# Patient Record
Sex: FEMALE | Race: WHITE | NOT HISPANIC OR LATINO
[De-identification: names, ages, dates, MRNs, and addresses within clinical notes are randomized per-mention and may not be internally consistent; named-entity substitution may affect disease eponyms.]

---

## 2017-01-17 ENCOUNTER — APPOINTMENT (OUTPATIENT)
Dept: SURGERY | Facility: CLINIC | Age: 47
End: 2017-01-17

## 2017-01-17 VITALS — HEART RATE: 70 BPM | RESPIRATION RATE: 15 BRPM | OXYGEN SATURATION: 100 % | TEMPERATURE: 98.2 F

## 2017-01-17 DIAGNOSIS — K63.2 FISTULA OF INTESTINE: ICD-10-CM

## 2018-06-12 ENCOUNTER — APPOINTMENT (OUTPATIENT)
Dept: SURGERY | Facility: CLINIC | Age: 48
End: 2018-06-12
Payer: MEDICAID

## 2018-06-12 VITALS
HEART RATE: 62 BPM | DIASTOLIC BLOOD PRESSURE: 70 MMHG | RESPIRATION RATE: 16 BRPM | SYSTOLIC BLOOD PRESSURE: 104 MMHG | TEMPERATURE: 98.7 F | OXYGEN SATURATION: 100 %

## 2018-06-12 DIAGNOSIS — K43.2 INCISIONAL HERNIA W/OUT OBSTRUCTION OR GANGRENE: ICD-10-CM

## 2018-06-12 PROCEDURE — 99213 OFFICE O/P EST LOW 20 MIN: CPT

## 2018-06-12 RX ORDER — PNV NO.95/FERROUS FUM/FOLIC AC 28MG-0.8MG
TABLET ORAL
Refills: 0 | Status: ACTIVE | COMMUNITY

## 2018-06-12 RX ORDER — CHOLECALCIFEROL (VITAMIN D3) 50 MCG
2000 CAPSULE ORAL
Refills: 0 | Status: ACTIVE | COMMUNITY

## 2018-07-31 ENCOUNTER — APPOINTMENT (OUTPATIENT)
Dept: OPHTHALMOLOGY | Facility: CLINIC | Age: 48
End: 2018-07-31
Payer: MEDICAID

## 2018-07-31 PROCEDURE — 92014 COMPRE OPH EXAM EST PT 1/>: CPT

## 2018-07-31 PROCEDURE — 92020 GONIOSCOPY: CPT

## 2018-07-31 PROCEDURE — 92132 CPTRZD OPH DX IMG ANT SGM: CPT

## 2019-07-23 ENCOUNTER — TRANSCRIPTION ENCOUNTER (OUTPATIENT)
Age: 49
End: 2019-07-23

## 2019-08-07 ENCOUNTER — LABORATORY RESULT (OUTPATIENT)
Age: 49
End: 2019-08-07

## 2019-08-07 ENCOUNTER — NON-APPOINTMENT (OUTPATIENT)
Age: 49
End: 2019-08-07

## 2019-08-07 ENCOUNTER — APPOINTMENT (OUTPATIENT)
Dept: CARDIOLOGY | Facility: CLINIC | Age: 49
End: 2019-08-07
Payer: MEDICAID

## 2019-08-07 VITALS
HEIGHT: 67 IN | OXYGEN SATURATION: 97 % | DIASTOLIC BLOOD PRESSURE: 60 MMHG | HEART RATE: 73 BPM | BODY MASS INDEX: 41.44 KG/M2 | TEMPERATURE: 97.9 F | WEIGHT: 264 LBS | SYSTOLIC BLOOD PRESSURE: 110 MMHG

## 2019-08-07 DIAGNOSIS — R41.3 OTHER AMNESIA: ICD-10-CM

## 2019-08-07 PROCEDURE — 93306 TTE W/DOPPLER COMPLETE: CPT

## 2019-08-07 PROCEDURE — 93000 ELECTROCARDIOGRAM COMPLETE: CPT

## 2019-08-07 PROCEDURE — 99396 PREV VISIT EST AGE 40-64: CPT

## 2019-08-07 NOTE — HISTORY OF PRESENT ILLNESS
[de-identified] : This is a 48 year old lady with a PSH of Partial Gastrectomy, Small bowel Resection, Ventral hernia Repair, and Skin Debridement of the Abdomen presents today to establish care and for her annual wellness exam. Patient states that she has an Abdominal Hernia since 2016, and is awaiting to have surgery with Dr. Bowen once she loses weight. Patient states that she believes that she has had many complications from the multiple surgeries and attributes her symptoms of fatigue and difficulty concentrating to the surgeries. She does feel nauseous after eating, however, she does attribute to surgery.  Patient denies dyspnea, palpitations, chest pain, vomiting, dizziness and lightheadedness.\par  [FreeTextEntry1] : yearly physical

## 2019-08-07 NOTE — REVIEW OF SYSTEMS
[Fatigue] : fatigue [Nausea] : nausea [Memory Loss] : memory loss [Negative] : Psychiatric [Fever] : no fever [Chills] : no chills [Hot Flashes] : no hot flashes [Night Sweats] : no night sweats [Recent Change In Weight] : ~T no recent weight change [Abdominal Pain] : no abdominal pain [Constipation] : no constipation [Diarrhea] : diarrhea [Vomiting] : no vomiting [Heartburn] : no heartburn [Melena] : no melena [Headache] : no headache [Dizziness] : no dizziness [Fainting] : no fainting [Confusion] : no confusion [Unsteady Walking] : no ataxia [de-identified] : Difficulty concentrating

## 2019-08-07 NOTE — PHYSICAL EXAM
[No Acute Distress] : no acute distress [Well Nourished] : well nourished [Well Developed] : well developed [Normal Sclera/Conjunctiva] : normal sclera/conjunctiva [Well-Appearing] : well-appearing [PERRL] : pupils equal round and reactive to light [EOMI] : extraocular movements intact [Normal Outer Ear/Nose] : the outer ears and nose were normal in appearance [Normal Oropharynx] : the oropharynx was normal [No JVD] : no jugular venous distention [Supple] : supple [No Lymphadenopathy] : no lymphadenopathy [No Respiratory Distress] : no respiratory distress  [Thyroid Normal, No Nodules] : the thyroid was normal and there were no nodules present [No Accessory Muscle Use] : no accessory muscle use [Normal Rate] : normal rate  [Clear to Auscultation] : lungs were clear to auscultation bilaterally [Normal S1, S2] : normal S1 and S2 [Regular Rhythm] : with a regular rhythm [No Carotid Bruits] : no carotid bruits [No Murmur] : no murmur heard [No Abdominal Bruit] : a ~M bruit was not heard ~T in the abdomen [No Varicosities] : no varicosities [Pedal Pulses Present] : the pedal pulses are present [No Edema] : there was no peripheral edema [No Palpable Aorta] : no palpable aorta [No Extremity Clubbing/Cyanosis] : no extremity clubbing/cyanosis [Soft] : abdomen soft [Non Tender] : non-tender [No Masses] : no abdominal mass palpated [No HSM] : no HSM [Normal Bowel Sounds] : normal bowel sounds [Normal Posterior Cervical Nodes] : no posterior cervical lymphadenopathy [Normal Anterior Cervical Nodes] : no anterior cervical lymphadenopathy [No Spinal Tenderness] : no spinal tenderness [No CVA Tenderness] : no CVA  tenderness [Grossly Normal Strength/Tone] : grossly normal strength/tone [No Joint Swelling] : no joint swelling [No Rash] : no rash [Coordination Grossly Intact] : coordination grossly intact [No Focal Deficits] : no focal deficits [Normal Gait] : normal gait [Deep Tendon Reflexes (DTR)] : deep tendon reflexes were 2+ and symmetric [Normal Affect] : the affect was normal [Normal Insight/Judgement] : insight and judgment were intact [de-identified] : Abdominal Hernia  [de-identified] : nevi noted

## 2019-08-11 LAB
25(OH)D3 SERPL-MCNC: 31 NG/ML
ALBUMIN SERPL ELPH-MCNC: 4.3 G/DL
ALP BLD-CCNC: 56 U/L
ALT SERPL-CCNC: 16 U/L
ANION GAP SERPL CALC-SCNC: 11 MMOL/L
APPEARANCE: CLEAR
AST SERPL-CCNC: 10 U/L
BACTERIA: ABNORMAL
BASOPHILS # BLD AUTO: 0.12 K/UL
BASOPHILS NFR BLD AUTO: 1.1 %
BILIRUB SERPL-MCNC: 0.6 MG/DL
BILIRUBIN URINE: NEGATIVE
BLOOD URINE: NEGATIVE
BUN SERPL-MCNC: 14 MG/DL
CALCIUM SERPL-MCNC: 9.6 MG/DL
CHLORIDE SERPL-SCNC: 102 MMOL/L
CHOLEST SERPL-MCNC: 182 MG/DL
CHOLEST/HDLC SERPL: 2.8 RATIO
CO2 SERPL-SCNC: 24 MMOL/L
COLOR: YELLOW
CREAT SERPL-MCNC: 0.67 MG/DL
EOSINOPHIL # BLD AUTO: 0.2 K/UL
EOSINOPHIL NFR BLD AUTO: 1.9 %
ESTIMATED AVERAGE GLUCOSE: 111 MG/DL
FERRITIN SERPL-MCNC: 10 NG/ML
FOLATE SERPL-MCNC: >20 NG/ML
GLUCOSE QUALITATIVE U: NEGATIVE
GLUCOSE SERPL-MCNC: 87 MG/DL
HBA1C MFR BLD HPLC: 5.5 %
HCT VFR BLD CALC: 41 %
HDLC SERPL-MCNC: 65 MG/DL
HGB BLD-MCNC: 12.9 G/DL
HYALINE CASTS: 2 /LPF
IMM GRANULOCYTES NFR BLD AUTO: 0.4 %
IRON SERPL-MCNC: 76 UG/DL
KETONES URINE: NEGATIVE
LDLC SERPL CALC-MCNC: 101 MG/DL
LEUKOCYTE ESTERASE URINE: NEGATIVE
LYMPHOCYTES # BLD AUTO: 2.69 K/UL
LYMPHOCYTES NFR BLD AUTO: 25.4 %
MAGNESIUM SERPL-MCNC: 1.9 MG/DL
MAN DIFF?: NORMAL
MCHC RBC-ENTMCNC: 26.9 PG
MCHC RBC-ENTMCNC: 31.5 GM/DL
MCV RBC AUTO: 85.4 FL
MICROSCOPIC-UA: NORMAL
MONOCYTES # BLD AUTO: 0.88 K/UL
MONOCYTES NFR BLD AUTO: 8.3 %
NEUTROPHILS # BLD AUTO: 6.65 K/UL
NEUTROPHILS NFR BLD AUTO: 62.9 %
NITRITE URINE: NEGATIVE
PH URINE: 6
PHOSPHATE SERPL-MCNC: 3.8 MG/DL
PLATELET # BLD AUTO: 363 K/UL
POTASSIUM SERPL-SCNC: 4.5 MMOL/L
PROT SERPL-MCNC: 6.8 G/DL
PROTEIN URINE: NEGATIVE
RBC # BLD: 4.8 M/UL
RBC # FLD: 13.7 %
RED BLOOD CELLS URINE: 2 /HPF
SODIUM SERPL-SCNC: 137 MMOL/L
SPECIFIC GRAVITY URINE: 1.02
SQUAMOUS EPITHELIAL CELLS: 9 /HPF
T3RU NFR SERPL: 1.1 TBI
T4 FREE SERPL-MCNC: 1.1 NG/DL
T4 SERPL-MCNC: 6.1 UG/DL
TRANSFERRIN SERPL-MCNC: 279 MG/DL
TRIGL SERPL-MCNC: 79 MG/DL
TSH SERPL-ACNC: 1.23 UIU/ML
URATE SERPL-MCNC: 4.7 MG/DL
UROBILINOGEN URINE: NORMAL
VIT B12 SERPL-MCNC: 988 PG/ML
WBC # FLD AUTO: 10.58 K/UL
WHITE BLOOD CELLS URINE: 2 /HPF

## 2020-08-26 ENCOUNTER — APPOINTMENT (OUTPATIENT)
Dept: CARDIOLOGY | Facility: CLINIC | Age: 50
End: 2020-08-26
Payer: MEDICAID

## 2020-08-26 ENCOUNTER — NON-APPOINTMENT (OUTPATIENT)
Age: 50
End: 2020-08-26

## 2020-08-26 VITALS
DIASTOLIC BLOOD PRESSURE: 80 MMHG | WEIGHT: 260 LBS | HEIGHT: 67 IN | HEART RATE: 54 BPM | SYSTOLIC BLOOD PRESSURE: 118 MMHG | TEMPERATURE: 98.4 F | BODY MASS INDEX: 40.81 KG/M2 | OXYGEN SATURATION: 96 %

## 2020-08-26 DIAGNOSIS — Z12.39 ENCOUNTER FOR OTHER SCREENING FOR MALIGNANT NEOPLASM OF BREAST: ICD-10-CM

## 2020-08-26 DIAGNOSIS — L81.2 FRECKLES: ICD-10-CM

## 2020-08-26 DIAGNOSIS — Z71.89 OTHER SPECIFIED COUNSELING: ICD-10-CM

## 2020-08-26 DIAGNOSIS — Z12.11 ENCOUNTER FOR SCREENING FOR MALIGNANT NEOPLASM OF COLON: ICD-10-CM

## 2020-08-26 DIAGNOSIS — R53.83 OTHER FATIGUE: ICD-10-CM

## 2020-08-26 PROCEDURE — 93000 ELECTROCARDIOGRAM COMPLETE: CPT

## 2020-08-26 PROCEDURE — 99396 PREV VISIT EST AGE 40-64: CPT

## 2020-08-26 NOTE — PHYSICAL EXAM
[No Acute Distress] : no acute distress [Well Nourished] : well nourished [Well Developed] : well developed [Normal Sclera/Conjunctiva] : normal sclera/conjunctiva [Well-Appearing] : well-appearing [PERRL] : pupils equal round and reactive to light [EOMI] : extraocular movements intact [Normal Oropharynx] : the oropharynx was normal [Normal Outer Ear/Nose] : the outer ears and nose were normal in appearance [No JVD] : no jugular venous distention [No Lymphadenopathy] : no lymphadenopathy [Supple] : supple [Thyroid Normal, No Nodules] : the thyroid was normal and there were no nodules present [No Respiratory Distress] : no respiratory distress  [No Accessory Muscle Use] : no accessory muscle use [Clear to Auscultation] : lungs were clear to auscultation bilaterally [Normal Rate] : normal rate  [Regular Rhythm] : with a regular rhythm [Normal S1, S2] : normal S1 and S2 [No Murmur] : no murmur heard [No Carotid Bruits] : no carotid bruits [No Abdominal Bruit] : a ~M bruit was not heard ~T in the abdomen [No Varicosities] : no varicosities [Pedal Pulses Present] : the pedal pulses are present [No Edema] : there was no peripheral edema [No Palpable Aorta] : no palpable aorta [No Extremity Clubbing/Cyanosis] : no extremity clubbing/cyanosis [Soft] : abdomen soft [Non Tender] : non-tender [No Masses] : no abdominal mass palpated [No HSM] : no HSM [Normal Bowel Sounds] : normal bowel sounds [Normal Posterior Cervical Nodes] : no posterior cervical lymphadenopathy [Normal Anterior Cervical Nodes] : no anterior cervical lymphadenopathy [No CVA Tenderness] : no CVA  tenderness [No Spinal Tenderness] : no spinal tenderness [No Joint Swelling] : no joint swelling [Grossly Normal Strength/Tone] : grossly normal strength/tone [No Rash] : no rash [Coordination Grossly Intact] : coordination grossly intact [Normal Gait] : normal gait [No Focal Deficits] : no focal deficits [Deep Tendon Reflexes (DTR)] : deep tendon reflexes were 2+ and symmetric [Normal Affect] : the affect was normal [Normal Insight/Judgement] : insight and judgment were intact [de-identified] : Abdominal Hernia  [de-identified] : nevi noted

## 2020-08-26 NOTE — HISTORY OF PRESENT ILLNESS
[de-identified] : This is a 49 year old lady with a PSH of Partial Gastrectomy, Small bowel Resection, Ventral hernia Repair, and Skin Debridement of the Abdomen presents today for her annual wellness exam. Patient has been planning to have surgery for her abdominal hernia but has not been able to lose weight.  She also continues to experience fatigue and states she needs to rest if she over exerts herself. She denies any dyspnea and states she experiences more muscle fatigue, has no stamina, to complete tasks. She has no other issues or concerns for today. Patient denies chest pain, shortness of breath, palpitations, dizziness, vision changes, n/v, abdominal pain, changes in bowel/bladder habits,  or appetite.

## 2020-09-03 ENCOUNTER — NON-APPOINTMENT (OUTPATIENT)
Age: 50
End: 2020-09-03

## 2020-09-03 ENCOUNTER — APPOINTMENT (OUTPATIENT)
Dept: OPHTHALMOLOGY | Facility: CLINIC | Age: 50
End: 2020-09-03
Payer: MEDICAID

## 2020-09-03 PROCEDURE — 92020 GONIOSCOPY: CPT

## 2020-09-03 PROCEDURE — 92014 COMPRE OPH EXAM EST PT 1/>: CPT

## 2020-09-03 PROCEDURE — 92132 CPTRZD OPH DX IMG ANT SGM: CPT

## 2020-09-25 ENCOUNTER — APPOINTMENT (OUTPATIENT)
Dept: GASTROENTEROLOGY | Facility: CLINIC | Age: 50
End: 2020-09-25
Payer: MEDICAID

## 2020-09-25 VITALS
WEIGHT: 262 LBS | HEIGHT: 67 IN | BODY MASS INDEX: 41.12 KG/M2 | DIASTOLIC BLOOD PRESSURE: 71 MMHG | SYSTOLIC BLOOD PRESSURE: 111 MMHG | TEMPERATURE: 97.1 F | HEART RATE: 62 BPM

## 2020-09-25 DIAGNOSIS — K43.9 VENTRAL HERNIA W/OUT OBSTRUCTION OR GANGRENE: ICD-10-CM

## 2020-09-25 DIAGNOSIS — Z86.39 PERSONAL HISTORY OF OTHER ENDOCRINE, NUTRITIONAL AND METABOLIC DISEASE: ICD-10-CM

## 2020-09-25 DIAGNOSIS — Z86.2 PERSONAL HISTORY OF DISEASES OF THE BLOOD AND BLOOD-FORMING ORGANS AND CERTAIN DISORDERS INVOLVING THE IMMUNE MECHANISM: ICD-10-CM

## 2020-09-25 DIAGNOSIS — Z56.0 UNEMPLOYMENT, UNSPECIFIED: ICD-10-CM

## 2020-09-25 PROCEDURE — 99203 OFFICE O/P NEW LOW 30 MIN: CPT

## 2020-09-25 SDOH — ECONOMIC STABILITY - INCOME SECURITY: UNEMPLOYMENT, UNSPECIFIED: Z56.0

## 2020-09-25 NOTE — ASSESSMENT
[FreeTextEntry1] : 1.  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  Low ferritin may be from postoperative anatomy, smoldering inflammation, with menses superimposed.  Colorectal source possible, as prep may have been suboptimal at only colonoscopy approximately 2017.\par 3.  Morbid obesity.\par 4.  Abdominal wall hernia, contemplating surgery.\par 5.  Hypercholesterolemia.\par \par Plan:\par 1.  Medical record release for Dr. Pham.\par 2.  Other medical records reviewed at length.\par 3.  Schedule repeat upper endoscopy--she is aware that she would not be a candidate for office anesthesia unless BMI<40--she will try to lose about 10 pounds before proceeding with the endoscopy, otherwise the procedure would be scheduled at the hospital with Dr. Triplett (as I do no hospital work).  Hold iron for several days prior. Procedure, rationale, material risks, and anesthesia plan were reviewed and brochure given.\par 4.  Although she may need another colonoscopy, particularly if indeed bowel prep was suboptimal, she would like to hold off for now; she also refused rectal exam today (she stated that it was recently done by GYN).

## 2020-09-25 NOTE — HISTORY OF PRESENT ILLNESS
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  She has been contemplating surgery for abdominal wall hernia, but would like to lose more weight before proceeding.  She did not require chemotherapy or radiation for the stomach cancer.  Other than occasional nausea and chronic fatigue, she denies GI or constitutional symptoms at this time.  Last labs revealed ferritin 12 with normal H/H.  She has been followed by Dr. Pham ([facility]), with last upper endoscopy approximately 3-4 years ago; and same-day colonoscopy was reportedly negative, although prep was suboptimal--she states that she does not want repeat colonoscopy at this time, but realizes that she is long overdue for repeating the upper endoscopy.

## 2020-09-25 NOTE — CONSULT LETTER
[Dear  ___] : Dear  [unfilled], [Consult Letter:] : I had the pleasure of evaluating your patient, [unfilled]. [Please see my note below.] : Please see my note below. [Consult Closing:] : Thank you very much for allowing me to participate in the care of this patient.  If you have any questions, please do not hesitate to contact me. [Sincerely,] : Sincerely, [FreeTextEntry3] : Sohan Rangel M.D.\par

## 2020-09-25 NOTE — PHYSICAL EXAM
[General Appearance - Alert] : alert [General Appearance - In No Acute Distress] : in no acute distress [General Appearance - Well Nourished] : well nourished [General Appearance - Well Developed] : well developed [Sclera] : the sclera and conjunctiva were normal [Neck Appearance] : the appearance of the neck was normal [Neck Cervical Mass (___cm)] : no neck mass was observed [Jugular Venous Distention Increased] : there was no jugular-venous distention [Thyroid Diffuse Enlargement] : the thyroid was not enlarged [Thyroid Nodule] : there were no palpable thyroid nodules [Auscultation Breath Sounds / Voice Sounds] : lungs were clear to auscultation bilaterally [Heart Rate And Rhythm] : heart rate was normal and rhythm regular [Heart Sounds] : normal S1 and S2 [Heart Sounds Gallop] : no gallops [Murmurs] : no murmurs [Heart Sounds Pericardial Friction Rub] : no pericardial rub [Full Pulse] : the pedal pulses are present [Edema] : there was no peripheral edema [Bowel Sounds] : normal bowel sounds [Abdomen Soft] : soft [Abdomen Tenderness] : non-tender [Abdomen Mass (___ Cm)] : no abdominal mass palpated [Patient Refused] : rectal exam was refused by the patient [Cervical Lymph Nodes Enlarged Posterior Bilaterally] : posterior cervical [Cervical Lymph Nodes Enlarged Anterior Bilaterally] : anterior cervical [Supraclavicular Lymph Nodes Enlarged Bilaterally] : supraclavicular [Inguinal Lymph Nodes Enlarged Bilaterally] : inguinal [Abnormal Walk] : normal gait [Nail Clubbing] : no clubbing  or cyanosis of the fingernails [Musculoskeletal - Swelling] : no joint swelling seen [Skin Color & Pigmentation] : normal skin color and pigmentation [Skin Turgor] : normal skin turgor [] : no rash [Oriented To Time, Place, And Person] : oriented to person, place, and time [Impaired Insight] : insight and judgment were intact [Affect] : the affect was normal [FreeTextEntry1] : surgical scars; large reducible abdominal wall hernia

## 2020-09-25 NOTE — REASON FOR VISIT
[Consultation] : a consultation visit [FreeTextEntry1] : PHx stomach CA (2011).  Pt had part of stomach and small intestine resected.  Pt c/o fatigue.  Pt needs repeat EGD.

## 2020-09-25 NOTE — REVIEW OF SYSTEMS
[Feeling Tired] : feeling tired [Muscle Weakness] : muscle weakness [Feelings Of Weakness] : feelings of weakness [Negative] : Heme/Lymph [As Noted in HPI] : as noted in HPI

## 2020-10-08 ENCOUNTER — APPOINTMENT (OUTPATIENT)
Dept: OTOLARYNGOLOGY | Facility: CLINIC | Age: 50
End: 2020-10-08
Payer: MEDICAID

## 2020-10-08 VITALS
BODY MASS INDEX: 41.12 KG/M2 | DIASTOLIC BLOOD PRESSURE: 67 MMHG | WEIGHT: 262 LBS | SYSTOLIC BLOOD PRESSURE: 111 MMHG | HEIGHT: 67 IN | TEMPERATURE: 98 F | HEART RATE: 75 BPM

## 2020-10-08 DIAGNOSIS — H80.92 UNSPECIFIED OTOSCLEROSIS, LEFT EAR: ICD-10-CM

## 2020-10-08 DIAGNOSIS — H90.11 CONDUCTIVE HEARING LOSS, UNILATERAL, RIGHT EAR, WITH UNRESTRICTED HEARING ON THE CONTRALATERAL SIDE: ICD-10-CM

## 2020-10-08 PROCEDURE — 99204 OFFICE O/P NEW MOD 45 MIN: CPT | Mod: 25

## 2020-10-08 PROCEDURE — 92567 TYMPANOMETRY: CPT

## 2020-10-08 PROCEDURE — 92557 COMPREHENSIVE HEARING TEST: CPT

## 2020-10-08 NOTE — DATA REVIEWED
[de-identified] : left kid to moid CHL\par progression from previous audio in 2013 and 2007\par Hearing Test performed to evaluate the extent of hearing loss and  to explain pt's symptoms\par

## 2020-10-08 NOTE — ASSESSMENT
[FreeTextEntry1] : Left hearing loss c/w otosclerosis\par Rec Hearing Aid vs Stapedectopmy\par CHL hearing loss-cleared for hearing aid\par \par f/u annual and prn

## 2020-10-08 NOTE — HISTORY OF PRESENT ILLNESS
[No] : patient does not have a  history of radiation therapy [de-identified] : 50 yo female\par known h/o left hearing loss 2/2 otosclerosis for >10 yrs\par senses that hearing loss has progressed over the years\par Occ left tinnitus w/o vertigo\par no other modifying factors\par no nasal or throat complaints\par  [Tinnitus] : tinnitus [Anxiety] : no anxiety [Dizziness] : no dizziness [Headache] : no headache [Hearing Loss] : hearing loss [Vertigo] : no vertigo [Presbycusis] : no presbycusis [Congenital Ear Malformation] : no congenital ear malformation [Meniere Disease] : no Meniere disease [Otosclerosis] : otosclerosis [Perilymphatic Fistula] : no perilymphatic fistula [Hypertension] : no hypertension [Early Onset Hearing Loss] : no early onset hearing loss [Stroke] : no stroke [Facial Pain] : no facial pain [Facial Pressure] : no facial pressure [Nasal Congestion] : no nasal congestion [Ear Fullness] : no ear fullness [Allergic Rhinitis] : no allergic rhinitis [Septal Deviation] : no septal deviation [Environmental Allergies] : no environmental allergies [Seasonal Allergies] : no seasonal allergies [Environmental Allergens] : no environmental allergens [Adenoidectomy] : no adenoidectomy [Allergies] : no allergies [Asthma] : no asthma [Neck Mass] : no neck mass [Neck Pain] : no neck pain [Chills] : no chills [Cold Intolerance] : no cold intolerance [Cough] : no cough [Fatigue] : no fatigue [Heat Intolerance] : no heat intolerance [Hyperthyroidism] : no hyperthyroidism [Sialadenitis] : no sialadenitis [Hodgkin Disease] : no hodgkin disease [Tobacco Use] : no tobacco use [Alcohol Use] : no alcohol use

## 2020-11-06 ENCOUNTER — APPOINTMENT (OUTPATIENT)
Dept: DISASTER EMERGENCY | Facility: CLINIC | Age: 50
End: 2020-11-06

## 2020-11-08 LAB — SARS-COV-2 N GENE NPH QL NAA+PROBE: NOT DETECTED

## 2020-11-09 ENCOUNTER — APPOINTMENT (OUTPATIENT)
Dept: GASTROENTEROLOGY | Facility: CLINIC | Age: 50
End: 2020-11-09

## 2020-12-23 PROBLEM — Z12.11 ENCOUNTER FOR SCREENING COLONOSCOPY: Status: RESOLVED | Noted: 2019-08-07 | Resolved: 2020-12-23

## 2021-01-01 ENCOUNTER — TRANSCRIPTION ENCOUNTER (OUTPATIENT)
Age: 51
End: 2021-01-01

## 2021-01-01 ENCOUNTER — OUTPATIENT (OUTPATIENT)
Dept: OUTPATIENT SERVICES | Facility: HOSPITAL | Age: 51
LOS: 1 days | Discharge: HOME | End: 2021-01-01

## 2021-01-01 ENCOUNTER — APPOINTMENT (OUTPATIENT)
Dept: SURGICAL ONCOLOGY | Facility: CLINIC | Age: 51
End: 2021-01-01
Payer: MEDICAID

## 2021-01-01 ENCOUNTER — APPOINTMENT (OUTPATIENT)
Dept: CARDIOLOGY | Facility: CLINIC | Age: 51
End: 2021-01-01
Payer: MEDICAID

## 2021-01-01 ENCOUNTER — NON-APPOINTMENT (OUTPATIENT)
Age: 51
End: 2021-01-01

## 2021-01-01 ENCOUNTER — APPOINTMENT (OUTPATIENT)
Dept: CARDIOLOGY | Facility: CLINIC | Age: 51
End: 2021-01-01

## 2021-01-01 ENCOUNTER — APPOINTMENT (OUTPATIENT)
Dept: GASTROENTEROLOGY | Facility: CLINIC | Age: 51
End: 2021-01-01
Payer: MEDICAID

## 2021-01-01 ENCOUNTER — APPOINTMENT (OUTPATIENT)
Dept: GASTROENTEROLOGY | Facility: HOSPITAL | Age: 51
End: 2021-01-01

## 2021-01-01 ENCOUNTER — APPOINTMENT (OUTPATIENT)
Dept: PULMONOLOGY | Facility: CLINIC | Age: 51
End: 2021-01-01
Payer: MEDICAID

## 2021-01-01 ENCOUNTER — LABORATORY RESULT (OUTPATIENT)
Age: 51
End: 2021-01-01

## 2021-01-01 ENCOUNTER — APPOINTMENT (OUTPATIENT)
Dept: HEMATOLOGY ONCOLOGY | Facility: CLINIC | Age: 51
End: 2021-01-01
Payer: MEDICAID

## 2021-01-01 ENCOUNTER — APPOINTMENT (OUTPATIENT)
Dept: INTERNAL MEDICINE | Facility: CLINIC | Age: 51
End: 2021-01-01
Payer: MEDICAID

## 2021-01-01 ENCOUNTER — APPOINTMENT (OUTPATIENT)
Dept: OBGYN | Facility: CLINIC | Age: 51
End: 2021-01-01
Payer: MEDICAID

## 2021-01-01 ENCOUNTER — APPOINTMENT (OUTPATIENT)
Dept: SLEEP CENTER | Facility: CLINIC | Age: 51
End: 2021-01-01
Payer: MEDICAID

## 2021-01-01 ENCOUNTER — INPATIENT (INPATIENT)
Facility: HOSPITAL | Age: 51
LOS: 8 days | Discharge: HOME | DRG: 374 | End: 2021-12-30
Attending: STUDENT IN AN ORGANIZED HEALTH CARE EDUCATION/TRAINING PROGRAM | Admitting: STUDENT IN AN ORGANIZED HEALTH CARE EDUCATION/TRAINING PROGRAM
Payer: MEDICAID

## 2021-01-01 ENCOUNTER — APPOINTMENT (OUTPATIENT)
Dept: DISASTER EMERGENCY | Facility: CLINIC | Age: 51
End: 2021-01-01

## 2021-01-01 ENCOUNTER — OUTPATIENT (OUTPATIENT)
Dept: OUTPATIENT SERVICES | Facility: HOSPITAL | Age: 51
LOS: 1 days | End: 2021-01-01
Payer: MEDICAID

## 2021-01-01 ENCOUNTER — RESULT REVIEW (OUTPATIENT)
Age: 51
End: 2021-01-01

## 2021-01-01 ENCOUNTER — APPOINTMENT (OUTPATIENT)
Dept: PULMONOLOGY | Facility: CLINIC | Age: 51
End: 2021-01-01

## 2021-01-01 ENCOUNTER — OUTPATIENT (OUTPATIENT)
Dept: OUTPATIENT SERVICES | Facility: HOSPITAL | Age: 51
LOS: 1 days | Discharge: HOME | End: 2021-01-01
Payer: MEDICAID

## 2021-01-01 ENCOUNTER — APPOINTMENT (OUTPATIENT)
Dept: CT IMAGING | Facility: CLINIC | Age: 51
End: 2021-01-01
Payer: MEDICAID

## 2021-01-01 VITALS
HEART RATE: 65 BPM | DIASTOLIC BLOOD PRESSURE: 63 MMHG | TEMPERATURE: 98 F | SYSTOLIC BLOOD PRESSURE: 102 MMHG | RESPIRATION RATE: 17 BRPM | OXYGEN SATURATION: 97 % | WEIGHT: 244.93 LBS | HEIGHT: 66 IN

## 2021-01-01 VITALS
HEART RATE: 83 BPM | TEMPERATURE: 98 F | RESPIRATION RATE: 18 BRPM | HEIGHT: 66.93 IN | SYSTOLIC BLOOD PRESSURE: 130 MMHG | OXYGEN SATURATION: 98 % | DIASTOLIC BLOOD PRESSURE: 80 MMHG | WEIGHT: 248.02 LBS | BODY MASS INDEX: 38.93 KG/M2

## 2021-01-01 VITALS
BODY MASS INDEX: 38.14 KG/M2 | HEART RATE: 58 BPM | SYSTOLIC BLOOD PRESSURE: 98 MMHG | WEIGHT: 243 LBS | TEMPERATURE: 98.7 F | DIASTOLIC BLOOD PRESSURE: 70 MMHG | HEIGHT: 67 IN | OXYGEN SATURATION: 99 %

## 2021-01-01 VITALS
SYSTOLIC BLOOD PRESSURE: 129 MMHG | DIASTOLIC BLOOD PRESSURE: 83 MMHG | WEIGHT: 245 LBS | BODY MASS INDEX: 38.91 KG/M2 | OXYGEN SATURATION: 99 % | HEART RATE: 79 BPM | RESPIRATION RATE: 18 BRPM | HEIGHT: 66.5 IN

## 2021-01-01 VITALS
WEIGHT: 210.1 LBS | HEART RATE: 104 BPM | RESPIRATION RATE: 18 BRPM | HEIGHT: 66 IN | TEMPERATURE: 98 F | OXYGEN SATURATION: 98 % | SYSTOLIC BLOOD PRESSURE: 131 MMHG | DIASTOLIC BLOOD PRESSURE: 81 MMHG

## 2021-01-01 VITALS
WEIGHT: 253 LBS | BODY MASS INDEX: 39.71 KG/M2 | TEMPERATURE: 97.9 F | DIASTOLIC BLOOD PRESSURE: 76 MMHG | HEART RATE: 74 BPM | HEIGHT: 67 IN | SYSTOLIC BLOOD PRESSURE: 130 MMHG | OXYGEN SATURATION: 98 %

## 2021-01-01 VITALS
RESPIRATION RATE: 18 BRPM | TEMPERATURE: 98 F | SYSTOLIC BLOOD PRESSURE: 124 MMHG | OXYGEN SATURATION: 98 % | HEART RATE: 92 BPM | DIASTOLIC BLOOD PRESSURE: 79 MMHG

## 2021-01-01 VITALS
HEART RATE: 65 BPM | TEMPERATURE: 97.6 F | SYSTOLIC BLOOD PRESSURE: 110 MMHG | WEIGHT: 255 LBS | BODY MASS INDEX: 40.02 KG/M2 | BODY MASS INDEX: 40.02 KG/M2 | DIASTOLIC BLOOD PRESSURE: 76 MMHG | HEIGHT: 67 IN | TEMPERATURE: 97.8 F | SYSTOLIC BLOOD PRESSURE: 107 MMHG | HEART RATE: 66 BPM | WEIGHT: 255 LBS | HEIGHT: 67 IN | DIASTOLIC BLOOD PRESSURE: 71 MMHG

## 2021-01-01 VITALS
SYSTOLIC BLOOD PRESSURE: 118 MMHG | HEART RATE: 62 BPM | HEIGHT: 67 IN | DIASTOLIC BLOOD PRESSURE: 68 MMHG | WEIGHT: 239 LBS | BODY MASS INDEX: 37.51 KG/M2

## 2021-01-01 VITALS
HEIGHT: 65.98 IN | RESPIRATION RATE: 17 BRPM | DIASTOLIC BLOOD PRESSURE: 68 MMHG | HEART RATE: 73 BPM | TEMPERATURE: 97.5 F | WEIGHT: 239.86 LBS | DIASTOLIC BLOOD PRESSURE: 72 MMHG | BODY MASS INDEX: 38.55 KG/M2 | OXYGEN SATURATION: 98 % | SYSTOLIC BLOOD PRESSURE: 110 MMHG | SYSTOLIC BLOOD PRESSURE: 108 MMHG

## 2021-01-01 VITALS
TEMPERATURE: 97.6 F | DIASTOLIC BLOOD PRESSURE: 70 MMHG | HEART RATE: 78 BPM | SYSTOLIC BLOOD PRESSURE: 130 MMHG | RESPIRATION RATE: 18 BRPM | OXYGEN SATURATION: 98 % | BODY MASS INDEX: 39.05 KG/M2 | WEIGHT: 243 LBS | HEIGHT: 66 IN

## 2021-01-01 VITALS
HEART RATE: 75 BPM | TEMPERATURE: 98 F | SYSTOLIC BLOOD PRESSURE: 103 MMHG | OXYGEN SATURATION: 97 % | DIASTOLIC BLOOD PRESSURE: 58 MMHG | RESPIRATION RATE: 17 BRPM

## 2021-01-01 DIAGNOSIS — R93.89 ABNORMAL FINDINGS ON DIAGNOSTIC IMAGING OF OTHER SPECIFIED BODY STRUCTURES: ICD-10-CM

## 2021-01-01 DIAGNOSIS — C16.1 MALIGNANT NEOPLASM OF FUNDUS OF STOMACH: ICD-10-CM

## 2021-01-01 DIAGNOSIS — R82.71 BACTERIURIA: ICD-10-CM

## 2021-01-01 DIAGNOSIS — E78.5 HYPERLIPIDEMIA, UNSPECIFIED: ICD-10-CM

## 2021-01-01 DIAGNOSIS — R60.0 LOCALIZED EDEMA: ICD-10-CM

## 2021-01-01 DIAGNOSIS — N17.9 ACUTE KIDNEY FAILURE, UNSPECIFIED: ICD-10-CM

## 2021-01-01 DIAGNOSIS — Z85.028 PERSONAL HISTORY OF OTHER MALIGNANT NEOPLASM OF STOMACH: ICD-10-CM

## 2021-01-01 DIAGNOSIS — N83.201 UNSPECIFIED OVARIAN CYST, RIGHT SIDE: ICD-10-CM

## 2021-01-01 DIAGNOSIS — R11.0 NAUSEA: ICD-10-CM

## 2021-01-01 DIAGNOSIS — N30.00 ACUTE CYSTITIS WITHOUT HEMATURIA: ICD-10-CM

## 2021-01-01 DIAGNOSIS — K28.9 GASTROJEJUNAL ULCER, UNSPECIFIED AS ACUTE OR CHRONIC, W/OUT HEMORRHAGE OR PERFORATION: ICD-10-CM

## 2021-01-01 DIAGNOSIS — Z90.3 ACQUIRED ABSENCE OF STOMACH [PART OF]: Chronic | ICD-10-CM

## 2021-01-01 DIAGNOSIS — E66.9 OBESITY, UNSPECIFIED: ICD-10-CM

## 2021-01-01 DIAGNOSIS — R63.8 OTHER SYMPTOMS AND SIGNS CONCERNING FOOD AND FLUID INTAKE: ICD-10-CM

## 2021-01-01 DIAGNOSIS — R53.81 OTHER MALAISE: ICD-10-CM

## 2021-01-01 DIAGNOSIS — R13.10 DYSPHAGIA, UNSPECIFIED: ICD-10-CM

## 2021-01-01 DIAGNOSIS — J90 PLEURAL EFFUSION, NOT ELSEWHERE CLASSIFIED: ICD-10-CM

## 2021-01-01 DIAGNOSIS — Z98.89 OTHER SPECIFIED POSTPROCEDURAL STATES: Chronic | ICD-10-CM

## 2021-01-01 DIAGNOSIS — E87.2 ACIDOSIS: ICD-10-CM

## 2021-01-01 DIAGNOSIS — R79.0 ABNORMAL LVL OF BLOOD MINERAL: ICD-10-CM

## 2021-01-01 DIAGNOSIS — K21.9 GASTRO-ESOPHAGEAL REFLUX DISEASE W/OUT ESOPHAGITIS: ICD-10-CM

## 2021-01-01 DIAGNOSIS — K59.00 CONSTIPATION, UNSPECIFIED: ICD-10-CM

## 2021-01-01 DIAGNOSIS — C16.9 MALIGNANT NEOPLASM OF STOMACH, UNSPECIFIED: ICD-10-CM

## 2021-01-01 DIAGNOSIS — Z00.8 ENCOUNTER FOR OTHER GENERAL EXAMINATION: ICD-10-CM

## 2021-01-01 DIAGNOSIS — Z01.818 ENCOUNTER FOR OTHER PREPROCEDURAL EXAMINATION: ICD-10-CM

## 2021-01-01 DIAGNOSIS — R94.2 ABNORMAL RESULTS OF PULMONARY FUNCTION STUDIES: ICD-10-CM

## 2021-01-01 DIAGNOSIS — E55.9 VITAMIN D DEFICIENCY, UNSPECIFIED: ICD-10-CM

## 2021-01-01 DIAGNOSIS — R10.9 UNSPECIFIED ABDOMINAL PAIN: ICD-10-CM

## 2021-01-01 DIAGNOSIS — Z00.00 ENCOUNTER FOR GENERAL ADULT MEDICAL EXAMINATION W/OUT ABNORMAL FINDINGS: ICD-10-CM

## 2021-01-01 DIAGNOSIS — R06.83 SNORING: ICD-10-CM

## 2021-01-01 DIAGNOSIS — R94.31 ABNORMAL ELECTROCARDIOGRAM [ECG] [EKG]: ICD-10-CM

## 2021-01-01 DIAGNOSIS — R52 PAIN, UNSPECIFIED: ICD-10-CM

## 2021-01-01 DIAGNOSIS — R10.13 EPIGASTRIC PAIN: ICD-10-CM

## 2021-01-01 DIAGNOSIS — Z01.419 ENCOUNTER FOR GYNECOLOGICAL EXAMINATION (GENERAL) (ROUTINE) W/OUT ABNORMAL FINDINGS: ICD-10-CM

## 2021-01-01 DIAGNOSIS — R18.0 MALIGNANT ASCITES: ICD-10-CM

## 2021-01-01 DIAGNOSIS — R11.2 NAUSEA WITH VOMITING, UNSPECIFIED: ICD-10-CM

## 2021-01-01 DIAGNOSIS — Z63.5 DISRUPTION OF FAMILY BY SEPARATION AND DIVORCE: ICD-10-CM

## 2021-01-01 DIAGNOSIS — Z51.5 ENCOUNTER FOR PALLIATIVE CARE: ICD-10-CM

## 2021-01-01 DIAGNOSIS — R18.8 OTHER ASCITES: ICD-10-CM

## 2021-01-01 DIAGNOSIS — Z29.9 ENCOUNTER FOR PROPHYLACTIC MEASURES, UNSPECIFIED: ICD-10-CM

## 2021-01-01 LAB
25(OH)D3 SERPL-MCNC: 72.7 NG/ML
ACANTHOCYTES BLD QL SMEAR: SLIGHT — SIGNIFICANT CHANGE UP
ALBUMIN FLD-MCNC: 2.6 G/DL — SIGNIFICANT CHANGE UP
ALBUMIN FLD-MCNC: 2.8 G/DL — SIGNIFICANT CHANGE UP
ALBUMIN SERPL ELPH-MCNC: 2.6 G/DL — LOW (ref 3.3–5)
ALBUMIN SERPL ELPH-MCNC: 2.6 G/DL — LOW (ref 3.3–5)
ALBUMIN SERPL ELPH-MCNC: 2.8 G/DL — LOW (ref 3.3–5)
ALBUMIN SERPL ELPH-MCNC: 2.9 G/DL — LOW (ref 3.3–5)
ALBUMIN SERPL ELPH-MCNC: 2.9 G/DL — LOW (ref 3.3–5)
ALBUMIN SERPL ELPH-MCNC: 3.4 G/DL — SIGNIFICANT CHANGE UP (ref 3.3–5)
ALBUMIN SERPL ELPH-MCNC: 3.5 G/DL — SIGNIFICANT CHANGE UP (ref 3.3–5)
ALBUMIN SERPL ELPH-MCNC: 3.7 G/DL — SIGNIFICANT CHANGE UP (ref 3.3–5)
ALBUMIN SERPL ELPH-MCNC: 4 G/DL
ALBUMIN SERPL ELPH-MCNC: 4 G/DL — SIGNIFICANT CHANGE UP (ref 3.3–5)
ALBUMIN SERPL ELPH-MCNC: 4.4 G/DL
ALP BLD-CCNC: 57 U/L
ALP BLD-CCNC: 70 U/L
ALP SERPL-CCNC: 1394 U/L — HIGH (ref 40–120)
ALP SERPL-CCNC: 1432 U/L — HIGH (ref 40–120)
ALP SERPL-CCNC: 1581 U/L — HIGH (ref 40–120)
ALP SERPL-CCNC: 1763 U/L — HIGH (ref 40–120)
ALP SERPL-CCNC: 1806 U/L — HIGH (ref 40–120)
ALP SERPL-CCNC: 2125 U/L — HIGH (ref 40–120)
ALP SERPL-CCNC: 2178 U/L — HIGH (ref 40–120)
ALP SERPL-CCNC: 2291 U/L — HIGH (ref 40–120)
ALP SERPL-CCNC: 2383 U/L — HIGH (ref 40–120)
ALT FLD-CCNC: 12 U/L — SIGNIFICANT CHANGE UP (ref 10–45)
ALT FLD-CCNC: 15 U/L — SIGNIFICANT CHANGE UP (ref 10–45)
ALT FLD-CCNC: 19 U/L — SIGNIFICANT CHANGE UP (ref 10–45)
ALT FLD-CCNC: 26 U/L — SIGNIFICANT CHANGE UP (ref 10–45)
ALT FLD-CCNC: 30 U/L — SIGNIFICANT CHANGE UP (ref 10–45)
ALT FLD-CCNC: 41 U/L — SIGNIFICANT CHANGE UP (ref 10–45)
ALT FLD-CCNC: 53 U/L — HIGH (ref 10–45)
ALT SERPL-CCNC: 14 U/L
ALT SERPL-CCNC: 17 U/L
AMYLASE/CREAT SERPL: 52 U/L
ANION GAP SERPL CALC-SCNC: 12 MMOL/L
ANION GAP SERPL CALC-SCNC: 13 MMOL/L — SIGNIFICANT CHANGE UP (ref 5–17)
ANION GAP SERPL CALC-SCNC: 14 MMOL/L
ANION GAP SERPL CALC-SCNC: 14 MMOL/L — SIGNIFICANT CHANGE UP (ref 5–17)
ANION GAP SERPL CALC-SCNC: 15 MMOL/L — SIGNIFICANT CHANGE UP (ref 5–17)
ANION GAP SERPL CALC-SCNC: 16 MMOL/L — SIGNIFICANT CHANGE UP (ref 5–17)
ANION GAP SERPL CALC-SCNC: 18 MMOL/L — HIGH (ref 5–17)
ANION GAP SERPL CALC-SCNC: 19 MMOL/L — HIGH (ref 5–17)
ANION GAP SERPL CALC-SCNC: 19 MMOL/L — HIGH (ref 5–17)
ANISOCYTOSIS BLD QL: SLIGHT — SIGNIFICANT CHANGE UP
ANISOCYTOSIS BLD QL: SLIGHT — SIGNIFICANT CHANGE UP
APPEARANCE UR: ABNORMAL
APPEARANCE UR: ABNORMAL
APPEARANCE: ABNORMAL
APTT BLD: 29.7 SEC — SIGNIFICANT CHANGE UP (ref 27.5–35.5)
APTT BLD: 30.2 SEC — SIGNIFICANT CHANGE UP (ref 27.5–35.5)
APTT BLD: 30.5 SEC — SIGNIFICANT CHANGE UP (ref 27.5–35.5)
AST SERPL-CCNC: 12 U/L — SIGNIFICANT CHANGE UP (ref 10–40)
AST SERPL-CCNC: 13 U/L
AST SERPL-CCNC: 13 U/L — SIGNIFICANT CHANGE UP (ref 10–40)
AST SERPL-CCNC: 15 U/L — SIGNIFICANT CHANGE UP (ref 10–40)
AST SERPL-CCNC: 15 U/L — SIGNIFICANT CHANGE UP (ref 10–40)
AST SERPL-CCNC: 16 U/L — SIGNIFICANT CHANGE UP (ref 10–40)
AST SERPL-CCNC: 17 U/L — SIGNIFICANT CHANGE UP (ref 10–40)
AST SERPL-CCNC: 21 U/L
AST SERPL-CCNC: 35 U/L — SIGNIFICANT CHANGE UP (ref 10–40)
AST SERPL-CCNC: 36 U/L — SIGNIFICANT CHANGE UP (ref 10–40)
AST SERPL-CCNC: 83 U/L — HIGH (ref 10–40)
AUER BODIES BLD QL SMEAR: PRESENT — SIGNIFICANT CHANGE UP
B PERT IGG+IGM PNL SER: ABNORMAL
B PERT IGG+IGM PNL SER: CLEAR — SIGNIFICANT CHANGE UP
BACTERIA # UR AUTO: ABNORMAL
BACTERIA # UR AUTO: NEGATIVE — SIGNIFICANT CHANGE UP
BACTERIA: NEGATIVE
BASE EXCESS BLDV CALC-SCNC: -2 MMOL/L — SIGNIFICANT CHANGE UP (ref -2–2)
BASOPHILS # BLD AUTO: 0.05 K/UL
BASOPHILS # BLD AUTO: 0.06 K/UL — SIGNIFICANT CHANGE UP (ref 0–0.2)
BASOPHILS # BLD AUTO: 0.07 K/UL
BASOPHILS # BLD AUTO: 0.07 K/UL — SIGNIFICANT CHANGE UP (ref 0–0.2)
BASOPHILS # BLD AUTO: 0.1 K/UL — SIGNIFICANT CHANGE UP (ref 0–0.2)
BASOPHILS # BLD AUTO: 0.13 K/UL — SIGNIFICANT CHANGE UP (ref 0–0.2)
BASOPHILS NFR BLD AUTO: 0.6 % — SIGNIFICANT CHANGE UP (ref 0–2)
BASOPHILS NFR BLD AUTO: 0.7 %
BASOPHILS NFR BLD AUTO: 0.7 %
BASOPHILS NFR BLD AUTO: 0.8 % — SIGNIFICANT CHANGE UP (ref 0–2)
BASOPHILS NFR BLD AUTO: 0.9 % — SIGNIFICANT CHANGE UP (ref 0–2)
BASOPHILS NFR BLD AUTO: 0.9 % — SIGNIFICANT CHANGE UP (ref 0–2)
BILIRUB DIRECT SERPL-MCNC: 0.2 MG/DL
BILIRUB SERPL-MCNC: 0.3 MG/DL — SIGNIFICANT CHANGE UP (ref 0.2–1.2)
BILIRUB SERPL-MCNC: 0.4 MG/DL — SIGNIFICANT CHANGE UP (ref 0.2–1.2)
BILIRUB SERPL-MCNC: 0.5 MG/DL — SIGNIFICANT CHANGE UP (ref 0.2–1.2)
BILIRUB SERPL-MCNC: 0.6 MG/DL — SIGNIFICANT CHANGE UP (ref 0.2–1.2)
BILIRUB SERPL-MCNC: 0.6 MG/DL — SIGNIFICANT CHANGE UP (ref 0.2–1.2)
BILIRUB SERPL-MCNC: 0.7 MG/DL
BILIRUB SERPL-MCNC: 0.8 MG/DL
BILIRUB UR-MCNC: ABNORMAL
BILIRUB UR-MCNC: NEGATIVE — SIGNIFICANT CHANGE UP
BILIRUBIN URINE: NEGATIVE
BLD GP AB SCN SERPL QL: NEGATIVE — SIGNIFICANT CHANGE UP
BLD GP AB SCN SERPL QL: NEGATIVE — SIGNIFICANT CHANGE UP
BLOOD URINE: NEGATIVE
BUN SERPL-MCNC: 10 MG/DL — SIGNIFICANT CHANGE UP (ref 7–23)
BUN SERPL-MCNC: 13 MG/DL
BUN SERPL-MCNC: 13 MG/DL — SIGNIFICANT CHANGE UP (ref 7–23)
BUN SERPL-MCNC: 14 MG/DL — SIGNIFICANT CHANGE UP (ref 7–23)
BUN SERPL-MCNC: 16 MG/DL
BUN SERPL-MCNC: 18 MG/DL — SIGNIFICANT CHANGE UP (ref 7–23)
BUN SERPL-MCNC: 21 MG/DL — SIGNIFICANT CHANGE UP (ref 7–23)
BUN SERPL-MCNC: 27 MG/DL — HIGH (ref 7–23)
BUN SERPL-MCNC: 29 MG/DL — HIGH (ref 7–23)
BUN SERPL-MCNC: 7 MG/DL — SIGNIFICANT CHANGE UP (ref 7–23)
BUN SERPL-MCNC: 8 MG/DL — SIGNIFICANT CHANGE UP (ref 7–23)
BUN SERPL-MCNC: 9 MG/DL — SIGNIFICANT CHANGE UP (ref 7–23)
BURR CELLS BLD QL SMEAR: PRESENT — SIGNIFICANT CHANGE UP
BURR CELLS BLD QL SMEAR: SLIGHT — SIGNIFICANT CHANGE UP
CA-I SERPL-SCNC: 1.18 MMOL/L — SIGNIFICANT CHANGE UP (ref 1.15–1.33)
CALCIUM OXALATE CRYSTALS: ABNORMAL
CALCIUM SERPL-MCNC: 8 MG/DL — LOW (ref 8.4–10.5)
CALCIUM SERPL-MCNC: 8.1 MG/DL — LOW (ref 8.4–10.5)
CALCIUM SERPL-MCNC: 8.1 MG/DL — LOW (ref 8.4–10.5)
CALCIUM SERPL-MCNC: 8.2 MG/DL — LOW (ref 8.4–10.5)
CALCIUM SERPL-MCNC: 8.8 MG/DL — SIGNIFICANT CHANGE UP (ref 8.4–10.5)
CALCIUM SERPL-MCNC: 8.8 MG/DL — SIGNIFICANT CHANGE UP (ref 8.4–10.5)
CALCIUM SERPL-MCNC: 8.9 MG/DL — SIGNIFICANT CHANGE UP (ref 8.4–10.5)
CALCIUM SERPL-MCNC: 9.1 MG/DL — SIGNIFICANT CHANGE UP (ref 8.4–10.5)
CALCIUM SERPL-MCNC: 9.2 MG/DL — SIGNIFICANT CHANGE UP (ref 8.4–10.5)
CALCIUM SERPL-MCNC: 9.2 MG/DL — SIGNIFICANT CHANGE UP (ref 8.4–10.5)
CALCIUM SERPL-MCNC: 9.3 MG/DL — SIGNIFICANT CHANGE UP (ref 8.4–10.5)
CALCIUM SERPL-MCNC: 9.5 MG/DL
CALCIUM SERPL-MCNC: 9.7 MG/DL
CALCIUM SERPL-MCNC: 9.7 MG/DL — SIGNIFICANT CHANGE UP (ref 8.4–10.5)
CHLORIDE BLDV-SCNC: 94 MMOL/L — LOW (ref 96–108)
CHLORIDE SERPL-SCNC: 100 MMOL/L — SIGNIFICANT CHANGE UP (ref 96–108)
CHLORIDE SERPL-SCNC: 100 MMOL/L — SIGNIFICANT CHANGE UP (ref 96–108)
CHLORIDE SERPL-SCNC: 101 MMOL/L — SIGNIFICANT CHANGE UP (ref 96–108)
CHLORIDE SERPL-SCNC: 101 MMOL/L — SIGNIFICANT CHANGE UP (ref 96–108)
CHLORIDE SERPL-SCNC: 102 MMOL/L
CHLORIDE SERPL-SCNC: 104 MMOL/L
CHLORIDE SERPL-SCNC: 92 MMOL/L — LOW (ref 96–108)
CHLORIDE SERPL-SCNC: 94 MMOL/L — LOW (ref 96–108)
CHLORIDE SERPL-SCNC: 94 MMOL/L — LOW (ref 96–108)
CHLORIDE SERPL-SCNC: 96 MMOL/L — SIGNIFICANT CHANGE UP (ref 96–108)
CHLORIDE SERPL-SCNC: 98 MMOL/L — SIGNIFICANT CHANGE UP (ref 96–108)
CHLORIDE SERPL-SCNC: 98 MMOL/L — SIGNIFICANT CHANGE UP (ref 96–108)
CHLORIDE SERPL-SCNC: 99 MMOL/L — SIGNIFICANT CHANGE UP (ref 96–108)
CHLORIDE SERPL-SCNC: 99 MMOL/L — SIGNIFICANT CHANGE UP (ref 96–108)
CHOLEST SERPL-MCNC: 194 MG/DL
CO2 BLDV-SCNC: 24 MMOL/L — SIGNIFICANT CHANGE UP (ref 22–26)
CO2 SERPL-SCNC: 17 MMOL/L — LOW (ref 22–31)
CO2 SERPL-SCNC: 18 MMOL/L — LOW (ref 22–31)
CO2 SERPL-SCNC: 18 MMOL/L — LOW (ref 22–31)
CO2 SERPL-SCNC: 19 MMOL/L — LOW (ref 22–31)
CO2 SERPL-SCNC: 19 MMOL/L — LOW (ref 22–31)
CO2 SERPL-SCNC: 20 MMOL/L — LOW (ref 22–31)
CO2 SERPL-SCNC: 21 MMOL/L — LOW (ref 22–31)
CO2 SERPL-SCNC: 22 MMOL/L
CO2 SERPL-SCNC: 22 MMOL/L — SIGNIFICANT CHANGE UP (ref 22–31)
CO2 SERPL-SCNC: 24 MMOL/L
COD CRY URNS QL: ABNORMAL
COLOR FLD: YELLOW — SIGNIFICANT CHANGE UP
COLOR FLD: YELLOW — SIGNIFICANT CHANGE UP
COLOR SPEC: YELLOW — SIGNIFICANT CHANGE UP
COLOR SPEC: YELLOW — SIGNIFICANT CHANGE UP
COLOR: YELLOW
COMMENT - URINE: SIGNIFICANT CHANGE UP
CREAT ?TM UR-MCNC: 216 MG/DL — SIGNIFICANT CHANGE UP
CREAT SERPL-MCNC: 0.65 MG/DL
CREAT SERPL-MCNC: 0.7 MG/DL — SIGNIFICANT CHANGE UP (ref 0.5–1.3)
CREAT SERPL-MCNC: 0.72 MG/DL — SIGNIFICANT CHANGE UP (ref 0.5–1.3)
CREAT SERPL-MCNC: 0.73 MG/DL
CREAT SERPL-MCNC: 0.75 MG/DL — SIGNIFICANT CHANGE UP (ref 0.5–1.3)
CREAT SERPL-MCNC: 0.81 MG/DL — SIGNIFICANT CHANGE UP (ref 0.5–1.3)
CREAT SERPL-MCNC: 0.82 MG/DL — SIGNIFICANT CHANGE UP (ref 0.5–1.3)
CREAT SERPL-MCNC: 0.82 MG/DL — SIGNIFICANT CHANGE UP (ref 0.5–1.3)
CREAT SERPL-MCNC: 0.96 MG/DL — SIGNIFICANT CHANGE UP (ref 0.5–1.3)
CREAT SERPL-MCNC: 1.4 MG/DL — HIGH (ref 0.5–1.3)
CREAT SERPL-MCNC: 2.1 MG/DL — HIGH (ref 0.5–1.3)
CREAT SERPL-MCNC: 3.31 MG/DL — HIGH (ref 0.5–1.3)
CREAT SERPL-MCNC: 3.97 MG/DL — HIGH (ref 0.5–1.3)
CREAT SERPL-MCNC: 4.67 MG/DL — HIGH (ref 0.5–1.3)
CRP SERPL-MCNC: <3 MG/L
CULTURE RESULTS: NO GROWTH — SIGNIFICANT CHANGE UP
CULTURE RESULTS: SIGNIFICANT CHANGE UP
CYTOLOGY CVX/VAG DOC THIN PREP: NORMAL
DACRYOCYTES BLD QL SMEAR: SLIGHT — SIGNIFICANT CHANGE UP
DIFF PNL FLD: ABNORMAL
DIFF PNL FLD: ABNORMAL
ELLIPTOCYTES BLD QL SMEAR: SLIGHT — SIGNIFICANT CHANGE UP
EOSINOPHIL # BLD AUTO: 0 K/UL — SIGNIFICANT CHANGE UP (ref 0–0.5)
EOSINOPHIL # BLD AUTO: 0 K/UL — SIGNIFICANT CHANGE UP (ref 0–0.5)
EOSINOPHIL # BLD AUTO: 0.09 K/UL — SIGNIFICANT CHANGE UP (ref 0–0.5)
EOSINOPHIL # BLD AUTO: 0.13 K/UL
EOSINOPHIL # BLD AUTO: 0.17 K/UL — SIGNIFICANT CHANGE UP (ref 0–0.5)
EOSINOPHIL # BLD AUTO: 0.18 K/UL
EOSINOPHIL NFR BLD AUTO: 0 % — SIGNIFICANT CHANGE UP (ref 0–6)
EOSINOPHIL NFR BLD AUTO: 0 % — SIGNIFICANT CHANGE UP (ref 0–6)
EOSINOPHIL NFR BLD AUTO: 0.8 % — SIGNIFICANT CHANGE UP (ref 0–6)
EOSINOPHIL NFR BLD AUTO: 1.7 %
EOSINOPHIL NFR BLD AUTO: 1.8 %
EOSINOPHIL NFR BLD AUTO: 2.2 % — SIGNIFICANT CHANGE UP (ref 0–6)
EPI CELLS # UR: 30 /HPF — HIGH
EPI CELLS # UR: 54 /HPF — HIGH
ERYTHROCYTE [SEDIMENTATION RATE] IN BLOOD BY WESTERGREN METHOD: 34 MM/HR
ESTIMATED AVERAGE GLUCOSE: 103 MG/DL
ESTIMATED AVERAGE GLUCOSE: 108 MG/DL
FERRITIN SERPL-MCNC: 12 NG/ML
FLUID INTAKE SUBSTANCE CLASS: SIGNIFICANT CHANGE UP
FLUID INTAKE SUBSTANCE CLASS: SIGNIFICANT CHANGE UP
FLUID SEGMENTED GRANULOCYTES: 1 % — SIGNIFICANT CHANGE UP
FLUID SEGMENTED GRANULOCYTES: 1 % — SIGNIFICANT CHANGE UP
GAS PNL BLDV: 130 MMOL/L — LOW (ref 136–145)
GAS PNL BLDV: SIGNIFICANT CHANGE UP
GAS PNL BLDV: SIGNIFICANT CHANGE UP
GGT SERPL-CCNC: 33 U/L — SIGNIFICANT CHANGE UP (ref 8–40)
GGT SERPL-CCNC: 7 U/L
GIANT PLATELETS BLD QL SMEAR: PRESENT — SIGNIFICANT CHANGE UP
GLUCOSE BLDV-MCNC: 107 MG/DL — HIGH (ref 70–99)
GLUCOSE FLD-MCNC: 86 MG/DL — SIGNIFICANT CHANGE UP
GLUCOSE FLD-MCNC: 90 MG/DL — SIGNIFICANT CHANGE UP
GLUCOSE QUALITATIVE U: NEGATIVE
GLUCOSE SERPL-MCNC: 100 MG/DL — HIGH (ref 70–99)
GLUCOSE SERPL-MCNC: 103 MG/DL — HIGH (ref 70–99)
GLUCOSE SERPL-MCNC: 105 MG/DL — HIGH (ref 70–99)
GLUCOSE SERPL-MCNC: 106 MG/DL — HIGH (ref 70–99)
GLUCOSE SERPL-MCNC: 111 MG/DL — HIGH (ref 70–99)
GLUCOSE SERPL-MCNC: 117 MG/DL — HIGH (ref 70–99)
GLUCOSE SERPL-MCNC: 83 MG/DL
GLUCOSE SERPL-MCNC: 89 MG/DL — SIGNIFICANT CHANGE UP (ref 70–99)
GLUCOSE SERPL-MCNC: 90 MG/DL — SIGNIFICANT CHANGE UP (ref 70–99)
GLUCOSE SERPL-MCNC: 90 MG/DL — SIGNIFICANT CHANGE UP (ref 70–99)
GLUCOSE SERPL-MCNC: 91 MG/DL — SIGNIFICANT CHANGE UP (ref 70–99)
GLUCOSE SERPL-MCNC: 92 MG/DL
GLUCOSE SERPL-MCNC: 92 MG/DL — SIGNIFICANT CHANGE UP (ref 70–99)
GLUCOSE SERPL-MCNC: 95 MG/DL — SIGNIFICANT CHANGE UP (ref 70–99)
GLUCOSE UR QL: NEGATIVE — SIGNIFICANT CHANGE UP
GLUCOSE UR QL: NEGATIVE — SIGNIFICANT CHANGE UP
GRAM STN FLD: SIGNIFICANT CHANGE UP
GRAM STN FLD: SIGNIFICANT CHANGE UP
HBA1C MFR BLD HPLC: 5.2 %
HBA1C MFR BLD HPLC: 5.4 %
HCO3 BLDV-SCNC: 23 MMOL/L — SIGNIFICANT CHANGE UP (ref 22–29)
HCT VFR BLD CALC: 28.1 % — LOW (ref 34.5–45)
HCT VFR BLD CALC: 28.2 % — LOW (ref 34.5–45)
HCT VFR BLD CALC: 31.5 % — LOW (ref 34.5–45)
HCT VFR BLD CALC: 31.7 % — LOW (ref 34.5–45)
HCT VFR BLD CALC: 32.1 % — LOW (ref 34.5–45)
HCT VFR BLD CALC: 32.5 % — LOW (ref 34.5–45)
HCT VFR BLD CALC: 34.1 % — LOW (ref 34.5–45)
HCT VFR BLD CALC: 34.9 % — SIGNIFICANT CHANGE UP (ref 34.5–45)
HCT VFR BLD CALC: 35.9 % — SIGNIFICANT CHANGE UP (ref 34.5–45)
HCT VFR BLD CALC: 36.5 % — SIGNIFICANT CHANGE UP (ref 34.5–45)
HCT VFR BLD CALC: 36.9 % — SIGNIFICANT CHANGE UP (ref 34.5–45)
HCT VFR BLD CALC: 40.9 %
HCT VFR BLD CALC: 43.2 %
HCT VFR BLDA CALC: 36 % — SIGNIFICANT CHANGE UP (ref 34.5–46.5)
HDLC SERPL-MCNC: 43 MG/DL
HGB BLD CALC-MCNC: 12.1 G/DL — SIGNIFICANT CHANGE UP (ref 11.7–16.1)
HGB BLD-MCNC: 10 G/DL — LOW (ref 11.5–15.5)
HGB BLD-MCNC: 10.4 G/DL — LOW (ref 11.5–15.5)
HGB BLD-MCNC: 10.8 G/DL — LOW (ref 11.5–15.5)
HGB BLD-MCNC: 11.1 G/DL — LOW (ref 11.5–15.5)
HGB BLD-MCNC: 11.5 G/DL — SIGNIFICANT CHANGE UP (ref 11.5–15.5)
HGB BLD-MCNC: 11.5 G/DL — SIGNIFICANT CHANGE UP (ref 11.5–15.5)
HGB BLD-MCNC: 12.9 G/DL
HGB BLD-MCNC: 13.3 G/DL
HGB BLD-MCNC: 8.8 G/DL — LOW (ref 11.5–15.5)
HGB BLD-MCNC: 8.9 G/DL — LOW (ref 11.5–15.5)
HGB BLD-MCNC: 9.8 G/DL — LOW (ref 11.5–15.5)
HPV HIGH+LOW RISK DNA PNL CVX: NOT DETECTED
HYALINE CASTS # UR AUTO: 148 /LPF — HIGH (ref 0–2)
HYALINE CASTS # UR AUTO: 15 /LPF — HIGH (ref 0–2)
HYALINE CASTS: 0 /LPF
HYPOCHROMIA BLD QL: SLIGHT — SIGNIFICANT CHANGE UP
IMM GRANULOCYTES NFR BLD AUTO: 0.3 %
IMM GRANULOCYTES NFR BLD AUTO: 0.3 %
IMM GRANULOCYTES NFR BLD AUTO: 2.6 % — HIGH (ref 0–1.5)
IMM GRANULOCYTES NFR BLD AUTO: 3.5 % — HIGH (ref 0–1.5)
INR BLD: 1.26 RATIO — HIGH (ref 0.88–1.16)
INR BLD: 1.28 RATIO — HIGH (ref 0.88–1.16)
INR BLD: 1.29 RATIO — HIGH (ref 0.88–1.16)
INR BLD: 1.36 RATIO — HIGH (ref 0.88–1.16)
IRON SATN MFR SERPL: 20 %
IRON SERPL-MCNC: 70 UG/DL
KETONES UR-MCNC: ABNORMAL
KETONES UR-MCNC: NEGATIVE — SIGNIFICANT CHANGE UP
KETONES URINE: NEGATIVE
LACTATE BLDV-MCNC: 1.3 MMOL/L — SIGNIFICANT CHANGE UP (ref 0.7–2)
LACTATE SERPL-SCNC: 0.9 MMOL/L — SIGNIFICANT CHANGE UP (ref 0.7–2)
LDH SERPL L TO P-CCNC: 169 U/L — SIGNIFICANT CHANGE UP
LDH SERPL L TO P-CCNC: 232 U/L — SIGNIFICANT CHANGE UP
LDH SERPL L TO P-CCNC: 252 U/L — HIGH (ref 50–242)
LDLC SERPL DIRECT ASSAY-MCNC: 132 MG/DL
LEUKOCYTE ESTERASE UR-ACNC: ABNORMAL
LEUKOCYTE ESTERASE UR-ACNC: NEGATIVE — SIGNIFICANT CHANGE UP
LEUKOCYTE ESTERASE URINE: NEGATIVE
LIDOCAIN IGE QN: 44 U/L — SIGNIFICANT CHANGE UP (ref 7–60)
LPL SERPL-CCNC: 23 U/L
LYMPHOCYTES # BLD AUTO: 0.38 K/UL — LOW (ref 1–3.3)
LYMPHOCYTES # BLD AUTO: 1.43 K/UL — SIGNIFICANT CHANGE UP (ref 1–3.3)
LYMPHOCYTES # BLD AUTO: 1.65 K/UL — SIGNIFICANT CHANGE UP (ref 1–3.3)
LYMPHOCYTES # BLD AUTO: 12.6 % — LOW (ref 13–44)
LYMPHOCYTES # BLD AUTO: 2.32 K/UL
LYMPHOCYTES # BLD AUTO: 2.56 K/UL — SIGNIFICANT CHANGE UP (ref 1–3.3)
LYMPHOCYTES # BLD AUTO: 2.6 % — LOW (ref 13–44)
LYMPHOCYTES # BLD AUTO: 2.71 K/UL
LYMPHOCYTES # BLD AUTO: 21.4 % — SIGNIFICANT CHANGE UP (ref 13–44)
LYMPHOCYTES # BLD AUTO: 23.5 % — SIGNIFICANT CHANGE UP (ref 13–44)
LYMPHOCYTES # FLD: 40 % — SIGNIFICANT CHANGE UP
LYMPHOCYTES # FLD: 48 % — SIGNIFICANT CHANGE UP
LYMPHOCYTES NFR BLD AUTO: 27.5 %
LYMPHOCYTES NFR BLD AUTO: 30.4 %
MAGNESIUM SERPL-MCNC: 1.5 MG/DL — LOW (ref 1.6–2.6)
MAGNESIUM SERPL-MCNC: 1.6 MG/DL — SIGNIFICANT CHANGE UP (ref 1.6–2.6)
MAGNESIUM SERPL-MCNC: 1.7 MG/DL — SIGNIFICANT CHANGE UP (ref 1.6–2.6)
MAGNESIUM SERPL-MCNC: 1.7 MG/DL — SIGNIFICANT CHANGE UP (ref 1.6–2.6)
MAGNESIUM SERPL-MCNC: 1.8 MG/DL — SIGNIFICANT CHANGE UP (ref 1.6–2.6)
MAGNESIUM SERPL-MCNC: 1.8 MG/DL — SIGNIFICANT CHANGE UP (ref 1.6–2.6)
MAGNESIUM SERPL-MCNC: 1.9 MG/DL
MAGNESIUM SERPL-MCNC: 1.9 MG/DL — SIGNIFICANT CHANGE UP (ref 1.6–2.6)
MAGNESIUM SERPL-MCNC: 2 MG/DL — SIGNIFICANT CHANGE UP (ref 1.6–2.6)
MAN DIFF?: NORMAL
MAN DIFF?: NORMAL
MANUAL SMEAR VERIFICATION: SIGNIFICANT CHANGE UP
MANUAL SMEAR VERIFICATION: SIGNIFICANT CHANGE UP
MCHC RBC-ENTMCNC: 22.4 PG — LOW (ref 27–34)
MCHC RBC-ENTMCNC: 22.5 PG — LOW (ref 27–34)
MCHC RBC-ENTMCNC: 22.6 PG — LOW (ref 27–34)
MCHC RBC-ENTMCNC: 22.8 PG — LOW (ref 27–34)
MCHC RBC-ENTMCNC: 22.9 PG — LOW (ref 27–34)
MCHC RBC-ENTMCNC: 23 PG — LOW (ref 27–34)
MCHC RBC-ENTMCNC: 23.1 PG — LOW (ref 27–34)
MCHC RBC-ENTMCNC: 23.2 PG — LOW (ref 27–34)
MCHC RBC-ENTMCNC: 23.2 PG — LOW (ref 27–34)
MCHC RBC-ENTMCNC: 27 PG
MCHC RBC-ENTMCNC: 27 PG
MCHC RBC-ENTMCNC: 30.5 GM/DL — LOW (ref 32–36)
MCHC RBC-ENTMCNC: 30.8 GM/DL
MCHC RBC-ENTMCNC: 30.8 GM/DL — LOW (ref 32–36)
MCHC RBC-ENTMCNC: 30.9 GM/DL — LOW (ref 32–36)
MCHC RBC-ENTMCNC: 31.2 GM/DL — LOW (ref 32–36)
MCHC RBC-ENTMCNC: 31.2 GM/DL — LOW (ref 32–36)
MCHC RBC-ENTMCNC: 31.3 GM/DL — LOW (ref 32–36)
MCHC RBC-ENTMCNC: 31.5 GM/DL
MCHC RBC-ENTMCNC: 31.5 GM/DL — LOW (ref 32–36)
MCHC RBC-ENTMCNC: 31.6 GM/DL — LOW (ref 32–36)
MCHC RBC-ENTMCNC: 31.7 GM/DL — LOW (ref 32–36)
MCV RBC AUTO: 71.9 FL — LOW (ref 80–100)
MCV RBC AUTO: 72.1 FL — LOW (ref 80–100)
MCV RBC AUTO: 72.3 FL — LOW (ref 80–100)
MCV RBC AUTO: 72.7 FL — LOW (ref 80–100)
MCV RBC AUTO: 73 FL — LOW (ref 80–100)
MCV RBC AUTO: 73.1 FL — LOW (ref 80–100)
MCV RBC AUTO: 73.2 FL — LOW (ref 80–100)
MCV RBC AUTO: 74 FL — LOW (ref 80–100)
MCV RBC AUTO: 74.4 FL — LOW (ref 80–100)
MCV RBC AUTO: 74.9 FL — LOW (ref 80–100)
MCV RBC AUTO: 75.9 FL — LOW (ref 80–100)
MCV RBC AUTO: 85.7 FL
MCV RBC AUTO: 87.8 FL
MESOTHL CELL # FLD: 11 % — SIGNIFICANT CHANGE UP
MESOTHL CELL # FLD: 30 % — SIGNIFICANT CHANGE UP
MICROCYTES BLD QL: SLIGHT — SIGNIFICANT CHANGE UP
MICROCYTES BLD QL: SLIGHT — SIGNIFICANT CHANGE UP
MICROSCOPIC-UA: NORMAL
MONOCYTES # BLD AUTO: 0.75 K/UL — SIGNIFICANT CHANGE UP (ref 0–0.9)
MONOCYTES # BLD AUTO: 0.88 K/UL
MONOCYTES # BLD AUTO: 0.88 K/UL
MONOCYTES # BLD AUTO: 0.97 K/UL — HIGH (ref 0–0.9)
MONOCYTES # BLD AUTO: 1.24 K/UL — HIGH (ref 0–0.9)
MONOCYTES # BLD AUTO: 1.52 K/UL — HIGH (ref 0–0.9)
MONOCYTES NFR BLD AUTO: 10.5 % — SIGNIFICANT CHANGE UP (ref 2–14)
MONOCYTES NFR BLD AUTO: 10.9 % — SIGNIFICANT CHANGE UP (ref 2–14)
MONOCYTES NFR BLD AUTO: 11.5 %
MONOCYTES NFR BLD AUTO: 12.6 % — SIGNIFICANT CHANGE UP (ref 2–14)
MONOCYTES NFR BLD AUTO: 6.9 % — SIGNIFICANT CHANGE UP (ref 2–14)
MONOCYTES NFR BLD AUTO: 8.9 %
MONOS+MACROS # FLD: 25 % — SIGNIFICANT CHANGE UP
MONOS+MACROS # FLD: 9 % — SIGNIFICANT CHANGE UP
NEUTROPHILS # BLD AUTO: 12.44 K/UL — HIGH (ref 1.8–7.4)
NEUTROPHILS # BLD AUTO: 4.24 K/UL
NEUTROPHILS # BLD AUTO: 4.59 K/UL — SIGNIFICANT CHANGE UP (ref 1.8–7.4)
NEUTROPHILS # BLD AUTO: 6 K/UL
NEUTROPHILS # BLD AUTO: 7.5 K/UL — HIGH (ref 1.8–7.4)
NEUTROPHILS # BLD AUTO: 8.26 K/UL — HIGH (ref 1.8–7.4)
NEUTROPHILS NFR BLD AUTO: 55.4 %
NEUTROPHILS NFR BLD AUTO: 59.5 % — SIGNIFICANT CHANGE UP (ref 43–77)
NEUTROPHILS NFR BLD AUTO: 60.8 %
NEUTROPHILS NFR BLD AUTO: 68.7 % — SIGNIFICANT CHANGE UP (ref 43–77)
NEUTROPHILS NFR BLD AUTO: 72.5 % — SIGNIFICANT CHANGE UP (ref 43–77)
NEUTROPHILS NFR BLD AUTO: 85.1 % — HIGH (ref 43–77)
NEUTS BAND # BLD: 0.9 % — SIGNIFICANT CHANGE UP (ref 0–8)
NITRITE UR-MCNC: NEGATIVE — SIGNIFICANT CHANGE UP
NITRITE UR-MCNC: NEGATIVE — SIGNIFICANT CHANGE UP
NITRITE URINE: NEGATIVE
NRBC # BLD: 0 /100 WBCS — SIGNIFICANT CHANGE UP (ref 0–0)
OTHER CELLS FLD MANUAL: 15 % — SIGNIFICANT CHANGE UP
OTHER CELLS FLD MANUAL: 20 % — SIGNIFICANT CHANGE UP
PCO2 BLDV: 40 MMHG — SIGNIFICANT CHANGE UP (ref 39–42)
PH BLDV: 7.37 — SIGNIFICANT CHANGE UP (ref 7.32–7.43)
PH FLD: 7.63 — SIGNIFICANT CHANGE UP
PH UR: 5.5 — SIGNIFICANT CHANGE UP (ref 5–8)
PH UR: 7.5 — SIGNIFICANT CHANGE UP (ref 5–8)
PH URINE: 6
PHOSPHATE SERPL-MCNC: 2.3 MG/DL — LOW (ref 2.5–4.5)
PHOSPHATE SERPL-MCNC: 2.7 MG/DL — SIGNIFICANT CHANGE UP (ref 2.5–4.5)
PHOSPHATE SERPL-MCNC: 2.8 MG/DL — SIGNIFICANT CHANGE UP (ref 2.5–4.5)
PHOSPHATE SERPL-MCNC: 3 MG/DL — SIGNIFICANT CHANGE UP (ref 2.5–4.5)
PHOSPHATE SERPL-MCNC: 3.3 MG/DL — SIGNIFICANT CHANGE UP (ref 2.5–4.5)
PHOSPHATE SERPL-MCNC: 3.3 MG/DL — SIGNIFICANT CHANGE UP (ref 2.5–4.5)
PHOSPHATE SERPL-MCNC: 3.6 MG/DL — SIGNIFICANT CHANGE UP (ref 2.5–4.5)
PHOSPHATE SERPL-MCNC: 4 MG/DL
PLAT MORPH BLD: NORMAL — SIGNIFICANT CHANGE UP
PLAT MORPH BLD: NORMAL — SIGNIFICANT CHANGE UP
PLATELET # BLD AUTO: 250 K/UL — SIGNIFICANT CHANGE UP (ref 150–400)
PLATELET # BLD AUTO: 250 K/UL — SIGNIFICANT CHANGE UP (ref 150–400)
PLATELET # BLD AUTO: 262 K/UL — SIGNIFICANT CHANGE UP (ref 150–400)
PLATELET # BLD AUTO: 274 K/UL — SIGNIFICANT CHANGE UP (ref 150–400)
PLATELET # BLD AUTO: 275 K/UL — SIGNIFICANT CHANGE UP (ref 150–400)
PLATELET # BLD AUTO: 281 K/UL — SIGNIFICANT CHANGE UP (ref 150–400)
PLATELET # BLD AUTO: 301 K/UL — SIGNIFICANT CHANGE UP (ref 150–400)
PLATELET # BLD AUTO: 314 K/UL — SIGNIFICANT CHANGE UP (ref 150–400)
PLATELET # BLD AUTO: 330 K/UL
PLATELET # BLD AUTO: 333 K/UL — SIGNIFICANT CHANGE UP (ref 150–400)
PLATELET # BLD AUTO: 352 K/UL — SIGNIFICANT CHANGE UP (ref 150–400)
PLATELET # BLD AUTO: 365 K/UL
PLATELET # BLD AUTO: 389 K/UL — SIGNIFICANT CHANGE UP (ref 150–400)
PO2 BLDV: 26 MMHG — SIGNIFICANT CHANGE UP (ref 25–45)
POIKILOCYTOSIS BLD QL AUTO: SIGNIFICANT CHANGE UP
POIKILOCYTOSIS BLD QL AUTO: SLIGHT — SIGNIFICANT CHANGE UP
POLYCHROMASIA BLD QL SMEAR: SLIGHT — SIGNIFICANT CHANGE UP
POTASSIUM BLDV-SCNC: 2.9 MMOL/L — CRITICAL LOW (ref 3.5–5.1)
POTASSIUM SERPL-MCNC: 3 MMOL/L — LOW (ref 3.5–5.3)
POTASSIUM SERPL-MCNC: 3.1 MMOL/L — LOW (ref 3.5–5.3)
POTASSIUM SERPL-MCNC: 3.2 MMOL/L — LOW (ref 3.5–5.3)
POTASSIUM SERPL-MCNC: 3.3 MMOL/L — LOW (ref 3.5–5.3)
POTASSIUM SERPL-MCNC: 3.4 MMOL/L — LOW (ref 3.5–5.3)
POTASSIUM SERPL-MCNC: 3.4 MMOL/L — LOW (ref 3.5–5.3)
POTASSIUM SERPL-MCNC: 3.5 MMOL/L — SIGNIFICANT CHANGE UP (ref 3.5–5.3)
POTASSIUM SERPL-MCNC: 3.6 MMOL/L — SIGNIFICANT CHANGE UP (ref 3.5–5.3)
POTASSIUM SERPL-MCNC: 3.6 MMOL/L — SIGNIFICANT CHANGE UP (ref 3.5–5.3)
POTASSIUM SERPL-MCNC: 3.8 MMOL/L — SIGNIFICANT CHANGE UP (ref 3.5–5.3)
POTASSIUM SERPL-MCNC: 3.8 MMOL/L — SIGNIFICANT CHANGE UP (ref 3.5–5.3)
POTASSIUM SERPL-MCNC: 4.4 MMOL/L — SIGNIFICANT CHANGE UP (ref 3.5–5.3)
POTASSIUM SERPL-SCNC: 3 MMOL/L — LOW (ref 3.5–5.3)
POTASSIUM SERPL-SCNC: 3.1 MMOL/L — LOW (ref 3.5–5.3)
POTASSIUM SERPL-SCNC: 3.2 MMOL/L — LOW (ref 3.5–5.3)
POTASSIUM SERPL-SCNC: 3.3 MMOL/L — LOW (ref 3.5–5.3)
POTASSIUM SERPL-SCNC: 3.4 MMOL/L — LOW (ref 3.5–5.3)
POTASSIUM SERPL-SCNC: 3.4 MMOL/L — LOW (ref 3.5–5.3)
POTASSIUM SERPL-SCNC: 3.5 MMOL/L — SIGNIFICANT CHANGE UP (ref 3.5–5.3)
POTASSIUM SERPL-SCNC: 3.6 MMOL/L — SIGNIFICANT CHANGE UP (ref 3.5–5.3)
POTASSIUM SERPL-SCNC: 3.6 MMOL/L — SIGNIFICANT CHANGE UP (ref 3.5–5.3)
POTASSIUM SERPL-SCNC: 3.8 MMOL/L — SIGNIFICANT CHANGE UP (ref 3.5–5.3)
POTASSIUM SERPL-SCNC: 3.8 MMOL/L — SIGNIFICANT CHANGE UP (ref 3.5–5.3)
POTASSIUM SERPL-SCNC: 4.2 MMOL/L
POTASSIUM SERPL-SCNC: 4.4 MMOL/L
POTASSIUM SERPL-SCNC: 4.4 MMOL/L — SIGNIFICANT CHANGE UP (ref 3.5–5.3)
PROCALCITONIN SERPL-MCNC: 0.22 NG/ML — HIGH (ref 0.02–0.1)
PROT FLD-MCNC: 3.9 G/DL — SIGNIFICANT CHANGE UP
PROT FLD-MCNC: 4.3 G/DL — SIGNIFICANT CHANGE UP
PROT SERPL-MCNC: 5.3 G/DL — LOW (ref 6–8.3)
PROT SERPL-MCNC: 5.3 G/DL — LOW (ref 6–8.3)
PROT SERPL-MCNC: 5.7 G/DL — LOW (ref 6–8.3)
PROT SERPL-MCNC: 5.7 G/DL — LOW (ref 6–8.3)
PROT SERPL-MCNC: 5.9 G/DL — LOW (ref 6–8.3)
PROT SERPL-MCNC: 6.1 G/DL — SIGNIFICANT CHANGE UP (ref 6–8.3)
PROT SERPL-MCNC: 6.4 G/DL — SIGNIFICANT CHANGE UP (ref 6–8.3)
PROT SERPL-MCNC: 6.6 G/DL — SIGNIFICANT CHANGE UP (ref 6–8.3)
PROT SERPL-MCNC: 7 G/DL
PROT SERPL-MCNC: 7 G/DL — SIGNIFICANT CHANGE UP (ref 6–8.3)
PROT SERPL-MCNC: 7.1 G/DL
PROT UR-MCNC: 100 — SIGNIFICANT CHANGE UP
PROT UR-MCNC: ABNORMAL
PROTEIN URINE: ABNORMAL
PROTHROM AB SERPL-ACNC: 15 SEC — HIGH (ref 10.6–13.6)
PROTHROM AB SERPL-ACNC: 15.2 SEC — HIGH (ref 10.6–13.6)
PROTHROM AB SERPL-ACNC: 15.3 SEC — HIGH (ref 10.6–13.6)
PROTHROM AB SERPL-ACNC: 16.1 SEC — HIGH (ref 10.6–13.6)
RBC # BLD: 3.85 M/UL — SIGNIFICANT CHANGE UP (ref 3.8–5.2)
RBC # BLD: 3.91 M/UL — SIGNIFICANT CHANGE UP (ref 3.8–5.2)
RBC # BLD: 4.23 M/UL — SIGNIFICANT CHANGE UP (ref 3.8–5.2)
RBC # BLD: 4.33 M/UL — SIGNIFICANT CHANGE UP (ref 3.8–5.2)
RBC # BLD: 4.37 M/UL — SIGNIFICANT CHANGE UP (ref 3.8–5.2)
RBC # BLD: 4.44 M/UL — SIGNIFICANT CHANGE UP (ref 3.8–5.2)
RBC # BLD: 4.49 M/UL — SIGNIFICANT CHANGE UP (ref 3.8–5.2)
RBC # BLD: 4.77 M/UL
RBC # BLD: 4.77 M/UL — SIGNIFICANT CHANGE UP (ref 3.8–5.2)
RBC # BLD: 4.85 M/UL — SIGNIFICANT CHANGE UP (ref 3.8–5.2)
RBC # BLD: 4.92 M/UL
RBC # BLD: 4.99 M/UL — SIGNIFICANT CHANGE UP (ref 3.8–5.2)
RBC # BLD: 5.13 M/UL — SIGNIFICANT CHANGE UP (ref 3.8–5.2)
RBC # FLD: 14.7 %
RBC # FLD: 15.4 %
RBC # FLD: 19.9 % — HIGH (ref 10.3–14.5)
RBC # FLD: 20.3 % — HIGH (ref 10.3–14.5)
RBC # FLD: 20.5 % — HIGH (ref 10.3–14.5)
RBC # FLD: 20.6 % — HIGH (ref 10.3–14.5)
RBC # FLD: 20.8 % — HIGH (ref 10.3–14.5)
RBC # FLD: 20.8 % — HIGH (ref 10.3–14.5)
RBC # FLD: 21 % — HIGH (ref 10.3–14.5)
RBC # FLD: 21.2 % — HIGH (ref 10.3–14.5)
RBC # FLD: 21.3 % — HIGH (ref 10.3–14.5)
RBC BLD AUTO: ABNORMAL
RBC BLD AUTO: ABNORMAL
RBC CASTS # UR COMP ASSIST: 5 /HPF — HIGH (ref 0–4)
RBC CASTS # UR COMP ASSIST: 6 /HPF — HIGH (ref 0–4)
RCV VOL RI: 112 /UL — HIGH (ref 0–0)
RCV VOL RI: 310 /UL — HIGH (ref 0–0)
RED BLOOD CELLS URINE: 3 /HPF
RH IG SCN BLD-IMP: POSITIVE — SIGNIFICANT CHANGE UP
RH IG SCN BLD-IMP: POSITIVE — SIGNIFICANT CHANGE UP
SAO2 % BLDV: 39.8 % — LOW (ref 67–88)
SARS-COV-2 N GENE NPH QL NAA+PROBE: NOT DETECTED
SARS-COV-2 N GENE NPH QL NAA+PROBE: NOT DETECTED
SARS-COV-2 RNA SPEC QL NAA+PROBE: SIGNIFICANT CHANGE UP
SARS-COV-2 RNA SPEC QL NAA+PROBE: SIGNIFICANT CHANGE UP
SCHISTOCYTES BLD QL AUTO: SLIGHT — SIGNIFICANT CHANGE UP
SODIUM SERPL-SCNC: 131 MMOL/L — LOW (ref 135–145)
SODIUM SERPL-SCNC: 131 MMOL/L — LOW (ref 135–145)
SODIUM SERPL-SCNC: 132 MMOL/L — LOW (ref 135–145)
SODIUM SERPL-SCNC: 133 MMOL/L — LOW (ref 135–145)
SODIUM SERPL-SCNC: 134 MMOL/L — LOW (ref 135–145)
SODIUM SERPL-SCNC: 134 MMOL/L — LOW (ref 135–145)
SODIUM SERPL-SCNC: 138 MMOL/L
SODIUM SERPL-SCNC: 140 MMOL/L
SODIUM UR-SCNC: 35 MMOL/L — SIGNIFICANT CHANGE UP
SP GR SPEC: 1.01 — SIGNIFICANT CHANGE UP (ref 1.01–1.02)
SP GR SPEC: >1.05 (ref 1.01–1.02)
SPECIFIC GRAVITY URINE: 1.04
SPECIMEN SOURCE: SIGNIFICANT CHANGE UP
SQUAMOUS EPITHELIAL CELLS: 22 /HPF
SURGICAL PATHOLOGY STUDY: SIGNIFICANT CHANGE UP
T3 SERPL-MCNC: 95 NG/DL
T3RU NFR SERPL: 1.1 TBI
T4 FREE SERPL-MCNC: 1.1 NG/DL
T4 SERPL-MCNC: 6.1 UG/DL
TIBC SERPL-MCNC: 344 UG/DL
TOTAL NUCLEATED CELL COUNT, BODY FLUID: 207 /UL — SIGNIFICANT CHANGE UP
TOTAL NUCLEATED CELL COUNT, BODY FLUID: 610 /UL — SIGNIFICANT CHANGE UP
TRIGL SERPL-MCNC: 93 MG/DL
TSH SERPL-ACNC: 0.69 UIU/ML
TUBE TYPE: SIGNIFICANT CHANGE UP
TUBE TYPE: SIGNIFICANT CHANGE UP
UIBC SERPL-MCNC: 274 UG/DL
URINE COMMENTS: NORMAL
UROBILINOGEN FLD QL: ABNORMAL
UROBILINOGEN FLD QL: NEGATIVE — SIGNIFICANT CHANGE UP
UROBILINOGEN URINE: ABNORMAL
VIT B12 SERPL-MCNC: 818 PG/ML
WBC # BLD: 10.27 K/UL — SIGNIFICANT CHANGE UP (ref 3.8–10.5)
WBC # BLD: 10.91 K/UL — HIGH (ref 3.8–10.5)
WBC # BLD: 11.33 K/UL — HIGH (ref 3.8–10.5)
WBC # BLD: 11.39 K/UL — HIGH (ref 3.8–10.5)
WBC # BLD: 12.24 K/UL — HIGH (ref 3.8–10.5)
WBC # BLD: 14.14 K/UL — HIGH (ref 3.8–10.5)
WBC # BLD: 14.47 K/UL — HIGH (ref 3.8–10.5)
WBC # BLD: 7.71 K/UL — SIGNIFICANT CHANGE UP (ref 3.8–10.5)
WBC # BLD: 8.48 K/UL — SIGNIFICANT CHANGE UP (ref 3.8–10.5)
WBC # BLD: 8.57 K/UL — SIGNIFICANT CHANGE UP (ref 3.8–10.5)
WBC # BLD: 9.5 K/UL — SIGNIFICANT CHANGE UP (ref 3.8–10.5)
WBC # FLD AUTO: 10.27 K/UL — SIGNIFICANT CHANGE UP (ref 3.8–10.5)
WBC # FLD AUTO: 10.91 K/UL — HIGH (ref 3.8–10.5)
WBC # FLD AUTO: 11.33 K/UL — HIGH (ref 3.8–10.5)
WBC # FLD AUTO: 11.39 K/UL — HIGH (ref 3.8–10.5)
WBC # FLD AUTO: 12.24 K/UL — HIGH (ref 3.8–10.5)
WBC # FLD AUTO: 14.14 K/UL — HIGH (ref 3.8–10.5)
WBC # FLD AUTO: 14.47 K/UL — HIGH (ref 3.8–10.5)
WBC # FLD AUTO: 7.64 K/UL
WBC # FLD AUTO: 7.71 K/UL — SIGNIFICANT CHANGE UP (ref 3.8–10.5)
WBC # FLD AUTO: 8.48 K/UL — SIGNIFICANT CHANGE UP (ref 3.8–10.5)
WBC # FLD AUTO: 8.57 K/UL — SIGNIFICANT CHANGE UP (ref 3.8–10.5)
WBC # FLD AUTO: 9.5 K/UL — SIGNIFICANT CHANGE UP (ref 3.8–10.5)
WBC # FLD AUTO: 9.87 K/UL
WBC UR QL: 22 /HPF — HIGH (ref 0–5)
WBC UR QL: 27 /HPF — HIGH (ref 0–5)
WHITE BLOOD CELLS URINE: 4 /HPF

## 2021-01-01 PROCEDURE — 99223 1ST HOSP IP/OBS HIGH 75: CPT | Mod: GC

## 2021-01-01 PROCEDURE — 99285 EMERGENCY DEPT VISIT HI MDM: CPT | Mod: 25

## 2021-01-01 PROCEDURE — 88108 CYTOPATH CONCENTRATE TECH: CPT | Mod: 26

## 2021-01-01 PROCEDURE — 71275 CT ANGIOGRAPHY CHEST: CPT | Mod: 26,MA

## 2021-01-01 PROCEDURE — 93970 EXTREMITY STUDY: CPT | Mod: 26

## 2021-01-01 PROCEDURE — 99072 ADDL SUPL MATRL&STAF TM PHE: CPT

## 2021-01-01 PROCEDURE — 71045 X-RAY EXAM CHEST 1 VIEW: CPT | Mod: 26

## 2021-01-01 PROCEDURE — 84702 CHORIONIC GONADOTROPIN TEST: CPT

## 2021-01-01 PROCEDURE — 94729 DIFFUSING CAPACITY: CPT

## 2021-01-01 PROCEDURE — 99232 SBSQ HOSP IP/OBS MODERATE 35: CPT | Mod: GC

## 2021-01-01 PROCEDURE — 74177 CT ABD & PELVIS W/CONTRAST: CPT | Mod: 26,MA

## 2021-01-01 PROCEDURE — 99233 SBSQ HOSP IP/OBS HIGH 50: CPT

## 2021-01-01 PROCEDURE — 87075 CULTR BACTERIA EXCEPT BLOOD: CPT

## 2021-01-01 PROCEDURE — 99232 SBSQ HOSP IP/OBS MODERATE 35: CPT

## 2021-01-01 PROCEDURE — 99223 1ST HOSP IP/OBS HIGH 75: CPT

## 2021-01-01 PROCEDURE — 71046 X-RAY EXAM CHEST 2 VIEWS: CPT

## 2021-01-01 PROCEDURE — 99213 OFFICE O/P EST LOW 20 MIN: CPT

## 2021-01-01 PROCEDURE — 76770 US EXAM ABDO BACK WALL COMP: CPT

## 2021-01-01 PROCEDURE — 99232 SBSQ HOSP IP/OBS MODERATE 35: CPT | Mod: GC,25

## 2021-01-01 PROCEDURE — 81001 URINALYSIS AUTO W/SCOPE: CPT

## 2021-01-01 PROCEDURE — 93970 EXTREMITY STUDY: CPT

## 2021-01-01 PROCEDURE — ZZZZZ: CPT

## 2021-01-01 PROCEDURE — 83880 ASSAY OF NATRIURETIC PEPTIDE: CPT

## 2021-01-01 PROCEDURE — 82945 GLUCOSE OTHER FLUID: CPT

## 2021-01-01 PROCEDURE — 88112 CYTOPATH CELL ENHANCE TECH: CPT | Mod: 26

## 2021-01-01 PROCEDURE — 74177 CT ABD & PELVIS W/CONTRAST: CPT | Mod: MA

## 2021-01-01 PROCEDURE — 49083 ABD PARACENTESIS W/IMAGING: CPT

## 2021-01-01 PROCEDURE — 74018 RADEX ABDOMEN 1 VIEW: CPT

## 2021-01-01 PROCEDURE — 93306 TTE W/DOPPLER COMPLETE: CPT

## 2021-01-01 PROCEDURE — 88305 TISSUE EXAM BY PATHOLOGIST: CPT

## 2021-01-01 PROCEDURE — 87102 FUNGUS ISOLATION CULTURE: CPT

## 2021-01-01 PROCEDURE — 88112 CYTOPATH CELL ENHANCE TECH: CPT

## 2021-01-01 PROCEDURE — 83690 ASSAY OF LIPASE: CPT

## 2021-01-01 PROCEDURE — 99233 SBSQ HOSP IP/OBS HIGH 50: CPT | Mod: GC

## 2021-01-01 PROCEDURE — 86850 RBC ANTIBODY SCREEN: CPT

## 2021-01-01 PROCEDURE — 99203 OFFICE O/P NEW LOW 30 MIN: CPT

## 2021-01-01 PROCEDURE — 86900 BLOOD TYPING SEROLOGIC ABO: CPT

## 2021-01-01 PROCEDURE — 32557 INSERT CATH PLEURA W/ IMAGE: CPT | Mod: GC,59

## 2021-01-01 PROCEDURE — 85027 COMPLETE CBC AUTOMATED: CPT

## 2021-01-01 PROCEDURE — 89051 BODY FLUID CELL COUNT: CPT

## 2021-01-01 PROCEDURE — 99396 PREV VISIT EST AGE 40-64: CPT

## 2021-01-01 PROCEDURE — 84295 ASSAY OF SERUM SODIUM: CPT

## 2021-01-01 PROCEDURE — 99214 OFFICE O/P EST MOD 30 MIN: CPT

## 2021-01-01 PROCEDURE — 93010 ELECTROCARDIOGRAM REPORT: CPT

## 2021-01-01 PROCEDURE — 84100 ASSAY OF PHOSPHORUS: CPT

## 2021-01-01 PROCEDURE — 85014 HEMATOCRIT: CPT

## 2021-01-01 PROCEDURE — 83605 ASSAY OF LACTIC ACID: CPT

## 2021-01-01 PROCEDURE — 88341 IMHCHEM/IMCYTCHM EA ADD ANTB: CPT

## 2021-01-01 PROCEDURE — 85610 PROTHROMBIN TIME: CPT

## 2021-01-01 PROCEDURE — 84157 ASSAY OF PROTEIN OTHER: CPT

## 2021-01-01 PROCEDURE — 88305 TISSUE EXAM BY PATHOLOGIST: CPT | Mod: 26

## 2021-01-01 PROCEDURE — 82803 BLOOD GASES ANY COMBINATION: CPT

## 2021-01-01 PROCEDURE — 99204 OFFICE O/P NEW MOD 45 MIN: CPT | Mod: 25

## 2021-01-01 PROCEDURE — 87086 URINE CULTURE/COLONY COUNT: CPT

## 2021-01-01 PROCEDURE — 87040 BLOOD CULTURE FOR BACTERIA: CPT

## 2021-01-01 PROCEDURE — 84132 ASSAY OF SERUM POTASSIUM: CPT

## 2021-01-01 PROCEDURE — 43239 EGD BIOPSY SINGLE/MULTIPLE: CPT

## 2021-01-01 PROCEDURE — 76604 US EXAM CHEST: CPT | Mod: 26,GC

## 2021-01-01 PROCEDURE — 36415 COLL VENOUS BLD VENIPUNCTURE: CPT

## 2021-01-01 PROCEDURE — 87205 SMEAR GRAM STAIN: CPT

## 2021-01-01 PROCEDURE — 32555 ASPIRATE PLEURA W/ IMAGING: CPT | Mod: GC,59

## 2021-01-01 PROCEDURE — 99222 1ST HOSP IP/OBS MODERATE 55: CPT

## 2021-01-01 PROCEDURE — 80048 BASIC METABOLIC PNL TOTAL CA: CPT

## 2021-01-01 PROCEDURE — 71045 X-RAY EXAM CHEST 1 VIEW: CPT

## 2021-01-01 PROCEDURE — 83986 ASSAY PH BODY FLUID NOS: CPT

## 2021-01-01 PROCEDURE — 85025 COMPLETE CBC W/AUTO DIFF WBC: CPT

## 2021-01-01 PROCEDURE — 88341 IMHCHEM/IMCYTCHM EA ADD ANTB: CPT | Mod: 26,59

## 2021-01-01 PROCEDURE — 96374 THER/PROPH/DIAG INJ IV PUSH: CPT

## 2021-01-01 PROCEDURE — 82042 OTHER SOURCE ALBUMIN QUAN EA: CPT

## 2021-01-01 PROCEDURE — 82435 ASSAY OF BLOOD CHLORIDE: CPT

## 2021-01-01 PROCEDURE — 99214 OFFICE O/P EST MOD 30 MIN: CPT | Mod: 25

## 2021-01-01 PROCEDURE — 86901 BLOOD TYPING SEROLOGIC RH(D): CPT

## 2021-01-01 PROCEDURE — 80053 COMPREHEN METABOLIC PANEL: CPT

## 2021-01-01 PROCEDURE — 84145 PROCALCITONIN (PCT): CPT

## 2021-01-01 PROCEDURE — U0003: CPT

## 2021-01-01 PROCEDURE — 74240 X-RAY XM UPR GI TRC 1CNTRST: CPT | Mod: 26

## 2021-01-01 PROCEDURE — 94010 BREATHING CAPACITY TEST: CPT

## 2021-01-01 PROCEDURE — 85730 THROMBOPLASTIN TIME PARTIAL: CPT

## 2021-01-01 PROCEDURE — 74177 CT ABD & PELVIS W/CONTRAST: CPT | Mod: 26

## 2021-01-01 PROCEDURE — 82947 ASSAY GLUCOSE BLOOD QUANT: CPT

## 2021-01-01 PROCEDURE — 99222 1ST HOSP IP/OBS MODERATE 55: CPT | Mod: GC

## 2021-01-01 PROCEDURE — 84300 ASSAY OF URINE SODIUM: CPT

## 2021-01-01 PROCEDURE — 87070 CULTURE OTHR SPECIMN AEROBIC: CPT

## 2021-01-01 PROCEDURE — 32551 INSERTION OF CHEST TUBE: CPT | Mod: GC,59

## 2021-01-01 PROCEDURE — 93000 ELECTROCARDIOGRAM COMPLETE: CPT

## 2021-01-01 PROCEDURE — 49082 ABD PARACENTESIS: CPT

## 2021-01-01 PROCEDURE — 82977 ASSAY OF GGT: CPT

## 2021-01-01 PROCEDURE — 88342 IMHCHEM/IMCYTCHM 1ST ANTB: CPT

## 2021-01-01 PROCEDURE — P9045: CPT

## 2021-01-01 PROCEDURE — 74177 CT ABD & PELVIS W/CONTRAST: CPT

## 2021-01-01 PROCEDURE — 99285 EMERGENCY DEPT VISIT HI MDM: CPT

## 2021-01-01 PROCEDURE — 93005 ELECTROCARDIOGRAM TRACING: CPT

## 2021-01-01 PROCEDURE — 71275 CT ANGIOGRAPHY CHEST: CPT | Mod: MA

## 2021-01-01 PROCEDURE — 74240 X-RAY XM UPR GI TRC 1CNTRST: CPT

## 2021-01-01 PROCEDURE — P9047: CPT

## 2021-01-01 PROCEDURE — 82330 ASSAY OF CALCIUM: CPT

## 2021-01-01 PROCEDURE — 94727 GAS DIL/WSHOT DETER LNG VOL: CPT

## 2021-01-01 PROCEDURE — 99205 OFFICE O/P NEW HI 60 MIN: CPT

## 2021-01-01 PROCEDURE — 85018 HEMOGLOBIN: CPT

## 2021-01-01 PROCEDURE — 71250 CT THORAX DX C-: CPT

## 2021-01-01 PROCEDURE — 88342 IMHCHEM/IMCYTCHM 1ST ANTB: CPT | Mod: 26,59

## 2021-01-01 PROCEDURE — 82570 ASSAY OF URINE CREATININE: CPT

## 2021-01-01 PROCEDURE — U0005: CPT

## 2021-01-01 PROCEDURE — 74018 RADEX ABDOMEN 1 VIEW: CPT | Mod: 26

## 2021-01-01 PROCEDURE — 84484 ASSAY OF TROPONIN QUANT: CPT

## 2021-01-01 PROCEDURE — 71250 CT THORAX DX C-: CPT | Mod: 26

## 2021-01-01 PROCEDURE — 83615 LACTATE (LD) (LDH) ENZYME: CPT

## 2021-01-01 PROCEDURE — 93015 CV STRESS TEST SUPVJ I&R: CPT

## 2021-01-01 PROCEDURE — 76770 US EXAM ABDO BACK WALL COMP: CPT | Mod: 26

## 2021-01-01 PROCEDURE — 83735 ASSAY OF MAGNESIUM: CPT

## 2021-01-01 PROCEDURE — 43259 EGD US EXAM DUODENUM/JEJUNUM: CPT | Mod: GC

## 2021-01-01 PROCEDURE — C1729: CPT

## 2021-01-01 RX ORDER — SODIUM CHLORIDE 9 MG/ML
1000 INJECTION INTRAMUSCULAR; INTRAVENOUS; SUBCUTANEOUS
Refills: 0 | Status: DISCONTINUED | OUTPATIENT
Start: 2021-01-01 | End: 2021-01-01

## 2021-01-01 RX ORDER — HYDROMORPHONE HYDROCHLORIDE 2 MG/ML
2 INJECTION INTRAMUSCULAR; INTRAVENOUS; SUBCUTANEOUS
Qty: 32 | Refills: 0
Start: 2021-01-01 | End: 2022-01-01

## 2021-01-01 RX ORDER — ALBUMIN HUMAN 25 %
50 VIAL (ML) INTRAVENOUS ONCE
Refills: 0 | Status: COMPLETED | OUTPATIENT
Start: 2021-01-01 | End: 2021-01-01

## 2021-01-01 RX ORDER — FAMOTIDINE 10 MG/ML
20 INJECTION INTRAVENOUS ONCE
Refills: 0 | Status: COMPLETED | OUTPATIENT
Start: 2021-01-01 | End: 2021-01-01

## 2021-01-01 RX ORDER — MAGNESIUM SULFATE 500 MG/ML
2 VIAL (ML) INJECTION ONCE
Refills: 0 | Status: COMPLETED | OUTPATIENT
Start: 2021-01-01 | End: 2021-01-01

## 2021-01-01 RX ORDER — POTASSIUM CHLORIDE 20 MEQ
10 PACKET (EA) ORAL ONCE
Refills: 0 | Status: DISCONTINUED | OUTPATIENT
Start: 2021-01-01 | End: 2021-01-01

## 2021-01-01 RX ORDER — SIMETHICONE 80 MG/1
80 TABLET, CHEWABLE ORAL
Refills: 0 | Status: DISCONTINUED | OUTPATIENT
Start: 2021-01-01 | End: 2021-01-01

## 2021-01-01 RX ORDER — ALBUMIN HUMAN 25 %
250 VIAL (ML) INTRAVENOUS ONCE
Refills: 0 | Status: COMPLETED | OUTPATIENT
Start: 2021-01-01 | End: 2021-01-01

## 2021-01-01 RX ORDER — SODIUM CHLORIDE 9 MG/ML
1000 INJECTION, SOLUTION INTRAVENOUS
Refills: 0 | Status: DISCONTINUED | OUTPATIENT
Start: 2021-01-01 | End: 2021-01-01

## 2021-01-01 RX ORDER — POTASSIUM CHLORIDE 20 MEQ
10 PACKET (EA) ORAL
Refills: 0 | Status: DISCONTINUED | OUTPATIENT
Start: 2021-01-01 | End: 2021-01-01

## 2021-01-01 RX ORDER — ACETAMINOPHEN 500 MG
975 TABLET ORAL ONCE
Refills: 0 | Status: COMPLETED | OUTPATIENT
Start: 2021-01-01 | End: 2021-01-01

## 2021-01-01 RX ORDER — POTASSIUM CHLORIDE 20 MEQ
10 PACKET (EA) ORAL
Refills: 0 | Status: COMPLETED | OUTPATIENT
Start: 2021-01-01 | End: 2021-01-01

## 2021-01-01 RX ORDER — LACTOBACILLUS ACIDOPHILUS 100MM CELL
1 CAPSULE ORAL DAILY
Refills: 0 | Status: DISCONTINUED | OUTPATIENT
Start: 2021-01-01 | End: 2021-01-01

## 2021-01-01 RX ORDER — ONDANSETRON 8 MG/1
4 TABLET, FILM COATED ORAL ONCE
Refills: 0 | Status: COMPLETED | OUTPATIENT
Start: 2021-01-01 | End: 2021-01-01

## 2021-01-01 RX ORDER — MORPHINE SULFATE 50 MG/1
3 CAPSULE, EXTENDED RELEASE ORAL EVERY 4 HOURS
Refills: 0 | Status: DISCONTINUED | OUTPATIENT
Start: 2021-01-01 | End: 2021-01-01

## 2021-01-01 RX ORDER — HYDROMORPHONE HYDROCHLORIDE 2 MG/ML
0.3 INJECTION INTRAMUSCULAR; INTRAVENOUS; SUBCUTANEOUS
Refills: 0 | Status: DISCONTINUED | OUTPATIENT
Start: 2021-01-01 | End: 2021-01-01

## 2021-01-01 RX ORDER — POTASSIUM CHLORIDE 20 MEQ
20 PACKET (EA) ORAL ONCE
Refills: 0 | Status: DISCONTINUED | OUTPATIENT
Start: 2021-01-01 | End: 2021-01-01

## 2021-01-01 RX ORDER — MAGNESIUM SULFATE 500 MG/ML
1 VIAL (ML) INJECTION ONCE
Refills: 0 | Status: COMPLETED | OUTPATIENT
Start: 2021-01-01 | End: 2021-01-01

## 2021-01-01 RX ORDER — MORPHINE SULFATE 50 MG/1
2 CAPSULE, EXTENDED RELEASE ORAL EVERY 4 HOURS
Refills: 0 | Status: DISCONTINUED | OUTPATIENT
Start: 2021-01-01 | End: 2021-01-01

## 2021-01-01 RX ORDER — DEXTROSE MONOHYDRATE, SODIUM CHLORIDE, AND POTASSIUM CHLORIDE 50; .745; 4.5 G/1000ML; G/1000ML; G/1000ML
1000 INJECTION, SOLUTION INTRAVENOUS
Refills: 0 | Status: DISCONTINUED | OUTPATIENT
Start: 2021-01-01 | End: 2021-01-01

## 2021-01-01 RX ORDER — HYDROMORPHONE HYDROCHLORIDE 2 MG/ML
0.5 INJECTION INTRAMUSCULAR; INTRAVENOUS; SUBCUTANEOUS EVERY 4 HOURS
Refills: 0 | Status: DISCONTINUED | OUTPATIENT
Start: 2021-01-01 | End: 2021-01-01

## 2021-01-01 RX ORDER — ONDANSETRON 8 MG/1
4 TABLET, FILM COATED ORAL EVERY 8 HOURS
Refills: 0 | Status: DISCONTINUED | OUTPATIENT
Start: 2021-01-01 | End: 2021-01-01

## 2021-01-01 RX ORDER — HYDROMORPHONE HYDROCHLORIDE 2 MG/ML
0.5 INJECTION INTRAMUSCULAR; INTRAVENOUS; SUBCUTANEOUS
Refills: 0 | Status: DISCONTINUED | OUTPATIENT
Start: 2021-01-01 | End: 2021-01-01

## 2021-01-01 RX ORDER — SIMETHICONE 80 MG/1
1 TABLET, CHEWABLE ORAL
Qty: 40 | Refills: 0
Start: 2021-01-01 | End: 2022-01-01

## 2021-01-01 RX ORDER — SODIUM CHLORIDE 9 MG/ML
500 INJECTION, SOLUTION INTRAVENOUS ONCE
Refills: 0 | Status: COMPLETED | OUTPATIENT
Start: 2021-01-01 | End: 2021-01-01

## 2021-01-01 RX ORDER — SENNA PLUS 8.6 MG/1
10 TABLET ORAL
Qty: 200 | Refills: 0
Start: 2021-01-01 | End: 2022-01-01

## 2021-01-01 RX ORDER — CEFTRIAXONE 500 MG/1
1000 INJECTION, POWDER, FOR SOLUTION INTRAMUSCULAR; INTRAVENOUS EVERY 24 HOURS
Refills: 0 | Status: DISCONTINUED | OUTPATIENT
Start: 2021-01-01 | End: 2021-01-01

## 2021-01-01 RX ORDER — POTASSIUM PHOSPHATE, MONOBASIC POTASSIUM PHOSPHATE, DIBASIC 236; 224 MG/ML; MG/ML
15 INJECTION, SOLUTION INTRAVENOUS ONCE
Refills: 0 | Status: COMPLETED | OUTPATIENT
Start: 2021-01-01 | End: 2021-01-01

## 2021-01-01 RX ORDER — SODIUM CHLORIDE 9 MG/ML
500 INJECTION, SOLUTION INTRAVENOUS
Refills: 0 | Status: DISCONTINUED | OUTPATIENT
Start: 2021-01-01 | End: 2021-01-01

## 2021-01-01 RX ORDER — OMEPRAZOLE 40 MG/1
40 CAPSULE, DELAYED RELEASE ORAL
Qty: 30 | Refills: 1 | Status: ACTIVE | COMMUNITY
Start: 2021-01-01 | End: 1900-01-01

## 2021-01-01 RX ORDER — HEPARIN SODIUM 5000 [USP'U]/ML
5000 INJECTION INTRAVENOUS; SUBCUTANEOUS EVERY 12 HOURS
Refills: 0 | Status: DISCONTINUED | OUTPATIENT
Start: 2021-01-01 | End: 2021-01-01

## 2021-01-01 RX ORDER — SODIUM CHLORIDE 9 MG/ML
1000 INJECTION, SOLUTION INTRAVENOUS
Refills: 0 | Status: COMPLETED | OUTPATIENT
Start: 2021-01-01 | End: 2021-01-01

## 2021-01-01 RX ORDER — MORPHINE SULFATE 50 MG/1
4 CAPSULE, EXTENDED RELEASE ORAL EVERY 4 HOURS
Refills: 0 | Status: DISCONTINUED | OUTPATIENT
Start: 2021-01-01 | End: 2021-01-01

## 2021-01-01 RX ORDER — HEPARIN SODIUM 5000 [USP'U]/ML
5000 INJECTION INTRAVENOUS; SUBCUTANEOUS ONCE
Refills: 0 | Status: COMPLETED | OUTPATIENT
Start: 2021-01-01 | End: 2021-01-01

## 2021-01-01 RX ORDER — POTASSIUM CHLORIDE 20 MEQ
40 PACKET (EA) ORAL ONCE
Refills: 0 | Status: COMPLETED | OUTPATIENT
Start: 2021-01-01 | End: 2021-01-01

## 2021-01-01 RX ORDER — POTASSIUM CHLORIDE 20 MEQ
40 PACKET (EA) ORAL ONCE
Refills: 0 | Status: DISCONTINUED | OUTPATIENT
Start: 2021-01-01 | End: 2021-01-01

## 2021-01-01 RX ORDER — CEFTRIAXONE 500 MG/1
1000 INJECTION, POWDER, FOR SOLUTION INTRAMUSCULAR; INTRAVENOUS EVERY 24 HOURS
Refills: 0 | Status: COMPLETED | OUTPATIENT
Start: 2021-01-01 | End: 2021-01-01

## 2021-01-01 RX ORDER — PANTOPRAZOLE SODIUM 20 MG/1
40 TABLET, DELAYED RELEASE ORAL
Refills: 0 | Status: DISCONTINUED | OUTPATIENT
Start: 2021-01-01 | End: 2021-01-01

## 2021-01-01 RX ORDER — PANTOPRAZOLE SODIUM 20 MG/1
40 TABLET, DELAYED RELEASE ORAL EVERY 24 HOURS
Refills: 0 | Status: DISCONTINUED | OUTPATIENT
Start: 2021-01-01 | End: 2021-01-01

## 2021-01-01 RX ORDER — SIMETHICONE 80 MG/1
80 TABLET, CHEWABLE ORAL THREE TIMES A DAY
Refills: 0 | Status: DISCONTINUED | OUTPATIENT
Start: 2021-01-01 | End: 2021-01-01

## 2021-01-01 RX ORDER — ENOXAPARIN SODIUM 100 MG/ML
40 INJECTION SUBCUTANEOUS DAILY
Refills: 0 | Status: DISCONTINUED | OUTPATIENT
Start: 2021-01-01 | End: 2021-01-01

## 2021-01-01 RX ORDER — ONDANSETRON 8 MG/1
1 TABLET, FILM COATED ORAL
Qty: 30 | Refills: 0
Start: 2021-01-01 | End: 2022-01-01

## 2021-01-01 RX ORDER — SIMETHICONE 80 MG/1
80 TABLET, CHEWABLE ORAL ONCE
Refills: 0 | Status: COMPLETED | OUTPATIENT
Start: 2021-01-01 | End: 2021-01-01

## 2021-01-01 RX ADMIN — CEFTRIAXONE 100 MILLIGRAM(S): 500 INJECTION, POWDER, FOR SOLUTION INTRAMUSCULAR; INTRAVENOUS at 14:56

## 2021-01-01 RX ADMIN — SODIUM CHLORIDE 75 MILLILITER(S): 9 INJECTION, SOLUTION INTRAVENOUS at 12:39

## 2021-01-01 RX ADMIN — ENOXAPARIN SODIUM 40 MILLIGRAM(S): 100 INJECTION SUBCUTANEOUS at 11:25

## 2021-01-01 RX ADMIN — ENOXAPARIN SODIUM 40 MILLIGRAM(S): 100 INJECTION SUBCUTANEOUS at 11:16

## 2021-01-01 RX ADMIN — MORPHINE SULFATE 2 MILLIGRAM(S): 50 CAPSULE, EXTENDED RELEASE ORAL at 22:37

## 2021-01-01 RX ADMIN — DEXTROSE MONOHYDRATE, SODIUM CHLORIDE, AND POTASSIUM CHLORIDE 50 MILLILITER(S): 50; .745; 4.5 INJECTION, SOLUTION INTRAVENOUS at 06:31

## 2021-01-01 RX ADMIN — SODIUM CHLORIDE 100 MILLILITER(S): 9 INJECTION, SOLUTION INTRAVENOUS at 23:47

## 2021-01-01 RX ADMIN — FAMOTIDINE 20 MILLIGRAM(S): 10 INJECTION INTRAVENOUS at 10:08

## 2021-01-01 RX ADMIN — Medication 25 GRAM(S): at 17:33

## 2021-01-01 RX ADMIN — SODIUM CHLORIDE 80 MILLILITER(S): 9 INJECTION INTRAMUSCULAR; INTRAVENOUS; SUBCUTANEOUS at 23:07

## 2021-01-01 RX ADMIN — MORPHINE SULFATE 2 MILLIGRAM(S): 50 CAPSULE, EXTENDED RELEASE ORAL at 15:41

## 2021-01-01 RX ADMIN — HYDROMORPHONE HYDROCHLORIDE 0.5 MILLIGRAM(S): 2 INJECTION INTRAMUSCULAR; INTRAVENOUS; SUBCUTANEOUS at 09:57

## 2021-01-01 RX ADMIN — SIMETHICONE 80 MILLIGRAM(S): 80 TABLET, CHEWABLE ORAL at 19:44

## 2021-01-01 RX ADMIN — SIMETHICONE 80 MILLIGRAM(S): 80 TABLET, CHEWABLE ORAL at 04:14

## 2021-01-01 RX ADMIN — MORPHINE SULFATE 2 MILLIGRAM(S): 50 CAPSULE, EXTENDED RELEASE ORAL at 19:30

## 2021-01-01 RX ADMIN — MORPHINE SULFATE 3 MILLIGRAM(S): 50 CAPSULE, EXTENDED RELEASE ORAL at 21:30

## 2021-01-01 RX ADMIN — Medication 1 TABLET(S): at 11:26

## 2021-01-01 RX ADMIN — DEXTROSE MONOHYDRATE, SODIUM CHLORIDE, AND POTASSIUM CHLORIDE 50 MILLILITER(S): 50; .745; 4.5 INJECTION, SOLUTION INTRAVENOUS at 11:20

## 2021-01-01 RX ADMIN — DEXTROSE MONOHYDRATE, SODIUM CHLORIDE, AND POTASSIUM CHLORIDE 75 MILLILITER(S): 50; .745; 4.5 INJECTION, SOLUTION INTRAVENOUS at 11:53

## 2021-01-01 RX ADMIN — Medication 100 MILLIEQUIVALENT(S): at 19:30

## 2021-01-01 RX ADMIN — Medication 100 MILLIEQUIVALENT(S): at 14:26

## 2021-01-01 RX ADMIN — SIMETHICONE 80 MILLIGRAM(S): 80 TABLET, CHEWABLE ORAL at 15:48

## 2021-01-01 RX ADMIN — MORPHINE SULFATE 4 MILLIGRAM(S): 50 CAPSULE, EXTENDED RELEASE ORAL at 08:56

## 2021-01-01 RX ADMIN — SODIUM CHLORIDE 80 MILLILITER(S): 9 INJECTION INTRAMUSCULAR; INTRAVENOUS; SUBCUTANEOUS at 09:12

## 2021-01-01 RX ADMIN — Medication 100 MILLIEQUIVALENT(S): at 10:18

## 2021-01-01 RX ADMIN — PANTOPRAZOLE SODIUM 40 MILLIGRAM(S): 20 TABLET, DELAYED RELEASE ORAL at 16:54

## 2021-01-01 RX ADMIN — MORPHINE SULFATE 3 MILLIGRAM(S): 50 CAPSULE, EXTENDED RELEASE ORAL at 16:31

## 2021-01-01 RX ADMIN — ONDANSETRON 4 MILLIGRAM(S): 8 TABLET, FILM COATED ORAL at 00:02

## 2021-01-01 RX ADMIN — MORPHINE SULFATE 2 MILLIGRAM(S): 50 CAPSULE, EXTENDED RELEASE ORAL at 20:45

## 2021-01-01 RX ADMIN — SODIUM CHLORIDE 500 MILLILITER(S): 9 INJECTION, SOLUTION INTRAVENOUS at 22:03

## 2021-01-01 RX ADMIN — HEPARIN SODIUM 5000 UNIT(S): 5000 INJECTION INTRAVENOUS; SUBCUTANEOUS at 09:56

## 2021-01-01 RX ADMIN — Medication 100 GRAM(S): at 16:41

## 2021-01-01 RX ADMIN — HYDROMORPHONE HYDROCHLORIDE 0.5 MILLIGRAM(S): 2 INJECTION INTRAMUSCULAR; INTRAVENOUS; SUBCUTANEOUS at 18:50

## 2021-01-01 RX ADMIN — Medication 100 MILLIEQUIVALENT(S): at 22:02

## 2021-01-01 RX ADMIN — Medication 50 MILLILITER(S): at 17:30

## 2021-01-01 RX ADMIN — Medication 100 MILLIEQUIVALENT(S): at 09:11

## 2021-01-01 RX ADMIN — HYDROMORPHONE HYDROCHLORIDE 0.5 MILLIGRAM(S): 2 INJECTION INTRAMUSCULAR; INTRAVENOUS; SUBCUTANEOUS at 18:37

## 2021-01-01 RX ADMIN — HEPARIN SODIUM 5000 UNIT(S): 5000 INJECTION INTRAVENOUS; SUBCUTANEOUS at 16:41

## 2021-01-01 RX ADMIN — SODIUM CHLORIDE 80 MILLILITER(S): 9 INJECTION INTRAMUSCULAR; INTRAVENOUS; SUBCUTANEOUS at 11:43

## 2021-01-01 RX ADMIN — DEXTROSE MONOHYDRATE, SODIUM CHLORIDE, AND POTASSIUM CHLORIDE 50 MILLILITER(S): 50; .745; 4.5 INJECTION, SOLUTION INTRAVENOUS at 18:18

## 2021-01-01 RX ADMIN — HEPARIN SODIUM 5000 UNIT(S): 5000 INJECTION INTRAVENOUS; SUBCUTANEOUS at 23:44

## 2021-01-01 RX ADMIN — ENOXAPARIN SODIUM 40 MILLIGRAM(S): 100 INJECTION SUBCUTANEOUS at 17:42

## 2021-01-01 RX ADMIN — FAMOTIDINE 20 MILLIGRAM(S): 10 INJECTION INTRAVENOUS at 05:05

## 2021-01-01 RX ADMIN — SIMETHICONE 80 MILLIGRAM(S): 80 TABLET, CHEWABLE ORAL at 17:31

## 2021-01-01 RX ADMIN — MORPHINE SULFATE 3 MILLIGRAM(S): 50 CAPSULE, EXTENDED RELEASE ORAL at 17:29

## 2021-01-01 RX ADMIN — HYDROMORPHONE HYDROCHLORIDE 0.5 MILLIGRAM(S): 2 INJECTION INTRAMUSCULAR; INTRAVENOUS; SUBCUTANEOUS at 18:27

## 2021-01-01 RX ADMIN — SODIUM CHLORIDE 100 MILLILITER(S): 9 INJECTION, SOLUTION INTRAVENOUS at 15:11

## 2021-01-01 RX ADMIN — SIMETHICONE 80 MILLIGRAM(S): 80 TABLET, CHEWABLE ORAL at 04:51

## 2021-01-01 RX ADMIN — SIMETHICONE 80 MILLIGRAM(S): 80 TABLET, CHEWABLE ORAL at 11:33

## 2021-01-01 RX ADMIN — DEXTROSE MONOHYDRATE, SODIUM CHLORIDE, AND POTASSIUM CHLORIDE 50 MILLILITER(S): 50; .745; 4.5 INJECTION, SOLUTION INTRAVENOUS at 13:02

## 2021-01-01 RX ADMIN — DEXTROSE MONOHYDRATE, SODIUM CHLORIDE, AND POTASSIUM CHLORIDE 50 MILLILITER(S): 50; .745; 4.5 INJECTION, SOLUTION INTRAVENOUS at 20:13

## 2021-01-01 RX ADMIN — SODIUM CHLORIDE 125 MILLILITER(S): 9 INJECTION INTRAMUSCULAR; INTRAVENOUS; SUBCUTANEOUS at 12:03

## 2021-01-01 RX ADMIN — Medication 100 MILLIEQUIVALENT(S): at 12:17

## 2021-01-01 RX ADMIN — MORPHINE SULFATE 4 MILLIGRAM(S): 50 CAPSULE, EXTENDED RELEASE ORAL at 13:49

## 2021-01-01 RX ADMIN — Medication 100 MILLIEQUIVALENT(S): at 17:39

## 2021-01-01 RX ADMIN — Medication 125 MILLILITER(S): at 18:15

## 2021-01-01 RX ADMIN — HYDROMORPHONE HYDROCHLORIDE 0.5 MILLIGRAM(S): 2 INJECTION INTRAMUSCULAR; INTRAVENOUS; SUBCUTANEOUS at 11:03

## 2021-01-01 RX ADMIN — Medication 100 MILLIEQUIVALENT(S): at 18:31

## 2021-01-01 RX ADMIN — MORPHINE SULFATE 4 MILLIGRAM(S): 50 CAPSULE, EXTENDED RELEASE ORAL at 09:26

## 2021-01-01 RX ADMIN — MORPHINE SULFATE 2 MILLIGRAM(S): 50 CAPSULE, EXTENDED RELEASE ORAL at 22:50

## 2021-01-01 RX ADMIN — Medication 1 TABLET(S): at 11:25

## 2021-01-01 RX ADMIN — MORPHINE SULFATE 2 MILLIGRAM(S): 50 CAPSULE, EXTENDED RELEASE ORAL at 15:11

## 2021-01-01 RX ADMIN — Medication 100 MILLIEQUIVALENT(S): at 02:28

## 2021-01-01 RX ADMIN — SIMETHICONE 80 MILLIGRAM(S): 80 TABLET, CHEWABLE ORAL at 02:45

## 2021-01-01 RX ADMIN — POTASSIUM PHOSPHATE, MONOBASIC POTASSIUM PHOSPHATE, DIBASIC 62.5 MILLIMOLE(S): 236; 224 INJECTION, SOLUTION INTRAVENOUS at 19:39

## 2021-01-01 RX ADMIN — CEFTRIAXONE 100 MILLIGRAM(S): 500 INJECTION, POWDER, FOR SOLUTION INTRAMUSCULAR; INTRAVENOUS at 13:46

## 2021-01-01 RX ADMIN — SIMETHICONE 80 MILLIGRAM(S): 80 TABLET, CHEWABLE ORAL at 05:57

## 2021-01-01 RX ADMIN — SIMETHICONE 80 MILLIGRAM(S): 80 TABLET, CHEWABLE ORAL at 20:49

## 2021-01-01 RX ADMIN — MORPHINE SULFATE 3 MILLIGRAM(S): 50 CAPSULE, EXTENDED RELEASE ORAL at 20:13

## 2021-01-01 RX ADMIN — SODIUM CHLORIDE 125 MILLILITER(S): 9 INJECTION INTRAMUSCULAR; INTRAVENOUS; SUBCUTANEOUS at 15:41

## 2021-01-01 RX ADMIN — Medication 100 MILLIEQUIVALENT(S): at 01:25

## 2021-01-01 RX ADMIN — Medication 100 MILLIEQUIVALENT(S): at 15:20

## 2021-01-01 RX ADMIN — CEFTRIAXONE 100 MILLIGRAM(S): 500 INJECTION, POWDER, FOR SOLUTION INTRAMUSCULAR; INTRAVENOUS at 13:24

## 2021-01-01 RX ADMIN — FAMOTIDINE 20 MILLIGRAM(S): 10 INJECTION INTRAVENOUS at 22:03

## 2021-01-01 RX ADMIN — SIMETHICONE 80 MILLIGRAM(S): 80 TABLET, CHEWABLE ORAL at 00:52

## 2021-01-01 RX ADMIN — SODIUM CHLORIDE 100 MILLILITER(S): 9 INJECTION, SOLUTION INTRAVENOUS at 14:56

## 2021-01-01 RX ADMIN — PANTOPRAZOLE SODIUM 40 MILLIGRAM(S): 20 TABLET, DELAYED RELEASE ORAL at 06:31

## 2021-01-01 RX ADMIN — SODIUM CHLORIDE 75 MILLILITER(S): 9 INJECTION, SOLUTION INTRAVENOUS at 02:50

## 2021-01-01 RX ADMIN — ENOXAPARIN SODIUM 40 MILLIGRAM(S): 100 INJECTION SUBCUTANEOUS at 11:44

## 2021-01-01 RX ADMIN — SIMETHICONE 80 MILLIGRAM(S): 80 TABLET, CHEWABLE ORAL at 12:53

## 2021-01-01 RX ADMIN — SIMETHICONE 80 MILLIGRAM(S): 80 TABLET, CHEWABLE ORAL at 13:08

## 2021-01-01 SDOH — SOCIAL STABILITY - SOCIAL INSECURITY: DISRUPTION OF FAMILY BY SEPARATION AND DIVORCE: Z63.5

## 2021-02-15 LAB
25(OH)D3 SERPL-MCNC: 37.2 NG/ML
ALBUMIN SERPL ELPH-MCNC: 4.5 G/DL
ALP BLD-CCNC: 48 U/L
ALT SERPL-CCNC: 12 U/L
ANION GAP SERPL CALC-SCNC: 13 MMOL/L
APPEARANCE: CLEAR
AST SERPL-CCNC: 14 U/L
BACTERIA: ABNORMAL
BASOPHILS # BLD AUTO: 0.09 K/UL
BASOPHILS NFR BLD AUTO: 0.9 %
BILIRUB SERPL-MCNC: 0.7 MG/DL
BILIRUBIN URINE: NEGATIVE
BLOOD URINE: NEGATIVE
BUN SERPL-MCNC: 16 MG/DL
CALCIUM SERPL-MCNC: 9.9 MG/DL
CHLORIDE SERPL-SCNC: 102 MMOL/L
CHOLEST SERPL-MCNC: 207 MG/DL
CHOLEST/HDLC SERPL: 3.2 RATIO
CO2 SERPL-SCNC: 24 MMOL/L
COLOR: YELLOW
CREAT SERPL-MCNC: 0.71 MG/DL
EOSINOPHIL # BLD AUTO: 0.16 K/UL
EOSINOPHIL NFR BLD AUTO: 1.6 %
ESTIMATED AVERAGE GLUCOSE: 100 MG/DL
FERRITIN SERPL-MCNC: 12 NG/ML
FOLATE SERPL-MCNC: 12.3 NG/ML
GLUCOSE QUALITATIVE U: NEGATIVE
GLUCOSE SERPL-MCNC: 90 MG/DL
HBA1C MFR BLD HPLC: 5.1 %
HCT VFR BLD CALC: 44.2 %
HDLC SERPL-MCNC: 65 MG/DL
HGB BLD-MCNC: 13.9 G/DL
HYALINE CASTS: 1 /LPF
IMM GRANULOCYTES NFR BLD AUTO: 0.3 %
IRON SATN MFR SERPL: 22 %
IRON SERPL-MCNC: 76 UG/DL
KETONES URINE: NEGATIVE
LDLC SERPL CALC-MCNC: 124 MG/DL
LEUKOCYTE ESTERASE URINE: NEGATIVE
LYMPHOCYTES # BLD AUTO: 2.8 K/UL
LYMPHOCYTES NFR BLD AUTO: 27.6 %
MAN DIFF?: NORMAL
MCHC RBC-ENTMCNC: 27.1 PG
MCHC RBC-ENTMCNC: 31.4 GM/DL
MCV RBC AUTO: 86.3 FL
MICROSCOPIC-UA: NORMAL
MONOCYTES # BLD AUTO: 0.88 K/UL
MONOCYTES NFR BLD AUTO: 8.7 %
NEUTROPHILS # BLD AUTO: 6.2 K/UL
NEUTROPHILS NFR BLD AUTO: 60.9 %
NITRITE URINE: NEGATIVE
PH URINE: 7
PLATELET # BLD AUTO: 311 K/UL
POTASSIUM SERPL-SCNC: 4.8 MMOL/L
PROT SERPL-MCNC: 6.9 G/DL
PROTEIN URINE: NEGATIVE
RBC # BLD: 5.12 M/UL
RBC # FLD: 15.5 %
RED BLOOD CELLS URINE: 1 /HPF
SODIUM SERPL-SCNC: 139 MMOL/L
SPECIFIC GRAVITY URINE: 1.02
SQUAMOUS EPITHELIAL CELLS: 13 /HPF
T4 FREE SERPL-MCNC: 1.1 NG/DL
TIBC SERPL-MCNC: 352 UG/DL
TRIGL SERPL-MCNC: 91 MG/DL
TSH SERPL-ACNC: 0.71 UIU/ML
UIBC SERPL-MCNC: 276 UG/DL
UROBILINOGEN URINE: NORMAL
VIT B12 SERPL-MCNC: 645 PG/ML
WBC # FLD AUTO: 10.16 K/UL
WHITE BLOOD CELLS URINE: 2 /HPF

## 2021-05-24 PROBLEM — R10.9 ABDOMINAL PAIN: Status: ACTIVE | Noted: 2021-01-01

## 2021-05-24 NOTE — HISTORY OF PRESENT ILLNESS
[FreeTextEntry8] : Patient presented for the first time for transition of care, she's looking for a new PCP.  Last PE was 8/2020.  She had to change MD due to a change of insurance.\par \par Pt has diffuse abdominal pain for 1-2 weeks.   She also has dyspepsia.  Symptoms are worse after eating.  The symptoms can last minutes or hours.  Symptoms slightly relieved if she lies on her L side.  She had h/o stomach cancer, and has distal gastrectomy, about 10 years ago, and later had SBO requiring FOREIGN twice.  She also has a hernia, but that has not been repaired  yet.  She has not tried any meds.  She has an apt to see GI and needs a referral.  She has no change of bowel habits, no vomiting, but has the usual nausea.  Pt admitted to 3-4 cups of coffee daily, and also like to eat dark chocolate.\par \par She would also like to see cardiologist, who is her previous PCP, to f/u on HLD and to discuss her care.\par \par

## 2021-05-24 NOTE — PHYSICAL EXAM
[No Acute Distress] : no acute distress [Well Nourished] : well nourished [Well Developed] : well developed [Supple] : supple [No Respiratory Distress] : no respiratory distress  [Clear to Auscultation] : lungs were clear to auscultation bilaterally [Normal Rate] : normal rate  [Regular Rhythm] : with a regular rhythm [Normal S1, S2] : normal S1 and S2 [No Edema] : there was no peripheral edema [Soft] : abdomen soft [Non Tender] : non-tender [Normal Bowel Sounds] : normal bowel sounds [No CVA Tenderness] : no CVA  tenderness [No Spinal Tenderness] : no spinal tenderness [No Joint Swelling] : no joint swelling [Grossly Normal Strength/Tone] : grossly normal strength/tone [Speech Grossly Normal] : speech grossly normal [Normal Affect] : the affect was normal [Alert and Oriented x3] : oriented to person, place, and time [Normal Mood] : the mood was normal [de-identified] : Obese female in stated age,  [de-identified] : Well healed surgical scars, with reducible incisional hernia,

## 2021-05-25 PROBLEM — R11.0 NAUSEA: Status: ACTIVE | Noted: 2021-01-01

## 2021-05-25 PROBLEM — Z01.818 PREOP TESTING: Status: ACTIVE | Noted: 2020-11-03

## 2021-05-25 NOTE — REVIEW OF SYSTEMS
[Negative] : Heme/Lymph [As Noted in HPI] : as noted in HPI [Abdominal Pain] : abdominal pain [Heartburn] : heartburn

## 2021-05-25 NOTE — PHYSICAL EXAM
[General Appearance - Alert] : alert [General Appearance - In No Acute Distress] : in no acute distress [General Appearance - Well Nourished] : well nourished [General Appearance - Well Developed] : well developed [Sclera] : the sclera and conjunctiva were normal [Neck Appearance] : the appearance of the neck was normal [Neck Cervical Mass (___cm)] : no neck mass was observed [Jugular Venous Distention Increased] : there was no jugular-venous distention [Thyroid Diffuse Enlargement] : the thyroid was not enlarged [Thyroid Nodule] : there were no palpable thyroid nodules [Auscultation Breath Sounds / Voice Sounds] : lungs were clear to auscultation bilaterally [Heart Rate And Rhythm] : heart rate was normal and rhythm regular [Heart Sounds] : normal S1 and S2 [Heart Sounds Gallop] : no gallops [Murmurs] : no murmurs [Heart Sounds Pericardial Friction Rub] : no pericardial rub [Full Pulse] : the pedal pulses are present [Edema] : there was no peripheral edema [Bowel Sounds] : normal bowel sounds [Abdomen Soft] : soft [Abdomen Tenderness] : non-tender [Abdomen Mass (___ Cm)] : no abdominal mass palpated [FreeTextEntry1] : surgical scars; large reducible abdominal wall hernia [Patient Refused] : rectal exam was refused by the patient [Skin Color & Pigmentation] : normal skin color and pigmentation [Skin Turgor] : normal skin turgor [] : no rash [Oriented To Time, Place, And Person] : oriented to person, place, and time [Impaired Insight] : insight and judgment were intact [Affect] : the affect was normal

## 2021-05-25 NOTE — REASON FOR VISIT
[Follow-Up: _____] : a [unfilled] follow-up visit [FreeTextEntry1] : Pre Endoscopy exam, acid reflux, indigestion

## 2021-05-25 NOTE — ASSESSMENT
[FreeTextEntry1] : 1.  Recent upper abdominal discomfort, indigestion, regurgitation, and nausea; status post gastrojejunostomy for signet ring cancer of the stomach 2011, with subsequent small bowel resection, with last upper endoscopy approximately 2017 reportedly negative--rule out marginal ulcer, metachronous neoplasia.  Possible gastroparesis (such as post-gastrectomy), prone to flares from time to time.\par 2.  Previously noted low ferritin may be from postoperative anatomy, smoldering inflammation, etc.  Colonoscopy 2017 reportedly revealed suboptimal prep.\par 3.  Obesity.\par 4.  Hypercholesterolemia.\par 5.  Abdominal wall hernia.\par \par Suggest:\par 1.  Extensive bloodwork drawn by me today.\par 2.  Schedule repeat upper endoscopy; she is aware that colonoscopy should be repeated with better prep at some point, but would like to hold off for now.  Procedure, rationale, and anesthesia plan were again reviewed and brochure given.\par 3.  Consider CT scan abdomen/pelvis after upper endoscopy.\par 4.  Further discussion regarding colonoscopy after above results available.

## 2021-05-25 NOTE — HISTORY OF PRESENT ILLNESS
[FreeTextEntry1] : For the past 1-2 weeks, Lucinda has noted a sour stomach, with regurgitation and nausea, gradually becoming worse.  She has been taking iron pills sporadically, not regularly.  She also notes some vague discomfort in the LUQ, changes with movement, unclear cause.  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  Last upper endoscopy was approximately 4 years ago by Dr. Pham, reportedly negative, with same day colonoscopy negative but prep was reportedly suboptimal.  She did not have COVID-19 infection, and she is refusing vaccine.

## 2021-05-25 NOTE — CONSULT LETTER
[Dear  ___] : Dear  [unfilled], [Courtesy Letter:] : I had the pleasure of seeing your patient, [unfilled], in my office today. [Please see my note below.] : Please see my note below. [Consult Closing:] : Thank you very much for allowing me to participate in the care of this patient.  If you have any questions, please do not hesitate to contact me. [Sincerely,] : Sincerely, [FreeTextEntry3] : Sohan Rangel M.D.\par

## 2021-05-28 PROBLEM — K28.9 ANASTOMOTIC ULCER: Status: ACTIVE | Noted: 2021-01-01

## 2021-06-05 NOTE — HISTORY OF PRESENT ILLNESS
[_________] : Performed [unfilled] [de-identified] : Lucinda presents to the office today for follow up after undergoing an EGD one week prior.\par \par The patient had seen Dr. Rangel one week prior for abdominal pain, reflux, and nausea which had been getting worse for the previous 1-2 weeks.  On her EGD on May 28, a small hiatus hernia and an ulceration at the anastomosis was seen.  Biopsies of the anastomotic region revealed adenocarcinoma with signet ring cell features.  Biopsies from the stomach body revealed atypical cells.\par \par The patient has a history of signet ring adenocarcinoma of the stomach which was diagnosed in 2011 when she was pregnant.  She underwent a partial gastrectomy in [facility] with subsequent small bowel resection.  Her postoperative course was complicated by perforation and enterocutaneous fistulas.  She never underwent chemotherapy.  She was previously followed by Dr. Pham ([facility]) and had an EGD and colonoscopy in 2017. [de-identified] : adenocarcinoma

## 2021-06-05 NOTE — ASSESSMENT
[FreeTextEntry1] : 1.  Recurrent gastric adenocarcinoma with signet ring features on EGD in May 2021 (at anastomosis).  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  Low ferritin.  \par 3.  Status post Colonoscopy 2017 reportedly revealed suboptimal prep.\par 4.  Obesity.\par 5.  Hypercholesterolemia.\par 6.  Abdominal wall hernia.\par \par Suggest:\par - Biopsy results reviewed with the patient.  Copy was given to patient.\par - Check CT A/P.\par - Patient was advised to follow up with medical and surgical oncology to discuss treatment options.

## 2021-06-24 PROBLEM — R79.0 LOW FERRITIN: Status: ACTIVE | Noted: 2020-09-25

## 2021-06-24 PROBLEM — E78.5 HYPERLIPIDEMIA: Status: ACTIVE | Noted: 2021-01-01

## 2021-06-24 PROBLEM — R94.31 ABNORMAL EKG: Status: ACTIVE | Noted: 2019-08-07

## 2021-06-24 PROBLEM — R10.13 EPIGASTRIC PAIN: Status: ACTIVE | Noted: 2021-01-01

## 2021-06-24 NOTE — HISTORY OF PRESENT ILLNESS
[FreeTextEntry1] : pt presents for f/u pt s/p resection for signet cell carcinoma 10 years ago .pt with recurrent cancer .pt s/p surgery for prior pt pending oncology and surgery eval .pt with ocassional dyspepsia and rare dyspnea .pt s/p recent ct abdomen /pelvis noted with abnormality base of lung pt denies cough /fever pt denies any chest  pain dizziness ,lightheadedness ,nausea vomiting diaphoresis\par pt with ocassional abdominal discomfort

## 2021-06-24 NOTE — PHYSICAL EXAM
[Well Developed] : well developed [Well Nourished] : well nourished [No Acute Distress] : no acute distress [Normal Conjunctiva] : normal conjunctiva [Normal Venous Pressure] : normal venous pressure [No Carotid Bruit] : no carotid bruit [No Murmur] : no murmur [Normal S1, S2] : normal S1, S2 [No Rub] : no rub [No Gallop] : no gallop [Clear Lung Fields] : clear lung fields [Good Air Entry] : good air entry [No Respiratory Distress] : no respiratory distress  [Non Tender] : non-tender [Soft] : abdomen soft [No Masses/organomegaly] : no masses/organomegaly [Normal Bowel Sounds] : normal bowel sounds [Normal Gait] : normal gait [No Edema] : no edema [No Cyanosis] : no cyanosis [No Clubbing] : no clubbing [No Varicosities] : no varicosities [No Rash] : no rash [No Skin Lesions] : no skin lesions [Moves all extremities] : moves all extremities [No Focal Deficits] : no focal deficits [Normal Speech] : normal speech [Alert and Oriented] : alert and oriented [Normal memory] : normal memory [de-identified] : abdominal scar noted /abdominal hernia noted

## 2021-06-27 NOTE — PHYSICAL EXAM
[Fully active, able to carry on all pre-disease performance without restriction] : Status 0 - Fully active, able to carry on all pre-disease performance without restriction [Normal] : affect appropriate [de-identified] : anicteric  [de-identified] : no jvd  [de-identified] : normal respiratory effort, no audible wheeze [de-identified] : reg rate

## 2021-06-27 NOTE — HISTORY OF PRESENT ILLNESS
[Disease: _____________________] : Disease: [unfilled] [de-identified] : Lucinda in 2021 at age 50 dx with gastric adenocarcinoma\par in 2011 when pregnant had gastric adeno that was resected and found to be T1a with no perioperative chemo, had been on surveillance with no recurrence \par recently had abdominal pain and went for EGD with biopsy + gastric adeno at prior anastomosis site \par Surg Onc eval next week

## 2021-06-28 NOTE — PHYSICAL EXAM
[Normal] : supple, no neck mass and thyroid not enlarged [Normal Neck Lymph Nodes] : normal neck lymph nodes  [Normal Supraclavicular Lymph Nodes] : normal supraclavicular lymph nodes [Normal Groin Lymph Nodes] : normal groin lymph nodes [Normal Axillary Lymph Nodes] : normal axillary lymph nodes [Normal] : oriented to person, place and time, with appropriate affect [de-identified] : Midline incisional hernia with skin overlying loops of bowel.  No peritoneal signs.

## 2021-06-28 NOTE — CONSULT LETTER
[Dear  ___] : Dear  [unfilled], [Consult Letter:] : I had the pleasure of evaluating your patient, [unfilled]. [Please see my note below.] : Please see my note below. [Consult Closing:] : Thank you very much for allowing me to participate in the care of this patient.  If you have any questions, please do not hesitate to contact me. [Sincerely,] : Sincerely, [FreeTextEntry2] : Sohan Rangel MD  [FreeTextEntry3] : Jonatan Patel MD\par Surgical Oncology\par [facility]\par Office: [phone]\par Cell: [phone]\par

## 2021-06-28 NOTE — HISTORY OF PRESENT ILLNESS
[de-identified] : Lucinda is a pleasant 50 year old female who presents today for an initial consultation regarding recurrent signet cell gastric cancer.\par \par She was initially diagnosed with gastric cancer in 2011 while she was 4 months pregnant.  She underwent a distal gastrectomy with Billroth II reconstruction at [facility] in [place] with Dr. Philipp More.  Final pathology demonstrated a T1aN0 signet cell carcinoma of the stomach.  She did not require adjuvant chemotherapy.\par \par In June 2015, Lucinda was admitted to [facility] with a bowel obstruction.  She initially tried nonoperative management and eventually required an operative intervention with Dr. Alarcon.  She underwent an adhesiolysis and closure of the abdominal wall with Strattice mesh.  Her postoperative course was complicated by the development of ischemic bowel managed with an open abdomen and serial trips to the operating room with ultimately abdominal wall closure using a biologic mesh. \par \par Most recently, Lucinda has been developing abdominal pain, discomfort, increased burping, and flatulence. She report an 8-10 lbs. weight loss over the last month.  Endoscopy 5/28/21 with Dr. Neeraj Triplett.  Gabriel's esophagitis and a small hiatal hernia was noted.  Biopsy of the anastomosis demonstrates poorly differentiated adenocarcinoma with signet ring cell features.\par \par CT abdomen and pelvis 06/15/21 notes recurrent neoplastic disease of the stomach at the gastrojejunal anastomosis with concern for transmural disease and possibly direct extension to the left lobe of liver.

## 2021-06-28 NOTE — ASSESSMENT
[FreeTextEntry1] : We discussed the multimodal management of gastric cancer.  Lucinda will undergo a CT scan of the chest tomorrow.  I have also recommended that she follow-up with GI for an endoscopic ultrasound.  I suspect she will be a candidate for neoadjuvant chemotherapy and subsequent surgical extirpation.

## 2021-07-14 NOTE — ASU PREOP CHECKLIST - BMI (KG/M2)
Dear Laura Jackson    After reviewing your responses, I've been able to diagnose you witha sinus infection caused by a virus.? Also, we will test you for covid.    No antibiotics are indicated at this time.    It is also important to stay well hydrated, get lots of rest and take over-the-counter decongestants,?tylenol?or ibuprofen if you?are able to?take those medications per your primary care provider to help relieve discomfort.?     It is important that you take?all of?your prescribed medication even if your symptoms are improving after a few doses.? Taking?all of?your medicine helps prevent the symptoms from returning.?     If your symptoms worsen, you develop severe headache, vomiting, high fever (>102), or are not improving in 7 days, please contact your primary care provider for an appointment or visit any of our convenient Walk-in Care or Urgent Care Centers to be seen which can be found on our website?here.?     Thanks again for choosing?us?as your health care partner,?   ?  Duglas Stearns MD?   
39.5

## 2021-07-16 PROBLEM — Z85.028 HISTORY OF MALIGNANT NEOPLASM OF STOMACH: Status: RESOLVED | Noted: 2021-01-01 | Resolved: 2021-01-01

## 2021-07-16 PROBLEM — E66.9 OBESITY (BMI 30-39.9): Status: ACTIVE | Noted: 2021-01-01

## 2021-07-16 PROBLEM — Z63.5 SEPARATED FROM SPOUSE: Status: ACTIVE | Noted: 2021-01-01

## 2021-07-16 PROBLEM — R06.83 SNORING: Status: ACTIVE | Noted: 2021-01-01

## 2021-07-16 PROBLEM — K21.9 GERD (GASTROESOPHAGEAL REFLUX DISEASE): Status: ACTIVE | Noted: 2021-01-01

## 2021-07-16 PROBLEM — E55.9 VITAMIN D DEFICIENCY: Status: ACTIVE | Noted: 2019-08-07

## 2021-07-16 PROBLEM — R93.89 ABNORMAL CHEST CT: Status: ACTIVE | Noted: 2021-01-01

## 2021-07-16 PROBLEM — R94.2 ABNORMAL PFTS (PULMONARY FUNCTION TESTS): Status: ACTIVE | Noted: 2021-01-01

## 2021-07-16 NOTE — ASSESSMENT
[FreeTextEntry1] : Ms. ALLRED  is a 50 year old female with a history of hyperlipidemia, OW, low vitamin D, otosclerosis, gastric cancer signet cell carcinoma 2011 s/p complicated surgery perforation left pleural effusion, chest tube placement, resection of small bowels 2015, now recent recurrent adeno carcinoma; gastric cancer who now comes to the office for an initial pulmonary evaluation for abnormal CT of the chest prior to starting chemotherapy. \par \par Her shortness of breath is multifactorial due to:\par -poor mechanics of breathing \par -out of shape \par - over weight \par - pulmonary disease\par     - abnormal CT \par -cardiac disease (doubtful)\par \par problem 1: abnormal CT - c/w mild silent aspiration (likely related to GERD and likely sleep apnea) NOT c/w underlying known gastric cancer \par - get f/p HR CT in 3 months;  9/2021 \par CAT scans are the only radiological modality to identify abnormalities w/in the lungs with regards to nodules/masses/lymph nodes. Risks, benefits were reviewed in detail. The guidelines for abnormalities include follow up CT scans at various intervals which could range from 6 weeks to 1 year intervals. If there is a change for the worse then consideration for a biopsy will be considered if you are a candidate. Second opinion evaluation with a thoracic surgeon or an interventional radiologist could be offered. \par \par problem 2: no underlying pulmonary disease \par - no medication indicated at the current time \par \par Problem 3: abnormal PFTs \par - diffusion is mildly  reduced; repeat in 3 months \par \par Problem 4: GERD / hx of gastric carcinoma\par  -add Omeprazole 40 mg QAM, pre-meal \par  -IF have breakthrough, then recommended Pepcid 40 mg QHS \par -Rule of 2s: avoid eating too much, eating too late, eating too spicy, eating two hours before bed.\par -Things to avoid including overeating, spicy foods, tight clothing, eating within three hours of bed, this list is not all inclusive. \par -For treatment of reflux, possible options discussed including diet control, H2 blockers, PPIs, as well as coating motility agents discussed as treatment options. Timing of meals and proximity of last meal to sleep were discussed. If symptoms persist, a formal gastrointestinal evaluation is needed.\par \par \par Problem 5: Snoring / ?OSAS\par - get home sleep study ([employer])\par Sleep apnea is associated with adverse clinical consequences which can affect most organ systems.  Cardiovascular disease risk includes arrhythmias, atrial fibrillation, hypertension, coronary artery disease, and stroke. Metabolic disorders include diabetes type 2, non-alcoholic fatty liver disease. Mood disorder especially depression; and cognitive decline especially in the elderly. Associations with  chronic reflux/Gabriel’s esophagus some but not all inclusive. \par -Reasons  include arousal consistent with hypopnea; respiratory events most prominent in REM sleep or supine position; therefore sleep staging and body position are important for accurate diagnosis and estimation of AHI.\par \par Problem 6: low vitamin D\par - on rx \par Low vitamin D levels have been associated with asthma exacerbations and increased allergic symptoms. The goal based on recent information is maintaining levels between 50-70 and low normal is 30. Recommended 50,000 units every two weeks to once a month depending on the level. \par \par \par problem 7: cardiac disease\par -recommended to continue to follow up with Cardiologist if needed \par \par problem 8: poor breathing mechanics\par -Proper breathing techniques were reviewed with an emphasis of exhalation. Patient instructed to breath in for 1 second and out for four seconds. Patient was encouraged to not talk while walking. \par \par problem 9:  overweight\par -Weight loss, exercise, and diet control were discussed and are highly encouraged. Treatment options are given such as, aqua therapy, and contacting a nutritionist. Recommended to use the elliptical, stationary bike, less use of treadmill. \par \par \par problem 10: health maintenance \par -recommended yearly flu shot after October 15\par -recommended strep pneumonia vaccines: Prevnar-13 vaccine, followed by Pneumo vaccine 23 one year following after 65 years old. \par -recommended early intervention for Upper Respiratory Infections (URIs)\par -recommended regular osteoporosis evaluations\par -recommended early dermatological evaluations\par -recommended after the age of 50 to the age of 70, colonoscopy every 5 years\par \par F/U in 6-8 weeks.\par She is encouraged to call with any changes, concerns, or questions\par \par

## 2021-07-16 NOTE — HISTORY OF PRESENT ILLNESS
[TextBox_4] : Ms. ALLRED is a 50 year old female with hx of hyperlipidemia, OW, low vitamin D,  otosclerosis, gastric cancer signet cell carcinoma 2011 s/p complicated surgery perforation left pleural effusion, chest tube placement, resection of small bowels 2015, presenting to the office today for initial pulmonary evaluation. Her chief complaint is\par - no real SOB \par - she has been having heart burn depending on what she eats \par - no diarrhea / constipation\par - no itchy eyes / ears \par - no coughing / wheezing \par - weight has been stable \par - no ankle / leg swelling \par - she has been having fatigue \par - she notes she snores but shes not sure if its a big issue\par - when she wakes up in the morning she feels okay \par - occasionally has lump in the throat to clear \par - she reports NOT  being able to fall asleep as a passenger in a car for over an hour\par - she reports being able to fall asleep while watching a boring TV show \par - her memory / concentration is fine \par - she has been taking omeprazole \par - she feels like her breathing has been shallow \par -she denies any visual issues, headaches, nausea, vomiting, fever, chills, sweats, chest pain, chest pressure, diarrhea, constipation, dysphagia, dizziness, leg swelling, leg pain, itchy eyes, itchy ears, heartburn, reflux, sour taste in the mouth, myalgias or arthralgias.

## 2021-07-16 NOTE — ADDENDUM
[FreeTextEntry1] : Documented by Ai Cortez acting as a scribe for Dr. Shahriar Conklin on 07/16/2021 \par \par All medical record entries made by the Scribe were at my, Dr. Shahriar Conklin's, direction and personally dictated by me on 07/16/2021 . I have reviewed the chart and agree that the record accurately reflects my personal performance of the history, physical exam, assessment and plan. I have also personally directed, reviewed, and agree with the discharge instructions.

## 2021-07-16 NOTE — PROCEDURE
[FreeTextEntry1] :  FENO ; pt was unable to perform \par \par  CXR reveals  normal sized heart; evidence of left lower lung scaring versus breast tissue \par \par FULL PFTs reveals normal flows; FEV1 was   2.94L which is   103% of predicted; normal lung volumes; normal diffusion of  21.8, which is  76% of predicted; low normal flow volume loop \par \par \par CT (JUL.9.2021) IMPRESSION: no finding suspicious for metastasis in the chest. Right lower lobe aspiration, some areas new and decreased elsewhere. \par

## 2021-08-03 NOTE — HISTORY OF PRESENT ILLNESS
[Disease: _____________________] : Disease: [unfilled] [T: ___] : T[unfilled] [N: ___] : N[unfilled] [M: ___] : M[unfilled] [de-identified] : Lucinda in 2021 at age 50 dx with gastric adenocarcinoma\par in 2011 when pregnant had gastric adeno that was resected and found to be T1a with no perioperative chemo, had been on surveillance with no recurrence \par recently had abdominal pain and went for EGD with biopsy + gastric adeno at prior anastomosis site \par Surg Onc eval with Dr. Patel \par EUS T2 N1-2 July 2021 with Dr. Pavon  [de-identified] : Lucinda comes in follow up , still looking to be evaluate at [facility] but hasn't made appt yet\piper Hasn't completed PET despite beign called with authorization \piper remains undecided if she wants to proceed with treatment but wants more info

## 2021-08-03 NOTE — PHYSICAL EXAM
[Fully active, able to carry on all pre-disease performance without restriction] : Status 0 - Fully active, able to carry on all pre-disease performance without restriction [Normal] : affect appropriate [de-identified] : anicteric  [de-identified] : no jvd  [de-identified] : normal respiratory effort, no audible wheeze [de-identified] : reg rate

## 2021-10-04 PROBLEM — Z01.419 ENCOUNTER FOR ANNUAL ROUTINE GYNECOLOGICAL EXAMINATION: Status: ACTIVE | Noted: 2021-01-01

## 2021-10-04 PROBLEM — C16.9: Status: ACTIVE | Noted: 2021-01-01

## 2021-10-05 NOTE — DISCUSSION/SUMMARY
[FreeTextEntry1] : Annual preventitive care gyn exam\par Unremarkable gyn exam\par pap/hpv\par Recurrence of gastric cancer 2021\par pt to be scheduled for full gastrectomy\par SBE\par mammo\par Colonoscopy\par

## 2021-10-29 PROBLEM — R93.89 ABNORMAL PELVIC ULTRASOUND: Status: ACTIVE | Noted: 2021-01-01

## 2021-11-04 PROBLEM — N83.201 CYST OF RIGHT OVARY: Status: ACTIVE | Noted: 2021-01-01

## 2021-12-21 NOTE — ED PROVIDER NOTE - CARE PLAN
1 Principal Discharge DX:	Nausea and vomiting   Principal Discharge DX:	Nausea and vomiting  Secondary Diagnosis:	Pleural effusion, right  Secondary Diagnosis:	Shortness of breath  Secondary Diagnosis:	Metastasis from gastric cancer

## 2021-12-21 NOTE — ED PROVIDER NOTE - NSICDXPASTSURGICALHX_GEN_ALL_CORE_FT
PAST SURGICAL HISTORY:  History of gastrectomy distal gastrectomy w/ B2 reconstruction (gastrojejunostomy)    History of resection of small bowel exlap, SBR, meckel's diverticulectomy (7/4/2015); exlap, SBR for necrotic bowel, left in discontinuity, Abthera (7/9/2015); exlap, SB anastomosis, Abthera (7/11/15); exlap, washout, abdominal closure w/ Strattice (7/13/15)

## 2021-12-21 NOTE — ED PROVIDER NOTE - CLINICAL SUMMARY MEDICAL DECISION MAKING FREE TEXT BOX
51F hx of gastric CA sp resection, prior SBOs, no longer on tx, here with NV, inability to tolerate PO, abdominal discomfort and SOB, concern for SBO, Progression of dz, possible PE given hx of malignancy. Labs, imaging, IVF, sx mgmt, TBA.

## 2021-12-21 NOTE — ED PROVIDER NOTE - OBJECTIVE STATEMENT
50 y/o female PMHx gastric cancer 2011 s/p distal gastrectomy (no chemo) c/b SBO 2015 requiring adhesiolysis further c/b ischemic bowel managed with an open abdomen and serial surgeries now with poorly differentiated adenocarcinoma on EGD biopsy 5/28/21 with concern for transmural disease and possibly direct extension to the left lobe of liver not currently on chemo    GI Dr. Uziel Pickard [phone] 50 y/o female PMHx gastric cancer 2011 s/p distal gastrectomy (no chemo) c/b SBO 2015 requiring adhesiolysis further c/b ischemic bowel managed with an open abdomen and serial surgeries now with poorly differentiated adenocarcinoma on EGD biopsy 5/28/21 with concern for transmural disease and possibly direct extension to the left lobe of liver refusing gastrectomy/chemo now presenting to the ED unable to tolerate PO x3 weeks. Patient has been on a liquid since onset of symptoms and feels that the vitamins/tea treating her cancer caused her symptoms. Patient also reported JORDAN walking a few feet within the last week. Patient last BM was 3 days ago and has minimal flatus. Patient reported belching. Patient denied CP, palpitations, abdominal pain, diarrhea, fever chills, cough, back pain, rash,    GI Dr. Uziel Pickard [phone]

## 2021-12-21 NOTE — ED PROVIDER NOTE - RAPID ASSESSMENT
50 y/o female h/o gastric cancer, prior SBO, bowel resection x 2, gastrectomy, presents with inability to tolerate solid PO intake, abdominal discomfort, N/V, and change in BM. Pt is belching a lot on triage.     Scribe Statement: IYoana Tasfia, attest that this documentation has been prepared under the direction and in the presence of Giselle Orozco (). 50 y/o female h/o gastric cancer, prior SBO, bowel resection x 2, gastrectomy, presents with inability to tolerate solid PO intake, abdominal discomfort, N/V, and change in BM. Pt is belching a lot on triage. Uncomfortable appearing.     Scribe Statement: Yoana LAWSON Tasfia, attest that this documentation has been prepared under the direction and in the presence of Giselle Orozco (). 50 y/o female h/o gastric cancer, prior SBO, bowel resection x 2, gastrectomy, presents with inability to tolerate solid PO intake, abdominal discomfort, N/V, and change in BM. Pt is belching a lot on triage. Uncomfortable appearing.     Scribe Statement: I, Randa Lees, attest that this documentation has been prepared under the direction and in the presence of Giselle Orozco ().    I, Dr. Orozco,  personally performed the telemedicine rapid assessment described in the documentation, reviewed the rapid assessment documentation recorded by the scribe in my presence and it accurately and completely records my words and action.

## 2021-12-21 NOTE — ED ADULT NURSE NOTE - OBJECTIVE STATEMENT
2155 pt 51yf aox4 c/o dec po intake for 3wks, [place] [address], her pmds are aware, in no acute distress able to verbalize concerns, pending eval/dispo

## 2021-12-21 NOTE — ED PROVIDER NOTE - NSICDXPASTMEDICALHX_GEN_ALL_CORE_FT
PAST MEDICAL HISTORY:  Gastric cancer 5/2011 no adjunt trement    Obesity (BMI 30-39.9)     Other abdominal hernia     Perforated bowel     Small bowel obstruction Exp lap complicated with post op Ishemic bowel /perforation

## 2021-12-22 NOTE — H&P ADULT - PROBLEM SELECTOR PLAN 5
-Patient with bilateral lower extremity edema-in the setting of malignancy need to rule out DVT  -Bilateral lower extremity dopplers ordered, follow up results

## 2021-12-22 NOTE — H&P ADULT - PROBLEM SELECTOR PLAN 3
-Patient presenting with many bacteria in the urine and 27 WBC however, denies symptoms  -Follow up urine culture  -Hold off on antibiotics for now, patient not having symptoms and states she would not want unless culture confirms infection

## 2021-12-22 NOTE — CONSULT NOTE ADULT - ASSESSMENT
Check HER 2 status, deficient mismatch repair, MSI-H, PD-1 expression status.   She may be a canddiate for further therapy...  This patient is a 49yo lady with PMH with gastric cancer T1a diagnosed 2011 s/p partial gastrectomy, with localized recurrence identified in 5/2021, who had not pursued therapy, now presenting with inability to tolerate PO, found to have metastatic disease.     - agree with consulting GI and surgical teams to determine if patient's dilated afferent loop on CT and inability to tolerate PO may be related to possible obstruction   - agree with IR evaluation for paracentesis for ascites and pulmonary evaluation for thoracentesis of large pleural effusion  - ongoing discussion regarding further outpatient care        Thank you for this interesting consult. We will follow with you. Please do not hesitate to page with questions.     Karlee Hudson MD PGY4   [phone]  Hematology-Oncology Fellow

## 2021-12-22 NOTE — H&P ADULT - NSHPLABSRESULTS_GEN_ALL_CORE
.  LABS:                         10.0   10.27 )-----------( 301      ( 22 Dec 2021 06:18 )             32.1     12-22    132<L>  |  94<L>  |  7   ----------------------------<  89  3.0<L>   |  20<L>  |  0.70    Ca    9.1      22 Dec 2021 06:18  Phos  2.8     12-22  Mg     1.9     12-22    TPro  7.0  /  Alb  4.0  /  TBili  0.6  /  DBili  x   /  AST  16  /  ALT  15  /  AlkPhos  2383<H>  12-21      Urinalysis Basic - ( 22 Dec 2021 11:36 )    Color: Yellow / Appearance: Slightly Turbid / SG: >1.050 / pH: x  Gluc: x / Ketone: Large  / Bili: Small / Urobili: 3 mg/dL   Blood: x / Protein: 100 / Nitrite: Negative   Leuk Esterase: Negative / RBC: 6 /hpf / WBC 27 /HPF   Sq Epi: x / Non Sq Epi: 30 /hpf / Bacteria: Many

## 2021-12-22 NOTE — H&P ADULT - PROBLEM SELECTOR PLAN 4
-Patient presenting with HCO3 of 20 and anion gap of 19 with ketones in the urine-in the setting of poor PO intake for the last few weeks, likely starvation ketosis  -Continue with IV D5/LR and recheck metabolic panel

## 2021-12-22 NOTE — CONSULT NOTE ADULT - SUBJECTIVE AND OBJECTIVE BOX
REASON FOR CONSULTATION:    HPI:  This patient is a 47yo lady with PMH with recent recurrent gastric cancer presents with complaint of progressively worsening dysphagia.   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 CT A/P found recurrent neoplastic disease of stomach at gastrojejunal anastomosis, stranding of the perienteric fascia suggesting transmural disease. Recurrent tumor abutting the left lobe of the liver. She saw Dr. Ibanez in 6/25 and recommended PET/CT, but was not performed. CT chest found no suspicious findings for mets.  She had EUS T2N1 with Dr. Pavon. Pt was seen by Dr. Patel (surgical oncology), who was unable to get diagnostic laparoscopy given surgical risk from prior studies. The patient was informed about neoadjuvant FLOT, and advised to have PET and port placement,  but she was not ready to start treatment and was [place] [facility] [place]    CT A/P imaging here new large R pleural effusion, moderate L hydronephrosis without cause for obstruction, marked distension of H apparent loop likely 2/2 recurrent disease at gastrojejunostomy anastomosis, ascites with multiple loculated peritoneal fluid collections, infiltrative chances of peritoneal fat likely 2/2 peritoneal carcinomatosis, sclerotic and lytic bone lesion suggestive of advanced metastatic disease.   IR was consulted for paracentesis of ascites. Surgical team not offering intervention at this time, but suggested drain of pleural effusion.  CT chest found no main, R, L or lobar pulmonary embolism noted.  Diffuse osseous metastatic disease noted. New large pleural effusion.     REVIEW OF SYSTEMS  CONSTITUTIONAL: No fever, no chills, no fatigue  EYES: No eye pain, no vision changes  ENMT:  No difficulty hearing, no throat pain  RESPIRATORY: No cough, no wheezing, no sputum production; No shortness of breath  CARDIOVASCULAR: No chest pain, no palpitations, no JORDAN, no leg swelling  GASTROINTESTINAL: No abdominal pain, no nausea, no vomiting, no hematemesis, no diarrhea, no constipation, no melena, no hematochezia.  GENITOURINARY: No dysuria, no hematuria  NEUROLOGICAL: No headaches, no loss of strength, no numbness  SKIN: No itching, no rashes, no lesions   MUSCULOSKELETAL: No joint pain, no joint swelling; No muscle pain  HEME/LYMPH: No easy bruising, bleeding        Allergies    Cipro (Rash)    Intolerances        MEDICATIONS  (STANDING):  dextrose 5% + lactated ringers. 1000 milliLiter(s) (100 mL/Hr) IV Continuous <Continuous>  lactobacillus acidophilus 1 Tablet(s) Oral daily  multivitamin 1 Tablet(s) Oral daily    MEDICATIONS  (PRN):  morphine  - Injectable 2 milliGRAM(s) IV Push every 4 hours PRN Moderate Pain (4 - 6)  morphine  - Injectable 4 milliGRAM(s) IV Push every 4 hours PRN Severe Pain (7 - 10)  ondansetron Injectable 4 milliGRAM(s) IV Push every 8 hours PRN Nausea and/or Vomiting      T(C): 36.8 (12-22-21 @ 12:38), Max: 36.9 (12-21-21 @ 20:30)  HR: 98 (12-22-21 @ 12:38) (92 - 112)  BP: 132/68 (12-22-21 @ 12:38) (113/67 - 136/59)  RR: 20 (12-22-21 @ 12:38) (16 - 20)  SpO2: 97% (12-22-21 @ 12:38) (95% - 100%)  Wt(kg): --    PHYSICAL EXAM:  GENERAL: NAD, well-groomed, well-developed  HEAD:  Atraumatic, Normocephalic  EYES: EOMI, PERRLA, conjunctiva and sclera clear  ENMT: No oropharyngeal exudates, erythema or lesions,  Moist mucous membranes, good dentition  NECK: Supple, no cervical lymphadenopathy, no JVD  NERVOUS SYSTEM:  Alert & Oriented X3, CN II-XII intact, 5/5 BUE and BLE motor strength, full sensation to light touch   CHEST/LUNG: Clear to auscultation bilaterally; No rales, no  rhonchi, no wheezing  HEART: Regular rate and rhythm; No murmurs, rubs, or gallops  ABDOMEN: Soft, Nontender, Nondistended  EXTREMITIES:  2+ radial Pulses, No cyanosis or edema  SKIN: warm, dry      LABS:                        11.5   11.33 )-----------( 333      ( 22 Dec 2021 15:15 )             36.5     12-22    132<L>  |  94<L>  |  7   ----------------------------<  89  3.0<L>   |  20<L>  |  0.70    Ca    9.1      22 Dec 2021 06:18  Phos  2.8     12-22  Mg     1.9     12-22    TPro  7.0  /  Alb  4.0  /  TBili  0.6  /  DBili  x   /  AST  16  /  ALT  15  /  AlkPhos  2383<H>  12-21      Urinalysis Basic - ( 22 Dec 2021 11:36 )    Color: Yellow / Appearance: Slightly Turbid / SG: >1.050 / pH: x  Gluc: x / Ketone: Large  / Bili: Small / Urobili: 3 mg/dL   Blood: x / Protein: 100 / Nitrite: Negative   Leuk Esterase: Negative / RBC: 6 /hpf / WBC 27 /HPF   Sq Epi: x / Non Sq Epi: 30 /hpf / Bacteria: Many            RADIOLOGY & ADDITIONAL STUDIES:    PATHOLOGY:     NOTE NOT FINALIZED    REASON FOR CONSULTATION:    HPI:  This patient is a 49yo lady with PMH with recent recurrent gastric cancer presents with complaint of progressively worsening dysphagia.   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 CT A/P found recurrent neoplastic disease of stomach at gastrojejunal anastomosis, stranding of the perienteric fascia suggesting transmural disease. Recurrent tumor abutting the left lobe of the liver. She saw Dr. Ibanez in 6/25 and recommended PET/CT, but was not performed. CT chest found no suspicious findings for mets.  She had EUS T2N1 with Dr. Pavon. Pt was seen by Dr. Patel (surgical oncology), who was unable to get diagnostic laparoscopy given surgical risk from prior studies. The patient was informed about neoadjuvant FLOT, and advised to have PET and port placement,  but she was not ready to start treatment and [place] [facility] [place]    [place]P imaging here new large R pleural effusion, moderate L hydronephrosis without cause for obstruction, marked distension of H apparent loop likely 2/2 recurrent disease at gastrojejunostomy anastomosis, ascites with multiple loculated peritoneal fluid collections, infiltrative chances of peritoneal fat likely 2/2 peritoneal carcinomatosis, sclerotic and lytic bone lesion suggestive of advanced metastatic disease.   IR was consulted for paracentesis of ascites. Surgical team not offering intervention at this time, but suggested drain of pleural effusion.  CT chest found no main, R, L or lobar pulmonary embolism noted.  Diffuse osseous metastatic disease noted. New large pleural effusion.     REVIEW OF SYSTEMS  CONSTITUTIONAL: No fever, no chills, no fatigue  EYES: No eye pain, no vision changes  ENMT:  No difficulty hearing, no throat pain  RESPIRATORY: No cough, no wheezing, no sputum production; No shortness of breath  CARDIOVASCULAR: No chest pain, no palpitations, no JORDAN, no leg swelling  GASTROINTESTINAL: No abdominal pain, no nausea, no vomiting, no hematemesis, no diarrhea, no constipation, no melena, no hematochezia.  GENITOURINARY: No dysuria, no hematuria  NEUROLOGICAL: No headaches, no loss of strength, no numbness  SKIN: No itching, no rashes, no lesions   MUSCULOSKELETAL: No joint pain, no joint swelling; No muscle pain  HEME/LYMPH: No easy bruising, bleeding        Allergies    Cipro (Rash)    Intolerances        MEDICATIONS  (STANDING):  dextrose 5% + lactated ringers. 1000 milliLiter(s) (100 mL/Hr) IV Continuous <Continuous>  lactobacillus acidophilus 1 Tablet(s) Oral daily  multivitamin 1 Tablet(s) Oral daily    MEDICATIONS  (PRN):  morphine  - Injectable 2 milliGRAM(s) IV Push every 4 hours PRN Moderate Pain (4 - 6)  morphine  - Injectable 4 milliGRAM(s) IV Push every 4 hours PRN Severe Pain (7 - 10)  ondansetron Injectable 4 milliGRAM(s) IV Push every 8 hours PRN Nausea and/or Vomiting      T(C): 36.8 (12-22-21 @ 12:38), Max: 36.9 (12-21-21 @ 20:30)  HR: 98 (12-22-21 @ 12:38) (92 - 112)  BP: 132/68 (12-22-21 @ 12:38) (113/67 - 136/59)  RR: 20 (12-22-21 @ 12:38) (16 - 20)  SpO2: 97% (12-22-21 @ 12:38) (95% - 100%)  Wt(kg): --    PHYSICAL EXAM:  GENERAL: NAD, well-groomed, well-developed  HEAD:  Atraumatic, Normocephalic  EYES: EOMI, PERRLA, conjunctiva and sclera clear  ENMT: No oropharyngeal exudates, erythema or lesions,  Moist mucous membranes, good dentition  NECK: Supple, no cervical lymphadenopathy, no JVD  NERVOUS SYSTEM:  Alert & Oriented X3, CN II-XII intact, 5/5 BUE and BLE motor strength, full sensation to light touch   CHEST/LUNG: Clear to auscultation bilaterally; No rales, no  rhonchi, no wheezing  HEART: Regular rate and rhythm; No murmurs, rubs, or gallops  ABDOMEN: Soft, Nontender, Nondistended  EXTREMITIES:  2+ radial Pulses, No cyanosis or edema  SKIN: warm, dry      LABS:                        11.5   11.33 )-----------( 333      ( 22 Dec 2021 15:15 )             36.5     12-22    132<L>  |  94<L>  |  7   ----------------------------<  89  3.0<L>   |  20<L>  |  0.70    Ca    9.1      22 Dec 2021 06:18  Phos  2.8     12-22  Mg     1.9     12-22    TPro  7.0  /  Alb  4.0  /  TBili  0.6  /  DBili  x   /  AST  16  /  ALT  15  /  AlkPhos  2383<H>  12-21      Urinalysis Basic - ( 22 Dec 2021 11:36 )    Color: Yellow / Appearance: Slightly Turbid / SG: >1.050 / pH: x  Gluc: x / Ketone: Large  / Bili: Small / Urobili: 3 mg/dL   Blood: x / Protein: 100 / Nitrite: Negative   Leuk Esterase: Negative / RBC: 6 /hpf / WBC 27 /HPF   Sq Epi: x / Non Sq Epi: 30 /hpf / Bacteria: Many            RADIOLOGY & ADDITIONAL STUDIES:    PATHOLOGY:     REASON FOR CONSULTATION:    HPI:  This patient is a 49yo lady with PMH with recent recurrent gastric cancer presents with complaint of progressively worsening inability to tolerate oral intake.   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 In June 2015, she had bowel obstruction, requiring surgical intervention.    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 CT A/P found recurrent neoplastic disease of stomach at gastrojejunal anastomosis, stranding of the perienteric fascia suggesting transmural disease. Recurrent tumor abutting the left lobe of the liver. She saw Dr. Ibanez in 6/25 and recommended PET/CT, but was not performed. CT chest found no suspicious findings for mets.  She had EUS T2N1 with Dr. Pavon. Pt was seen by Dr. Patel (surgical oncology), who was unable to get diagnostic laparoscopy given surgical risk from prior studies. The patient was informed about neoadjuvant FLOT, and advised to have PET and port placement,  but she was not ready to start treatment and was considering care at [facility] [place]. The patient started naturopathic remedies with juicing.   Over the last month, she has had emesis after taking solid foods, and now can only tolerate clear liquids. She has had 30-40lbs weight loss in the last 3 months.     CT A/P imaging here new large R pleural effusion, moderate L hydronephrosis without cause for obstruction, marked distension of H apparent loop likely 2/2 recurrent disease at gastrojejunostomy anastomosis, ascites with multiple loculated peritoneal fluid collections, infiltrative chances of peritoneal fat likely 2/2 peritoneal carcinomatosis, sclerotic and lytic bone lesion suggestive of advanced metastatic disease.   IR was consulted for paracentesis of ascites. Surgical team not offering intervention at this time, but suggested drain of pleural effusion.  CT chest found no main, R, L or lobar pulmonary embolism noted.  Diffuse osseous metastatic disease noted. New large pleural effusion.     REVIEW OF SYSTEMS  CONSTITUTIONAL: No fever, no chills, no fatigue  EYES: No eye pain, no vision changes  ENMT:  No difficulty hearing, no throat pain  RESPIRATORY: No cough, no wheezing, no sputum production; No shortness of breath  CARDIOVASCULAR: No chest pain, no palpitations, no JORDAN, no leg swelling  GASTROINTESTINAL: + abdominal pain described as "raw", +postprandial vomiting  GENITOURINARY: No dysuria, no hematuria  NEUROLOGICAL: No headaches, no loss of strength, no numbness  SKIN: No itching, no rashes, no lesions   MUSCULOSKELETAL: No joint pain, no joint swelling; No muscle pain  HEME/LYMPH: No easy bruising, bleeding    Allergies    Cipro (Rash)    Intolerances        MEDICATIONS  (STANDING):  dextrose 5% + lactated ringers. 1000 milliLiter(s) (100 mL/Hr) IV Continuous <Continuous>  lactobacillus acidophilus 1 Tablet(s) Oral daily  multivitamin 1 Tablet(s) Oral daily    MEDICATIONS  (PRN):  morphine  - Injectable 2 milliGRAM(s) IV Push every 4 hours PRN Moderate Pain (4 - 6)  morphine  - Injectable 4 milliGRAM(s) IV Push every 4 hours PRN Severe Pain (7 - 10)  ondansetron Injectable 4 milliGRAM(s) IV Push every 8 hours PRN Nausea and/or Vomiting      T(C): 36.8 (12-22-21 @ 12:38), Max: 36.9 (12-21-21 @ 20:30)  HR: 98 (12-22-21 @ 12:38) (92 - 112)  BP: 132/68 (12-22-21 @ 12:38) (113/67 - 136/59)  RR: 20 (12-22-21 @ 12:38) (16 - 20)  SpO2: 97% (12-22-21 @ 12:38) (95% - 100%)  Wt(kg): --    PHYSICAL EXAM:  GENERAL: NAD, well-groomed, well-developed  HEAD:  Atraumatic, Normocephalic  EYES: EOMI, PERRLA, conjunctiva and sclera clear  ENMT: No oropharyngeal exudates, erythema or lesions,  Moist mucous membranes  NECK: Supple, no cervical lymphadenopathy  NERVOUS SYSTEM:  Alert & Oriented X3, moving extremities voluntarily   CHEST/LUNG: Clear to auscultation bilaterally; No rales, no  rhonchi, no wheezing  HEART: Regular rate and rhythm; No murmurs, rubs, or gallops  ABDOMEN: Soft, mild abdominal tenderness to palpation, nondistended  EXTREMITIES:  2+ radial Pulses, No cyanosis or edema  SKIN: warm, dry    LABS:                        11.5   11.33 )-----------( 333      ( 22 Dec 2021 15:15 )             36.5     12-22    132<L>  |  94<L>  |  7   ----------------------------<  89  3.0<L>   |  20<L>  |  0.70    Ca    9.1      22 Dec 2021 06:18  Phos  2.8     12-22  Mg     1.9     12-22    TPro  7.0  /  Alb  4.0  /  TBili  0.6  /  DBili  x   /  AST  16  /  ALT  15  /  AlkPhos  2383<H>  12-21      Urinalysis Basic - ( 22 Dec 2021 11:36 )    Color: Yellow / Appearance: Slightly Turbid / SG: >1.050 / pH: x  Gluc: x / Ketone: Large  / Bili: Small / Urobili: 3 mg/dL   Blood: x / Protein: 100 / Nitrite: Negative   Leuk Esterase: Negative / RBC: 6 /hpf / WBC 27 /HPF   Sq Epi: x / Non Sq Epi: 30 /hpf / Bacteria: Many    RADIOLOGY & ADDITIONAL STUDIES:    < from: CT Abdomen and Pelvis w/ IV Cont (12.21.21 @ 20:14) >  FINDINGS:  Pleural spaces: New large right pleural effusion with adjacent   atelectasis..    Liver: Unremarkable.  Gallbladder and bile ducts: Unremarkable. No calcified stones.  Pancreas: Unremarkable.  Spleen: Normal. .  Adrenal glands: Normal.  Kidneys and ureters: Normal right kidney. Moderate left hydronephrosis and  proximal ureteral dilatation. No cause for obstruction identified.  Stomach and bowel: Status post gastrojejunostomy. Apparent marked   distention H apparent loop likely secondary to recurrent disease at the   gastrojejunostomy anastomosis. Appendix: No inflammation.    Intraperitoneal space: Ascites and multiple loculated peritoneal fluid  collections. Infiltrative changes of the peritoneal fat likely secondary   to peritoneal carcinomatosis.  Vasculature: Unremarkable.  Retroperitoneum: Left periaortic lymph node new since 06/15/2021, 1.8 x   1.1 cm (5:38). Retroperitoneal fluid infiltrating into the ric hepatis  Urinary bladder: Unremarkable.  Reproductive: Unremarkable uterus and adenexa.  Bones/joints: Predominantly sclerotic but some lytic lesions throughout  skeleton suggest advanced metastatic disease..  Soft tissues: Large anterior abdominal and pelvic wall hernia containing   bowel,  mesentery and ascites. Hernia neck measures 16 cm..    IMPRESSION:  1. Apparent marked dilatation afferent loop likely secondary to recurrent   disease at the gastrojejunostomy  2. New large right pleural effusion with adjacent atelectasis..  3. New large ascites with multiple loculations and mesenteric fat   infiltration likely secondary to peritoneal carcinomatosis  4. Predominantly sclerotic but some lytic lesions throughout skeleton   suggest  advanced metastatic disease..  5. Moderate left hydronephrosis andproximal ureteral dilatation  6. Large abdominal and pelvic wall hernia containing bowel, mesentery and  ascites. No significant change.    < end of copied text >    < from: CT Angio Chest PE Protocol w/ IV Cont (12.21.21 @ 22:31) >  FINDINGS:  CHEST:  LUNGS AND LARGE AIRWAYS: Patent central airways. 4 mm nodule in the   periphery left lung base on series 8 image 82 appears stable.  PLEURA: New large right pleural effusion with near complete collapse of   the right lower lobe and partial compressive right middle and upper lobe   atelectasis.  VESSELS: No main, right, left or lobar pulmonary embolism. Limited   evaluation of the segmental subsegmental branches.  HEART: Heart size is normal. No pericardial effusion.  MEDIASTINUM AND NASREEN: No lymphadenopathy.  CHEST WALL AND LOWER NECK: Tiny subcentimeter hypodense nodules within   the left thyroid lobe, unchanged.  UPPER ABDOMEN: Again seen are partially visualized cystic collections in   the upper abdomen which could be from loculated ascites. Please refer to   CT abdomen and pelvis that was concurrently performed was widely dictated   in a separate report.  BONES: Diffuse osseous metastatic disease involving the bilateral ribs,   sternum cervical and thoracic spine, which are new from 6/29/2021. No   evidence of pathological fracture.    IMPRESSION:    No main, right, left or lobar pulmonary embolism. Limited evaluation of   the segmental subsegmental branches.    New large right pleural effusion with near complete right lower and   partial right upper and middle compressive atelectasis.    Stable 4 mm mm left lower lobe pulmonary nodule.    Diffuse osseous metastatic disease.      < end of copied text >

## 2021-12-22 NOTE — PATIENT PROFILE ADULT - FALL HARM RISK - UNIVERSAL INTERVENTIONS
Bed in lowest position, wheels locked, appropriate side rails in place/Call bell, personal items and telephone in reach/Instruct patient to call for assistance before getting out of bed or chair/Non-slip footwear when patient is out of bed/[place] to call system/Physically safe environment - no spills, clutter or unnecessary equipment/Purposeful Proactive Rounding/Room/bathroom lighting operational, light cord in reach

## 2021-12-22 NOTE — H&P ADULT - PROBLEM SELECTOR PLAN 1
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 however, patient opted not for treatment at that time now presenting with dysphagia to solids that has progressed to liquids  -CT findings as above with progression of disease  -Surgical oncology consulted, no surgical intervention at this time  -Oncology consulted, will follow up recs  -Patient with large right pleural effusion, pulmonary consulted for thoracentesis  -IR consulted for possible paracentesis for ascites  -Patient states that currently she would like to be full code-palliative consulted, follow up recs  -Zofran PRN  -Clear liquid diet, advance as tolerated  -Morphine for pain  -IVF as patient not tolerating much PO

## 2021-12-22 NOTE — H&P ADULT - HISTORY OF PRESENT ILLNESS
51 year old female with PMH gastric CA s/p resection, SBO, multiple bowel resections, and recent recurrence of her cancer who presents for inability to tolerate PO. Six months ago, the patient had abdominal pain and EGD with biopsy which showed poorly differentiated signet ring cell carcinoma. She saw Dr. Skinny Ibanez from onoclogy as an outpatient who recommended PORT and PET along with treatment however, the patient opted not to proceed with treatment. 51 year old female with PMH gastric CA s/p resection, SBO, multiple bowel resections, and recent recurrence of her cancer who presents for inability to tolerate PO. Six months ago, the patient had abdominal pain and EGD with biopsy which showed poorly differentiated signet ring cell carcinoma. She saw Dr. Skinny Ibanez from onoclogy as an outpatient who recommended PORT and PET along with treatment however, the patient opted not to proceed with treatment. For the past 3 weeks, the patient has been unable to tolerate solids and switched to a liquid diet. For the past 2-3 days, her dysphagia has worsened and now she is having difficulty tolerating liquids. When she attempts to take PO, she has many episodes of vomiting, belching and spitting up which prompted her to go to the ED. Upon arrival, vital signs were /81, , RR 18, temperature 98.3 degrees Farenheit and saturating 98% on room air. Labs were significant for WBC 10.91, Na 131, K 3.1, Cl 92, HCO3 20, anion gap 19 and alk phos 2383. UA was positive for blood and 27 WBC. CT C/A/P showed apparent marked dilatation afferent loop likely secondary to recurrent disease at the gastrojejunostomy, new large right pleural effusion with adjacent atelectasis, new large ascites with multiple loculations and mesenteric fat infiltration likely secondary to peritoneal carcinomatosis, predominantly sclerotic but some lytic lesions throughout skeleton suggest advanced metastatic disease, moderate left hydronephrosis and proximal ureteral dilatation and large abdominal and pelvic wall hernia containing bowel, mesentery and ascites. She received 500cc LR, 20mg IV pepcid x2, 975mg PO acetaminophen, 4mg PO zofran and 30meq IV K and was admitted for further managmenet. 51 year old female with PMH gastric CA s/p resection, SBO, multiple bowel resections, and recent recurrence of her cancer who presents for inability to tolerate PO. The patient was initially diagnosed in 2011 with gastric cancer which she had resected. Six months ago, the patient had abdominal pain and EGD with biopsy which showed poorly differentiated signet ring cell carcinoma. She saw Dr. Skinny Ibanez from oncology as an outpatient who recommended chemoport and PET for staging purposes along with treatment however, the patient opted not to proceed with treatment. For the past 3 weeks, the patient has been unable to tolerate solids and switched to a liquid diet. For the past 2-3 days, her dysphagia has worsened and now she is having difficulty tolerating liquids. When she attempts to take PO, she has many episodes of vomiting, belching and spitting up which prompted her to go to the ED. Upon arrival, vital signs were /81, , RR 18, temperature 98.3 degrees Farenheit and saturating 98% on room air. Labs were significant for WBC 10.91, Na 131, K 3.1, Cl 92, HCO3 20, anion gap 19 and alk phos 2383. UA was positive for blood and 27 WBC. CT C/A/P showed apparent marked dilatation afferent loop likely secondary to recurrent disease at the gastrojejunostomy, new large right pleural effusion with adjacent atelectasis, new large ascites with multiple loculations and mesenteric fat infiltration likely secondary to peritoneal carcinomatosis, predominantly sclerotic but some lytic lesions throughout skeleton suggest advanced metastatic disease, moderate left hydronephrosis and proximal ureteral dilatation and large abdominal and pelvic wall hernia containing bowel, mesentery and ascites. She received 500cc LR, 20mg IV pepcid x2, 975mg PO acetaminophen, 4mg PO zofran and 30meq IV K and was admitted for further management 51 year old female with PMH gastric CA s/p resection, SBO, multiple bowel resections, and recent recurrence of her cancer who presents for inability to tolerate PO. The patient was initially diagnosed in 2011 with gastric cancer which she had resected. Six months ago, the patient had abdominal pain and EGD with biopsy which showed poorly differentiated signet ring cell carcinoma. She saw Dr. Skinny Ibanez from oncology as an outpatient who recommended chemoport and PET for staging purposes along with treatment however, the patient opted not to proceed with treatment. For the past 3 weeks, the patient has been unable to tolerate solids and switched to a liquid diet. For the past 2-3 days, her dysphagia has worsened and now she is having difficulty tolerating liquids. When she attempts to take PO, she has many episodes of vomiting, belching and spitting up which prompted her to go to the ED. Upon arrival, vital signs were /81, , RR 18, temperature 98.3 degrees Farenheit and saturating 98% on room air. Labs were significant for WBC 10.91, Na 131, K 3.1, Cl 92, HCO3 20, anion gap 19 and alk phos 2383. UA was positive for blood and 27 WBC. CT C/A/P showed apparent marked dilatation afferent loop likely secondary to recurrent disease at the gastrojejunostomy, new large right pleural effusion with adjacent atelectasis, new large ascites with multiple loculations and mesenteric fat infiltration likely secondary to peritoneal carcinomatosis, predominantly sclerotic but some lytic lesions throughout skeleton suggest advanced metastatic disease, moderate left hydronephrosis and proximal ureteral dilatation and large abdominal and pelvic wall hernia containing bowel, mesentery and ascites. She received 500cc LR, 20mg IV pepcid x2, 975mg PO acetaminophen, 4mg PO zofran and 30meq IV K and was admitted for further management.

## 2021-12-22 NOTE — CONSULT NOTE ADULT - ASSESSMENT
The patient is a 51 year old female with PMH of stage 4 gastric CA (s/p resection, SBO, multiple bowel resections, and recent recurrence of her cancer) who presents for inability to tolerate PO found to have progression of her gastric cancer. Pulmonary consulted after patient found to have large right pleural effusion on CT chest.     Problem: Large right pleural effusion  Assessment: Differential includes malignant effusion vs translocation of ascites fluid through defect in the diaghpram. Unlikely to represent parapneumonic effusion given lack of fevers, productive cough. Patient has SOB on exertion which is typical of malignant effusions. She does SOB at rest or orthopnea however she is unable to lie flat due to her GI complaints. She has no cough. She denies pleurisy sx therefore unlikely to be an inflammatory or infectious effusion as these patients typically complain of pleuritic pain / positional pain / sharp pain. Discussed therapeutic and diagnostic thoracentesis with patient. She is very nervous about the procedure however agreed to allow us to perform the procedure tomorrow AM.     Plan:  Await coag panel  Please send CBC, CMP tomorrow AM labs  Can give dose of HSQ now and at midnight. Please hold after midnight.   Pulm will set up for thoracentesis tomorrow AM

## 2021-12-22 NOTE — CHART NOTE - NSCHARTNOTEFT_GEN_A_CORE
: Dr Tobin Underwood    INDICATION: Pleural Effusion    PROCEDURE:  [ ] LIMITED ECHO  [x ] LIMITED CHEST  [ ] LIMITED RETROPERITONEAL  [ ] LIMITED ABDOMINAL  [ ] LIMITED DVT  [ ] NEEDLE GUIDANCE VASCULAR  [ ] NEEDLE GUIDANCE THORACENTESIS  [ ] NEEDLE GUIDANCE PARACENTESIS  [ ] NEEDLE GUIDANCE PERICARDIOCENTESIS  [ ] OTHER    FINDINGS:  Large right pleural effusion, simple fluid    INTERPRETATION:    Large right pleural effusion, simple fluid. Concern for malignant effusion      Images uploaded to [employer] : Dr Tobin Underwood    INDICATION: Pleural Effusion    PROCEDURE:  [ ] LIMITED ECHO  [x ] LIMITED CHEST  [ ] LIMITED RETROPERITONEAL  [ ] LIMITED ABDOMINAL  [ ] LIMITED DVT  [ ] NEEDLE GUIDANCE VASCULAR  [ ] NEEDLE GUIDANCE THORACENTESIS  [ ] NEEDLE GUIDANCE PARACENTESIS  [ ] NEEDLE GUIDANCE PERICARDIOCENTESIS  [ ] OTHER    FINDINGS:  Large right pleural effusion, simple fluid    INTERPRETATION:    Large right pleural effusion, simple fluid. Concern for malignant effusion.      Images uploaded to [email or web address]

## 2021-12-22 NOTE — CHART NOTE - NSCHARTNOTEFT_GEN_A_CORE
51 F with gastric cancer, likely with peritoneal disease and ascites, IR consulted for diagnostic/therapeutic paracentesis.    plan for  paracentesis THURS 12/23. can put order for IR procedure under Dr. Macario    please recheck CBC BMP Coags 4 AM,  ensure covid swab within 72 hours of procedure    Raheem Abreu MD  Interventional Radiology PGY4    -EMERGENT: [facility] IR Pager: [phone].  [facility] IR Pager: [phone] [id number]  -Nonemergent consults:  [facility] order "Consult- Interventional Radiology"  -Available on [employer] TEAMS for questions 51 F with gastric cancer, likely with peritoneal disease and ascites, IR consulted for diagnostic/therapeutic paracentesis.    plan for  paracentesis THURS 12/23. can put order for IR procedure under Dr. Macario.    please recheck CBC BMP Coags 4 AM,  ensure covid swab within 72 hours of procedure    Raheem Abreu MD  Interventional Radiology PGY4    -EMERGENT: [facility] IR Pager: [phone].  [facility] IR Pager: [phone] [id number]  -Nonemergent consults:  [facility] order "Consult- Interventional Radiology"  -Available on [employer] TEAMS for questions

## 2021-12-22 NOTE — H&P ADULT - PROBLEM SELECTOR PLAN 2
-Patient has shortness of breath with exertion as an outpatient and CT chest with new large right pleural effusion with near complete right lower and partial right upper and middle compressive atelectasis  -Pulmonary consulted, for thoracentesis either later today or tomorrow

## 2021-12-22 NOTE — PROVIDER CONTACT NOTE (MEDICATION) - ASSESSMENT
pt is a/0x4. vitals stable. patient unable to consume po. small amount of emesis after attempting to drink

## 2021-12-22 NOTE — CONSULT NOTE ADULT - SUBJECTIVE AND OBJECTIVE BOX
[facility] Surgical Oncology Consultant Evaluation    HPI:  Ms. Flores is a 51 Year-Old Lady with history of T1a gastric adenocarcinoma status post resection '11 Outside Hospitalization Facility, persistent Small bowel obstruction requiring Exploratory Laparotomy and multiple takebacks for SBR, hernia repair, most recently by Dr. Bowen in '16. She presented earlier in June this year with persistent abdominal pain, discomfort, and burping, she had previously undergone and Esophagogastroduodenoscopy and biopsy of the GJ anastomosis which revealed poorly differentiated adenocarcinoma with signet ring cell features. Imaging at that time revealed recurrent neoplastic disease of the stomach with concern for transmural disease extending to the liver. She was recommended to undergo a PET CT scan and initiate chemotherapy as soon as possibly by Oncology, Dr. Sánchez.     Now presents with 3 weeks of worsening tolerance to diet, initially trialed liquid diet however still unable to tolerate. When asked as to why she had not pursued treatment for her known recurrent gastric CA, she stated her belief that "chemotherapy kills people more than helps", and at the time pursued natural remedies for her cancer. She has not yet sought a second opinion. Still passing gas, last bowel movement was 3 days ago.     PAST MEDICAL & SURGICAL HISTORY:  Gastric cancer  5/2011 no adjunt trement    Small bowel obstruction  Exp lap complicated with post op Ishemic bowel /perforation    Perforated bowel    Other abdominal hernia    Obesity (BMI 30-39.9)    History of gastrectomy  distal gastrectomy w/ B2 reconstruction (gastrojejunostomy)    History of resection of small bowel  exlap, SBR, meckel&#x27;s diverticulectomy (7/4/2015); exlap, SBR for necrotic bowel, left in discontinuity, Abthera (7/9/2015); exlap, SB anastomosis, Abthera (7/11/15); exlap, washout, abdominal closure w/ Strattice (7/13/15)        MEDICATIONS  (STANDING):  potassium chloride  10 mEq/100 mL IVPB 10 milliEquivalent(s) IV Intermittent every 1 hour      ___________________________________________  REVIEW OF SYSTEMS:  As stated in History of Present Illness, otherwise non-contributory.   ___________________________________________  PHYSICAL EXAM:  Vital Signs Last 24 Hrs  T(C): 36.9 (21 Dec 2021 20:30), Max: 36.9 (21 Dec 2021 20:30)  T(F): 98.5 (21 Dec 2021 20:30), Max: 98.5 (21 Dec 2021 20:30)  HR: 95 (21 Dec 2021 20:30) (95 - 104)  BP: 127/87 (21 Dec 2021 20:30) (127/87 - 131/81)  BP(mean): --  RR: 20 (21 Dec 2021 20:30) (18 - 20)  SpO2: 97% (21 Dec 2021 20:30) (97% - 98%)CAPILLARY BLOOD GLUCOSE        I&O's Detail      General: Alert and Oriented x3, No acute distress.  Respiratory: Breathing non-labored.  Abdomen: Soft, nontender, nondistended. No rebound, no guarding, No palpable organomegaly or masses.  Extremities: Moves all four.   ____________________________________________  LABS:  CBC Full  -  ( 21 Dec 2021 17:57 )  WBC Count : 10.91 K/uL  RBC Count : 5.13 M/uL  Hemoglobin : 11.5 g/dL  Hematocrit : 36.9 %  Platelet Count - Automated : 389 K/uL  Mean Cell Volume : 71.9 fl  Mean Cell Hemoglobin : 22.4 pg  Mean Cell Hemoglobin Concentration : 31.2 gm/dL  Auto Neutrophil # : 7.50 K/uL  Auto Lymphocyte # : 2.56 K/uL  Auto Monocyte # : 0.75 K/uL  Auto Eosinophil # : 0.00 K/uL  Auto Basophil # : 0.10 K/uL  Auto Neutrophil % : 68.7 %  Auto Lymphocyte % : 23.5 %  Auto Monocyte % : 6.9 %  Auto Eosinophil % : 0.0 %  Auto Basophil % : 0.9 %    12-21    131<L>  |  92<L>  |  9   ----------------------------<  111<H>  3.1<L>   |  20<L>  |  0.72    Ca    9.7      21 Dec 2021 17:57  Phos  3.0     12-21  Mg     1.8     12-21    TPro  7.0  /  Alb  4.0  /  TBili  0.6  /  DBili  x   /  AST  16  /  ALT  15  /  AlkPhos  2383<H>  12-21    LIVER FUNCTIONS - ( 21 Dec 2021 17:57 )  Alb: 4.0 g/dL / Pro: 7.0 g/dL / ALK PHOS: 2383 U/L / ALT: 15 U/L / AST: 16 U/L / GGT: x                   ____________________________________________  RADIOLOGY:  < from: CT Angio Chest PE Protocol w/ IV Cont (12.21.21 @ 22:31) >    IMPRESSION:    No main, right, left or lobar pulmonary embolism. Limited evaluation of   the segmental subsegmental branches.    New large right pleural effusion with near complete right lower and   partial right upper and middle compressive atelectasis.    Stable 4 mm mm left lower lobe pulmonary nodule.    Diffuse osseous metastatic disease.    < from: CT Abdomen and Pelvis w/ IV Cont (12.21.21 @ 20:14) >  IMPRESSION:  1. Interval development of significant systemic disease.  2. New large right pleural effusion with adjacent atelectasis..  3. Ascites and multiple loculated peritoneal fluid collections..  4. Predominantly sclerotic but some lytic lesions throughout skeleton   suggest  advanced metastatic disease..  5. Moderate left hydronephrosis and proximal ureteral dilatation  6. Large abdominal and pelvic wall hernia containing bowel, mesentery and  ascites. No significant change.    < end of copied text >

## 2021-12-22 NOTE — CONSULT NOTE ADULT - ASSESSMENT
Ms. Flores is a 51 Year-Old Lady with history of T1a gastric adenocarcinoma status post resection '11, SBR and incisional hernia repairs, known recurrent disease at GJ anastomosis managed naturally, presenting with persistent inability to tolerate diet, found to have diffuse metastatic disease.     - No surgical intervention at this time.   - Can consider diagnostic/therapeutic drainage of R pleural effusion.   - Unclear nature of intra-abdominal fluid collections, symptoms possibly related to intestinal compression.   - Recommend Palliative/Oncology consultation.   - Full plan to be discussed with Dr. Patel.     Red Team Surgery Pager #[phone]

## 2021-12-22 NOTE — CONSULT NOTE ADULT - SUBJECTIVE AND OBJECTIVE BOX
CHIEF COMPLAINT:    HPI per H and P: HPI:  51 year old female with PMH gastric CA s/p resection, SBO, multiple bowel resections, and recent recurrence of her cancer who presents for inability to tolerate PO. The patient was initially diagnosed in 2011 with gastric cancer which she had resected. Six months ago, the patient had abdominal pain and EGD with biopsy which showed poorly differentiated signet ring cell carcinoma. She saw Dr. Skinny Ibanez from oncology as an outpatient who recommended chemoport and PET for staging purposes along with treatment however, the patient opted not to proceed with treatment. For the past 3 weeks, the patient has been unable to tolerate solids and switched to a liquid diet. For the past 2-3 days, her dysphagia has worsened and now she is having difficulty tolerating liquids. When she attempts to take PO, she has many episodes of vomiting, belching and spitting up which prompted her to go to the ED. Upon arrival, vital signs were /81, , RR 18, temperature 98.3 degrees Farenheit and saturating 98% on room air. Labs were significant for WBC 10.91, Na 131, K 3.1, Cl 92, HCO3 20, anion gap 19 and alk phos 2383. UA was positive for blood and 27 WBC. CT C/A/P showed apparent marked dilatation afferent loop likely secondary to recurrent disease at the gastrojejunostomy, new large right pleural effusion with adjacent atelectasis, new large ascites with multiple loculations and mesenteric fat infiltration likely secondary to peritoneal carcinomatosis, predominantly sclerotic but some lytic lesions throughout skeleton suggest advanced metastatic disease, moderate left hydronephrosis and proximal ureteral dilatation and large abdominal and pelvic wall hernia containing bowel, mesentery and ascites. She received 500cc LR, 20mg IV pepcid x2, 975mg PO acetaminophen, 4mg PO zofran and 30meq IV K and was admitted for further management. (22 Dec 2021 13:39)      PAST MEDICAL & SURGICAL HISTORY:  Gastric cancer  5/2011 no adjunt trement    Small bowel obstruction  Exp lap complicated with post op Ishemic bowel /perforation    Perforated bowel    Other abdominal hernia    Obesity (BMI 30-39.9)    History of gastrectomy  distal gastrectomy w/ B2 reconstruction (gastrojejunostomy)    History of resection of small bowel  exlap, SBR, meckel&#x27;s diverticulectomy (7/4/2015); exlap, SBR for necrotic bowel, left in discontinuity, Abthera (7/9/2015); exlap, SB anastomosis, Abthera (7/11/15); exlap, washout, abdominal closure w/ Strattice (7/13/15)        FAMILY HISTORY:  FH: diabetes mellitus (Mother)        SOCIAL HISTORY:  Smoking: [x ] Never Smoked [ ] Former Smoker (__ packs x ___ years) [ ] Current Smoker  (__ packs x ___ years)  Substance Use: [x ] Never Used [ ] Used ____  EtOH Use: No ETOH use        Allergies    Cipro (Rash)    Intolerances        HOME MEDICATIONS:     ROS  Constitutional: denies fevers, chills, night sweats, weight loss  HEENT: denies visual changes, cough  Cardiovascular: denies palpitations, chest pain, edema  Respiratory: denies SOB, wheezing  Gastrointestinal: denies N/V/D, abdominal pain, hematochezia, melena  : denies dysuria, urinary urgency, increased frequency  MSK: denies muscle weakness, joint pain  Skin: denies new rashes or masses  Heme: denies bleeding, bruising  Neuro: denies headache, weakness    PHYSICAL EXAM:   GEN: Age appropriate, resting comfortably in bed, no acute distress, non toxic appearing, speaking in complete sentences.   HEENT: Conjunctiva and sclera normal  PULM: Lungs CTAB, no wheezes, rales, rhonchi  CV: RRR, S1S2, no MRG  MSK: no stiffness or joint effusions  Abdominal: Soft, nontender to palpation, non-distended, +BS  Extremities: No edema or cyanosis  NEURO: AAOx3  Psych: normal affect, normal behavior  Skin: No rashes, lesions      OBJECTIVE:  ICU Vital Signs Last 24 Hrs  T(C): 36.8 (22 Dec 2021 12:38), Max: 36.9 (21 Dec 2021 20:30)  T(F): 98.2 (22 Dec 2021 12:38), Max: 98.5 (21 Dec 2021 20:30)  HR: 98 (22 Dec 2021 12:38) (92 - 112)  BP: 132/68 (22 Dec 2021 12:38) (113/67 - 136/59)  BP(mean): --  ABP: --  ABP(mean): --  RR: 20 (22 Dec 2021 12:38) (16 - 20)  SpO2: 97% (22 Dec 2021 12:38) (95% - 100%)        CAPILLARY BLOOD GLUCOSE            HOSPITAL MEDICATIONS:            morphine  - Injectable 2 milliGRAM(s) IV Push every 4 hours PRN  morphine  - Injectable 4 milliGRAM(s) IV Push every 4 hours PRN  ondansetron Injectable 4 milliGRAM(s) IV Push every 8 hours PRN          dextrose 5% + lactated ringers. 1000 milliLiter(s) IV Continuous <Continuous>  multivitamin 1 Tablet(s) Oral daily        lactobacillus acidophilus 1 Tablet(s) Oral daily      LABS:                        11.5   11.33 )-----------( 333      ( 22 Dec 2021 15:15 )             36.5     Hgb Trend: 11.5<--, 10.0<--, 11.5<--  12-22    132<L>  |  94<L>  |  7   ----------------------------<  89  3.0<L>   |  20<L>  |  0.70    Ca    9.1      22 Dec 2021 06:18  Phos  2.8     12-22  Mg     1.9     12-22    TPro  7.0  /  Alb  4.0  /  TBili  0.6  /  DBili  x   /  AST  16  /  ALT  15  /  AlkPhos  2383<H>  12-21    Creatinine Trend: 0.70<--, 0.72<--    Urinalysis Basic - ( 22 Dec 2021 11:36 )    Color: Yellow / Appearance: Slightly Turbid / SG: >1.050 / pH: x  Gluc: x / Ketone: Large  / Bili: Small / Urobili: 3 mg/dL   Blood: x / Protein: 100 / Nitrite: Negative   Leuk Esterase: Negative / RBC: 6 /hpf / WBC 27 /HPF   Sq Epi: x / Non Sq Epi: 30 /hpf / Bacteria: Many        Venous Blood Gas:  12-21 @ 17:57  7.37/40/26/23/39.8  VBG Lactate: 1.3

## 2021-12-22 NOTE — H&P ADULT - ASSESSMENT
51 year old female with PMH gastric CA s/p resection, SBO, multiple bowel resections, and recent recurrence of her cancer who presents for inability to tolerate PO. 51 year old female with PMH gastric CA s/p resection, SBO, multiple bowel resections, and recent recurrence of her cancer who presents for inability to tolerate PO found to have progression of her gastric cancer.

## 2021-12-22 NOTE — H&P ADULT - NSHPREVIEWOFSYSTEMS_GEN_ALL_CORE
REVIEW OF SYSTEMS:    CONSTITUTIONAL: No weakness, fevers or chills  EYES/ENT: No visual changes;  No vertigo or throat pain   NECK: No pain or stiffness  RESPIRATORY: No cough, wheezing, hemoptysis; No shortness of breath  CARDIOVASCULAR: No chest pain or palpitations  GASTROINTESTINAL: No abdominal or epigastric pain. No nausea, vomiting, or hematemesis; No diarrhea or constipation. No melena or hematochezia.  GENITOURINARY: No dysuria, frequency or hematuria  NEUROLOGICAL: No numbness or weakness  SKIN: No itching, burning, rashes, or lesions  MSK: No joint pain, no back pain  HEME: No easy bleeding, no easy bruising  All other review of systems is negative unless indicated above. REVIEW OF SYSTEMS:    CONSTITUTIONAL: No weakness, fevers or chills  EYES/ENT: No visual changes;  No vertigo  NECK: No pain or stiffness  RESPIRATORY: No cough, wheezing, hemoptysis; Does endorse dyspnea on exertion  CARDIOVASCULAR: No chest pain or palpitations  GASTROINTESTINAL: No abdominal or epigastric pain. Endorses nausea, vomiting and dysphagia  GENITOURINARY: No dysuria, frequency or hematuria  NEUROLOGICAL: No numbness or weakness  SKIN: No itching, burning, rashes, or lesions  MSK: No joint pain, no back pain  HEME: No easy bleeding, no easy bruising  All other review of systems is negative unless indicated above. REVIEW OF SYSTEMS:    CONSTITUTIONAL: No weakness, fevers or chills  EYES/ENT: No visual changes;  No vertigo  NECK: No pain or stiffness  RESPIRATORY: No cough, wheezing, hemoptysis; Does endorse dyspnea on exertion  CARDIOVASCULAR: No chest pain or palpitations  GASTROINTESTINAL: No abdominal or epigastric pain. Endorses nausea, vomiting, belching and dysphagia  GENITOURINARY: No dysuria, frequency or hematuria  NEUROLOGICAL: No numbness or weakness  SKIN: No itching, burning, rashes, or lesions  MSK: No joint pain, no back pain  HEME: No easy bleeding, no easy bruising  All other review of systems is negative unless indicated above.

## 2021-12-22 NOTE — H&P ADULT - NSHPADDITIONALINFOADULT_GEN_ALL_CORE
Discussed with patient, [facility] ACP and various consultants.    Eliud Convissar, DO  Pager [phone]  If no answer [phone]

## 2021-12-23 NOTE — PROGRESS NOTE ADULT - ASSESSMENT
51 year old female with PMH gastric CA s/p resection, SBO, multiple bowel resections, and recent recurrence of her cancer who presents for inability to tolerate PO found to have progression of her gastric cancer.

## 2021-12-23 NOTE — PROGRESS NOTE ADULT - SUBJECTIVE AND OBJECTIVE BOX
GENERAL SURGERY PROGRESS NOTE   ___________________________________________________________________    ALLIE ALLRED | [id number] | 51y Female | [facility] 456 D1 | LOS 1d    Attending: John Millan    ___________________________________________________________________    CC: Patient is a 51y old  Female who presents with a chief complaint of Dysphagia (23 Dec 2021 12:30)      SUBJECTIVE:   Patient seen today during morning rounds at bedside and found to be without acute distress. Denies chest pain, fever, severe pain, or SOB.     Overnight: Unremarkable    Allegies: Cipro (Rash)   NKDA    OBJECTIVE:  Vitals:    T(C): 36.7 (21 @ 14:00), Max: 37.4 (21 @ 08:51)  HR: 104 (21 @ 14:00) (88 - 111)  BP: 121/79 (21 @ 14:00) (121/79 - 136/77)  RR: 17 (21 @ 14:00) (16 - 17)  SpO2: 97% (21 @ 14:00) (97% - 98%)      OUT:  Total OUT: 0 mL            Medications:  cefTRIAXone   IVPB 1000 IV Intermittent every 24 hours    lactobacillus acidophilus 1 Tablet(s) Oral daily        Laboratory:  WBC: 8.57 H&H: 10.8/34.9 Plt: 314  WBC: 11.33 H&H: 11.5/36.5 Plt: 333    Chemistry:                               Phos: 3.3 M.0  132  |  96  |  9   ----------------------------<  105  3.5   |  21  |  0.75                                       Phos: 3.0 M.8  133  |  94  |  8   ----------------------------<  103  3.3   |  20  |  0.82            12-23   TPro 6.1 / Alb 3.4 / TBili 0.5 / DBili x  / AST/AST 17/12 / AlkPhos 2125<H>  12-   TPro 6.6 / Alb 3.7 / TBili 0.5 / DBili x  / AST/AST 15/15 / AlkPhos 2291<H>  PTT 29.7 PT/INR 15.0/1.26          Reviewed laboratory and imaging    Physical Exam:   Constitutional: resting in bed with no acute distress  Respiratory: unlabored breathing, clear respiration  Gastrointestinal: Abdomen soft, distended, minimally tender, ventral hernia soft, nontender and reducible  Extremities:  No edema, no calf tenderness  Skin: no cyanosis or rash observed

## 2021-12-23 NOTE — CONSULT NOTE ADULT - ASSESSMENT
50 yo female with stage 4 gastric cancer, inability to sujit po, ascites s/p paracentesis and pleural effusion found with moderate left hydroureteronephrosis to proximal ureter likely from extrinsic compression; + u/a on admission without ucx results      no urgent urological intervention required at this time due to the pt being without flank pain, having normal kidney function, and she is without signs of obstructed acute pyelonephritis.   favor starting abx while we await ucx - informed pt and she agrees at this time.   restart IVF to help dilute urine

## 2021-12-23 NOTE — PRE PROCEDURE NOTE - PRE PROCEDURE EVALUATION
Interventional Radiology    HPI: The patient is a 51 year old female with PMH of stage 4 gastric CA (s/p resection, SBO, multiple bowel resections, and recent recurrence of her cancer) who presents for inability to tolerate PO found to have progression of her gastric cancer. Presents to IR tx & dx paracentesis.    Allergies: Cipro (Rash)    Medications (Abx/Cardiac/Anticoagulation/Blood Products)    heparin   Injectable: 5000 Unit(s) SubCutaneous (12-22 @ 16:41)  heparin   Injectable: 5000 Unit(s) SubCutaneous (12-22 @ 23:44)    Data:    T(C): 37.4  HR: 111  BP: 127/76  RR: 16  SpO2: 98%    Exam  General: No acute distress  Chest: Non labored breathing  Abdomen: Non-distended  Extremities: No swelling, warm    -WBC 8.57 / HgB 10.8 / Hct 34.9 / Plt 314  -Na 132 / Cl 96 / BUN 9 / Glucose 105  -K 3.5 / CO2 21 / Cr 0.75  -ALT 12 / Alk Phos 2125 / T.Bili 0.5  -INR1.26    Imaging: reviewed    Plan: 51y Female presents for dx & tx paracentesis  -Risks/Benefits/alternatives explained with the patient and/or healthcare proxy and witnessed informed consent obtained.

## 2021-12-23 NOTE — CONSULT NOTE ADULT - PROBLEM SELECTOR RECOMMENDATION 5
Current functional PPSv2: 70  Nursing care required: Minimal assistance with ADLs    Family Health Care Decision Act (FHCDA) Surrogate Decision Maker Hierarchy in the absence of a health care agent  [facility] Article 81 Guardian-->Spouse or domestic partner--> Adult child-->Parent-->Sibling--> Close friend

## 2021-12-23 NOTE — CONSULT NOTE ADULT - PROBLEM SELECTOR RECOMMENDATION 4
Problem: pleural effusion  Degree: Large R pleural effusion, moderate dyspnea  Likely due to malignancy  Rx regimen/treatment strategy: Will be drained today  Consultative team involvement: Pulm

## 2021-12-23 NOTE — CONSULT NOTE ADULT - SUBJECTIVE AND OBJECTIVE BOX
HPI:  51 year old female with PMH gastric CA s/p resection, SBO, multiple bowel resections, and recent recurrence of her cancer who presents for inability to tolerate PO. The patient was initially diagnosed in 2011 with gastric cancer which she had resected. Six months ago, the patient had abdominal pain and EGD with biopsy which showed poorly differentiated signet ring cell carcinoma. She saw Dr. Skinny Ibanez from oncology as an outpatient who recommended chemoport and PET for staging purposes along with treatment however, the patient opted not to proceed with treatment. For the past 3 weeks, the patient has been unable to tolerate solids and switched to a liquid diet. For the past 2-3 days, her dysphagia has worsened and now she is having difficulty tolerating liquids. When she attempts to take PO, she has many episodes of vomiting, belching and spitting up which prompted her to go to the ED. Upon arrival, vital signs were /81, , RR 18, temperature 98.3 degrees Farenheit and saturating 98% on room air. Labs were significant for WBC 10.91, Na 131, K 3.1, Cl 92, HCO3 20, anion gap 19 and alk phos 2383. UA was positive for blood and 27 WBC. CT C/A/P showed apparent marked dilatation afferent loop likely secondary to recurrent disease at the gastrojejunostomy, new large right pleural effusion with adjacent atelectasis, new large ascites with multiple loculations and mesenteric fat infiltration likely secondary to peritoneal carcinomatosis, predominantly sclerotic but some lytic lesions throughout skeleton suggest advanced metastatic disease, moderate left hydronephrosis and proximal ureteral dilatation and large abdominal and pelvic wall hernia containing bowel, mesentery and ascites. She received 500cc LR, 20mg IV pepcid x2, 975mg PO acetaminophen, 4mg PO zofran and 30meq IV K and was admitted for further management. (22 Dec 2021 13:39)        Pt feels some relief after paracentesis. She still has some abdominal discomfort and receives moderate relief from the IVP morphine 3 mg. She feels as if the 4 mg is too strong. The patient also reports using the morphine to relax but does nto want to use it to excess out of fear of dependence          PERTINENT PM/SXH:   Bariatric surg stat-unsp    Gastric cancer    Small bowel obstruction    Perforated bowel    Other abdominal hernia    Obesity (BMI 30-39.9)      History of gastrectomy    History of resection of small bowel      FAMILY HISTORY:  FH: diabetes mellitus (Mother)      ITEMS NOT CHECKED ARE NOT PRESENT    SOCIAL HISTORY:   Significant other/partner[ ]  Children[x ]  Advent/Spirituality:  Substance hx:  [ ]   Tobacco hx:  [ ]   Alcohol hx: [ ]   Home Opioid hx:  [ ] I-Stop Reference No:  Living Situation: [ x]Home  [ ]Long term care  [ ]Rehab [ ]Other    ADVANCE DIRECTIVES:    DNR  MOLST  [ ]  Living Will  [ ]   DECISION MAKER(s):  [ ] Health Care Proxy(s)  [x ] Surrogate(s)  [ ] Guardian           Name(s): Phone Number(s):    BASELINE (I)ADL(s) (prior to admission):  [place]: [ ]Total  [ ] Moderate [ ]Dependent    Allergies    Cipro (Rash)    Intolerances    MEDICATIONS  (STANDING):  cefTRIAXone   IVPB 1000 milliGRAM(s) IV Intermittent every 24 hours  dextrose 5% + lactated ringers. 1000 milliLiter(s) (100 mL/Hr) IV Continuous <Continuous>  lactobacillus acidophilus 1 Tablet(s) Oral daily  multivitamin 1 Tablet(s) Oral daily    MEDICATIONS  (PRN):  morphine  - Injectable 2 milliGRAM(s) IV Push every 4 hours PRN Moderate Pain (4 - 6)  morphine  - Injectable 4 milliGRAM(s) IV Push every 4 hours PRN Severe Pain (7 - 10)  ondansetron Injectable 4 milliGRAM(s) IV Push every 8 hours PRN Nausea and/or Vomiting    PRESENT SYMPTOMS: [ ]Unable to obtain due to poor mentation   Source if other than patient:  [ ]Family   [ ]Team     Pain: [ x]yes [ ]no  QOL impact -  significant  Location - abdomen                    Aggravating factors - movement  Quality - sharp  Radiation - none  Timing- n/a  Severity (0-10 scale):  Minimal acceptable level (0-10 scale):     PAIN AD Score:     http://geriatrictoolkit.[email or web address]/cog/painad.pdf (press ctrl +  left click to view)    Dyspnea:                           [ ]Mild [x ]Moderate [ ]Severe  Anxiety:                             [x ]Mild [ ]Moderate [ ]Severe  Fatigue:                             [ ]Mild [ ]Moderate [ ]Severe  Nausea:                             [ x]Mild [ ]Moderate [ ]Severe  Loss of appetite:              [ x]Mild [ ]Moderate [ ]Severe  Constipation:                    [ ]Mild [ ]Moderate [ ]Severe    Other Symptoms:  [x ]All other review of systems negative     Palliative Performance Status Version 2:      40   %    http://[facility].org/files/news/palliative_performance_scale_ppsv2.pdf  PHYSICAL EXAM:  Vital Signs Last 24 Hrs  T(C): 36.7 (23 Dec 2021 14:00), Max: 37.4 (23 Dec 2021 08:51)  T(F): 98 (23 Dec 2021 14:00), Max: 99.3 (23 Dec 2021 08:51)  HR: 104 (23 Dec 2021 14:00) (88 - 111)  BP: 121/79 (23 Dec 2021 14:00) (121/79 - 136/77)  BP(mean): --  RR: 17 (23 Dec 2021 14:00) (16 - 17)  SpO2: 97% (23 Dec 2021 14:00) (97% - 98%) I&O's Summary    23 Dec 2021 07:01  -  23 Dec 2021 14:27  --------------------------------------------------------  IN: 240 mL / OUT: 0 mL / NET: 240 mL      GENERAL:  [x ]Alert  [x ]Oriented x  3  [ ]Lethargic  [ ]Cachexia  [ ]Unarousable  [ ]Verbal  [ ]Non-Verbal  Behavioral:   [ x] Anxiety  [ ] Delirium [ ] Agitation [  ] Other Calm  HEENT:  [ ]Normal   [ x]Dry mouth   [ ]ET Tube/Trach  [ ]Oral lesions  PULMONARY: exam deferred, about to receive thoracentesis  [  ]Clear [ ]Tachypnea  [ ]Audible excessive secretions   [ ]Rhonchi        [ ]Right [ ]Left [ ]Bilateral  [ ]Crackles        [ ]Right [ ]Left [ ]Bilateral  [ ]Wheezing     [ ]Right [ ]Left [ ]Bilateral  [ ]Diminished breath sounds [ ]right [ ]left [ ]bilateral  CARDIOVASCULAR:   exam deferred, about to receive thoracentesis  [  ]Regular [ ]Irregular [ ]Tachy  [ ]Linus [ ]Murmur [ ]Other  GASTROINTESTINAL: heavily scarred from Sx  [  ]Soft  [ ]Distended   [ ]+BS  [x ]Non tender [ ]Tender  [ ]PEG [ ]OGT/ NGT  Last BM:   GENITOURINARY:  [ x]Normal [ ] Incontinent   [ ]Oliguria/Anuria   [ ]Ansari  MUSCULOSKELETAL:   [x ]Normal   [ ]Weakness  [ ]Bed/Wheelchair bound [ ]Edema  NEUROLOGIC:   [ x]No focal deficits  [ ]Cognitive impairment  [ ]Dysphagia [ ]Dysarthria [ ]Paresis [ ]Other   SKIN:   [x ]Normal    [ ]Rash  [ ]Pressure ulcer(s)       Present on admission [ ]y [ ]n    CRITICAL CARE:  [ ] Shock Present  [ ]Septic [ ]Cardiogenic [ ]Neurologic [ ]Hypovolemic  [ ]  Vasopressors [ ]  Inotropes   [ ]Respiratory failure present [ ]Mechanical ventilation [ ]Non-invasive ventilatory support [ ]High flow  [ ]Acute  [ ]Chronic [ ]Hypoxic  [ ]Hypercarbic [ ]Other  [ ]Other organ failure     LABS:                        10.8   8.57  )-----------( 314      ( 23 Dec 2021 07:04 )             34.9   12-23    132<L>  |  96  |  9   ----------------------------<  105<H>  3.5   |  21<L>  |  0.75    Ca    9.2      23 Dec 2021 07:06  Phos  3.3     12-23  Mg     2.0     12-23    TPro  6.1  /  Alb  3.4  /  TBili  0.5  /  DBili  x   /  AST  17  /  ALT  12  /  AlkPhos  2125<H>  12-23  PT/INR - ( 23 Dec 2021 07:05 )   PT: 15.0 sec;   INR: 1.26 ratio         PTT - ( 23 Dec 2021 07:05 )  PTT:29.7 sec    Urinalysis Basic - ( 22 Dec 2021 11:36 )    Color: Yellow / Appearance: Slightly Turbid / SG: >1.050 / pH: x  Gluc: x / Ketone: Large  / Bili: Small / Urobili: 3 mg/dL   Blood: x / Protein: 100 / Nitrite: Negative   Leuk Esterase: Negative / RBC: 6 /hpf / WBC 27 /HPF   Sq Epi: x / Non Sq Epi: 30 /hpf / Bacteria: Many      RADIOLOGY & ADDITIONAL STUDIES:    PROTEIN CALORIE MALNUTRITION PRESENT: [ ]mild [ ]moderate [ ]severe [ ]underweight [ ]morbid obesity  https://www.andeal.org/vault/2440/web/files/ONC/Table_Clinical%20Characteristics%20to%20Document%20Malnutrition-White%20JV%20et%20al%202012.pdf    Height (cm): 167.6 (12-21-21 @ 17:21), 167.6 (07-14-21 @ 14:07)  Weight (kg): 95.3 (12-21-21 @ 17:21), 111.1 (07-14-21 @ 14:07)  BMI (kg/m2): 33.9 (12-21-21 @ 17:21), 39.6 (07-14-21 @ 14:07)    [ ]PPSV2 < or = to 30% [ ]significant weight loss  [ ]poor nutritional intake  [ ]anasarca      [ ]Artificial Nutrition      REFERRALS:   [ ]Chaplaincy  [ ]Hospice  [ ]Child Life  [ ]Social Work  [ ]Case management [ ]Holistic Therapy     Goals of Care Document:

## 2021-12-23 NOTE — CONSULT NOTE ADULT - PROBLEM SELECTOR RECOMMENDATION 6
Actions:  [x] Rapport building     [x] Symptom assessment    [] Eliciting preferences of goals   [] Prognostic understanding    [] Emotional Support  [] Coping skill development  []  Other  Interdisciplinary Referrals: None at this time  Communication: d/w nurse  Documentation Review: [x] Primary Team [x] Consultants [] Interdisciplinary team

## 2021-12-23 NOTE — PRE-OP CHECKLIST - HEART RATE (BEATS/MIN)
I left a message for Jarett Montoya to notify her that I spoke to [facility] ANDRY at [place] [address] and set up her referral as requested by Dr Silvia Sosa  Per Gisela Mayo is now registered and will need to call for her appointment  I instructed Jarett Montoya in my message that per [facility], the first available appointment will be 2/25/20 at [facility]  Ultrasound reports sent to [facility] orthopedics as requested  I left the phone number to call and schedule the appointment, as well as the number for the nurse line for any questions 
[phone]

## 2021-12-23 NOTE — PROGRESS NOTE ADULT - PROBLEM SELECTOR PLAN 3
-Patient presenting with many bacteria in the urine and 27 WBC however, denies symptoms  -Follow up urine culture  -appreciate  input  start rocephin for cystitis

## 2021-12-23 NOTE — PROGRESS NOTE ADULT - SUBJECTIVE AND OBJECTIVE BOX
Patient is a 51y old  Female who presents with a chief complaint of Dysphagia (23 Dec 2021 12:30)      INTERVAL History of Present Illness/OVERNIGHT EVENTS: upper GI symptoms  dilated afferent loop GJ  d/w Dr. Patel at bedside - recommend GI consult  Dr. Alexis to evaluate in AM    MEDICATIONS  (STANDING):  cefTRIAXone   IVPB 1000 milliGRAM(s) IV Intermittent every 24 hours  lactobacillus acidophilus 1 Tablet(s) Oral daily  multivitamin 1 Tablet(s) Oral daily  sodium chloride 0.9% with potassium chloride 20 mEq/L 1000 milliLiter(s) (50 mL/Hr) IV Continuous <Continuous>    MEDICATIONS  (PRN):  morphine  - Injectable 2 milliGRAM(s) IV Push every 4 hours PRN Moderate Pain (4 - 6)  morphine  - Injectable 3 milliGRAM(s) IV Push every 4 hours PRN Severe Pain (7 - 10)  ondansetron Injectable 4 milliGRAM(s) IV Push every 8 hours PRN Nausea and/or Vomiting      Allergies    Cipro (Rash)    Intolerances        REVIEW OF SYSTEMS:  Negative unless otherwise specified above.    Vital Signs Last 24 Hrs  T(C): 36.7 (23 Dec 2021 14:00), Max: 37.4 (23 Dec 2021 08:51)  T(F): 98 (23 Dec 2021 14:00), Max: 99.3 (23 Dec 2021 08:51)  HR: 104 (23 Dec 2021 14:00) (88 - 111)  BP: 121/79 (23 Dec 2021 14:00) (121/79 - 136/77)  BP(mean): --  RR: 17 (23 Dec 2021 14:00) (16 - 17)  SpO2: 97% (23 Dec 2021 14:00) (97% - 98%)        PHYSICAL EXAM:  GENERAL: No apparent distress, appears stated age  HEAD:  Atraumatic, Normocephalic  EYES: Conjunctiva and sclera clear, no discharge  ENMT: Moist mucous membranes, no nasal discharge  NECK: Supple, no JVD  CHEST/LUNG: Clear to auscultation bilaterally, no wheeze or rales  HEART: Regular rhythm, no rubs or gallops  ABDOMEN: Soft, Nontender, mild distension  EXTREMITIES:  No clubbing, cyanosis or edema  SKIN: No rash, no new discoloration  NERVOUS SYSTEM:  Alert & Oriented; Bilateral Lower extremity mobile, sensation to light touch intact      LABS:                        10.8   8.57  )-----------( 314      ( 23 Dec 2021 07:04 )             34.9     23 Dec 2021 07:06    132    |  96     |  9      ----------------------------<  105    3.5     |  21     |  0.75     Ca    9.2        23 Dec 2021 07:06  Phos  3.3       23 Dec 2021 07:06  Mg     2.0       23 Dec 2021 07:06    TPro  6.1    /  Alb  3.4    /  TBili  0.5    /  DBili  x      /  AST  17     /  ALT  12     /  AlkPhos  2125   23 Dec 2021 07:06    PT/INR - ( 23 Dec 2021 07:05 )   PT: 15.0 sec;   INR: 1.26 ratio         PTT - ( 23 Dec 2021 07:05 )  PTT:29.7 sec  Urinalysis Basic - ( 22 Dec 2021 11:36 )    Color: Yellow / Appearance: Slightly Turbid / SG: >1.050 / pH: x  Gluc: x / Ketone: Large  / Bili: Small / Urobili: 3 mg/dL   Blood: x / Protein: 100 / Nitrite: Negative   Leuk Esterase: Negative / RBC: 6 /hpf / WBC 27 /HPF   Sq Epi: x / Non Sq Epi: 30 /hpf / Bacteria: Many      CAPILLARY BLOOD GLUCOSE          RADIOLOGY & ADDITIONAL TESTS:      Images reviewed personally    Consultant Notes Reviewed and Care Discussed with relevant Consultants.

## 2021-12-23 NOTE — PROGRESS NOTE ADULT - PROBLEM SELECTOR PLAN 2
-Patient has shortness of breath with exertion as an outpatient and CT chest with new large right pleural effusion with near complete right lower and partial right upper and middle compressive atelectasis  -Pulmonary consulted, for thoracentesis - done 12/23 with 2 liters removed

## 2021-12-23 NOTE — PROGRESS NOTE ADULT - ASSESSMENT
51 F PMH of  T1a gastric adenocarcinoma status post resection '11, SBR and incisional hernia repairs, known recurrent disease at GJ anastomosis managed naturally, presenting with persistent inability to tolerate diet, found to have diffuse metastatic disease.     Recommendations:   - No surgical intervention at this time.   - Can consider diagnostic/therapeutic drainage of R pleural effusion and ascitic fluid  - Unclear nature of intra-abdominal fluid collections, symptoms possibly related to intestinal compression.   - Appreciate palliative evaluation; will follow up recommendations    Billy Guerra PGY2  Red Team Surgery [phone]

## 2021-12-23 NOTE — PROGRESS NOTE ADULT - ASSESSMENT
The patient is a 51 year old female with PMH of stage 4 gastric CA (s/p resection, SBO, multiple bowel resections, and recent recurrence of her cancer) who presents for inability to tolerate PO found to have progression of her gastric cancer. Pulmonary consulted after patient found to have large right pleural effusion on CT chest.     Problem: Large right pleural effusion  Assessment: Differential includes malignant effusion vs translocation of ascites fluid through defect in the diaghpram. Unlikely to represent parapneumonic effusion given lack of fevers, productive cough. Patient has SOB on exertion which is typical of malignant effusions. She does SOB at rest or orthopnea however she is unable to lie flat due to her GI complaints. She has no cough. She denies pleurisy sx therefore unlikely to be an inflammatory or infectious effusion as these patients typically complain of pleuritic pain / positional pain / sharp pain. Discussed therapeutic and diagnostic thoracentesis with patient. She is amenable.     Plan:  Pulm will set up for thoracentesis today

## 2021-12-23 NOTE — CONSULT NOTE ADULT - PROBLEM SELECTOR RECOMMENDATION 9
Predominant symptom: pain  Likely due to ascites and obstruction  Degree of control: moderate  Relative to previous day: comparable  Duration of PRN: one hour maximum  Current treatment regimen: IV morphine 2 mg and 4 mg for moderate and severe pain, respectively  Recommendations: Discontinue 4 mg, she feels this is too potent, Begin 3 mg IV morphine q4H PRN  Reassess morphine use in 24 hours to determine if ATC dosing is needed  Risk mitigation/Bowel regimen: narcan prn. Pt is using morphine to relax at times.  Adverse events noted: none

## 2021-12-23 NOTE — CONSULT NOTE ADULT - PROBLEM SELECTOR RECOMMENDATION 2
Problem: ascites  Status: s/p paracentesis  Likely due to malignancy  Relevant factors: normal albumin  Rx regimen/treatment strategy: s/p drain

## 2021-12-23 NOTE — PROGRESS NOTE ADULT - SUBJECTIVE AND OBJECTIVE BOX
Patient is a 51y old  Female who presents with a chief complaint of Dysphagia (22 Dec 2021 15:53)      SUBJECTIVE / OVERNIGHT EVENTS:    Patient seen and examined at bedside. No acute events overnight.    T(F): 97.4 (12-23 @ 05:05), Max: 98.4 (12-22 @ 21:40)  HR: 88 (12-23 @ 05:05) (88 - 112)  BP: 125/80 (12-23 @ 05:05) (113/67 - 134/84)  RR: 17 (12-23 @ 05:05) (17 - 20)  SpO2: 98% (12-23 @ 05:05) (97% - 98%)    I&O's Summary      MEDICATIONS  (STANDING):  dextrose 5% + lactated ringers. 1000 milliLiter(s) (100 mL/Hr) IV Continuous <Continuous>  dextrose 5% + lactated ringers. 1000 milliLiter(s) (100 mL/Hr) IV Continuous <Continuous>  lactobacillus acidophilus 1 Tablet(s) Oral daily  multivitamin 1 Tablet(s) Oral daily    MEDICATIONS  (PRN):  morphine  - Injectable 2 milliGRAM(s) IV Push every 4 hours PRN Moderate Pain (4 - 6)  morphine  - Injectable 4 milliGRAM(s) IV Push every 4 hours PRN Severe Pain (7 - 10)  ondansetron Injectable 4 milliGRAM(s) IV Push every 8 hours PRN Nausea and/or Vomiting      PHYSICAL EXAM:   GEN: Age appropriate, resting comfortably in bed, no acute distress, non toxic appearing, speaking in complete sentences.   HEENT: Conjunctiva and sclera normal  PULM: Lungs decreased breath sounds right lung fields  CV: RRR, S1S2, no MRG  MSK: no stiffness or joint effusions  Abdominal: Soft, nontender to palpation, non-distended, +BS  Extremities: No edema or cyanosis  NEURO: AAOx3  Psych: normal affect, normal behavior  Skin: No rashes, lesions    LABS:  Labs personally reviewed.                        10.8   8.57  )-----------( 314      ( 23 Dec 2021 07:04 )             34.9     Hgb Trend: 10.8<--, 11.5<--, 10.0<--, 11.5<--  12-23    132<L>  |  96  |  9   ----------------------------<  105<H>  3.5   |  21<L>  |  0.75    Ca    9.2      23 Dec 2021 07:06  Phos  3.3     12-23  Mg     2.0     12-23    TPro  6.1  /  Alb  3.4  /  TBili  0.5  /  DBili  x   /  AST  17  /  ALT  12  /  AlkPhos  x   12-23    Creatinine Trend: 0.75<--, 0.82<--, 0.70<--, 0.72<--  PT/INR - ( 23 Dec 2021 07:05 )   PT: 15.0 sec;   INR: 1.26 ratio         PTT - ( 23 Dec 2021 07:05 )  PTT:29.7 sec  Urinalysis Basic - ( 22 Dec 2021 11:36 )    Color: Yellow / Appearance: Slightly Turbid / SG: >1.050 / pH: x  Gluc: x / Ketone: Large  / Bili: Small / Urobili: 3 mg/dL   Blood: x / Protein: 100 / Nitrite: Negative   Leuk Esterase: Negative / RBC: 6 /hpf / WBC 27 /HPF   Sq Epi: x / Non Sq Epi: 30 /hpf / Bacteria: Many          Berny [facility]  Pulmonary and Critical Care Fellow    PGY-5 Pager: [phone]  [phone]  [facility] Pulmonary Spectra [phone]   Night Float:

## 2021-12-23 NOTE — CONSULT NOTE ADULT - SUBJECTIVE AND OBJECTIVE BOX
50 yo Female  with PMH of gastric CA s/p resection, SBO, multiple bowel resections, and recent recurrence of her cancer who presents for inability to tolerate PO. On imaging was found to have moderate hydronephrosis to proximal ureter with clear etiology. Pt seen and examined. Had never known of this before and has no past  hx. Notes that with dec po intake she has had dec uop and its color is concentrated cloudy and malodorous She favored holding off abx in the ER while cx is growing. She is without fever/ flank pain/ hematuria/ sensation of retention.   s/p paracentesis of 750cc today   Notes that her abd feels distended but slightly improved. Very scant flatus, + much belching.    PAST MEDICAL & SURGICAL HISTORY:  Gastric cancer  5/2011 no adjuvent treatment    Small bowel obstruction  Exp lap complicated with post op Ishemic bowel /perforation    Perforated bowel    Other abdominal hernia    Obesity (BMI 30-39.9)    History of gastrectomy  distal gastrectomy w/ B2 reconstruction (gastrojejunostomy)    History of resection of small bowel  exlap, SBR, meckel&#x27;s diverticulectomy (7/4/2015); exlap, SBR for necrotic bowel, left in discontinuity, Abthera (7/9/2015); exlap, SB anastomosis, Abthera (7/11/15); exlap, washout, abdominal closure w/ Strattice (7/13/15)        MEDICATIONS  (STANDING):  dextrose 5% + lactated ringers. 1000 milliLiter(s) (100 mL/Hr) IV Continuous <Continuous>  dextrose 5% + lactated ringers. 1000 milliLiter(s) (100 mL/Hr) IV Continuous <Continuous>  lactobacillus acidophilus 1 Tablet(s) Oral daily  multivitamin 1 Tablet(s) Oral daily    MEDICATIONS  (PRN):  morphine  - Injectable 2 milliGRAM(s) IV Push every 4 hours PRN Moderate Pain (4 - 6)  morphine  - Injectable 4 milliGRAM(s) IV Push every 4 hours PRN Severe Pain (7 - 10)  ondansetron Injectable 4 milliGRAM(s) IV Push every 8 hours PRN Nausea and/or Vomiting      FAMILY HISTORY:  FH: diabetes mellitus (Mother)        Allergies    Cipro (Rash)    Intolerances        SOCIAL HISTORY: no tob     REVIEW OF SYSTEMS: Otherwise negative as stated in HPI    Physical Exam  Vital signs  T(C): 37 (12-23-21 @ 09:47), Max: 37.4 (12-23-21 @ 08:51)  HR: 99 (12-23-21 @ 09:47)  BP: 136/77 (12-23-21 @ 09:47)  SpO2: 97% (12-23-21 @ 09:47)  Wt(kg): --    Output    UOP N/A    Gen:  AWAKE ALERT NAD AXOX3    Pulm:  NO RESP DISTRESS  	  CV:  S1S2    GI:  MIN DISTENSION, + VENTRAL HERNIA    BACK: NO CVAT BL     :  NONPALP BLADDER, NO SUPRAPUBIC TENDERNESS                             	      LABS:                            10.8   8.57  )-----------( 314      ( 23 Dec 2021 07:04 )             34.9       12-23    132<L>  |  96  |  9   ----------------------------<  105<H>  3.5   |  21<L>  |  0.75    Ca    9.2      23 Dec 2021 07:06  Phos  3.3     12-23  Mg     2.0     12-23    TPro  6.1  /  Alb  3.4  /  TBili  0.5  /  DBili  x   /  AST  17  /  ALT  12  /  AlkPhos  2125<H>  12-23    PT/INR - ( 23 Dec 2021 07:05 )   PT: 15.0 sec;   INR: 1.26 ratio         PTT - ( 23 Dec 2021 07:05 )  PTT:29.7 sec  Urinalysis Basic - ( 22 Dec 2021 11:36 )    Color: Yellow / Appearance: Slightly Turbid / SG: >1.050 / pH: x  Gluc: x / Ketone: Large  / Bili: Small / Urobili: 3 mg/dL   Blood: x / Protein: 100 / Nitrite: Negative   Leuk Esterase: Negative / RBC: 6 /hpf / WBC 27 /HPF   Sq Epi: x / Non Sq Epi: 30 /hpf / Bacteria: Many        Urine Cx: SENT AND REC     RADIOLOGY:  < from: CT Abdomen and Pelvis w/ IV Cont (12.21.21 @ 20:14) >    COMPARISON:  CT ABDOMEN AND PELVIS WITH ORAL CONTRAST WITH IV CONTRAST 6/15/2021 5:00   PM    FINDINGS:  Pleural spaces: New large right pleural effusion with adjacent   atelectasis..    Liver: Unremarkable.  Gallbladder and bile ducts: Unremarkable. No calcified stones.  Pancreas: Unremarkable.  Spleen: Normal. .  Adrenal glands: Normal.  Kidneys and ureters: Normal right kidney. Moderate left hydronephrosis and  proximal ureteral dilatation. No cause for obstruction identified.  Stomach and bowel: Status post gastrojejunostomy. Apparent marked   distention H apparent loop likely secondary to recurrent disease at the   gastrojejunostomy anastomosis. Appendix: No inflammation.    Intraperitoneal space: Ascites and multiple loculated peritoneal fluid  collections. Infiltrative changes of the peritoneal fat likely secondary   to peritoneal carcinomatosis.  Vasculature: Unremarkable.  Retroperitoneum: Left periaortic lymph node new since 06/15/2021, 1.8 x   1.1 cm (5:38). Retroperitoneal fluid infiltrating into the ric hepatis  Urinary bladder: Unremarkable.  Reproductive: Unremarkable uterus and adenexa.  Bones/joints: Predominantly sclerotic but some lytic lesions throughout  skeleton suggest advanced metastatic disease..  Soft tissues: Large anterior abdominal and pelvic wall hernia containing   bowel,  mesentery and ascites. Hernia neck measures 16 cm..    IMPRESSION:  1. Apparent marked dilatation afferent loop likely secondary to recurrent   disease at the gastrojejunostomy  2. New large right pleural effusion with adjacent atelectasis..  3. New large ascites with multiple loculations and mesenteric fat   infiltration likely secondary to peritoneal carcinomatosis  4. Predominantly sclerotic but some lytic lesions throughout skeleton   suggest  advanced metastatic disease..  5. Moderate left hydronephrosis andproximal ureteral dilatation  6. Large abdominal and pelvic wall hernia containing bowel, mesentery and  ascites. No significant change.    < end of copied text >

## 2021-12-24 NOTE — PROGRESS NOTE ADULT - SUBJECTIVE AND OBJECTIVE BOX
Patient is a 51y old  Female who presents with a chief complaint of Dysphagia (23 Dec 2021 17:55)      INTERVAL History of Present Illness/OVERNIGHT EVENTS: sister at bedside.  await GI eval re: EGD/dilation.  consult placed    MEDICATIONS  (STANDING):  cefTRIAXone   IVPB 1000 milliGRAM(s) IV Intermittent every 24 hours  enoxaparin Injectable 40 milliGRAM(s) SubCutaneous daily  lactobacillus acidophilus 1 Tablet(s) Oral daily  multivitamin 1 Tablet(s) Oral daily  pantoprazole  Injectable 40 milliGRAM(s) IV Push two times a day  sodium chloride 0.9% with potassium chloride 20 mEq/L 1000 milliLiter(s) (50 mL/Hr) IV Continuous <Continuous>    MEDICATIONS  (PRN):  morphine  - Injectable 2 milliGRAM(s) IV Push every 4 hours PRN Moderate Pain (4 - 6)  morphine  - Injectable 3 milliGRAM(s) IV Push every 4 hours PRN Severe Pain (7 - 10)  ondansetron Injectable 4 milliGRAM(s) IV Push every 8 hours PRN Nausea and/or Vomiting      Allergies    Cipro (Rash)    Intolerances        REVIEW OF SYSTEMS:  Negative unless otherwise specified above.    Vital Signs Last 24 Hrs  T(C): 36.8 (24 Dec 2021 14:10), Max: 36.8 (23 Dec 2021 21:27)  T(F): 98.3 (24 Dec 2021 14:10), Max: 98.3 (24 Dec 2021 14:10)  HR: 92 (24 Dec 2021 14:10) (92 - 104)  BP: 123/82 (24 Dec 2021 14:10) (120/78 - 123/82)  BP(mean): --  RR: 18 (24 Dec 2021 14:10) (18 - 18)  SpO2: 97% (24 Dec 2021 14:10) (97% - 97%)        PHYSICAL EXAM:  GENERAL: No apparent distress, appears stated age  HEAD:  Atraumatic, Normocephalic  EYES: Conjunctiva and sclera clear, no discharge  ENMT: Moist mucous membranes, no nasal discharge  NECK: Supple, no JVD  CHEST/LUNG: improved aeration right lung  HEART: Regular rhythm, no rubs or gallops  ABDOMEN: Soft, Nontender, epigastric fullness  EXTREMITIES:  No clubbing, cyanosis or edema  SKIN: No rash, no new discoloration  NERVOUS SYSTEM:  Alert & Oriented; Bilateral Lower extremity mobile, sensation to light touch intact      LABS:                        11.1   9.50  )-----------( 352      ( 24 Dec 2021 12:17 )             35.9     24 Dec 2021 12:17    134    |  98     |  9      ----------------------------<  106    3.4     |  22     |  0.81     Ca    9.2        24 Dec 2021 12:17    TPro  6.4    /  Alb  3.5    /  TBili  0.5    /  DBili  x      /  AST  35     /  ALT  30     /  AlkPhos  2178   24 Dec 2021 12:17    PT/INR - ( 23 Dec 2021 07:05 )   PT: 15.0 sec;   INR: 1.26 ratio         PTT - ( 23 Dec 2021 07:05 )  PTT:29.7 sec    CAPILLARY BLOOD GLUCOSE          RADIOLOGY & ADDITIONAL TESTS:      Images reviewed personally    Consultant Notes Reviewed and Care Discussed with relevant Consultants.

## 2021-12-24 NOTE — PROGRESS NOTE ADULT - PROBLEM SELECTOR PLAN 3
-Patient has shortness of breath with exertion as an outpatient and CT chest with new large right pleural effusion with near complete right lower and partial right upper and middle compressive atelectasis  -Pulmonary consulted, for thoracentesis - done 12/23 with 2 liters removed  -exudate

## 2021-12-25 NOTE — PROGRESS NOTE ADULT - SUBJECTIVE AND OBJECTIVE BOX
Patient is a 51y old  Female who presents with a chief complaint of Dysphagia (24 Dec 2021 17:47)      INTERVAL History of Present Illness/OVERNIGHT EVENTS: reports decreased urination despite IVF.  poor oral intake chronically  await advanced GI input    MEDICATIONS  (STANDING):  enoxaparin Injectable 40 milliGRAM(s) SubCutaneous daily  lactobacillus acidophilus 1 Tablet(s) Oral daily  multivitamin 1 Tablet(s) Oral daily  pantoprazole  Injectable 40 milliGRAM(s) IV Push two times a day  sodium chloride 0.9%. 1000 milliLiter(s) (125 mL/Hr) IV Continuous <Continuous>    MEDICATIONS  (PRN):  morphine  - Injectable 2 milliGRAM(s) IV Push every 4 hours PRN Moderate Pain (4 - 6)  morphine  - Injectable 3 milliGRAM(s) IV Push every 4 hours PRN Severe Pain (7 - 10)  ondansetron Injectable 4 milliGRAM(s) IV Push every 8 hours PRN Nausea and/or Vomiting  simethicone 80 milliGRAM(s) Chew three times a day PRN Dyspepsia      Allergies    Cipro (Rash)    Intolerances        REVIEW OF SYSTEMS:  Negative unless otherwise specified above.    Vital Signs Last 24 Hrs  T(C): 36.8 (25 Dec 2021 13:58), Max: 36.8 (25 Dec 2021 13:58)  T(F): 98.3 (25 Dec 2021 13:58), Max: 98.3 (25 Dec 2021 13:58)  HR: --  BP: 133/84 (25 Dec 2021 13:58) (133/84 - 133/84)  BP(mean): --  RR: 18 (25 Dec 2021 13:58) (18 - 18)  SpO2: --        PHYSICAL EXAM:  GENERAL: No apparent distress, appears stated age  HEAD:  Atraumatic, Normocephalic  EYES: Conjunctiva and sclera clear, no discharge  ENMT: Moist mucous membranes, no nasal discharge  NECK: Supple, no JVD  CHEST/LUNG: decreased BS on right  HEART: Regular rhythm, no rubs or gallops  ABDOMEN: Soft, Nontender, epigastric fullness, no rigidity or rebound ttp, hypoactive BS  EXTREMITIES:  No clubbing, cyanosis or edema  SKIN: No rash, no new discoloration  NERVOUS SYSTEM:  Alert & Oriented; Bilateral Lower extremity mobile, sensation to light touch intact      LABS:                        10.0   12.24 )-----------( 250      ( 25 Dec 2021 11:33 )             31.5     25 Dec 2021 11:33    132    |  98     |  14     ----------------------------<  100    3.8     |  19     |  2.10     Ca    8.0        25 Dec 2021 11:33  Phos  3.3       25 Dec 2021 11:33  Mg     1.7       25 Dec 2021 11:33    TPro  5.3    /  Alb  2.8    /  TBili  0.6    /  DBili  x      /  AST  83     /  ALT  53     /  AlkPhos  1806   25 Dec 2021 11:33        CAPILLARY BLOOD GLUCOSE          RADIOLOGY & ADDITIONAL TESTS:      Images reviewed personally    Consultant Notes Reviewed and Care Discussed with relevant Consultants.

## 2021-12-25 NOTE — PROGRESS NOTE ADULT - SUBJECTIVE AND OBJECTIVE BOX
Interval events:   Increase in Cr 2    SUBJECTIVE:  Denies flank pain. Reports tea colored urine. Says she is only urinating once a day      OBJECTIVE:  Vital Signs Last 24 Hrs  T(C): 36.8 (25 Dec 2021 13:58), Max: 36.8 (25 Dec 2021 13:58)  T(F): 98.3 (25 Dec 2021 13:58), Max: 98.3 (25 Dec 2021 13:58)  HR: --  BP: 133/84 (25 Dec 2021 13:58) (133/84 - 133/84)  BP(mean): --  RR: 18 (25 Dec 2021 13:58) (18 - 18)  SpO2: --    Physical Examination:  GEN: NAD, resting quietly  ABD: soft    LABS:                        10.0   12.24 )-----------( 250      ( 25 Dec 2021 11:33 )             31.5       12-25    132<L>  |  98  |  14  ----------------------------<  100<H>  3.8   |  19<L>  |  2.10<H>    Ca    8.0<L>      25 Dec 2021 11:33  Phos  3.3     12-25  Mg     1.7     12-25    TPro  5.3<L>  /  Alb  2.8<L>  /  TBili  0.6  /  DBili  x   /  AST  83<H>  /  ALT  53<H>  /  AlkPhos  1806<H>  12-25

## 2021-12-25 NOTE — CONSULT NOTE ADULT - ASSESSMENT
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 Work up since then included EUS with Dr. Llanos that showed involvement of the mucosa, submucosa, and muscularis propria (T2) with gastro-hepatic nodes. Patient now presenting with 30 lb wt loss, poor PO intake and N/V  after PO intake for the past 3 months with work up revealing in CT  marked dilatation afferent loop likely secondary to recurrent disease at the gastrojejunostomy and ? partial obstruction.    #Recurrent gastric cancer with bone metastasis and peritoneal carcinoamtosis  #Concern for partial obstruction vs gastroparesis from invading malignancy    #New ascites s/p paracentesis of 700 cc  #Large pleural effusion s/p thoracentesis of 2 L    Recommendations:  -Keep on full liuid for now  -Discussed with patient role of obtaining upper GI series to delineate anatomy and assess concern for blockage but patient thinks she can not tolerate it.  -PPI IV BID  -Supportive care with antiemetics per primary team and palliative team  -Obtain COVID swab tomorrow and make NPO on Sunday after midnight in anticipation of possible EGD. Plan to discuss with [facility] attending ( On Monday) role of EGD +/- stent (if anatomically possible/feasible).     Ridge Levine MD  Gastroenterology/Hepatology Fellow  Contact on-call GI fellow via answering service ([phone]) from 5pm-8am AND on weekends/holidays

## 2021-12-25 NOTE — CONSULT NOTE ADULT - SUBJECTIVE AND OBJECTIVE BOX
HPI:  51 year old female with PMH gastric CA s/p resection, SBO, multiple bowel resections, and recent recurrence of her cancer who presents for inability to tolerate PO. The patient was initially diagnosed in 2011 with gastric cancer which she had resected. Six months ago, the patient had abdominal pain and EGD with biopsy which showed poorly differentiated signet ring cell carcinoma. She saw Dr. Skinny Ibanez from oncology as an outpatient who recommended chemoport and PET for staging purposes along with treatment however, the patient opted not to proceed with treatment. For the past 3 weeks, the patient has been unable to tolerate solids and switched to a liquid diet. For the past 2-3 days, her dysphagia has worsened and now she is having difficulty tolerating liquids. When she attempts to take PO, she has many episodes of vomiting, belching and spitting up which prompted her to go to the ED. Upon arrival, vital signs were /81, , RR 18, temperature 98.3 degrees Farenheit and saturating 98% on room air. Labs were significant for WBC 10.91, Na 131, K 3.1, Cl 92, HCO3 20, anion gap 19 and alk phos 2383. UA was positive for blood and 27 WBC. CT C/A/P showed apparent marked dilatation afferent loop likely secondary to recurrent disease at the gastrojejunostomy, new large right pleural effusion with adjacent atelectasis, new large ascites with multiple loculations and mesenteric fat infiltration likely secondary to peritoneal carcinomatosis, predominantly sclerotic but some lytic lesions throughout skeleton suggest advanced metastatic disease, moderate left hydronephrosis and proximal ureteral dilatation and large abdominal and pelvic wall hernia containing bowel, mesentery and ascites. She received 500cc LR, 20mg IV pepcid x2, 975mg PO acetaminophen, 4mg PO zofran and 30meq IV K and was admitted for further management.      Allergies:  Cipro (Rash)        Hospital Medications:  dextrose 5% + sodium chloride 0.9% with potassium chloride 20 mEq/L 1000 milliLiter(s) IV Continuous <Continuous>  enoxaparin Injectable 40 milliGRAM(s) SubCutaneous daily  lactobacillus acidophilus 1 Tablet(s) Oral daily  morphine  - Injectable 2 milliGRAM(s) IV Push every 4 hours PRN  morphine  - Injectable 3 milliGRAM(s) IV Push every 4 hours PRN  multivitamin 1 Tablet(s) Oral daily  ondansetron Injectable 4 milliGRAM(s) IV Push every 8 hours PRN  pantoprazole  Injectable 40 milliGRAM(s) IV Push two times a day      PMHX/PSHX:  Bariatric surg stat-unsp    Gastric cancer    Small bowel obstruction    Perforated bowel    Other abdominal hernia    Obesity (BMI 30-39.9)    History of gastrectomy    History of resection of small bowel        Family history:  No pertinent family history in first degree relatives    FH: diabetes mellitus (Mother)        Social History: no smoking    ROS:   General:  No fevers, chills or night sweats  ENT:  No sore throat or dysphagia  CV:  No pain or palpitations  Resp:  No dyspnea, cough or  wheezing  GI:  as above  Skin:  No rash or edema  Neuro: no weakness   Hematologic: no bleeding  Musculoskeletal: no muscle pain or join pain  Psych: no agitation     : no dysuria      PHYSICAL EXAM:   GENERAL:  NAD, Appears stated age  HEENT:  NC/AT,  conjunctivae clear and pink, sclera -anicteric  CHEST:  CTA B/L, Normal effort  HEART:  RRR S1/S2,  ABDOMEN:  Soft, mild discomfort  EXTREMITIES:  No cyanosis or Edema  SKIN:  Warm & Dry. No rash or erythema  NEURO:  Alert, oriented, no focal deficit    Vital Signs:  Vital Signs Last 24 Hrs  T(C): 36.8 (25 Dec 2021 13:58), Max: 36.8 (25 Dec 2021 13:58)  T(F): 98.3 (25 Dec 2021 13:58), Max: 98.3 (25 Dec 2021 13:58)  HR: --  BP: 133/84 (25 Dec 2021 13:58) (133/84 - 133/84)  BP(mean): --  RR: 18 (25 Dec 2021 13:58) (18 - 18)  SpO2: --  Daily     Daily     LABS:                        10.0   12.24 )-----------( 250      ( 25 Dec 2021 11:33 )             31.5     Mean Cell Volume: 72.7 fl (12-25-21 @ 11:33)    12-25    132<L>  |  98  |  14  ----------------------------<  100<H>  3.8   |  19<L>  |  2.10<H>    Ca    8.0<L>      25 Dec 2021 11:33  Phos  3.3     12-25  Mg     1.7     12-25    TPro  5.3<L>  /  Alb  2.8<L>  /  TBili  0.6  /  DBili  x   /  AST  83<H>  /  ALT  53<H>  /  AlkPhos  1806<H>  12-25    LIVER FUNCTIONS - ( 25 Dec 2021 11:33 )  Alb: 2.8 g/dL / Pro: 5.3 g/dL / ALK PHOS: 1806 U/L / ALT: 53 U/L / AST: 83 U/L / GGT: x                                       10.0   12.24 )-----------( 250      ( 25 Dec 2021 11:33 )             31.5                         11.1   9.50  )-----------( 352      ( 24 Dec 2021 12:17 )             35.9                         10.8   8.57  )-----------( 314      ( 23 Dec 2021 07:04 )             34.9                         11.5   11.33 )-----------( 333      ( 22 Dec 2021 15:15 )             36.5     Imaging:  < from: CT Abdomen and Pelvis w/ IV Cont (12.21.21 @ 20:14) >  Liver: Unremarkable.  Gallbladder and bile ducts: Unremarkable. No calcified stones.  Pancreas: Unremarkable.  Spleen: Normal. .  Adrenal glands: Normal.  Kidneys and ureters: Normal right kidney. Moderate left hydronephrosis and  proximal ureteral dilatation. No cause for obstruction identified.  Stomach and bowel: Status post gastrojejunostomy. Apparent marked   distention H apparent loop likely secondary to recurrent disease at the   gastrojejunostomy anastomosis. Appendix: No inflammation.    Intraperitoneal space: Ascites and multiple loculated peritoneal fluid  collections. Infiltrative changes of the peritoneal fat likely secondary   to peritoneal carcinomatosis.  Vasculature: Unremarkable.  Retroperitoneum: Left periaortic lymph node new since 06/15/2021, 1.8 x   1.1 cm (5:38). Retroperitoneal fluid infiltrating into the ric hepatis  Urinary bladder: Unremarkable.  Reproductive: Unremarkable uterus and adenexa.  Bones/joints: Predominantly sclerotic but some lytic lesions throughout  skeleton suggest advanced metastatic disease..  Soft tissues: Large anterior abdominal and pelvic wall hernia containing   bowel,  mesentery and ascites. Hernia neck measures 16 cm..    IMPRESSION:  1. Apparent marked dilatation afferent loop likely secondary to recurrent   disease at the gastrojejunostomy  2. New large right pleural effusion with adjacent atelectasis..  3. New large ascites with multiple loculations and mesenteric fat   infiltration likely secondary to peritoneal carcinomatosis  4. Predominantly sclerotic but some lytic lesions throughout skeleton   suggest  advanced metastatic disease..  5. Moderate left hydronephrosis andproximal ureteral dilatation  6. Large abdominal and pelvic wall hernia containing bowel, mesentery and  ascites. No significant change.    < end of copied text >

## 2021-12-25 NOTE — PROGRESS NOTE ADULT - ASSESSMENT
52 yo female with stage 4 gastric cancer, inability to sujit po, ascites s/p paracentesis and pleural effusion found with moderate left hydroureteronephrosis to proximal ureter likely from extrinsic compression; + u/a on admission without ucx results    - Patient AF VSS, small increase in WBC today   - Cr acutely elevated to 2 from 0.8  - Acute increase in Cr likely unrelated to left hydronephrosis seen on CT from 12/21  - Can obtain renal bladder US to rule out obstruction   - Nephrology evaluation for alternative cause of MARIELLA  - urine clx negative, patient has completed course of ctx   - no urgent urological intervention

## 2021-12-26 NOTE — CONSULT NOTE ADULT - ATTENDING COMMENTS
I have examined pt and agree with above exam and plan above.    50 yo female with  widely metastatic gastric cancer s/p multiple bowel resection c/o increasing SOB. See has difficulty lying flat due to abd mets from gastric cancer. No fever , chills  or hemoptysis. Pt c/o SOB at rest and on exertion. Pt with large R pleural effusion on CT chest. POCUS today confirmed large R pleural effusion.      A/P Large R pleural effusion suspicious for malignant pleural effusion. Will perform therapeutic thoracentesis once INR  comes back and is normal.  If pt has recurrent malignant pleural effusion, she would be candidate for Pleurx catheter.
Pt w gastric remnant cancer.  Last seen in office several months ago, but pt was unclear of her treatment choices.  Per pt she never initiated treatment.  Currently w progression of dz    Would f/u w Med Onc eval    Consider paracentesis.    Consider mSBO protocol for persisting symptoms    Consider [facility] discussions/Pall care eval    Do not feel that surgery currently will be of benefit
Pt. seen, examined and d/w fellow. Agree with above note. PT. 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 Work up since then included EUS  Patient now presenting with 30 lb wt loss, poor PO intake and N/V  after PO intake for the past 3 months with work up revealing in CT  marked dilatation afferent loop likely secondary to recurrent disease at the gastrojejunostomy and ? partial obstruction. Now has new ascites and pleural effusion. We will d/w advanced endoscopy team for possible EGD with stent early next week.    Shin Bajwa MD  [facility]
Patient seen and examined    Case discussed with Dr Kirkpatrick    I agree with his history exam and plans as noted above    52 yo woman with metastatic gastric cancer admitted with SBO and leukocytosis and hydronephrosis seen on CT scan  distended abdomen with palpable ascites and superficial skin metastases on exam  feels uncomfortable but not in acute distress    Source GI translocation vs  urinary tract  Send blood cultures x2 sets  send UA and culture  Urology evaluation  IF decompensates would broaden to Meropenem 500mg IV q 12hr    Zacarias Ford MD  [phone]  After 5pm/weekends [phone]
Patient has h/o early stage gastric cancer s/p resection 2011 while 4 months pregnant with pathologic T1aN0 disease.  Followed with observation/surveillance.  She was hospitalized with bowel obstruction in 2015 requiring surgery.  Diagnosed with local recurrence of disease earlier this year at anastomic site.  Disease appeared localized and it was recommended for patient to have PET/CT and plan for neoadjuvant chemo followed by possibly surgical resection.  Patient did not follow-up and pursued natural therapies including juicing.  She now  presents with widespread metastatic disease including bone metastases, peritoneal carcinomatosis, ascites and likely obstruction from local recurrence of disease.  Patient unable to tolerate solids and some liquids.  Need Surgical Oncology and possibly GI input about possible intervention to alleviate ? mechanical obstruction from local recurrence:  role for stent?  Role for palliative surgery?  Patient will likely need thoracentesis and possibly paracentesis. May need  consult for hydronephrosis.  Mainstay of treatment will be palliative systemic chemotherapy however patient needs way of meeting nutritional needs.      Madi Hein MD

## 2021-12-26 NOTE — CONSULT NOTE ADULT - ASSESSMENT
51 year old female with PMH gastric CA s/p resection, SBO, multiple bowel resections, and recent recurrence of her cancer who presents for inability to tolerate PO. The patient was initially diagnosed in 2011 with gastric cancer which she had resected. Six months ago, the patient had abdominal pain and EGD with biopsy which showed poorly differentiated signet ring cell carcinoma. She saw Dr. Skinny Ibanez from oncology as an outpatient who recommended chemoport and PET for staging purposes along with treatment however, the patient opted not to proceed with treatment. For the past 3 weeks, the patient has been unable to tolerate solids and switched to a liquid diet. For the past 2-3 days, her dysphagia has worsened and now she is having difficulty tolerating liquids. When she attempts to take PO, she has many episodes of vomiting, belching and spitting up which prompted her to go to the ED.  UA was positive for blood and 27 WBC. CT C/A/P  with iv contrast showed apparent marked dilatation afferent loop likely secondary to recurrent disease at the gastrojejunostomy, new large right pleural effusion with adjacent atelectasis, new large ascites with multiple loculations and mesenteric fat infiltration likely secondary to peritoneal carcinomatosis, predominantly sclerotic but some lytic lesions throughout skeleton suggest advanced metastatic disease, moderate left hydronephrosis and proximal ureteral dilatation and large abdominal and pelvic wall hernia containing bowel, mesentery and ascites. She received 500cc LR, 20mg IV pepcid x2, 975mg PO acetaminophen, 4mg PO zofran and 30meq IV K and was admitted for further management.  she had paracentesis as well as thoracentesis. states has decrease po intake for a month  now noticed with MARIELLA          1- MARIELLA   2- metastatic ca  3- mild acidosis   4- hydronephrosis     mariella in setting of contrast nephropathy suspected.   hydro is also of concern   to receive ivf and albumin 5% 250 cc as d/w pt team   strict I/O  if no improvement in uo may need P NT as well   trend lytes and bicarb

## 2021-12-26 NOTE — PROGRESS NOTE ADULT - ASSESSMENT
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 Work up since then included EUS with Dr. Llanos that showed involvement of the mucosa, submucosa, and muscularis propria (T2) with gastro-hepatic nodes. Patient now presenting with 30 lb wt loss, poor PO intake and N/V  after PO intake for the past 3 months with work up revealing in CT  marked dilatation afferent loop likely secondary to recurrent disease at the gastrojejunostomy and ? partial obstruction.    #Recurrent gastric cancer with bone metastasis and peritoneal carcinoamtosis  #Concern for partial obstruction vs gastroparesis from invading malignancy    #New ascites s/p paracentesis of 700 cc  #Large pleural effusion s/p thoracentesis of 2 L    Recommendations:  -Keep on full liuid for now  -Discussed with patient role of obtaining upper GI series to delineate anatomy and assess concern for blockage but patient thinks she can not tolerate it.  -PPI IV BID  -Supportive care with antiemetics per primary team and palliative team  -Patient now with MARIELLA stage 3, hyponatremia, and leukocytosis. Will hold off EGD evaluation tomorrow given further medical optimization warranted.     Ridge Levine MD  Gastroenterology/Hepatology Fellow  Contact on-call GI fellow via answering service ([phone]) from 5pm-8am AND on weekends/holidays

## 2021-12-26 NOTE — PROGRESS NOTE ADULT - SUBJECTIVE AND OBJECTIVE BOX
Interval Events:   Patient with same ongoing [facility]    Hospital Medications:  dextrose 5% + sodium chloride 0.9%. 1000 milliLiter(s) IV Continuous <Continuous>  enoxaparin Injectable 40 milliGRAM(s) SubCutaneous daily  lactobacillus acidophilus 1 Tablet(s) Oral daily  morphine  - Injectable 2 milliGRAM(s) IV Push every 4 hours PRN  morphine  - Injectable 3 milliGRAM(s) IV Push every 4 hours PRN  multivitamin 1 Tablet(s) Oral daily  ondansetron Injectable 4 milliGRAM(s) IV Push every 8 hours PRN  pantoprazole  Injectable 40 milliGRAM(s) IV Push two times a day  simethicone 80 milliGRAM(s) Chew three times a day PRN  sodium chloride 0.9%. 1000 milliLiter(s) IV Continuous <Continuous>      ROS: All system reviewed and negative except as mentioned above.    PHYSICAL EXAM:   Vital Signs:  Vital Signs Last 24 Hrs  T(C): 36.4 (26 Dec 2021 10:01), Max: 36.4 (26 Dec 2021 10:01)  T(F): 97.5 (26 Dec 2021 10:01), Max: 97.5 (26 Dec 2021 10:01)  HR: 92 (26 Dec 2021 10:01) (92 - 92)  BP: 125/81 (26 Dec 2021 10:01) (125/81 - 125/81)  BP(mean): --  RR: 18 (26 Dec 2021 10:01) (18 - 18)  SpO2: 98% (26 Dec 2021 10:01) (98% - 98%)  Daily     Daily     GENERAL:  NAD, Appears stated age  HEENT:  NC/AT,  conjunctivae clear and pink, sclera -anicteric  CHEST:  Normal Effort, Breath sounds clear  HEART:  RRR, S1 + S2, no murmurs  ABDOMEN:  Soft, non-tender, non-distended, normoactive bowel sounds,  no masses  EXTREMITIES:  no cyanosis or edema  SKIN:  Warm & Dry. No rash or erythema  NEURO:  Alert, oriented, no focal deficit    LABS:                        10.4   14.14 )-----------( 262      ( 26 Dec 2021 09:32 )             34.1     Mean Cell Volume: 75.9 fl (12-26-21 @ 09:32)    12-26    132<L>  |  100  |  21  ----------------------------<  92  4.4   |  17<L>  |  3.31<H>    Ca    8.1<L>      26 Dec 2021 09:32  Phos  3.6     12-26  Mg     1.8     12-26    TPro  5.7<L>  /  Alb  2.9<L>  /  TBili  0.4  /  DBili  x   /  AST  36  /  ALT  41  /  AlkPhos  1763<H>  12-26    LIVER FUNCTIONS - ( 26 Dec 2021 09:32 )  Alb: 2.9 g/dL / Pro: 5.7 g/dL / ALK PHOS: 1763 U/L / ALT: 41 U/L / AST: 36 U/L / GGT: x                                       10.4   14.14 )-----------( 262      ( 26 Dec 2021 09:32 )             34.1                         10.0   12.24 )-----------( 250      ( 25 Dec 2021 11:33 )             31.5                         11.1   9.50  )-----------( 352      ( 24 Dec 2021 12:17 )             35.9       Imaging: Images reviewed no new images appreciated.

## 2021-12-26 NOTE — PROGRESS NOTE ADULT - PROBLEM SELECTOR PLAN 5
-Patient has shortness of breath with exertion as an outpatient and CT chest with new large right pleural effusion with near complete right lower and partial right upper and middle compressive atelectasis  -Pulmonary consulted, for thoracentesis - done 12/23 with 2 liters removed  -exudate  -f/up pulm recs

## 2021-12-26 NOTE — CONSULT NOTE ADULT - SUBJECTIVE AND OBJECTIVE BOX
Patient is a 51y old  Female who presents with a chief complaint of Dysphagia (26 Dec 2021 15:15)    HPI:  51 year old female with PMH gastric CA s/p resection, SBO, multiple bowel resections, and recent recurrence of her cancer who presents for inability to tolerate PO. The patient was initially diagnosed in 2011 with gastric cancer which she had resected. Six months ago, the patient had abdominal pain and EGD with biopsy which showed poorly differentiated signet ring cell carcinoma. She saw Dr. Skinny Ibanez from oncology as an outpatient who recommended chemoport and PET for staging purposes along with treatment however, the patient opted not to proceed with treatment. For the past 3 weeks, the patient has been unable to tolerate solids and switched to a liquid diet. For the past 2-3 days, her dysphagia has worsened and now she is having difficulty tolerating liquids. When she attempts to take PO, she has many episodes of vomiting, belching and spitting up which prompted her to go to the ED. Upon arrival, vital signs were /81, , RR 18, temperature 98.3 degrees Farenheit and saturating 98% on room air. Labs were significant for WBC 10.91, Na 131, K 3.1, Cl 92, HCO3 20, anion gap 19 and alk phos 2383. UA was positive for blood and 27 WBC. CT C/A/P showed apparent marked dilatation afferent loop likely secondary to recurrent disease at the gastrojejunostomy, new large right pleural effusion with adjacent atelectasis, new large ascites with multiple loculations and mesenteric fat infiltration likely secondary to peritoneal carcinomatosis, predominantly sclerotic but some lytic lesions throughout skeleton suggest advanced metastatic disease, moderate left hydronephrosis and proximal ureteral dilatation and large abdominal and pelvic wall hernia containing bowel, mesentery and ascites. She received 500cc LR, 20mg IV pepcid x2, 975mg PO acetaminophen, 4mg PO zofran and 30meq IV K and was admitted for further management. (22 Dec 2021 13:39)     ID consulted for leukocytosis.    REVIEW OF SYSTEMS  [  ] ROS unobtainable because:    [ X ] All other systems negative except as noted below    Constitutional:  [ ] fever [ ] chills  [ ] weight loss  [ ]night sweat  [ ]poor appetite/PO intake [ ]fatigue   Skin:  [ ] rash [ ] phlebitis	  Eyes: [ ] icterus [ ] pain  [ ] discharge	  ENMT: [ ] sore throat  [ ] thrush [ ] ulcers [ ] exudates [ ]anosmia  Respiratory: [ ] dyspnea [ ] hemoptysis [ ] cough [ ] sputum	  Cardiovascular:  [ ] chest pain [ ] palpitations [ ] edema	  Gastrointestinal:  [ ] nausea [ ] vomiting [ ] diarrhea [ ] constipation [ ] pain	  Genitourinary:  [ ] dysuria [ ] frequency [ ] hematuria [ ] discharge [ ] flank pain  [ ] incontinence  Musculoskeletal:  [ ] myalgias [ ] arthralgias [ ] arthritis  [ ] back pain  Neurological:  [ ] headache [ ] weakness [ ] seizures  [ ] confusion/altered mental status    prior hospital charts reviewed [V]  primary team notes reviewed [V]  other consultant notes reviewed [V]    PAST MEDICAL & SURGICAL HISTORY:  Gastric cancer  5/2011 no adjunt trement    Small bowel obstruction  Exp lap complicated with post op Ishemic bowel /perforation    Perforated bowel    Other abdominal hernia    Obesity (BMI 30-39.9)    History of gastrectomy  distal gastrectomy w/ B2 reconstruction (gastrojejunostomy)    History of resection of small bowel  exlap, SBR, meckel&#x27;s diverticulectomy (7/4/2015); exlap, SBR for necrotic bowel, left in discontinuity, Abthera (7/9/2015); exlap, SB anastomosis, Abthera (7/11/15); exlap, washout, abdominal closure w/ Strattice (7/13/15)        SOCIAL HISTORY:  - Denied smoking/vaping/alcohol/recreational drug use    FAMILY HISTORY:  FH: diabetes mellitus (Mother)        Allergies  Cipro (Rash)    ANTIMICROBIALS:      ANTIMICROBIALS (past 90 days):  MEDICATIONS  (STANDING):  cefTRIAXone   IVPB   100 mL/Hr IV Intermittent (12-25-21 @ 13:46)   100 mL/Hr IV Intermittent (12-24-21 @ 13:24)   100 mL/Hr IV Intermittent (12-23-21 @ 14:56)        OTHER MEDS:   MEDICATIONS  (STANDING):  heparin   Injectable 5000 every 12 hours  HYDROmorphone  Injectable 0.5 every 4 hours PRN  ondansetron Injectable 4 every 8 hours PRN  pantoprazole  Injectable 40 every 24 hours  simethicone 80 three times a day PRN      VITALS:  Vital Signs Last 24 Hrs  T(F): 97.5 (12-26-21 @ 10:01), Max: 99.3 (12-23-21 @ 08:51)    Vital Signs Last 24 Hrs  HR: 92 (12-26-21 @ 10:01) (92 - 92)  BP: 125/81 (12-26-21 @ 10:01) (125/81 - 125/81)  RR: 18 (12-26-21 @ 10:01)  SpO2: 98% (12-26-21 @ 10:01) (98% - 98%)  Wt(kg): --    EXAM:  Physical Exam:  Constitutional:  well preserved, comfortable  Head/Eyes: no icterus, PERRL, EOMI  ENT:  supple; no thrush  LUNGS:  CTA  CVS:  normal S1, S2, no murmur  Abd:  soft, non-tender; non-distended  Ext:  no edema  Vascular:  IV site no erythema tenderness or discharge  MSK:  joints without swelling  Neuro: AAO X 3, non- focal    Labs:                        10.4   14.14 )-----------( 262      ( 26 Dec 2021 09:32 )             34.1     12-26    132<L>  |  100  |  21  ----------------------------<  92  4.4   |  17<L>  |  3.31<H>    Ca    8.1<L>      26 Dec 2021 09:32  Phos  3.6     12-26  Mg     1.8     12-26    TPro  5.7<L>  /  Alb  2.9<L>  /  TBili  0.4  /  DBili  x   /  AST  36  /  ALT  41  /  AlkPhos  1763<H>  12-26      WBC Trend:  WBC Count: 14.14 (12-26-21 @ 09:32)  WBC Count: 12.24 (12-25-21 @ 11:33)  WBC Count: 9.50 (12-24-21 @ 12:17)  WBC Count: 8.57 (12-23-21 @ 07:04)      Auto Neutrophil #: 7.50 K/uL (12-21-21 @ 17:57)      Creatine Trend:  Creatinine, Serum: 3.31 (12-26)  Creatinine, Serum: 2.10 (12-25)  Creatinine, Serum: 0.81 (12-24)  Creatinine, Serum: 0.75 (12-23)      Liver Biochemical Testing Trend:  Alanine Aminotransferase (ALT/SGPT): 41 (12-26)  Alanine Aminotransferase (ALT/SGPT): 53 *H* (12-25)  Alanine Aminotransferase (ALT/SGPT): 30 (12-24)  Alanine Aminotransferase (ALT/SGPT): 12 (12-23)  Alanine Aminotransferase (ALT/SGPT): 15 (12-22)  Aspartate Aminotransferase (AST/SGOT): 36 (12-26-21 @ 09:32)  Aspartate Aminotransferase (AST/SGOT): 83 (12-25-21 @ 11:33)  Aspartate Aminotransferase (AST/SGOT): 35 (12-24-21 @ 12:17)  Aspartate Aminotransferase (AST/SGOT): 17 (12-23-21 @ 07:06)  Aspartate Aminotransferase (AST/SGOT): 15 (12-22-21 @ 15:15)  Bilirubin Total, Serum: 0.4 (12-26)  Bilirubin Total, Serum: 0.6 (12-25)  Bilirubin Total, Serum: 0.5 (12-24)  Bilirubin Total, Serum: 0.5 (12-23)  Bilirubin Total, Serum: 0.5 (12-22)      MICROBIOLOGY:        Culture - Body Fluid with Gram Stain (collected 23 Dec 2021 23:25)  Source: .Body Fluid Pleural Fluid  Preliminary Report:    No growth    Culture - Body Fluid with Gram Stain (collected 23 Dec 2021 15:00)  Source: .Body Fluid Peritoneal Fluid  Preliminary Report:    No growth    Culture - Urine (collected 23 Dec 2021 00:44)  Source: Clean Catch Clean Catch (Midstream)  Final Report:    Normal Urogenital reginald present      COVID-19 PCR: NotDetec (12-26-21 @ 10:25)  COVID-19 PCR: NotDetec (12-21-21 @ 17:56)    Lactate Dehydrogenase, Serum: 252 (12-25)    Serum Pro-Brain Natriuretic Peptide: 63 (12-21)    Troponin T, High Sensitivity Result: 11 (12-21)    RADIOLOGY:  imaging below personally reviewed    < from: Xray Abdomen 1 View PORTABLE -Urgent (Xray Abdomen 1 View PORTABLE -Urgent .) (12.25.21 @ 11:50) >  IMPRESSION:  Nonspecific, nonobstructive bowel gas pattern.  < end of copied text >      < from: CT Abdomen and Pelvis w/ IV Cont (12.21.21 @ 20:14) >  IMPRESSION:  1. Apparent marked dilatation afferent loop likely secondary to recurrent   disease at the gastrojejunostomy  2. New large right pleural effusion with adjacent atelectasis..  3. New large ascites with multiple loculations and mesenteric fat   infiltration likely secondary to peritoneal carcinomatosis  4. Predominantly sclerotic but some lytic lesions throughout skeleton   suggest  advanced metastatic disease..  5. Moderate left hydronephrosis andproximal ureteral dilatation  6. Large abdominal and pelvic wall hernia containing bowel, mesentery and  ascites. No significant change.    < end of copied text >     Patient is a 51y old  Female who presents with a chief complaint of Dysphagia (26 Dec 2021 15:15)    HPI:  51 year old female with PMH gastric CA s/p resection, SBO, multiple bowel resections, and recent recurrence of her cancer who presents for inability to tolerate PO. The patient was initially diagnosed in 2011 with gastric cancer which she had resected. Six months ago, the patient had abdominal pain and EGD with biopsy which showed poorly differentiated signet ring cell carcinoma. She saw Dr. Skinny Ibanez from oncology as an outpatient who recommended chemoport and PET for staging purposes along with treatment however, the patient opted not to proceed with treatment. For the past 3 weeks, the patient has been unable to tolerate solids and switched to a liquid diet. For the past 2-3 days, her dysphagia has worsened and now she is having difficulty tolerating liquids. When she attempts to take PO, she has many episodes of vomiting, belching and spitting up which prompted her to go to the ED. Upon arrival, vital signs were /81, , RR 18, temperature 98.3 degrees Farenheit and saturating 98% on room air. Labs were significant for WBC 10.91, Na 131, K 3.1, Cl 92, HCO3 20, anion gap 19 and alk phos 2383. UA was positive for blood and 27 WBC. CT C/A/P showed apparent marked dilatation afferent loop likely secondary to recurrent disease at the gastrojejunostomy, new large right pleural effusion with adjacent atelectasis, new large ascites with multiple loculations and mesenteric fat infiltration likely secondary to peritoneal carcinomatosis, predominantly sclerotic but some lytic lesions throughout skeleton suggest advanced metastatic disease, moderate left hydronephrosis and proximal ureteral dilatation and large abdominal and pelvic wall hernia containing bowel, mesentery and ascites. She received 500cc LR, 20mg IV pepcid x2, 975mg PO acetaminophen, 4mg PO zofran and 30meq IV K and was admitted for further management. (22 Dec 2021 13:39)     ID consulted for leukocytosis.  She is making few urine. No dysuria. No fever or chills.    REVIEW OF SYSTEMS  [  ] ROS unobtainable because:    [ X ] All other systems negative except as noted below    Constitutional:  [ ] fever [ ] chills  [ ] weight loss  [ ]night sweat  [ ]poor appetite/PO intake [ ]fatigue   Skin:  [ ] rash [ ] phlebitis	  Eyes: [ ] icterus [ ] pain  [ ] discharge	  ENMT: [ ] sore throat  [ ] thrush [ ] ulcers [ ] exudates [ ]anosmia  Respiratory: [ ] dyspnea [ ] hemoptysis [ ] cough [ ] sputum	  Cardiovascular:  [ ] chest pain [ ] palpitations [ ] edema	  Gastrointestinal:  [X ] nausea [ ] vomiting [ ] diarrhea [ ] constipation [ X] pain	  Genitourinary:  [ ] dysuria [ ] frequency [ ] hematuria [ ] discharge [ ] flank pain  [ ] incontinence  Musculoskeletal:  [ ] myalgias [ ] arthralgias [ ] arthritis  [ ] back pain  Neurological:  [ ] headache [ ] weakness [ ] seizures  [ ] confusion/altered mental status    prior hospital charts reviewed [V]  primary team notes reviewed [V]  other consultant notes reviewed [V]    PAST MEDICAL & SURGICAL HISTORY:  Gastric cancer  5/2011 no adjunt trement    Small bowel obstruction  Exp lap complicated with post op Ishemic bowel /perforation  Perforated bowel  Other abdominal hernia  Obesity (BMI 30-39.9)  History of gastrectomydistal gastrectomy w/ B2 reconstruction (gastrojejunostomy)    History of resection of small bowel  exlap, SBR, meckel&#x27;s diverticulectomy (7/4/2015); exlap, SBR for necrotic bowel, left in discontinuity, Abthera (7/9/2015); exlap, SB anastomosis, Abthera (7/11/15); exlap, washout, abdominal closure w/ Strattice (7/13/15)      SOCIAL HISTORY:  - Denied smoking/vaping/alcohol/recreational drug use  - Lives with 2 daughter  - Not vaccinated against COVID    FAMILY HISTORY:  FH: diabetes mellitus (Mother)    Allergies  Cipro (Rash)    ANTIMICROBIALS: Off    ANTIMICROBIALS (past 90 days):  MEDICATIONS  (STANDING):  cefTRIAXone   IVPB   100 mL/Hr IV Intermittent (12-25-21 @ 13:46)   100 mL/Hr IV Intermittent (12-24-21 @ 13:24)   100 mL/Hr IV Intermittent (12-23-21 @ 14:56)        OTHER MEDS:   MEDICATIONS  (STANDING):  heparin   Injectable 5000 every 12 hours  HYDROmorphone  Injectable 0.5 every 4 hours PRN  ondansetron Injectable 4 every 8 hours PRN  pantoprazole  Injectable 40 every 24 hours  simethicone 80 three times a day PRN      VITALS:  Vital Signs Last 24 Hrs  T(F): 97.5 (12-26-21 @ 10:01), Max: 99.3 (12-23-21 @ 08:51)    Vital Signs Last 24 Hrs  HR: 92 (12-26-21 @ 10:01) (92 - 92)  BP: 125/81 (12-26-21 @ 10:01) (125/81 - 125/81)  RR: 18 (12-26-21 @ 10:01)  SpO2: 98% (12-26-21 @ 10:01) (98% - 98%)  Wt(kg): --    EXAM:  Physical Exam:  Constitutional:  stable, feeling nausea  Head/Eyes: no icterus, PERRL, EOMI  ENT:  supple; no thrush  LUNGS:  decreased breathing sounds, more on RLL  CVS:  normal S1, S2, no murmur  Abd:  distended abd from vernia, mild tenderness, BS+  Ext:  no edema  Vascular:  IV site no erythema tenderness or discharge  MSK:  joints without swelling  Neuro: AAO X 3, non- focal    Labs:                        10.4   14.14 )-----------( 262      ( 26 Dec 2021 09:32 )             34.1     12-26    132<L>  |  100  |  21  ----------------------------<  92  4.4   |  17<L>  |  3.31<H>    Ca    8.1<L>      26 Dec 2021 09:32  Phos  3.6     12-26  Mg     1.8     12-26    TPro  5.7<L>  /  Alb  2.9<L>  /  TBili  0.4  /  DBili  x   /  AST  36  /  ALT  41  /  AlkPhos  1763<H>  12-26      WBC Trend:  WBC Count: 14.14 (12-26-21 @ 09:32)  WBC Count: 12.24 (12-25-21 @ 11:33)  WBC Count: 9.50 (12-24-21 @ 12:17)  WBC Count: 8.57 (12-23-21 @ 07:04)      Auto Neutrophil #: 7.50 K/uL (12-21-21 @ 17:57)      Creatine Trend:  Creatinine, Serum: 3.31 (12-26)  Creatinine, Serum: 2.10 (12-25)  Creatinine, Serum: 0.81 (12-24)  Creatinine, Serum: 0.75 (12-23)      Liver Biochemical Testing Trend:  Alanine Aminotransferase (ALT/SGPT): 41 (12-26)  Alanine Aminotransferase (ALT/SGPT): 53 *H* (12-25)  Alanine Aminotransferase (ALT/SGPT): 30 (12-24)  Alanine Aminotransferase (ALT/SGPT): 12 (12-23)  Alanine Aminotransferase (ALT/SGPT): 15 (12-22)  Aspartate Aminotransferase (AST/SGOT): 36 (12-26-21 @ 09:32)  Aspartate Aminotransferase (AST/SGOT): 83 (12-25-21 @ 11:33)  Aspartate Aminotransferase (AST/SGOT): 35 (12-24-21 @ 12:17)  Aspartate Aminotransferase (AST/SGOT): 17 (12-23-21 @ 07:06)  Aspartate Aminotransferase (AST/SGOT): 15 (12-22-21 @ 15:15)  Bilirubin Total, Serum: 0.4 (12-26)  Bilirubin Total, Serum: 0.6 (12-25)  Bilirubin Total, Serum: 0.5 (12-24)  Bilirubin Total, Serum: 0.5 (12-23)  Bilirubin Total, Serum: 0.5 (12-22)      MICROBIOLOGY:        Culture - Body Fluid with Gram Stain (collected 23 Dec 2021 23:25)  Source: .Body Fluid Pleural Fluid  Preliminary Report:    No growth    Culture - Body Fluid with Gram Stain (collected 23 Dec 2021 15:00)  Source: .Body Fluid Peritoneal Fluid  Preliminary Report:    No growth    Culture - Urine (collected 23 Dec 2021 00:44)  Source: Clean Catch Clean Catch (Midstream)  Final Report:    Normal Urogenital reginald present      COVID-19 PCR: NotDetec (12-26-21 @ 10:25)  COVID-19 PCR: NotDetec (12-21-21 @ 17:56)    Lactate Dehydrogenase, Serum: 252 (12-25)    Serum Pro-Brain Natriuretic Peptide: 63 (12-21)    Troponin T, High Sensitivity Result: 11 (12-21)    RADIOLOGY:  imaging below personally reviewed    < from: Xray Abdomen 1 View PORTABLE -Urgent (Xray Abdomen 1 View PORTABLE -Urgent .) (12.25.21 @ 11:50) >  IMPRESSION:  Nonspecific, nonobstructive bowel gas pattern.  < end of copied text >      < from: CT Abdomen and Pelvis w/ IV Cont (12.21.21 @ 20:14) >  IMPRESSION:  1. Apparent marked dilatation afferent loop likely secondary to recurrent   disease at the gastrojejunostomy  2. New large right pleural effusion with adjacent atelectasis..  3. New large ascites with multiple loculations and mesenteric fat   infiltration likely secondary to peritoneal carcinomatosis  4. Predominantly sclerotic but some lytic lesions throughout skeleton   suggest  advanced metastatic disease..  5. Moderate left hydronephrosis andproximal ureteral dilatation  6. Large abdominal and pelvic wall hernia containing bowel, mesentery and  ascites. No significant change.    < end of copied text >

## 2021-12-26 NOTE — PROGRESS NOTE ADULT - PROBLEM SELECTOR PLAN 3
Rocephin - finished 3 days  appreciate  input - no intervention on hydro.  WBC mildly elevated but no fever or overt infection.  Check procalcitonin.

## 2021-12-26 NOTE — CONSULT NOTE ADULT - ASSESSMENT
51 year old female with PMH gastric CA s/p resection, SBO, multiple bowel resections, and recent recurrence of her cancer who presents for inability to tolerate PO.   ALP 1700-2000s  s/p CTX 12/23-25  No fever  Now WBC 8.57>14.14  Now MARIELLA, Cr acutely>3 from 0.7  COVID (12/26) PCR neg  PD fluid culture no growth (12/23)  UCx: normal reginald (12/23)  CT A/P (12/21) metastasis, left hydronephrosis, ascites    #Leukocytosis  - Could be reactive from cancer or urologic obstruction given acute MARIELLA  -  and Renal to evaluate for MARIELLA cause  - No abx for now    Dimas Kirkpatrick MD, PGY5  ID fellow  Pager: [phone]  [facility] pager ID: [phone] (would prefer to text page for any new consult or question, please include name/location and best call back number)  After 5pm/weekends call [phone]   51 year old female with PMH gastric CA s/p resection, SBO, multiple bowel resections, and recent recurrence of her cancer who presents for inability to tolerate PO.   ALP 1700-2000s  s/p CTX 12/23-25  No fever  Now WBC 8.57>14.14  Now MARIELLA, Cr acutely>3 from 0.7  COVID (12/26) PCR neg  PD fluid culture no growth (12/23)  UCx: normal reginald (12/23)  CT A/P (12/21) metastasis, left hydronephrosis, ascites    #Leukocytosis  - Could be reactive from cancer or urologic obstruction given acute MARIELLA  -  and Renal to evaluate for MARIELLA cause  - No abx for now, can repeat UA/UCx  - Follow up renal US result, per my view there is hydro? management (Ansari, etc) per primary team  - Please encourage pt to get COVID vaccine whenever feasible    Dimas Kirkpatrick MD, PGY5  ID fellow  Pager: [phone]  [facility] pager ID: [phone] (would prefer to text page for any new consult or question, please include name/location and best call back number)  After 5pm/weekends call [phone]    d/w Dr. Ford  Recs conveyed to primary team

## 2021-12-26 NOTE — CONSULT NOTE ADULT - SUBJECTIVE AND OBJECTIVE BOX
[facility]  [phone]  NEPHROLOGY      INITIAL CONSULT NOTE  --------------------------------------------------------------------------------  HPI:      51 year old female with PMH gastric CA s/p resection, SBO, multiple bowel resections, and recent recurrence of her cancer who presents for inability to tolerate PO. The patient was initially diagnosed in 2011 with gastric cancer which she had resected. Six months ago, the patient had abdominal pain and EGD with biopsy which showed poorly differentiated signet ring cell carcinoma. She saw Dr. Skinny Ibanez from oncology as an outpatient who recommended chemoport and PET for staging purposes along with treatment however, the patient opted not to proceed with treatment. For the past 3 weeks, the patient has been unable to tolerate solids and switched to a liquid diet. For the past 2-3 days, her dysphagia has worsened and now she is having difficulty tolerating liquids. When she attempts to take PO, she has many episodes of vomiting, belching and spitting up which prompted her to go to the ED. Upon arrival, vital signs were /81, , RR 18, temperature 98.3 degrees Farenheit and saturating 98% on room air. Labs were significant for WBC 10.91, Na 131, K 3.1, Cl 92, HCO3 20, anion gap 19 and alk phos 2383. UA was positive for blood and 27 WBC. CT C/A/P  with iv contrast showed apparent marked dilatation afferent loop likely secondary to recurrent disease at the gastrojejunostomy, new large right pleural effusion with adjacent atelectasis, new large ascites with multiple loculations and mesenteric fat infiltration likely secondary to peritoneal carcinomatosis, predominantly sclerotic but some lytic lesions throughout skeleton suggest advanced metastatic disease, moderate left hydronephrosis and proximal ureteral dilatation and large abdominal and pelvic wall hernia containing bowel, mesentery and ascites. She received 500cc LR, 20mg IV pepcid x2, 975mg PO acetaminophen, 4mg PO zofran and 30meq IV K and was admitted for further management.  she had paracentesis as well as thoracentesis. states has decrease po intake for a month  now noticed with MARIELLA renal consult called.     PAST HISTORY  --------------------------------------------------------------------------------  PAST MEDICAL & SURGICAL HISTORY:  Gastric cancer  5/2011 no adjunt trement    Small bowel obstruction  Exp lap complicated with post op Ishemic bowel /perforation    Perforated bowel    Other abdominal hernia    Obesity (BMI 30-39.9)    History of gastrectomy  distal gastrectomy w/ B2 reconstruction (gastrojejunostomy)    History of resection of small bowel  exlap, SBR, meckel&#x27;s diverticulectomy (7/4/2015); exlap, SBR for necrotic bowel, left in discontinuity, Abthera (7/9/2015); exlap, SB anastomosis, Abthera (7/11/15); exlap, washout, abdominal closure w/ Strattice (7/13/15)      FAMILY HISTORY:  FH: diabetes mellitus (Mother)      PAST SOCIAL HISTORY:    ALLERGIES & MEDICATIONS  --------------------------------------------------------------------------------  Allergies    Cipro (Rash)    Intolerances      Standing Inpatient Medications  dextrose 5% + sodium chloride 0.9%. 1000 milliLiter(s) IV Continuous <Continuous>  heparin   Injectable 5000 Unit(s) SubCutaneous every 12 hours  lactobacillus acidophilus 1 Tablet(s) Oral daily  multivitamin 1 Tablet(s) Oral daily  pantoprazole  Injectable 40 milliGRAM(s) IV Push every 24 hours  sodium chloride 0.9%. 1000 milliLiter(s) IV Continuous <Continuous>    PRN Inpatient Medications  HYDROmorphone  Injectable 0.5 milliGRAM(s) IV Push every 4 hours PRN  ondansetron Injectable 4 milliGRAM(s) IV Push every 8 hours PRN  simethicone 80 milliGRAM(s) Chew three times a day PRN      REVIEW OF SYSTEMS  --------------------------------------------------------------------------------  Gen: No  fevers/chills   Skin: No rashes  Head/Eyes/Ears/Mouth: No headache; Normal hearing;  No sinus pain/discomfort, sore throat  Respiratory: No dyspnea, cough, wheezing, hemoptysis  CV: No chest pain, orthopnea  GI: No abdominal pain, diarrhea, nausea, vomiting, melena  : No dysuria,  + decrease urination or hesitancy urinating  hematuria, nocturia  MSK: No joint pain/swelling; no back pain  Neuro: No dizziness/lightheadedness  also with no edema       VITALS/PHYSICAL EXAM  --------------------------------------------------------------------------------  T(C): 36.4 (12-26-21 @ 10:01), Max: 36.4 (12-26-21 @ 10:01)  HR: 92 (12-26-21 @ 10:01) (92 - 92)  BP: 125/81 (12-26-21 @ 10:01) (125/81 - 125/81)  RR: 18 (12-26-21 @ 10:01) (18 - 18)  SpO2: 98% (12-26-21 @ 10:01) (98% - 98%)  Wt(kg): --        12-25-21 @ 07:01  -  12-26-21 @ 07:00  --------------------------------------------------------  IN: 240 mL / OUT: 0 mL / NET: 240 mL    12-26-21 @ 07:01  -  12-26-21 @ 19:07  --------------------------------------------------------  IN: 240 mL / OUT: 0 mL / NET: 240 mL      Physical Exam:  	Gen: Non toxic comfortable appearing   	no jvd   	Pulm: decrease bs  no rales or ronchi or wheezing  	CV: RRR, S1S2; no rub  	Back: No CVA tenderness  	Abd: +BS, soft, nontender/nondistended  	: No suprapubic tenderness  	UE: Warm, no cyanosis  no clubbing,  no edema  	LE: Warm, no cyanosis  no clubbing, non pitting edema  	Neuro: alert and oriented. speech coherent   	Skin: Warm, no decrease skin turgor   	    LABS/STUDIES  --------------------------------------------------------------------------------              10.4   14.14 >-----------<  262      [12-26-21 @ 09:32]              34.1     132  |  100  |  21  ----------------------------<  92      [12-26-21 @ 09:32]  4.4   |  17  |  3.31        Ca     8.1     [12-26-21 @ 09:32]      Mg     1.8     [12-26-21 @ 09:32]      Phos  3.6     [12-26-21 @ 09:32]    TPro  5.7  /  Alb  2.9  /  TBili  0.4  /  DBili  x   /  AST  36  /  ALT  41  /  AlkPhos  1763  [12-26-21 @ 09:32]              [12-25-21 @ 11:33]    Creatinine Trend:  SCr 3.31 [12-26 @ 09:32]  SCr 2.10 [12-25 @ 11:33]  SCr 0.81 [12-24 @ 12:17]  SCr 0.75 [12-23 @ 07:06]  SCr 0.82 [12-22 @ 15:15]    Urinalysis - [12-22-21 @ 11:36]      Color Yellow / Appearance Slightly Turbid / SG >1.050 / pH 7.5      Gluc Negative / Ketone Large  / Bili Small / Urobili 3 mg/dL       Blood Large / Protein 100 / Leuk Est Negative / Nitrite Negative      RBC 6 / WBC 27 / Hyaline 148 / Gran  / Sq Epi  / Non Sq Epi 30 / Bacteria Many    Urine Creatinine 216      [12-26-21 @ 00:11]  Urine Sodium 35      [12-26-21 @ 00:11]        < from: CT Abdomen and Pelvis w/ IV Cont (12.21.21 @ 20:14) >    ACC: [id number] EXAM:  CT ABDOMEN AND PELVIS IC                          PROCEDURE DATE:  12/21/2021          INTERPRETATION:  PROCEDURE INFORMATION:  Exam: CT Abdomen And Pelvis With Contrast  Exam date and time: 12/21/2021 7:59 PM  Age: 51 years old  Clinical indication: Nausea and vomiting. History of gastric cancer and   prior small bowel obstruction with 2 prior bowel resections and   gastrectomy. Biopsy of gastrojejunostomy anastomosis in June 2021   consistent with poorly differentiated adenocarcinoma with possible   extension to the liver on imaging.    TECHNIQUE:  Imaging protocol: Computed tomography of the abdomen and pelvis with   contrast.    COMPARISON:  CT ABDOMEN AND PELVIS WITH ORAL CONTRAST WITH IV CONTRAST 6/15/2021 5:00   PM    FINDINGS:  Pleural spaces: New large right pleural effusion with adjacent   atelectasis..    Liver: Unremarkable.  Gallbladder and bile ducts: Unremarkable. No calcified stones.  Pancreas: Unremarkable.  Spleen: Normal. .  Adrenal glands: Normal.  Kidneys and ureters: Normal right kidney. Moderate left hydronephrosis and  proximal ureteral dilatation. No cause for obstruction identified.  Stomach and bowel: Status post gastrojejunostomy. Apparent marked   distention H apparent loop likely secondary to recurrent disease at the   gastrojejunostomy anastomosis. Appendix: No inflammation.    Intraperitoneal space: Ascites and multiple loculated peritoneal fluid  collections. Infiltrative changes of the peritoneal fat likely secondary   to peritoneal carcinomatosis.  Vasculature: Unremarkable.  Retroperitoneum: Left periaortic lymph node new since 06/15/2021, 1.8 x   1.1 cm (5:38). Retroperitoneal fluid infiltrating into the ric hepatis  Urinary bladder: Unremarkable.  Reproductive: Unremarkable uterus and adenexa.  Bones/joints: Predominantly sclerotic but some lytic lesions throughout  skeleton suggest advanced metastatic disease..  Soft tissues: Large anterior abdominal and pelvic wall hernia containing   bowel,  mesentery and ascites. Hernia neck measures 16 cm..    IMPRESSION:  1. Apparent marked dilatation afferent loop likely secondary to recurrent   disease at the gastrojejunostomy  2. New large right pleural effusion with adjacent atelectasis..  3. New large ascites with multiple loculations and mesenteric fat   infiltration likely secondary to peritoneal carcinomatosis  4. Predominantly sclerotic but some lytic lesions throughout skeleton   suggest  advanced metastatic disease..  5. Moderate left hydronephrosis andproximal ureteral dilatation  6. Large abdominal and pelvic wall hernia containing bowel, mesentery and  ascites. No significant change.    < end of copied text >    < from: CT Angio Chest PE Protocol w/ IV Cont (12.21.21 @ 22:31) >  ACC: [id number] EXAM:  CT ANGIO CHEST PULM [facility]                          PROCEDURE DATE:  12/21/2021          INTERPRETATION:  CLINICAL INFORMATION: History of gastric cancer with   dyspnea on exertion and uncomfortable appearing. History of gastrectomy   and small bowel obstruction. Elevated Wells score of greater than four   placement in the PE likely category.    COMPARISON: CT chest 6/29/2021    CONTRAST/COMPLICATIONS:  IV Contrast: Omnipaque 350  66 cc administered   34 cc discarded  Oral Contrast: NONE  Complications: None reported at time of study completion    PROCEDURE:  CT Angiography of the Chest was performed. Portal venous phase imaging of   the Abdomen and Pelvis was performed.  Sagittal and coronal reformats were performedas well as 3D (MIP)   reconstructions.    FINDINGS:  CHEST:  LUNGS AND LARGE AIRWAYS: Patent central airways. 4 mm nodule in the   periphery left lung base on series 8 image 82 appears stable.  PLEURA: New large right pleural effusion with near complete collapse of   the right lower lobe and partial compressive right middle and upper lobe   atelectasis.  VESSELS: No main, right, left or lobar pulmonary embolism. Limited   evaluation of the segmental subsegmental branches.  HEART: Heart size is normal. No pericardial effusion.  MEDIASTINUM AND NASREEN: No lymphadenopathy.  CHEST WALL AND LOWER NECK: Tiny subcentimeter hypodense nodules within   the left thyroid lobe, unchanged.  UPPER ABDOMEN: Again seen are partially visualized cystic collections in   the upper abdomen which could be from loculated ascites. Please refer to   CT abdomen and pelvis that was concurrently performed was widely dictated   in a separate report.  BONES: Diffuse osseous metastatic disease involving the bilateral ribs,   sternum cervical and thoracic spine, which are new from 6/29/2021. No   evidence of pathological fracture.    IMPRESSION:    No main, right, left or lobar pulmonary embolism. Limited evaluation of   the segmental subsegmental branches.    New large right pleural effusion with near complete right lower and   partial right upper and middle compressive atelectasis.    Stable 4 mm mm left lower lobe pulmonary nodule.    Diffuse osseous metastatic disease.    < end of copied text >

## 2021-12-26 NOTE — PROGRESS NOTE ADULT - SUBJECTIVE AND OBJECTIVE BOX
Patient is a 51y old  Female who presents with a chief complaint of Dysphagia (26 Dec 2021 14:59)      INTERVAL History of Present Illness/OVERNIGHT EVENTS: oliguria  d/w Dr. Knight from renal - will give another dose of albumin, in addition to IVF.  Patient reports getting iv contrast*2 in ER.  Contrast nephropathy suspected.  No intervention for underlying left hydro per urology.  Not a candidate for surgery given diffuse metastatic disease - d/w surgery team 12/25.  No urgent GI intervention - d/w GI fellow 12/25.  Patient has been on IVF since admission to floor, and no nephrotoxic agents have been administered.    MEDICATIONS  (STANDING):  albumin human  5% IVPB 250 milliLiter(s) IV Intermittent once  dextrose 5% + sodium chloride 0.9%. 1000 milliLiter(s) (75 mL/Hr) IV Continuous <Continuous>  heparin   Injectable 5000 Unit(s) SubCutaneous every 12 hours  lactobacillus acidophilus 1 Tablet(s) Oral daily  multivitamin 1 Tablet(s) Oral daily  pantoprazole  Injectable 40 milliGRAM(s) IV Push every 24 hours  sodium chloride 0.9%. 1000 milliLiter(s) (125 mL/Hr) IV Continuous <Continuous>    MEDICATIONS  (PRN):  HYDROmorphone  Injectable 0.5 milliGRAM(s) IV Push every 4 hours PRN Severe Pain (7 - 10)  ondansetron Injectable 4 milliGRAM(s) IV Push every 8 hours PRN Nausea and/or Vomiting  simethicone 80 milliGRAM(s) Chew three times a day PRN Dyspepsia      Allergies    Cipro (Rash)    Intolerances        REVIEW OF SYSTEMS:  Negative unless otherwise specified above.    Vital Signs Last 24 Hrs  T(C): 36.4 (26 Dec 2021 10:01), Max: 36.4 (26 Dec 2021 10:01)  T(F): 97.5 (26 Dec 2021 10:01), Max: 97.5 (26 Dec 2021 10:01)  HR: 92 (26 Dec 2021 10:01) (92 - 92)  BP: 125/81 (26 Dec 2021 10:01) (125/81 - 125/81)  BP(mean): --  RR: 18 (26 Dec 2021 10:01) (18 - 18)  SpO2: 98% (26 Dec 2021 10:01) (98% - 98%)        PHYSICAL EXAM:  GENERAL: No apparent distress, appears stated age  HEAD:  Atraumatic, Normocephalic  EYES: Conjunctiva and sclera clear, no discharge  ENMT: Moist mucous membranes, no nasal discharge  NECK: Supple, no JVD  CHEST/LUNG: Decreased BS on right  HEART: Regular rhythm, no rubs or gallops  ABDOMEN: Soft, No focal ttp, no rigidity or rebound ttp  EXTREMITIES:  No clubbing, cyanosis or edema  SKIN: No rash, no new discoloration  NERVOUS SYSTEM:  Alert & Oriented; Bilateral Lower extremity mobile, sensation to light touch intact      LABS:                        10.4   14.14 )-----------( 262      ( 26 Dec 2021 09:32 )             34.1     26 Dec 2021 09:32    132    |  100    |  21     ----------------------------<  92     4.4     |  17     |  3.31     Ca    8.1        26 Dec 2021 09:32  Phos  3.6       26 Dec 2021 09:32  Mg     1.8       26 Dec 2021 09:32    TPro  5.7    /  Alb  2.9    /  TBili  0.4    /  DBili  x      /  AST  36     /  ALT  41     /  AlkPhos  1763   26 Dec 2021 09:32        CAPILLARY BLOOD GLUCOSE          RADIOLOGY & ADDITIONAL TESTS:      Images reviewed personally    Consultant Notes Reviewed and Care Discussed with relevant Consultants.

## 2021-12-27 NOTE — PROGRESS NOTE ADULT - PROBLEM SELECTOR PLAN 5
-Patient has shortness of breath with exertion as an outpatient and CT chest with new large right pleural effusion with near complete right lower and partial right upper and middle compressive atelectasis  -presumably malignant.   -Pulmonary consulted, for thoracentesis - done 12/23 with 2 liters removed

## 2021-12-27 NOTE — CONSULT NOTE ADULT - SUBJECTIVE AND OBJECTIVE BOX
Interventional Radiology    Evaluate for Procedure: Left PCN-U    HPI: 51y Female with PMH with gastric cancer T1a diagnosed 2011 s/p partial gastrectomy, with localized recurrence identified in 5/2021 at anastomotic site, who had not pursued therapy, now presenting with inability to tolerate PO, found to have metastatic disease to bone, with peritoneal carcinomatosis, pleural effusion and ascites, s/p thoracentesis and paracentesis now with worsening MARIELLA and new left hydro to proximal ureter in setting of mets cancer, delayed nephrogram, degree of obstruction is highly likely. IR consulted for left PCN-U placement.     Allergies: Cipro (Rash)    Medications (Abx/Cardiac/Anticoagulation/Blood Products)  enoxaparin Injectable: 40 milliGRAM(s) SubCutaneous (12-26 @ 11:44)  heparin   Injectable: 5000 Unit(s) SubCutaneous (12-27 @ 09:56)    Data:  T(C): 36.6  HR: 78  BP: 118/78  RR: 18  SpO2: 98%    -WBC 14.47 / HgB 10.0 / Hct 32.5 / Plt 274  -Na 131 / Cl 101 / BUN 27 / Glucose 117  -K 3.8 / CO2 17 / Cr 4.67  -ALT 26 / Alk Phos 1581 / T.Bili 0.4  -INR 1.26 / PTT 29.7    Radiology: reviewed    Assessment/Plan: 51y Female with PMH with gastric cancer T1a diagnosed 2011 s/p partial gastrectomy, with localized recurrence identified in 5/2021 at anastomotic site, who had not pursued therapy, now presenting with inability to tolerate PO, found to have metastatic disease to bone, with peritoneal carcinomatosis, pleural effusion and ascites, s/p thoracentesis and paracentesis now with worsening MARIELLA and new left hydro to proximal ureter in setting of mets cancer, delayed nephrogram, degree of obstruction is highly likely. IR consulted for left PCN-U placement.     - case reviewed and approved for tomorrow 12/28  - please place IR procedure order under Dr. Macario  - NPO tonight 12/27 @ 11pm  - Hold PM & AM AC  - send STAT labs in AM  - d/w primary team     Please contact IR with any questions or concerns.

## 2021-12-27 NOTE — PROGRESS NOTE ADULT - ASSESSMENT
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 Work up since then included EUS with Dr. Llanos that showed involvement of the mucosa, submucosa, and muscularis propria (T2) with gastro-hepatic nodes. Patient now presenting with 30 lb wt loss, poor PO intake and N/V  after PO intake for the past 3 months with work up revealing in CT  marked dilatation afferent loop likely secondary to recurrent disease at the gastrojejunostomy and ? partial obstruction.    #Recurrent gastric cancer with bone metastasis and peritoneal carcinoamtosis  #Concern for partial obstruction vs gastroparesis from invading malignancy    #New ascites s/p paracentesis of 700 cc  #Large pleural effusion s/p thoracentesis of 2 L    Recommendations:  -Keep on full liquid for now  -Discussed with patient role of obtaining upper GI series to delineate anatomy and assess concern for blockage but patient thinks she can not tolerate it.  -PPI IV BID  -Supportive care with antiemetics per primary team and palliative team  -Patient now with MARIELLA stage 3, hyponatremia. Will need to optimize prior EGD    Ridge Levine MD  Gastroenterology/Hepatology Fellow  Contact on-call GI fellow via answering service ([phone]) from 5pm-8am AND on weekends/holidays

## 2021-12-27 NOTE — PROGRESS NOTE ADULT - ASSESSMENT
This patient is a 49yo lady with PMH with gastric cancer T1a diagnosed 2011 s/p partial gastrectomy, with localized recurrence identified in 5/2021 at anastamotic site, who had not pursued therapy, now presenting with inability to tolerate PO, found to have metastatic disease to bone, with peritoneal carcinomatosis, pleural effusion and ascites, s/p thoracentesis and paracentesis.    Gastric cancer with metastases  - EGD on hold due to MARIELLA,   - Patient is awaiting UGIS to assess for obstruction, however patient reports due to nausea and emesis she will be unable to tolerate the study.  - dyspnea improved after thoracentesis, and abdominal paracentesis removed 750cc.   - patient will need follow up for palliative systemic chemotherapy    MARIELLA  - SCr is persistently rising, in setting of poor PO intake, contrast use, and hydronephrosis. SCr has not improved despite IV albumin and IVF.   She has left hydronephrosis to proximal ureter, with delayed nephrogram.   - patient has had low urine output, and CT identified   - patient planned for percutaneous nephroureteral tube with urology    Thank you for this interesting consult. We will follow with you. Please do not hesitate to page with questions.     Karlee Hudson MD PGY4   [phone]  Hematology-Oncology Fellow   This patient is a 49yo lady with PMH with gastric cancer T1a diagnosed 2011 s/p partial gastrectomy, with localized recurrence identified in 5/2021 at anastamotic site, who had not pursued therapy, now presenting with inability to tolerate PO, found to have metastatic disease to bone, with peritoneal carcinomatosis, pleural effusion and ascites, s/p thoracentesis and paracentesis.    Gastric cancer with metastases  - EGD on hold due to MARIELLA  - Patient is awaiting UGIS to assess for obstruction, however patient reports due to nausea and emesis she will be unable to tolerate the study.  - dyspnea improved after thoracentesis, and abdominal paracentesis removed 750cc.   - patient will need follow up for palliative systemic chemotherapy    MARIELLA  - SCr is persistently rising, in setting of poor PO intake, contrast use, and hydronephrosis. SCr has not improved despite IV albumin and IVF.   She has left hydronephrosis to proximal ureter, with delayed nephrogram.   - patient has had low urine output, and CT identified   - patient planned for percutaneous nephroureteral tube with IR    We will follow with you. Please do not hesitate to page with questions.     Karlee Hudson MD PGY4   [phone]  Hematology-Oncology Fellow

## 2021-12-27 NOTE — PROGRESS NOTE ADULT - PROBLEM SELECTOR PLAN 2
GI is f/u and the idea is for possible EGD if MARIELLA improves and IR is planning for eft PCN-U placement tomorrow.   Will hope that true endoscopy a palliative procedure may be performed to improve the issue with the "marked dilatation afferent loop likely secondary to recurrent disease at the gastrojejunostomy" which is probably causing her current symptoms.   Will ass Simethicone q 6 ATC.   Will add Zofran 4mg IV q 8ATC and will continue 4mg IV q 8 PRN.

## 2021-12-27 NOTE — PROGRESS NOTE ADULT - SUBJECTIVE AND OBJECTIVE BOX
INTERNAL MEDICINE PROGRESS NOTE    Dr. Chao Snyder, DO  Hospitalist  Division of Hospital Medicine  [facility]  Available via Microsoft Teams      SUBJECTIVE:  Nausea with po intake.     REVIEW OF SYSTEMS   12 point review of systems negative except for above.     PAST MEDICAL & SURGICAL HISTORY:  Gastric cancer  2011 no adjunt trement    Small bowel obstruction  Exp lap complicated with post op Ishemic bowel /perforation    Perforated bowel    Other abdominal hernia    Obesity (BMI 30-39.9)    History of gastrectomy  distal gastrectomy w/ B2 reconstruction (gastrojejunostomy)    History of resection of small bowel  exlap, SBR, meckel&#x27;s diverticulectomy (2015); exlap, SBR for necrotic bowel, left in discontinuity, Abthera (2015); exlap, SB anastomosis, Abthera (7/11/15); exlap, washout, abdominal closure w/ Strattice (7/13/15)        MEDICATIONS  (STANDING):  multivitamin 1 Tablet(s) Oral daily  pantoprazole  Injectable 40 milliGRAM(s) IV Push every 24 hours    MEDICATIONS  (PRN):  ondansetron Injectable 4 milliGRAM(s) IV Push every 8 hours PRN Nausea and/or Vomiting  simethicone 80 milliGRAM(s) Chew three times a day PRN Dyspepsia      Allergies    Cipro (Rash)    Intolerances        T(C): 36.6 (21 @ 13:25), Max: 36.6 (21 @ 13:25)  T(F): 97.9 (21 @ 13:25), Max: 97.9 (21 @ 13:25)  HR: 78 (21 @ 13:25) (78 - 78)  BP: 118/78 (21 @ 13:25) (118/78 - 118/78)  ABP: --  ABP(mean): --  RR: 18 (21 @ 13:25) (18 - 18)  SpO2: 98% (21 @ 13:25) (98% - 98%)      CONSTITUTIONAL: No acute distress.   HEENT:  Conjunctiva clear B/L.  Moist oral mucosa.   Cardiovascular: RRR with no murmurs. No JVD noted. Trace lower extremity edema B/L. Extremities are warm and well perfused. Radial pulses 2+ B/L. Dorsalis pedis pulses 2+ B/L.    Respiratory: dec bs (R>L). No wrr. No accessory muscle use.   Gastrointestinal:  Soft, nontender. Non-distended. Non-rigid. No CVA tenderness B/L.  MSK:  No joint swelling. No joint erythema B/L. No midline spinal tenderness.  Neurologic:  Alert and awake. Moving all extremities. Following commands. Making eye contact.    Skin:  No rashes noted. No skin erythema noted.   Psych:  Normal affect. Normal Mood.     LABS                        10.0   14.47 )-----------( 274      ( 27 Dec 2021 10:22 )             32.5     12-    131<L>  |  101  |  27<H>  ----------------------------<  117<H>  3.8   |  17<L>  |  4.67<H>    Ca    8.1<L>      27 Dec 2021 10:22  Phos  3.6     12  Mg     1.7         TPro  5.9<L>  /  Alb  2.9<L>  /  TBili  0.4  /  DBili  x   /  AST  15  /  ALT  26  /  AlkPhos  1581<H>  12      Urinalysis Basic - ( 27 Dec 2021 03:21 )    Color: Yellow / Appearance: Slightly Turbid / S.013 / pH: x  Gluc: x / Ketone: Negative  / Bili: Negative / Urobili: Negative   Blood: x / Protein: Trace / Nitrite: Negative   Leuk Esterase: Moderate / RBC: 5 /hpf / WBC 22 /HPF   Sq Epi: x / Non Sq Epi: 54 /hpf / Bacteria: Negative        ALL RECENT STUDIES REVIEWED INCLUDING REPORTS AVAILABLE     CARE DISCUSSED WITH ALL CONSULTANTS

## 2021-12-27 NOTE — PROGRESS NOTE ADULT - SUBJECTIVE AND OBJECTIVE BOX
GENERAL SURGERY PROGRESS NOTE    SUBJECTIVE  Patient seen and examined  tolerating water, otherwise c/o GERD "burning sensation" with reflux of non-water liquids  denies flatus, last BM over 1 week ago      OBJECTIVE    PHYSICAL EXAM  General: Appears well, NAD  CHEST: breathing comfortably  CV: appears well perfused  Abdomen: soft, nontender, nondistended, no rebound or guarding  Extremities: Grossly symmetric    T(C): 36.4 (21 @ 10:01), Max: 36.4 (21 @ 10:01)  HR: 92 (21 @ 10:01) (92 - 92)  BP: 125/81 (21 @ 10:01) (125/81 - 125/81)  RR: 18 (21 @ 10:01) (18 - 18)  SpO2: 98% (21 @ 10:01) (98% - 98%)    21 @ 07:01  -  21 @ 07:00  --------------------------------------------------------  IN: 240 mL / OUT: 0 mL / NET: 240 mL        MEDICATIONS  dextrose 5% + sodium chloride 0.9%. 1000 milliLiter(s) IV Continuous <Continuous>  heparin   Injectable 5000 Unit(s) SubCutaneous every 12 hours  HYDROmorphone  Injectable 0.5 milliGRAM(s) IV Push every 4 hours PRN  lactobacillus acidophilus 1 Tablet(s) Oral daily  multivitamin 1 Tablet(s) Oral daily  ondansetron Injectable 4 milliGRAM(s) IV Push every 8 hours PRN  pantoprazole  Injectable 40 milliGRAM(s) IV Push every 24 hours  simethicone 80 milliGRAM(s) Chew three times a day PRN  sodium chloride 0.9%. 1000 milliLiter(s) IV Continuous <Continuous>      LABS                        10.4   14.14 )-----------( 262      ( 26 Dec 2021 09:32 )             34.1         132<L>  |  100  |  21  ----------------------------<  92  4.4   |  17<L>  |  3.31<H>    Ca    8.1<L>      26 Dec 2021 09:32  Phos  3.6       Mg     1.8         TPro  5.7<L>  /  Alb  2.9<L>  /  TBili  0.4  /  DBili  x   /  AST  36  /  ALT  41  /  AlkPhos  1763<H>        Urinalysis Basic - ( 27 Dec 2021 03:21 )    Color: Yellow / Appearance: Slightly Turbid / S.013 / pH: x  Gluc: x / Ketone: Negative  / Bili: Negative / Urobili: Negative   Blood: x / Protein: Trace / Nitrite: Negative   Leuk Esterase: Moderate / RBC: 5 /hpf / WBC 22 /HPF   Sq Epi: x / Non Sq Epi: 54 /hpf / Bacteria: Negative        RADIOLOGY & ADDITIONAL STUDIES

## 2021-12-27 NOTE — PROGRESS NOTE ADULT - SUBJECTIVE AND OBJECTIVE BOX
Interval Events:   ongoing vomiting, no improvement in her [facility]    Hospital Medications:  dextrose 5% + sodium chloride 0.9%. 1000 milliLiter(s) IV Continuous <Continuous>  heparin   Injectable 5000 Unit(s) SubCutaneous every 12 hours  HYDROmorphone  Injectable 0.5 milliGRAM(s) IV Push every 4 hours PRN  lactobacillus acidophilus 1 Tablet(s) Oral daily  multivitamin 1 Tablet(s) Oral daily  ondansetron Injectable 4 milliGRAM(s) IV Push every 8 hours PRN  pantoprazole  Injectable 40 milliGRAM(s) IV Push every 24 hours  simethicone 80 milliGRAM(s) Chew three times a day PRN  sodium chloride 0.9%. 1000 milliLiter(s) IV Continuous <Continuous>      ROS: All system reviewed and negative except as mentioned above.    PHYSICAL EXAM:   Vital Signs:  Vital Signs Last 24 Hrs  T(C): 36.4 (26 Dec 2021 10:01), Max: 36.4 (26 Dec 2021 10:01)  T(F): 97.5 (26 Dec 2021 10:01), Max: 97.5 (26 Dec 2021 10:01)  HR: 92 (26 Dec 2021 10:01) (92 - 92)  BP: 125/81 (26 Dec 2021 10:01) (125/81 - 125/81)  BP(mean): --  RR: 18 (26 Dec 2021 10:01) (18 - 18)  SpO2: 98% (26 Dec 2021 10:01) (98% - 98%)  Daily     Daily     GENERAL:  NAD, Appears stated age  HEENT:  NC/AT,  conjunctivae clear and pink, sclera -anicteric  CHEST:  Normal Effort, Breath sounds clear  HEART:  RRR, S1 + S2, no murmurs  ABDOMEN:  Soft, mild discomfort to palpation  EXTREMITIES:  no cyanosis or edema  SKIN:  Warm & Dry. No rash or erythema  NEURO:  Alert, oriented, no focal deficit    LABS:                        10.4   14.14 )-----------( 262      ( 26 Dec 2021 09:32 )             34.1     Mean Cell Volume: 75.9 fl (- @ 09:32)        132<L>  |  100  |  21  ----------------------------<  92  4.4   |  17<L>  |  3.31<H>    Ca    8.1<L>      26 Dec 2021 09:32  Phos  3.6       Mg     1.8         TPro  5.7<L>  /  Alb  2.9<L>  /  TBili  0.4  /  DBili  x   /  AST  36  /  ALT  41  /  AlkPhos  1763<H>      LIVER FUNCTIONS - ( 26 Dec 2021 09:32 )  Alb: 2.9 g/dL / Pro: 5.7 g/dL / ALK PHOS: 1763 U/L / ALT: 41 U/L / AST: 36 U/L / GGT: x             Urinalysis Basic - ( 27 Dec 2021 03:21 )    Color: Yellow / Appearance: Slightly Turbid / S.013 / pH: x  Gluc: x / Ketone: Negative  / Bili: Negative / Urobili: Negative   Blood: x / Protein: Trace / Nitrite: Negative   Leuk Esterase: Moderate / RBC: 5 /hpf / WBC 22 /HPF   Sq Epi: x / Non Sq Epi: 54 /hpf / Bacteria: Negative                              10.4   14.14 )-----------( 262      ( 26 Dec 2021 09:32 )             34.1                         10.0   12.24 )-----------( 250      ( 25 Dec 2021 11:33 )             31.5                         11.1   9.50  )-----------( 352      ( 24 Dec 2021 12:17 )             35.9       Imaging: Images reviewed no new images appreciated.

## 2021-12-27 NOTE — CONSULT NOTE ADULT - PROVIDER SPECIALTY LIST ADULT
Heme/Onc
Intervent Radiology
Pulmonology
Gastroenterology
Nephrology
Infectious Disease
Surgery
Urology
Palliative Care

## 2021-12-27 NOTE — CHART NOTE - NSCHARTNOTEFT_GEN_A_CORE
Reviewed recent labs, MARIELLA worsening.   Patient has new left hydro to proximal ureter in setting of mets cancer, delayed nephrogram, degree of obstruction is highly likely.  The contralateral kidney if healthy should be providing adequate filtration, so worsening MARIELLA does raise concern for other potential causes.  However, in the setting of obstructive uropathy, patient would benefit from left nephrostomy tube, optimally a nephroureteral tube which could be capped if need be.   Internal tube has higher likelihood of failure from external compression and would not have option of diverting urine through back    Recommendations:  -left Nephroureteral tube,  discussion with patient by primary team to determine level of intervention in setting of palliation.    -trend SCr  -care per primary/ nephrology     Discussed with Dr. Dangelo Drake MD    Urology     [facility] Urology Pager #[phone]  [facility] Urology Pager #[phone]  Reachable on Teams M-F 6am-6pm    Please call with any further questions.

## 2021-12-27 NOTE — PROGRESS NOTE ADULT - SUBJECTIVE AND OBJECTIVE BOX
HPI:   51 year old female with PMH gastric CA s/p resection, SBO, multiple bowel resections, and recent recurrence of her cancer who presents for inability to tolerate PO found to have progression of her gastric cancer (bone metastasis and peritoneal carcinomatosis). Now with dysphagia.  GI as suggesting GI series; however, the patient did not think she was able to tolerate it. Currently she is trying to be optimized for possible EGD. She is s/p Paracentesis and Thoracentesis. Palliative medicine is f/u for symptoms and GOC.    SUBJECTIVE AND OBJECTIVE:  INTERVAL HPI/OVERNIGHT EVENTS:    DNR on chart:   Allergies    Cipro (Rash)    Intolerances    MEDICATIONS  (STANDING):  heparin   Injectable 5000 Unit(s) SubCutaneous every 12 hours  multivitamin 1 Tablet(s) Oral daily  pantoprazole  Injectable 40 milliGRAM(s) IV Push every 24 hours    MEDICATIONS  (PRN):  ondansetron Injectable 4 milliGRAM(s) IV Push every 8 hours PRN Nausea and/or Vomiting  simethicone 80 milliGRAM(s) Chew three times a day PRN Dyspepsia      ITEMS UNCHECKED ARE NOT PRESENT    PRESENT SYMPTOMS: [ ]Unable to obtain due to poor mentation   Source if other than patient:  [ ]Family   [ ]Team     Pain:  [ ]yes [ ]no  QOL impact -   Location -                    Aggravating factors -  Quality -  Radiation -  Timing-  Severity (0-10 scale):  Minimal acceptable level (0-10 scale):     Dyspnea:                           [ ]Mild [ ]Moderate [ ]Severe  Anxiety:                             [ ]Mild [ ]Moderate [ ]Severe  Fatigue:                             [ ]Mild [ ]Moderate [ ]Severe  Nausea:                             [ ]Mild [ ]Moderate [ ]Severe  Loss of appetite:              [ ]Mild [ ]Moderate [ ]Severe  Constipation:                    [ ]Mild [ ]Moderate [ ]Severe    CPOT:    https://www.[facility].org/getattachment/[email or web address]/Critical-Care-Pain-Observation-Tool-(CPOT)    PAIN AD Score:	  http://geriatrictoolkit.[place].[facility]/cog/painad.pdf (Ctrl + left click to view)    Other Symptoms:  [ ]All other review of systems negative     Palliative Performance Status Version 2:         %      http://npcrc.org/files/news/palliative_performance_scale_ppsv2.pdf  PHYSICAL EXAM:  Vital Signs Last 24 Hrs  T(C): 36.6 (27 Dec 2021 13:25), Max: 36.6 (27 Dec 2021 13:25)  T(F): 97.9 (27 Dec 2021 13:25), Max: 97.9 (27 Dec 2021 13:25)  HR: 78 (27 Dec 2021 13:25) (78 - 78)  BP: 118/78 (27 Dec 2021 13:25) (118/78 - 118/78)  BP(mean): --  RR: 18 (27 Dec 2021 13:25) (18 - 18)  SpO2: 98% (27 Dec 2021 13:25) (98% - 98%) I&O's Summary    26 Dec 2021 07:01  -  27 Dec 2021 07:00  --------------------------------------------------------  IN: 240 mL / OUT: 0 mL / NET: 240 mL       GENERAL:  [ ]Alert  [ ]Oriented x   [ ]Lethargic  [ ]Cachexia  [ ]Unarousable  [ ]Verbal  [ ]Non-Verbal  Behavioral:   [ ]Anxiety  [ ]Delirium [ ]Agitation [ ]Other  HEENT:  [ ]Normal   [ ]Dry mouth   [ ]ET Tube/Trach  [ ]Oral lesions  PULMONARY:   [ ]Clear [ ]Tachypnea  [ ]Audible excessive secretions   [ ]Rhonchi        [ ]Right [ ]Left [ ]Bilateral  [ ]Crackles        [ ]Right [ ]Left [ ]Bilateral  [ ]Wheezing     [ ]Right [ ]Left [ ]Bilateral  [ ]Diminished BS [ ] Right [ ]Left [ ]Bilateral  CARDIOVASCULAR:    [ ]Regular [ ]Irregular [ ]Tachy  [ ]Linus [ ]Murmur [ ]Other  GASTROINTESTINAL:  [ ]Soft  [ ]Distended   [ ]+BS  [ ]Non tender [ ]Tender  [ ]PEG [ ]OGT/ NGT   Last BM:    GENITOURINARY:  [ ]Normal [ ]Incontinent   [ ]Oliguria/Anuria   [ ]Ansari  MUSCULOSKELETAL:   [ ]Normal   [ ]Weakness  [ ]Bed/Wheelchair bound [ ]Edema  NEUROLOGIC:   [ ]No focal deficits  [ ] Cognitive impairment  [ ] Dysphagia [ ]Dysarthria [ ] Paresis [ ]Other   SKIN:   [ ]Normal  [ ]Rash   [ ]Pressure ulcer(s) [ ]y [ ]n present on admission    CRITICAL CARE:  [ ]Shock Present  [ ]Septic [ ]Cardiogenic [ ]Neurologic [ ]Hypovolemic  [ ]Vasopressors [ ]Inotropes  [ ]Respiratory failure present [ ]Mechanical Ventilation [ ]Non-invasive ventilatory support [ ]High-Flow   [ ]Acute  [ ]Chronic [ ]Hypoxic  [ ]Hypercarbic [ ]Other  [ ]Other organ failure     LABS:                        10.0   14.47 )-----------( 274      ( 27 Dec 2021 10:22 )             32.5   12    131<L>  |  101  |  27<H>  ----------------------------<  117<H>  3.8   |  17<L>  |  4.67<H>    Ca    8.1<L>      27 Dec 2021 10:22  Phos  3.6     12  Mg     1.7         TPro  5.9<L>  /  Alb  2.9<L>  /  TBili  0.4  /  DBili  x   /  AST  15  /  ALT  26  /  AlkPhos  1581<H>  12      Urinalysis Basic - ( 27 Dec 2021 03:21 )    Color: Yellow / Appearance: Slightly Turbid / S.013 / pH: x  Gluc: x / Ketone: Negative  / Bili: Negative / Urobili: Negative   Blood: x / Protein: Trace / Nitrite: Negative   Leuk Esterase: Moderate / RBC: 5 /hpf / WBC 22 /HPF   Sq Epi: x / Non Sq Epi: 54 /hpf / Bacteria: Negative      RADIOLOGY & ADDITIONAL STUDIES:    c< from: CT Abdomen and Pelvis w/ IV Cont (21 @ 20:14) >    ACC: [id number] EXAM:  CT ABDOMEN AND PELVIS IC                          PROCEDURE DATE:  2021 IMPRESSION:  1. Apparent marked dilatation afferent loop likely secondary to recurrent   disease at the gastrojejunostomy  2. New large right pleural effusion with adjacent atelectasis..  3. New large ascites with multiple loculations and mesenteric fat   infiltration likely secondary to peritoneal carcinomatosis  4. Predominantly sclerotic but some lytic lesions throughout skeleton   suggest  advanced metastatic disease..  5. Moderate left hydronephrosis andproximal ureteral dilatation  6. Large abdominal and pelvic wall hernia containing bowel, mesentery and  ascites. No significant change.    Original report by [facility].  Final report discussed with STEFFANIE Cardoza on 2021 at 9:10 AM  with   read back.    --- End of Report ---            YAMILA OSWALD MD; Attending Radiologist  This document has been electronically signed. Dec 22 2021  9:13AM    < end of copied text >    < from: CT Angio Chest PE Protocol w/ IV Cont (21 @ 22:31) >    IMPRESSION:    No main, right, left or lobar pulmonary embolism. Limited evaluation of   the segmental subsegmental branches.    New large right pleural effusion with near complete right lower and   partial right upper and middle compressive atelectasis.    Stable 4 mm mm left lower lobe pulmonary nodule.    Diffuse osseous metastatic disease.    --- End of Report ---          ZEV CHOI MD; Resident Radiology  This document has been electronically signed.  GORDY LANDERS MD; Attending Radiologist  This document has been electronically signed. Dec 22 2021 12:15AMACC: [id number] EXAM:  CT ANGIO CHEST PULM ART [facility]                          PROCEDURE DATE:  2021      < end of copied text >    Protein Calorie Malnutrition Present: [ ]mild [ ]moderate [ ]severe [ ]underweight [ ]morbid obesity  https://www.andeal.org/vault/2440/web/files/ONC/Table_Clinical%20Characteristics%20to%20Document%20Malnutrition-White%20JV%20et%20al%2020.pdf    Height (cm): 167.6 (21 @ 17:21), 167.6 (21 @ 14:07)  Weight (kg): 95.3 (21 @ 17:21), 111.1 (21 @ 14:07)  BMI (kg/m2): 33.9 (21 @ 17:21), 39.6 (21 @ 14:07)    [ ]PPSV2 < or = 30%  [ ]significant weight loss [ ]poor nutritional intake [ ]anasarca    [ ]Artificial Nutrition    REFERRALS:   [ ]Chaplaincy  [ ]Hospice  [ ]Child Life  [ ]Social Work  [ ]Case management [ ]Holistic Therapy     Goals of Care Document:     HPI:   51 year old female with PMH gastric CA s/p resection, SBO, multiple bowel resections, and recent recurrence of her cancer who presents for inability to tolerate PO found to have progression of her gastric cancer (bone metastasis and peritoneal carcinomatosis). Now with dysphagia.  GI as suggesting GI series; however, due to nausea, the patient did not think she was able to tolerate it. She is trying to be optimized for possible EGD.  Currently she has MARIELLA ? 2/2 to contrast induced nephropathy + [place]. Urology is recommending left Nephroureteral tube. She is s/p Paracentesis and Thoracentesis. Palliative medicine is f/u for symptoms and GOC.    SUBJECTIVE AND OBJECTIVE:  INTERVAL HPI/OVERNIGHT EVENTS:    DNR on chart:   Allergies    Cipro (Rash)    Intolerances    MEDICATIONS  (STANDING):  heparin   Injectable 5000 Unit(s) SubCutaneous every 12 hours  multivitamin 1 Tablet(s) Oral daily  pantoprazole  Injectable 40 milliGRAM(s) IV Push every 24 hours    MEDICATIONS  (PRN):  ondansetron Injectable 4 milliGRAM(s) IV Push every 8 hours PRN Nausea and/or Vomiting  simethicone 80 milliGRAM(s) Chew three times a day PRN Dyspepsia      ITEMS UNCHECKED ARE NOT PRESENT    PRESENT SYMPTOMS: [ ]Unable to obtain due to poor mentation   Source if other than patient:  [ ]Family   [ ]Team     Pain:  [ ]yes [ ]no  QOL impact -   Location -                    Aggravating factors -  Quality -  Radiation -  Timing-  Severity (0-10 scale):  Minimal acceptable level (0-10 scale):     Dyspnea:                           [ ]Mild [ ]Moderate [ ]Severe  Anxiety:                             [ ]Mild [ ]Moderate [ ]Severe  Fatigue:                             [ ]Mild [ ]Moderate [ ]Severe  Nausea:                             [ ]Mild [ ]Moderate [ ]Severe  Loss of appetite:              [ ]Mild [ ]Moderate [ ]Severe  Constipation:                    [ ]Mild [ ]Moderate [ ]Severe    CPOT:    https://www.[facility].org/getattachment/[email or web address]/Critical-Care-Pain-Observation-Tool-(CPOT)    PAIN AD Score:	  http://geriatrictoolkit.[facility]/cog/painad.pdf (Ctrl + left click to view)    Other Symptoms:  [ ]All other review of systems negative     Palliative Performance Status Version 2:         %      http://npcrc.org/files/news/palliative_performance_scale_ppsv2.pdf  PHYSICAL EXAM:  Vital Signs Last 24 Hrs  T(C): 36.6 (27 Dec 2021 13:25), Max: 36.6 (27 Dec 2021 13:25)  T(F): 97.9 (27 Dec 2021 13:25), Max: 97.9 (27 Dec 2021 13:25)  HR: 78 (27 Dec 2021 13:25) (78 - 78)  BP: 118/78 (27 Dec 2021 13:25) (118/78 - 118/78)  BP(mean): --  RR: 18 (27 Dec 2021 13:25) (18 - 18)  SpO2: 98% (27 Dec 2021 13:25) (98% - 98%) I&O's Summary    26 Dec 2021 07:01  -  27 Dec 2021 07:00  --------------------------------------------------------  IN: 240 mL / OUT: 0 mL / NET: 240 mL       GENERAL:  [ ]Alert  [ ]Oriented x   [ ]Lethargic  [ ]Cachexia  [ ]Unarousable  [ ]Verbal  [ ]Non-Verbal  Behavioral:   [ ]Anxiety  [ ]Delirium [ ]Agitation [ ]Other  HEENT:  [ ]Normal   [ ]Dry mouth   [ ]ET Tube/Trach  [ ]Oral lesions  PULMONARY:   [ ]Clear [ ]Tachypnea  [ ]Audible excessive secretions   [ ]Rhonchi        [ ]Right [ ]Left [ ]Bilateral  [ ]Crackles        [ ]Right [ ]Left [ ]Bilateral  [ ]Wheezing     [ ]Right [ ]Left [ ]Bilateral  [ ]Diminished BS [ ] Right [ ]Left [ ]Bilateral  CARDIOVASCULAR:    [ ]Regular [ ]Irregular [ ]Tachy  [ ]Linus [ ]Murmur [ ]Other  GASTROINTESTINAL:  [ ]Soft  [ ]Distended   [ ]+BS  [ ]Non tender [ ]Tender  [ ]PEG [ ]OGT/ NGT   Last BM:    GENITOURINARY:  [ ]Normal [ ]Incontinent   [ ]Oliguria/Anuria   [ ]Ansari  MUSCULOSKELETAL:   [ ]Normal   [ ]Weakness  [ ]Bed/Wheelchair bound [ ]Edema  NEUROLOGIC:   [ ]No focal deficits  [ ] Cognitive impairment  [ ] Dysphagia [ ]Dysarthria [ ] Paresis [ ]Other   SKIN:   [ ]Normal  [ ]Rash   [ ]Pressure ulcer(s) [ ]y [ ]n present on admission    CRITICAL CARE:  [ ]Shock Present  [ ]Septic [ ]Cardiogenic [ ]Neurologic [ ]Hypovolemic  [ ]Vasopressors [ ]Inotropes  [ ]Respiratory failure present [ ]Mechanical Ventilation [ ]Non-invasive ventilatory support [ ]High-Flow   [ ]Acute  [ ]Chronic [ ]Hypoxic  [ ]Hypercarbic [ ]Other  [ ]Other organ failure     LABS:                        10.0   14.47 )-----------( 274      ( 27 Dec 2021 10:22 )             32.5   12    131<L>  |  101  |  27<H>  ----------------------------<  117<H>  3.8   |  17<L>  |  4.67<H>    Ca    8.1<L>      27 Dec 2021 10:22  Phos  3.6     12  Mg     1.7         TPro  5.9<L>  /  Alb  2.9<L>  /  TBili  0.4  /  DBili  x   /  AST  15  /  ALT  26  /  AlkPhos  1581<H>        Urinalysis Basic - ( 27 Dec 2021 03:21 )    Color: Yellow / Appearance: Slightly Turbid / S.013 / pH: x  Gluc: x / Ketone: Negative  / Bili: Negative / Urobili: Negative   Blood: x / Protein: Trace / Nitrite: Negative   Leuk Esterase: Moderate / RBC: 5 /hpf / WBC 22 /HPF   Sq Epi: x / Non Sq Epi: 54 /hpf / Bacteria: Negative      RADIOLOGY & ADDITIONAL STUDIES:    c< from: CT Abdomen and Pelvis w/ IV Cont (21 @ 20:14) >    ACC: [id number] EXAM:  CT ABDOMEN AND PELVIS IC                          PROCEDURE DATE:  2021 IMPRESSION:  1. Apparent marked dilatation afferent loop likely secondary to recurrent   disease at the gastrojejunostomy  2. New large right pleural effusion with adjacent atelectasis..  3. New large ascites with multiple loculations and mesenteric fat   infiltration likely secondary to peritoneal carcinomatosis  4. Predominantly sclerotic but some lytic lesions throughout skeleton   suggest  advanced metastatic disease..  5. Moderate left hydronephrosis andproximal ureteral dilatation  6. Large abdominal and pelvic wall hernia containing bowel, mesentery and  ascites. No significant change.    Original report by [facility].  Final report discussed with STEFFANIE Cardoza on 2021 at 9:10 AM  with   read back.    --- End of Report ---            YAMILA OSWALD MD; Attending Radiologist  This document has been electronically signed. Dec 22 2021  9:13AM    < end of copied text >    < from: CT Angio Chest PE Protocol w/ IV Cont (21 @ 22:31) >    IMPRESSION:    No main, right, left or lobar pulmonary embolism. Limited evaluation of   the segmental subsegmental branches.    New large right pleural effusion with near complete right lower and   partial right upper and middle compressive atelectasis.    Stable 4 mm mm left lower lobe pulmonary nodule.    Diffuse osseous metastatic disease.    --- End of Report ---          ZEV CHOI MD; Resident Radiology  This document has been electronically signed.  GORDY LANDERS MD; Attending Radiologist  This document has been electronically signed. Dec 22 2021 12:15AMACC: [id number] EXAM:  CT ANGIO CHEST PULM ART [facility]                          PROCEDURE DATE:  2021      < end of copied text >    Protein Calorie Malnutrition Present: [ ]mild [ ]moderate [ ]severe [ ]underweight [ ]morbid obesity  https://www.andeal.org/vault/2440/web/files/ONC/Table_Clinical%20Characteristics%20to%20Document%20Malnutrition-White%20JV%20et%20al%2020.pdf    Height (cm): 167.6 (21 @ 17:21), 167.6 (21 @ 14:07)  Weight (kg): 95.3 (21 @ 17:21), 111.1 (21 @ 14:07)  BMI (kg/m2): 33.9 (21 @ 17:21), 39.6 (21 @ 14:07)    [ ]PPSV2 < or = 30%  [ ]significant weight loss [ ]poor nutritional intake [ ]anasarca    [ ]Artificial Nutrition    REFERRALS:   [ ]Chaplaincy  [ ]Hospice  [ ]Child Life  [ ]Social Work  [ ]Case management [ ]Holistic Therapy     Goals of Care Document:     HPI:   51 year old female with PMH gastric CA s/p resection, SBO, multiple bowel resections, and recent recurrence of her cancer who presents for inability to tolerate PO found to have progression of her gastric cancer (bone metastasis and peritoneal carcinomatosis). Now with dysphagia.  GI as suggesting GI series; however, due to nausea, the patient did not think she was able to tolerate it. She is trying to be optimized for possible EGD.  Currently she has MARIELLA ? 2/2 to contrast induced nephropathy + [place]. Urology is recommending left Nephroureteral tube. She is s/p Paracentesis and Thoracentesis. Palliative medicine is f/u for symptoms and GOC.    SUBJECTIVE AND OBJECTIVE:  INTERVAL HPI/OVERNIGHT EVENTS:  She was c/o not being able to pass gas or to have a BM. She was also c/o nausea and abdominal discomfort.     DNR on chart:   Allergies    Cipro (Rash)    Intolerances    MEDICATIONS  (STANDING):  heparin   Injectable 5000 Unit(s) SubCutaneous every 12 hours  multivitamin 1 Tablet(s) Oral daily  pantoprazole  Injectable 40 milliGRAM(s) IV Push every 24 hours    MEDICATIONS  (PRN):  ondansetron Injectable 4 milliGRAM(s) IV Push every 8 hours PRN Nausea and/or Vomiting  simethicone 80 milliGRAM(s) Chew three times a day PRN Dyspepsia      ITEMS UNCHECKED ARE NOT PRESENT    PRESENT SYMPTOMS: [ ]Unable to obtain due to poor mentation   Source if other than patient:  [ ]Family   [ ]Team     Pain:  [x ]yes [ ]no  QOL impact - Not being able to rest or eat   Location -                  epigastric and upper abdominal region.   Aggravating factors - eating   Quality - discomfort   Radiation - none   Timing- intermittent   Severity (0-10 scale): up to 8/10 but going away with Dilaudid PRN   Minimal acceptable level (0-10 scale): 3    Dyspnea:                           [ ]Mild [ ]Moderate [ ]Severe  Anxiety:                             [ ]Mild [ ]Moderate [ ]Severe  Fatigue:                             [ ]Mild [ ]Moderate [ ]Severe  Nausea:                             [ ]Mild [ ]Moderate [ ]Severe  Loss of appetite:              [ ]Mild [ ]Moderate [ ]Severe  Constipation:                    [ ]Mild [ ]Moderate [ ]Severe    CPOT:    https://www.[facility].org/getattachment/[email or web address]/Critical-Care-Pain-Observation-Tool-(CPOT)    PAIN AD Score:	  http://geriatrictoolkit.[place].[facility]/cog/painad.pdf (Ctrl + left click to view)    Other Symptoms:  [ x]All other review of systems negative but as per subjective and objective     Palliative Performance Status Version 2:    40     %      http://[facility].org/files/news/palliative_performance_scale_ppsv2.pdf  PHYSICAL EXAM:  Vital Signs Last 24 Hrs  T(C): 36.6 (27 Dec 2021 13:25), Max: 36.6 (27 Dec 2021 13:25)  T(F): 97.9 (27 Dec 2021 13:25), Max: 97.9 (27 Dec 2021 13:25)  HR: 78 (27 Dec 2021 13:25) (78 - 78)  BP: 118/78 (27 Dec 2021 13:25) (118/78 - 118/78)  BP(mean): --  RR: 18 (27 Dec 2021 13:25) (18 - 18)  SpO2: 98% (27 Dec 2021 13:25) (98% - 98%) I&O's Summary    26 Dec 2021 07:01  -  27 Dec 2021 07:00  --------------------------------------------------------  IN: 240 mL / OUT: 0 mL / NET: 240 mL       GENERAL:  [x ]Alert  [ x]Oriented x 3  [ ]Lethargic  [ ]Cachexia  [ ]Unarousable  [ ]Verbal  [ ]Non-Verbal [x] Obese   Behavioral:   [ ]Anxiety  [ ]Delirium [ ]Agitation [ ]Other  HEENT:  [x ]Normal   [ ]Dry mouth   [ ]ET Tube/Trach  [ ]Oral lesions  PULMONARY:   [x ]Clear [ ]Tachypnea  [ ]Audible excessive secretions   [ ]Rhonchi        [ ]Right [ ]Left [ ]Bilateral  [ ]Crackles        [ ]Right [ ]Left [ ]Bilateral  [ ]Wheezing     [ ]Right [ ]Left [ ]Bilateral  [ ]Diminished BS [ ] Right [ ]Left [ ]Bilateral  CARDIOVASCULAR:    [x ]Regular [ ]Irregular [ ]Tachy  [ ]Linus [ ]Murmur [ ]Other  GASTROINTESTINAL:  [x ]Soft  [ ]Distended   [x ]+BS  [ ]Non tender [x ]Tender over epigastric region  [ ]PEG [ ]OGT/ NGT   Last BM:    GENITOURINARY:  [ ]Normal [ ]Incontinent   [ ]Oliguria/Anuria   [ ]Ansari [x] Decreased urinary output   MUSCULOSKELETAL:   [ ]Normal   [x ]Weakness  [ ]Bed/Wheelchair bound [ x]Edema  NEUROLOGIC:   [x ]No focal deficits  [ ] Cognitive impairment  [ ] Dysphagia [ ]Dysarthria [ ] Paresis [ ]Other   SKIN:   [x ]Normal  [ ]Rash   [ ]Pressure ulcer(s) [ ]y [ ]n present on admission    CRITICAL CARE:  [ ]Shock Present  [ ]Septic [ ]Cardiogenic [ ]Neurologic [ ]Hypovolemic  [ ]Vasopressors [ ]Inotropes  [ ]Respiratory failure present [ ]Mechanical Ventilation [ ]Non-invasive ventilatory support [ ]High-Flow   [ ]Acute  [ ]Chronic [ ]Hypoxic  [ ]Hypercarbic [ ]Other  [ ]Other organ failure     LABS:                        10.0   14.47 )-----------( 274      ( 27 Dec 2021 10:22 )             32.5   12-    131<L>  |  101  |  27<H>  ----------------------------<  117<H>  3.8   |  17<L>  |  4.67<H>    Ca    8.1<L>      27 Dec 2021 10:22  Phos  3.6       Mg     1.7         TPro  5.9<L>  /  Alb  2.9<L>  /  TBili  0.4  /  DBili  x   /  AST  15  /  ALT  26  /  AlkPhos  1581<H>        Urinalysis Basic - ( 27 Dec 2021 03:21 )    Color: Yellow / Appearance: Slightly Turbid / S.013 / pH: x  Gluc: x / Ketone: Negative  / Bili: Negative / Urobili: Negative   Blood: x / Protein: Trace / Nitrite: Negative   Leuk Esterase: Moderate / RBC: 5 /hpf / WBC 22 /HPF   Sq Epi: x / Non Sq Epi: 54 /hpf / Bacteria: Negative      RADIOLOGY & ADDITIONAL STUDIES:    c< from: CT Abdomen and Pelvis w/ IV Cont (21 @ 20:14) >    ACC: [id number] EXAM:  CT ABDOMEN AND PELVIS IC                          PROCEDURE DATE:  2021 IMPRESSION:  1. Apparent marked dilatation afferent loop likely secondary to recurrent   disease at the gastrojejunostomy  2. New large right pleural effusion with adjacent atelectasis..  3. New large ascites with multiple loculations and mesenteric fat   infiltration likely secondary to peritoneal carcinomatosis  4. Predominantly sclerotic but some lytic lesions throughout skeleton   suggest  advanced metastatic disease..  5. Moderate left hydronephrosis andproximal ureteral dilatation  6. Large abdominal and pelvic wall hernia containing bowel, mesentery and  ascites. No significant change.    Original report by [facility].  Final report discussed with STEFFANIE Cardoza on 2021 at 9:10 AM  with   read back.    --- End of Report ---            YAMILA OSWALD MD; Attending Radiologist  This document has been electronically signed. Dec 22 2021  9:13AM    < end of copied text >    < from: CT Angio Chest PE Protocol w/ IV Cont (21 @ 22:31) >    IMPRESSION:    No main, right, left or lobar pulmonary embolism. Limited evaluation of   the segmental subsegmental branches.    New large right pleural effusion with near complete right lower and   partial right upper and middle compressive atelectasis.    Stable 4 mm mm left lower lobe pulmonary nodule.    Diffuse osseous metastatic disease.    --- End of Report ---          ZEV CHOI MD; Resident Radiology  This document has been electronically signed.  GORDY LANDERS MD; Attending Radiologist  This document has been electronically signed. Dec 22 2021 12:15AMACC: [id number] EXAM:  CT ANGIO CHEST PULM ART [facility]                          PROCEDURE DATE:  2021      < end of copied text >    Protein Calorie Malnutrition Present: [ ]mild [ ]moderate [ ]severe [ ]underweight [ ]morbid obesity  https://www.andeal.org/vault/2440/web/files/ONC/Table_Clinical%20Characteristics%20to%20Document%20Malnutrition-White%20JV%20et%20al%2020.pdf    Height (cm): 167.6 (21 @ 17:21), 167.6 (21 @ 14:07)  Weight (kg): 95.3 (21 @ 17:21), 111.1 (21 @ 14:07)  BMI (kg/m2): 33.9 (21 @ 17:21), 39.6 (21 @ 14:07)    [ ]PPSV2 < or = 30%  [ ]significant weight loss [ ]poor nutritional intake [ ]anasarca    [ ]Artificial Nutrition    REFERRALS:   [ ]Chaplaincy  [ ]Hospice  [ ]Child Life  [ ]Social Work  [ ]Case management [ ]Holistic Therapy     Goals of Care Document:

## 2021-12-27 NOTE — PROGRESS NOTE ADULT - PROBLEM SELECTOR PLAN 5
Will continue to f/u for symptoms.         Orlin Cardona MD   Geriatrics and Palliative Care (GAP) Consult Service    of Geriatric and Palliative Medicine  [facility]      Please page the following number for clinical matters between the hours of 9 am and 5 pm from Monday through Friday : [phone]    After 5pm and on weekends, please see the contact information below:    In the event of newly developing, evolving, or worsening symptoms, please contact the Palliative Medicine team via pager (if the patient is at [facility] #[phone] or if the patient is at [facility] #[phone]) The Geriatric and Palliative Medicine service has coverage 24 hours a day/ 7 days a week to provide medical recommendations regarding symptom management needs via telephone

## 2021-12-27 NOTE — PROGRESS NOTE ADULT - SUBJECTIVE AND OBJECTIVE BOX
INFECTIOUS DISEASES FOLLOW UP-- Gisell Ford  [phone]    This is a follow up note for this  51yFemale with  Nausea and vomiting        ROS:  CONSTITUTIONAL:  No fever, good appetite  CARDIOVASCULAR:  No chest pain or palpitations  RESPIRATORY:  No dyspnea  GASTROINTESTINAL:  No nausea, vomiting, diarrhea, or abdominal pain  GENITOURINARY:  No dysuria  NEUROLOGIC:  No headache,     Allergies    Cipro (Rash)    Intolerances        ANTIBIOTICS/RELEVANT:  antimicrobials    immunologic:    OTHER:  heparin   Injectable 5000 Unit(s) SubCutaneous every 12 hours  multivitamin 1 Tablet(s) Oral daily  ondansetron Injectable 4 milliGRAM(s) IV Push every 8 hours PRN  pantoprazole  Injectable 40 milliGRAM(s) IV Push every 24 hours  simethicone 80 milliGRAM(s) Chew three times a day PRN      Objective:  Vital Signs Last 24 Hrs  T(C): 36.6 (27 Dec 2021 13:25), Max: 36.6 (27 Dec 2021 13:25)  T(F): 97.9 (27 Dec 2021 13:25), Max: 97.9 (27 Dec 2021 13:25)  HR: 78 (27 Dec 2021 13:25) (78 - 78)  BP: 118/78 (27 Dec 2021 13:25) (118/78 - 118/78)  BP(mean): --  RR: 18 (27 Dec 2021 13:25) (18 - 18)  SpO2: 98% (27 Dec 2021 13:25) (98% - 98%)    PHYSICAL EXAM:  Constitutional:no acute distress  Eyes:MERRY, EOMI  Ear/Nose/Throat: no oral lesions, 	  Respiratory: clear BL  Cardiovascular: S1S2  Gastrointestinal:soft, (+) BS, no tenderness  Extremities:no e/e/c  No Lymphadenopathy  IV sites not inflammed.    LABS:                        10.0   14.47 )-----------( 274      ( 27 Dec 2021 10:22 )             32.5     12-27    131<L>  |  101  |  27<H>  ----------------------------<  117<H>  3.8   |  17<L>  |  4.67<H>    Ca    8.1<L>      27 Dec 2021 10:22  Phos  3.6     12-26  Mg     1.7     12-27    TPro  5.9<L>  /  Alb  2.9<L>  /  TBili  0.4  /  DBili  x   /  AST  15  /  ALT  26  /  AlkPhos  1581<H>        Urinalysis Basic - ( 27 Dec 2021 03:21 )    Color: Yellow / Appearance: Slightly Turbid / S.013 / pH: x  Gluc: x / Ketone: Negative  / Bili: Negative / Urobili: Negative   Blood: x / Protein: Trace / Nitrite: Negative   Leuk Esterase: Moderate / RBC: 5 /hpf / WBC 22 /HPF   Sq Epi: x / Non Sq Epi: 54 /hpf / Bacteria: Negative        MICROBIOLOGY:            RECENT CULTURES:   @ 23:25  .Body Fluid Pleural Fluid  --  --  --    No growth  --   @ 15:00  .Body Fluid Peritoneal Fluid  --  --  --    No growth  --   @ 00:44  Clean Catch Clean Catch (Midstream)  --  --  --    Normal Urogenital reginald present  --      RADIOLOGY & ADDITIONAL STUDIES:    < from: [facility] Duplex Lower Ext Vein Scan, [place] (21 @ 09:13) >  IMPRESSION:  No evidence of deep venous thrombosis in either lower extremity.    < end of copied text >   INFECTIOUS DISEASES FOLLOW UP-- Gisell Liliana  [phone]    This is a follow up note for this  51yFemale with  Nausea and vomiting, bowel obstruction secondary to metastatic gastric carcinoma  ureteral obstruction left> right for percutaneous nephrostomy placement        ROS:  CONSTITUTIONAL:  No fever, unable to take oral food and minimal liguids    Allergies    Cipro (Rash)    Intolerances        ANTIBIOTICS/RELEVANT:  antimicrobials    immunologic:    OTHER:  heparin   Injectable 5000 Unit(s) SubCutaneous every 12 hours  multivitamin 1 Tablet(s) Oral daily  ondansetron Injectable 4 milliGRAM(s) IV Push every 8 hours PRN  pantoprazole  Injectable 40 milliGRAM(s) IV Push every 24 hours  simethicone 80 milliGRAM(s) Chew three times a day PRN      Objective:  Vital Signs Last 24 Hrs  T(C): 36.6 (27 Dec 2021 13:25), Max: 36.6 (27 Dec 2021 13:25)  T(F): 97.9 (27 Dec 2021 13:25), Max: 97.9 (27 Dec 2021 13:25)  HR: 78 (27 Dec 2021 13:25) (78 - 78)  BP: 118/78 (27 Dec 2021 13:25) (118/78 - 118/78)  BP(mean): --  RR: 18 (27 Dec 2021 13:25) (18 - 18)  SpO2: 98% (27 Dec 2021 13:25) (98% - 98%)    PHYSICAL EXAM:  Constitutional:no acute distress, afebrile  Eyes:MERRY, EOMI  Ear/Nose/Throat: no oral lesions, 	  Respiratory: decreased BS right base  Cardiovascular: S1S2  Gastrointestinal: distended, scars noted, palpable metastatic disease  Extremities:no e/e/c  No Lymphadenopathy  IV sites not inflammed.    LABS:                        10.0   14.47 )-----------( 274      ( 27 Dec 2021 10:22 )             32.5     12-27    131<L>  |  101  |  27<H>  ----------------------------<  117<H>  3.8   |  17<L>  |  4.67<H>    Ca    8.1<L>      27 Dec 2021 10:22  Phos  3.6     12-26  Mg     1.7     12-27    TPro  5.9<L>  /  Alb  2.9<L>  /  TBili  0.4  /  DBili  x   /  AST  15  /  ALT  26  /  AlkPhos  1581<H>        Urinalysis Basic - ( 27 Dec 2021 03:21 )    Color: Yellow / Appearance: Slightly Turbid / S.013 / pH: x  Gluc: x / Ketone: Negative  / Bili: Negative / Urobili: Negative   Blood: x / Protein: Trace / Nitrite: Negative   Leuk Esterase: Moderate / RBC: 5 /hpf / WBC 22 /HPF   Sq Epi: x / Non Sq Epi: 54 /hpf / Bacteria: Negative        MICROBIOLOGY:            RECENT CULTURES:   @ 23:25  .Body Fluid Pleural Fluid  --  --  --    No growth  --   @ 15:00  .Body Fluid Peritoneal Fluid  --  --  --    No growth  --   @ 00:44  Clean Catch Clean Catch (Midstream)  --  --  --    Normal Urogenital reginald present  --      RADIOLOGY & ADDITIONAL STUDIES:    < from: [facility] Duplex Lower Ext Vein Scan, [place] (21 @ 09:13) >  IMPRESSION:  No evidence of deep venous thrombosis in either lower extremity.    < end of copied text >

## 2021-12-27 NOTE — CONSULT NOTE ADULT - CONSULT REQUESTED DATE/TIME
22-Dec-2021 01:48
22-Dec-2021 15:56
26-Dec-2021 15:42
25-Dec-2021 14:19
22-Dec-2021 15:41
23-Dec-2021 12:32
26-Dec-2021 19:06
27-Dec-2021 15:29
23-Dec-2021

## 2021-12-27 NOTE — PROGRESS NOTE ADULT - ASSESSMENT
51 year old female with PMH gastric CA s/p resection, SBO, multiple bowel resections, and recent recurrence of her cancer who presents for inability to tolerate PO.   ALP 1700-2000s  s/p CTX 12/23-25  No fever  Now WBC 8.57>14.14  Now MARIELLA, Cr acutely>3 from 0.7  COVID (12/26) PCR neg  PD fluid culture no growth (12/23)  UCx: normal reginald (12/23)  CT A/P (12/21) metastasis, left hydronephrosis, ascites    #Leukocytosis  - Could be reactive from cancer or urologic obstruction given acute MARIELLA  -  and Renal to evaluate for MARIELLA cause  - No abx for now, can repeat UA/UCx  - Follow up renal US result, per my view there is hydro? management (Ansari, etc) per primary team  - Please encourage pt to get COVID vaccine whenever feasible         51 year old female with PMH gastric CA s/p resection, SBO, multiple bowel resections, and recent recurrence of her cancer who presents for inability to tolerate PO.   ALP 1700-2000s  s/p CTX 12/23-25  No fever  Now WBC 8.57>14.14  Now MARIELLA, Cr acutely>3 from 0.7  COVID (12/26) PCR neg  PD fluid culture no growth (12/23)  UCx: normal reginald (12/23)  CT A/P (12/21) metastasis, left hydronephrosis, ascites    #Leukocytosis  - Could be reactive from cancer or urologic obstruction given acute MARIELLA  - s/p brief course of antibiotics with Ceftriaxone for + UA  - plan for percutaneous nephrostomy tube placement- would send urine for cultures at time of placement  - bowel obstruction- further work up after nephrostomy placement  - low threshold to restart antibiotics if she deteriorates with Cefepime dose dependent on renal function at that time point    - Please encourage pt to get COVID vaccine whenever feasible    - Currently still refusing chemotherapy, even palliative chemotherapy, she reports interest in in 'holistic' approach    Palliative care involved for GOC discussion    Zacarias Ford MD  [phone]  After 5pm/weekends [phone]

## 2021-12-27 NOTE — PROGRESS NOTE ADULT - SUBJECTIVE AND OBJECTIVE BOX
[facility]   [phone]  RENAL FOLLOW UP NOTE  --------------------------------------------------------------------------------  Chief Complaint:    24 hour events/subjective:      seen c/o nausea and no flatus has abd discomfort     PAST HISTORY  --------------------------------------------------------------------------------  No significant changes to PMH, PSH, FHx, SHx, unless otherwise noted    ALLERGIES & MEDICATIONS  --------------------------------------------------------------------------------  Allergies    Cipro (Rash)    Intolerances      Standing Inpatient Medications  heparin   Injectable 5000 Unit(s) SubCutaneous every 12 hours  multivitamin 1 Tablet(s) Oral daily  pantoprazole  Injectable 40 milliGRAM(s) IV Push every 24 hours    PRN Inpatient Medications  ondansetron Injectable 4 milliGRAM(s) IV Push every 8 hours PRN  simethicone 80 milliGRAM(s) Chew three times a day PRN      REVIEW OF SYSTEMS  --------------------------------------------------------------------------------    Gen: denies  fevers/chills,  CVS: denies chest pain/palpitations  Resp: denies SOB/Cough  GI: Denies N/V/Abd pain +  : Denies dysuria + decrease urination         VITALS/PHYSICAL EXAM  --------------------------------------------------------------------------------  T(C): --  HR: --  BP: --  RR: --  SpO2: --  Wt(kg): --    125/81 P 92    12-26-21 @ 07:01  -  12-27-21 @ 07:00  --------------------------------------------------------  IN: 240 mL / OUT: 0 mL / NET: 240 mL      Physical Exam:  	    Gen: overall ill appearing   	no jvd   	Pulm: decrease bs  no rales or ronchi or wheezing  	CV: RRR, S1S2; no rub  	Back: No CVA tenderness  	Abd:  hypoactive BS, soft, distended   	: No suprapubic tenderness  	UE: Warm, no cyanosis  no clubbing,  no edema  	LE: Warm, no cyanosis  no clubbing, non pitting edema  	Neuro: alert and oriented. speech coherent   	      LABS/STUDIES  --------------------------------------------------------------------------------              10.0   14.47 >-----------<  274      [12-27-21 @ 10:22]              32.5     131  |  101  |  27  ----------------------------<  117      [12-27-21 @ 10:22]  3.8   |  17  |  4.67        Ca     8.1     [12-27-21 @ 10:22]      Mg     1.7     [12-27-21 @ 10:22]      Phos  3.6     [12-26-21 @ 09:32]    TPro  5.9  /  Alb  2.9  /  TBili  0.4  /  DBili  x   /  AST  15  /  ALT  26  /  AlkPhos  1581  [12-27-21 @ 10:22]          Creatinine Trend:  SCr 4.67 [12-27 @ 10:22]  SCr 3.31 [12-26 @ 09:32]  SCr 2.10 [12-25 @ 11:33]  SCr 0.81 [12-24 @ 12:17]  SCr 0.75 [12-23 @ 07:06]      < from: CT Abdomen and Pelvis w/ IV Cont (12.21.21 @ 20:14) >    ACC: [id number] EXAM:  CT ABDOMEN AND PELVIS IC                          PROCEDURE DATE:  12/21/2021          INTERPRETATION:  PROCEDURE INFORMATION:  Exam: CT Abdomen And Pelvis With Contrast  Exam date and time: 12/21/2021 7:59 PM  Age: 51 years old  Clinical indication: Nausea and vomiting. History of gastric cancer and   prior small bowel obstruction with 2 prior bowel resections and   gastrectomy. Biopsy of gastrojejunostomy anastomosis in June 2021   consistent with poorly differentiated adenocarcinoma with possible   extension to the liver on imaging.    TECHNIQUE:  Imaging protocol: Computed tomography of the abdomen and pelvis with   contrast.    COMPARISON:  CT ABDOMEN AND PELVIS WITH ORAL CONTRAST WITH IV CONTRAST 6/15/2021 5:00   PM    FINDINGS:  Pleural spaces: New large right pleural effusion with adjacent   atelectasis..    Liver: Unremarkable.  Gallbladder and bile ducts: Unremarkable. No calcified stones.  Pancreas: Unremarkable.  Spleen: Normal. .  Adrenal glands: Normal.  Kidneys and ureters: Normal right kidney. Moderate left hydronephrosis and  proximal ureteral dilatation. No cause for obstruction identified.  Stomach and bowel: Status post gastrojejunostomy. Apparent marked   distention H apparent loop likely secondary to recurrent disease at the   gastrojejunostomy anastomosis. Appendix: No inflammation.    Intraperitoneal space: Ascites and multiple loculated peritoneal fluid  collections. Infiltrative changes of the peritoneal fat likely secondary   to peritoneal carcinomatosis.  Vasculature: Unremarkable.  Retroperitoneum: Left periaortic lymph node new since 06/15/2021, 1.8 x   1.1 cm (5:38). Retroperitoneal fluid infiltrating into the ric hepatis  Urinary bladder: Unremarkable.  Reproductive: Unremarkable uterus and adenexa.  Bones/joints: Predominantly sclerotic but some lytic lesions throughout  skeleton suggest advanced metastatic disease..  Soft tissues: Large anterior abdominal and pelvic wall hernia containing   bowel,  mesentery and ascites. Hernia neck measures 16 cm..    IMPRESSION:  1. Apparent marked dilatation afferent loop likely secondary to recurrent   disease at the gastrojejunostomy  2. New large right pleural effusion with adjacent atelectasis..  3. New large ascites with multiple loculations and mesenteric fat   infiltration likely secondary to peritoneal carcinomatosis  4. Predominantly sclerotic but some lytic lesions throughout skeleton   suggest  advanced metastatic disease..  5. Moderate left hydronephrosis andproximal ureteral dilatation  6. Large abdominal and pelvic wall hernia containing bowel, mesentery and  ascites. No significant change.    < end of copied text >    < from: CT Angio Chest PE Protocol w/ IV Cont (12.21.21 @ 22:31) >  ACC: [id number] EXAM:  CT ANGIO CHEST PULM [facility]                          PROCEDURE DATE:  12/21/2021          INTERPRETATION:  CLINICAL INFORMATION: History of gastric cancer with   dyspnea on exertion and uncomfortable appearing. History of gastrectomy   and small bowel obstruction. Elevated Wells score of greater than four   placement in the PE likely category.    COMPARISON: CT chest 6/29/2021    CONTRAST/COMPLICATIONS:  IV Contrast: Omnipaque 350  66 cc administered   34 cc discarded  Oral Contrast: NONE  Complications: None reported at time of study completion    PROCEDURE:  CT Angiography of the Chest was performed. Portal venous phase imaging of   the Abdomen and Pelvis was performed.  Sagittal and coronal reformats were performedas well as 3D (MIP)   reconstructions.    FINDINGS:  CHEST:  LUNGS AND LARGE AIRWAYS: Patent central airways. 4 mm nodule in the   periphery left lung base on series 8 image 82 appears stable.  PLEURA: New large right pleural effusion with near complete collapse of   the right lower lobe and partial compressive right middle and upper lobe   atelectasis.  VESSELS: No main, right, left or lobar pulmonary embolism. Limited   evaluation of the segmental subsegmental branches.  HEART: Heart size is normal. No pericardial effusion.  MEDIASTINUM AND NASREEN: No lymphadenopathy.  CHEST WALL AND LOWER NECK: Tiny subcentimeter hypodense nodules within   the left thyroid lobe, unchanged.  UPPER ABDOMEN: Again seen are partially visualized cystic collections in   the upper abdomen which could be from loculated ascites. Please refer to   CT abdomen and pelvis that was concurrently performed was widely dictated   in a separate report.  BONES: Diffuse osseous metastatic disease involving the bilateral ribs,   sternum cervical and thoracic spine, which are new from 6/29/2021. No   evidence of pathological fracture.    IMPRESSION:    No main, right, left or lobar pulmonary embolism. Limited evaluation of   the segmental subsegmental branches.    New large right pleural effusion with near complete right lower and   partial right upper and middle compressive atelectasis.    Stable 4 mm mm left lower lobe pulmonary nodule.    Diffuse osseous metastatic disease.    < end of copied text >        Urinalysis - [12-27-21 @ 03:21]      Color Yellow / Appearance Slightly Turbid / SG 1.013 / pH 5.5      Gluc Negative / Ketone Negative  / Bili Negative / Urobili Negative       Blood Small / Protein Trace / Leuk Est Moderate / Nitrite Negative      RBC 5 / WBC 22 / Hyaline 15 / Gran  / Sq Epi  / Non Sq Epi 54 / Bacteria Negative    Urine Creatinine 216      [12-26-21 @ 00:11]  Urine Sodium 35      [12-26-21 @ 00:11]

## 2021-12-27 NOTE — PROGRESS NOTE ADULT - ASSESSMENT
51 year old female with PMH gastric CA s/p resection, SBO, multiple bowel resections, and recent recurrence of her cancer who presents for inability to tolerate PO found to have progression of her gastric cancer (bone metastasis and peritoneal carcinomatosis). Now with dysphagia.  GI as suggesting GI series; however, due to nausea, the patient did not think she was able to tolerate it. She is trying to be optimized for possible EGD.  Currently she has MARIELLA ? 2/2 to contrast induced nephropathy + [place]. Urology is recommending left Nephroureteral tube. She is s/p Paracentesis and Thoracentesis. Palliative medicine is f/u for symptoms and GOC.

## 2021-12-27 NOTE — CONSULT NOTE ADULT - CONSULT REASON
left sided hydronephrosis
rising creatinine
Left PCN-U
Metastatic gastric CA
N/V
Leukocytosis
metastatic disease
pleural effusion
symptom management

## 2021-12-27 NOTE — PROGRESS NOTE ADULT - PROBLEM SELECTOR PLAN 3
2/2 to recurrent disease over gastrojejunostomy with marked dilated afferent loop as well as ascites.   EGD is pending once MARIELLA improves (see above).   Continue Dilaudid 0.3-0.5 mg IV q 3 PRN moderate to severe pain.  Monitor for worsening ascites and consider paracentesis PRN.

## 2021-12-27 NOTE — PROGRESS NOTE ADULT - ASSESSMENT
51 F PMH of  T1a gastric adenocarcinoma status post resection '11, SBR and incisional hernia repairs, known recurrent disease at GJ anastomosis managed naturally, presenting with persistent inability to tolerate diet, found to have diffuse metastatic disease.     Recommendations:   - No surgical intervention at this time.   - Unclear nature of intra-abdominal fluid collections, symptoms possibly related to intestinal compression.   - diet per medicine/GI  - Appreciate palliative evaluation for GOC    Please call back [facility] with any additional questions or concerns    [facility]  [phone]  51 F PMH of  T1a gastric adenocarcinoma status post resection '11, SBR and incisional hernia repairs, known recurrent disease at GJ anastomosis managed naturally, presenting with persistent inability to tolerate diet, found to have diffuse metastatic disease.     Recommendations:   - No surgical intervention at this time.   - Unclear nature of intra-abdominal fluid collections, symptoms possibly related to intestinal compression.   - diet per medicine/GI  - palliative immunotherapy may benefit patient, appreciate oncology recommendations  - Appreciate palliative evaluation for GOC    Please call back [facility] with any additional questions or concerns    [facility]  [phone]

## 2021-12-27 NOTE — PROGRESS NOTE ADULT - SUBJECTIVE AND OBJECTIVE BOX
Hematology Oncology Follow-up    INTERVAL HPI/OVERNIGHT EVENTS:  The patient reports persistent nausea, bloating, belching and abdominal discomfort. She is able to tolerate some liquids but still cannot tolerate solid flora.d She reports her last bowel movement was on Saturday.     REVIEW OF SYSTEMS  CONSTITUTIONAL: No fever, no chills  EYES: No eye pain, no vision changes  ENMT:  No difficulty hearing, no throat pain  RESPIRATORY: dyspnea improved after thoracentesis, no cough  CARDIOVASCULAR: No chest pain, no palpitations,   GASTROINTESTINAL: + abdominal pain, + nausea, + vomiting  GENITOURINARY: No dysuria, no hematuria  NEUROLOGICAL: No headaches, no loss of strength, no numbness  SKIN: No itching, no rashes, no lesions   MUSCULOSKELETAL: No joint pain, no joint swelling; No muscle pain  HEME/LYMPH: No easy bruising, bleeding    VITAL SIGNS:  T(F): 97.9 (21 @ 13:25)  HR: 78 (21 @ 13:25)  BP: 118/78 (21 @ 13:25)  RR: 18 (21 @ 13:25)  SpO2: 98% (21 @ 13:25)  Wt(kg): --    21 @ 07:01  -  21 @ 07:00  --------------------------------------------------------  IN: 240 mL / OUT: 0 mL / NET: 240 mL        T(C): 36.6 (21 @ 13:25), Max: 36.6 (21 @ 13:25)  HR: 78 (21 @ 13:25) (78 - 78)  BP: 118/78 (21 @ 13:25) (118/78 - 118/)  RR: 18 (21 @ 13:25) (18 - 18)  SpO2: 98% (21 @ 13:25) (98% - 98%)  Wt(kg): --    PHYSICAL EXAM:  GENERAL: appears mildly uncomfortable, well-groomed  HEAD:  Atraumatic, Normocephalic  EYES: EOMI, PERRLA, conjunctiva and sclera clear  ENMT: No oropharyngeal exudates, dry mucous membranes  NECK: Supple, no cervical lymphadenopathy  NERVOUS SYSTEM:  Alert & Oriented X3, CN II-XII intact, 5/5 BUE and BLE motor strength, full sensation to light touch   CHEST/LUNG: Clear to auscultation bilaterally; No rales  HEART: Regular rate and rhythm; No murmurs  ABDOMEN: Soft, distended, mild tenderness to palpation, no guarding  EXTREMITIES:  2+ radial Pulses, No cyanosis, traceedema  SKIN: warm, dry      MEDICATIONS  (STANDING):  heparin   Injectable 5000 Unit(s) SubCutaneous every 12 hours  multivitamin 1 Tablet(s) Oral daily  pantoprazole  Injectable 40 milliGRAM(s) IV Push every 24 hours    MEDICATIONS  (PRN):  ondansetron Injectable 4 milliGRAM(s) IV Push every 8 hours PRN Nausea and/or Vomiting  simethicone 80 milliGRAM(s) Chew three times a day PRN Dyspepsia      Cipro (Rash)      LABS:                        10.0   14.47 )-----------( 274      ( 27 Dec 2021 10:22 )             32.5     12    131<L>  |  101  |  27<H>  ----------------------------<  117<H>  3.8   |  17<L>  |  4.67<H>    Ca    8.1<L>      27 Dec 2021 10:22  Phos  3.6       Mg     1.7         TPro  5.9<L>  /  Alb  2.9<L>  /  TBili  0.4  /  DBili  x   /  AST  15  /  ALT  26  /  AlkPhos  1581<H>         Urinalysis Basic - ( 27 Dec 2021 03:21 )    Color: Yellow / Appearance: Slightly Turbid / S.013 / pH: x  Gluc: x / Ketone: Negative  / Bili: Negative / Urobili: Negative   Blood: x / Protein: Trace / Nitrite: Negative   Leuk Esterase: Moderate / RBC: 5 /hpf / WBC 22 /HPF   Sq Epi: x / Non Sq Epi: 54 /hpf / Bacteria: Negative    Bilirubin: Negative (21 @ 03:21)      RADIOLOGY & ADDITIONAL TESTS:  < from: [facility] Duplex Lower Ext Vein Scan, Bilat (27. @ 09:13) >  FINDINGS:    RIGHT:  Normal compressibility of the RIGHT common femoral, femoral and popliteal   veins.  Doppler examination shows normal spontaneous and phasic flow.  No RIGHT calf vein thrombosis is detected.    LEFT:  Normal compressibility of the LEFT common femoral, femoral and popliteal   veins.  Doppler examination shows normal spontaneous and phasic flow.  No LEFT calf vein thrombosis is detected.    IMPRESSION:  No evidence of deep venous thrombosis in either lower extremity.    < end of copied text >   Hematology Oncology Follow-up    INTERVAL HPI/OVERNIGHT EVENTS:  The patient reports persistent nausea, bloating, belching and abdominal discomfort. She is able to tolerate some liquids but still cannot tolerate solid flora.d She reports her last bowel movement was on Saturday.     REVIEW OF SYSTEMS  CONSTITUTIONAL: No fever, no chills  EYES: No eye pain, no vision changes  ENMT:  No difficulty hearing, no throat pain  RESPIRATORY: dyspnea improved after thoracentesis, no cough  CARDIOVASCULAR: No chest pain, no palpitations,   GASTROINTESTINAL: + abdominal pain, + nausea, + vomiting  GENITOURINARY: No dysuria, no hematuria  NEUROLOGICAL: No headaches, no loss of strength, no numbness  SKIN: No itching, no rashes, no lesions   MUSCULOSKELETAL: No joint pain, no joint swelling; No muscle pain  HEME/LYMPH: No easy bruising, bleeding    VITAL SIGNS:  T(F): 97.9 (21 @ 13:25)  HR: 78 (21 @ 13:25)  BP: 118/78 (21 @ 13:25)  RR: 18 (21 @ 13:25)  SpO2: 98% (21 @ 13:25)  Wt(kg): --    21 @ 07:01  -  21 @ 07:00  --------------------------------------------------------  IN: 240 mL / OUT: 0 mL / NET: 240 mL        T(C): 36.6 (21 @ 13:25), Max: 36.6 (21 @ 13:25)  HR: 78 (21 @ 13:25) (78 - 78)  BP: 118/78 (21 @ 13:25) (118/78 - 118/)  RR: 18 (21 @ 13:25) (18 - 18)  SpO2: 98% (21 @ 13:25) (98% - 98%)  Wt(kg): --    PHYSICAL EXAM:  GENERAL: appears mildly uncomfortable, well-groomed  HEAD:  Atraumatic, Normocephalic  EYES: EOMI, PERRLA, conjunctiva and sclera clear  ENMT: No oropharyngeal exudates, dry mucous membranes  NECK: Supple, no cervical lymphadenopathy  NERVOUS SYSTEM:  Alert & Oriented X3, CN II-XII intact, 5/5 BUE and BLE motor strength, full sensation to light touch   CHEST/LUNG: Clear to auscultation bilaterally; No rales  HEART: Regular rate and rhythm; No murmurs  ABDOMEN: Soft, distended, mild tenderness to palpation, no guarding  EXTREMITIES:  2+ radial Pulses, No cyanosis, trace edema  SKIN: warm, dry      MEDICATIONS  (STANDING):  heparin   Injectable 5000 Unit(s) SubCutaneous every 12 hours  multivitamin 1 Tablet(s) Oral daily  pantoprazole  Injectable 40 milliGRAM(s) IV Push every 24 hours    MEDICATIONS  (PRN):  ondansetron Injectable 4 milliGRAM(s) IV Push every 8 hours PRN Nausea and/or Vomiting  simethicone 80 milliGRAM(s) Chew three times a day PRN Dyspepsia      Cipro (Rash)      LABS:                        10.0   14.47 )-----------( 274      ( 27 Dec 2021 10:22 )             32.5     12    131<L>  |  101  |  27<H>  ----------------------------<  117<H>  3.8   |  17<L>  |  4.67<H>    Ca    8.1<L>      27 Dec 2021 10:22  Phos  3.6       Mg     1.7         TPro  5.9<L>  /  Alb  2.9<L>  /  TBili  0.4  /  DBili  x   /  AST  15  /  ALT  26  /  AlkPhos  1581<H>         Urinalysis Basic - ( 27 Dec 2021 03:21 )    Color: Yellow / Appearance: Slightly Turbid / S.013 / pH: x  Gluc: x / Ketone: Negative  / Bili: Negative / Urobili: Negative   Blood: x / Protein: Trace / Nitrite: Negative   Leuk Esterase: Moderate / RBC: 5 /hpf / WBC 22 /HPF   Sq Epi: x / Non Sq Epi: 54 /hpf / Bacteria: Negative    Bilirubin: Negative (21 @ 03:21)      RADIOLOGY & ADDITIONAL TESTS:  < from: [facility] Duplex Lower Ext Vein Scan, Bilat (27. @ 09:13) >  FINDINGS:    RIGHT:  Normal compressibility of the RIGHT common femoral, femoral and popliteal   veins.  Doppler examination shows normal spontaneous and phasic flow.  No RIGHT calf vein thrombosis is detected.    LEFT:  Normal compressibility of the LEFT common femoral, femoral and popliteal   veins.  Doppler examination shows normal spontaneous and phasic flow.  No LEFT calf vein thrombosis is detected.    IMPRESSION:  No evidence of deep venous thrombosis in either lower extremity.    < end of copied text >

## 2021-12-27 NOTE — PROGRESS NOTE ADULT - ASSESSMENT
51 year old female with PMH gastric CA s/p resection, SBO, multiple bowel resections, and recent recurrence of her cancer who presents for inability to tolerate PO. The patient was initially diagnosed in 2011 with gastric cancer which she had resected. Six months ago, the patient had abdominal pain and EGD with biopsy which showed poorly differentiated signet ring cell carcinoma. She saw Dr. Skinny Ibanez from oncology as an outpatient who recommended chemoport and PET for staging purposes along with treatment however, the patient opted not to proceed with treatment. For the past 3 weeks, the patient has been unable to tolerate solids and switched to a liquid diet. For the past 2-3 days, her dysphagia has worsened and now she is having difficulty tolerating liquids. When she attempts to take PO, she has many episodes of vomiting, belching and spitting up which prompted her to go to the ED.  UA was positive for blood and 27 WBC. CT C/A/P  with iv contrast showed apparent marked dilatation afferent loop likely secondary to recurrent disease at the gastrojejunostomy, new large right pleural effusion with adjacent atelectasis, new large ascites with multiple loculations and mesenteric fat infiltration likely secondary to peritoneal carcinomatosis, predominantly sclerotic but some lytic lesions throughout skeleton suggest advanced metastatic disease, moderate left hydronephrosis and proximal ureteral dilatation and large abdominal and pelvic wall hernia containing bowel, mesentery and ascites. She received 500cc LR, 20mg IV pepcid x2, 975mg PO acetaminophen, 4mg PO zofran and 30meq IV K and was admitted for further management.  she had paracentesis as well as thoracentesis. states has decrease po intake for a month  now noticed with MARIELLA          1- MARIELLA   2- metastatic ca  3- mild acidosis   4- hydronephrosis     mariella in setting of contrast nephropathy suspected pt is also with hydro as well.  renal function is deteriorating and has decrease uo. will need P NT   Gu contacted for intervention   has received iv alb and ivf hydration with no improvement   cont with anti emetics  GI note evaluated  d/w pt as well   trend lytes and bicarb   d/w Dr Snyder as well as  team and NP on floor

## 2021-12-28 NOTE — DIETITIAN INITIAL EVALUATION ADULT. - ADD RECOMMEND
1). Will continue to monitor weight, GI status, diet, and labs. 2). Made aware RD and dietetic intern remain available. 1). Will continue to monitor weight, GI status, diet, and labs. 2). Made aware RD and dietetic intern remain available..3). Defer diet consistency to teal/SLP. Malnutrition sticker placed in chart. 1). Will continue to monitor weight, GI status, diet, and labs. 2). Made aware RD and dietetic intern remain available3).Malnutrition sticker placed in chart.

## 2021-12-28 NOTE — PROGRESS NOTE ADULT - SUBJECTIVE AND OBJECTIVE BOX
[facility]   [phone]  RENAL FOLLOW UP NOTE  --------------------------------------------------------------------------------  Chief Complaint:    24 hour events/subjective:    seen earlier   still with decrease po intake   no short of breath   states has started to urinate better     PAST HISTORY  --------------------------------------------------------------------------------  No significant changes to PMH, PSH, FHx, SHx, unless otherwise noted    ALLERGIES & MEDICATIONS  --------------------------------------------------------------------------------  Allergies    Cipro (Rash)    Intolerances      Standing Inpatient Medications  ondansetron Injectable 4 milliGRAM(s) IV Push once  ondansetron Injectable 4 milliGRAM(s) IV Push every 8 hours  pantoprazole  Injectable 40 milliGRAM(s) IV Push every 24 hours  simethicone 80 milliGRAM(s) Chew <User Schedule>  sodium chloride 0.9%. 1000 milliLiter(s) IV Continuous <Continuous>    PRN Inpatient Medications  HYDROmorphone  Injectable 0.3 milliGRAM(s) IV Push every 3 hours PRN  HYDROmorphone  Injectable 0.5 milliGRAM(s) IV Push every 3 hours PRN  ondansetron Injectable 4 milliGRAM(s) IV Push every 8 hours PRN      REVIEW OF SYSTEMS  --------------------------------------------------------------------------------    Gen: denies fevers/chills,  CVS: denies chest pain/palpitations  Resp: denies SOB/Cough  GI: Denies N/V/Abd pain  : Denies dysuria        VITALS/PHYSICAL EXAM  --------------------------------------------------------------------------------  T(C): 37.1 (12-28-21 @ 12:48), Max: 37.1 (12-28-21 @ 12:48)  HR: 82 (12-28-21 @ 12:48) (82 - 83)  BP: 125/81 (12-28-21 @ 12:48) (120/80 - 125/81)  RR: 17 (12-28-21 @ 12:48) (17 - 18)  SpO2: 98% (12-28-21 @ 12:48) (98% - 99%)  Wt(kg): --        12-27-21 @ 07:01  -  12-28-21 @ 07:00  --------------------------------------------------------  IN: 600 mL / OUT: 600 mL / NET: 0 mL    12-28-21 @ 07:01  -  12-28-21 @ 21:16  --------------------------------------------------------  IN: 0 mL / OUT: 200 mL / NET: -200 mL      Physical Exam:  	    Gen: overall ill appearing   	no jvd   	Pulm: decrease bs  no rales or ronchi or wheezing  	CV: RRR, S1S2; no rub  	Back: No CVA tenderness  	Abd:  hypoactive BS, soft, distended   	: No suprapubic tenderness  	UE: Warm, no cyanosis  no clubbing,  no edema  	LE: Warm, no cyanosis  no clubbing, non pitting edema  	Neuro: alert and oriented. speech coherent       LABS/STUDIES  --------------------------------------------------------------------------------              9.8    11.39 >-----------<  275      [12-28-21 @ 04:57]              31.7     132  |  100  |  29  ----------------------------<  91      [12-28-21 @ 04:57]  3.6   |  18  |  3.97        Ca     8.2     [12-28-21 @ 04:57]      Mg     1.7     [12-27-21 @ 10:22]    TPro  5.7  /  Alb  2.6  /  TBili  0.4  /  DBili  x   /  AST  13  /  ALT  19  /  AlkPhos  1432  [12-28-21 @ 04:57]    PT/INR: PT 15.2 , INR 1.28       [12-28-21 @ 04:57]  PTT: 30.5       [12-28-21 @ 04:57]      Creatinine Trend:  SCr 3.97 [12-28 @ 04:57]  SCr 4.67 [12-27 @ 10:22]  SCr 3.31 [12-26 @ 09:32]  SCr 2.10 [12-25 @ 11:33]  SCr 0.81 [12-24 @ 12:17]              Urinalysis - [12-27-21 @ 03:21]      Color Yellow / Appearance Slightly Turbid / SG 1.013 / pH 5.5      Gluc Negative / Ketone Negative  / Bili Negative / Urobili Negative       Blood Small / Protein Trace / Leuk Est Moderate / Nitrite Negative      RBC 5 / WBC 22 / Hyaline 15 / Gran  / Sq Epi  / Non Sq Epi 54 / Bacteria Negative    Urine Creatinine 216      [12-26-21 @ 00:11]  Urine Sodium 35      [12-26-21 @ 00:11]

## 2021-12-28 NOTE — PROGRESS NOTE ADULT - ASSESSMENT
51 year old female with PMH gastric CA s/p resection, SBO, multiple bowel resections, and recent recurrence of her cancer who presents for inability to tolerate PO. The patient was initially diagnosed in 2011 with gastric cancer which she had resected. Six months ago, the patient had abdominal pain and EGD with biopsy which showed poorly differentiated signet ring cell carcinoma. She saw Dr. Skinny Ibanez from oncology as an outpatient who recommended chemoport and PET for staging purposes along with treatment however, the patient opted not to proceed with treatment. For the past 3 weeks, the patient has been unable to tolerate solids and switched to a liquid diet. For the past 2-3 days, her dysphagia has worsened and now she is having difficulty tolerating liquids. When she attempts to take PO, she has many episodes of vomiting, belching and spitting up which prompted her to go to the ED.  UA was positive for blood and 27 WBC. CT C/A/P  with iv contrast showed apparent marked dilatation afferent loop likely secondary to recurrent disease at the gastrojejunostomy, new large right pleural effusion with adjacent atelectasis, new large ascites with multiple loculations and mesenteric fat infiltration likely secondary to peritoneal carcinomatosis, predominantly sclerotic but some lytic lesions throughout skeleton suggest advanced metastatic disease, moderate left hydronephrosis and proximal ureteral dilatation and large abdominal and pelvic wall hernia containing bowel, mesentery and ascites. She received 500cc LR, 20mg IV pepcid x2, 975mg PO acetaminophen, 4mg PO zofran and 30meq IV K and was admitted for further management.  she had paracentesis as well as thoracentesis. states has decrease po intake for a month  now noticed with MARIELLA          1- MARIELLA   2- metastatic ca  3- mild acidosis   4- hydronephrosis     mariella in setting of contrast nephropathy suspected pt is also with hydro as well.  renal function is deteriorating and has decrease uo.  planned for NT placement however, cr is better today. in addition b/l hydro. d/w pt re; beatty placement. if no improvement in cr after beatty will need P NT   Gu f/u  cont with ivf today   GI note evaluated  trend lytes and bicarb   d/w Dr Snyder as well as  NP on floor

## 2021-12-28 NOTE — PROGRESS NOTE ADULT - ASSESSMENT
51 year old female with PMH gastric CA s/p resection, SBO, multiple bowel resections, and recent recurrence of her cancer who presents for inability to tolerate PO.   ALP 1700-2000s  s/p CTX 12/23-25  No fever  Now WBC 8.57>14.14  Now MARIELLA, Cr acutely>3 from 0.7  COVID (12/26) PCR neg  PD fluid culture no growth (12/23)  UCx: normal reginald (12/23)  CT A/P (12/21) metastasis, left hydronephrosis, ascites    #Leukocytosis  - Could be reactive from cancer or urologic obstruction given acute MARIELLA  - s/p brief course of antibiotics with Ceftriaxone for + UA  - plan for percutaneous nephrostomy tube placement- deferred per patient report  - bowel obstruction- slight improvement, abdomen softer on exam, tolerating liguid diet  - low threshold to restart antibiotics if she deteriorates with Cefepime dose dependent on renal function at that time point    - Please encourage pt to get COVID vaccine whenever feasible    - Currently still refusing chemotherapy, even palliative chemotherapy, she reports interest in in 'holistic' approach    Palliative care involved for GOC discussion  will sign off  please call if condition changes    Zacarias Ford MD  [phone]  After 5pm/weekends [phone]

## 2021-12-28 NOTE — DIETITIAN INITIAL EVALUATION ADULT. - OTHER INFO
This admission: Pt reports feeling hungry and want to eat.     Pt confirms NKFA. Denies D/C, and swallowing or chewing difficulty. Pt confirms nausea and vomiting after eating.     Pt reports possible 30 lbs weight loss x 2 months due to inability to tolerate PO intake. pt reports [id number](10/21) and CBW 210lbs(12/22).     Pt declined clear ensure. This admission: Pt reports feeling hungry and wants to eat. Per NP pt unable to tolerate PO intake, but will discuss with GI doctor about advancing the diet.    Pt confirms NKFA. Denies D/C, and history of swallowing or chewing difficulty prior to ascites. Pt confirms nausea and vomiting after eating.     Pt reports possible 30 lbs weight loss x 2 months due to inability to tolerate PO intake and cancer. pt reports [id number](10/21) and Current body weight 210lbs(12/22). As per chart, dosing body weight 210lbs (12/22).      Pt declined Ensure clear and had no questions at this time. This admission: Pt reports feeling hungry and wants to eat. Per NP pt unable to tolerate PO intake, but will discuss with GI doctor about advancing the diet.    Pt confirms NKFA. Denies D/C, and history of swallowing or chewing difficulty prior to ascites. Pt confirms nausea and vomiting after eating.     Pt reports possible 30 lbs weight loss x 2 months due to inability to tolerate PO intake and cancer. pt reports [id number](10/21) and Current body weight 210lbs(12/22). As per chart, dosing body weight 210lbs (12/21).      Pt declined Ensure clear and had no questions at this time.

## 2021-12-28 NOTE — PROGRESS NOTE ADULT - SUBJECTIVE AND OBJECTIVE BOX
INTERNAL MEDICINE PROGRESS NOTE    Dr. Chao Snyder, DO  Hospitalist  Division of Hospital Medicine  [facility]  Available via [employer] Teams      SUBJECTIVE:  No acute complaints.     REVIEW OF SYSTEMS   10 point review of systems negative except for above.     PAST MEDICAL & SURGICAL HISTORY:  Gastric cancer  2011 no adjunt trement    Small bowel obstruction  Exp lap complicated with post op Ishemic bowel /perforation    Perforated bowel    Other abdominal hernia    Obesity (BMI 30-39.9)    History of gastrectomy  distal gastrectomy w/ B2 reconstruction (gastrojejunostomy)    History of resection of small bowel  exlap, SBR, meckel&#x27;s diverticulectomy (2015); exlap, SBR for necrotic bowel, left in discontinuity, Abthera (2015); exlap, SB anastomosis, Abthera (7/11/15); exlap, washout, abdominal closure w/ Strattice (7/13/15)        MEDICATIONS  (STANDING):  ondansetron Injectable 4 milliGRAM(s) IV Push every 8 hours  pantoprazole  Injectable 40 milliGRAM(s) IV Push every 24 hours  simethicone 80 milliGRAM(s) Chew <User Schedule>  sodium chloride 0.9%. 1000 milliLiter(s) (80 mL/Hr) IV Continuous <Continuous>    MEDICATIONS  (PRN):  HYDROmorphone  Injectable 0.3 milliGRAM(s) IV Push every 3 hours PRN Moderate Pain (4 - 6)  HYDROmorphone  Injectable 0.5 milliGRAM(s) IV Push every 3 hours PRN Severe Pain (7 - 10)  ondansetron Injectable 4 milliGRAM(s) IV Push every 8 hours PRN Nausea and/or Vomiting      Allergies    Cipro (Rash)    Intolerances        T(C): 36.1 (21 @ 20:30), Max: 36.6 (21 @ 13:25)  T(F): 97 (21 @ 20:30), Max: 97.9 (21 @ 13:25)  HR: 83 (21 @ 04:44) (78 - 91)  BP: 120/80 (21 @ 04:44) (116/79 - 120/80)  ABP: --  ABP(mean): --  RR: 18 (21 @ 04:44) (16 - 18)  SpO2: 99% (21 @ 04:44) (97% - 99%)      CONSTITUTIONAL: No acute distress.   HEENT:  Conjunctiva clear B/L.  Moist oral mucosa.   Cardiovascular: RRR with no murmurs. No JVD noted. Trace lower extremity edema B/L. Extremities are warm and well perfused. Radial pulses 2+ B/L. Dorsalis pedis pulses 2+ B/L.    Respiratory: Lungs CTAB. No wrr. No accessory muscle use.   Gastrointestinal:  Soft, nontender. Non-distended. Non-rigid. No CVA tenderness B/L.  MSK:  No joint swelling. No joint erythema B/L. No midline spinal tenderness.  Neurologic:  Alert and awake. Moving all extremities. Following commands. Making eye contact.    Skin:  No rashes noted. No skin erythema noted.   Psych:  Normal affect. Normal Mood.     LABS                        9.8    11.39 )-----------( 275      ( 28 Dec 2021 04:57 )             31.7         132<L>  |  100  |  29<H>  ----------------------------<  91  3.6   |  18<L>  |  3.97<H>    Ca    8.2<L>      28 Dec 2021 04:57  Mg     1.7         TPro  5.7<L>  /  Alb  2.6<L>  /  TBili  0.4  /  DBili  x   /  AST  13  /  ALT  19  /  AlkPhos  1432<H>      PT/INR - ( 28 Dec 2021 04:57 )   PT: 15.2 sec;   INR: 1.28 ratio         PTT - ( 28 Dec 2021 04:57 )  PTT:30.5 sec  Urinalysis Basic - ( 27 Dec 2021 03:21 )    Color: Yellow / Appearance: Slightly Turbid / S.013 / pH: x  Gluc: x / Ketone: Negative  / Bili: Negative / Urobili: Negative   Blood: x / Protein: Trace / Nitrite: Negative   Leuk Esterase: Moderate / RBC: 5 /hpf / WBC 22 /HPF   Sq Epi: x / Non Sq Epi: 54 /hpf / Bacteria: Negative        ALL RECENT STUDIES REVIEWED INCLUDING REPORTS AVAILABLE     CARE DISCUSSED WITH ALL CONSULTANTS

## 2021-12-28 NOTE — PROGRESS NOTE ADULT - SUBJECTIVE AND OBJECTIVE BOX
INFECTIOUS DISEASES FOLLOW UP-- Gisell Liliana  [phone]    This is a follow up note for this  51yFemale with  Nausea and vomiting with bowel obstruction in the setting of metastatic gastric carcinoma  bilateral hydronephrosis        ROS:  CONSTITUTIONAL:  No fever, today tolerating oral liguids  CARDIOVASCULAR:  No chest pain or palpitations  RESPIRATORY:  No dyspnea  GASTROINTESTINAL:  No nausea, vomiting today and taking small amounts of lgiuids, passing faltus  GENITOURINARY:  No dysuria complaints, beatty placed draining dark yellow urine  NEUROLOGIC:  No headache,     Allergies    Cipro (Rash)    Intolerances        ANTIBIOTICS/RELEVANT:  antimicrobials    immunologic:    OTHER:  HYDROmorphone  Injectable 0.3 milliGRAM(s) IV Push every 3 hours PRN  HYDROmorphone  Injectable 0.5 milliGRAM(s) IV Push every 3 hours PRN  ondansetron Injectable 4 milliGRAM(s) IV Push every 8 hours  ondansetron Injectable 4 milliGRAM(s) IV Push every 8 hours PRN  pantoprazole  Injectable 40 milliGRAM(s) IV Push every 24 hours  simethicone 80 milliGRAM(s) Chew <User Schedule>  sodium chloride 0.9%. 1000 milliLiter(s) IV Continuous <Continuous>      Objective:  Vital Signs Last 24 Hrs  T(C): 37.1 (28 Dec 2021 12:48), Max: 37.1 (28 Dec 2021 12:48)  T(F): 98.8 (28 Dec 2021 12:48), Max: 98.8 (28 Dec 2021 12:48)  HR: 82 (28 Dec 2021 12:48) (82 - 91)  BP: 125/81 (28 Dec 2021 12:48) (116/79 - 125/81)  BP(mean): --  RR: 17 (28 Dec 2021 12:48) (16 - 18)  SpO2: 98% (28 Dec 2021 12:48) (97% - 99%)    PHYSICAL EXAM:  Constitutional:no acute distress  Eyes:MERRY, EOMI  Ear/Nose/Throat: no oral lesions, 	  Respiratory: clear BL  Cardiovascular: S1S2  Gastrointestinal:softer on exam, still distended,, palpable metastatic lesions  no CVA tenderness  Extremities:no e/e/c  No Lymphadenopathy  IV sites not inflammed.    LABS:                        9.8    11.39 )-----------( 275      ( 28 Dec 2021 04:57 )             31.7         132<L>  |  100  |  29<H>  ----------------------------<  91  3.6   |  18<L>  |  3.97<H>    Ca    8.2<L>      28 Dec 2021 04:57  Mg     1.7         TPro  5.7<L>  /  Alb  2.6<L>  /  TBili  0.4  /  DBili  x   /  AST  13  /  ALT  19  /  AlkPhos  1432<H>      PT/INR - ( 28 Dec 2021 04:57 )   PT: 15.2 sec;   INR: 1.28 ratio         PTT - ( 28 Dec 2021 04:57 )  PTT:30.5 sec  Urinalysis Basic - ( 27 Dec 2021 03:21 )    Color: Yellow / Appearance: Slightly Turbid / S.013 / pH: x  Gluc: x / Ketone: Negative  / Bili: Negative / Urobili: Negative   Blood: x / Protein: Trace / Nitrite: Negative   Leuk Esterase: Moderate / RBC: 5 /hpf / WBC 22 /HPF   Sq Epi: x / Non Sq Epi: 54 /hpf / Bacteria: Negative        MICROBIOLOGY:            RECENT CULTURES:   @ 12:39  .Blood Blood-Peripheral  --  --  --    No growth to date.  --   @ 08:26  Clean Catch Clean Catch (Midstream)  --  --  --    No growth  --   @ 23:25  .Body Fluid Pleural Fluid  --  --  --    No growth  --   @ 15:00  .Body Fluid Peritoneal Fluid  --  --  --    No growth at 5 days  --   @ 00:44  Clean Catch Clean Catch (Midstream)  --  --  --    Normal Urogenital reginald present  --      RADIOLOGY & ADDITIONAL STUDIES:    < from: [facility] Duplex Lower Ext Vein Scan, Jorge A (21 @ 09:13) >    IMPRESSION:  No evidence of deep venous thrombosis in either lower extremity.    < end of copied text >

## 2021-12-28 NOTE — DIETITIAN INITIAL EVALUATION ADULT. - ETIOLOGY
decreased PO intake in the setting of malignant ascites and gastric cancer. malignant ascites and gastric cancer.

## 2021-12-28 NOTE — DIETITIAN INITIAL EVALUATION ADULT. - REASON INDICATOR FOR ASSESSMENT
Pt seen for length of stay.  Source of information: EMR, and pt Pt seen for length of stay, NPO/ clear liquid diet x>4 days  Source of information: EMR, and pt

## 2021-12-28 NOTE — DIETITIAN INITIAL EVALUATION ADULT. - DIET TYPE
Once medically feasible, consider low-sodium diet. Will continue to monitor as able  and adjust as needed. Defer diet and fluid consistency to team/SLP. Malnutrition sticker placed in chart. Once medically feasible, consider low-sodium diet. Will continue to monitor as able  and adjust as needed. Once medically feasible, consider low-sodium diet. Will continue to monitor as able  and adjust as needed. Defer diet consistency to team/SLP

## 2021-12-28 NOTE — DIETITIAN INITIAL EVALUATION ADULT. - ORAL INTAKE PTA/DIET HISTORY
Pt is alert and oriented. Pt reports good appetite and PO intake PTA, but the last 2-3 weeks due to  blockage( Malignant ascites) can not tolerate solid food. Pt reports usually eats 2-3 meals/day, she does shopping/cooking. Pt reports taking protein shake 1x daily, home made green juice 1 cup x daily, and liquid multivitamins 1x daily, unable to provide further detail. Pt is alert, oriented and tired. Pt reports good appetite and PO intake PTA, but the last 2-3 weeks due to  "blockage"( Malignant ascites) was not able to tolerate solid food. Pt reports usually eats 2-3 meals/day and not following any restricted diet at home, she does shopping/cooking. Pt reports taking protein shake 1x daily(unable to provide further detail), home made green juice 1 cup daily, and liquid multivitamins 1x daily. Pt is alert, oriented and tired. Pt reports good appetite and PO intake PTA, but was not able to tolerate solid food for the last 2-3 weeks due to  "blockage"( Malignant ascites) . Pt reports usually eats 2-3 meals/day and not following any restricted diet at home, she does shopping/cooking. Pt reports taking protein shake 1x daily(unable to provide further detail), home made green juice 1 cup daily, and liquid multivitamins 1x daily.

## 2021-12-28 NOTE — DIETITIAN INITIAL EVALUATION ADULT. - OTHER CALCULATIONS
Dosing [id number] (12/22) was used for all calculations. Fluid intake is defer to the team. upper IBW weight 143lbs was used for kcal and protein calculations. Fluid intake is deferred to the team.

## 2021-12-28 NOTE — CHART NOTE - NSCHARTNOTEFT_GEN_A_CORE
Advanced Endoscopy Attending Note  Patient with metastatic recurrent gastric ca  PO intolerance with possible GJ obstruction (but not clear on CT given no gastric dilation)  Also bilateral hydronephrosis pending nephrostomy tubes  Recommend UGIS to eval flow of contrast through efferent (enteral limb); if efferent limb is obstructed can consider endoscopic GJ for palliation however increased risk due to ascites  No need for intervention on afferent limb as bilirubin is normal and no signs of cholangitis    Thank you for this interesting consult.  Please call the advanced GI service with any questions or concerns.

## 2021-12-28 NOTE — PROGRESS NOTE ADULT - PROBLEM SELECTOR PLAN 3
Rocephin - finished 3 days  Recent UA positive.   UA and Bcx at lab.  Monitor of further abx for now but low threshold for resumption of antibiotics.

## 2021-12-28 NOTE — DIETITIAN INITIAL EVALUATION ADULT. - SIGNS/SYMPTOMS
12.5% weight loss  x 2 months. 12.5% weight loss  x 2 months  PO intake <50% x > 7 days, and ascites.

## 2021-12-28 NOTE — DIETITIAN INITIAL EVALUATION ADULT. - CHIEF COMPLAINT
The patient is a 51y Female wit PMHx: GI cancer, s/p resections, obesity, abdominal hernia, PE, bacteria in urine, LE edema, malignant ascites, and MARIELLA. Pt presented with inability to tolerate PO and found to have progression of her gastric cancer. As per chart, The patient is a 51y Female wit PMHx: SOB, GI cancer, s/p resections, obesity, abdominal hernia, PE, bacteria in urine, LE edema, malignant ascites, and MARIELLA. Pt presented with inability to tolerate PO and found to have progression of her gastric cancer and s/p paracentesis and thoracentesis. As per chart, The patient is a 51y Female wit PMHx: SOB, GI cancer, s/p resections, obesity, abdominal hernia, PE, bacteria in urine, LE edema, malignant ascites, and MARIELLA. Pt presented with inability to tolerate PO and found to have progression of her gastric cancer and s/p paracentesis and thoracentesis(12/23) As per chart, The patient is a 51y Female wit PMHx: SBO, GI cancer, s/p resections, obesity, abdominal hernia, PE, bacteria in urine, LE edema, malignant ascites, and MARIELLA. Pt presented with inability to tolerate PO and found to have progression of her gastric cancer and s/p paracentesis and thoracentesis(12/23)

## 2021-12-28 NOTE — DIETITIAN INITIAL EVALUATION ADULT. - PERTINENT LABORATORY DATA
12/28  Na:132<L>  BUN: 29<H>  Cr: 3.9<H>  eGFR: 12<L> 12/28  Na:132<L>  BUN: 29<H>  Cr: 3.97<H>  eGFR: 12<L>

## 2021-12-29 NOTE — PROGRESS NOTE ADULT - PROBLEM SELECTOR PLAN 5
Will continue to f/u for symptoms.   d/w the primary NP.       Orlin Cardona MD   Geriatrics and Palliative Care ([facility]) Consult Service    of Geriatric and Palliative Medicine  [facility]      Please page the following number for clinical matters between the hours of 9 am and 5 pm from Monday through Friday : [phone]    After 5pm and on weekends, please see the contact information below:    In the event of newly developing, evolving, or worsening symptoms, please contact the Palliative Medicine team via pager (if the patient is at [facility] #[phone] or if the patient is at [facility] #[phone]) The Geriatric and Palliative Medicine service has coverage 24 hours a day/ 7 days a week to provide medical recommendations regarding symptom management needs via telephone

## 2021-12-29 NOTE — PROGRESS NOTE ADULT - ASSESSMENT
51 year old female with PMH gastric CA s/p resection, SBO, multiple bowel resections, and recent recurrence of her cancer who presents for inability to tolerate PO found to have progression of her gastric cancer (bone metastasis and peritoneal carcinomatosis). Now with dysphagia.  GI as suggesting GI series; however, due to nausea, the patient did not think she was able to tolerate it. She is trying to be optimized for possible EGD.  Currently she has MARIELLA ? 2/2 to contrast induced nephropathy + Isabella. Urology is recommending left Nephroureteral tube. She is s/p Paracentesis and Thoracentesis. Palliative medicine is f/u for symptoms and GOC.

## 2021-12-29 NOTE — PROGRESS NOTE ADULT - SUBJECTIVE AND OBJECTIVE BOX
[facility]   [phone]  RENAL FOLLOW UP NOTE  --------------------------------------------------------------------------------  Chief Complaint:    24 hour events/subjective:    seen states has edema     PAST HISTORY  --------------------------------------------------------------------------------  No significant changes to PMH, PSH, FHx, SHx, unless otherwise noted    ALLERGIES & MEDICATIONS  --------------------------------------------------------------------------------  Allergies    Cipro (Rash)    Intolerances      Standing Inpatient Medications  ondansetron Injectable 4 milliGRAM(s) IV Push once  ondansetron Injectable 4 milliGRAM(s) IV Push every 8 hours  pantoprazole  Injectable 40 milliGRAM(s) IV Push every 24 hours  potassium chloride  10 mEq/100 mL IVPB 10 milliEquivalent(s) IV Intermittent every 1 hour  simethicone 80 milliGRAM(s) Chew <User Schedule>  sodium chloride 0.9%. 1000 milliLiter(s) IV Continuous <Continuous>    PRN Inpatient Medications  HYDROmorphone  Injectable 0.3 milliGRAM(s) IV Push every 3 hours PRN  HYDROmorphone  Injectable 0.5 milliGRAM(s) IV Push every 3 hours PRN  ondansetron Injectable 4 milliGRAM(s) IV Push every 8 hours PRN      REVIEW OF SYSTEMS  --------------------------------------------------------------------------------    Gen: denies  fevers/chills,  CVS: denies chest pain/palpitations  Resp: denies SOB/Cough  GI: + nausea no Abd pain  : Denies dysuria.    VITALS/PHYSICAL EXAM  --------------------------------------------------------------------------------  T(C): 36.6 (12-29-21 @ 05:08), Max: 37.1 (12-28-21 @ 12:48)  HR: 78 (12-29-21 @ 05:08) (78 - 100)  BP: 124/78 (12-29-21 @ 05:08) (124/78 - 127/72)  RR: 18 (12-29-21 @ 05:08) (17 - 18)  SpO2: 97% (12-29-21 @ 05:08) (97% - 99%)  Wt(kg): --        12-28-21 @ 07:01  -  12-29-21 @ 07:00  --------------------------------------------------------  IN: 1320 mL / OUT: 1425 mL / NET: -105 mL      Physical Exam:  	    	    Gen: overall ill appearing   	no jvd   	Pulm: decrease bs  no rales or ronchi or wheezing  	CV: RRR, S1S2; no rub  	Abd:  hypoactive BS, soft, distended   	UE: Warm, no cyanosis  no clubbing,  no edema  	LE: Warm, no cyanosis  no clubbing, 1-2-  edema  	Neuro: alert and oriented. speech coherent     LABS/STUDIES  --------------------------------------------------------------------------------              8.8    7.71  >-----------<  250      [12-29-21 @ 05:05]              28.1     134  |  101  |  18  ----------------------------<  90      [12-29-21 @ 05:05]  3.2   |  18  |  1.40        Ca     8.8     [12-29-21 @ 05:05]      Mg     1.7     [12-27-21 @ 10:22]    TPro  5.3  /  Alb  2.6  /  TBili  0.3  /  DBili  x   /  AST  12  /  ALT  15  /  AlkPhos  1394  [12-29-21 @ 05:05]    PT/INR: PT 15.3 , INR 1.29       [12-29-21 @ 05:05]  PTT: 30.5       [12-28-21 @ 04:57]      Creatinine Trend:  SCr 1.40 [12-29 @ 05:05]  SCr 3.97 [12-28 @ 04:57]  SCr 4.67 [12-27 @ 10:22]  SCr 3.31 [12-26 @ 09:32]  SCr 2.10 [12-25 @ 11:33]              Urinalysis - [12-27-21 @ 03:21]      Color Yellow / Appearance Slightly Turbid / SG 1.013 / pH 5.5      Gluc Negative / Ketone Negative  / Bili Negative / Urobili Negative       Blood Small / Protein Trace / Leuk Est Moderate / Nitrite Negative      RBC 5 / WBC 22 / Hyaline 15 / Gran  / Sq Epi  / Non Sq Epi 54 / Bacteria Negative    Urine Creatinine 216      [12-26-21 @ 00:11]  Urine Sodium 35      [12-26-21 @ 00:11]

## 2021-12-29 NOTE — PROGRESS NOTE ADULT - PROBLEM SELECTOR PLAN 2
UGIS results noticed. D/w advanced endoscopy attending, Dr. Liriano that will f/u with the patient and discuss about possible treatment options (?EGD with G-J placement)   Simethicone q 6 ATC.   Zofran 4mg IV q 8ATC and will continue 4mg IV q 8 PRN.  Liquid diet. I will advise against anything beyond liquid diet.

## 2021-12-29 NOTE — PROGRESS NOTE ADULT - NSPROGADDITIONALINFOA_GEN_ALL_CORE
overall prognosis is poor given stage 4 disease, and reluctance to rely on traditional medical therapies.  plan of care discussed with floor NP/PA
plan of care discussed with floor NP Andrea, GI, surgery, bedside RN
replace K as tolerated.  if unable to take PO, we can give more IV.
prognosis is poor  await GI input re: endoscopy  plan of care discussed with floor NP Dominic
await advanced GI recs re: further intervention

## 2021-12-29 NOTE — PROGRESS NOTE ADULT - ASSESSMENT
51 year old female with PMH gastric CA s/p resection, SBO, multiple bowel resections, and recent recurrence of her cancer who presents for inability to tolerate PO. The patient was initially diagnosed in 2011 with gastric cancer which she had resected. Six months ago, the patient had abdominal pain and EGD with biopsy which showed poorly differentiated signet ring cell carcinoma. She saw Dr. Skinny Ibanez from oncology as an outpatient who recommended chemoport and PET for staging purposes along with treatment however, the patient opted not to proceed with treatment. For the past 3 weeks, the patient has been unable to tolerate solids and switched to a liquid diet. For the past 2-3 days, her dysphagia has worsened and now she is having difficulty tolerating liquids. When she attempts to take PO, she has many episodes of vomiting, belching and spitting up which prompted her to go to the ED.  UA was positive for blood and 27 WBC. CT C/A/P  with iv contrast showed apparent marked dilatation afferent loop likely secondary to recurrent disease at the gastrojejunostomy, new large right pleural effusion with adjacent atelectasis, new large ascites with multiple loculations and mesenteric fat infiltration likely secondary to peritoneal carcinomatosis, predominantly sclerotic but some lytic lesions throughout skeleton suggest advanced metastatic disease, moderate left hydronephrosis and proximal ureteral dilatation and large abdominal and pelvic wall hernia containing bowel, mesentery and ascites. She received 500cc LR, 20mg IV pepcid x2, 975mg PO acetaminophen, 4mg PO zofran and 30meq IV K and was admitted for further management.  she had paracentesis as well as thoracentesis. states has decrease po intake for a month  now noticed with MARIELLA          1- MARIELLA   2- metastatic ca  3- mild acidosis   4- hydronephrosis   5- hypokalemia  6- acidosis     mariella in setting of contrast nephropathy suspected pt is also with hydro as well.    with beatty placement cr improving   has edema dc ivf  trend bicarb   Gu f/u  hypokalemia supplement kcl   GI follow up   trend lytes and bicarb   d/w Dr Snyder

## 2021-12-29 NOTE — PROGRESS NOTE ADULT - SUBJECTIVE AND OBJECTIVE BOX
HPI:   51 year old female with PMH gastric CA s/p resection, SBO, multiple bowel resections, and recent recurrence of her cancer who presents for inability to tolerate PO found to have progression of her gastric cancer (bone metastasis and peritoneal carcinomatosis). Now with dysphagia.  GI as suggesting GI series; however, due to nausea, the patient did not think she was able to tolerate it. She is trying to be optimized for possible EGD.  Currently she has MARIELLA ? 2/2 to contrast induced nephropathy + [place]. Urology is recommending left Nephroureteral tube. She is s/p Paracentesis and Thoracentesis. Palliative medicine is f/u for symptoms and GOC.    12/29 She indicated nausea has improve. However, she is still not able to pass gas and has not had a BM. She did not use any Dilaudid PRN during the last 24 hours.     SUBJECTIVE AND OBJECTIVE:  INTERVAL HPI/OVERNIGHT EVENTS:  She was c/o not being able to pass gas or to have a BM. She was also c/o nausea and abdominal discomfort.     DNR on chart:   Allergies    Cipro (Rash)    Intolerances    MEDICATIONS  (STANDING):  enoxaparin Injectable 40 milliGRAM(s) SubCutaneous daily  ondansetron Injectable 4 milliGRAM(s) IV Push every 8 hours  pantoprazole  Injectable 40 milliGRAM(s) IV Push every 24 hours  potassium phosphate IVPB 15 milliMole(s) IV Intermittent once  simethicone 80 milliGRAM(s) Chew <User Schedule>    MEDICATIONS  (PRN):  HYDROmorphone  Injectable 0.3 milliGRAM(s) IV Push every 3 hours PRN Moderate Pain (4 - 6)  HYDROmorphone  Injectable 0.5 milliGRAM(s) IV Push every 3 hours PRN Severe Pain (7 - 10)  ondansetron Injectable 4 milliGRAM(s) IV Push every 8 hours PRN Nausea and/or Vomiting        ITEMS UNCHECKED ARE NOT PRESENT    PRESENT SYMPTOMS: [ ]Unable to obtain due to poor mentation   Source if other than patient:  [ ]Family   [ ]Team     Pain:  [x ]yes [ ]no  QOL impact - Not being able to rest or eat   Location -                  epigastric and upper abdominal region.   Aggravating factors - eating   Quality - discomfort   Radiation - none   Timing- intermittent   Severity (0-10 scale): up to 8/10 but going away with Dilaudid PRN.  However, pain was currently controlled.   Minimal acceptable level (0-10 scale): 3    Dyspnea:                           [ ]Mild [ ]Moderate [ ]Severe  Anxiety:                             [ ]Mild [ ]Moderate [ ]Severe  Fatigue:                             [ ]Mild [ ]Moderate [ ]Severe  Nausea:                             [ ]Mild [ ]Moderate [ ]Severe  Loss of appetite:              [ ]Mild [ ]Moderate [ ]Severe  Constipation:                    [ ]Mild [ ]Moderate [ ]Severe    CPOT:    https://www.[facility].org/getattachment/[email or web address]/Critical-Care-Pain-Observation-Tool-(CPOT)    PAIN AD Score:	  [email or web address].[email or web address]/painad.pdf (Ctrl + left click to view)    Other Symptoms:  [ x]All other review of systems negative but as per subjective and objective     Palliative Performance Status Version 2:    40     %      http://[facility].org/files/news/palliative_performance_scale_ppsv2.pdf  PHYSICAL EXAM:  Vital Signs Last 24 Hrs  T(C): 36.3 (29 Dec 2021 11:29), Max: 37 (28 Dec 2021 20:56)  T(F): 97.3 (29 Dec 2021 11:29), Max: 98.6 (28 Dec 2021 20:56)  HR: 87 (29 Dec 2021 11:29) (78 - 100)  BP: 119/65 (29 Dec 2021 11:29) (119/65 - 127/72)  BP(mean): --  RR: 18 (29 Dec 2021 11:29) (18 - 18)  SpO2: 97% (29 Dec 2021 11:29) (97% - 99%)       GENERAL:  [x ]Alert  [ x]Oriented x 3  [ ]Lethargic  [ ]Cachexia  [ ]Unarousable  [ ]Verbal  [ ]Non-Verbal [x] Obese   Behavioral:   [ ]Anxiety  [ ]Delirium [ ]Agitation [ ]Other  HEENT:  [x ]Normal   [ ]Dry mouth   [ ]ET Tube/Trach  [ ]Oral lesions  PULMONARY:   [x ]Clear [ ]Tachypnea  [ ]Audible excessive secretions   [ ]Rhonchi        [ ]Right [ ]Left [ ]Bilateral  [ ]Crackles        [ ]Right [ ]Left [ ]Bilateral  [ ]Wheezing     [ ]Right [ ]Left [ ]Bilateral  [ ]Diminished BS [ ] Right [ ]Left [ ]Bilateral  CARDIOVASCULAR:    [x ]Regular [ ]Irregular [ ]Tachy  [ ]Linus [ ]Murmur [ ]Other  GASTROINTESTINAL:  [x ]Soft  [ ]Distended   [x ]+BS  [ ]Non tender [x ]Tender over epigastric region  [ ]PEG [ ]OGT/ NGT   Last BM:  12/26  GENITOURINARY:  [ ]Normal [ ]Incontinent   [ ]Oliguria/Anuria   [x ]Ansari   MUSCULOSKELETAL:   [ ]Normal   [x ]Weakness  [ ]Bed/Wheelchair bound [ x]Edema  NEUROLOGIC:   [x ]No focal deficits  [ ] Cognitive impairment  [ ] Dysphagia [ ]Dysarthria [ ] Paresis [ ]Other   SKIN:   [x ]Normal  [ ]Rash   [ ]Pressure ulcer(s) [ ]y [ ]n present on admission    CRITICAL CARE:  [ ]Shock Present  [ ]Septic [ ]Cardiogenic [ ]Neurologic [ ]Hypovolemic  [ ]Vasopressors [ ]Inotropes  [ ]Respiratory failure present [ ]Mechanical Ventilation [ ]Non-invasive ventilatory support [ ]High-Flow   [ ]Acute  [ ]Chronic [ ]Hypoxic  [ ]Hypercarbic [ ]Other  [ ]Other organ failure     LABS:                                    8.8    7.71  )-----------( 250      ( 29 Dec 2021 05:05 )             28.1   12-29    132<L>  |  99  |  13  ----------------------------<  95  3.6   |  17<L>  |  0.96    Ca    8.9      29 Dec 2021 16:26  Phos  2.3     12-29  Mg     1.5     12-29    TPro  5.3<L>  /  Alb  2.6<L>  /  TBili  0.3  /  DBili  x   /  AST  12  /  ALT  15  /  AlkPhos  1394<H>  12-29        RADIOLOGY & ADDITIONAL STUDIES:    c< from: CT Abdomen and Pelvis w/ IV Cont (12.21.21 @ 20:14) >    ACC: [id number] EXAM:  CT ABDOMEN AND PELVIS IC                          PROCEDURE DATE:  12/21/2021 IMPRESSION:  1. Apparent marked dilatation afferent loop likely secondary to recurrent   disease at the gastrojejunostomy  2. New large right pleural effusion with adjacent atelectasis..  3. New large ascites with multiple loculations and mesenteric fat   infiltration likely secondary to peritoneal carcinomatosis  4. Predominantly sclerotic but some lytic lesions throughout skeleton   suggest  advanced metastatic disease..  5. Moderate left hydronephrosis andproximal ureteral dilatation  6. Large abdominal and pelvic wall hernia containing bowel, mesentery and  ascites. No significant change.    Original report by [facility].  Final report discussed with STEFFANIE Cardoza on 12/22/2021 at 9:10 AM  with   read back.    --- End of Report ---            YAMILA OSWALD MD; Attending Radiologist  This document has been electronically signed. Dec 22 2021  9:13AM    < end of copied text >    < from: CT Angio Chest PE Protocol w/ IV Cont (12.21.21 @ 22:31) >    IMPRESSION:    No main, right, left or lobar pulmonary embolism. Limited evaluation of   the segmental subsegmental branches.    New large right pleural effusion with near complete right lower and   partial right upper and middle compressive atelectasis.    Stable 4 mm mm left lower lobe pulmonary nodule.    Diffuse osseous metastatic disease.    --- End of Report ---          ZEV CHOI MD; Resident Radiology  This document has been electronically signed.  GORDY LANDERS MD; Attending Radiologist  This document has been electronically signed. Dec 22 2021 12:15AMACC: [id number] EXAM:  CT ANGIO CHEST PULM ART [facility]                          PROCEDURE DATE:  12/21/2021      < end of copied text >    Protein Calorie Malnutrition Present: [ ]mild [ ]moderate [ ]severe [ ]underweight [ ]morbid obesity  https://www.andeal.org/vault/2440/web/files/ONC/Table_Clinical%20Characteristics%20to%20Document%20Malnutrition-White%20JV%20et%20al%202012.pdf    Height (cm): 167.6 (12-21-21 @ 17:21), 167.6 (07-14-21 @ 14:07)  Weight (kg): 95.3 (12-21-21 @ 17:21), 111.1 (07-14-21 @ 14:07)  BMI (kg/m2): 33.9 (12-21-21 @ 17:21), 39.6 (07-14-21 @ 14:07)    [ ]PPSV2 < or = 30%  [ ]significant weight loss [ ]poor nutritional intake [ ]anasarca    [ ]Artificial Nutrition    REFERRALS:   [ ]Chaplaincy  [ ]Hospice  [ ]Child Life  [ ]Social Work  [ ]Case management [ ]Holistic Therapy     Goals of Care Document:

## 2021-12-29 NOTE — PROGRESS NOTE ADULT - SUBJECTIVE AND OBJECTIVE BOX
INTERNAL MEDICINE PROGRESS NOTE    Dr. Chao Snyder, DO  Hospitalist  Division of Hospital Medicine  [facility]  Available via Microsoft Teams      SUBJECTIVE:  No acute complaints.     REVIEW OF SYSTEMS   12 point review of systems negative except for above.     PAST MEDICAL & SURGICAL HISTORY:  Gastric cancer  5/2011 no adjunt trement    Small bowel obstruction  Exp lap complicated with post op Ishemic bowel /perforation    Perforated bowel    Other abdominal hernia    Obesity (BMI 30-39.9)    History of gastrectomy  distal gastrectomy w/ B2 reconstruction (gastrojejunostomy)    History of resection of small bowel  exlap, SBR, meckel&#x27;s diverticulectomy (7/4/2015); exlap, SBR for necrotic bowel, left in discontinuity, Abthera (7/9/2015); exlap, SB anastomosis, Abthera (7/11/15); exlap, washout, abdominal closure w/ Strattice (7/13/15)        MEDICATIONS  (STANDING):  ondansetron Injectable 4 milliGRAM(s) IV Push once  ondansetron Injectable 4 milliGRAM(s) IV Push every 8 hours  pantoprazole  Injectable 40 milliGRAM(s) IV Push every 24 hours  simethicone 80 milliGRAM(s) Chew <User Schedule>    MEDICATIONS  (PRN):  HYDROmorphone  Injectable 0.3 milliGRAM(s) IV Push every 3 hours PRN Moderate Pain (4 - 6)  HYDROmorphone  Injectable 0.5 milliGRAM(s) IV Push every 3 hours PRN Severe Pain (7 - 10)  ondansetron Injectable 4 milliGRAM(s) IV Push every 8 hours PRN Nausea and/or Vomiting      Allergies    Cipro (Rash)    Intolerances        T(C): 36.3 (12-29-21 @ 11:29), Max: 37.1 (12-28-21 @ 12:48)  T(F): 97.3 (12-29-21 @ 11:29), Max: 98.8 (12-28-21 @ 12:48)  HR: 87 (12-29-21 @ 11:29) (78 - 100)  BP: 119/65 (12-29-21 @ 11:29) (119/65 - 127/72)  ABP: --  ABP(mean): --  RR: 18 (12-29-21 @ 11:29) (17 - 18)  SpO2: 97% (12-29-21 @ 11:29) (97% - 99%)      CONSTITUTIONAL: No acute distress.   HEENT:  Conjunctiva clear B/L.  Moist oral mucosa.   Cardiovascular: RRR with no murmurs. No JVD noted. Trace lower extremity edema B/L. Extremities are warm and well perfused. Radial pulses 2+ B/L. Dorsalis pedis pulses 2+ B/L.    Respiratory: Lungs CTAB. No wrr. No accessory muscle use.   Gastrointestinal:  Soft, nontender. Non-distended. Non-rigid. No CVA tenderness B/L.  MSK:  No joint swelling. No joint erythema B/L. No midline spinal tenderness.  Neurologic:  Alert and awake.  Moving all extremities. Following commands. Making eye contact.    Skin:  No rashes noted. No skin erythema noted.   Psych:  Normal affect. Normal Mood.   : indwelling beatty; yellow urine.     LABS                        8.8    7.71  )-----------( 250      ( 29 Dec 2021 05:05 )             28.1     12-29    134<L>  |  101  |  18  ----------------------------<  90  3.2<L>   |  18<L>  |  1.40<H>    Ca    8.8      29 Dec 2021 05:05    TPro  5.3<L>  /  Alb  2.6<L>  /  TBili  0.3  /  DBili  x   /  AST  12  /  ALT  15  /  AlkPhos  1394<H>  12-29    PT/INR - ( 29 Dec 2021 05:05 )   PT: 15.3 sec;   INR: 1.29 ratio         PTT - ( 28 Dec 2021 04:57 )  PTT:30.5 sec      ALL RECENT STUDIES REVIEWED INCLUDING REPORTS AVAILABLE     CARE DISCUSSED WITH ALL CONSULTANTS

## 2021-12-29 NOTE — CHART NOTE - NSCHARTNOTEFT_GEN_A_CORE
Brief GI update  ==========  UGIS reviewed: Narrowing at the gastrojejunostomy with minimal contrast   passing through the stomach into the efferent loop.    Would attempt thin liquid diet/full liquid (if tolerates thin liquid). Can plan for EGD with G-J placement if within patient's GOC - timing and location (inpatient/outpatient) pending clinical course. Patient has ascites which puts her at an increased risk for the procedure.    Will followup tomorrow.   Thank you for involving us in the care of this patient. Please reach out if any further questions.    Allen Bland, PGY-5  GI Fellow    Available on Microsoft Teams  Pager [phone] ([facility]) or [phone] ([facility])  After 5PM/Weekends, please contact the on-call GI fellow: [phone]  Available through Microsoft Teams

## 2021-12-29 NOTE — PROGRESS NOTE ADULT - PROBLEM SELECTOR PLAN 3
2/2 to recurrent disease over gastrojejunostomy with marked dilated afferent loop as well as ascites.   Continue Dilaudid 0.3-0.5 mg IV q 3 PRN moderate to severe pain.  Monitor for worsening ascites and consider paracentesis PRN.  GI f/u.

## 2021-12-30 NOTE — PROGRESS NOTE ADULT - PROBLEM SELECTOR PLAN 3
2/2 to recurrent disease over gastrojejunostomy with marked dilated afferent loop as well as ascites.   Continue Dilaudid 0.3-0.5 mg IV q 3 PRN moderate to severe pain.  Monitor for worsening ascites and consider paracentesis PRN.  GI f/u. Controlled.   2/2 to recurrent disease over gastrojejunostomy with marked dilated afferent loop as well as ascites.   Continue Dilaudid 0.3-0.5 mg IV q 3 PRN moderate to severe pain.  Monitor for worsening ascites and consider paracentesis PRN.  GI f/u.

## 2021-12-30 NOTE — PROGRESS NOTE ADULT - PROBLEM SELECTOR PLAN 2
UGIS results noticed. D/w advanced endoscopy attending, Dr. Liriano that will f/u with the patient and discuss about possible treatment options (?EGD with G-J placement)   Simethicone q 6 ATC.   Zofran 4mg IV q 8ATC and will continue 4mg IV q 8 PRN.  Liquid diet. I will advise against anything beyond liquid diet. Today (12/30) I had a lengthy d/w the patient about UGIS results. I also showed her the actual images of her study and explained to her the underlying reason why she should not be eating solid food and also the reasons why, if trying to continue to get chemo, she should consider G-J tube placement.   Advanced Endoscopy is to  f/u with the patient and discuss about possible treatment options.   Simethicone q 6 ATC.   Zofran 4mg IV q 8ATC and will continue 4mg IV q 8 PRN. Can switch to PO ODT when read to be DC.   Liquid diet. I will advise against anything beyond liquid diet.

## 2021-12-30 NOTE — PROGRESS NOTE ADULT - REASON FOR ADMISSION
Dysphagia

## 2021-12-30 NOTE — PROGRESS NOTE ADULT - PROBLEM SELECTOR PLAN 4
?contrast nephropathy  check bladder scan  cont IVF  renal consult
multifactorial; ARIANNE contributory however postrenal from gastric cancer is likely a strong contributory here.   mgmt as above.   IR, Uro and renal input appreciated.
-Patient presenting with HCO3 of 20 and anion gap of 19 with ketones in the urine-in the setting of poor PO intake for the last few weeks, likely starvation ketosis  -Continue with IVF
multifactorial; ARIANNE contributory however postrenal from gastric cancer is likely a strong contributory here.   mgmt as above.   IR, Uro and renal input appreciated.
rocephin  appreciate  input - no intervention on hydro
2/2 to Narrowing at the gastrojejunostomy.   Monitor for worsening ascites and consider paracentesis PRN.   GI f/u.
multifactorial; ARIANNE contributory however postrenal from gastric cancer is likely a strong contributory here.   I doubt further ivf will be of any benefit.   NT evaluation as above.   IR, Uro and renal input appreciated.
multifactorial; ARIANNE contributory however postrenal from gastric cancer is likely a strong contributory here.   mgmt as above.   IR, Uro and renal input appreciated.
2/2 to Narrowing at the gastrojejunostomy.   Monitor for worsening ascites and consider paracentesis PRN.   GI f/u.
Likely 2/2 to recurrent disease over gastrojejunostomy with marked dilated afferent loop as well as ascites.  Monitor for worsening ascites and consider paracentesis PRN.   May also consider Octreotide 100mcg q 8h SQ.
likely contrast nephropathy  cont IVF, albumin  renal consult appreciated

## 2021-12-30 NOTE — PROGRESS NOTE ADULT - CONVERSATION DETAILS
I provided the patient with education about the HCP form and the reasons why she should complete one. The patient asked me  to leave a form for her to complete. She will be reaching out to her primary nurse if she were to be able to complete one before DC.

## 2021-12-30 NOTE — DISCHARGE NOTE NURSING/CASE MANAGEMENT/SOCIAL WORK - NSDCPEFALRISK_GEN_ALL_CORE
For information on Fall & Injury Prevention, visit: [email or web address] OR  https://[email or web address]/[email or web address]-prevention-tips-to-[email or web address]-injury OR  https://www.cdc.gov/steadi/patient.html

## 2021-12-30 NOTE — PROGRESS NOTE ADULT - PROBLEM SELECTOR PROBLEM 3
Abdominal pain
Acute cystitis
Acute cystitis
Bacteria in urine
Abdominal pain
Acute cystitis
Acute cystitis
Pleural effusion
Acute cystitis
Abdominal pain
Acute cystitis

## 2021-12-30 NOTE — PROGRESS NOTE ADULT - ASSESSMENT
51 year old female with PMH gastric CA s/p resection, SBO, multiple bowel resections, and recent recurrence of her cancer who presents for inability to tolerate PO found to have progression of her gastric cancer (bone metastasis and peritoneal carcinomatosis). Now with dysphagia.  GI as suggesting GI series; however, due to nausea, the patient did not think she was able to tolerate it. She is trying to be optimized for possible EGD.  Currently she has MARIELLA ? 2/2 to contrast induced nephropathy + [place]. Urology is recommending left Nephroureteral tube. She is s/p Paracentesis and Thoracentesis. Palliative medicine is f/u for symptoms and GOC.

## 2021-12-30 NOTE — PROGRESS NOTE ADULT - PROBLEM SELECTOR PROBLEM 4
MARIELLA (acute kidney injury)
Constipated
MARIELLA (acute kidney injury)
Constipated
Constipated
Acute cystitis
MARIELLA (acute kidney injury)
Acidosis
MARIELLA (acute kidney injury)

## 2021-12-30 NOTE — DISCHARGE NOTE PROVIDER - NSDCCPCAREPLAN_GEN_ALL_CORE_FT
PRINCIPAL DISCHARGE DIAGNOSIS  Diagnosis: Gastric cancer  Assessment and Plan of Treatment: CONTINUE ANGELICA LIQUID DIET.  DO NOT TAKE SOLID FOOD UNTIL AFTER YOUR PROCEDURE WITH DR LIRIANO.   Follow p with Gastroenterologist within 1 wek.Dr Liriano's Office will call you to schedule procedure.Call her Office if ou do not get a call by Wednesday January 2 nd,      SECONDARY DISCHARGE DIAGNOSES  Diagnosis: Pleural effusion, right  Assessment and Plan of Treatment: Follow up [facility] Pulmonologist within 1 week    Diagnosis: Ascites  Assessment and Plan of Treatment: Follow up with GI Md Dr Liriano within 1 week    Diagnosis: Pain  Assessment and Plan of Treatment: Dilaudid or Tylenol as needed for pain.    Diagnosis: Constipated  Assessment and Plan of Treatment: Senna at bedtime.

## 2021-12-30 NOTE — PROGRESS NOTE ADULT - PROBLEM SELECTOR PLAN 5
-Patient has shortness of breath with exertion as an outpatient and CT chest with new large right pleural effusion with near complete right lower and partial right upper and middle compressive atelectasis  -presumably malignant.   -Pulmonary consulted, for thoracentesis - done 12/23 with 2 liters removed  -Repeat CXR: relatively stable r pl effusion.

## 2021-12-30 NOTE — PROGRESS NOTE ADULT - PROBLEM SELECTOR PLAN 1
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 however, patient opted not for treatment at that time now presenting with dysphagia to solids that has progressed to liquids  -CT findings as above with progression of disease  -Surgical oncology consulted, no surgical intervention at this time  -Oncology consulted - recs appreciated  -Patient with large right pleural effusion, pulmonary consulted for thoracentesis - done 12/23 - 2 L removed  -IR consulted for possible paracentesis for ascites - done 12/23 - 700cc removed  -Patient states that currently she would like to be full code-palliative consulted, follow up recs  -Zofran PRN  -Clear liquid diet, advance as tolerated  -Morphine for pain  -IVF as patient not tolerating much PO
The patient's goals are for trying to improver her condition so she can go back to chemo.
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 however, patient opted not for treatment at that time now presenting with dysphagia to solids that has progressed to liquids  -CT findings as above with progression of disease  -Surgical oncology consulted, no surgical intervention at this time  -Oncology consulted - recs appreciated  -Patient with large right pleural effusion, pulmonary consulted for thoracentesis - done 12/23 - 2 L removed  -IR consulted for possible paracentesis for ascites - done 12/23 - 700cc removed  -Patient states that currently she would like to be full code-palliative consulted, follow up recs  -Zofran PRN  -Clear liquid diet, advance as tolerated  -Morphine for pain  -IVF as patient not tolerating much PO  -appreciate GI recs re: EGD/dilation ?Monday. ?postponed  d/w surgical team - not surgical candidate given metastatic dz 12/25  prognosis is guarded.  patient has been relying on alternate therapies such as juicing for months.
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 Rec clear liquid diet for now. Favor UGIS at this time. Limit opiates. Mobilization as tolerated.   -B/L hydro likely from mass effect; urology and IR assistance appreciated. Case discussed with nephrology who feels b/l percutaneous NT would be of benefit. IR scheduled left but to revaluate for right now.   -All consultant contribution to care greatly appreciated.   -prognosis is guarded.
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 however, patient opted not for treatment at that time now presenting with dysphagia to solids that has progressed to liquids  -CT findings as above with progression of disease  -Surgical oncology consulted, no surgical intervention at this time  -Oncology consulted - recs appreciated  -Patient with large right pleural effusion, pulmonary consulted for thoracentesis - done 12/23 - 2 L removed  -IR consulted for possible paracentesis for ascites - done 12/23 - 700cc removed  -Patient states that currently she would like to be full code-palliative consulted, follow up recs  -Zofran PRN  -Clear liquid diet, advance as tolerated  -Morphine for pain  -IVF as patient not tolerating much PO  -await advanced GI recs re: ?EGD/dilation
The patient's goals are for trying to improver her condition so she can go back to chemo.
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 Rec clear liquid diet for now. Favor UGIS at this time. Limit opiates if able. Mobilization as tolerated.   -B/L hydro likely from mass effect; urology and IR assistance appreciated. Hold on NT for now as Cr improving with IVF. Off now.   -C/w monitoring uop w/ indwelling beatty.   -All consultant contribution to care greatly appreciated.
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 Rec clear liquid diet for now. Favor UGIS at this time. Limit opiates if able. Mobilization as tolerated. Will follow up further recs. Possibly offering G-J tube. Patient inquiring about stent.   -B/L hydro likely from mass effect; urology and IR assistance appreciated. Hold on NT for now as Cr improving with IVF. Off now.   -C/w monitoring uop w/ indwelling beatty.   -All consultant contribution to care greatly appreciated.
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 however, patient opted not for treatment at that time now presenting with dysphagia to solids that has progressed to liquids  -CT findings as above with progression of disease  -Surgical oncology consulted, no surgical intervention at this time  -Oncology consulted - recs appreciated  -Patient with large right pleural effusion, pulmonary consulted for thoracentesis - done 12/23 - 2 L removed  -IR consulted for possible paracentesis for ascites - done 12/23 - 700cc removed  -Patient states that currently she would like to be full code-palliative consulted, follow up recs  -Zofran PRN  -Clear liquid diet, advance as tolerated  -Morphine for pain  -IVF as patient not tolerating much PO  -appreciate GI recs re: EGD/dilation ?Monday.  d/w surgical team - not surgical candidate given metastatic dz
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 Rec clear liquid diet for now. Favor UGIS at this time. Limit opiates if able. Mobilization as tolerated.   -B/L hydro likely from mass effect; urology and IR assistance appreciated. Hold on NT for now as Cr improving. Cased w/ nephrology; favor indwelling beatty for uop with further IVF.   -All consultant contribution to care greatly appreciated.   -prognosis is guarded.
The patient's goals are for trying to improver her condition so she can go back to chemo.

## 2021-12-30 NOTE — DISCHARGE NOTE PROVIDER - CARE PROVIDER_API CALL
Skinny Ibanez)  Hematology; Medical Oncology  [address] [place] [address]  Phone: [phone]  Fax: [phone]  Follow Up Time: 1 week    Pamela Liriano)  Gastroenterology; Internal Medicine  [address] [place] [address], [address]  [place], [place] [id number]  Phone: [phone]  Fax: [phone]  Follow Up Time: 1-3 days    Jennifer Winter)  [facility]  [address] [place] [address]  Phone: [phone]  Fax: [phone]  Follow Up Time: 1 week    Gabino Rudd)  Critical Care Medicine; Pulmonary Disease  [address] [place] [address]  Phone: [phone]  Fax: [phone]  Follow Up Time: 1 week    Shawn James)  Cardiology; Internal Medicine  [address] [facility], [address]  [place] [address]  Phone: [phone]  Fax: [phone]  Established Patient  Follow Up Time: 1 week

## 2021-12-30 NOTE — PROGRESS NOTE ADULT - NUTRITIONAL ASSESSMENT
This patient has been assessed with a concern for Malnutrition and has been determined to have a diagnosis/diagnoses of Severe protein-calorie malnutrition.    This patient is being managed with:   Diet Full Liquid-  Entered: Dec 28 2021  5:27PM    Diet NPO after Midnight-     NPO Start Date: 28-Dec-2021   NPO Start Time: 23:59  Entered: Dec 28 2021  1:33PM    
This patient has been assessed with a concern for Malnutrition and has been determined to have a diagnosis/diagnoses of Severe protein-calorie malnutrition.    This patient is being managed with:   Diet Full Liquid-  Entered: Dec 28 2021  5:27PM

## 2021-12-30 NOTE — DISCHARGE NOTE PROVIDER - PROVIDER TOKENS
PROVIDER:[TOKEN:[[id number]],FOLLOWUP:[1 week]],PROVIDER:[TOKEN:[[id number]],FOLLOWUP:[1-3 days]],PROVIDER:[TOKEN:[[id number]],FOLLOWUP:[1 week]],PROVIDER:[TOKEN:[[id number]],FOLLOWUP:[1 week]],PROVIDER:[TOKEN:[[id number]],FOLLOWUP:[1 week],ESTABLISHEDPATIENT:[T]]

## 2021-12-30 NOTE — PROGRESS NOTE ADULT - SUBJECTIVE AND OBJECTIVE BOX
Interval Events:   - Able to tolerate CLD when taking small, slow boluses  - No abdominal pain  - Wants to go home    Allergies:  Cipro (Rash)        Hospital Medications:  enoxaparin Injectable 40 milliGRAM(s) SubCutaneous daily  HYDROmorphone  Injectable 0.3 milliGRAM(s) IV Push every 3 hours PRN  HYDROmorphone  Injectable 0.5 milliGRAM(s) IV Push every 3 hours PRN  ondansetron Injectable 4 milliGRAM(s) IV Push every 8 hours  ondansetron Injectable 4 milliGRAM(s) IV Push every 8 hours PRN  pantoprazole  Injectable 40 milliGRAM(s) IV Push every 24 hours  potassium chloride  10 mEq/100 mL IVPB 10 milliEquivalent(s) IV Intermittent every 1 hour  simethicone 80 milliGRAM(s) Chew <User Schedule>      PMHX/PSHX:  Bariatric surg stat-unsp    Gastric cancer    Small bowel obstruction    Perforated bowel    Other abdominal hernia    Obesity (BMI 30-39.9)    History of gastrectomy    History of resection of small bowel        Family history:  No pertinent family history in first degree relatives    FH: diabetes mellitus (Mother)        ROS:   General:  No wt loss, fevers, chills, night sweats, fatigue,   Eyes:  Good vision, no reported pain  ENT:  No sore throat, pain, runny nose, dysphagia  CV:  No pain, palpitations, hypo/hypertension  Pulm:  No dyspnea, cough, tachypnea, wheezing  GI:  As per HPI  :  No pain, bleeding, incontinence, nocturia  Muscle:  No pain, weakness  Neuro:  No weakness, tingling, memory problems  Psych:  No fatigue, insomnia, mood problems, depression  Endocrine:  No polyuria, polydipsia, cold/heat intolerance  Heme:  No petechiae, ecchymosis, easy bruisability  Skin:  No rash, tattoos, scars, edema      PHYSICAL EXAM:   Vital Signs:  Vital Signs Last 24 Hrs  T(C): 36.4 (30 Dec 2021 14:30), Max: 36.6 (29 Dec 2021 21:15)  T(F): 97.5 (30 Dec 2021 14:30), Max: 97.9 (29 Dec 2021 21:15)  HR: 92 (30 Dec 2021 14:30) (89 - 92)  BP: 124/79 (30 Dec 2021 14:30) (124/79 - 127/82)  BP(mean): --  RR: 18 (30 Dec 2021 14:30) (18 - 18)  SpO2: 98% (30 Dec 2021 14:30) (98% - 98%)  Daily     Daily       12-29-21 @ 07:01  -  12-30-21 @ 07:00  --------------------------------------------------------  IN: 360 mL / OUT: 650 mL / NET: -290 mL    12-30-21 @ 07:01  -  12-30-21 @ 15:35  --------------------------------------------------------  IN: 480 mL / OUT: 650 mL / NET: -170 mL        GENERAL:  NAD, Appears stated age  HEENT:  NC/AT,  conjunctivae clear and pink, sclera -anicteric  CHEST:  Normal Effort, Breath sounds clear  HEART:  RRR, S1 + S2, no murmurs  ABDOMEN:  Soft, mild discomfort to palpation  EXTREMITIES:  no cyanosis or edema  SKIN:  Warm & Dry. No rash or erythema  NEURO:  Alert, oriented, no focal deficit      LABS:                        8.9    8.48  )-----------( 281      ( 30 Dec 2021 07:18 )             28.2     Mean Cell Volume: 72.1 fl (12-30-21 @ 07:18)    12-30    132<L>  |  99  |  10  ----------------------------<  90  3.4<L>   |  19<L>  |  0.82    Ca    8.8      30 Dec 2021 07:18  Phos  2.7     12-30  Mg     1.6     12-30    TPro  5.3<L>  /  Alb  2.6<L>  /  TBili  0.3  /  DBili  x   /  AST  12  /  ALT  15  /  AlkPhos  1394<H>  12-29    LIVER FUNCTIONS - ( 29 Dec 2021 05:05 )  Alb: 2.6 g/dL / Pro: 5.3 g/dL / ALK PHOS: 1394 U/L / ALT: 15 U/L / AST: 12 U/L / GGT: x           PT/INR - ( 29 Dec 2021 05:05 )   PT: 15.3 sec;   INR: 1.29 ratio                                     8.9    8.48  )-----------( 281      ( 30 Dec 2021 07:18 )             28.2                         8.8    7.71  )-----------( 250      ( 29 Dec 2021 05:05 )             28.1                         9.8    11.39 )-----------( 275      ( 28 Dec 2021 04:57 )             31.7       Imaging:

## 2021-12-30 NOTE — PROGRESS NOTE ADULT - PROVIDER SPECIALTY LIST ADULT
Heme/Onc
Nephrology
Urology
Palliative Care
Surgery
Gastroenterology
Heme/Onc
Infectious Disease
Infectious Disease
Nephrology
Nephrology
Surgery
Gastroenterology
Gastroenterology
Pulmonology
Hospitalist
Hospitalist
Palliative Care
Hospitalist
Palliative Care
Hospitalist

## 2021-12-30 NOTE — DISCHARGE NOTE PROVIDER - HOSPITAL COURSE
51 year old female with PMH gastric CA s/p resection, SBO, multiple bowel resections, and recent recurrence of her cancer who presents for inability to tolerate PO found to have progression of her gastric cancer.    This patient has been assessed with a concern for Malnutrition and has been determined to have a diagnosis/diagnoses of Severe protein-calorie malnutrition.    This patient is being managed with:   Diet Full Liquid.  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 Rec clear liquid diet for now. Favor UGIS at this time. Limit opiates if able. Mobilization as tolerated. Will follow up further recs. Possibly offering G-J tube. Patient inquiring about stent.   B/L hydro likely from mass effect; urology and IR assistance appreciated. Hold on NT for now as Cr improving with IVF. Off now.   C/w monitoring uop w/ indwelling beatty.   All consultant contribution to care greatly appreciated.    Malignant ascites:   Mgmt as above.    Acute cystitis:  Rocephin - finished 3 days  Recent UA positive.   UA and Bcx at lab.  Monitor of further abx for now but low threshold for resumption of antibiotics.    MARIELLA (acute kidney injury):   Multifactorial; ARIANNE contributory however postrenal from gastric cancer is likely a strong contributory here.   mgmt as above.   IR, Uro and renal input appreciated.    Pleural effusion:  Patient has shortness of breath with exertion as an outpatient and CT chest with new large right pleural effusion with near complete right lower and partial right upper and middle compressive atelectasis  -presumably malignant.   Pulmonary consulted, for thoracentesis - done 12/23 with 2 liters removed  Repeat CXR: relatively stable r pl effusion.  Need for prophylactic measure:  Lovenox.    Pt requesting to be  discharged home. Instructed to follow p with GI, Heme Onc. Requesting to follow up with Palliative Out Patient.        51 year old female with PMH gastric CA s/p resection, SBO, multiple bowel resections, and recent recurrence of her cancer who presents for inability to tolerate PO found to have progression of her gastric cancer.    This patient has been assessed with a concern for Malnutrition and has been determined to have a diagnosis/diagnoses of Severe protein-calorie malnutrition.    This patient is being managed with:   Diet Full Liquid.  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 Rec clear liquid diet for now. Favor UGIS at this time. Limit opiates if able. Mobilization as tolerated. Will follow up further recs. Possibly offering G-J tube. Patient inquiring about stent.   B/L hydro likely from mass effect; urology and IR assistance appreciated. Hold on NT for now as Cr improving with IVF. Off now.   C/w monitoring uop w/ indwelling beatty.   All consultant contribution to care greatly appreciated.    Malignant ascites:   Mgmt as above.    Acute cystitis:  Rocephin - finished 3 days  Recent UA positive.   UA and Bcx at lab.  Monitor of further abx for now but low threshold for resumption of antibiotics.    MARIELLA (acute kidney injury):   Multifactorial; ARIANNE contributory however postrenal from gastric cancer is likely a strong contributory here.   mgmt as above.   IR, Uro and renal input appreciated.    Pleural effusion:  Patient has shortness of breath with exertion as an outpatient and CT chest with new large right pleural effusion with near complete right lower and partial right upper and middle compressive atelectasis  -presumably malignant.   Pulmonary consulted, for thoracentesis - done 12/23 with 2 liters removed  Repeat CXR: relatively stable r pl effusion.  Need for prophylactic measure:  Lovenox.    Pt requesting to be  discharged home. Instructed to follow p with GI, Heme Onc. Requesting to follow up with Palliative Out Patient.       Attending attestation.   Patient passed TOV.  Renal function stable. S/p IVF.  CXR with pleural effusion which may have increased slightly in size from IVF. Respiratory status stable.   Patient to follow up outpatient with pulmonary.   Patient is not interested in chemo therapy and instead would like to follow up with palliative care at a later time.   With regards to her GI issues, I discussed the case with GI. We explored options of internal transfer to [facility] for advanced endoscopy intervention vs discharge with outpatient follow up in 1-2 weeks. Either option deemed to be reasonable.   Per the patients preference, she would like to be discharged from the hospital and follow up outpatient.   Return precautions discussed. I instructed the patient to return to [facility] if needed as this is where the intervention is to be done.   Diet and medications discussed at length.   All questions answered.   The patient is medically optimized for close outpatient follow up.   All consultant contribution to care greatly appreciated.     DC TIME SPENT:  50 minutes.

## 2021-12-30 NOTE — DISCHARGE NOTE NURSING/CASE MANAGEMENT/SOCIAL WORK - PATIENT PORTAL LINK FT
You can access the FollowMyHealth Patient Portal offered by [facility] by registering at the following website: [email or web address]. By joining [facility]’s FollowMyHealth portal, you will also be able to view your health information using other applications (apps) compatible with our system.

## 2021-12-30 NOTE — PROGRESS NOTE ADULT - ASSESSMENT
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 Work up since then included EUS with Dr. Llanos that showed involvement of the mucosa, submucosa, and muscularis propria (T2) with gastro-hepatic nodes. Patient now presenting with 30 lb wt loss, poor PO intake and N/V  after PO intake for the past 3 months with work up revealing in CT  marked dilatation afferent loop likely secondary to recurrent disease at the gastrojejunostomy and ? partial obstruction.    #Dyspepsia - UGIS with narrowing at the gastrojejunostomy with minimal contrast passing through the stomach into the efferent loop.  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  -PPI IV BID  -Supportive care with antiemetics per primary team and palliative team  -Patient now with MARIELLA stage 3, hyponatremia. Will need to optimize prior EGD   49yo lady with PMH with recent recurrent gastric cancer with initial diagnosed with signet ring cancer T1a in 2011 (while pregnant) and underwent partial gastrectomy with no perioperative chemotherapy, subsequent small bowel resection, post-op course c/b perforation and enterocutaneous fistula and recurrence in 5/2021 with EGD  showing ulcerations at anastomosis with biopsy showing poorly differentiated adenocarcinoma with signet ring cell features in gastric antral-types mucosa. Work up since then included EUS with Dr. Llanos that showed involvement of the mucosa, submucosa, and muscularis propria (T2) with gastro-hepatic nodes. Patient now presenting with 30 lb wt loss, poor PO intake and N/V  after PO intake for the past 3 months with work up revealing in CT  marked dilatation afferent loop likely secondary to recurrent disease at the gastrojejunostomy and ? partial obstruction.    #Dyspepsia - UGIS with narrowing at the gastrojejunostomy with minimal contrast passing through the stomach into the efferent loop.  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 G-J  -No objections to discharge    Thank you for involving us in the care of this patient. Please reach out if any further questions.    Allen Bland, PGY-5  GI Fellow    Available on [employer] Teams  Pager [phone] ([facility]) or [phone] ([facility])  After 5PM/Weekends, please contact the on-call GI fellow: [phone]  Available through [employer] Teams

## 2021-12-30 NOTE — PROGRESS NOTE ADULT - ASSESSMENT
This patient is a 47yo lady with PMH with gastric cancer T1a diagnosed 2011 s/p partial gastrectomy, with localized recurrence identified in 5/2021 at anastamotic site, who had not pursued therapy, now presenting with inability to tolerate PO, found to have metastatic disease to bone, with peritoneal carcinomatosis, pleural effusion and ascites, s/p thoracentesis and paracentesis, with improving MARIELLA and UGIS with narrowing at GJ.     Gastric cancer with metastases  - Patient is pending possible GJ tube placement by GI team for narrowing identified on UGIS to help meet nutritional requirements  - patient will need follow up for palliative systemic chemotherapy    MARIELLA  - resolving after beatty catheter placement      We will follow with you. Please do not hesitate to page with questions.     Karlee Hudson MD PGY4   [phone]  Hematology-Oncology Fellow   This patient is a 47yo lady with PMH with gastric cancer T1a diagnosed 2011 s/p partial gastrectomy, with localized recurrence identified in 5/2021 at anastamotic site, who had not pursued therapy, now presenting with inability to tolerate PO, found to have metastatic disease to bone, with peritoneal carcinomatosis, pleural effusion and ascites, s/p thoracentesis and paracentesis, with improving MARIELLA and UGIS with narrowing at GJ.     Gastric cancer with metastases  - Patient was found to have narrowing on   The patient had discussed with GI team that she wanted to go home, thus plan was for diaz  Patient is pending possible GJ tube placement by GI team for narrowing identified on UGIS to help meet nutritional requirements  - patient will need follow up for palliative systemic chemotherapy    MARIELLA  - resolving after beatty catheter placement     We will follow with you. Please do not hesitate to page with questions.     Karlee Hudson MD PGY4   [phone]  Hematology-Oncology Fellow   This patient is a 47yo lady with PMH with gastric cancer T1a diagnosed 2011 s/p partial gastrectomy, with localized recurrence identified in 5/2021 at anastamotic site, who had not pursued therapy, now presenting with inability to tolerate PO, found to have metastatic disease to bone, with peritoneal carcinomatosis, pleural effusion and ascites, s/p thoracentesis and paracentesis, with improving MARIELLA and UGIS with narrowing at GJ.     Gastric cancer with metastases  - Patient was found to have narrowing at gastrojejunostomy with minimal contrast passing through the stomach into the efferent loop. She states that she is adequately able to meet her nutritional needs by consuming liquids alone.  - She expresses that she wants to go home. She is interested in follow up with the GI team as an outpatient to pursue endoscopy with possible stent vs gastrojejunostomy.   - We expressed that stent or gastrojejunostomy would help the patient be able to take in PO, however would not address the known malignancy and that ultimately our recommendations is for the patient to follow up at [facility] with Dr. Ibanez for palliative systemic chemotherapy to try to shrink the tumor. She expresses that she does not want to follow up with Dr. Ibanez because she does not want chemotherapy and that she "is looking into alternatives that do not involve chemotherapy. "   - She is interested in considering follow-up with the palliative care team. We requested that the covering NP provide information for palliative care team follow-up with discharge materials.     MARIELLA  - resolving after beatty catheter placement     We will follow with you. Please do not hesitate to page with questions.     Karlee Hudson MD PGY4   [phone]  Hematology-Oncology Fellow This patient is a 52yo lady with PMH with gastric cancer T1a diagnosed 2011 s/p partial gastrectomy, with localized recurrence identified in 5/2021 at anastamotic site, who had not pursued therapy, now presenting with inability to tolerate PO, found to have metastatic disease to bone, with peritoneal carcinomatosis, pleural effusion and ascites, s/p thoracentesis and paracentesis, with improving MARIELLA and UGIS with narrowing at GJ.     Gastric cancer with metastases  - Patient was found to have narrowing at gastrojejunostomy with minimal contrast passing through the stomach into the efferent loop. She states that she is adequately able to meet her nutritional needs by consuming liquids alone.  - She expresses that she wants to go home. She is interested in follow up with the GI team as an outpatient to pursue endoscopy with possible stent vs gastrojejunostomy.   - We expressed that stent or gastrojejunostomy would help the patient be able to take in PO, however would not address the known malignancy and that ultimately our recommendations is for the patient to follow up at [facility] with Dr. Ibanez for palliative systemic chemotherapy to try to shrink the tumor. She expresses that she does not want to follow up with Dr. Ibanez because she does not want chemotherapy and that she "is looking into alternatives that do not involve chemotherapy. "   - She is interested in considering follow-up with the palliative care team. We requested that the covering NP provide information for palliative care team follow-up with discharge materials.     MARIELLA  - resolving after beatty catheter placement     We will follow with you. Please do not hesitate to page with questions.     Karlee Hudson MD PGY4   [phone]  Hematology-Oncology Fellow

## 2021-12-30 NOTE — PROGRESS NOTE ADULT - SUBJECTIVE AND OBJECTIVE BOX
HPI:   51 year old female with PMH gastric CA s/p resection, SBO, multiple bowel resections, and recent recurrence of her cancer who presents for inability to tolerate PO found to have progression of her gastric cancer (bone metastasis and peritoneal carcinomatosis). Now with dysphagia.  GI as suggesting GI series; however, due to nausea, the patient did not think she was able to tolerate it. She is trying to be optimized for possible EGD.  Currently she has MARIELLA ? 2/2 to contrast induced nephropathy + [place]. Urology is recommending left Nephroureteral tube. She is s/p Paracentesis and Thoracentesis. Palliative medicine is f/u for symptoms and GOC.    SUBJECTIVE AND OBJECTIVE:  INTERVAL HPI/OVERNIGHT EVENTS:    12/29 She indicated nausea has improve. However, she is still not able to pass gas and has not had a BM. She did not use any Dilaudid PRN during the last 24 hours.   12/30 The patient appears to be tolerating clear liquid diet.           DNR on chart:   Allergies    Cipro (Rash)    Intolerances    MEDICATIONS  (STANDING):  enoxaparin Injectable 40 milliGRAM(s) SubCutaneous daily  ondansetron Injectable 4 milliGRAM(s) IV Push every 8 hours  pantoprazole  Injectable 40 milliGRAM(s) IV Push every 24 hours  potassium phosphate IVPB 15 milliMole(s) IV Intermittent once  simethicone 80 milliGRAM(s) Chew <User Schedule>    MEDICATIONS  (PRN):  HYDROmorphone  Injectable 0.3 milliGRAM(s) IV Push every 3 hours PRN Moderate Pain (4 - 6)  HYDROmorphone  Injectable 0.5 milliGRAM(s) IV Push every 3 hours PRN Severe Pain (7 - 10)  ondansetron Injectable 4 milliGRAM(s) IV Push every 8 hours PRN Nausea and/or Vomiting        ITEMS UNCHECKED ARE NOT PRESENT    PRESENT SYMPTOMS: [ ]Unable to obtain due to poor mentation   Source if other than patient:  [ ]Family   [ ]Team     Pain:  [x ]yes [ ]no  QOL impact - Not being able to rest or eat   Location -                  epigastric and upper abdominal region.   Aggravating factors - eating   Quality - discomfort   Radiation - none   Timing- intermittent   Severity (0-10 scale): up to 8/10 but going away with Dilaudid PRN.  However, pain was currently controlled.   Minimal acceptable level (0-10 scale): 3    Dyspnea:                           [ ]Mild [ ]Moderate [ ]Severe  Anxiety:                             [ ]Mild [ ]Moderate [ ]Severe  Fatigue:                             [ ]Mild [ ]Moderate [ ]Severe  Nausea:                             [ ]Mild [ ]Moderate [ ]Severe  Loss of appetite:              [ ]Mild [ ]Moderate [ ]Severe  Constipation:                    [ ]Mild [ ]Moderate [ ]Severe    CPOT:    https://www.[facility].org/getattachment/[email or web address]/Critical-Care-Pain-Observation-Tool-(CPOT)    PAIN AD Score:	  http://geriatrictoolkit.[facility]/cog/painad.pdf (Ctrl + left click to view)    Other Symptoms:  [ x]All other review of systems negative but as per subjective and objective     Palliative Performance Status Version 2:    40     %      http://[facility].org/files/news/palliative_performance_scale_[email or web address].pdf  PHYSICAL EXAM:  Vital Signs Last 24 Hrs  T(C): 36.3 (29 Dec 2021 11:29), Max: 37 (28 Dec 2021 20:56)  T(F): 97.3 (29 Dec 2021 11:29), Max: 98.6 (28 Dec 2021 20:56)  HR: 87 (29 Dec 2021 11:29) (78 - 100)  BP: 119/65 (29 Dec 2021 11:29) (119/65 - 127/72)  BP(mean): --  RR: 18 (29 Dec 2021 11:29) (18 - 18)  SpO2: 97% (29 Dec 2021 11:29) (97% - 99%)       GENERAL:  [x ]Alert  [ x]Oriented x 3  [ ]Lethargic  [ ]Cachexia  [ ]Unarousable  [ ]Verbal  [ ]Non-Verbal [x] Obese   Behavioral:   [ ]Anxiety  [ ]Delirium [ ]Agitation [ ]Other  HEENT:  [x ]Normal   [ ]Dry mouth   [ ]ET Tube/Trach  [ ]Oral lesions  PULMONARY:   [x ]Clear [ ]Tachypnea  [ ]Audible excessive secretions   [ ]Rhonchi        [ ]Right [ ]Left [ ]Bilateral  [ ]Crackles        [ ]Right [ ]Left [ ]Bilateral  [ ]Wheezing     [ ]Right [ ]Left [ ]Bilateral  [ ]Diminished BS [ ] Right [ ]Left [ ]Bilateral  CARDIOVASCULAR:    [x ]Regular [ ]Irregular [ ]Tachy  [ ]Linus [ ]Murmur [ ]Other  GASTROINTESTINAL:  [x ]Soft  [ ]Distended   [x ]+BS  [ ]Non tender [x ]Tender over epigastric region  [ ]PEG [ ]OGT/ NGT   Last BM:  12/26  GENITOURINARY:  [ ]Normal [ ]Incontinent   [ ]Oliguria/Anuria   [x ]Ansari   MUSCULOSKELETAL:   [ ]Normal   [x ]Weakness  [ ]Bed/Wheelchair bound [ x]Edema  NEUROLOGIC:   [x ]No focal deficits  [ ] Cognitive impairment  [ ] Dysphagia [ ]Dysarthria [ ] Paresis [ ]Other   SKIN:   [x ]Normal  [ ]Rash   [ ]Pressure ulcer(s) [ ]y [ ]n present on admission    CRITICAL CARE:  [ ]Shock Present  [ ]Septic [ ]Cardiogenic [ ]Neurologic [ ]Hypovolemic  [ ]Vasopressors [ ]Inotropes  [ ]Respiratory failure present [ ]Mechanical Ventilation [ ]Non-invasive ventilatory support [ ]High-Flow   [ ]Acute  [ ]Chronic [ ]Hypoxic  [ ]Hypercarbic [ ]Other  [ ]Other organ failure     LABS:                                    8.8    7.71  )-----------( 250      ( 29 Dec 2021 05:05 )             28.1   12-29    132<L>  |  99  |  13  ----------------------------<  95  3.6   |  17<L>  |  0.96    Ca    8.9      29 Dec 2021 16:26  Phos  2.3     12-29  Mg     1.5     12-29    TPro  5.3<L>  /  Alb  2.6<L>  /  TBili  0.3  /  DBili  x   /  AST  12  /  ALT  15  /  AlkPhos  1394<H>  12-29        RADIOLOGY & ADDITIONAL STUDIES:    c< from: CT Abdomen and Pelvis w/ IV Cont (12.21.21 @ 20:14) >    ACC: [id number] EXAM:  CT ABDOMEN AND PELVIS IC                          PROCEDURE DATE:  12/21/2021 IMPRESSION:  1. Apparent marked dilatation afferent loop likely secondary to recurrent   disease at the gastrojejunostomy  2. New large right pleural effusion with adjacent atelectasis..  3. New large ascites with multiple loculations and mesenteric fat   infiltration likely secondary to peritoneal carcinomatosis  4. Predominantly sclerotic but some lytic lesions throughout skeleton   suggest  advanced metastatic disease..  5. Moderate left hydronephrosis andproximal ureteral dilatation  6. Large abdominal and pelvic wall hernia containing bowel, mesentery and  ascites. No significant change.    Original report by [facility].  Final report discussed with STEFFANIE Cardoza on 12/22/2021 at 9:10 AM  with   read back.    --- End of Report ---            YAMILA OSWALD MD; Attending Radiologist  This document has been electronically signed. Dec 22 2021  9:13AM    < end of copied text >    < from: CT Angio Chest PE Protocol w/ IV Cont (12.21.21 @ 22:31) >    IMPRESSION:    No main, right, left or lobar pulmonary embolism. Limited evaluation of   the segmental subsegmental branches.    New large right pleural effusion with near complete right lower and   partial right upper and middle compressive atelectasis.    Stable 4 mm mm left lower lobe pulmonary nodule.    Diffuse osseous metastatic disease.    --- End of Report ---          ZEV CHOI MD; Resident Radiology  This document has been electronically signed.  GORDY LANDERS MD; Attending Radiologist  This document has been electronically signed. Dec 22 2021 12:15AMACC: [id number] EXAM:  CT ANGIO CHEST PULM ART [facility]                          PROCEDURE DATE:  12/21/2021      < end of copied text >    Protein Calorie Malnutrition Present: [ ]mild [ ]moderate [ ]severe [ ]underweight [ ]morbid obesity  https://www.andeal.org/vault/2440/web/files/ONC/Table_Clinical%20Characteristics%20to%20Document%20Malnutrition-White%20JV%20et%20al%202012.pdf    Height (cm): 167.6 (12-21-21 @ 17:21), 167.6 (07-14-21 @ 14:07)  Weight (kg): 95.3 (12-21-21 @ 17:21), 111.1 (07-14-21 @ 14:07)  BMI (kg/m2): 33.9 (12-21-21 @ 17:21), 39.6 (07-14-21 @ 14:07)    [ ]PPSV2 < or = 30%  [ ]significant weight loss [ ]poor nutritional intake [ ]anasarca    [ ]Artificial Nutrition    REFERRALS:   [ ]Chaplaincy  [ ]Hospice  [ ]Child Life  [ ]Social Work  [ ]Case management [ ]Holistic Therapy     Goals of Care Document:     HPI:   51 year old female with PMH gastric CA s/p resection, SBO, multiple bowel resections, and recent recurrence of her cancer who presents for inability to tolerate PO found to have progression of her gastric cancer (bone metastasis and peritoneal carcinomatosis). Now with dysphagia.  GI as suggesting GI series; however, due to nausea, the patient did not think she was able to tolerate it. She is trying to be optimized for possible EGD.  Currently she has MARIELLA ? 2/2 to contrast induced nephropathy + [place]. Urology is recommending left Nephroureteral tube. She is s/p Paracentesis and Thoracentesis. Palliative medicine is f/u for symptoms and GOC.    SUBJECTIVE AND OBJECTIVE:  INTERVAL HPI/OVERNIGHT EVENTS:    12/29 She indicated nausea has improve. However, she is still not able to pass gas and has not had a BM. She did not use any Dilaudid PRN during the last 24 hours.   12/30 The patient appears to be tolerating clear liquid diet. Still not able to pass gas or to have a BM.           DNR on chart:   Allergies    Cipro (Rash)    Intolerances    MEDICATIONS  (STANDING):  enoxaparin Injectable 40 milliGRAM(s) SubCutaneous daily  ondansetron Injectable 4 milliGRAM(s) IV Push every 8 hours  pantoprazole  Injectable 40 milliGRAM(s) IV Push every 24 hours  potassium chloride  10 mEq/100 mL IVPB 10 milliEquivalent(s) IV Intermittent every 1 hour  simethicone 80 milliGRAM(s) Chew <User Schedule>    MEDICATIONS  (PRN):  HYDROmorphone  Injectable 0.3 milliGRAM(s) IV Push every 3 hours PRN Moderate Pain (4 - 6)  HYDROmorphone  Injectable 0.5 milliGRAM(s) IV Push every 3 hours PRN Severe Pain (7 - 10)  ondansetron Injectable 4 milliGRAM(s) IV Push every 8 hours PRN Nausea and/or Vomiting      ITEMS UNCHECKED ARE NOT PRESENT    PRESENT SYMPTOMS: [ ]Unable to obtain due to poor mentation   Source if other than patient:  [ ]Family   [ ]Team     Pain:  [ x]yes [ ]no  QOL impact - Not being able to rest or eat   Location -                  epigastric and upper abdominal region.   Aggravating factors - eating   Quality - discomfort   Radiation - none   Timing- intermittent   Severity (0-10 scale): up to 8/10 but going away with Dilaudid PRN.  However, pain was currently controlled.   Minimal acceptable level (0-10 scale): 3    Dyspnea:                           [ ]Mild [ ]Moderate [ ]Severe  Anxiety:                             [ ]Mild [ ]Moderate [ ]Severe  Fatigue:                             [ ]Mild [ ]Moderate [ ]Severe  Nausea:                             [x ]Mild [ ]Moderate [ ]Severe  Loss of appetite:              [ ]Mild [ ]Moderate [ ]Severe  Constipation:                    [ ]Mild [ ]Moderate [ ]Severe    CPOT:    https://www.[facility].org/getattachment/[email or web address]/Critical-Care-Pain-Observation-Tool-(CPOT)    PAIN AD Score:	  http://geriatrictoolkit.[facility]/cog/painad.pdf (Ctrl + left click to view)    Other Symptoms:  [ x]All other review of systems negative but as per subjective and objective     Palliative Performance Status Version 2:    40     %      http://[facility].org/files/news/palliative_performance_scale_[email or web address].pdf  PHYSICAL EXAM:  Vital Signs Last 24 Hrs  T(C): 36.4 (30 Dec 2021 14:30), Max: 36.6 (29 Dec 2021 21:15)  T(F): 97.5 (30 Dec 2021 14:30), Max: 97.9 (29 Dec 2021 21:15)  HR: 92 (30 Dec 2021 14:30) (89 - 92)  BP: 124/79 (30 Dec 2021 14:30) (124/79 - 127/82)  BP(mean): --  RR: 18 (30 Dec 2021 14:30) (18 - 18)  SpO2: 98% (30 Dec 2021 14:30) (98% - 98%)  GENERAL:  [x ]Alert  [ x]Oriented x 3  [ ]Lethargic  [ ]Cachexia  [ ]Unarousable  [ ]Verbal  [ ]Non-Verbal [x] Obese   Behavioral:   [ ]Anxiety  [ ]Delirium [ ]Agitation [ ]Other  HEENT:  [x ]Normal   [ ]Dry mouth   [ ]ET Tube/Trach  [ ]Oral lesions  PULMONARY:   [x ]Clear [ ]Tachypnea  [ ]Audible excessive secretions   [ ]Rhonchi        [ ]Right [ ]Left [ ]Bilateral  [ ]Crackles        [ ]Right [ ]Left [ ]Bilateral  [ ]Wheezing     [ ]Right [ ]Left [ ]Bilateral  [ ]Diminished BS [ ] Right [ ]Left [ ]Bilateral  CARDIOVASCULAR:    [x ]Regular [ ]Irregular [ ]Tachy  [ ]Linus [ ]Murmur [ ]Other  GASTROINTESTINAL:  [x ]Soft  [ ]Distended   [x ]+BS  [ ]Non tender [x ]Tender over epigastric region  [ ]PEG [ ]OGT/ NGT   Last BM:  12/26  GENITOURINARY:  [ ]Normal [ ]Incontinent   [ ]Oliguria/Anuria   [x ]Ansari   MUSCULOSKELETAL:   [ ]Normal   [x ]Weakness  [ ]Bed/Wheelchair bound [ x]Edema  NEUROLOGIC:   [x ]No focal deficits  [ ] Cognitive impairment  [ ] Dysphagia [ ]Dysarthria [ ] Paresis [ ]Other   SKIN:   [x ]Normal  [ ]Rash   [ ]Pressure ulcer(s) [ ]y [ ]n present on admission    CRITICAL CARE:  [ ]Shock Present  [ ]Septic [ ]Cardiogenic [ ]Neurologic [ ]Hypovolemic  [ ]Vasopressors [ ]Inotropes  [ ]Respiratory failure present [ ]Mechanical Ventilation [ ]Non-invasive ventilatory support [ ]High-Flow   [ ]Acute  [ ]Chronic [ ]Hypoxic  [ ]Hypercarbic [ ]Other  [ ]Other organ failure     LABS:                                                      8.9    8.48  )-----------( 281      ( 30 Dec 2021 07:18 )             28.2   12-30    132<L>  |  99  |  10  ----------------------------<  90  3.4<L>   |  19<L>  |  0.82    Ca    8.8      30 Dec 2021 07:18  Phos  2.7     12-30  Mg     1.6     12-30    TPro  5.3<L>  /  Alb  2.6<L>  /  TBili  0.3  /  DBili  x   /  AST  12  /  ALT  15  /  AlkPhos  1394<H>  12-29        RADIOLOGY & ADDITIONAL STUDIES:  < from: Xray UGI Single Contrast Study (12.29.21 @ 11:17) >    ACC: [id number] EXAM:  XR UGI SNGL CON STDY                          PROCEDURE DATE:  12/29/2021  IMPRESSION:  Limited study. Narrowing at the gastrojejunostomy with minimal contrast   passing through the stomach into the efferent loop.    --- End of Report ---          LALA GUTIERREZ MD; Resident Radiologist  This document has been electronically signed.  LOBITO CROFT MD; Attending Radiologist  This document has been electronically signed. Dec 29 2021  3:09PM    < end of copied text >    Protein Calorie Malnutrition Present: [ ]mild [ ]moderate [ ]severe [ ]underweight [ ]morbid obesity  https://www.andeal.org/vault/2440/web/files/ONC/Table_Clinical%20Characteristics%20to%20Document%20Malnutrition-White%20JV%20et%20al%202012.pdf    Height (cm): 167.6 (12-21-21 @ 17:21), 167.6 (07-14-21 @ 14:07)  Weight (kg): 95.3 (12-21-21 @ 17:21), 111.1 (07-14-21 @ 14:07)  BMI (kg/m2): 33.9 (12-21-21 @ 17:21), 39.6 (07-14-21 @ 14:07)    [ ]PPSV2 < or = 30%  [ ]significant weight loss [ ]poor nutritional intake [ ]anasarca    [ ]Artificial Nutrition    REFERRALS:   [ ]Chaplaincy  [ ]Hospice  [ ]Child Life  [ ]Social Work  [ ]Case management [ ]Holistic Therapy     Goals of Care Document:

## 2021-12-30 NOTE — PROGRESS NOTE ADULT - ATTENDING COMMENTS
48F with gastric ca s/p B2 not on chemo with wide recurrence here with PO intolerance/nausea/vomiting  CT showing known recurrence at GJ with afferent limb dilation however afferent limb obstruction does not explain sx (also no cholangitis or elevated TB which would be concerning for afferent limb syndrome)  Please obtain UGIS with small bowel follow through to evaluate efferent limb patency  Need for endoscopic intervention to be determined subsequently  High calorie liquid diet in the meantime as tolerated for now    Thank you for this interesting consult.  Please call the advanced GI service with any questions or concerns.
50 yo female with  widely metastatic gastric cancer s/p multiple bowel resection c/o increasing SOB. See has difficulty lying flat due to abd mets from gastric cancer. No fever , chills  or hemoptysis. Pt c/o SOB at rest and on exertion. Pt with large R pleural effusion on CT chest. POCUS today confirmed large R pleural effusion.      A/P Large R pleural effusion suspicious for malignant pleural effusion.  S/P  thoracentesis with 2 liter fluid removal. Pt feeling better. Explained to patient that she will cough for 4-5 hourss as her lung expands. No pneumothorax on post cxr.   If pt has recurrent malignant pleural effusion, she would be candidate for Pleurx catheter.
Pt. seen , examined and d/w fellow .Agree with above note. Pt. 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 Work up since then included EUS with Dr. Llanos that showed involvement of the mucosa, submucosa, and muscularis propria (T2) with gastro-hepatic nodes. Patient now presenting with 30 lb wt loss, poor PO intake and N/V  after PO intake for the past 3 months with work up revealing in CT  marked dilatation afferent loop likely secondary to recurrent disease at the gastrojejunostomy and ? partial obstruction.    When asked why she did not follow up after diagnosis of her recurrence, she wanted to seek 'alternative' treatments. We will discuss with advanced regarding possibility of a gastric stent.    Shin Bajwa MD  [facility]
As above  Recurrent metastastic gastric ca with obstruction of afferent and efferent limbs - efferent limb obstruction best seen on UGIS yesterday - small volume liquid going through limb.  Afferent limb dilated on CT but no evidence of afferent limb syndrome at this time    Discussed with advanced endoscopy colleagues; can offer endoscopy with either efferent limb stenting or endoscopic GJ - depends on anatomy during endoscopy. Would plan to do this at [facility]. Discussed with patient, she is amenable. Can do this as an outpatient at [facility] which is patient preference which we will arrange  (vs inpatient transfer).  Discussed in the meantime continuing a full liquid diet with high calorie content to be taken in small portions throughout the day as opposed to three full meals (encouraged milkshakes, protein shakes/high calorie supplements like Ensure). Take PPI daily for reflux from stasis.    Thank you for this interesting consult.  Please call the advanced GI service with any questions or concerns.
Recurrent/metastatic gastric cancer.  Patient reported some relief s/p thoracentesis but not as much following paracentesis.    Continue symptom management per palliative care.    Management of MARIELLA as above.  For possible upper endoscopy depending on renal function/electrolytes.      Madi Hein MD
Patient with recurrent gastric cancer with disease causing component of upper GI obstruction whereby she cannot tolerate solid foods.  She is able to tolerate full liquid diet.  She ultimately needs GI intervention for either stent of G-J Tube.  Recommended she have this addressed as inpatient so her nutritional needs can be met, however she was insistent on going home and following-up with GI as outpatient.  She does not want to stay in the hospital.  Standard of care would include systemic palliative chemotherapy and possibly palliative RT to the region of obstruction.  Discussed that she might need Mediport placement for chemo administration.  Recommended she f/u with Medical Oncology to discuss/arrange for chemotherapy treatment.  She reported that she did not want chemotherapy at all and dit not want to follow with medical oncology and prefers to take a different "natural approach" and plans to see a new natural practitioner.  I asked her for further details about this but she declined to discuss it further.  She clearly stated that she does not want to see Medical Oncology and stated "I'm a 51 year old woman and can make my own decisions".  I asked how her sister felt about this and she said she supported her making decisions that were right for her.  I recommended she see Palliative Care as an outpatient for symptom management and she was agreeable to this; asked NP to make referral on discharge.  She had CXR this morning that was reviewed revealing some re-accumulation of effusion; discussed that she may need to have repeat thoracentesis done as outpatient and she was hoping pall care could arrange this if needed.  D/W NP and also medical attending.      Madi Hein MD

## 2021-12-30 NOTE — PROGRESS NOTE ADULT - SUBJECTIVE AND OBJECTIVE BOX
INTERNAL MEDICINE PROGRESS NOTE    Dr. Chao Snyder, DO  Hospitalist  Division of Hospital Medicine  [facility]  Available via Microsoft Teams      SUBJECTIVE:  No acute complaints.     REVIEW OF SYSTEMS   10 point review of systems negative except for above.     PAST MEDICAL & SURGICAL HISTORY:  Gastric cancer  5/2011 no adjunt trement    Small bowel obstruction  Exp lap complicated with post op Ishemic bowel /perforation    Perforated bowel    Other abdominal hernia    Obesity (BMI 30-39.9)    History of gastrectomy  distal gastrectomy w/ B2 reconstruction (gastrojejunostomy)    History of resection of small bowel  exlap, SBR, meckel&#x27;s diverticulectomy (7/4/2015); exlap, SBR for necrotic bowel, left in discontinuity, Abthera (7/9/2015); exlap, SB anastomosis, Abthera (7/11/15); exlap, washout, abdominal closure w/ Strattice (7/13/15)        MEDICATIONS  (STANDING):  enoxaparin Injectable 40 milliGRAM(s) SubCutaneous daily  ondansetron Injectable 4 milliGRAM(s) IV Push every 8 hours  pantoprazole  Injectable 40 milliGRAM(s) IV Push every 24 hours  potassium chloride  10 mEq/100 mL IVPB 10 milliEquivalent(s) IV Intermittent every 1 hour  simethicone 80 milliGRAM(s) Chew <User Schedule>    MEDICATIONS  (PRN):  HYDROmorphone  Injectable 0.3 milliGRAM(s) IV Push every 3 hours PRN Moderate Pain (4 - 6)  HYDROmorphone  Injectable 0.5 milliGRAM(s) IV Push every 3 hours PRN Severe Pain (7 - 10)  ondansetron Injectable 4 milliGRAM(s) IV Push every 8 hours PRN Nausea and/or Vomiting      Allergies    Cipro (Rash)    Intolerances        T(C): 36.6 (12-29-21 @ 21:15), Max: 36.6 (12-29-21 @ 21:15)  T(F): 97.9 (12-29-21 @ 21:15), Max: 97.9 (12-29-21 @ 21:15)  HR: 89 (12-29-21 @ 21:15) (87 - 89)  BP: 127/82 (12-29-21 @ 21:15) (119/65 - 127/82)  ABP: --  ABP(mean): --  RR: 18 (12-29-21 @ 21:15) (18 - 18)  SpO2: 98% (12-29-21 @ 21:15) (97% - 98%)      CONSTITUTIONAL: No acute distress.   HEENT:  Conjunctiva clear B/L.  Moist oral mucosa.   Cardiovascular: RRR with no murmurs. No JVD noted. 1+ lower extremity edema B/L. Extremities are warm and well perfused. Radial pulses 2+ B/L. Dorsalis pedis pulses 2+ B/L.    Respiratory: dc bs on r; no wrr. No wrr. No accessory muscle use.   Gastrointestinal:  Soft, nontender. Non-distended. Non-rigid. No CVA tenderness B/L.  MSK:  No joint swelling. No joint erythema B/L. No midline spinal tenderness.  Neurologic:  Alert and awake. Moving all extremities. Following commands. Making eye contact.    Skin:  No rashes noted. No skin erythema noted.   Psych:  Normal affect. Normal Mood.     LABS                        8.9    8.48  )-----------( 281      ( 30 Dec 2021 07:18 )             28.2     12-30    132<L>  |  99  |  10  ----------------------------<  90  3.4<L>   |  19<L>  |  0.82    Ca    8.8      30 Dec 2021 07:18  Phos  2.7     12-30  Mg     1.6     12-30    TPro  5.3<L>  /  Alb  2.6<L>  /  TBili  0.3  /  DBili  x   /  AST  12  /  ALT  15  /  AlkPhos  1394<H>  12-29    PT/INR - ( 29 Dec 2021 05:05 )   PT: 15.3 sec;   INR: 1.29 ratio               ALL RECENT STUDIES REVIEWED INCLUDING REPORTS AVAILABLE   CXR: relatively stable r pl effusion.     CARE DISCUSSED WITH ALL CONSULTANTS

## 2021-12-30 NOTE — DISCHARGE NOTE PROVIDER - DETAILS OF MALNUTRITION DIAGNOSIS/DIAGNOSES
This patient has been assessed with a concern for Malnutrition and was treated during this hospitalization for the following Nutrition diagnosis/diagnoses:     -  12/28/2021: Severe protein-calorie malnutrition

## 2021-12-30 NOTE — DISCHARGE NOTE PROVIDER - NSDCMRMEDTOKEN_GEN_ALL_CORE_FT
Dilaudid 1 mg/mL oral liquid: 2 milliliter(s) orally every 6 hours MDD:8 ml  multivitamin:   once a day  ondansetron 4 mg oral tablet, disintegratin tab(s) orally every 8 hours, As Needed for nausea/vomiting.   Probiotic Formula oral capsule: 1 cap(s) orally once a day  Senna 8.8 mg/5 mL oral syrup: 10 milliliter(s) orally once a day (at bedtime)   simethicone 80 mg oral tablet, chewable: 1 tab(s) orally every 6 hours, As Needed . TAKE 6 AMN, 12 PM, 6 PM AND 10 PM FOR GAS.

## 2021-12-30 NOTE — PROGRESS NOTE ADULT - PROBLEM SELECTOR PLAN 5
Will continue to f/u for symptoms.   d/w the primary NP.       Orlin Cardona MD   Geriatrics and Palliative Care (GAP) Consult Service    of Geriatric and Palliative Medicine  [facility]      Please page the following number for clinical matters between the hours of 9 am and 5 pm from Monday through Friday : [phone]    After 5pm and on weekends, please see the contact information below:    In the event of newly developing, evolving, or worsening symptoms, please contact the Palliative Medicine team via pager (if the patient is at [facility] #[phone] or if the patient is at [facility] #[phone]) The Geriatric and Palliative Medicine service has coverage 24 hours a day/ 7 days a week to provide medical recommendations regarding symptom management needs via telephone ON DC, please a referral to Dr Doll for cancer pain Rx and supportive care ([address], [place] [address]  [phone]). If referring to Dr Doll, please email her with a summary of the patient's hospitalization and pain treatment at the time of discharge. Thank you.  Since GOC are defined and symptoms are controlled, I will sign off.     Orlin Cardona MD   Geriatrics and Palliative Care (GAP) Consult Service    of Geriatric and Palliative Medicine  [facility]      Please page the following number for clinical matters between the hours of 9 am and 5 pm from Monday through Friday : [phone]    After 5pm and on weekends, please see the contact information below:    In the event of newly developing, evolving, or worsening symptoms, please contact the Palliative Medicine team via pager (if the patient is at [facility] #[phone] or if the patient is at [facility] #[phone]) The Geriatric and Palliative Medicine service has coverage 24 hours a day/ 7 days a week to provide medical recommendations regarding symptom management needs via telephone

## 2021-12-30 NOTE — CHART NOTE - NSCHARTNOTEFT_GEN_A_CORE
PA Medicine Note     Notified by RN, patient     Patient informed on upper GI series: Narrowing at the gastrojejunostomy with minimal contrast   passing through the stomach into the efferent loop. Patient is A&Ox4 and fully educated on risks of aspiration and is still insistent. Discussed above with hospitalist Dr. De Luna.     Chelly Potter, PA-C   Department of Medicine PA Medicine Note     Notified by RN, patient is insisting on eating scrambled eggs that her  brought to her. Patient informed on upper GI series: Narrowing at the gastrojejunostomy with minimal contrast passing through the stomach into the efferent loop. Patient is A&Ox4, and was fully educated on extreme risks of aspiration and is still insistent. Discussed above with hospitalist Dr. De Luna.     Chelly Potter, PA-C   Department of Medicine PA Medicine Note     Notified by RN, patient is insisting on eating scrambled eggs that her  brought to her. Patient informed on upper GI series findings: Narrowing at the gastrojejunostomy with minimal contrast passing through the stomach into the efferent loop. Patient is A&Ox4, and was fully educated on extreme risks of aspiration and is still insistent. Discussed above with hospitalist Dr. De Luna.     Chelly Potter, PA-C   Department of Medicine PA Medicine Note     Notified by RN, patient is insisting on eating scrambled eggs that her  brought to her. Patient informed on upper GI series findings: Narrowing at the gastrojejunostomy with minimal contrast passing through the stomach into the efferent loop. Patient is A&Ox4, and was fully educated on extreme risks of aspiration and is still insistent. Patient is fully aware of GI recommendations for a full liquid diet. Discussed above with hospitalist Dr. De Luna.     VALDEZ FragaC   Department of Medicine

## 2021-12-30 NOTE — PROGRESS NOTE ADULT - PROBLEM SELECTOR PROBLEM 2
Pleural effusion
Malignant ascites
Nausea & vomiting
Malignant ascites
Nausea & vomiting
Malignant ascites
Malignant ascites
Nausea & vomiting
Malignant ascites

## 2021-12-30 NOTE — PROGRESS NOTE ADULT - PROBLEM SELECTOR PROBLEM 1
Gastric cancer

## 2021-12-30 NOTE — PROVIDER CONTACT NOTE (OTHER) - ASSESSMENT
Alert and oriented x4. Educated on the risk of aspiration but still wants to eat. At 0115 spouse bough in scrambled eggs, pt had approx. three bits.

## 2021-12-30 NOTE — DISCHARGE NOTE PROVIDER - CARE PROVIDERS DIRECT ADDRESSES
[email or web address]

## 2022-01-01 ENCOUNTER — NON-APPOINTMENT (OUTPATIENT)
Age: 52
End: 2022-01-01

## 2022-01-01 ENCOUNTER — EMERGENCY (EMERGENCY)
Facility: HOSPITAL | Age: 52
LOS: 1 days | Discharge: HOME | End: 2022-01-01
Attending: EMERGENCY MEDICINE
Payer: MEDICAID

## 2022-01-01 ENCOUNTER — TRANSCRIPTION ENCOUNTER (OUTPATIENT)
Age: 52
End: 2022-01-01

## 2022-01-01 ENCOUNTER — INPATIENT (INPATIENT)
Facility: HOSPITAL | Age: 52
LOS: 9 days | Discharge: NURSING, REHAB OR LONG-TERM CARE | End: 2022-04-02
Attending: HOSPITALIST | Admitting: HOSPITALIST
Payer: MEDICAID

## 2022-01-01 ENCOUNTER — APPOINTMENT (OUTPATIENT)
Dept: GERIATRICS | Facility: CLINIC | Age: 52
End: 2022-01-01
Payer: MEDICAID

## 2022-01-01 ENCOUNTER — LABORATORY RESULT (OUTPATIENT)
Age: 52
End: 2022-01-01

## 2022-01-01 ENCOUNTER — INPATIENT (INPATIENT)
Facility: HOSPITAL | Age: 52
LOS: 11 days | Discharge: HOME HEALTH | DRG: 374 | End: 2022-04-15
Attending: HOSPITALIST | Admitting: STUDENT IN AN ORGANIZED HEALTH CARE EDUCATION/TRAINING PROGRAM
Payer: MEDICAID

## 2022-01-01 ENCOUNTER — APPOINTMENT (OUTPATIENT)
Dept: GASTROENTEROLOGY | Facility: CLINIC | Age: 52
End: 2022-01-01
Payer: MEDICAID

## 2022-01-01 ENCOUNTER — OUTPATIENT (OUTPATIENT)
Dept: OUTPATIENT SERVICES | Facility: HOSPITAL | Age: 52
LOS: 1 days | End: 2022-01-01
Payer: MEDICAID

## 2022-01-01 ENCOUNTER — APPOINTMENT (OUTPATIENT)
Dept: CARDIOLOGY | Facility: CLINIC | Age: 52
End: 2022-01-01
Payer: MEDICAID

## 2022-01-01 ENCOUNTER — INPATIENT (INPATIENT)
Facility: HOSPITAL | Age: 52
LOS: 14 days | DRG: 180 | End: 2022-04-30
Attending: INTERNAL MEDICINE | Admitting: STUDENT IN AN ORGANIZED HEALTH CARE EDUCATION/TRAINING PROGRAM
Payer: MEDICAID

## 2022-01-01 ENCOUNTER — INPATIENT (INPATIENT)
Facility: HOSPITAL | Age: 52
LOS: 4 days | Discharge: HOME HEALTH | DRG: 167 | End: 2022-01-20
Attending: HOSPITALIST | Admitting: HOSPITALIST
Payer: MEDICAID

## 2022-01-01 ENCOUNTER — APPOINTMENT (OUTPATIENT)
Dept: GERIATRICS | Facility: CLINIC | Age: 52
End: 2022-01-01

## 2022-01-01 ENCOUNTER — RESULT REVIEW (OUTPATIENT)
Age: 52
End: 2022-01-01

## 2022-01-01 ENCOUNTER — INPATIENT (INPATIENT)
Facility: HOSPITAL | Age: 52
LOS: 3 days | Discharge: HOSPICE | End: 2022-01-09
Attending: INTERNAL MEDICINE | Admitting: INTERNAL MEDICINE
Payer: MEDICAID

## 2022-01-01 ENCOUNTER — APPOINTMENT (OUTPATIENT)
Dept: RADIOLOGY | Facility: HOSPITAL | Age: 52
End: 2022-01-01

## 2022-01-01 ENCOUNTER — APPOINTMENT (OUTPATIENT)
Dept: THORACIC SURGERY | Facility: CLINIC | Age: 52
End: 2022-01-01

## 2022-01-01 ENCOUNTER — EMERGENCY (EMERGENCY)
Facility: HOSPITAL | Age: 52
LOS: 1 days | Discharge: HOME | End: 2022-01-01
Attending: STUDENT IN AN ORGANIZED HEALTH CARE EDUCATION/TRAINING PROGRAM
Payer: MEDICAID

## 2022-01-01 ENCOUNTER — APPOINTMENT (OUTPATIENT)
Dept: ULTRASOUND IMAGING | Facility: IMAGING CENTER | Age: 52
End: 2022-01-01
Payer: MEDICAID

## 2022-01-01 ENCOUNTER — INPATIENT (INPATIENT)
Facility: HOSPITAL | Age: 52
LOS: 0 days | Discharge: HOME | DRG: 375 | End: 2022-03-16
Attending: HOSPITALIST | Admitting: HOSPITALIST
Payer: MEDICAID

## 2022-01-01 ENCOUNTER — APPOINTMENT (OUTPATIENT)
Dept: THORACIC SURGERY | Facility: CLINIC | Age: 52
End: 2022-01-01
Payer: MEDICAID

## 2022-01-01 VITALS
DIASTOLIC BLOOD PRESSURE: 60 MMHG | TEMPERATURE: 97 F | HEART RATE: 111 BPM | OXYGEN SATURATION: 100 % | SYSTOLIC BLOOD PRESSURE: 107 MMHG | RESPIRATION RATE: 20 BRPM

## 2022-01-01 VITALS
HEART RATE: 97 BPM | WEIGHT: 199.96 LBS | HEIGHT: 66 IN | DIASTOLIC BLOOD PRESSURE: 78 MMHG | SYSTOLIC BLOOD PRESSURE: 140 MMHG | RESPIRATION RATE: 18 BRPM | OXYGEN SATURATION: 100 % | TEMPERATURE: 98 F

## 2022-01-01 VITALS
WEIGHT: 234 LBS | BODY MASS INDEX: 37.16 KG/M2 | HEIGHT: 66.5 IN | RESPIRATION RATE: 18 BRPM | OXYGEN SATURATION: 99 % | DIASTOLIC BLOOD PRESSURE: 75 MMHG | HEART RATE: 99 BPM | SYSTOLIC BLOOD PRESSURE: 114 MMHG

## 2022-01-01 VITALS
OXYGEN SATURATION: 98 % | RESPIRATION RATE: 16 BRPM | SYSTOLIC BLOOD PRESSURE: 138 MMHG | DIASTOLIC BLOOD PRESSURE: 74 MMHG | HEIGHT: 66 IN | TEMPERATURE: 98 F | HEART RATE: 91 BPM

## 2022-01-01 VITALS
TEMPERATURE: 98.1 F | DIASTOLIC BLOOD PRESSURE: 54 MMHG | HEART RATE: 108 BPM | BODY MASS INDEX: 39.23 KG/M2 | WEIGHT: 247 LBS | OXYGEN SATURATION: 99 % | RESPIRATION RATE: 20 BRPM | HEIGHT: 66.5 IN | SYSTOLIC BLOOD PRESSURE: 116 MMHG

## 2022-01-01 VITALS
TEMPERATURE: 98 F | OXYGEN SATURATION: 97 % | DIASTOLIC BLOOD PRESSURE: 69 MMHG | SYSTOLIC BLOOD PRESSURE: 114 MMHG | RESPIRATION RATE: 18 BRPM | HEART RATE: 108 BPM

## 2022-01-01 VITALS
OXYGEN SATURATION: 95 % | SYSTOLIC BLOOD PRESSURE: 111 MMHG | DIASTOLIC BLOOD PRESSURE: 73 MMHG | HEIGHT: 66 IN | RESPIRATION RATE: 17 BRPM | HEART RATE: 109 BPM | TEMPERATURE: 99 F

## 2022-01-01 VITALS
SYSTOLIC BLOOD PRESSURE: 120 MMHG | TEMPERATURE: 98 F | RESPIRATION RATE: 18 BRPM | HEIGHT: 66 IN | OXYGEN SATURATION: 95 % | DIASTOLIC BLOOD PRESSURE: 88 MMHG | WEIGHT: 250 LBS | HEART RATE: 100 BPM

## 2022-01-01 VITALS
TEMPERATURE: 98 F | OXYGEN SATURATION: 98 % | RESPIRATION RATE: 18 BRPM | DIASTOLIC BLOOD PRESSURE: 64 MMHG | HEART RATE: 100 BPM | SYSTOLIC BLOOD PRESSURE: 119 MMHG

## 2022-01-01 VITALS
HEART RATE: 112 BPM | BODY MASS INDEX: 38.89 KG/M2 | HEIGHT: 66 IN | WEIGHT: 242 LBS | DIASTOLIC BLOOD PRESSURE: 70 MMHG | OXYGEN SATURATION: 98 % | TEMPERATURE: 98.2 F | SYSTOLIC BLOOD PRESSURE: 122 MMHG

## 2022-01-01 VITALS
OXYGEN SATURATION: 96 % | RESPIRATION RATE: 20 BRPM | TEMPERATURE: 97 F | DIASTOLIC BLOOD PRESSURE: 63 MMHG | SYSTOLIC BLOOD PRESSURE: 120 MMHG | HEART RATE: 104 BPM

## 2022-01-01 VITALS
DIASTOLIC BLOOD PRESSURE: 71 MMHG | HEART RATE: 134 BPM | OXYGEN SATURATION: 96 % | TEMPERATURE: 99 F | SYSTOLIC BLOOD PRESSURE: 120 MMHG | RESPIRATION RATE: 20 BRPM

## 2022-01-01 VITALS
OXYGEN SATURATION: 99 % | RESPIRATION RATE: 16 BRPM | DIASTOLIC BLOOD PRESSURE: 57 MMHG | SYSTOLIC BLOOD PRESSURE: 119 MMHG | HEART RATE: 106 BPM | TEMPERATURE: 98 F | HEIGHT: 66 IN

## 2022-01-01 VITALS
WEIGHT: 199.96 LBS | HEIGHT: 66 IN | TEMPERATURE: 98 F | DIASTOLIC BLOOD PRESSURE: 82 MMHG | HEART RATE: 104 BPM | SYSTOLIC BLOOD PRESSURE: 132 MMHG | OXYGEN SATURATION: 99 % | RESPIRATION RATE: 18 BRPM

## 2022-01-01 VITALS
SYSTOLIC BLOOD PRESSURE: 103 MMHG | TEMPERATURE: 98 F | HEIGHT: 66 IN | HEART RATE: 100 BPM | RESPIRATION RATE: 16 BRPM | DIASTOLIC BLOOD PRESSURE: 63 MMHG | WEIGHT: 199.96 LBS | OXYGEN SATURATION: 100 %

## 2022-01-01 VITALS
SYSTOLIC BLOOD PRESSURE: 104 MMHG | RESPIRATION RATE: 16 BRPM | HEART RATE: 113 BPM | DIASTOLIC BLOOD PRESSURE: 61 MMHG | OXYGEN SATURATION: 95 % | TEMPERATURE: 98 F

## 2022-01-01 VITALS
DIASTOLIC BLOOD PRESSURE: 69 MMHG | SYSTOLIC BLOOD PRESSURE: 109 MMHG | HEIGHT: 66 IN | TEMPERATURE: 97 F | RESPIRATION RATE: 20 BRPM | HEART RATE: 109 BPM | OXYGEN SATURATION: 100 %

## 2022-01-01 VITALS
OXYGEN SATURATION: 99 % | DIASTOLIC BLOOD PRESSURE: 71 MMHG | SYSTOLIC BLOOD PRESSURE: 131 MMHG | TEMPERATURE: 99 F | RESPIRATION RATE: 18 BRPM | HEART RATE: 96 BPM

## 2022-01-01 VITALS
HEART RATE: 105 BPM | WEIGHT: 235 LBS | DIASTOLIC BLOOD PRESSURE: 68 MMHG | BODY MASS INDEX: 37.32 KG/M2 | SYSTOLIC BLOOD PRESSURE: 126 MMHG | HEIGHT: 66.5 IN | OXYGEN SATURATION: 99 % | TEMPERATURE: 97.9 F

## 2022-01-01 DIAGNOSIS — R53.81 OTHER MALAISE: ICD-10-CM

## 2022-01-01 DIAGNOSIS — R00.0 TACHYCARDIA, UNSPECIFIED: ICD-10-CM

## 2022-01-01 DIAGNOSIS — R10.9 UNSPECIFIED ABDOMINAL PAIN: ICD-10-CM

## 2022-01-01 DIAGNOSIS — Z71.89 OTHER SPECIFIED COUNSELING: ICD-10-CM

## 2022-01-01 DIAGNOSIS — Z98.89 OTHER SPECIFIED POSTPROCEDURAL STATES: Chronic | ICD-10-CM

## 2022-01-01 DIAGNOSIS — C16.9 MALIGNANT NEOPLASM OF STOMACH, UNSPECIFIED: ICD-10-CM

## 2022-01-01 DIAGNOSIS — J94.8 OTHER SPECIFIED PLEURAL CONDITIONS: ICD-10-CM

## 2022-01-01 DIAGNOSIS — M79.89 OTHER SPECIFIED SOFT TISSUE DISORDERS: ICD-10-CM

## 2022-01-01 DIAGNOSIS — R82.90 UNSPECIFIED ABNORMAL FINDINGS IN URINE: ICD-10-CM

## 2022-01-01 DIAGNOSIS — Z51.5 ENCOUNTER FOR PALLIATIVE CARE: ICD-10-CM

## 2022-01-01 DIAGNOSIS — R06.02 SHORTNESS OF BREATH: ICD-10-CM

## 2022-01-01 DIAGNOSIS — E87.6 HYPOKALEMIA: ICD-10-CM

## 2022-01-01 DIAGNOSIS — D63.8 ANEMIA IN OTHER CHRONIC DISEASES CLASSIFIED ELSEWHERE: ICD-10-CM

## 2022-01-01 DIAGNOSIS — I49.3 VENTRICULAR PREMATURE DEPOLARIZATION: ICD-10-CM

## 2022-01-01 DIAGNOSIS — D64.9 ANEMIA, UNSPECIFIED: ICD-10-CM

## 2022-01-01 DIAGNOSIS — Z90.3 ACQUIRED ABSENCE OF STOMACH [PART OF]: Chronic | ICD-10-CM

## 2022-01-01 DIAGNOSIS — R13.10 DYSPHAGIA, UNSPECIFIED: ICD-10-CM

## 2022-01-01 DIAGNOSIS — J90 PLEURAL EFFUSION, NOT ELSEWHERE CLASSIFIED: ICD-10-CM

## 2022-01-01 DIAGNOSIS — E88.09 OTHER DISORDERS OF PLASMA-PROTEIN METABOLISM, NOT ELSEWHERE CLASSIFIED: ICD-10-CM

## 2022-01-01 DIAGNOSIS — G89.3 NEOPLASM RELATED PAIN (ACUTE) (CHRONIC): ICD-10-CM

## 2022-01-01 DIAGNOSIS — F11.90 OPIOID USE, UNSPECIFIED, UNCOMPLICATED: ICD-10-CM

## 2022-01-01 DIAGNOSIS — R07.9 CHEST PAIN, UNSPECIFIED: ICD-10-CM

## 2022-01-01 DIAGNOSIS — J91.0 MALIGNANT PLEURAL EFFUSION: ICD-10-CM

## 2022-01-01 DIAGNOSIS — J96.01 ACUTE RESPIRATORY FAILURE WITH HYPOXIA: ICD-10-CM

## 2022-01-01 DIAGNOSIS — Z29.9 ENCOUNTER FOR PROPHYLACTIC MEASURES, UNSPECIFIED: ICD-10-CM

## 2022-01-01 DIAGNOSIS — K59.03 DRUG INDUCED CONSTIPATION: ICD-10-CM

## 2022-01-01 DIAGNOSIS — E87.2 ACIDOSIS: ICD-10-CM

## 2022-01-01 DIAGNOSIS — R06.00 DYSPNEA, UNSPECIFIED: ICD-10-CM

## 2022-01-01 DIAGNOSIS — R60.0 LOCALIZED EDEMA: ICD-10-CM

## 2022-01-01 DIAGNOSIS — C79.9 SECONDARY MALIGNANT NEOPLASM OF UNSPECIFIED SITE: ICD-10-CM

## 2022-01-01 DIAGNOSIS — F41.9 ANXIETY DISORDER, UNSPECIFIED: ICD-10-CM

## 2022-01-01 DIAGNOSIS — R93.5 ABNORMAL FINDINGS ON DIAGNOSTIC IMAGING OF OTHER ABDOMINAL REGIONS, INCLUDING RETROPERITONEUM: ICD-10-CM

## 2022-01-01 DIAGNOSIS — K59.00 CONSTIPATION, UNSPECIFIED: ICD-10-CM

## 2022-01-01 DIAGNOSIS — R18.8 OTHER ASCITES: ICD-10-CM

## 2022-01-01 DIAGNOSIS — S42.009A FRACTURE OF UNSPECIFIED PART OF UNSPECIFIED CLAVICLE, INITIAL ENCOUNTER FOR CLOSED FRACTURE: ICD-10-CM

## 2022-01-01 DIAGNOSIS — R53.2 FUNCTIONAL QUADRIPLEGIA: ICD-10-CM

## 2022-01-01 DIAGNOSIS — R52 PAIN, UNSPECIFIED: ICD-10-CM

## 2022-01-01 DIAGNOSIS — Z00.00 ENCOUNTER FOR GENERAL ADULT MEDICAL EXAMINATION W/OUT ABNORMAL FINDINGS: ICD-10-CM

## 2022-01-01 DIAGNOSIS — R79.89 OTHER SPECIFIED ABNORMAL FINDINGS OF BLOOD CHEMISTRY: ICD-10-CM

## 2022-01-01 DIAGNOSIS — E87.1 HYPO-OSMOLALITY AND HYPONATREMIA: ICD-10-CM

## 2022-01-01 DIAGNOSIS — R79.1 ABNORMAL COAGULATION PROFILE: ICD-10-CM

## 2022-01-01 DIAGNOSIS — R74.8 ABNORMAL LEVELS OF OTHER SERUM ENZYMES: ICD-10-CM

## 2022-01-01 DIAGNOSIS — Z23 ENCOUNTER FOR IMMUNIZATION: ICD-10-CM

## 2022-01-01 DIAGNOSIS — Z13.228 ENCOUNTER FOR SCREENING FOR OTHER METABOLIC DISORDERS: ICD-10-CM

## 2022-01-01 DIAGNOSIS — I89.0 LYMPHEDEMA, NOT ELSEWHERE CLASSIFIED: ICD-10-CM

## 2022-01-01 LAB
24R-OH-CALCIDIOL SERPL-MCNC: 43.4 NG/ML — SIGNIFICANT CHANGE UP (ref 30–80)
25(OH)D3 SERPL-MCNC: 39.7 NG/ML
4/8 RATIO: 2.96 RATIO — SIGNIFICANT CHANGE UP (ref 0.9–3.6)
A1C WITH ESTIMATED AVERAGE GLUCOSE RESULT: 5.1 % — SIGNIFICANT CHANGE UP (ref 4–5.6)
ABS CD8: 125 /UL — LOW (ref 142–740)
ALBUMIN FLD-MCNC: 0.3 G/DL — SIGNIFICANT CHANGE UP
ALBUMIN SERPL ELPH-MCNC: 2.3 G/DL — LOW (ref 3.3–5)
ALBUMIN SERPL ELPH-MCNC: 2.3 G/DL — LOW (ref 3.3–5)
ALBUMIN SERPL ELPH-MCNC: 2.4 G/DL — LOW (ref 3.3–5)
ALBUMIN SERPL ELPH-MCNC: 2.4 G/DL — LOW (ref 3.3–5)
ALBUMIN SERPL ELPH-MCNC: 2.5 G/DL — LOW (ref 3.3–5)
ALBUMIN SERPL ELPH-MCNC: 2.6 G/DL
ALBUMIN SERPL ELPH-MCNC: 2.6 G/DL — LOW (ref 3.3–5)
ALBUMIN SERPL ELPH-MCNC: 2.7 G/DL — LOW (ref 3.3–5)
ALBUMIN SERPL ELPH-MCNC: 2.7 G/DL — LOW (ref 3.3–5)
ALBUMIN SERPL ELPH-MCNC: 2.8 G/DL — LOW (ref 3.3–5)
ALBUMIN SERPL ELPH-MCNC: 2.9 G/DL — LOW (ref 3.3–5)
ALBUMIN SERPL ELPH-MCNC: 3 G/DL — LOW (ref 3.3–5)
ALBUMIN SERPL ELPH-MCNC: 3.2 G/DL — LOW (ref 3.3–5)
ALBUMIN SERPL ELPH-MCNC: 3.4 G/DL — SIGNIFICANT CHANGE UP (ref 3.3–5)
ALP BLD-CCNC: 3081 U/L
ALP SERPL-CCNC: 1704 U/L — HIGH (ref 40–120)
ALP SERPL-CCNC: 1905 U/L — HIGH (ref 40–120)
ALP SERPL-CCNC: 1912 U/L — HIGH (ref 40–120)
ALP SERPL-CCNC: 1923 U/L — HIGH (ref 40–120)
ALP SERPL-CCNC: 1923 U/L — HIGH (ref 40–120)
ALP SERPL-CCNC: 1935 U/L — HIGH (ref 40–120)
ALP SERPL-CCNC: 1943 U/L — HIGH (ref 40–120)
ALP SERPL-CCNC: 2041 U/L — HIGH (ref 40–120)
ALP SERPL-CCNC: 2169 U/L — HIGH (ref 40–120)
ALP SERPL-CCNC: 2186 U/L — HIGH (ref 40–120)
ALP SERPL-CCNC: 2194 U/L — HIGH (ref 40–120)
ALP SERPL-CCNC: 2269 U/L — HIGH (ref 40–120)
ALP SERPL-CCNC: 2291 U/L — HIGH (ref 40–120)
ALP SERPL-CCNC: 2307 U/L — HIGH (ref 40–120)
ALP SERPL-CCNC: 2336 U/L — HIGH (ref 40–120)
ALP SERPL-CCNC: 2385 U/L — HIGH (ref 40–120)
ALP SERPL-CCNC: 2474 U/L — HIGH (ref 40–120)
ALP SERPL-CCNC: 2499 U/L — HIGH (ref 40–120)
ALP SERPL-CCNC: 2506 U/L — HIGH (ref 40–120)
ALP SERPL-CCNC: 2531 U/L — HIGH (ref 40–120)
ALP SERPL-CCNC: 2682 U/L — HIGH (ref 40–120)
ALT FLD-CCNC: 114 U/L — HIGH (ref 4–33)
ALT FLD-CCNC: 124 U/L — HIGH (ref 4–33)
ALT FLD-CCNC: 14 U/L — SIGNIFICANT CHANGE UP (ref 10–45)
ALT FLD-CCNC: 14 U/L — SIGNIFICANT CHANGE UP (ref 4–33)
ALT FLD-CCNC: 15 U/L — SIGNIFICANT CHANGE UP (ref 10–45)
ALT FLD-CCNC: 15 U/L — SIGNIFICANT CHANGE UP (ref 4–33)
ALT FLD-CCNC: 16 U/L — SIGNIFICANT CHANGE UP (ref 10–45)
ALT FLD-CCNC: 16 U/L — SIGNIFICANT CHANGE UP (ref 4–33)
ALT FLD-CCNC: 17 U/L — SIGNIFICANT CHANGE UP (ref 4–33)
ALT FLD-CCNC: 19 U/L — SIGNIFICANT CHANGE UP (ref 10–45)
ALT FLD-CCNC: 19 U/L — SIGNIFICANT CHANGE UP (ref 10–45)
ALT FLD-CCNC: 20 U/L — SIGNIFICANT CHANGE UP (ref 10–45)
ALT FLD-CCNC: 20 U/L — SIGNIFICANT CHANGE UP (ref 10–45)
ALT FLD-CCNC: 21 U/L — SIGNIFICANT CHANGE UP (ref 10–45)
ALT FLD-CCNC: 21 U/L — SIGNIFICANT CHANGE UP (ref 4–33)
ALT FLD-CCNC: 22 U/L — SIGNIFICANT CHANGE UP (ref 10–45)
ALT FLD-CCNC: 31 U/L — SIGNIFICANT CHANGE UP (ref 10–45)
ALT SERPL-CCNC: 17 U/L
ANION GAP SERPL CALC-SCNC: 10 MMOL/L — SIGNIFICANT CHANGE UP (ref 5–17)
ANION GAP SERPL CALC-SCNC: 10 MMOL/L — SIGNIFICANT CHANGE UP (ref 7–14)
ANION GAP SERPL CALC-SCNC: 11 MMOL/L
ANION GAP SERPL CALC-SCNC: 11 MMOL/L — SIGNIFICANT CHANGE UP (ref 5–17)
ANION GAP SERPL CALC-SCNC: 11 MMOL/L — SIGNIFICANT CHANGE UP (ref 7–14)
ANION GAP SERPL CALC-SCNC: 12 MMOL/L — SIGNIFICANT CHANGE UP (ref 5–17)
ANION GAP SERPL CALC-SCNC: 12 MMOL/L — SIGNIFICANT CHANGE UP (ref 7–14)
ANION GAP SERPL CALC-SCNC: 13 MMOL/L — SIGNIFICANT CHANGE UP (ref 5–17)
ANION GAP SERPL CALC-SCNC: 13 MMOL/L — SIGNIFICANT CHANGE UP (ref 7–14)
ANION GAP SERPL CALC-SCNC: 14 MMOL/L — SIGNIFICANT CHANGE UP (ref 5–17)
ANION GAP SERPL CALC-SCNC: 15 MMOL/L — SIGNIFICANT CHANGE UP (ref 5–17)
ANION GAP SERPL CALC-SCNC: 17 MMOL/L — SIGNIFICANT CHANGE UP (ref 5–17)
ANION GAP SERPL CALC-SCNC: 6 MMOL/L — SIGNIFICANT CHANGE UP (ref 5–17)
ANION GAP SERPL CALC-SCNC: 6 MMOL/L — SIGNIFICANT CHANGE UP (ref 5–17)
ANION GAP SERPL CALC-SCNC: 9 MMOL/L — SIGNIFICANT CHANGE UP (ref 5–17)
ANISOCYTOSIS BLD QL: SLIGHT — SIGNIFICANT CHANGE UP
ANISOCYTOSIS BLD QL: SLIGHT — SIGNIFICANT CHANGE UP
APPEARANCE: ABNORMAL
APTT BLD: 28.9 SEC — SIGNIFICANT CHANGE UP (ref 27.5–35.5)
APTT BLD: 30.1 SEC — SIGNIFICANT CHANGE UP (ref 27–36.3)
APTT BLD: 30.7 SEC — SIGNIFICANT CHANGE UP (ref 27.5–35.5)
APTT BLD: 30.8 SEC — SIGNIFICANT CHANGE UP (ref 27–36.3)
APTT BLD: 30.8 SEC — SIGNIFICANT CHANGE UP (ref 27–36.3)
APTT BLD: 31.3 SEC — SIGNIFICANT CHANGE UP (ref 27.5–35.5)
APTT BLD: 31.4 SEC — SIGNIFICANT CHANGE UP (ref 27.5–35.5)
APTT BLD: 32.2 SEC — SIGNIFICANT CHANGE UP (ref 27.5–35.5)
APTT BLD: 33.4 SEC — SIGNIFICANT CHANGE UP (ref 27.5–35.5)
APTT BLD: 33.4 SEC — SIGNIFICANT CHANGE UP (ref 27–36.3)
AST SERPL-CCNC: 19 U/L — SIGNIFICANT CHANGE UP (ref 10–40)
AST SERPL-CCNC: 19 U/L — SIGNIFICANT CHANGE UP (ref 10–40)
AST SERPL-CCNC: 19 U/L — SIGNIFICANT CHANGE UP (ref 4–32)
AST SERPL-CCNC: 19 U/L — SIGNIFICANT CHANGE UP (ref 4–32)
AST SERPL-CCNC: 20 U/L — SIGNIFICANT CHANGE UP (ref 4–32)
AST SERPL-CCNC: 20 U/L — SIGNIFICANT CHANGE UP (ref 4–32)
AST SERPL-CCNC: 21 U/L — SIGNIFICANT CHANGE UP (ref 10–40)
AST SERPL-CCNC: 21 U/L — SIGNIFICANT CHANGE UP (ref 4–32)
AST SERPL-CCNC: 22 U/L — SIGNIFICANT CHANGE UP (ref 10–40)
AST SERPL-CCNC: 222 U/L — HIGH (ref 4–32)
AST SERPL-CCNC: 23 U/L — SIGNIFICANT CHANGE UP (ref 10–40)
AST SERPL-CCNC: 24 U/L — SIGNIFICANT CHANGE UP (ref 4–32)
AST SERPL-CCNC: 25 U/L — SIGNIFICANT CHANGE UP (ref 10–40)
AST SERPL-CCNC: 25 U/L — SIGNIFICANT CHANGE UP (ref 10–40)
AST SERPL-CCNC: 26 U/L
AST SERPL-CCNC: 27 U/L — SIGNIFICANT CHANGE UP (ref 10–40)
AST SERPL-CCNC: 275 U/L — HIGH (ref 4–32)
AST SERPL-CCNC: 28 U/L — SIGNIFICANT CHANGE UP (ref 10–40)
AST SERPL-CCNC: 29 U/L — SIGNIFICANT CHANGE UP (ref 4–32)
AST SERPL-CCNC: 34 U/L — SIGNIFICANT CHANGE UP (ref 10–40)
B PERT IGG+IGM PNL SER: ABNORMAL
B PERT IGG+IGM PNL SER: ABNORMAL
BACTERIA: ABNORMAL
BASE EXCESS BLDV CALC-SCNC: -0.4 MMOL/L — SIGNIFICANT CHANGE UP (ref -2–2)
BASE EXCESS BLDV CALC-SCNC: -2.9 MMOL/L — LOW (ref -2–2)
BASE EXCESS BLDV CALC-SCNC: 13.4 MMOL/L — HIGH (ref -2–2)
BASOPHILS # BLD AUTO: 0 K/UL — SIGNIFICANT CHANGE UP (ref 0–0.2)
BASOPHILS # BLD AUTO: 0 K/UL — SIGNIFICANT CHANGE UP (ref 0–0.2)
BASOPHILS # BLD AUTO: 0.04 K/UL — SIGNIFICANT CHANGE UP (ref 0–0.2)
BASOPHILS # BLD AUTO: 0.05 K/UL — SIGNIFICANT CHANGE UP (ref 0–0.2)
BASOPHILS # BLD AUTO: 0.06 K/UL — SIGNIFICANT CHANGE UP (ref 0–0.2)
BASOPHILS # BLD AUTO: 0.06 K/UL — SIGNIFICANT CHANGE UP (ref 0–0.2)
BASOPHILS # BLD AUTO: 0.07 K/UL — SIGNIFICANT CHANGE UP (ref 0–0.2)
BASOPHILS # BLD AUTO: 0.08 K/UL
BASOPHILS # BLD AUTO: 0.11 K/UL — SIGNIFICANT CHANGE UP (ref 0–0.2)
BASOPHILS # BLD AUTO: 0.16 K/UL — SIGNIFICANT CHANGE UP (ref 0–0.2)
BASOPHILS NFR BLD AUTO: 0 % — SIGNIFICANT CHANGE UP (ref 0–2)
BASOPHILS NFR BLD AUTO: 0 % — SIGNIFICANT CHANGE UP (ref 0–2)
BASOPHILS NFR BLD AUTO: 0.6 % — SIGNIFICANT CHANGE UP (ref 0–2)
BASOPHILS NFR BLD AUTO: 0.8 % — SIGNIFICANT CHANGE UP (ref 0–2)
BASOPHILS NFR BLD AUTO: 0.8 % — SIGNIFICANT CHANGE UP (ref 0–2)
BASOPHILS NFR BLD AUTO: 0.9 %
BASOPHILS NFR BLD AUTO: 0.9 % — SIGNIFICANT CHANGE UP (ref 0–2)
BASOPHILS NFR BLD AUTO: 0.9 % — SIGNIFICANT CHANGE UP (ref 0–2)
BASOPHILS NFR BLD AUTO: 1 % — SIGNIFICANT CHANGE UP (ref 0–2)
BASOPHILS NFR BLD AUTO: 1 % — SIGNIFICANT CHANGE UP (ref 0–2)
BASOPHILS NFR BLD AUTO: 1.7 % — SIGNIFICANT CHANGE UP (ref 0–2)
BASOPHILS NFR BLD AUTO: 1.8 % — SIGNIFICANT CHANGE UP (ref 0–2)
BILIRUB DIRECT SERPL-MCNC: 0.3 MG/DL — SIGNIFICANT CHANGE UP (ref 0–0.3)
BILIRUB INDIRECT FLD-MCNC: 0.3 MG/DL — SIGNIFICANT CHANGE UP (ref 0–1)
BILIRUB SERPL-MCNC: 0.3 MG/DL — SIGNIFICANT CHANGE UP (ref 0.2–1.2)
BILIRUB SERPL-MCNC: 0.4 MG/DL — SIGNIFICANT CHANGE UP (ref 0.2–1.2)
BILIRUB SERPL-MCNC: 0.5 MG/DL
BILIRUB SERPL-MCNC: 0.5 MG/DL — SIGNIFICANT CHANGE UP (ref 0.2–1.2)
BILIRUB SERPL-MCNC: 0.6 MG/DL — SIGNIFICANT CHANGE UP (ref 0.2–1.2)
BILIRUBIN URINE: NEGATIVE
BLD GP AB SCN SERPL QL: NEGATIVE — SIGNIFICANT CHANGE UP
BLOOD URINE: NEGATIVE
BUN SERPL-MCNC: 10 MG/DL — SIGNIFICANT CHANGE UP (ref 7–23)
BUN SERPL-MCNC: 11 MG/DL — SIGNIFICANT CHANGE UP (ref 7–23)
BUN SERPL-MCNC: 12 MG/DL
BUN SERPL-MCNC: 12 MG/DL — SIGNIFICANT CHANGE UP (ref 7–23)
BUN SERPL-MCNC: 13 MG/DL — SIGNIFICANT CHANGE UP (ref 7–23)
BUN SERPL-MCNC: 14 MG/DL — SIGNIFICANT CHANGE UP (ref 7–23)
BUN SERPL-MCNC: 16 MG/DL — SIGNIFICANT CHANGE UP (ref 7–23)
BUN SERPL-MCNC: 4 MG/DL — LOW (ref 7–23)
BUN SERPL-MCNC: 5 MG/DL — LOW (ref 7–23)
BUN SERPL-MCNC: 6 MG/DL — LOW (ref 7–23)
BUN SERPL-MCNC: 7 MG/DL — SIGNIFICANT CHANGE UP (ref 7–23)
BUN SERPL-MCNC: 8 MG/DL — SIGNIFICANT CHANGE UP (ref 7–23)
BUN SERPL-MCNC: 9 MG/DL — SIGNIFICANT CHANGE UP (ref 7–23)
CA-I SERPL-SCNC: 1.11 MMOL/L — LOW (ref 1.15–1.33)
CA-I SERPL-SCNC: 1.15 MMOL/L — SIGNIFICANT CHANGE UP (ref 1.15–1.33)
CALCIUM OXALATE CRYSTALS: ABNORMAL
CALCIUM SERPL-MCNC: 7.9 MG/DL — LOW (ref 8.4–10.5)
CALCIUM SERPL-MCNC: 8 MG/DL — LOW (ref 8.4–10.5)
CALCIUM SERPL-MCNC: 8 MG/DL — LOW (ref 8.4–10.5)
CALCIUM SERPL-MCNC: 8.1 MG/DL — LOW (ref 8.4–10.5)
CALCIUM SERPL-MCNC: 8.2 MG/DL — LOW (ref 8.4–10.5)
CALCIUM SERPL-MCNC: 8.3 MG/DL
CALCIUM SERPL-MCNC: 8.3 MG/DL — LOW (ref 8.4–10.5)
CALCIUM SERPL-MCNC: 8.4 MG/DL — SIGNIFICANT CHANGE UP (ref 8.4–10.5)
CALCIUM SERPL-MCNC: 8.5 MG/DL — SIGNIFICANT CHANGE UP (ref 8.4–10.5)
CALCIUM SERPL-MCNC: 8.6 MG/DL — SIGNIFICANT CHANGE UP (ref 8.4–10.5)
CALCIUM SERPL-MCNC: 8.7 MG/DL — SIGNIFICANT CHANGE UP (ref 8.4–10.5)
CALCIUM SERPL-MCNC: 8.7 MG/DL — SIGNIFICANT CHANGE UP (ref 8.4–10.5)
CALCIUM SERPL-MCNC: 8.8 MG/DL — SIGNIFICANT CHANGE UP (ref 8.4–10.5)
CALCIUM SERPL-MCNC: 8.9 MG/DL — SIGNIFICANT CHANGE UP (ref 8.4–10.5)
CALCIUM SERPL-MCNC: 9 MG/DL — SIGNIFICANT CHANGE UP (ref 8.4–10.5)
CALCIUM SERPL-MCNC: 9.1 MG/DL — SIGNIFICANT CHANGE UP (ref 8.4–10.5)
CALCIUM SERPL-MCNC: 9.2 MG/DL — SIGNIFICANT CHANGE UP (ref 8.4–10.5)
CALCIUM SERPL-MCNC: 9.2 MG/DL — SIGNIFICANT CHANGE UP (ref 8.4–10.5)
CD16+CD56+ CELLS NFR BLD: 10 % — SIGNIFICANT CHANGE UP (ref 5–23)
CD16+CD56+ CELLS NFR SPEC: 71 /UL — SIGNIFICANT CHANGE UP (ref 71–410)
CD19 BLASTS SPEC-ACNC: 66 /UL — LOW (ref 84–469)
CD19 BLASTS SPEC-ACNC: 9 % — SIGNIFICANT CHANGE UP (ref 6–24)
CD3 BLASTS SPEC-ACNC: 537 /UL — LOW (ref 672–1870)
CD3 BLASTS SPEC-ACNC: 79 % — SIGNIFICANT CHANGE UP (ref 59–83)
CD4 %: 55 % — SIGNIFICANT CHANGE UP (ref 30–62)
CD8 %: 19 % — SIGNIFICANT CHANGE UP (ref 12–36)
CHLORIDE BLDV-SCNC: 93 MMOL/L — LOW (ref 96–108)
CHLORIDE BLDV-SCNC: 98 MMOL/L — SIGNIFICANT CHANGE UP (ref 96–108)
CHLORIDE SERPL-SCNC: 100 MMOL/L
CHLORIDE SERPL-SCNC: 100 MMOL/L — SIGNIFICANT CHANGE UP (ref 96–108)
CHLORIDE SERPL-SCNC: 100 MMOL/L — SIGNIFICANT CHANGE UP (ref 98–107)
CHLORIDE SERPL-SCNC: 100 MMOL/L — SIGNIFICANT CHANGE UP (ref 98–107)
CHLORIDE SERPL-SCNC: 101 MMOL/L — SIGNIFICANT CHANGE UP (ref 98–107)
CHLORIDE SERPL-SCNC: 102 MMOL/L — SIGNIFICANT CHANGE UP (ref 98–107)
CHLORIDE SERPL-SCNC: 84 MMOL/L — LOW (ref 96–108)
CHLORIDE SERPL-SCNC: 85 MMOL/L — LOW (ref 96–108)
CHLORIDE SERPL-SCNC: 86 MMOL/L — LOW (ref 96–108)
CHLORIDE SERPL-SCNC: 87 MMOL/L — LOW (ref 96–108)
CHLORIDE SERPL-SCNC: 87 MMOL/L — LOW (ref 96–108)
CHLORIDE SERPL-SCNC: 88 MMOL/L — LOW (ref 96–108)
CHLORIDE SERPL-SCNC: 89 MMOL/L — LOW (ref 96–108)
CHLORIDE SERPL-SCNC: 90 MMOL/L — LOW (ref 96–108)
CHLORIDE SERPL-SCNC: 90 MMOL/L — LOW (ref 96–108)
CHLORIDE SERPL-SCNC: 92 MMOL/L — LOW (ref 96–108)
CHLORIDE SERPL-SCNC: 93 MMOL/L — LOW (ref 96–108)
CHLORIDE SERPL-SCNC: 93 MMOL/L — LOW (ref 96–108)
CHLORIDE SERPL-SCNC: 94 MMOL/L — LOW (ref 96–108)
CHLORIDE SERPL-SCNC: 95 MMOL/L — LOW (ref 96–108)
CHLORIDE SERPL-SCNC: 95 MMOL/L — LOW (ref 98–107)
CHLORIDE SERPL-SCNC: 95 MMOL/L — LOW (ref 98–107)
CHLORIDE SERPL-SCNC: 96 MMOL/L — LOW (ref 98–107)
CHLORIDE SERPL-SCNC: 96 MMOL/L — LOW (ref 98–107)
CHLORIDE SERPL-SCNC: 96 MMOL/L — SIGNIFICANT CHANGE UP (ref 96–108)
CHLORIDE SERPL-SCNC: 97 MMOL/L — LOW (ref 98–107)
CHLORIDE SERPL-SCNC: 97 MMOL/L — SIGNIFICANT CHANGE UP (ref 96–108)
CHLORIDE SERPL-SCNC: 98 MMOL/L — SIGNIFICANT CHANGE UP (ref 96–108)
CHLORIDE SERPL-SCNC: 98 MMOL/L — SIGNIFICANT CHANGE UP (ref 98–107)
CHLORIDE SERPL-SCNC: 99 MMOL/L — SIGNIFICANT CHANGE UP (ref 96–108)
CHLORIDE SERPL-SCNC: 99 MMOL/L — SIGNIFICANT CHANGE UP (ref 98–107)
CHLORIDE SERPL-SCNC: 99 MMOL/L — SIGNIFICANT CHANGE UP (ref 98–107)
CHOLEST SERPL-MCNC: 179 MG/DL
CHOLEST SERPL-MCNC: 185 MG/DL — SIGNIFICANT CHANGE UP
CO2 BLDV-SCNC: 25 MMOL/L — SIGNIFICANT CHANGE UP (ref 22–26)
CO2 BLDV-SCNC: 26 MMOL/L — SIGNIFICANT CHANGE UP (ref 22–26)
CO2 BLDV-SCNC: 42 MMOL/L — HIGH (ref 22–26)
CO2 SERPL-SCNC: 20 MMOL/L — LOW (ref 22–31)
CO2 SERPL-SCNC: 20 MMOL/L — LOW (ref 22–31)
CO2 SERPL-SCNC: 21 MMOL/L — LOW (ref 22–31)
CO2 SERPL-SCNC: 22 MMOL/L
CO2 SERPL-SCNC: 22 MMOL/L — SIGNIFICANT CHANGE UP (ref 22–31)
CO2 SERPL-SCNC: 23 MMOL/L — SIGNIFICANT CHANGE UP (ref 22–31)
CO2 SERPL-SCNC: 24 MMOL/L — SIGNIFICANT CHANGE UP (ref 22–31)
CO2 SERPL-SCNC: 25 MMOL/L — SIGNIFICANT CHANGE UP (ref 22–31)
CO2 SERPL-SCNC: 26 MMOL/L — SIGNIFICANT CHANGE UP (ref 22–31)
CO2 SERPL-SCNC: 27 MMOL/L — SIGNIFICANT CHANGE UP (ref 22–31)
CO2 SERPL-SCNC: 28 MMOL/L — SIGNIFICANT CHANGE UP (ref 22–31)
CO2 SERPL-SCNC: 29 MMOL/L — SIGNIFICANT CHANGE UP (ref 22–31)
CO2 SERPL-SCNC: 29 MMOL/L — SIGNIFICANT CHANGE UP (ref 22–31)
CO2 SERPL-SCNC: 31 MMOL/L — SIGNIFICANT CHANGE UP (ref 22–31)
CO2 SERPL-SCNC: 31 MMOL/L — SIGNIFICANT CHANGE UP (ref 22–31)
CO2 SERPL-SCNC: 32 MMOL/L — HIGH (ref 22–31)
CO2 SERPL-SCNC: 32 MMOL/L — HIGH (ref 22–31)
CO2 SERPL-SCNC: 33 MMOL/L — HIGH (ref 22–31)
CO2 SERPL-SCNC: 34 MMOL/L — HIGH (ref 22–31)
CO2 SERPL-SCNC: 34 MMOL/L — HIGH (ref 22–31)
CO2 SERPL-SCNC: 35 MMOL/L — HIGH (ref 22–31)
CO2 SERPL-SCNC: 35 MMOL/L — HIGH (ref 22–31)
CO2 SERPL-SCNC: 36 MMOL/L — HIGH (ref 22–31)
CO2 SERPL-SCNC: 37 MMOL/L — HIGH (ref 22–31)
CO2 SERPL-SCNC: 37 MMOL/L — HIGH (ref 22–31)
CO2 SERPL-SCNC: 38 MMOL/L — HIGH (ref 22–31)
CO2 SERPL-SCNC: 39 MMOL/L — HIGH (ref 22–31)
CO2 SERPL-SCNC: 40 MMOL/L — HIGH (ref 22–31)
CO2 SERPL-SCNC: 42 MMOL/L — HIGH (ref 22–31)
CO2 SERPL-SCNC: 42 MMOL/L — HIGH (ref 22–31)
CO2 SERPL-SCNC: 43 MMOL/L — HIGH (ref 22–31)
COLOR FLD: YELLOW
COLOR FLD: YELLOW — SIGNIFICANT CHANGE UP
COLOR: YELLOW
COMMENT - FLUIDS: SIGNIFICANT CHANGE UP
COMMENT - FLUIDS: SIGNIFICANT CHANGE UP
CREAT ?TM UR-MCNC: 188 MG/DL — SIGNIFICANT CHANGE UP
CREAT SERPL-MCNC: 0.42 MG/DL — LOW (ref 0.5–1.3)
CREAT SERPL-MCNC: 0.43 MG/DL — LOW (ref 0.5–1.3)
CREAT SERPL-MCNC: 0.45 MG/DL — LOW (ref 0.5–1.3)
CREAT SERPL-MCNC: 0.46 MG/DL — LOW (ref 0.5–1.3)
CREAT SERPL-MCNC: 0.48 MG/DL — LOW (ref 0.5–1.3)
CREAT SERPL-MCNC: 0.51 MG/DL — SIGNIFICANT CHANGE UP (ref 0.5–1.3)
CREAT SERPL-MCNC: 0.53 MG/DL — SIGNIFICANT CHANGE UP (ref 0.5–1.3)
CREAT SERPL-MCNC: 0.55 MG/DL — SIGNIFICANT CHANGE UP (ref 0.5–1.3)
CREAT SERPL-MCNC: 0.55 MG/DL — SIGNIFICANT CHANGE UP (ref 0.5–1.3)
CREAT SERPL-MCNC: 0.56 MG/DL — SIGNIFICANT CHANGE UP (ref 0.5–1.3)
CREAT SERPL-MCNC: 0.57 MG/DL — SIGNIFICANT CHANGE UP (ref 0.5–1.3)
CREAT SERPL-MCNC: 0.57 MG/DL — SIGNIFICANT CHANGE UP (ref 0.5–1.3)
CREAT SERPL-MCNC: 0.59 MG/DL — SIGNIFICANT CHANGE UP (ref 0.5–1.3)
CREAT SERPL-MCNC: 0.6 MG/DL — SIGNIFICANT CHANGE UP (ref 0.5–1.3)
CREAT SERPL-MCNC: 0.6 MG/DL — SIGNIFICANT CHANGE UP (ref 0.5–1.3)
CREAT SERPL-MCNC: 0.61 MG/DL — SIGNIFICANT CHANGE UP (ref 0.5–1.3)
CREAT SERPL-MCNC: 0.64 MG/DL — SIGNIFICANT CHANGE UP (ref 0.5–1.3)
CREAT SERPL-MCNC: 0.65 MG/DL — SIGNIFICANT CHANGE UP (ref 0.5–1.3)
CREAT SERPL-MCNC: 0.67 MG/DL — SIGNIFICANT CHANGE UP (ref 0.5–1.3)
CREAT SERPL-MCNC: 0.69 MG/DL — SIGNIFICANT CHANGE UP (ref 0.5–1.3)
CREAT SERPL-MCNC: 0.69 MG/DL — SIGNIFICANT CHANGE UP (ref 0.5–1.3)
CREAT SERPL-MCNC: 0.7 MG/DL — SIGNIFICANT CHANGE UP (ref 0.5–1.3)
CREAT SERPL-MCNC: 0.71 MG/DL — SIGNIFICANT CHANGE UP (ref 0.5–1.3)
CREAT SERPL-MCNC: 0.72 MG/DL — SIGNIFICANT CHANGE UP (ref 0.5–1.3)
CREAT SERPL-MCNC: 0.73 MG/DL — SIGNIFICANT CHANGE UP (ref 0.5–1.3)
CREAT SERPL-MCNC: 0.73 MG/DL — SIGNIFICANT CHANGE UP (ref 0.5–1.3)
CREAT SERPL-MCNC: 0.74 MG/DL — SIGNIFICANT CHANGE UP (ref 0.5–1.3)
CREAT SERPL-MCNC: 0.75 MG/DL — SIGNIFICANT CHANGE UP (ref 0.5–1.3)
CREAT SERPL-MCNC: 0.77 MG/DL — SIGNIFICANT CHANGE UP (ref 0.5–1.3)
CREAT SERPL-MCNC: 0.78 MG/DL — SIGNIFICANT CHANGE UP (ref 0.5–1.3)
CREAT SERPL-MCNC: 0.79 MG/DL — SIGNIFICANT CHANGE UP (ref 0.5–1.3)
CREAT SERPL-MCNC: 0.8 MG/DL — SIGNIFICANT CHANGE UP (ref 0.5–1.3)
CREAT SERPL-MCNC: 0.82 MG/DL
CULTURE RESULTS: SIGNIFICANT CHANGE UP
DACRYOCYTES BLD QL SMEAR: SLIGHT — SIGNIFICANT CHANGE UP
DACRYOCYTES BLD QL SMEAR: SLIGHT — SIGNIFICANT CHANGE UP
EGFR: 101 ML/MIN/1.73M2 — SIGNIFICANT CHANGE UP
EGFR: 102 ML/MIN/1.73M2 — SIGNIFICANT CHANGE UP
EGFR: 104 ML/MIN/1.73M2 — SIGNIFICANT CHANGE UP
EGFR: 106 ML/MIN/1.73M2 — SIGNIFICANT CHANGE UP
EGFR: 106 ML/MIN/1.73M2 — SIGNIFICANT CHANGE UP
EGFR: 107 ML/MIN/1.73M2 — SIGNIFICANT CHANGE UP
EGFR: 108 ML/MIN/1.73M2 — SIGNIFICANT CHANGE UP
EGFR: 110 ML/MIN/1.73M2 — SIGNIFICANT CHANGE UP
EGFR: 111 ML/MIN/1.73M2 — SIGNIFICANT CHANGE UP
EGFR: 111 ML/MIN/1.73M2 — SIGNIFICANT CHANGE UP
EGFR: 112 ML/MIN/1.73M2 — SIGNIFICANT CHANGE UP
EGFR: 113 ML/MIN/1.73M2 — SIGNIFICANT CHANGE UP
EGFR: 114 ML/MIN/1.73M2 — SIGNIFICANT CHANGE UP
EGFR: 116 ML/MIN/1.73M2 — SIGNIFICANT CHANGE UP
EGFR: 117 ML/MIN/1.73M2 — SIGNIFICANT CHANGE UP
EGFR: 118 ML/MIN/1.73M2 — SIGNIFICANT CHANGE UP
EGFR: 119 ML/MIN/1.73M2 — SIGNIFICANT CHANGE UP
EGFR: 120 ML/MIN/1.73M2 — SIGNIFICANT CHANGE UP
EGFR: 99 ML/MIN/1.73M2 — SIGNIFICANT CHANGE UP
EGFR: 99 ML/MIN/1.73M2 — SIGNIFICANT CHANGE UP
ELLIPTOCYTES BLD QL SMEAR: SLIGHT — SIGNIFICANT CHANGE UP
EOSINOPHIL # BLD AUTO: 0.06 K/UL — SIGNIFICANT CHANGE UP (ref 0–0.5)
EOSINOPHIL # BLD AUTO: 0.07 K/UL — SIGNIFICANT CHANGE UP (ref 0–0.5)
EOSINOPHIL # BLD AUTO: 0.08 K/UL — SIGNIFICANT CHANGE UP (ref 0–0.5)
EOSINOPHIL # BLD AUTO: 0.09 K/UL — SIGNIFICANT CHANGE UP (ref 0–0.5)
EOSINOPHIL # BLD AUTO: 0.13 K/UL — SIGNIFICANT CHANGE UP (ref 0–0.5)
EOSINOPHIL # BLD AUTO: 0.14 K/UL — SIGNIFICANT CHANGE UP (ref 0–0.5)
EOSINOPHIL # BLD AUTO: 0.15 K/UL — SIGNIFICANT CHANGE UP (ref 0–0.5)
EOSINOPHIL # BLD AUTO: 0.16 K/UL — SIGNIFICANT CHANGE UP (ref 0–0.5)
EOSINOPHIL # BLD AUTO: 0.18 K/UL — SIGNIFICANT CHANGE UP (ref 0–0.5)
EOSINOPHIL # BLD AUTO: 0.18 K/UL — SIGNIFICANT CHANGE UP (ref 0–0.5)
EOSINOPHIL # BLD AUTO: 0.19 K/UL — SIGNIFICANT CHANGE UP (ref 0–0.5)
EOSINOPHIL # BLD AUTO: 0.25 K/UL — SIGNIFICANT CHANGE UP (ref 0–0.5)
EOSINOPHIL # BLD AUTO: 0.27 K/UL — SIGNIFICANT CHANGE UP (ref 0–0.5)
EOSINOPHIL # BLD AUTO: 0.4 K/UL
EOSINOPHIL # FLD: 0 % — SIGNIFICANT CHANGE UP
EOSINOPHIL NFR BLD AUTO: 0.9 % — SIGNIFICANT CHANGE UP (ref 0–6)
EOSINOPHIL NFR BLD AUTO: 1.3 % — SIGNIFICANT CHANGE UP (ref 0–6)
EOSINOPHIL NFR BLD AUTO: 1.4 % — SIGNIFICANT CHANGE UP (ref 0–6)
EOSINOPHIL NFR BLD AUTO: 1.7 % — SIGNIFICANT CHANGE UP (ref 0–6)
EOSINOPHIL NFR BLD AUTO: 1.7 % — SIGNIFICANT CHANGE UP (ref 0–6)
EOSINOPHIL NFR BLD AUTO: 1.8 % — SIGNIFICANT CHANGE UP (ref 0–6)
EOSINOPHIL NFR BLD AUTO: 2 % — SIGNIFICANT CHANGE UP (ref 0–6)
EOSINOPHIL NFR BLD AUTO: 2.2 % — SIGNIFICANT CHANGE UP (ref 0–6)
EOSINOPHIL NFR BLD AUTO: 2.6 % — SIGNIFICANT CHANGE UP (ref 0–6)
EOSINOPHIL NFR BLD AUTO: 3.5 % — SIGNIFICANT CHANGE UP (ref 0–6)
EOSINOPHIL NFR BLD AUTO: 4.1 % — SIGNIFICANT CHANGE UP (ref 0–6)
EOSINOPHIL NFR BLD AUTO: 4.3 %
ESTIMATED AVERAGE GLUCOSE: 100 — SIGNIFICANT CHANGE UP
FERRITIN SERPL-MCNC: 107 NG/ML — SIGNIFICANT CHANGE UP (ref 15–150)
FERRITIN SERPL-MCNC: 78 NG/ML
FLUAV AG NPH QL: SIGNIFICANT CHANGE UP
FLUBV AG NPH QL: SIGNIFICANT CHANGE UP
FLUID INTAKE SUBSTANCE CLASS: SIGNIFICANT CHANGE UP
FLUID INTAKE SUBSTANCE CLASS: SIGNIFICANT CHANGE UP
FLUID SEGMENTED GRANULOCYTES: 2 % — SIGNIFICANT CHANGE UP
FOLATE SERPL-MCNC: <2 NG/ML
FOLATE+VIT B12 SERBLD-IMP: 0 % — SIGNIFICANT CHANGE UP
G6PD RBC-CCNC: 17.9 U/G HGB — SIGNIFICANT CHANGE UP (ref 7–20.5)
GAS PNL BLDV: 128 MMOL/L — LOW (ref 136–145)
GAS PNL BLDV: 130 MMOL/L — LOW (ref 136–145)
GAS PNL BLDV: SIGNIFICANT CHANGE UP
GLUCOSE BLDV-MCNC: 94 MG/DL — SIGNIFICANT CHANGE UP (ref 70–99)
GLUCOSE BLDV-MCNC: 97 MG/DL — SIGNIFICANT CHANGE UP (ref 70–99)
GLUCOSE FLD-MCNC: 115 MG/DL — SIGNIFICANT CHANGE UP
GLUCOSE FLD-MCNC: 86 MG/DL — SIGNIFICANT CHANGE UP
GLUCOSE QUALITATIVE U: NEGATIVE
GLUCOSE SERPL-MCNC: 100 MG/DL — HIGH (ref 70–99)
GLUCOSE SERPL-MCNC: 100 MG/DL — HIGH (ref 70–99)
GLUCOSE SERPL-MCNC: 101 MG/DL — HIGH (ref 70–99)
GLUCOSE SERPL-MCNC: 102 MG/DL — HIGH (ref 70–99)
GLUCOSE SERPL-MCNC: 103 MG/DL — HIGH (ref 70–99)
GLUCOSE SERPL-MCNC: 103 MG/DL — HIGH (ref 70–99)
GLUCOSE SERPL-MCNC: 106 MG/DL — HIGH (ref 70–99)
GLUCOSE SERPL-MCNC: 106 MG/DL — HIGH (ref 70–99)
GLUCOSE SERPL-MCNC: 107 MG/DL — HIGH (ref 70–99)
GLUCOSE SERPL-MCNC: 108 MG/DL — HIGH (ref 70–99)
GLUCOSE SERPL-MCNC: 109 MG/DL — HIGH (ref 70–99)
GLUCOSE SERPL-MCNC: 110 MG/DL — HIGH (ref 70–99)
GLUCOSE SERPL-MCNC: 111 MG/DL — HIGH (ref 70–99)
GLUCOSE SERPL-MCNC: 112 MG/DL — HIGH (ref 70–99)
GLUCOSE SERPL-MCNC: 113 MG/DL — HIGH (ref 70–99)
GLUCOSE SERPL-MCNC: 114 MG/DL — HIGH (ref 70–99)
GLUCOSE SERPL-MCNC: 116 MG/DL — HIGH (ref 70–99)
GLUCOSE SERPL-MCNC: 117 MG/DL — HIGH (ref 70–99)
GLUCOSE SERPL-MCNC: 119 MG/DL — HIGH (ref 70–99)
GLUCOSE SERPL-MCNC: 83 MG/DL — SIGNIFICANT CHANGE UP (ref 70–99)
GLUCOSE SERPL-MCNC: 86 MG/DL — SIGNIFICANT CHANGE UP (ref 70–99)
GLUCOSE SERPL-MCNC: 87 MG/DL — SIGNIFICANT CHANGE UP (ref 70–99)
GLUCOSE SERPL-MCNC: 88 MG/DL — SIGNIFICANT CHANGE UP (ref 70–99)
GLUCOSE SERPL-MCNC: 90 MG/DL — SIGNIFICANT CHANGE UP (ref 70–99)
GLUCOSE SERPL-MCNC: 92 MG/DL — SIGNIFICANT CHANGE UP (ref 70–99)
GLUCOSE SERPL-MCNC: 93 MG/DL — SIGNIFICANT CHANGE UP (ref 70–99)
GLUCOSE SERPL-MCNC: 94 MG/DL — SIGNIFICANT CHANGE UP (ref 70–99)
GLUCOSE SERPL-MCNC: 94 MG/DL — SIGNIFICANT CHANGE UP (ref 70–99)
GLUCOSE SERPL-MCNC: 96 MG/DL — SIGNIFICANT CHANGE UP (ref 70–99)
GLUCOSE SERPL-MCNC: 97 MG/DL
GLUCOSE SERPL-MCNC: 97 MG/DL — SIGNIFICANT CHANGE UP (ref 70–99)
GLUCOSE SERPL-MCNC: 98 MG/DL — SIGNIFICANT CHANGE UP (ref 70–99)
GLUCOSE SERPL-MCNC: 99 MG/DL — SIGNIFICANT CHANGE UP (ref 70–99)
GRAM STN FLD: SIGNIFICANT CHANGE UP
GRAM STN FLD: SIGNIFICANT CHANGE UP
HAPTOGLOB SERPL-MCNC: 236 MG/DL
HCG SERPL-ACNC: <2 MIU/ML — SIGNIFICANT CHANGE UP
HCG SERPL-ACNC: <5 MIU/ML — SIGNIFICANT CHANGE UP
HCO3 BLDV-SCNC: 24 MMOL/L — SIGNIFICANT CHANGE UP (ref 22–29)
HCO3 BLDV-SCNC: 25 MMOL/L — SIGNIFICANT CHANGE UP (ref 22–29)
HCO3 BLDV-SCNC: 40 MMOL/L — HIGH (ref 22–29)
HCT VFR BLD CALC: 24.2 % — LOW (ref 34.5–45)
HCT VFR BLD CALC: 24.2 % — LOW (ref 34.5–45)
HCT VFR BLD CALC: 24.5 % — LOW (ref 34.5–45)
HCT VFR BLD CALC: 24.9 % — LOW (ref 34.5–45)
HCT VFR BLD CALC: 25.3 % — LOW (ref 34.5–45)
HCT VFR BLD CALC: 25.4 % — LOW (ref 34.5–45)
HCT VFR BLD CALC: 25.5 % — LOW (ref 34.5–45)
HCT VFR BLD CALC: 25.8 % — LOW (ref 34.5–45)
HCT VFR BLD CALC: 25.9 % — LOW (ref 34.5–45)
HCT VFR BLD CALC: 26 % — LOW (ref 34.5–45)
HCT VFR BLD CALC: 26.1 % — LOW (ref 34.5–45)
HCT VFR BLD CALC: 26.4 % — LOW (ref 34.5–45)
HCT VFR BLD CALC: 26.5 % — LOW (ref 34.5–45)
HCT VFR BLD CALC: 26.6 % — LOW (ref 34.5–45)
HCT VFR BLD CALC: 26.6 % — LOW (ref 34.5–45)
HCT VFR BLD CALC: 26.8 % — LOW (ref 34.5–45)
HCT VFR BLD CALC: 26.9 % — LOW (ref 34.5–45)
HCT VFR BLD CALC: 27.1 % — LOW (ref 34.5–45)
HCT VFR BLD CALC: 27.2 % — LOW (ref 34.5–45)
HCT VFR BLD CALC: 27.3 % — LOW (ref 34.5–45)
HCT VFR BLD CALC: 27.3 % — LOW (ref 34.5–45)
HCT VFR BLD CALC: 27.4 % — LOW (ref 34.5–45)
HCT VFR BLD CALC: 27.5 % — LOW (ref 34.5–45)
HCT VFR BLD CALC: 27.5 % — LOW (ref 34.5–45)
HCT VFR BLD CALC: 27.6 % — LOW (ref 34.5–45)
HCT VFR BLD CALC: 27.7 % — LOW (ref 34.5–45)
HCT VFR BLD CALC: 27.9 % — LOW (ref 34.5–45)
HCT VFR BLD CALC: 27.9 % — LOW (ref 34.5–45)
HCT VFR BLD CALC: 28.2 % — LOW (ref 34.5–45)
HCT VFR BLD CALC: 28.3 % — LOW (ref 34.5–45)
HCT VFR BLD CALC: 28.5 % — LOW (ref 34.5–45)
HCT VFR BLD CALC: 28.6 % — LOW (ref 34.5–45)
HCT VFR BLD CALC: 29.1 % — LOW (ref 34.5–45)
HCT VFR BLD CALC: 29.3 % — LOW (ref 34.5–45)
HCT VFR BLD CALC: 29.5 % — LOW (ref 34.5–45)
HCT VFR BLD CALC: 29.6 % — LOW (ref 34.5–45)
HCT VFR BLD CALC: 29.7 % — LOW (ref 34.5–45)
HCT VFR BLD CALC: 29.9 % — LOW (ref 34.5–45)
HCT VFR BLD CALC: 29.9 % — LOW (ref 34.5–45)
HCT VFR BLD CALC: 30 %
HCT VFR BLD CALC: 30.6 % — LOW (ref 34.5–45)
HCT VFR BLD CALC: 31 % — LOW (ref 34.5–45)
HCT VFR BLD CALC: 31.4 % — LOW (ref 34.5–45)
HCT VFR BLD CALC: 33.4 % — LOW (ref 34.5–45)
HCT VFR BLDA CALC: 26 % — LOW (ref 34.5–46.5)
HCT VFR BLDA CALC: 27 % — LOW (ref 34.5–46.5)
HDLC SERPL-MCNC: 54 MG/DL — SIGNIFICANT CHANGE UP
HDLC SERPL-MCNC: 70 MG/DL
HGB BLD CALC-MCNC: 8.6 G/DL — LOW (ref 11.7–16.1)
HGB BLD CALC-MCNC: 8.9 G/DL — LOW (ref 11.7–16.1)
HGB BLD-MCNC: 7.1 G/DL — LOW (ref 11.5–15.5)
HGB BLD-MCNC: 7.1 G/DL — LOW (ref 11.5–15.5)
HGB BLD-MCNC: 7.3 G/DL — LOW (ref 11.5–15.5)
HGB BLD-MCNC: 7.4 G/DL — LOW (ref 11.5–15.5)
HGB BLD-MCNC: 7.4 G/DL — LOW (ref 11.5–15.5)
HGB BLD-MCNC: 7.5 G/DL — LOW (ref 11.5–15.5)
HGB BLD-MCNC: 7.5 G/DL — LOW (ref 11.5–15.5)
HGB BLD-MCNC: 7.6 G/DL — LOW (ref 11.5–15.5)
HGB BLD-MCNC: 7.7 G/DL — LOW (ref 11.5–15.5)
HGB BLD-MCNC: 7.8 G/DL — LOW (ref 11.5–15.5)
HGB BLD-MCNC: 7.8 G/DL — LOW (ref 11.5–15.5)
HGB BLD-MCNC: 7.9 G/DL — LOW (ref 11.5–15.5)
HGB BLD-MCNC: 7.9 G/DL — LOW (ref 11.5–15.5)
HGB BLD-MCNC: 8 G/DL — LOW (ref 11.5–15.5)
HGB BLD-MCNC: 8.1 G/DL — LOW (ref 11.5–15.5)
HGB BLD-MCNC: 8.2 G/DL — LOW (ref 11.5–15.5)
HGB BLD-MCNC: 8.3 G/DL — LOW (ref 11.5–15.5)
HGB BLD-MCNC: 8.4 G/DL — LOW (ref 11.5–15.5)
HGB BLD-MCNC: 8.4 G/DL — LOW (ref 11.5–15.5)
HGB BLD-MCNC: 8.5 G/DL — LOW (ref 11.5–15.5)
HGB BLD-MCNC: 8.6 G/DL — LOW (ref 11.5–15.5)
HGB BLD-MCNC: 8.6 G/DL — LOW (ref 11.5–15.5)
HGB BLD-MCNC: 8.7 G/DL
HGB BLD-MCNC: 8.7 G/DL — LOW (ref 11.5–15.5)
HGB BLD-MCNC: 8.8 G/DL — LOW (ref 11.5–15.5)
HGB BLD-MCNC: 8.9 G/DL — LOW (ref 11.5–15.5)
HGB BLD-MCNC: 8.9 G/DL — LOW (ref 11.5–15.5)
HGB BLD-MCNC: 9 G/DL — LOW (ref 11.5–15.5)
HGB BLD-MCNC: 9.2 G/DL — LOW (ref 11.5–15.5)
HGB BLD-MCNC: 9.4 G/DL — LOW (ref 11.5–15.5)
HGB BLD-MCNC: 9.6 G/DL — LOW (ref 11.5–15.5)
HGB BLD-MCNC: 9.6 G/DL — LOW (ref 11.5–15.5)
HOROWITZ INDEX BLDV+IHG-RTO: SIGNIFICANT CHANGE UP
HYALINE CASTS: 0 /LPF
HYPOCHROMIA BLD QL: SLIGHT — SIGNIFICANT CHANGE UP
IANC: 3.9 K/UL — SIGNIFICANT CHANGE UP (ref 1.8–7.4)
IANC: 4.25 K/UL — SIGNIFICANT CHANGE UP (ref 1.5–8.5)
IANC: 7.13 K/UL — SIGNIFICANT CHANGE UP (ref 1.5–8.5)
IANC: 7.29 K/UL — SIGNIFICANT CHANGE UP (ref 1.5–8.5)
IMM GRANULOCYTES NFR BLD AUTO: 3.1 % — HIGH (ref 0–1.5)
IMM GRANULOCYTES NFR BLD AUTO: 3.8 % — HIGH (ref 0–1.5)
IMM GRANULOCYTES NFR BLD AUTO: 4.4 % — HIGH (ref 0–1.5)
IMM GRANULOCYTES NFR BLD AUTO: 4.7 % — HIGH (ref 0–1.5)
IMM GRANULOCYTES NFR BLD AUTO: 5.2 % — HIGH (ref 0–1.5)
IMM GRANULOCYTES NFR BLD AUTO: 7.6 % — HIGH (ref 0–1.5)
INR BLD: 1.17 RATIO — HIGH (ref 0.88–1.16)
INR BLD: 1.2 RATIO — HIGH (ref 0.88–1.16)
INR BLD: 1.2 RATIO — HIGH (ref 0.88–1.16)
INR BLD: 1.25 RATIO — HIGH (ref 0.88–1.16)
INR BLD: 1.27 RATIO — HIGH (ref 0.88–1.16)
INR BLD: 1.32 RATIO — HIGH (ref 0.88–1.16)
INR BLD: 1.33 RATIO — HIGH (ref 0.88–1.16)
INR BLD: 1.4 RATIO — HIGH (ref 0.88–1.16)
IRON SERPL-MCNC: 52 UG/DL
KETONES URINE: NORMAL
LACTATE BLDV-MCNC: 0.9 MMOL/L — SIGNIFICANT CHANGE UP (ref 0.7–2)
LACTATE BLDV-MCNC: 1 MMOL/L — SIGNIFICANT CHANGE UP (ref 0.7–2)
LDH SERPL L TO P-CCNC: 165 U/L — SIGNIFICANT CHANGE UP
LDH SERPL L TO P-CCNC: 59 U/L — SIGNIFICANT CHANGE UP
LDH SERPL-CCNC: 284 U/L
LDLC SERPL CALC-MCNC: 90 MG/DL
LEUKOCYTE ESTERASE URINE: ABNORMAL
LIDOCAIN IGE QN: 14 U/L — SIGNIFICANT CHANGE UP (ref 7–60)
LIPID PNL WITH DIRECT LDL SERPL: 114 MG/DL — HIGH
LYMPHOCYTES # BLD AUTO: 0.48 K/UL — LOW (ref 1–3.3)
LYMPHOCYTES # BLD AUTO: 0.71 K/UL — LOW (ref 1–3.3)
LYMPHOCYTES # BLD AUTO: 0.83 K/UL — LOW (ref 1–3.3)
LYMPHOCYTES # BLD AUTO: 0.83 K/UL — LOW (ref 1–3.3)
LYMPHOCYTES # BLD AUTO: 0.94 K/UL — LOW (ref 1–3.3)
LYMPHOCYTES # BLD AUTO: 1.01 K/UL — SIGNIFICANT CHANGE UP (ref 1–3.3)
LYMPHOCYTES # BLD AUTO: 1.02 K/UL — SIGNIFICANT CHANGE UP (ref 1–3.3)
LYMPHOCYTES # BLD AUTO: 1.17 K/UL — SIGNIFICANT CHANGE UP (ref 1–3.3)
LYMPHOCYTES # BLD AUTO: 1.45 K/UL — SIGNIFICANT CHANGE UP (ref 1–3.3)
LYMPHOCYTES # BLD AUTO: 1.89 K/UL — SIGNIFICANT CHANGE UP (ref 1–3.3)
LYMPHOCYTES # BLD AUTO: 12.2 % — LOW (ref 13–44)
LYMPHOCYTES # BLD AUTO: 12.4 % — LOW (ref 13–44)
LYMPHOCYTES # BLD AUTO: 12.7 % — LOW (ref 13–44)
LYMPHOCYTES # BLD AUTO: 14 % — SIGNIFICANT CHANGE UP (ref 13–44)
LYMPHOCYTES # BLD AUTO: 15.7 % — SIGNIFICANT CHANGE UP (ref 13–44)
LYMPHOCYTES # BLD AUTO: 17.4 % — SIGNIFICANT CHANGE UP (ref 13–44)
LYMPHOCYTES # BLD AUTO: 17.8 % — SIGNIFICANT CHANGE UP (ref 13–44)
LYMPHOCYTES # BLD AUTO: 18.9 % — SIGNIFICANT CHANGE UP (ref 13–44)
LYMPHOCYTES # BLD AUTO: 2.08 K/UL — SIGNIFICANT CHANGE UP (ref 1–3.3)
LYMPHOCYTES # BLD AUTO: 2.29 K/UL — SIGNIFICANT CHANGE UP (ref 1–3.3)
LYMPHOCYTES # BLD AUTO: 2.4 K/UL
LYMPHOCYTES # BLD AUTO: 20.4 % — SIGNIFICANT CHANGE UP (ref 13–44)
LYMPHOCYTES # BLD AUTO: 20.6 % — SIGNIFICANT CHANGE UP (ref 13–44)
LYMPHOCYTES # BLD AUTO: 3.02 K/UL — SIGNIFICANT CHANGE UP (ref 1–3.3)
LYMPHOCYTES # BLD AUTO: 33.6 % — SIGNIFICANT CHANGE UP (ref 13–44)
LYMPHOCYTES # BLD AUTO: 7 % — LOW (ref 13–44)
LYMPHOCYTES # BLD AUTO: 8.7 % — LOW (ref 13–44)
LYMPHOCYTES # FLD: 15 % — SIGNIFICANT CHANGE UP
LYMPHOCYTES # FLD: 88 % — SIGNIFICANT CHANGE UP
LYMPHOCYTES NFR BLD AUTO: 25.9 %
MAGNESIUM SERPL-MCNC: 1.6 MG/DL — SIGNIFICANT CHANGE UP (ref 1.6–2.6)
MAGNESIUM SERPL-MCNC: 1.8 MG/DL — SIGNIFICANT CHANGE UP (ref 1.6–2.6)
MAGNESIUM SERPL-MCNC: 1.9 MG/DL — SIGNIFICANT CHANGE UP (ref 1.6–2.6)
MAGNESIUM SERPL-MCNC: 2 MG/DL
MAGNESIUM SERPL-MCNC: 2 MG/DL — SIGNIFICANT CHANGE UP (ref 1.6–2.6)
MAGNESIUM SERPL-MCNC: 2.2 MG/DL — SIGNIFICANT CHANGE UP (ref 1.6–2.6)
MAGNESIUM SERPL-MCNC: 2.3 MG/DL — SIGNIFICANT CHANGE UP (ref 1.6–2.6)
MAGNESIUM SERPL-MCNC: 2.3 MG/DL — SIGNIFICANT CHANGE UP (ref 1.6–2.6)
MAGNESIUM SERPL-MCNC: 2.4 MG/DL — SIGNIFICANT CHANGE UP (ref 1.6–2.6)
MAN DIFF?: NORMAL
MANUAL SMEAR VERIFICATION: SIGNIFICANT CHANGE UP
MCHC RBC-ENTMCNC: 23.1 PG — LOW (ref 27–34)
MCHC RBC-ENTMCNC: 23.1 PG — LOW (ref 27–34)
MCHC RBC-ENTMCNC: 23.2 PG — LOW (ref 27–34)
MCHC RBC-ENTMCNC: 23.3 PG — LOW (ref 27–34)
MCHC RBC-ENTMCNC: 23.4 PG — LOW (ref 27–34)
MCHC RBC-ENTMCNC: 23.4 PG — LOW (ref 27–34)
MCHC RBC-ENTMCNC: 23.5 PG — LOW (ref 27–34)
MCHC RBC-ENTMCNC: 23.7 PG
MCHC RBC-ENTMCNC: 25.3 PG — LOW (ref 27–34)
MCHC RBC-ENTMCNC: 25.6 PG — LOW (ref 27–34)
MCHC RBC-ENTMCNC: 25.7 PG — LOW (ref 27–34)
MCHC RBC-ENTMCNC: 25.8 PG — LOW (ref 27–34)
MCHC RBC-ENTMCNC: 25.9 PG — LOW (ref 27–34)
MCHC RBC-ENTMCNC: 26 PG — LOW (ref 27–34)
MCHC RBC-ENTMCNC: 26.1 PG — LOW (ref 27–34)
MCHC RBC-ENTMCNC: 26.2 PG — LOW (ref 27–34)
MCHC RBC-ENTMCNC: 26.3 PG — LOW (ref 27–34)
MCHC RBC-ENTMCNC: 26.3 PG — LOW (ref 27–34)
MCHC RBC-ENTMCNC: 26.4 PG — LOW (ref 27–34)
MCHC RBC-ENTMCNC: 26.5 PG — LOW (ref 27–34)
MCHC RBC-ENTMCNC: 26.6 PG — LOW (ref 27–34)
MCHC RBC-ENTMCNC: 26.7 PG — LOW (ref 27–34)
MCHC RBC-ENTMCNC: 26.9 PG — LOW (ref 27–34)
MCHC RBC-ENTMCNC: 26.9 PG — LOW (ref 27–34)
MCHC RBC-ENTMCNC: 28.7 GM/DL — LOW (ref 32–36)
MCHC RBC-ENTMCNC: 28.9 GM/DL — LOW (ref 32–36)
MCHC RBC-ENTMCNC: 29 GM/DL
MCHC RBC-ENTMCNC: 29 GM/DL — LOW (ref 32–36)
MCHC RBC-ENTMCNC: 29 GM/DL — LOW (ref 32–36)
MCHC RBC-ENTMCNC: 29.2 GM/DL — LOW (ref 32–36)
MCHC RBC-ENTMCNC: 29.2 GM/DL — LOW (ref 32–36)
MCHC RBC-ENTMCNC: 29.3 GM/DL — LOW (ref 32–36)
MCHC RBC-ENTMCNC: 29.4 GM/DL — LOW (ref 32–36)
MCHC RBC-ENTMCNC: 29.5 GM/DL — LOW (ref 32–36)
MCHC RBC-ENTMCNC: 29.6 GM/DL — LOW (ref 32–36)
MCHC RBC-ENTMCNC: 29.7 GM/DL — LOW (ref 32–36)
MCHC RBC-ENTMCNC: 29.8 GM/DL — LOW (ref 32–36)
MCHC RBC-ENTMCNC: 29.9 GM/DL — LOW (ref 32–36)
MCHC RBC-ENTMCNC: 29.9 GM/DL — LOW (ref 32–36)
MCHC RBC-ENTMCNC: 30 GM/DL — LOW (ref 32–36)
MCHC RBC-ENTMCNC: 30.1 GM/DL — LOW (ref 32–36)
MCHC RBC-ENTMCNC: 30.2 GM/DL — LOW (ref 32–36)
MCHC RBC-ENTMCNC: 30.2 GM/DL — LOW (ref 32–36)
MCHC RBC-ENTMCNC: 30.3 GM/DL — LOW (ref 32–36)
MCHC RBC-ENTMCNC: 30.4 GM/DL — LOW (ref 32–36)
MCHC RBC-ENTMCNC: 30.4 GM/DL — LOW (ref 32–36)
MCHC RBC-ENTMCNC: 30.5 GM/DL — LOW (ref 32–36)
MCHC RBC-ENTMCNC: 30.6 GM/DL — LOW (ref 32–36)
MCHC RBC-ENTMCNC: 30.7 GM/DL — LOW (ref 32–36)
MCHC RBC-ENTMCNC: 30.7 GM/DL — LOW (ref 32–36)
MCHC RBC-ENTMCNC: 30.8 GM/DL — LOW (ref 32–36)
MCHC RBC-ENTMCNC: 30.9 GM/DL — LOW (ref 32–36)
MCHC RBC-ENTMCNC: 31 GM/DL — LOW (ref 32–36)
MCHC RBC-ENTMCNC: 31.2 GM/DL — LOW (ref 32–36)
MCHC RBC-ENTMCNC: 31.6 GM/DL — LOW (ref 32–36)
MCV RBC AUTO: 74.3 FL — LOW (ref 80–100)
MCV RBC AUTO: 74.6 FL — LOW (ref 80–100)
MCV RBC AUTO: 74.9 FL — LOW (ref 80–100)
MCV RBC AUTO: 75 FL — LOW (ref 80–100)
MCV RBC AUTO: 75.4 FL — LOW (ref 80–100)
MCV RBC AUTO: 75.5 FL — LOW (ref 80–100)
MCV RBC AUTO: 76.2 FL — LOW (ref 80–100)
MCV RBC AUTO: 76.4 FL — LOW (ref 80–100)
MCV RBC AUTO: 76.5 FL — LOW (ref 80–100)
MCV RBC AUTO: 76.7 FL — LOW (ref 80–100)
MCV RBC AUTO: 77.1 FL — LOW (ref 80–100)
MCV RBC AUTO: 81.7 FL
MCV RBC AUTO: 84.1 FL — SIGNIFICANT CHANGE UP (ref 80–100)
MCV RBC AUTO: 85.2 FL — SIGNIFICANT CHANGE UP (ref 80–100)
MCV RBC AUTO: 85.5 FL — SIGNIFICANT CHANGE UP (ref 80–100)
MCV RBC AUTO: 85.5 FL — SIGNIFICANT CHANGE UP (ref 80–100)
MCV RBC AUTO: 86.1 FL — SIGNIFICANT CHANGE UP (ref 80–100)
MCV RBC AUTO: 86.2 FL — SIGNIFICANT CHANGE UP (ref 80–100)
MCV RBC AUTO: 86.2 FL — SIGNIFICANT CHANGE UP (ref 80–100)
MCV RBC AUTO: 86.3 FL — SIGNIFICANT CHANGE UP (ref 80–100)
MCV RBC AUTO: 86.7 FL — SIGNIFICANT CHANGE UP (ref 80–100)
MCV RBC AUTO: 86.9 FL — SIGNIFICANT CHANGE UP (ref 80–100)
MCV RBC AUTO: 87 FL — SIGNIFICANT CHANGE UP (ref 80–100)
MCV RBC AUTO: 87 FL — SIGNIFICANT CHANGE UP (ref 80–100)
MCV RBC AUTO: 87.1 FL — SIGNIFICANT CHANGE UP (ref 80–100)
MCV RBC AUTO: 87.1 FL — SIGNIFICANT CHANGE UP (ref 80–100)
MCV RBC AUTO: 87.2 FL — SIGNIFICANT CHANGE UP (ref 80–100)
MCV RBC AUTO: 87.2 FL — SIGNIFICANT CHANGE UP (ref 80–100)
MCV RBC AUTO: 87.3 FL — SIGNIFICANT CHANGE UP (ref 80–100)
MCV RBC AUTO: 87.4 FL — SIGNIFICANT CHANGE UP (ref 80–100)
MCV RBC AUTO: 87.7 FL — SIGNIFICANT CHANGE UP (ref 80–100)
MCV RBC AUTO: 87.7 FL — SIGNIFICANT CHANGE UP (ref 80–100)
MCV RBC AUTO: 87.8 FL — SIGNIFICANT CHANGE UP (ref 80–100)
MCV RBC AUTO: 88 FL — SIGNIFICANT CHANGE UP (ref 80–100)
MCV RBC AUTO: 88.2 FL — SIGNIFICANT CHANGE UP (ref 80–100)
MCV RBC AUTO: 88.3 FL — SIGNIFICANT CHANGE UP (ref 80–100)
MCV RBC AUTO: 88.3 FL — SIGNIFICANT CHANGE UP (ref 80–100)
MCV RBC AUTO: 88.4 FL — SIGNIFICANT CHANGE UP (ref 80–100)
MCV RBC AUTO: 88.5 FL — SIGNIFICANT CHANGE UP (ref 80–100)
MCV RBC AUTO: 88.6 FL — SIGNIFICANT CHANGE UP (ref 80–100)
MCV RBC AUTO: 88.6 FL — SIGNIFICANT CHANGE UP (ref 80–100)
MCV RBC AUTO: 88.7 FL — SIGNIFICANT CHANGE UP (ref 80–100)
MCV RBC AUTO: 89 FL — SIGNIFICANT CHANGE UP (ref 80–100)
MCV RBC AUTO: 89.3 FL — SIGNIFICANT CHANGE UP (ref 80–100)
MCV RBC AUTO: 89.4 FL — SIGNIFICANT CHANGE UP (ref 80–100)
MCV RBC AUTO: 89.4 FL — SIGNIFICANT CHANGE UP (ref 80–100)
MCV RBC AUTO: 89.6 FL — SIGNIFICANT CHANGE UP (ref 80–100)
MCV RBC AUTO: 90.4 FL — SIGNIFICANT CHANGE UP (ref 80–100)
MCV RBC AUTO: 90.4 FL — SIGNIFICANT CHANGE UP (ref 80–100)
MCV RBC AUTO: 90.5 FL — SIGNIFICANT CHANGE UP (ref 80–100)
MCV RBC AUTO: 91.1 FL — SIGNIFICANT CHANGE UP (ref 80–100)
MCV RBC AUTO: 91.4 FL — SIGNIFICANT CHANGE UP (ref 80–100)
MCV RBC AUTO: 91.6 FL — SIGNIFICANT CHANGE UP (ref 80–100)
MESOTHL CELL # FLD: 0 % — SIGNIFICANT CHANGE UP
MESOTHL CELL # FLD: 5 % — SIGNIFICANT CHANGE UP
METAMYELOCYTES # FLD: 0.9 % — HIGH (ref 0–0)
METAMYELOCYTES # FLD: 0.9 % — HIGH (ref 0–0)
METAMYELOCYTES # FLD: 1.7 % — HIGH (ref 0–0)
MICROCYTES BLD QL: SLIGHT — SIGNIFICANT CHANGE UP
MICROCYTES BLD QL: SLIGHT — SIGNIFICANT CHANGE UP
MICROSCOPIC-UA: NORMAL
MONOCYTES # BLD AUTO: 0.18 K/UL — SIGNIFICANT CHANGE UP (ref 0–0.9)
MONOCYTES # BLD AUTO: 0.31 K/UL
MONOCYTES # BLD AUTO: 0.38 K/UL — SIGNIFICANT CHANGE UP (ref 0–0.9)
MONOCYTES # BLD AUTO: 0.4 K/UL — SIGNIFICANT CHANGE UP (ref 0–0.9)
MONOCYTES # BLD AUTO: 0.41 K/UL — SIGNIFICANT CHANGE UP (ref 0–0.9)
MONOCYTES # BLD AUTO: 0.57 K/UL — SIGNIFICANT CHANGE UP (ref 0–0.9)
MONOCYTES # BLD AUTO: 0.64 K/UL — SIGNIFICANT CHANGE UP (ref 0–0.9)
MONOCYTES # BLD AUTO: 0.66 K/UL — SIGNIFICANT CHANGE UP (ref 0–0.9)
MONOCYTES # BLD AUTO: 0.75 K/UL — SIGNIFICANT CHANGE UP (ref 0–0.9)
MONOCYTES # BLD AUTO: 0.89 K/UL — SIGNIFICANT CHANGE UP (ref 0–0.9)
MONOCYTES # BLD AUTO: 0.9 K/UL — SIGNIFICANT CHANGE UP (ref 0–0.9)
MONOCYTES # BLD AUTO: 0.96 K/UL — HIGH (ref 0–0.9)
MONOCYTES # BLD AUTO: 0.97 K/UL — HIGH (ref 0–0.9)
MONOCYTES # BLD AUTO: 1 K/UL — HIGH (ref 0–0.9)
MONOCYTES NFR BLD AUTO: 11 % — SIGNIFICANT CHANGE UP (ref 2–14)
MONOCYTES NFR BLD AUTO: 12.7 % — SIGNIFICANT CHANGE UP (ref 2–14)
MONOCYTES NFR BLD AUTO: 13.5 % — SIGNIFICANT CHANGE UP (ref 2–14)
MONOCYTES NFR BLD AUTO: 13.8 % — SIGNIFICANT CHANGE UP (ref 2–14)
MONOCYTES NFR BLD AUTO: 2.6 % — SIGNIFICANT CHANGE UP (ref 2–14)
MONOCYTES NFR BLD AUTO: 3.4 %
MONOCYTES NFR BLD AUTO: 3.5 % — SIGNIFICANT CHANGE UP (ref 2–14)
MONOCYTES NFR BLD AUTO: 5.5 % — SIGNIFICANT CHANGE UP (ref 2–14)
MONOCYTES NFR BLD AUTO: 6.1 % — SIGNIFICANT CHANGE UP (ref 2–14)
MONOCYTES NFR BLD AUTO: 7 % — SIGNIFICANT CHANGE UP (ref 2–14)
MONOCYTES NFR BLD AUTO: 7.1 % — SIGNIFICANT CHANGE UP (ref 2–14)
MONOCYTES NFR BLD AUTO: 8.7 % — SIGNIFICANT CHANGE UP (ref 2–14)
MONOCYTES NFR BLD AUTO: 8.8 % — SIGNIFICANT CHANGE UP (ref 2–14)
MONOCYTES NFR BLD AUTO: 9.8 % — SIGNIFICANT CHANGE UP (ref 2–14)
MONOS+MACROS # FLD: 29 % — SIGNIFICANT CHANGE UP
MONOS+MACROS # FLD: 5 % — SIGNIFICANT CHANGE UP
MYELOCYTES NFR BLD: 0.9 % — HIGH (ref 0–0)
MYELOCYTES NFR BLD: 0.9 % — HIGH (ref 0–0)
MYELOCYTES NFR BLD: 1.7 % — HIGH (ref 0–0)
NEUTROPHILS # BLD AUTO: 3.9 K/UL — SIGNIFICANT CHANGE UP (ref 1.8–7.4)
NEUTROPHILS # BLD AUTO: 3.91 K/UL — SIGNIFICANT CHANGE UP (ref 1.8–7.4)
NEUTROPHILS # BLD AUTO: 4.62 K/UL — SIGNIFICANT CHANGE UP (ref 1.8–7.4)
NEUTROPHILS # BLD AUTO: 4.69 K/UL — SIGNIFICANT CHANGE UP (ref 1.8–7.4)
NEUTROPHILS # BLD AUTO: 4.89 K/UL — SIGNIFICANT CHANGE UP (ref 1.8–7.4)
NEUTROPHILS # BLD AUTO: 4.92 K/UL — SIGNIFICANT CHANGE UP (ref 1.8–7.4)
NEUTROPHILS # BLD AUTO: 5.19 K/UL — SIGNIFICANT CHANGE UP (ref 1.8–7.4)
NEUTROPHILS # BLD AUTO: 5.37 K/UL — SIGNIFICANT CHANGE UP (ref 1.8–7.4)
NEUTROPHILS # BLD AUTO: 5.6 K/UL — SIGNIFICANT CHANGE UP (ref 1.8–7.4)
NEUTROPHILS # BLD AUTO: 5.66 K/UL
NEUTROPHILS # BLD AUTO: 6.57 K/UL — SIGNIFICANT CHANGE UP (ref 1.8–7.4)
NEUTROPHILS # BLD AUTO: 7.13 K/UL — SIGNIFICANT CHANGE UP (ref 1.8–7.4)
NEUTROPHILS # BLD AUTO: 7.29 K/UL — SIGNIFICANT CHANGE UP (ref 1.8–7.4)
NEUTROPHILS # BLD AUTO: 8.11 K/UL — HIGH (ref 1.8–7.4)
NEUTROPHILS NFR BLD AUTO: 50.4 % — SIGNIFICANT CHANGE UP (ref 43–77)
NEUTROPHILS NFR BLD AUTO: 55 % — SIGNIFICANT CHANGE UP (ref 43–77)
NEUTROPHILS NFR BLD AUTO: 59.1 % — SIGNIFICANT CHANGE UP (ref 43–77)
NEUTROPHILS NFR BLD AUTO: 59.5 %
NEUTROPHILS NFR BLD AUTO: 63.8 % — SIGNIFICANT CHANGE UP (ref 43–77)
NEUTROPHILS NFR BLD AUTO: 64.3 % — SIGNIFICANT CHANGE UP (ref 43–77)
NEUTROPHILS NFR BLD AUTO: 66.2 % — SIGNIFICANT CHANGE UP (ref 43–77)
NEUTROPHILS NFR BLD AUTO: 71.3 % — SIGNIFICANT CHANGE UP (ref 43–77)
NEUTROPHILS NFR BLD AUTO: 72.8 % — SIGNIFICANT CHANGE UP (ref 43–77)
NEUTROPHILS NFR BLD AUTO: 74.6 % — SIGNIFICANT CHANGE UP (ref 43–77)
NEUTROPHILS NFR BLD AUTO: 75.6 % — SIGNIFICANT CHANGE UP (ref 43–77)
NEUTROPHILS NFR BLD AUTO: 76 % — SIGNIFICANT CHANGE UP (ref 43–77)
NEUTROPHILS NFR BLD AUTO: 78.2 % — HIGH (ref 43–77)
NEUTROPHILS NFR BLD AUTO: 80.7 % — HIGH (ref 43–77)
NEUTROPHILS-BODY FLUID: 2 % — SIGNIFICANT CHANGE UP
NEUTS BAND # BLD: 0.9 % — SIGNIFICANT CHANGE UP (ref 0–8)
NEUTS BAND # BLD: 3.5 % — SIGNIFICANT CHANGE UP (ref 0–8)
NITRITE URINE: NEGATIVE
NON HDL CHOLESTEROL: 131 MG/DL — HIGH
NON-GYNECOLOGICAL CYTOLOGY STUDY: SIGNIFICANT CHANGE UP
NONHDLC SERPL-MCNC: 109 MG/DL
NRBC # BLD: 0 /100 WBCS — SIGNIFICANT CHANGE UP
NRBC # BLD: 0 /100 WBCS — SIGNIFICANT CHANGE UP (ref 0–0)
NRBC # FLD: 0 K/UL — SIGNIFICANT CHANGE UP
NRBC # FLD: 0.02 K/UL — HIGH
NRBC # FLD: 0.03 K/UL — HIGH
NRBC # FLD: 0.03 K/UL — HIGH
NRBC # FLD: 0.04 K/UL — HIGH
NT-PROBNP SERPL-SCNC: 158 PG/ML — SIGNIFICANT CHANGE UP (ref 0–300)
NT-PROBNP SERPL-SCNC: 311 PG/ML — HIGH (ref 0–300)
NT-PROBNP SERPL-SCNC: 369 PG/ML — HIGH
NT-PROBNP SERPL-SCNC: 944 PG/ML — HIGH (ref 0–300)
OSMOLALITY SERPL: 276 MOSM/KG — SIGNIFICANT CHANGE UP (ref 275–295)
OSMOLALITY UR: 351 MOS/KG — SIGNIFICANT CHANGE UP (ref 300–900)
OSMOLALITY UR: 771 MOSM/KG — SIGNIFICANT CHANGE UP (ref 50–1200)
OTHER CELLS FLD MANUAL: 54 % — SIGNIFICANT CHANGE UP
PCO2 BLDV: 42 MMHG — SIGNIFICANT CHANGE UP (ref 39–42)
PCO2 BLDV: 47 MMHG — HIGH (ref 39–42)
PCO2 BLDV: 61 MMHG — HIGH (ref 39–42)
PH BLDV: 7.31 — LOW (ref 7.32–7.43)
PH BLDV: 7.38 — SIGNIFICANT CHANGE UP (ref 7.32–7.43)
PH BLDV: 7.42 — SIGNIFICANT CHANGE UP (ref 7.32–7.43)
PH FLD: 7.8 — SIGNIFICANT CHANGE UP
PH URINE: 6
PHOSPHATE SERPL-MCNC: 2.7 MG/DL — SIGNIFICANT CHANGE UP (ref 2.5–4.5)
PHOSPHATE SERPL-MCNC: 2.7 MG/DL — SIGNIFICANT CHANGE UP (ref 2.5–4.5)
PHOSPHATE SERPL-MCNC: 2.8 MG/DL — SIGNIFICANT CHANGE UP (ref 2.5–4.5)
PHOSPHATE SERPL-MCNC: 2.8 MG/DL — SIGNIFICANT CHANGE UP (ref 2.5–4.5)
PHOSPHATE SERPL-MCNC: 2.9 MG/DL — SIGNIFICANT CHANGE UP (ref 2.5–4.5)
PHOSPHATE SERPL-MCNC: 3 MG/DL — SIGNIFICANT CHANGE UP (ref 2.5–4.5)
PHOSPHATE SERPL-MCNC: 3.1 MG/DL — SIGNIFICANT CHANGE UP (ref 2.5–4.5)
PHOSPHATE SERPL-MCNC: 3.2 MG/DL — SIGNIFICANT CHANGE UP (ref 2.5–4.5)
PHOSPHATE SERPL-MCNC: 3.3 MG/DL — SIGNIFICANT CHANGE UP (ref 2.5–4.5)
PHOSPHATE SERPL-MCNC: 3.3 MG/DL — SIGNIFICANT CHANGE UP (ref 2.5–4.5)
PHOSPHATE SERPL-MCNC: 3.4 MG/DL — SIGNIFICANT CHANGE UP (ref 2.5–4.5)
PHOSPHATE SERPL-MCNC: 3.7 MG/DL — SIGNIFICANT CHANGE UP (ref 2.5–4.5)
PLAT MORPH BLD: NORMAL — SIGNIFICANT CHANGE UP
PLATELET # BLD AUTO: 249 K/UL — SIGNIFICANT CHANGE UP (ref 150–400)
PLATELET # BLD AUTO: 250 K/UL — SIGNIFICANT CHANGE UP (ref 150–400)
PLATELET # BLD AUTO: 252 K/UL — SIGNIFICANT CHANGE UP (ref 150–400)
PLATELET # BLD AUTO: 254 K/UL — SIGNIFICANT CHANGE UP (ref 150–400)
PLATELET # BLD AUTO: 265 K/UL — SIGNIFICANT CHANGE UP (ref 150–400)
PLATELET # BLD AUTO: 279 K/UL — SIGNIFICANT CHANGE UP (ref 150–400)
PLATELET # BLD AUTO: 283 K/UL — SIGNIFICANT CHANGE UP (ref 150–400)
PLATELET # BLD AUTO: 286 K/UL — SIGNIFICANT CHANGE UP (ref 150–400)
PLATELET # BLD AUTO: 294 K/UL — SIGNIFICANT CHANGE UP (ref 150–400)
PLATELET # BLD AUTO: 298 K/UL — SIGNIFICANT CHANGE UP (ref 150–400)
PLATELET # BLD AUTO: 302 K/UL — SIGNIFICANT CHANGE UP (ref 150–400)
PLATELET # BLD AUTO: 303 K/UL — SIGNIFICANT CHANGE UP (ref 150–400)
PLATELET # BLD AUTO: 303 K/UL — SIGNIFICANT CHANGE UP (ref 150–400)
PLATELET # BLD AUTO: 304 K/UL — SIGNIFICANT CHANGE UP (ref 150–400)
PLATELET # BLD AUTO: 308 K/UL — SIGNIFICANT CHANGE UP (ref 150–400)
PLATELET # BLD AUTO: 308 K/UL — SIGNIFICANT CHANGE UP (ref 150–400)
PLATELET # BLD AUTO: 313 K/UL — SIGNIFICANT CHANGE UP (ref 150–400)
PLATELET # BLD AUTO: 316 K/UL — SIGNIFICANT CHANGE UP (ref 150–400)
PLATELET # BLD AUTO: 324 K/UL — SIGNIFICANT CHANGE UP (ref 150–400)
PLATELET # BLD AUTO: 325 K/UL — SIGNIFICANT CHANGE UP (ref 150–400)
PLATELET # BLD AUTO: 326 K/UL — SIGNIFICANT CHANGE UP (ref 150–400)
PLATELET # BLD AUTO: 329 K/UL — SIGNIFICANT CHANGE UP (ref 150–400)
PLATELET # BLD AUTO: 330 K/UL — SIGNIFICANT CHANGE UP (ref 150–400)
PLATELET # BLD AUTO: 330 K/UL — SIGNIFICANT CHANGE UP (ref 150–400)
PLATELET # BLD AUTO: 331 K/UL — SIGNIFICANT CHANGE UP (ref 150–400)
PLATELET # BLD AUTO: 333 K/UL — SIGNIFICANT CHANGE UP (ref 150–400)
PLATELET # BLD AUTO: 334 K/UL — SIGNIFICANT CHANGE UP (ref 150–400)
PLATELET # BLD AUTO: 336 K/UL — SIGNIFICANT CHANGE UP (ref 150–400)
PLATELET # BLD AUTO: 337 K/UL — SIGNIFICANT CHANGE UP (ref 150–400)
PLATELET # BLD AUTO: 337 K/UL — SIGNIFICANT CHANGE UP (ref 150–400)
PLATELET # BLD AUTO: 341 K/UL — SIGNIFICANT CHANGE UP (ref 150–400)
PLATELET # BLD AUTO: 342 K/UL — SIGNIFICANT CHANGE UP (ref 150–400)
PLATELET # BLD AUTO: 344 K/UL — SIGNIFICANT CHANGE UP (ref 150–400)
PLATELET # BLD AUTO: 345 K/UL — SIGNIFICANT CHANGE UP (ref 150–400)
PLATELET # BLD AUTO: 345 K/UL — SIGNIFICANT CHANGE UP (ref 150–400)
PLATELET # BLD AUTO: 352 K/UL — SIGNIFICANT CHANGE UP (ref 150–400)
PLATELET # BLD AUTO: 353 K/UL — SIGNIFICANT CHANGE UP (ref 150–400)
PLATELET # BLD AUTO: 354 K/UL — SIGNIFICANT CHANGE UP (ref 150–400)
PLATELET # BLD AUTO: 358 K/UL — SIGNIFICANT CHANGE UP (ref 150–400)
PLATELET # BLD AUTO: 361 K/UL — SIGNIFICANT CHANGE UP (ref 150–400)
PLATELET # BLD AUTO: 364 K/UL — SIGNIFICANT CHANGE UP (ref 150–400)
PLATELET # BLD AUTO: 365 K/UL — SIGNIFICANT CHANGE UP (ref 150–400)
PLATELET # BLD AUTO: 366 K/UL — SIGNIFICANT CHANGE UP (ref 150–400)
PLATELET # BLD AUTO: 367 K/UL — SIGNIFICANT CHANGE UP (ref 150–400)
PLATELET # BLD AUTO: 373 K/UL — SIGNIFICANT CHANGE UP (ref 150–400)
PLATELET # BLD AUTO: 382 K/UL — SIGNIFICANT CHANGE UP (ref 150–400)
PLATELET # BLD AUTO: 385 K/UL — SIGNIFICANT CHANGE UP (ref 150–400)
PLATELET # BLD AUTO: 387 K/UL — SIGNIFICANT CHANGE UP (ref 150–400)
PLATELET # BLD AUTO: 387 K/UL — SIGNIFICANT CHANGE UP (ref 150–400)
PLATELET # BLD AUTO: 398 K/UL — SIGNIFICANT CHANGE UP (ref 150–400)
PLATELET # BLD AUTO: 419 K/UL — HIGH (ref 150–400)
PLATELET # BLD AUTO: 421 K/UL — HIGH (ref 150–400)
PLATELET # BLD AUTO: 422 K/UL
PLATELET # BLD AUTO: 425 K/UL — HIGH (ref 150–400)
PLATELET # BLD AUTO: 445 K/UL — HIGH (ref 150–400)
PO2 BLDV: 28 MMHG — SIGNIFICANT CHANGE UP (ref 25–45)
PO2 BLDV: 39 MMHG — SIGNIFICANT CHANGE UP (ref 25–45)
PO2 BLDV: 48 MMHG — HIGH (ref 25–45)
POIKILOCYTOSIS BLD QL AUTO: SLIGHT — SIGNIFICANT CHANGE UP
POLYCHROMASIA BLD QL SMEAR: SLIGHT — SIGNIFICANT CHANGE UP
POTASSIUM BLDV-SCNC: 3.5 MMOL/L — SIGNIFICANT CHANGE UP (ref 3.5–5.1)
POTASSIUM BLDV-SCNC: 5 MMOL/L — SIGNIFICANT CHANGE UP (ref 3.5–5.1)
POTASSIUM SERPL-MCNC: 2.9 MMOL/L — CRITICAL LOW (ref 3.5–5.3)
POTASSIUM SERPL-MCNC: 3 MMOL/L — LOW (ref 3.5–5.3)
POTASSIUM SERPL-MCNC: 3.1 MMOL/L — LOW (ref 3.5–5.3)
POTASSIUM SERPL-MCNC: 3.2 MMOL/L — LOW (ref 3.5–5.3)
POTASSIUM SERPL-MCNC: 3.3 MMOL/L — LOW (ref 3.5–5.3)
POTASSIUM SERPL-MCNC: 3.5 MMOL/L — SIGNIFICANT CHANGE UP (ref 3.5–5.3)
POTASSIUM SERPL-MCNC: 3.6 MMOL/L — SIGNIFICANT CHANGE UP (ref 3.5–5.3)
POTASSIUM SERPL-MCNC: 3.7 MMOL/L — SIGNIFICANT CHANGE UP (ref 3.5–5.3)
POTASSIUM SERPL-MCNC: 3.8 MMOL/L — SIGNIFICANT CHANGE UP (ref 3.5–5.3)
POTASSIUM SERPL-MCNC: 3.9 MMOL/L — SIGNIFICANT CHANGE UP (ref 3.5–5.3)
POTASSIUM SERPL-MCNC: 3.9 MMOL/L — SIGNIFICANT CHANGE UP (ref 3.5–5.3)
POTASSIUM SERPL-MCNC: 4 MMOL/L — SIGNIFICANT CHANGE UP (ref 3.5–5.3)
POTASSIUM SERPL-MCNC: 4.1 MMOL/L — SIGNIFICANT CHANGE UP (ref 3.5–5.3)
POTASSIUM SERPL-MCNC: 4.1 MMOL/L — SIGNIFICANT CHANGE UP (ref 3.5–5.3)
POTASSIUM SERPL-MCNC: 4.2 MMOL/L — SIGNIFICANT CHANGE UP (ref 3.5–5.3)
POTASSIUM SERPL-MCNC: 4.3 MMOL/L — SIGNIFICANT CHANGE UP (ref 3.5–5.3)
POTASSIUM SERPL-MCNC: 4.4 MMOL/L — SIGNIFICANT CHANGE UP (ref 3.5–5.3)
POTASSIUM SERPL-MCNC: 4.4 MMOL/L — SIGNIFICANT CHANGE UP (ref 3.5–5.3)
POTASSIUM SERPL-MCNC: 4.5 MMOL/L — SIGNIFICANT CHANGE UP (ref 3.5–5.3)
POTASSIUM SERPL-MCNC: 4.6 MMOL/L — SIGNIFICANT CHANGE UP (ref 3.5–5.3)
POTASSIUM SERPL-MCNC: 4.7 MMOL/L — SIGNIFICANT CHANGE UP (ref 3.5–5.3)
POTASSIUM SERPL-MCNC: 5 MMOL/L — SIGNIFICANT CHANGE UP (ref 3.5–5.3)
POTASSIUM SERPL-MCNC: 5.1 MMOL/L — SIGNIFICANT CHANGE UP (ref 3.5–5.3)
POTASSIUM SERPL-SCNC: 2.9 MMOL/L — CRITICAL LOW (ref 3.5–5.3)
POTASSIUM SERPL-SCNC: 3 MMOL/L — LOW (ref 3.5–5.3)
POTASSIUM SERPL-SCNC: 3.1 MMOL/L — LOW (ref 3.5–5.3)
POTASSIUM SERPL-SCNC: 3.2 MMOL/L — LOW (ref 3.5–5.3)
POTASSIUM SERPL-SCNC: 3.3 MMOL/L — LOW (ref 3.5–5.3)
POTASSIUM SERPL-SCNC: 3.5 MMOL/L — SIGNIFICANT CHANGE UP (ref 3.5–5.3)
POTASSIUM SERPL-SCNC: 3.6 MMOL/L — SIGNIFICANT CHANGE UP (ref 3.5–5.3)
POTASSIUM SERPL-SCNC: 3.7 MMOL/L — SIGNIFICANT CHANGE UP (ref 3.5–5.3)
POTASSIUM SERPL-SCNC: 3.8 MMOL/L — SIGNIFICANT CHANGE UP (ref 3.5–5.3)
POTASSIUM SERPL-SCNC: 3.9 MMOL/L — SIGNIFICANT CHANGE UP (ref 3.5–5.3)
POTASSIUM SERPL-SCNC: 3.9 MMOL/L — SIGNIFICANT CHANGE UP (ref 3.5–5.3)
POTASSIUM SERPL-SCNC: 4 MMOL/L — SIGNIFICANT CHANGE UP (ref 3.5–5.3)
POTASSIUM SERPL-SCNC: 4.1 MMOL/L — SIGNIFICANT CHANGE UP (ref 3.5–5.3)
POTASSIUM SERPL-SCNC: 4.1 MMOL/L — SIGNIFICANT CHANGE UP (ref 3.5–5.3)
POTASSIUM SERPL-SCNC: 4.2 MMOL/L
POTASSIUM SERPL-SCNC: 4.2 MMOL/L — SIGNIFICANT CHANGE UP (ref 3.5–5.3)
POTASSIUM SERPL-SCNC: 4.3 MMOL/L — SIGNIFICANT CHANGE UP (ref 3.5–5.3)
POTASSIUM SERPL-SCNC: 4.4 MMOL/L — SIGNIFICANT CHANGE UP (ref 3.5–5.3)
POTASSIUM SERPL-SCNC: 4.4 MMOL/L — SIGNIFICANT CHANGE UP (ref 3.5–5.3)
POTASSIUM SERPL-SCNC: 4.5 MMOL/L — SIGNIFICANT CHANGE UP (ref 3.5–5.3)
POTASSIUM SERPL-SCNC: 4.6 MMOL/L — SIGNIFICANT CHANGE UP (ref 3.5–5.3)
POTASSIUM SERPL-SCNC: 4.7 MMOL/L — SIGNIFICANT CHANGE UP (ref 3.5–5.3)
POTASSIUM SERPL-SCNC: 5 MMOL/L — SIGNIFICANT CHANGE UP (ref 3.5–5.3)
POTASSIUM SERPL-SCNC: 5.1 MMOL/L — SIGNIFICANT CHANGE UP (ref 3.5–5.3)
PROCALCITONIN SERPL-MCNC: 0.13 NG/ML — HIGH (ref 0.02–0.1)
PROT FLD-MCNC: 1.1 G/DL — SIGNIFICANT CHANGE UP
PROT FLD-MCNC: 2.5 G/DL — SIGNIFICANT CHANGE UP
PROT SERPL-MCNC: 5 G/DL — LOW (ref 6–8.3)
PROT SERPL-MCNC: 5.1 G/DL
PROT SERPL-MCNC: 5.1 G/DL — LOW (ref 6–8.3)
PROT SERPL-MCNC: 5.2 G/DL — LOW (ref 6–8.3)
PROT SERPL-MCNC: 5.3 G/DL — LOW (ref 6–8.3)
PROT SERPL-MCNC: 5.4 G/DL — LOW (ref 6–8.3)
PROT SERPL-MCNC: 5.5 G/DL — LOW (ref 6–8.3)
PROT SERPL-MCNC: 5.7 G/DL — LOW (ref 6–8.3)
PROT SERPL-MCNC: 5.7 G/DL — LOW (ref 6–8.3)
PROT SERPL-MCNC: 5.8 G/DL — LOW (ref 6–8.3)
PROT SERPL-MCNC: 5.9 G/DL — LOW (ref 6–8.3)
PROT SERPL-MCNC: 6 G/DL — SIGNIFICANT CHANGE UP (ref 6–8.3)
PROT SERPL-MCNC: 6.1 G/DL — SIGNIFICANT CHANGE UP (ref 6–8.3)
PROTEIN URINE: ABNORMAL
PROTHROM AB SERPL-ACNC: 13.6 SEC — HIGH (ref 10.5–13.4)
PROTHROM AB SERPL-ACNC: 13.9 SEC — HIGH (ref 10.5–13.4)
PROTHROM AB SERPL-ACNC: 14 SEC — HIGH (ref 10.5–13.4)
PROTHROM AB SERPL-ACNC: 14.8 SEC — HIGH (ref 10.6–13.6)
PROTHROM AB SERPL-ACNC: 15.1 SEC — HIGH (ref 10.6–13.6)
PROTHROM AB SERPL-ACNC: 15.3 SEC — HIGH (ref 10.5–13.4)
PROTHROM AB SERPL-ACNC: 15.4 SEC — HIGH (ref 10.5–13.4)
PROTHROM AB SERPL-ACNC: 15.5 SEC — HIGH (ref 10.5–13.4)
PROTHROM AB SERPL-ACNC: 15.6 SEC — HIGH (ref 10.6–13.6)
PROTHROM AB SERPL-ACNC: 15.7 SEC — HIGH (ref 10.6–13.6)
RAPID RVP RESULT: SIGNIFICANT CHANGE UP
RAPID RVP RESULT: SIGNIFICANT CHANGE UP
RBC # BLD: 2.68 M/UL — LOW (ref 3.8–5.2)
RBC # BLD: 2.77 M/UL — LOW (ref 3.8–5.2)
RBC # BLD: 2.78 M/UL — LOW (ref 3.8–5.2)
RBC # BLD: 2.8 M/UL — LOW (ref 3.8–5.2)
RBC # BLD: 2.81 M/UL — LOW (ref 3.8–5.2)
RBC # BLD: 2.81 M/UL — LOW (ref 3.8–5.2)
RBC # BLD: 2.89 M/UL — LOW (ref 3.8–5.2)
RBC # BLD: 2.91 M/UL — LOW (ref 3.8–5.2)
RBC # BLD: 2.91 M/UL — LOW (ref 3.8–5.2)
RBC # BLD: 2.92 M/UL — LOW (ref 3.8–5.2)
RBC # BLD: 2.92 M/UL — LOW (ref 3.8–5.2)
RBC # BLD: 2.97 M/UL — LOW (ref 3.8–5.2)
RBC # BLD: 2.99 M/UL — LOW (ref 3.8–5.2)
RBC # BLD: 3 M/UL — LOW (ref 3.8–5.2)
RBC # BLD: 3.01 M/UL — LOW (ref 3.8–5.2)
RBC # BLD: 3.03 M/UL — LOW (ref 3.8–5.2)
RBC # BLD: 3.05 M/UL — LOW (ref 3.8–5.2)
RBC # BLD: 3.06 M/UL — LOW (ref 3.8–5.2)
RBC # BLD: 3.07 M/UL — LOW (ref 3.8–5.2)
RBC # BLD: 3.08 M/UL — LOW (ref 3.8–5.2)
RBC # BLD: 3.08 M/UL — LOW (ref 3.8–5.2)
RBC # BLD: 3.09 M/UL — LOW (ref 3.8–5.2)
RBC # BLD: 3.11 M/UL — LOW (ref 3.8–5.2)
RBC # BLD: 3.14 M/UL — LOW (ref 3.8–5.2)
RBC # BLD: 3.16 M/UL — LOW (ref 3.8–5.2)
RBC # BLD: 3.16 M/UL — LOW (ref 3.8–5.2)
RBC # BLD: 3.17 M/UL — LOW (ref 3.8–5.2)
RBC # BLD: 3.18 M/UL — LOW (ref 3.8–5.2)
RBC # BLD: 3.18 M/UL — LOW (ref 3.8–5.2)
RBC # BLD: 3.2 M/UL — LOW (ref 3.8–5.2)
RBC # BLD: 3.22 M/UL — LOW (ref 3.8–5.2)
RBC # BLD: 3.3 M/UL — LOW (ref 3.8–5.2)
RBC # BLD: 3.31 M/UL — LOW (ref 3.8–5.2)
RBC # BLD: 3.31 M/UL — LOW (ref 3.8–5.2)
RBC # BLD: 3.34 M/UL — LOW (ref 3.8–5.2)
RBC # BLD: 3.43 M/UL — LOW (ref 3.8–5.2)
RBC # BLD: 3.47 M/UL — LOW (ref 3.8–5.2)
RBC # BLD: 3.49 M/UL — LOW (ref 3.8–5.2)
RBC # BLD: 3.54 M/UL — LOW (ref 3.8–5.2)
RBC # BLD: 3.6 M/UL — LOW (ref 3.8–5.2)
RBC # BLD: 3.61 M/UL — LOW (ref 3.8–5.2)
RBC # BLD: 3.67 M/UL
RBC # BLD: 3.69 M/UL — LOW (ref 3.8–5.2)
RBC # BLD: 3.7 M/UL — LOW (ref 3.8–5.2)
RBC # BLD: 3.78 M/UL — LOW (ref 3.8–5.2)
RBC # BLD: 3.83 M/UL — SIGNIFICANT CHANGE UP (ref 3.8–5.2)
RBC # BLD: 3.94 M/UL — SIGNIFICANT CHANGE UP (ref 3.8–5.2)
RBC # BLD: 3.96 M/UL — SIGNIFICANT CHANGE UP (ref 3.8–5.2)
RBC # BLD: 3.97 M/UL — SIGNIFICANT CHANGE UP (ref 3.8–5.2)
RBC # BLD: 4.06 M/UL — SIGNIFICANT CHANGE UP (ref 3.8–5.2)
RBC # BLD: 4.16 M/UL — SIGNIFICANT CHANGE UP (ref 3.8–5.2)
RBC # FLD: 18 % — HIGH (ref 10.3–14.5)
RBC # FLD: 18.1 % — HIGH (ref 10.3–14.5)
RBC # FLD: 18.3 % — HIGH (ref 10.3–14.5)
RBC # FLD: 18.4 % — HIGH (ref 10.3–14.5)
RBC # FLD: 18.5 % — HIGH (ref 10.3–14.5)
RBC # FLD: 18.6 % — HIGH (ref 10.3–14.5)
RBC # FLD: 18.8 % — HIGH (ref 10.3–14.5)
RBC # FLD: 18.9 % — HIGH (ref 10.3–14.5)
RBC # FLD: 19 % — HIGH (ref 10.3–14.5)
RBC # FLD: 19.1 % — HIGH (ref 10.3–14.5)
RBC # FLD: 19.2 % — HIGH (ref 10.3–14.5)
RBC # FLD: 19.3 % — HIGH (ref 10.3–14.5)
RBC # FLD: 19.4 % — HIGH (ref 10.3–14.5)
RBC # FLD: 19.4 % — HIGH (ref 10.3–14.5)
RBC # FLD: 19.6 % — HIGH (ref 10.3–14.5)
RBC # FLD: 22.1 % — HIGH (ref 10.3–14.5)
RBC # FLD: 22.1 % — HIGH (ref 10.3–14.5)
RBC # FLD: 22.4 % — HIGH (ref 10.3–14.5)
RBC # FLD: 22.4 % — HIGH (ref 10.3–14.5)
RBC # FLD: 22.5 % — HIGH (ref 10.3–14.5)
RBC # FLD: 23.2 % — HIGH (ref 10.3–14.5)
RBC # FLD: 23.2 % — HIGH (ref 10.3–14.5)
RBC # FLD: 23.3 % — HIGH (ref 10.3–14.5)
RBC # FLD: 23.5 % — HIGH (ref 10.3–14.5)
RBC # FLD: 23.6 % — HIGH (ref 10.3–14.5)
RBC # FLD: 23.6 % — HIGH (ref 10.3–14.5)
RBC # FLD: 24.5 %
RBC BLD AUTO: ABNORMAL
RCV VOL RI: 123 /UL — HIGH (ref 0–0)
RCV VOL RI: 3000 CELLS/UL — HIGH (ref 0–5)
RED BLOOD CELLS URINE: 4 /HPF
RH IG SCN BLD-IMP: POSITIVE — SIGNIFICANT CHANGE UP
RSV RNA NPH QL NAA+NON-PROBE: SIGNIFICANT CHANGE UP
SAO2 % BLDV: 45.8 % — LOW (ref 67–88)
SAO2 % BLDV: 66.5 % — LOW (ref 67–88)
SAO2 % BLDV: 74.7 % — SIGNIFICANT CHANGE UP (ref 67–88)
SARS-COV-2 RNA SPEC QL NAA+PROBE: SIGNIFICANT CHANGE UP
SCHISTOCYTES BLD QL AUTO: SLIGHT — SIGNIFICANT CHANGE UP
SODIUM SERPL-SCNC: 128 MMOL/L — LOW (ref 135–145)
SODIUM SERPL-SCNC: 129 MMOL/L — LOW (ref 135–145)
SODIUM SERPL-SCNC: 130 MMOL/L — LOW (ref 135–145)
SODIUM SERPL-SCNC: 131 MMOL/L — LOW (ref 135–145)
SODIUM SERPL-SCNC: 132 MMOL/L — LOW (ref 135–145)
SODIUM SERPL-SCNC: 133 MMOL/L
SODIUM SERPL-SCNC: 133 MMOL/L — LOW (ref 135–145)
SODIUM SERPL-SCNC: 134 MMOL/L — LOW (ref 135–145)
SODIUM SERPL-SCNC: 135 MMOL/L — SIGNIFICANT CHANGE UP (ref 135–145)
SODIUM SERPL-SCNC: 136 MMOL/L — SIGNIFICANT CHANGE UP (ref 135–145)
SODIUM SERPL-SCNC: 137 MMOL/L — SIGNIFICANT CHANGE UP (ref 135–145)
SODIUM SERPL-SCNC: 138 MMOL/L — SIGNIFICANT CHANGE UP (ref 135–145)
SODIUM SERPL-SCNC: 139 MMOL/L — SIGNIFICANT CHANGE UP (ref 135–145)
SODIUM SERPL-SCNC: 141 MMOL/L — SIGNIFICANT CHANGE UP (ref 135–145)
SODIUM UR-SCNC: 8 MMOL/L — SIGNIFICANT CHANGE UP
SODIUM UR-SCNC: < 20 MMOL/L — SIGNIFICANT CHANGE UP
SPECIFIC GRAVITY URINE: 1.02
SPECIMEN SOURCE: SIGNIFICANT CHANGE UP
SQUAMOUS EPITHELIAL CELLS: 12 /HPF
SURGICAL PATHOLOGY STUDY: SIGNIFICANT CHANGE UP
T-CELL CD4 SUBSET PNL BLD: 368 /UL — LOW (ref 489–1457)
T4 SERPL-MCNC: 5.4 UG/DL
TARGETS BLD QL SMEAR: SLIGHT — SIGNIFICANT CHANGE UP
TOTAL CELLS COUNTED, BODY FLUID: 100 CELLS — SIGNIFICANT CHANGE UP
TOTAL NUCLEATED CELL COUNT, BODY FLUID: 159 /UL — SIGNIFICANT CHANGE UP
TOTAL NUCLEATED CELL COUNT, BODY FLUID: 289 CELLS/UL — HIGH (ref 0–5)
TRANSFERRIN SERPL-MCNC: 158 MG/DL
TRIGL SERPL-MCNC: 87 MG/DL — SIGNIFICANT CHANGE UP
TRIGL SERPL-MCNC: 96 MG/DL
TROPONIN T, HIGH SENSITIVITY RESULT: 16 NG/L — SIGNIFICANT CHANGE UP (ref 0–51)
TROPONIN T, HIGH SENSITIVITY RESULT: 29 NG/L — SIGNIFICANT CHANGE UP (ref 0–51)
TROPONIN T, HIGH SENSITIVITY RESULT: 31 NG/L — SIGNIFICANT CHANGE UP (ref 0–51)
TROPONIN T, HIGH SENSITIVITY RESULT: 60 NG/L — HIGH (ref 0–51)
TROPONIN T, HIGH SENSITIVITY RESULT: 62 NG/L — HIGH (ref 0–51)
TROPONIN T, HIGH SENSITIVITY RESULT: 64 NG/L — HIGH (ref 0–51)
TSH SERPL-ACNC: 0.96 UIU/ML
TSH SERPL-MCNC: 1.35 UIU/ML — SIGNIFICANT CHANGE UP (ref 0.27–4.2)
TUBE TYPE: SIGNIFICANT CHANGE UP
TUBE TYPE: SIGNIFICANT CHANGE UP
UROBILINOGEN URINE: ABNORMAL
VARIANT LYMPHS # BLD: 0.9 % — SIGNIFICANT CHANGE UP (ref 0–6)
VIT B12 SERPL-MCNC: 533 PG/ML
WBC # BLD: 10.47 K/UL — SIGNIFICANT CHANGE UP (ref 3.8–10.5)
WBC # BLD: 10.67 K/UL — HIGH (ref 3.8–10.5)
WBC # BLD: 10.84 K/UL — HIGH (ref 3.8–10.5)
WBC # BLD: 11.02 K/UL — HIGH (ref 3.8–10.5)
WBC # BLD: 11.09 K/UL — HIGH (ref 3.8–10.5)
WBC # BLD: 12.44 K/UL — HIGH (ref 3.8–10.5)
WBC # BLD: 5.17 K/UL — SIGNIFICANT CHANGE UP (ref 3.8–10.5)
WBC # BLD: 5.5 K/UL — SIGNIFICANT CHANGE UP (ref 3.8–10.5)
WBC # BLD: 5.64 K/UL — SIGNIFICANT CHANGE UP (ref 3.8–10.5)
WBC # BLD: 5.67 K/UL — SIGNIFICANT CHANGE UP (ref 3.8–10.5)
WBC # BLD: 5.86 K/UL — SIGNIFICANT CHANGE UP (ref 3.8–10.5)
WBC # BLD: 5.88 K/UL — SIGNIFICANT CHANGE UP (ref 3.8–10.5)
WBC # BLD: 5.92 K/UL — SIGNIFICANT CHANGE UP (ref 3.8–10.5)
WBC # BLD: 5.93 K/UL — SIGNIFICANT CHANGE UP (ref 3.8–10.5)
WBC # BLD: 5.97 K/UL — SIGNIFICANT CHANGE UP (ref 3.8–10.5)
WBC # BLD: 6.02 K/UL — SIGNIFICANT CHANGE UP (ref 3.8–10.5)
WBC # BLD: 6.05 K/UL — SIGNIFICANT CHANGE UP (ref 3.8–10.5)
WBC # BLD: 6.16 K/UL — SIGNIFICANT CHANGE UP (ref 3.8–10.5)
WBC # BLD: 6.21 K/UL — SIGNIFICANT CHANGE UP (ref 3.8–10.5)
WBC # BLD: 6.3 K/UL — SIGNIFICANT CHANGE UP (ref 3.8–10.5)
WBC # BLD: 6.49 K/UL — SIGNIFICANT CHANGE UP (ref 3.8–10.5)
WBC # BLD: 6.51 K/UL — SIGNIFICANT CHANGE UP (ref 3.8–10.5)
WBC # BLD: 6.59 K/UL — SIGNIFICANT CHANGE UP (ref 3.8–10.5)
WBC # BLD: 6.67 K/UL — SIGNIFICANT CHANGE UP (ref 3.8–10.5)
WBC # BLD: 6.69 K/UL — SIGNIFICANT CHANGE UP (ref 3.8–10.5)
WBC # BLD: 6.72 K/UL — SIGNIFICANT CHANGE UP (ref 3.8–10.5)
WBC # BLD: 6.79 K/UL — SIGNIFICANT CHANGE UP (ref 3.8–10.5)
WBC # BLD: 6.79 K/UL — SIGNIFICANT CHANGE UP (ref 3.8–10.5)
WBC # BLD: 6.83 K/UL — SIGNIFICANT CHANGE UP (ref 3.8–10.5)
WBC # BLD: 6.85 K/UL — SIGNIFICANT CHANGE UP (ref 3.8–10.5)
WBC # BLD: 6.99 K/UL — SIGNIFICANT CHANGE UP (ref 3.8–10.5)
WBC # BLD: 6.99 K/UL — SIGNIFICANT CHANGE UP (ref 3.8–10.5)
WBC # BLD: 7.05 K/UL — SIGNIFICANT CHANGE UP (ref 3.8–10.5)
WBC # BLD: 7.06 K/UL — SIGNIFICANT CHANGE UP (ref 3.8–10.5)
WBC # BLD: 7.11 K/UL — SIGNIFICANT CHANGE UP (ref 3.8–10.5)
WBC # BLD: 7.12 K/UL — SIGNIFICANT CHANGE UP (ref 3.8–10.5)
WBC # BLD: 7.21 K/UL — SIGNIFICANT CHANGE UP (ref 3.8–10.5)
WBC # BLD: 7.24 K/UL — SIGNIFICANT CHANGE UP (ref 3.8–10.5)
WBC # BLD: 7.27 K/UL — SIGNIFICANT CHANGE UP (ref 3.8–10.5)
WBC # BLD: 7.42 K/UL — SIGNIFICANT CHANGE UP (ref 3.8–10.5)
WBC # BLD: 7.52 K/UL — SIGNIFICANT CHANGE UP (ref 3.8–10.5)
WBC # BLD: 7.82 K/UL — SIGNIFICANT CHANGE UP (ref 3.8–10.5)
WBC # BLD: 7.86 K/UL — SIGNIFICANT CHANGE UP (ref 3.8–10.5)
WBC # BLD: 7.88 K/UL — SIGNIFICANT CHANGE UP (ref 3.8–10.5)
WBC # BLD: 8.14 K/UL — SIGNIFICANT CHANGE UP (ref 3.8–10.5)
WBC # BLD: 8.18 K/UL — SIGNIFICANT CHANGE UP (ref 3.8–10.5)
WBC # BLD: 8.3 K/UL — SIGNIFICANT CHANGE UP (ref 3.8–10.5)
WBC # BLD: 8.34 K/UL — SIGNIFICANT CHANGE UP (ref 3.8–10.5)
WBC # BLD: 8.66 K/UL — SIGNIFICANT CHANGE UP (ref 3.8–10.5)
WBC # BLD: 8.7 K/UL — SIGNIFICANT CHANGE UP (ref 3.8–10.5)
WBC # BLD: 8.99 K/UL — SIGNIFICANT CHANGE UP (ref 3.8–10.5)
WBC # BLD: 9.08 K/UL — SIGNIFICANT CHANGE UP (ref 3.8–10.5)
WBC # BLD: 9.18 K/UL — SIGNIFICANT CHANGE UP (ref 3.8–10.5)
WBC # BLD: 9.89 K/UL — SIGNIFICANT CHANGE UP (ref 3.8–10.5)
WBC # FLD AUTO: 10.47 K/UL — SIGNIFICANT CHANGE UP (ref 3.8–10.5)
WBC # FLD AUTO: 10.67 K/UL — HIGH (ref 3.8–10.5)
WBC # FLD AUTO: 10.84 K/UL — HIGH (ref 3.8–10.5)
WBC # FLD AUTO: 11.02 K/UL — HIGH (ref 3.8–10.5)
WBC # FLD AUTO: 11.09 K/UL — HIGH (ref 3.8–10.5)
WBC # FLD AUTO: 12.44 K/UL — HIGH (ref 3.8–10.5)
WBC # FLD AUTO: 5.17 K/UL — SIGNIFICANT CHANGE UP (ref 3.8–10.5)
WBC # FLD AUTO: 5.5 K/UL — SIGNIFICANT CHANGE UP (ref 3.8–10.5)
WBC # FLD AUTO: 5.64 K/UL — SIGNIFICANT CHANGE UP (ref 3.8–10.5)
WBC # FLD AUTO: 5.67 K/UL — SIGNIFICANT CHANGE UP (ref 3.8–10.5)
WBC # FLD AUTO: 5.86 K/UL — SIGNIFICANT CHANGE UP (ref 3.8–10.5)
WBC # FLD AUTO: 5.88 K/UL — SIGNIFICANT CHANGE UP (ref 3.8–10.5)
WBC # FLD AUTO: 5.92 K/UL — SIGNIFICANT CHANGE UP (ref 3.8–10.5)
WBC # FLD AUTO: 5.93 K/UL — SIGNIFICANT CHANGE UP (ref 3.8–10.5)
WBC # FLD AUTO: 5.97 K/UL — SIGNIFICANT CHANGE UP (ref 3.8–10.5)
WBC # FLD AUTO: 6.02 K/UL — SIGNIFICANT CHANGE UP (ref 3.8–10.5)
WBC # FLD AUTO: 6.05 K/UL — SIGNIFICANT CHANGE UP (ref 3.8–10.5)
WBC # FLD AUTO: 6.16 K/UL — SIGNIFICANT CHANGE UP (ref 3.8–10.5)
WBC # FLD AUTO: 6.21 K/UL — SIGNIFICANT CHANGE UP (ref 3.8–10.5)
WBC # FLD AUTO: 6.3 K/UL — SIGNIFICANT CHANGE UP (ref 3.8–10.5)
WBC # FLD AUTO: 6.49 K/UL — SIGNIFICANT CHANGE UP (ref 3.8–10.5)
WBC # FLD AUTO: 6.51 K/UL — SIGNIFICANT CHANGE UP (ref 3.8–10.5)
WBC # FLD AUTO: 6.59 K/UL — SIGNIFICANT CHANGE UP (ref 3.8–10.5)
WBC # FLD AUTO: 6.67 K/UL — SIGNIFICANT CHANGE UP (ref 3.8–10.5)
WBC # FLD AUTO: 6.69 K/UL — SIGNIFICANT CHANGE UP (ref 3.8–10.5)
WBC # FLD AUTO: 6.72 K/UL — SIGNIFICANT CHANGE UP (ref 3.8–10.5)
WBC # FLD AUTO: 6.79 K/UL — SIGNIFICANT CHANGE UP (ref 3.8–10.5)
WBC # FLD AUTO: 6.79 K/UL — SIGNIFICANT CHANGE UP (ref 3.8–10.5)
WBC # FLD AUTO: 6.83 K/UL — SIGNIFICANT CHANGE UP (ref 3.8–10.5)
WBC # FLD AUTO: 6.85 K/UL — SIGNIFICANT CHANGE UP (ref 3.8–10.5)
WBC # FLD AUTO: 6.99 K/UL — SIGNIFICANT CHANGE UP (ref 3.8–10.5)
WBC # FLD AUTO: 6.99 K/UL — SIGNIFICANT CHANGE UP (ref 3.8–10.5)
WBC # FLD AUTO: 7.05 K/UL — SIGNIFICANT CHANGE UP (ref 3.8–10.5)
WBC # FLD AUTO: 7.06 K/UL — SIGNIFICANT CHANGE UP (ref 3.8–10.5)
WBC # FLD AUTO: 7.11 K/UL — SIGNIFICANT CHANGE UP (ref 3.8–10.5)
WBC # FLD AUTO: 7.12 K/UL — SIGNIFICANT CHANGE UP (ref 3.8–10.5)
WBC # FLD AUTO: 7.21 K/UL — SIGNIFICANT CHANGE UP (ref 3.8–10.5)
WBC # FLD AUTO: 7.24 K/UL — SIGNIFICANT CHANGE UP (ref 3.8–10.5)
WBC # FLD AUTO: 7.27 K/UL — SIGNIFICANT CHANGE UP (ref 3.8–10.5)
WBC # FLD AUTO: 7.42 K/UL — SIGNIFICANT CHANGE UP (ref 3.8–10.5)
WBC # FLD AUTO: 7.52 K/UL — SIGNIFICANT CHANGE UP (ref 3.8–10.5)
WBC # FLD AUTO: 7.82 K/UL — SIGNIFICANT CHANGE UP (ref 3.8–10.5)
WBC # FLD AUTO: 7.86 K/UL — SIGNIFICANT CHANGE UP (ref 3.8–10.5)
WBC # FLD AUTO: 7.88 K/UL — SIGNIFICANT CHANGE UP (ref 3.8–10.5)
WBC # FLD AUTO: 8.14 K/UL — SIGNIFICANT CHANGE UP (ref 3.8–10.5)
WBC # FLD AUTO: 8.18 K/UL — SIGNIFICANT CHANGE UP (ref 3.8–10.5)
WBC # FLD AUTO: 8.3 K/UL — SIGNIFICANT CHANGE UP (ref 3.8–10.5)
WBC # FLD AUTO: 8.34 K/UL — SIGNIFICANT CHANGE UP (ref 3.8–10.5)
WBC # FLD AUTO: 8.66 K/UL — SIGNIFICANT CHANGE UP (ref 3.8–10.5)
WBC # FLD AUTO: 8.7 K/UL — SIGNIFICANT CHANGE UP (ref 3.8–10.5)
WBC # FLD AUTO: 8.99 K/UL — SIGNIFICANT CHANGE UP (ref 3.8–10.5)
WBC # FLD AUTO: 9.08 K/UL — SIGNIFICANT CHANGE UP (ref 3.8–10.5)
WBC # FLD AUTO: 9.18 K/UL — SIGNIFICANT CHANGE UP (ref 3.8–10.5)
WBC # FLD AUTO: 9.25 K/UL
WBC # FLD AUTO: 9.89 K/UL — SIGNIFICANT CHANGE UP (ref 3.8–10.5)
WHITE BLOOD CELLS URINE: 10 /HPF

## 2022-01-01 PROCEDURE — 86359 T CELLS TOTAL COUNT: CPT

## 2022-01-01 PROCEDURE — 99233 SBSQ HOSP IP/OBS HIGH 50: CPT

## 2022-01-01 PROCEDURE — 99358 PROLONG SERVICE W/O CONTACT: CPT | Mod: NC

## 2022-01-01 PROCEDURE — 32552 REMOVE LUNG CATHETER: CPT | Mod: AS

## 2022-01-01 PROCEDURE — 93010 ELECTROCARDIOGRAM REPORT: CPT

## 2022-01-01 PROCEDURE — 82803 BLOOD GASES ANY COMBINATION: CPT

## 2022-01-01 PROCEDURE — 99223 1ST HOSP IP/OBS HIGH 75: CPT | Mod: GC

## 2022-01-01 PROCEDURE — 93005 ELECTROCARDIOGRAM TRACING: CPT

## 2022-01-01 PROCEDURE — 84157 ASSAY OF PROTEIN OTHER: CPT

## 2022-01-01 PROCEDURE — 82947 ASSAY GLUCOSE BLOOD QUANT: CPT

## 2022-01-01 PROCEDURE — 97116 GAIT TRAINING THERAPY: CPT

## 2022-01-01 PROCEDURE — 93306 TTE W/DOPPLER COMPLETE: CPT

## 2022-01-01 PROCEDURE — 71045 X-RAY EXAM CHEST 1 VIEW: CPT | Mod: 26

## 2022-01-01 PROCEDURE — 73030 X-RAY EXAM OF SHOULDER: CPT | Mod: 26,LT

## 2022-01-01 PROCEDURE — 83615 LACTATE (LD) (LDH) ENZYME: CPT

## 2022-01-01 PROCEDURE — 99497 ADVNCD CARE PLAN 30 MIN: CPT | Mod: 25

## 2022-01-01 PROCEDURE — 12345: CPT | Mod: NC

## 2022-01-01 PROCEDURE — 99239 HOSP IP/OBS DSCHRG MGMT >30: CPT

## 2022-01-01 PROCEDURE — 99222 1ST HOSP IP/OBS MODERATE 55: CPT | Mod: GC

## 2022-01-01 PROCEDURE — 99238 HOSP IP/OBS DSCHRG MGMT 30/<: CPT

## 2022-01-01 PROCEDURE — 86900 BLOOD TYPING SEROLOGIC ABO: CPT

## 2022-01-01 PROCEDURE — 84295 ASSAY OF SERUM SODIUM: CPT

## 2022-01-01 PROCEDURE — 99233 SBSQ HOSP IP/OBS HIGH 50: CPT | Mod: GC

## 2022-01-01 PROCEDURE — 73060 X-RAY EXAM OF HUMERUS: CPT | Mod: 26,LT

## 2022-01-01 PROCEDURE — 99285 EMERGENCY DEPT VISIT HI MDM: CPT

## 2022-01-01 PROCEDURE — 99285 EMERGENCY DEPT VISIT HI MDM: CPT | Mod: 25

## 2022-01-01 PROCEDURE — 99232 SBSQ HOSP IP/OBS MODERATE 35: CPT

## 2022-01-01 PROCEDURE — 73020 X-RAY EXAM OF SHOULDER: CPT

## 2022-01-01 PROCEDURE — 99024 POSTOP FOLLOW-UP VISIT: CPT

## 2022-01-01 PROCEDURE — 85610 PROTHROMBIN TIME: CPT

## 2022-01-01 PROCEDURE — 76705 ECHO EXAM OF ABDOMEN: CPT | Mod: 26

## 2022-01-01 PROCEDURE — 74176 CT ABD & PELVIS W/O CONTRAST: CPT | Mod: MA

## 2022-01-01 PROCEDURE — 85730 THROMBOPLASTIN TIME PARTIAL: CPT

## 2022-01-01 PROCEDURE — C9399: CPT

## 2022-01-01 PROCEDURE — 0225U NFCT DS DNA&RNA 21 SARSCOV2: CPT

## 2022-01-01 PROCEDURE — 97162 PT EVAL MOD COMPLEX 30 MIN: CPT

## 2022-01-01 PROCEDURE — 71045 X-RAY EXAM CHEST 1 VIEW: CPT | Mod: 26,77

## 2022-01-01 PROCEDURE — 73060 X-RAY EXAM OF HUMERUS: CPT

## 2022-01-01 PROCEDURE — 93306 TTE W/DOPPLER COMPLETE: CPT | Mod: 26

## 2022-01-01 PROCEDURE — 82435 ASSAY OF BLOOD CHLORIDE: CPT

## 2022-01-01 PROCEDURE — 99443: CPT

## 2022-01-01 PROCEDURE — 84132 ASSAY OF SERUM POTASSIUM: CPT

## 2022-01-01 PROCEDURE — 87075 CULTR BACTERIA EXCEPT BLOOD: CPT

## 2022-01-01 PROCEDURE — 80048 BASIC METABOLIC PNL TOTAL CA: CPT

## 2022-01-01 PROCEDURE — 84100 ASSAY OF PHOSPHORUS: CPT

## 2022-01-01 PROCEDURE — U0003: CPT

## 2022-01-01 PROCEDURE — 85025 COMPLETE CBC W/AUTO DIFF WBC: CPT

## 2022-01-01 PROCEDURE — 83605 ASSAY OF LACTIC ACID: CPT

## 2022-01-01 PROCEDURE — 99222 1ST HOSP IP/OBS MODERATE 55: CPT

## 2022-01-01 PROCEDURE — 76705 ECHO EXAM OF ABDOMEN: CPT

## 2022-01-01 PROCEDURE — 99214 OFFICE O/P EST MOD 30 MIN: CPT

## 2022-01-01 PROCEDURE — 93970 EXTREMITY STUDY: CPT

## 2022-01-01 PROCEDURE — 82945 GLUCOSE OTHER FLUID: CPT

## 2022-01-01 PROCEDURE — 86901 BLOOD TYPING SEROLOGIC RH(D): CPT

## 2022-01-01 PROCEDURE — 86850 RBC ANTIBODY SCREEN: CPT

## 2022-01-01 PROCEDURE — 88108 CYTOPATH CONCENTRATE TECH: CPT | Mod: 26

## 2022-01-01 PROCEDURE — 71275 CT ANGIOGRAPHY CHEST: CPT | Mod: 26,MA

## 2022-01-01 PROCEDURE — 99284 EMERGENCY DEPT VISIT MOD MDM: CPT

## 2022-01-01 PROCEDURE — 87070 CULTURE OTHR SPECIMN AEROBIC: CPT

## 2022-01-01 PROCEDURE — 85018 HEMOGLOBIN: CPT

## 2022-01-01 PROCEDURE — 83690 ASSAY OF LIPASE: CPT

## 2022-01-01 PROCEDURE — 36415 COLL VENOUS BLD VENIPUNCTURE: CPT

## 2022-01-01 PROCEDURE — 93970 EXTREMITY STUDY: CPT | Mod: 26

## 2022-01-01 PROCEDURE — 99233 SBSQ HOSP IP/OBS HIGH 50: CPT | Mod: 25

## 2022-01-01 PROCEDURE — 99223 1ST HOSP IP/OBS HIGH 75: CPT

## 2022-01-01 PROCEDURE — 96375 TX/PRO/DX INJ NEW DRUG ADDON: CPT

## 2022-01-01 PROCEDURE — 85027 COMPLETE CBC AUTOMATED: CPT

## 2022-01-01 PROCEDURE — 88342 IMHCHEM/IMCYTCHM 1ST ANTB: CPT

## 2022-01-01 PROCEDURE — 84300 ASSAY OF URINE SODIUM: CPT

## 2022-01-01 PROCEDURE — 32550 INSERT PLEURAL CATH: CPT

## 2022-01-01 PROCEDURE — 84484 ASSAY OF TROPONIN QUANT: CPT

## 2022-01-01 PROCEDURE — 88342 IMHCHEM/IMCYTCHM 1ST ANTB: CPT | Mod: 26,59

## 2022-01-01 PROCEDURE — 32555 ASPIRATE PLEURA W/ IMAGING: CPT

## 2022-01-01 PROCEDURE — 80053 COMPREHEN METABOLIC PANEL: CPT

## 2022-01-01 PROCEDURE — 84702 CHORIONIC GONADOTROPIN TEST: CPT

## 2022-01-01 PROCEDURE — 32552 REMOVE LUNG CATHETER: CPT

## 2022-01-01 PROCEDURE — 99222 1ST HOSP IP/OBS MODERATE 55: CPT | Mod: GC,25

## 2022-01-01 PROCEDURE — 86357 NK CELLS TOTAL COUNT: CPT

## 2022-01-01 PROCEDURE — 82330 ASSAY OF CALCIUM: CPT

## 2022-01-01 PROCEDURE — 99232 SBSQ HOSP IP/OBS MODERATE 35: CPT | Mod: GC

## 2022-01-01 PROCEDURE — 99498 ADVNCD CARE PLAN ADDL 30 MIN: CPT | Mod: 25

## 2022-01-01 PROCEDURE — 49083 ABD PARACENTESIS W/IMAGING: CPT

## 2022-01-01 PROCEDURE — 93000 ELECTROCARDIOGRAM COMPLETE: CPT

## 2022-01-01 PROCEDURE — 96374 THER/PROPH/DIAG INJ IV PUSH: CPT

## 2022-01-01 PROCEDURE — 87102 FUNGUS ISOLATION CULTURE: CPT

## 2022-01-01 PROCEDURE — 71250 CT THORAX DX C-: CPT | Mod: MA

## 2022-01-01 PROCEDURE — 43266 EGD ENDOSCOPIC STENT PLACE: CPT | Mod: GC

## 2022-01-01 PROCEDURE — 88112 CYTOPATH CELL ENHANCE TECH: CPT | Mod: 26

## 2022-01-01 PROCEDURE — 82042 OTHER SOURCE ALBUMIN QUAN EA: CPT

## 2022-01-01 PROCEDURE — U0005: CPT

## 2022-01-01 PROCEDURE — 74360 X-RAY GUIDE GI DILATION: CPT | Mod: 26

## 2022-01-01 PROCEDURE — 71045 X-RAY EXAM CHEST 1 VIEW: CPT

## 2022-01-01 PROCEDURE — 99231 SBSQ HOSP IP/OBS SF/LOW 25: CPT

## 2022-01-01 PROCEDURE — 88305 TISSUE EXAM BY PATHOLOGIST: CPT | Mod: 26

## 2022-01-01 PROCEDURE — 87205 SMEAR GRAM STAIN: CPT

## 2022-01-01 PROCEDURE — 85014 HEMATOCRIT: CPT

## 2022-01-01 PROCEDURE — 83935 ASSAY OF URINE OSMOLALITY: CPT

## 2022-01-01 PROCEDURE — 71275 CT ANGIOGRAPHY CHEST: CPT | Mod: MA

## 2022-01-01 PROCEDURE — 71250 CT THORAX DX C-: CPT | Mod: 26,MA

## 2022-01-01 PROCEDURE — 93224 XTRNL ECG REC UP TO 48 HRS: CPT

## 2022-01-01 PROCEDURE — 97530 THERAPEUTIC ACTIVITIES: CPT

## 2022-01-01 PROCEDURE — 86360 T CELL ABSOLUTE COUNT/RATIO: CPT

## 2022-01-01 PROCEDURE — 99497 ADVNCD CARE PLAN 30 MIN: CPT

## 2022-01-01 PROCEDURE — 88341 IMHCHEM/IMCYTCHM EA ADD ANTB: CPT

## 2022-01-01 PROCEDURE — 97166 OT EVAL MOD COMPLEX 45 MIN: CPT

## 2022-01-01 PROCEDURE — 73030 X-RAY EXAM OF SHOULDER: CPT

## 2022-01-01 PROCEDURE — 32609 THORACOSCOPY W/BX PLEURA: CPT

## 2022-01-01 PROCEDURE — P9016: CPT

## 2022-01-01 PROCEDURE — C1729: CPT

## 2022-01-01 PROCEDURE — 82955 ASSAY OF G6PD ENZYME: CPT

## 2022-01-01 PROCEDURE — 82565 ASSAY OF CREATININE: CPT

## 2022-01-01 PROCEDURE — 99442: CPT

## 2022-01-01 PROCEDURE — 89051 BODY FLUID CELL COUNT: CPT

## 2022-01-01 PROCEDURE — 83880 ASSAY OF NATRIURETIC PEPTIDE: CPT

## 2022-01-01 PROCEDURE — 76604 US EXAM CHEST: CPT | Mod: 26,GC

## 2022-01-01 PROCEDURE — 99239 HOSP IP/OBS DSCHRG MGMT >30: CPT | Mod: GC

## 2022-01-01 PROCEDURE — 99284 EMERGENCY DEPT VISIT MOD MDM: CPT | Mod: 25

## 2022-01-01 PROCEDURE — 99205 OFFICE O/P NEW HI 60 MIN: CPT

## 2022-01-01 PROCEDURE — 86923 COMPATIBILITY TEST ELECTRIC: CPT

## 2022-01-01 PROCEDURE — 88341 IMHCHEM/IMCYTCHM EA ADD ANTB: CPT | Mod: 26

## 2022-01-01 PROCEDURE — 83735 ASSAY OF MAGNESIUM: CPT

## 2022-01-01 PROCEDURE — 99396 PREV VISIT EST AGE 40-64: CPT

## 2022-01-01 PROCEDURE — 96376 TX/PRO/DX INJ SAME DRUG ADON: CPT

## 2022-01-01 PROCEDURE — 71045 X-RAY EXAM CHEST 1 VIEW: CPT | Mod: 26,76

## 2022-01-01 PROCEDURE — 82306 VITAMIN D 25 HYDROXY: CPT

## 2022-01-01 PROCEDURE — 88112 CYTOPATH CELL ENHANCE TECH: CPT

## 2022-01-01 PROCEDURE — 99233 SBSQ HOSP IP/OBS HIGH 50: CPT | Mod: GC,25

## 2022-01-01 PROCEDURE — 71046 X-RAY EXAM CHEST 2 VIEWS: CPT | Mod: 26

## 2022-01-01 PROCEDURE — 97110 THERAPEUTIC EXERCISES: CPT

## 2022-01-01 PROCEDURE — 88305 TISSUE EXAM BY PATHOLOGIST: CPT

## 2022-01-01 PROCEDURE — 82728 ASSAY OF FERRITIN: CPT

## 2022-01-01 PROCEDURE — 71275 CT ANGIOGRAPHY CHEST: CPT | Mod: 26

## 2022-01-01 PROCEDURE — 97161 PT EVAL LOW COMPLEX 20 MIN: CPT

## 2022-01-01 PROCEDURE — 74176 CT ABD & PELVIS W/O CONTRAST: CPT | Mod: 26,MA

## 2022-01-01 PROCEDURE — P9047: CPT

## 2022-01-01 PROCEDURE — 86355 B CELLS TOTAL COUNT: CPT

## 2022-01-01 DEVICE — HYDRA WIRE: Type: IMPLANTABLE DEVICE | Status: FUNCTIONAL

## 2022-01-01 DEVICE — CATH PLEURX CATHETER KIT WITH FOUR 1000ML BOTTLES: Type: IMPLANTABLE DEVICE | Site: RIGHT | Status: FUNCTIONAL

## 2022-01-01 DEVICE — STENT DUOD WALLFLX 27X22X9X230 TTS OTW: Type: IMPLANTABLE DEVICE | Status: FUNCTIONAL

## 2022-01-01 RX ORDER — MORPHINE SULFATE 50 MG/1
60 CAPSULE, EXTENDED RELEASE ORAL
Refills: 0 | Status: DISCONTINUED | OUTPATIENT
Start: 2022-01-01 | End: 2022-01-01

## 2022-01-01 RX ORDER — ALBUMIN HUMAN 25 %
50 VIAL (ML) INTRAVENOUS ONCE
Refills: 0 | Status: COMPLETED | OUTPATIENT
Start: 2022-01-01 | End: 2022-01-01

## 2022-01-01 RX ORDER — LIDOCAINE 4 G/100G
1 CREAM TOPICAL ONCE
Refills: 0 | Status: COMPLETED | OUTPATIENT
Start: 2022-01-01 | End: 2022-01-01

## 2022-01-01 RX ORDER — POLYETHYLENE GLYCOL 3350 17 G/17G
17 POWDER, FOR SOLUTION ORAL
Qty: 0 | Refills: 0 | DISCHARGE
Start: 2022-01-01

## 2022-01-01 RX ORDER — HYDROMORPHONE HYDROCHLORIDE 2 MG/ML
1 INJECTION INTRAMUSCULAR; INTRAVENOUS; SUBCUTANEOUS EVERY 4 HOURS
Refills: 0 | Status: DISCONTINUED | OUTPATIENT
Start: 2022-01-01 | End: 2022-01-01

## 2022-01-01 RX ORDER — METHOCARBAMOL 500 MG/1
1500 TABLET, FILM COATED ORAL
Refills: 0 | Status: DISCONTINUED | OUTPATIENT
Start: 2022-01-01 | End: 2022-01-01

## 2022-01-01 RX ORDER — BUMETANIDE 0.25 MG/ML
2 INJECTION INTRAMUSCULAR; INTRAVENOUS EVERY 8 HOURS
Refills: 0 | Status: DISCONTINUED | OUTPATIENT
Start: 2022-01-01 | End: 2022-01-01

## 2022-01-01 RX ORDER — HYDROMORPHONE HYDROCHLORIDE 2 MG/ML
0.5 INJECTION INTRAMUSCULAR; INTRAVENOUS; SUBCUTANEOUS EVERY 4 HOURS
Refills: 0 | Status: DISCONTINUED | OUTPATIENT
Start: 2022-01-01 | End: 2022-01-01

## 2022-01-01 RX ORDER — MORPHINE SULFATE 50 MG/1
1 CAPSULE, EXTENDED RELEASE ORAL
Qty: 90 | Refills: 0
Start: 2022-01-01 | End: 2022-05-14

## 2022-01-01 RX ORDER — LACTULOSE 10 G/15ML
20 SOLUTION ORAL EVERY 24 HOURS
Refills: 0 | Status: DISCONTINUED | OUTPATIENT
Start: 2022-01-01 | End: 2022-01-01

## 2022-01-01 RX ORDER — MORPHINE SULFATE 50 MG/1
60 CAPSULE, EXTENDED RELEASE ORAL EVERY 8 HOURS
Refills: 0 | Status: DISCONTINUED | OUTPATIENT
Start: 2022-01-01 | End: 2022-01-01

## 2022-01-01 RX ORDER — MORPHINE SULFATE 15 MG/1
15 TABLET, FILM COATED, EXTENDED RELEASE ORAL
Qty: 28 | Refills: 0 | Status: ACTIVE | COMMUNITY
Start: 2022-01-01 | End: 1900-01-01

## 2022-01-01 RX ORDER — POTASSIUM CHLORIDE 20 MEQ
40 PACKET (EA) ORAL
Refills: 0 | Status: COMPLETED | OUTPATIENT
Start: 2022-01-01 | End: 2022-01-01

## 2022-01-01 RX ORDER — POTASSIUM CHLORIDE 20 MEQ
40 PACKET (EA) ORAL ONCE
Refills: 0 | Status: COMPLETED | OUTPATIENT
Start: 2022-01-01 | End: 2022-01-01

## 2022-01-01 RX ORDER — HYDROMORPHONE HYDROCHLORIDE 2 MG/ML
1 INJECTION INTRAMUSCULAR; INTRAVENOUS; SUBCUTANEOUS ONCE
Refills: 0 | Status: DISCONTINUED | OUTPATIENT
Start: 2022-01-01 | End: 2022-01-01

## 2022-01-01 RX ORDER — METHOCARBAMOL 500 MG/1
1500 TABLET, FILM COATED ORAL THREE TIMES A DAY
Refills: 0 | Status: DISCONTINUED | OUTPATIENT
Start: 2022-01-01 | End: 2022-01-01

## 2022-01-01 RX ORDER — HYDROMORPHONE HYDROCHLORIDE 2 MG/ML
1.5 INJECTION INTRAMUSCULAR; INTRAVENOUS; SUBCUTANEOUS
Refills: 0 | Status: DISCONTINUED | OUTPATIENT
Start: 2022-01-01 | End: 2022-01-01

## 2022-01-01 RX ORDER — MORPHINE SULFATE 50 MG/1
30 CAPSULE, EXTENDED RELEASE ORAL EVERY 8 HOURS
Refills: 0 | Status: DISCONTINUED | OUTPATIENT
Start: 2022-01-01 | End: 2022-01-01

## 2022-01-01 RX ORDER — MORPHINE SULFATE 50 MG/1
45 CAPSULE, EXTENDED RELEASE ORAL
Refills: 0 | Status: DISCONTINUED | OUTPATIENT
Start: 2022-01-01 | End: 2022-01-01

## 2022-01-01 RX ORDER — HYDROMORPHONE HYDROCHLORIDE 2 MG/ML
1.5 INJECTION INTRAMUSCULAR; INTRAVENOUS; SUBCUTANEOUS
Qty: 63 | Refills: 0
Start: 2022-01-01 | End: 2022-01-01

## 2022-01-01 RX ORDER — SODIUM CHLORIDE 9 MG/ML
1000 INJECTION INTRAMUSCULAR; INTRAVENOUS; SUBCUTANEOUS
Refills: 0 | Status: DISCONTINUED | OUTPATIENT
Start: 2022-01-01 | End: 2022-01-01

## 2022-01-01 RX ORDER — HYDROMORPHONE HYDROCHLORIDE 2 MG/ML
2 INJECTION INTRAMUSCULAR; INTRAVENOUS; SUBCUTANEOUS
Refills: 0 | Status: DISCONTINUED | OUTPATIENT
Start: 2022-01-01 | End: 2022-01-01

## 2022-01-01 RX ORDER — LACTULOSE 10 G/15ML
10 SOLUTION ORAL
Refills: 0 | Status: COMPLETED | OUTPATIENT
Start: 2022-01-01 | End: 2022-01-01

## 2022-01-01 RX ORDER — HYDROMORPHONE HYDROCHLORIDE 2 MG/ML
3.5 INJECTION INTRAMUSCULAR; INTRAVENOUS; SUBCUTANEOUS
Refills: 0 | Status: DISCONTINUED | OUTPATIENT
Start: 2022-01-01 | End: 2022-01-01

## 2022-01-01 RX ORDER — SIMETHICONE 80 MG/1
80 TABLET, CHEWABLE ORAL THREE TIMES A DAY
Refills: 0 | Status: DISCONTINUED | OUTPATIENT
Start: 2022-01-01 | End: 2022-01-01

## 2022-01-01 RX ORDER — BUMETANIDE 0.25 MG/ML
2 INJECTION INTRAMUSCULAR; INTRAVENOUS
Refills: 0 | Status: DISCONTINUED | OUTPATIENT
Start: 2022-01-01 | End: 2022-01-01

## 2022-01-01 RX ORDER — INFLUENZA VIRUS VACCINE 15; 15; 15; 15 UG/.5ML; UG/.5ML; UG/.5ML; UG/.5ML
0.5 SUSPENSION INTRAMUSCULAR ONCE
Refills: 0 | Status: DISCONTINUED | OUTPATIENT
Start: 2022-01-01 | End: 2022-01-01

## 2022-01-01 RX ORDER — FAMOTIDINE 10 MG/ML
20 INJECTION INTRAVENOUS DAILY
Refills: 0 | Status: DISCONTINUED | OUTPATIENT
Start: 2022-01-01 | End: 2022-01-01

## 2022-01-01 RX ORDER — ENOXAPARIN SODIUM 100 MG/ML
40 INJECTION SUBCUTANEOUS DAILY
Refills: 0 | Status: DISCONTINUED | OUTPATIENT
Start: 2022-01-01 | End: 2022-01-01

## 2022-01-01 RX ORDER — HYDROMORPHONE HYDROCHLORIDE 2 MG/ML
1 INJECTION INTRAMUSCULAR; INTRAVENOUS; SUBCUTANEOUS
Refills: 0 | Status: DISCONTINUED | OUTPATIENT
Start: 2022-01-01 | End: 2022-01-01

## 2022-01-01 RX ORDER — HYDROMORPHONE HYDROCHLORIDE 2 MG/ML
2 INJECTION INTRAMUSCULAR; INTRAVENOUS; SUBCUTANEOUS EVERY 4 HOURS
Refills: 0 | Status: DISCONTINUED | OUTPATIENT
Start: 2022-01-01 | End: 2022-01-01

## 2022-01-01 RX ORDER — METHOCARBAMOL 500 MG/1
2 TABLET, FILM COATED ORAL
Qty: 0 | Refills: 0 | DISCHARGE
Start: 2022-01-01

## 2022-01-01 RX ORDER — METHOCARBAMOL 500 MG/1
1500 TABLET, FILM COATED ORAL ONCE
Refills: 0 | Status: COMPLETED | OUTPATIENT
Start: 2022-01-01 | End: 2022-01-01

## 2022-01-01 RX ORDER — HYDROMORPHONE HYDROCHLORIDE 2 MG/ML
1 INJECTION INTRAMUSCULAR; INTRAVENOUS; SUBCUTANEOUS
Qty: 18 | Refills: 0
Start: 2022-01-01 | End: 2022-01-01

## 2022-01-01 RX ORDER — LACTULOSE 10 G/15ML
20 SOLUTION ORAL EVERY 12 HOURS
Refills: 0 | Status: DISCONTINUED | OUTPATIENT
Start: 2022-01-01 | End: 2022-01-01

## 2022-01-01 RX ORDER — HYDROMORPHONE HYDROCHLORIDE 2 MG/ML
0.5 INJECTION INTRAMUSCULAR; INTRAVENOUS; SUBCUTANEOUS ONCE
Refills: 0 | Status: DISCONTINUED | OUTPATIENT
Start: 2022-01-01 | End: 2022-01-01

## 2022-01-01 RX ORDER — HYDROMORPHONE HYDROCHLORIDE 2 MG/ML
1 INJECTION INTRAMUSCULAR; INTRAVENOUS; SUBCUTANEOUS
Qty: 0 | Refills: 0 | DISCHARGE

## 2022-01-01 RX ORDER — POLYETHYLENE GLYCOL 3350 17 G/17G
17 POWDER, FOR SOLUTION ORAL EVERY 12 HOURS
Refills: 0 | Status: DISCONTINUED | OUTPATIENT
Start: 2022-01-01 | End: 2022-01-01

## 2022-01-01 RX ORDER — PIPERACILLIN AND TAZOBACTAM 4; .5 G/20ML; G/20ML
3.38 INJECTION, POWDER, LYOPHILIZED, FOR SOLUTION INTRAVENOUS ONCE
Refills: 0 | Status: COMPLETED | OUTPATIENT
Start: 2022-01-01 | End: 2022-01-01

## 2022-01-01 RX ORDER — METHOCARBAMOL 500 MG/1
2 TABLET, FILM COATED ORAL
Qty: 60 | Refills: 0
Start: 2022-01-01 | End: 2022-01-01

## 2022-01-01 RX ORDER — HYDROMORPHONE HYDROCHLORIDE 2 MG/ML
0.3 INJECTION INTRAMUSCULAR; INTRAVENOUS; SUBCUTANEOUS
Refills: 0 | Status: DISCONTINUED | OUTPATIENT
Start: 2022-01-01 | End: 2022-01-01

## 2022-01-01 RX ORDER — POTASSIUM CHLORIDE 20 MEQ
40 PACKET (EA) ORAL EVERY 4 HOURS
Refills: 0 | Status: DISCONTINUED | OUTPATIENT
Start: 2022-01-01 | End: 2022-01-01

## 2022-01-01 RX ORDER — ONDANSETRON 8 MG/1
4 TABLET, FILM COATED ORAL EVERY 8 HOURS
Refills: 0 | Status: DISCONTINUED | OUTPATIENT
Start: 2022-01-01 | End: 2022-01-01

## 2022-01-01 RX ORDER — POLYETHYLENE GLYCOL 3350 17 G/17G
17 POWDER, FOR SOLUTION ORAL
Refills: 0 | Status: DISCONTINUED | OUTPATIENT
Start: 2022-01-01 | End: 2022-01-01

## 2022-01-01 RX ORDER — HYDROXYZINE HCL 10 MG
25 TABLET ORAL EVERY 6 HOURS
Refills: 0 | Status: DISCONTINUED | OUTPATIENT
Start: 2022-01-01 | End: 2022-01-01

## 2022-01-01 RX ORDER — ACETAZOLAMIDE 250 MG/1
250 TABLET ORAL ONCE
Refills: 0 | Status: DISCONTINUED | OUTPATIENT
Start: 2022-01-01 | End: 2022-01-01

## 2022-01-01 RX ORDER — BUMETANIDE 0.25 MG/ML
1 INJECTION INTRAMUSCULAR; INTRAVENOUS
Qty: 60 | Refills: 0
Start: 2022-01-01 | End: 2022-05-14

## 2022-01-01 RX ORDER — ENOXAPARIN SODIUM 100 MG/ML
40 INJECTION SUBCUTANEOUS EVERY 24 HOURS
Refills: 0 | Status: DISCONTINUED | OUTPATIENT
Start: 2022-01-01 | End: 2022-01-01

## 2022-01-01 RX ORDER — HEPARIN SODIUM 5000 [USP'U]/ML
5000 INJECTION INTRAVENOUS; SUBCUTANEOUS EVERY 8 HOURS
Refills: 0 | Status: DISCONTINUED | OUTPATIENT
Start: 2022-01-01 | End: 2022-01-01

## 2022-01-01 RX ORDER — HYDROMORPHONE HYDROCHLORIDE 2 MG/ML
0.75 INJECTION INTRAMUSCULAR; INTRAVENOUS; SUBCUTANEOUS EVERY 4 HOURS
Refills: 0 | Status: DISCONTINUED | OUTPATIENT
Start: 2022-01-01 | End: 2022-01-01

## 2022-01-01 RX ORDER — ALPRAZOLAM 0.25 MG
0.25 TABLET ORAL EVERY 6 HOURS
Refills: 0 | Status: DISCONTINUED | OUTPATIENT
Start: 2022-01-01 | End: 2022-01-01

## 2022-01-01 RX ORDER — POTASSIUM CHLORIDE 20 MEQ
40 PACKET (EA) ORAL ONCE
Refills: 0 | Status: DISCONTINUED | OUTPATIENT
Start: 2022-01-01 | End: 2022-01-01

## 2022-01-01 RX ORDER — FAMOTIDINE 10 MG/ML
20 INJECTION INTRAVENOUS ONCE
Refills: 0 | Status: COMPLETED | OUTPATIENT
Start: 2022-01-01 | End: 2022-01-01

## 2022-01-01 RX ORDER — PIPERACILLIN AND TAZOBACTAM 4; .5 G/20ML; G/20ML
3.38 INJECTION, POWDER, LYOPHILIZED, FOR SOLUTION INTRAVENOUS EVERY 8 HOURS
Refills: 0 | Status: COMPLETED | OUTPATIENT
Start: 2022-01-01 | End: 2022-01-01

## 2022-01-01 RX ORDER — SODIUM CHLORIDE 9 MG/ML
1 INJECTION INTRAMUSCULAR; INTRAVENOUS; SUBCUTANEOUS
Qty: 0 | Refills: 0 | DISCHARGE
Start: 2022-01-01

## 2022-01-01 RX ORDER — SALIVA SUBSTITUTE COMB NO.11 351 MG
15 POWDER IN PACKET (EA) MUCOUS MEMBRANE THREE TIMES A DAY
Refills: 0 | Status: DISCONTINUED | OUTPATIENT
Start: 2022-01-01 | End: 2022-01-01

## 2022-01-01 RX ORDER — SODIUM CHLORIDE 9 MG/ML
1 INJECTION INTRAMUSCULAR; INTRAVENOUS; SUBCUTANEOUS THREE TIMES A DAY
Refills: 0 | Status: DISCONTINUED | OUTPATIENT
Start: 2022-01-01 | End: 2022-01-01

## 2022-01-01 RX ORDER — L.ACIDOPH/B.ANIMALIS/B.LONGUM 15B CELL
1 CAPSULE ORAL
Qty: 0 | Refills: 0 | DISCHARGE

## 2022-01-01 RX ORDER — HYDROMORPHONE HYDROCHLORIDE 2 MG/ML
2 INJECTION INTRAMUSCULAR; INTRAVENOUS; SUBCUTANEOUS ONCE
Refills: 0 | Status: DISCONTINUED | OUTPATIENT
Start: 2022-01-01 | End: 2022-01-01

## 2022-01-01 RX ORDER — ONDANSETRON 8 MG/1
4 TABLET, FILM COATED ORAL THREE TIMES A DAY
Refills: 0 | Status: DISCONTINUED | OUTPATIENT
Start: 2022-01-01 | End: 2022-01-01

## 2022-01-01 RX ORDER — HYDROMORPHONE HYDROCHLORIDE 2 MG/ML
3 INJECTION INTRAMUSCULAR; INTRAVENOUS; SUBCUTANEOUS
Refills: 0 | Status: DISCONTINUED | OUTPATIENT
Start: 2022-01-01 | End: 2022-01-01

## 2022-01-01 RX ORDER — LACTULOSE 10 G/15ML
20 SOLUTION ORAL DAILY
Refills: 0 | Status: DISCONTINUED | OUTPATIENT
Start: 2022-01-01 | End: 2022-01-01

## 2022-01-01 RX ORDER — METHYLNALTREXONE BROMIDE 12 MG/.6ML
12 INJECTION, SOLUTION SUBCUTANEOUS ONCE
Refills: 0 | Status: COMPLETED | OUTPATIENT
Start: 2022-01-01 | End: 2022-01-01

## 2022-01-01 RX ORDER — LANOLIN ALCOHOL/MO/W.PET/CERES
3 CREAM (GRAM) TOPICAL AT BEDTIME
Refills: 0 | Status: DISCONTINUED | OUTPATIENT
Start: 2022-01-01 | End: 2022-01-01

## 2022-01-01 RX ORDER — ACETAMINOPHEN 500 MG
975 TABLET ORAL ONCE
Refills: 0 | Status: DISCONTINUED | OUTPATIENT
Start: 2022-01-01 | End: 2022-01-01

## 2022-01-01 RX ORDER — MORPHINE SULFATE 50 MG/1
4 CAPSULE, EXTENDED RELEASE ORAL ONCE
Refills: 0 | Status: DISCONTINUED | OUTPATIENT
Start: 2022-01-01 | End: 2022-01-01

## 2022-01-01 RX ORDER — MORPHINE SULFATE 50 MG/1
1 CAPSULE, EXTENDED RELEASE ORAL
Qty: 0 | Refills: 0 | DISCHARGE

## 2022-01-01 RX ORDER — MORPHINE SULFATE 50 MG/1
15 CAPSULE, EXTENDED RELEASE ORAL EVERY 12 HOURS
Refills: 0 | Status: DISCONTINUED | OUTPATIENT
Start: 2022-01-01 | End: 2022-01-01

## 2022-01-01 RX ORDER — HYDROMORPHONE HYDROCHLORIDE 2 MG/ML
6 INJECTION INTRAMUSCULAR; INTRAVENOUS; SUBCUTANEOUS EVERY 4 HOURS
Refills: 0 | Status: DISCONTINUED | OUTPATIENT
Start: 2022-01-01 | End: 2022-01-01

## 2022-01-01 RX ORDER — MORPHINE SULFATE 50 MG/1
30 CAPSULE, EXTENDED RELEASE ORAL
Refills: 0 | Status: DISCONTINUED | OUTPATIENT
Start: 2022-01-01 | End: 2022-01-01

## 2022-01-01 RX ORDER — HYDROMORPHONE HYDROCHLORIDE 2 MG/ML
0.5 INJECTION INTRAMUSCULAR; INTRAVENOUS; SUBCUTANEOUS
Refills: 0 | Status: DISCONTINUED | OUTPATIENT
Start: 2022-01-01 | End: 2022-01-01

## 2022-01-01 RX ORDER — SIMETHICONE 80 MG/1
1 TABLET, CHEWABLE ORAL
Qty: 0 | Refills: 0 | DISCHARGE
Start: 2022-01-01

## 2022-01-01 RX ORDER — LACTULOSE 10 G/15ML
20 SOLUTION ORAL EVERY 8 HOURS
Refills: 0 | Status: DISCONTINUED | OUTPATIENT
Start: 2022-01-01 | End: 2022-01-01

## 2022-01-01 RX ORDER — LANOLIN ALCOHOL/MO/W.PET/CERES
5 CREAM (GRAM) TOPICAL AT BEDTIME
Refills: 0 | Status: DISCONTINUED | OUTPATIENT
Start: 2022-01-01 | End: 2022-01-01

## 2022-01-01 RX ORDER — MORPHINE SULFATE 50 MG/1
15 CAPSULE, EXTENDED RELEASE ORAL
Refills: 0 | Status: DISCONTINUED | OUTPATIENT
Start: 2022-01-01 | End: 2022-01-01

## 2022-01-01 RX ORDER — MORPHINE SULFATE 50 MG/1
15 CAPSULE, EXTENDED RELEASE ORAL ONCE
Refills: 0 | Status: DISCONTINUED | OUTPATIENT
Start: 2022-01-01 | End: 2022-01-01

## 2022-01-01 RX ORDER — SODIUM CHLORIDE 9 MG/ML
1000 INJECTION, SOLUTION INTRAVENOUS
Refills: 0 | Status: DISCONTINUED | OUTPATIENT
Start: 2022-01-01 | End: 2022-01-01

## 2022-01-01 RX ORDER — ACETAMINOPHEN 500 MG
1000 TABLET ORAL ONCE
Refills: 0 | Status: COMPLETED | OUTPATIENT
Start: 2022-01-01 | End: 2022-01-01

## 2022-01-01 RX ORDER — ROBINUL 0.2 MG/ML
0.4 INJECTION INTRAMUSCULAR; INTRAVENOUS EVERY 6 HOURS
Refills: 0 | Status: DISCONTINUED | OUTPATIENT
Start: 2022-01-01 | End: 2022-01-01

## 2022-01-01 RX ORDER — POTASSIUM CHLORIDE 20 MEQ
40 PACKET (EA) ORAL EVERY 4 HOURS
Refills: 0 | Status: COMPLETED | OUTPATIENT
Start: 2022-01-01 | End: 2022-01-01

## 2022-01-01 RX ORDER — HYDROMORPHONE HYDROCHLORIDE 2 MG/ML
2.5 INJECTION INTRAMUSCULAR; INTRAVENOUS; SUBCUTANEOUS EVERY 8 HOURS
Refills: 0 | Status: DISCONTINUED | OUTPATIENT
Start: 2022-01-01 | End: 2022-01-01

## 2022-01-01 RX ORDER — LACTULOSE 10 G/15ML
10 SOLUTION ORAL THREE TIMES A DAY
Refills: 0 | Status: COMPLETED | OUTPATIENT
Start: 2022-01-01 | End: 2022-01-01

## 2022-01-01 RX ORDER — FAMOTIDINE 10 MG/ML
20 INJECTION INTRAVENOUS
Refills: 0 | Status: DISCONTINUED | OUTPATIENT
Start: 2022-01-01 | End: 2022-01-01

## 2022-01-01 RX ORDER — HYDROMORPHONE HYDROCHLORIDE 2 MG/ML
1.5 INJECTION INTRAMUSCULAR; INTRAVENOUS; SUBCUTANEOUS
Qty: 100 | Refills: 0 | Status: DISCONTINUED | OUTPATIENT
Start: 2022-01-01 | End: 2022-01-01

## 2022-01-01 RX ORDER — ACETAMINOPHEN 500 MG
650 TABLET ORAL EVERY 6 HOURS
Refills: 0 | Status: DISCONTINUED | OUTPATIENT
Start: 2022-01-01 | End: 2022-01-01

## 2022-01-01 RX ORDER — POLYETHYLENE GLYCOL 3350 17 G/17G
17 POWDER, FOR SOLUTION ORAL EVERY 24 HOURS
Refills: 0 | Status: DISCONTINUED | OUTPATIENT
Start: 2022-01-01 | End: 2022-01-01

## 2022-01-01 RX ORDER — HYDROMORPHONE HYDROCHLORIDE 2 MG/ML
4 INJECTION INTRAMUSCULAR; INTRAVENOUS; SUBCUTANEOUS
Refills: 0 | Status: DISCONTINUED | OUTPATIENT
Start: 2022-01-01 | End: 2022-01-01

## 2022-01-01 RX ORDER — FUROSEMIDE 40 MG/1
40 TABLET ORAL
Qty: 30 | Refills: 1 | Status: DISCONTINUED | COMMUNITY
Start: 2022-01-01 | End: 2022-01-01

## 2022-01-01 RX ORDER — MAGNESIUM OXIDE 400 MG ORAL TABLET 241.3 MG
400 TABLET ORAL
Refills: 0 | Status: COMPLETED | OUTPATIENT
Start: 2022-01-01 | End: 2022-01-01

## 2022-01-01 RX ORDER — ONDANSETRON 8 MG/1
4 TABLET, FILM COATED ORAL ONCE
Refills: 0 | Status: DISCONTINUED | OUTPATIENT
Start: 2022-01-01 | End: 2022-01-01

## 2022-01-01 RX ORDER — ENOXAPARIN SODIUM 100 MG/ML
40 INJECTION SUBCUTANEOUS AT BEDTIME
Refills: 0 | Status: DISCONTINUED | OUTPATIENT
Start: 2022-01-01 | End: 2022-01-01

## 2022-01-01 RX ORDER — ACETAZOLAMIDE 250 MG/1
250 TABLET ORAL ONCE
Refills: 0 | Status: COMPLETED | OUTPATIENT
Start: 2022-01-01 | End: 2022-01-01

## 2022-01-01 RX ORDER — LANOLIN ALCOHOL/MO/W.PET/CERES
1 CREAM (GRAM) TOPICAL
Qty: 0 | Refills: 0 | DISCHARGE
Start: 2022-01-01

## 2022-01-01 RX ORDER — HYDROMORPHONE HYDROCHLORIDE 2 MG/ML
3 INJECTION INTRAMUSCULAR; INTRAVENOUS; SUBCUTANEOUS
Qty: 540 | Refills: 0
Start: 2022-01-01 | End: 2022-05-14

## 2022-01-01 RX ORDER — FENTANYL CITRATE 50 UG/ML
50 INJECTION INTRAVENOUS ONCE
Refills: 0 | Status: DISCONTINUED | OUTPATIENT
Start: 2022-01-01 | End: 2022-01-01

## 2022-01-01 RX ORDER — HYDROMORPHONE HYDROCHLORIDE 2 MG/ML
3 INJECTION INTRAMUSCULAR; INTRAVENOUS; SUBCUTANEOUS EVERY 4 HOURS
Refills: 0 | Status: DISCONTINUED | OUTPATIENT
Start: 2022-01-01 | End: 2022-01-01

## 2022-01-01 RX ORDER — HYDROMORPHONE HYDROCHLORIDE 2 MG/ML
1 INJECTION INTRAMUSCULAR; INTRAVENOUS; SUBCUTANEOUS
Qty: 56 | Refills: 0
Start: 2022-01-01 | End: 2022-01-01

## 2022-01-01 RX ORDER — OXYCODONE HYDROCHLORIDE 5 MG/1
5 TABLET ORAL ONCE
Refills: 0 | Status: DISCONTINUED | OUTPATIENT
Start: 2022-01-01 | End: 2022-01-01

## 2022-01-01 RX ORDER — HYDROMORPHONE HYDROCHLORIDE 2 MG/1
2 TABLET ORAL
Qty: 84 | Refills: 0 | Status: ACTIVE | COMMUNITY
Start: 2022-01-01 | End: 1900-01-01

## 2022-01-01 RX ORDER — HYDROMORPHONE HYDROCHLORIDE 2 MG/ML
1 INJECTION INTRAMUSCULAR; INTRAVENOUS; SUBCUTANEOUS EVERY 6 HOURS
Refills: 0 | Status: DISCONTINUED | OUTPATIENT
Start: 2022-01-01 | End: 2022-01-01

## 2022-01-01 RX ORDER — POLYETHYLENE GLYCOL 3350 17 G/17G
17 POWDER, FOR SOLUTION ORAL DAILY
Refills: 0 | Status: DISCONTINUED | OUTPATIENT
Start: 2022-01-01 | End: 2022-01-01

## 2022-01-01 RX ORDER — HYDROMORPHONE HYDROCHLORIDE 2 MG/ML
1 INJECTION INTRAMUSCULAR; INTRAVENOUS; SUBCUTANEOUS
Qty: 84 | Refills: 0
Start: 2022-01-01 | End: 2022-01-01

## 2022-01-01 RX ORDER — HYDROMORPHONE HYDROCHLORIDE 2 MG/ML
0.75 INJECTION INTRAMUSCULAR; INTRAVENOUS; SUBCUTANEOUS ONCE
Refills: 0 | Status: DISCONTINUED | OUTPATIENT
Start: 2022-01-01 | End: 2022-01-01

## 2022-01-01 RX ORDER — METHOCARBAMOL 500 MG/1
750 TABLET, FILM COATED ORAL THREE TIMES A DAY
Refills: 0 | Status: DISCONTINUED | OUTPATIENT
Start: 2022-01-01 | End: 2022-01-01

## 2022-01-01 RX ORDER — LACTULOSE 10 G/15ML
30 SOLUTION ORAL
Qty: 0 | Refills: 0 | DISCHARGE
Start: 2022-01-01

## 2022-01-01 RX ORDER — ALPRAZOLAM 0.25 MG
0.5 TABLET ORAL EVERY 6 HOURS
Refills: 0 | Status: DISCONTINUED | OUTPATIENT
Start: 2022-01-01 | End: 2022-01-01

## 2022-01-01 RX ORDER — POTASSIUM CHLORIDE 1500 MG/1
20 TABLET, FILM COATED, EXTENDED RELEASE ORAL
Qty: 60 | Refills: 1 | Status: DISCONTINUED | COMMUNITY
Start: 2022-01-01 | End: 2022-01-01

## 2022-01-01 RX ORDER — FOLIC ACID 1 MG/1
1 TABLET ORAL
Qty: 180 | Refills: 0 | Status: ACTIVE | COMMUNITY
Start: 2022-01-01

## 2022-01-01 RX ORDER — LACTULOSE 10 G/15ML
20 SOLUTION ORAL EVERY 6 HOURS
Refills: 0 | Status: DISCONTINUED | OUTPATIENT
Start: 2022-01-01 | End: 2022-01-01

## 2022-01-01 RX ORDER — NALOXONE HYDROCHLORIDE 4 MG/.1ML
4 SPRAY NASAL
Qty: 1 | Refills: 0 | Status: ACTIVE | COMMUNITY
Start: 2022-01-01

## 2022-01-01 RX ORDER — IRON SUCROSE 20 MG/ML
200 INJECTION, SOLUTION INTRAVENOUS ONCE
Refills: 0 | Status: COMPLETED | OUTPATIENT
Start: 2022-01-01 | End: 2022-01-01

## 2022-01-01 RX ORDER — NALOXONE HYDROCHLORIDE 4 MG/.1ML
4 SPRAY NASAL
Qty: 1 | Refills: 0
Start: 2022-01-01 | End: 2022-05-14

## 2022-01-01 RX ORDER — METHOCARBAMOL 500 MG/1
750 TABLET, FILM COATED ORAL ONCE
Refills: 0 | Status: COMPLETED | OUTPATIENT
Start: 2022-01-01 | End: 2022-01-01

## 2022-01-01 RX ORDER — MORPHINE SULFATE 50 MG/1
60 CAPSULE, EXTENDED RELEASE ORAL ONCE
Refills: 0 | Status: DISCONTINUED | OUTPATIENT
Start: 2022-01-01 | End: 2022-01-01

## 2022-01-01 RX ORDER — SIMETHICONE 80 MG/1
1 TABLET, CHEWABLE ORAL
Qty: 0 | Refills: 0 | DISCHARGE

## 2022-01-01 RX ORDER — HYDROMORPHONE HYDROCHLORIDE 2 MG/ML
1 INJECTION INTRAMUSCULAR; INTRAVENOUS; SUBCUTANEOUS
Qty: 0 | Refills: 0 | DISCHARGE
Start: 2022-01-01

## 2022-01-01 RX ORDER — ALPRAZOLAM 0.25 MG
0.25 TABLET ORAL EVERY 8 HOURS
Refills: 0 | Status: DISCONTINUED | OUTPATIENT
Start: 2022-01-01 | End: 2022-01-01

## 2022-01-01 RX ORDER — POLYETHYLENE GLYCOL 3350 17 G/17G
17 POWDER, FOR SOLUTION ORAL
Qty: 510 | Refills: 0
Start: 2022-01-01 | End: 2022-05-14

## 2022-01-01 RX ORDER — LACTULOSE 10 G/15ML
30 SOLUTION ORAL
Qty: 900 | Refills: 0
Start: 2022-01-01 | End: 2022-05-14

## 2022-01-01 RX ORDER — SENNA PLUS 8.6 MG/1
2 TABLET ORAL AT BEDTIME
Refills: 0 | Status: DISCONTINUED | OUTPATIENT
Start: 2022-01-01 | End: 2022-01-01

## 2022-01-01 RX ORDER — MULTIVIT WITH MIN/MFOLATE/K2 340-15/3 G
1 POWDER (GRAM) ORAL ONCE
Refills: 0 | Status: COMPLETED | OUTPATIENT
Start: 2022-01-01 | End: 2022-01-01

## 2022-01-01 RX ORDER — MORPHINE SULFATE 50 MG/1
6 CAPSULE, EXTENDED RELEASE ORAL ONCE
Refills: 0 | Status: DISCONTINUED | OUTPATIENT
Start: 2022-01-01 | End: 2022-01-01

## 2022-01-01 RX ORDER — HYDROMORPHONE HYDROCHLORIDE 2 MG/ML
4 INJECTION INTRAMUSCULAR; INTRAVENOUS; SUBCUTANEOUS EVERY 4 HOURS
Refills: 0 | Status: DISCONTINUED | OUTPATIENT
Start: 2022-01-01 | End: 2022-01-01

## 2022-01-01 RX ORDER — MORPHINE SULFATE 50 MG/1
1 CAPSULE, EXTENDED RELEASE ORAL
Qty: 0 | Refills: 0 | DISCHARGE
Start: 2022-01-01

## 2022-01-01 RX ORDER — SIMETHICONE 80 MG/1
80 TABLET, CHEWABLE ORAL EVERY 8 HOURS
Refills: 0 | Status: DISCONTINUED | OUTPATIENT
Start: 2022-01-01 | End: 2022-01-01

## 2022-01-01 RX ORDER — FENTANYL CITRATE 50 UG/ML
25 INJECTION INTRAVENOUS
Refills: 0 | Status: DISCONTINUED | OUTPATIENT
Start: 2022-01-01 | End: 2022-01-01

## 2022-01-01 RX ORDER — ONDANSETRON 8 MG/1
1 TABLET, FILM COATED ORAL
Qty: 0 | Refills: 0 | DISCHARGE

## 2022-01-01 RX ORDER — MORPHINE SULFATE 50 MG/1
60 CAPSULE, EXTENDED RELEASE ORAL EVERY 12 HOURS
Refills: 0 | Status: DISCONTINUED | OUTPATIENT
Start: 2022-01-01 | End: 2022-01-01

## 2022-01-01 RX ORDER — MORPHINE SULFATE 50 MG/1
1 CAPSULE, EXTENDED RELEASE ORAL
Qty: 60 | Refills: 0
Start: 2022-01-01 | End: 2022-01-01

## 2022-01-01 RX ORDER — SIMETHICONE 80 MG/1
80 TABLET, CHEWABLE ORAL ONCE
Refills: 0 | Status: COMPLETED | OUTPATIENT
Start: 2022-01-01 | End: 2022-01-01

## 2022-01-01 RX ORDER — FUROSEMIDE 40 MG
20 TABLET ORAL ONCE
Refills: 0 | Status: COMPLETED | OUTPATIENT
Start: 2022-01-01 | End: 2022-01-01

## 2022-01-01 RX ORDER — BUMETANIDE 0.25 MG/ML
2 INJECTION INTRAMUSCULAR; INTRAVENOUS EVERY 12 HOURS
Refills: 0 | Status: DISCONTINUED | OUTPATIENT
Start: 2022-01-01 | End: 2022-01-01

## 2022-01-01 RX ORDER — POTASSIUM CHLORIDE 20 MEQ
10 PACKET (EA) ORAL
Refills: 0 | Status: COMPLETED | OUTPATIENT
Start: 2022-01-01 | End: 2022-01-01

## 2022-01-01 RX ORDER — ALPRAZOLAM 0.25 MG
0.25 TABLET ORAL ONCE
Refills: 0 | Status: DISCONTINUED | OUTPATIENT
Start: 2022-01-01 | End: 2022-01-01

## 2022-01-01 RX ORDER — ACETAMINOPHEN 500 MG
2 TABLET ORAL
Qty: 0 | Refills: 0 | DISCHARGE
Start: 2022-01-01

## 2022-01-01 RX ORDER — HYDROMORPHONE HYDROCHLORIDE 2 MG/ML
1 INJECTION INTRAMUSCULAR; INTRAVENOUS; SUBCUTANEOUS
Qty: 100 | Refills: 0 | Status: DISCONTINUED | OUTPATIENT
Start: 2022-01-01 | End: 2022-01-01

## 2022-01-01 RX ORDER — SUCRALFATE 1 G/1
1 TABLET ORAL
Qty: 60 | Refills: 1 | Status: DISCONTINUED | COMMUNITY
Start: 2021-01-01 | End: 2022-01-01

## 2022-01-01 RX ORDER — LACTULOSE 10 G/15ML
20 SOLUTION ORAL THREE TIMES A DAY
Refills: 0 | Status: DISCONTINUED | OUTPATIENT
Start: 2022-01-01 | End: 2022-01-01

## 2022-01-01 RX ADMIN — POLYETHYLENE GLYCOL 3350 17 GRAM(S): 17 POWDER, FOR SOLUTION ORAL at 05:13

## 2022-01-01 RX ADMIN — HEPARIN SODIUM 5000 UNIT(S): 5000 INJECTION INTRAVENOUS; SUBCUTANEOUS at 17:58

## 2022-01-01 RX ADMIN — HYDROMORPHONE HYDROCHLORIDE 6 MILLIGRAM(S): 2 INJECTION INTRAMUSCULAR; INTRAVENOUS; SUBCUTANEOUS at 08:39

## 2022-01-01 RX ADMIN — HYDROMORPHONE HYDROCHLORIDE 2 MILLIGRAM(S): 2 INJECTION INTRAMUSCULAR; INTRAVENOUS; SUBCUTANEOUS at 21:55

## 2022-01-01 RX ADMIN — HYDROMORPHONE HYDROCHLORIDE 0.5 MILLIGRAM(S): 2 INJECTION INTRAMUSCULAR; INTRAVENOUS; SUBCUTANEOUS at 10:34

## 2022-01-01 RX ADMIN — MORPHINE SULFATE 60 MILLIGRAM(S): 50 CAPSULE, EXTENDED RELEASE ORAL at 03:38

## 2022-01-01 RX ADMIN — BUMETANIDE 2 MILLIGRAM(S): 0.25 INJECTION INTRAMUSCULAR; INTRAVENOUS at 06:09

## 2022-01-01 RX ADMIN — HYDROMORPHONE HYDROCHLORIDE 0.75 MILLIGRAM(S): 2 INJECTION INTRAMUSCULAR; INTRAVENOUS; SUBCUTANEOUS at 05:07

## 2022-01-01 RX ADMIN — BUMETANIDE 2 MILLIGRAM(S): 0.25 INJECTION INTRAMUSCULAR; INTRAVENOUS at 06:56

## 2022-01-01 RX ADMIN — HYDROMORPHONE HYDROCHLORIDE 0.75 MILLIGRAM(S): 2 INJECTION INTRAMUSCULAR; INTRAVENOUS; SUBCUTANEOUS at 22:53

## 2022-01-01 RX ADMIN — HYDROMORPHONE HYDROCHLORIDE 4 MILLIGRAM(S): 2 INJECTION INTRAMUSCULAR; INTRAVENOUS; SUBCUTANEOUS at 07:56

## 2022-01-01 RX ADMIN — HYDROMORPHONE HYDROCHLORIDE 2 MILLIGRAM(S): 2 INJECTION INTRAMUSCULAR; INTRAVENOUS; SUBCUTANEOUS at 02:43

## 2022-01-01 RX ADMIN — HYDROMORPHONE HYDROCHLORIDE 2 MILLIGRAM(S): 2 INJECTION INTRAMUSCULAR; INTRAVENOUS; SUBCUTANEOUS at 08:16

## 2022-01-01 RX ADMIN — HYDROMORPHONE HYDROCHLORIDE 6 MILLIGRAM(S): 2 INJECTION INTRAMUSCULAR; INTRAVENOUS; SUBCUTANEOUS at 09:05

## 2022-01-01 RX ADMIN — HYDROMORPHONE HYDROCHLORIDE 1 MILLIGRAM(S): 2 INJECTION INTRAMUSCULAR; INTRAVENOUS; SUBCUTANEOUS at 09:45

## 2022-01-01 RX ADMIN — Medication 1 TABLET(S): at 15:01

## 2022-01-01 RX ADMIN — MORPHINE SULFATE 60 MILLIGRAM(S): 50 CAPSULE, EXTENDED RELEASE ORAL at 17:14

## 2022-01-01 RX ADMIN — LIDOCAINE 1 PATCH: 4 CREAM TOPICAL at 00:42

## 2022-01-01 RX ADMIN — MORPHINE SULFATE 60 MILLIGRAM(S): 50 CAPSULE, EXTENDED RELEASE ORAL at 15:18

## 2022-01-01 RX ADMIN — HYDROMORPHONE HYDROCHLORIDE 1.5 MILLIGRAM(S): 2 INJECTION INTRAMUSCULAR; INTRAVENOUS; SUBCUTANEOUS at 17:39

## 2022-01-01 RX ADMIN — HYDROMORPHONE HYDROCHLORIDE 1 MILLIGRAM(S): 2 INJECTION INTRAMUSCULAR; INTRAVENOUS; SUBCUTANEOUS at 12:31

## 2022-01-01 RX ADMIN — Medication 1 DROP(S): at 00:51

## 2022-01-01 RX ADMIN — Medication 0.25 MILLIGRAM(S): at 11:03

## 2022-01-01 RX ADMIN — HYDROMORPHONE HYDROCHLORIDE 1 MG/HR: 2 INJECTION INTRAMUSCULAR; INTRAVENOUS; SUBCUTANEOUS at 12:05

## 2022-01-01 RX ADMIN — MORPHINE SULFATE 60 MILLIGRAM(S): 50 CAPSULE, EXTENDED RELEASE ORAL at 16:29

## 2022-01-01 RX ADMIN — HYDROMORPHONE HYDROCHLORIDE 0.5 MILLIGRAM(S): 2 INJECTION INTRAMUSCULAR; INTRAVENOUS; SUBCUTANEOUS at 01:28

## 2022-01-01 RX ADMIN — HYDROMORPHONE HYDROCHLORIDE 1 MILLIGRAM(S): 2 INJECTION INTRAMUSCULAR; INTRAVENOUS; SUBCUTANEOUS at 22:07

## 2022-01-01 RX ADMIN — HYDROMORPHONE HYDROCHLORIDE 6 MILLIGRAM(S): 2 INJECTION INTRAMUSCULAR; INTRAVENOUS; SUBCUTANEOUS at 18:20

## 2022-01-01 RX ADMIN — MORPHINE SULFATE 60 MILLIGRAM(S): 50 CAPSULE, EXTENDED RELEASE ORAL at 18:14

## 2022-01-01 RX ADMIN — HYDROMORPHONE HYDROCHLORIDE 1 MILLIGRAM(S): 2 INJECTION INTRAMUSCULAR; INTRAVENOUS; SUBCUTANEOUS at 19:06

## 2022-01-01 RX ADMIN — HYDROMORPHONE HYDROCHLORIDE 0.5 MILLIGRAM(S): 2 INJECTION INTRAMUSCULAR; INTRAVENOUS; SUBCUTANEOUS at 21:31

## 2022-01-01 RX ADMIN — BUMETANIDE 2 MILLIGRAM(S): 0.25 INJECTION INTRAMUSCULAR; INTRAVENOUS at 23:07

## 2022-01-01 RX ADMIN — HYDROMORPHONE HYDROCHLORIDE 4 MILLIGRAM(S): 2 INJECTION INTRAMUSCULAR; INTRAVENOUS; SUBCUTANEOUS at 17:34

## 2022-01-01 RX ADMIN — Medication 15 MILLILITER(S): at 13:16

## 2022-01-01 RX ADMIN — HYDROMORPHONE HYDROCHLORIDE 0.5 MILLIGRAM(S): 2 INJECTION INTRAMUSCULAR; INTRAVENOUS; SUBCUTANEOUS at 21:59

## 2022-01-01 RX ADMIN — POLYETHYLENE GLYCOL 3350 17 GRAM(S): 17 POWDER, FOR SOLUTION ORAL at 09:12

## 2022-01-01 RX ADMIN — HEPARIN SODIUM 5000 UNIT(S): 5000 INJECTION INTRAVENOUS; SUBCUTANEOUS at 11:19

## 2022-01-01 RX ADMIN — LACTULOSE 10 GRAM(S): 10 SOLUTION ORAL at 10:38

## 2022-01-01 RX ADMIN — HYDROMORPHONE HYDROCHLORIDE 2 MILLIGRAM(S): 2 INJECTION INTRAMUSCULAR; INTRAVENOUS; SUBCUTANEOUS at 06:00

## 2022-01-01 RX ADMIN — Medication 0.5 MILLIGRAM(S): at 12:09

## 2022-01-01 RX ADMIN — HYDROMORPHONE HYDROCHLORIDE 0.5 MILLIGRAM(S): 2 INJECTION INTRAMUSCULAR; INTRAVENOUS; SUBCUTANEOUS at 03:06

## 2022-01-01 RX ADMIN — BUMETANIDE 2 MILLIGRAM(S): 0.25 INJECTION INTRAMUSCULAR; INTRAVENOUS at 18:40

## 2022-01-01 RX ADMIN — HYDROMORPHONE HYDROCHLORIDE 6 MILLIGRAM(S): 2 INJECTION INTRAMUSCULAR; INTRAVENOUS; SUBCUTANEOUS at 16:11

## 2022-01-01 RX ADMIN — HYDROMORPHONE HYDROCHLORIDE 0.5 MILLIGRAM(S): 2 INJECTION INTRAMUSCULAR; INTRAVENOUS; SUBCUTANEOUS at 16:03

## 2022-01-01 RX ADMIN — HEPARIN SODIUM 5000 UNIT(S): 5000 INJECTION INTRAVENOUS; SUBCUTANEOUS at 05:01

## 2022-01-01 RX ADMIN — PIPERACILLIN AND TAZOBACTAM 25 GRAM(S): 4; .5 INJECTION, POWDER, LYOPHILIZED, FOR SOLUTION INTRAVENOUS at 14:06

## 2022-01-01 RX ADMIN — HYDROMORPHONE HYDROCHLORIDE 6 MILLIGRAM(S): 2 INJECTION INTRAMUSCULAR; INTRAVENOUS; SUBCUTANEOUS at 12:17

## 2022-01-01 RX ADMIN — HYDROMORPHONE HYDROCHLORIDE 1 MILLIGRAM(S): 2 INJECTION INTRAMUSCULAR; INTRAVENOUS; SUBCUTANEOUS at 19:15

## 2022-01-01 RX ADMIN — Medication 0.5 MILLIGRAM(S): at 16:32

## 2022-01-01 RX ADMIN — SODIUM CHLORIDE 1 GRAM(S): 9 INJECTION INTRAMUSCULAR; INTRAVENOUS; SUBCUTANEOUS at 22:01

## 2022-01-01 RX ADMIN — HYDROMORPHONE HYDROCHLORIDE 0.75 MILLIGRAM(S): 2 INJECTION INTRAMUSCULAR; INTRAVENOUS; SUBCUTANEOUS at 10:21

## 2022-01-01 RX ADMIN — Medication 25 MILLIGRAM(S): at 16:22

## 2022-01-01 RX ADMIN — MORPHINE SULFATE 30 MILLIGRAM(S): 50 CAPSULE, EXTENDED RELEASE ORAL at 14:49

## 2022-01-01 RX ADMIN — Medication 1 DROP(S): at 18:45

## 2022-01-01 RX ADMIN — Medication 0.5 MILLIGRAM(S): at 19:00

## 2022-01-01 RX ADMIN — HYDROMORPHONE HYDROCHLORIDE 2 MILLIGRAM(S): 2 INJECTION INTRAMUSCULAR; INTRAVENOUS; SUBCUTANEOUS at 00:30

## 2022-01-01 RX ADMIN — HYDROMORPHONE HYDROCHLORIDE 1 MILLIGRAM(S): 2 INJECTION INTRAMUSCULAR; INTRAVENOUS; SUBCUTANEOUS at 20:19

## 2022-01-01 RX ADMIN — POLYETHYLENE GLYCOL 3350 17 GRAM(S): 17 POWDER, FOR SOLUTION ORAL at 17:58

## 2022-01-01 RX ADMIN — MORPHINE SULFATE 60 MILLIGRAM(S): 50 CAPSULE, EXTENDED RELEASE ORAL at 13:20

## 2022-01-01 RX ADMIN — MORPHINE SULFATE 60 MILLIGRAM(S): 50 CAPSULE, EXTENDED RELEASE ORAL at 05:45

## 2022-01-01 RX ADMIN — HYDROMORPHONE HYDROCHLORIDE 2 MILLIGRAM(S): 2 INJECTION INTRAMUSCULAR; INTRAVENOUS; SUBCUTANEOUS at 11:02

## 2022-01-01 RX ADMIN — BUMETANIDE 2 MILLIGRAM(S): 0.25 INJECTION INTRAMUSCULAR; INTRAVENOUS at 14:06

## 2022-01-01 RX ADMIN — BUMETANIDE 2 MILLIGRAM(S): 0.25 INJECTION INTRAMUSCULAR; INTRAVENOUS at 17:04

## 2022-01-01 RX ADMIN — ENOXAPARIN SODIUM 40 MILLIGRAM(S): 100 INJECTION SUBCUTANEOUS at 07:51

## 2022-01-01 RX ADMIN — HYDROMORPHONE HYDROCHLORIDE 2 MILLIGRAM(S): 2 INJECTION INTRAMUSCULAR; INTRAVENOUS; SUBCUTANEOUS at 10:37

## 2022-01-01 RX ADMIN — Medication 0.5 MILLIGRAM(S): at 10:00

## 2022-01-01 RX ADMIN — HYDROMORPHONE HYDROCHLORIDE 1.5 MILLIGRAM(S): 2 INJECTION INTRAMUSCULAR; INTRAVENOUS; SUBCUTANEOUS at 11:57

## 2022-01-01 RX ADMIN — Medication 15 MILLILITER(S): at 06:09

## 2022-01-01 RX ADMIN — HYDROMORPHONE HYDROCHLORIDE 0.75 MILLIGRAM(S): 2 INJECTION INTRAMUSCULAR; INTRAVENOUS; SUBCUTANEOUS at 02:55

## 2022-01-01 RX ADMIN — HYDROMORPHONE HYDROCHLORIDE 6 MILLIGRAM(S): 2 INJECTION INTRAMUSCULAR; INTRAVENOUS; SUBCUTANEOUS at 07:52

## 2022-01-01 RX ADMIN — ENOXAPARIN SODIUM 40 MILLIGRAM(S): 100 INJECTION SUBCUTANEOUS at 17:23

## 2022-01-01 RX ADMIN — POLYETHYLENE GLYCOL 3350 17 GRAM(S): 17 POWDER, FOR SOLUTION ORAL at 06:45

## 2022-01-01 RX ADMIN — HYDROMORPHONE HYDROCHLORIDE 6 MILLIGRAM(S): 2 INJECTION INTRAMUSCULAR; INTRAVENOUS; SUBCUTANEOUS at 18:01

## 2022-01-01 RX ADMIN — Medication 40 MILLIEQUIVALENT(S): at 12:18

## 2022-01-01 RX ADMIN — HYDROMORPHONE HYDROCHLORIDE 6 MILLIGRAM(S): 2 INJECTION INTRAMUSCULAR; INTRAVENOUS; SUBCUTANEOUS at 12:32

## 2022-01-01 RX ADMIN — HEPARIN SODIUM 5000 UNIT(S): 5000 INJECTION INTRAVENOUS; SUBCUTANEOUS at 09:07

## 2022-01-01 RX ADMIN — SODIUM CHLORIDE 1 GRAM(S): 9 INJECTION INTRAMUSCULAR; INTRAVENOUS; SUBCUTANEOUS at 22:32

## 2022-01-01 RX ADMIN — Medication 1 TABLET(S): at 12:45

## 2022-01-01 RX ADMIN — SENNA PLUS 2 TABLET(S): 8.6 TABLET ORAL at 21:42

## 2022-01-01 RX ADMIN — HYDROMORPHONE HYDROCHLORIDE 1 MILLIGRAM(S): 2 INJECTION INTRAMUSCULAR; INTRAVENOUS; SUBCUTANEOUS at 00:39

## 2022-01-01 RX ADMIN — HYDROMORPHONE HYDROCHLORIDE 2.5 MILLIGRAM(S): 2 INJECTION INTRAMUSCULAR; INTRAVENOUS; SUBCUTANEOUS at 18:33

## 2022-01-01 RX ADMIN — LIDOCAINE 1 PATCH: 4 CREAM TOPICAL at 07:03

## 2022-01-01 RX ADMIN — BUMETANIDE 2 MILLIGRAM(S): 0.25 INJECTION INTRAMUSCULAR; INTRAVENOUS at 07:31

## 2022-01-01 RX ADMIN — SODIUM CHLORIDE 10 MILLILITER(S): 9 INJECTION INTRAMUSCULAR; INTRAVENOUS; SUBCUTANEOUS at 16:34

## 2022-01-01 RX ADMIN — SODIUM CHLORIDE 1 GRAM(S): 9 INJECTION INTRAMUSCULAR; INTRAVENOUS; SUBCUTANEOUS at 14:48

## 2022-01-01 RX ADMIN — LACTULOSE 20 GRAM(S): 10 SOLUTION ORAL at 06:09

## 2022-01-01 RX ADMIN — HYDROMORPHONE HYDROCHLORIDE 4 MILLIGRAM(S): 2 INJECTION INTRAMUSCULAR; INTRAVENOUS; SUBCUTANEOUS at 19:50

## 2022-01-01 RX ADMIN — Medication 1 DROP(S): at 01:09

## 2022-01-01 RX ADMIN — MORPHINE SULFATE 60 MILLIGRAM(S): 50 CAPSULE, EXTENDED RELEASE ORAL at 00:52

## 2022-01-01 RX ADMIN — LACTULOSE 20 GRAM(S): 10 SOLUTION ORAL at 12:17

## 2022-01-01 RX ADMIN — Medication 20 MILLIGRAM(S): at 13:16

## 2022-01-01 RX ADMIN — MORPHINE SULFATE 60 MILLIGRAM(S): 50 CAPSULE, EXTENDED RELEASE ORAL at 06:02

## 2022-01-01 RX ADMIN — MORPHINE SULFATE 45 MILLIGRAM(S): 50 CAPSULE, EXTENDED RELEASE ORAL at 14:04

## 2022-01-01 RX ADMIN — MORPHINE SULFATE 4 MILLIGRAM(S): 50 CAPSULE, EXTENDED RELEASE ORAL at 22:18

## 2022-01-01 RX ADMIN — MORPHINE SULFATE 60 MILLIGRAM(S): 50 CAPSULE, EXTENDED RELEASE ORAL at 15:24

## 2022-01-01 RX ADMIN — POLYETHYLENE GLYCOL 3350 17 GRAM(S): 17 POWDER, FOR SOLUTION ORAL at 17:39

## 2022-01-01 RX ADMIN — HYDROMORPHONE HYDROCHLORIDE 6 MILLIGRAM(S): 2 INJECTION INTRAMUSCULAR; INTRAVENOUS; SUBCUTANEOUS at 04:55

## 2022-01-01 RX ADMIN — ENOXAPARIN SODIUM 40 MILLIGRAM(S): 100 INJECTION SUBCUTANEOUS at 18:27

## 2022-01-01 RX ADMIN — LIDOCAINE 1 PATCH: 4 CREAM TOPICAL at 23:30

## 2022-01-01 RX ADMIN — PIPERACILLIN AND TAZOBACTAM 25 GRAM(S): 4; .5 INJECTION, POWDER, LYOPHILIZED, FOR SOLUTION INTRAVENOUS at 12:48

## 2022-01-01 RX ADMIN — HYDROMORPHONE HYDROCHLORIDE 0.75 MILLIGRAM(S): 2 INJECTION INTRAMUSCULAR; INTRAVENOUS; SUBCUTANEOUS at 11:49

## 2022-01-01 RX ADMIN — HYDROMORPHONE HYDROCHLORIDE 4 MILLIGRAM(S): 2 INJECTION INTRAMUSCULAR; INTRAVENOUS; SUBCUTANEOUS at 21:45

## 2022-01-01 RX ADMIN — HYDROMORPHONE HYDROCHLORIDE 0.75 MILLIGRAM(S): 2 INJECTION INTRAMUSCULAR; INTRAVENOUS; SUBCUTANEOUS at 05:37

## 2022-01-01 RX ADMIN — MORPHINE SULFATE 60 MILLIGRAM(S): 50 CAPSULE, EXTENDED RELEASE ORAL at 08:02

## 2022-01-01 RX ADMIN — LACTULOSE 10 GRAM(S): 10 SOLUTION ORAL at 06:15

## 2022-01-01 RX ADMIN — LIDOCAINE 1 PATCH: 4 CREAM TOPICAL at 04:00

## 2022-01-01 RX ADMIN — HYDROMORPHONE HYDROCHLORIDE 6 MILLIGRAM(S): 2 INJECTION INTRAMUSCULAR; INTRAVENOUS; SUBCUTANEOUS at 11:45

## 2022-01-01 RX ADMIN — HYDROMORPHONE HYDROCHLORIDE 2 MILLIGRAM(S): 2 INJECTION INTRAMUSCULAR; INTRAVENOUS; SUBCUTANEOUS at 16:49

## 2022-01-01 RX ADMIN — PIPERACILLIN AND TAZOBACTAM 25 GRAM(S): 4; .5 INJECTION, POWDER, LYOPHILIZED, FOR SOLUTION INTRAVENOUS at 21:59

## 2022-01-01 RX ADMIN — MORPHINE SULFATE 60 MILLIGRAM(S): 50 CAPSULE, EXTENDED RELEASE ORAL at 06:07

## 2022-01-01 RX ADMIN — HYDROMORPHONE HYDROCHLORIDE 1 MILLIGRAM(S): 2 INJECTION INTRAMUSCULAR; INTRAVENOUS; SUBCUTANEOUS at 22:29

## 2022-01-01 RX ADMIN — PIPERACILLIN AND TAZOBACTAM 25 GRAM(S): 4; .5 INJECTION, POWDER, LYOPHILIZED, FOR SOLUTION INTRAVENOUS at 16:13

## 2022-01-01 RX ADMIN — HYDROMORPHONE HYDROCHLORIDE 0.75 MILLIGRAM(S): 2 INJECTION INTRAMUSCULAR; INTRAVENOUS; SUBCUTANEOUS at 00:00

## 2022-01-01 RX ADMIN — HYDROMORPHONE HYDROCHLORIDE 0.75 MILLIGRAM(S): 2 INJECTION INTRAMUSCULAR; INTRAVENOUS; SUBCUTANEOUS at 18:27

## 2022-01-01 RX ADMIN — SIMETHICONE 80 MILLIGRAM(S): 80 TABLET, CHEWABLE ORAL at 21:39

## 2022-01-01 RX ADMIN — LACTULOSE 20 GRAM(S): 10 SOLUTION ORAL at 17:17

## 2022-01-01 RX ADMIN — MORPHINE SULFATE 60 MILLIGRAM(S): 50 CAPSULE, EXTENDED RELEASE ORAL at 23:47

## 2022-01-01 RX ADMIN — POLYETHYLENE GLYCOL 3350 17 GRAM(S): 17 POWDER, FOR SOLUTION ORAL at 06:09

## 2022-01-01 RX ADMIN — HYDROMORPHONE HYDROCHLORIDE 2 MILLIGRAM(S): 2 INJECTION INTRAMUSCULAR; INTRAVENOUS; SUBCUTANEOUS at 18:02

## 2022-01-01 RX ADMIN — SODIUM CHLORIDE 1 GRAM(S): 9 INJECTION INTRAMUSCULAR; INTRAVENOUS; SUBCUTANEOUS at 15:19

## 2022-01-01 RX ADMIN — MORPHINE SULFATE 60 MILLIGRAM(S): 50 CAPSULE, EXTENDED RELEASE ORAL at 06:42

## 2022-01-01 RX ADMIN — HYDROMORPHONE HYDROCHLORIDE 0.75 MILLIGRAM(S): 2 INJECTION INTRAMUSCULAR; INTRAVENOUS; SUBCUTANEOUS at 10:35

## 2022-01-01 RX ADMIN — HYDROMORPHONE HYDROCHLORIDE 0.75 MILLIGRAM(S): 2 INJECTION INTRAMUSCULAR; INTRAVENOUS; SUBCUTANEOUS at 16:50

## 2022-01-01 RX ADMIN — MORPHINE SULFATE 60 MILLIGRAM(S): 50 CAPSULE, EXTENDED RELEASE ORAL at 15:12

## 2022-01-01 RX ADMIN — Medication 300 MILLILITER(S): at 16:09

## 2022-01-01 RX ADMIN — MORPHINE SULFATE 60 MILLIGRAM(S): 50 CAPSULE, EXTENDED RELEASE ORAL at 23:06

## 2022-01-01 RX ADMIN — HYDROMORPHONE HYDROCHLORIDE 1 MILLIGRAM(S): 2 INJECTION INTRAMUSCULAR; INTRAVENOUS; SUBCUTANEOUS at 09:21

## 2022-01-01 RX ADMIN — HYDROMORPHONE HYDROCHLORIDE 0.75 MILLIGRAM(S): 2 INJECTION INTRAMUSCULAR; INTRAVENOUS; SUBCUTANEOUS at 07:44

## 2022-01-01 RX ADMIN — SIMETHICONE 80 MILLIGRAM(S): 80 TABLET, CHEWABLE ORAL at 14:06

## 2022-01-01 RX ADMIN — HYDROMORPHONE HYDROCHLORIDE 4 MILLIGRAM(S): 2 INJECTION INTRAMUSCULAR; INTRAVENOUS; SUBCUTANEOUS at 06:45

## 2022-01-01 RX ADMIN — Medication 1 TABLET(S): at 13:38

## 2022-01-01 RX ADMIN — HYDROMORPHONE HYDROCHLORIDE 1 MILLIGRAM(S): 2 INJECTION INTRAMUSCULAR; INTRAVENOUS; SUBCUTANEOUS at 20:09

## 2022-01-01 RX ADMIN — HYDROMORPHONE HYDROCHLORIDE 6 MILLIGRAM(S): 2 INJECTION INTRAMUSCULAR; INTRAVENOUS; SUBCUTANEOUS at 19:47

## 2022-01-01 RX ADMIN — SIMETHICONE 80 MILLIGRAM(S): 80 TABLET, CHEWABLE ORAL at 07:02

## 2022-01-01 RX ADMIN — HYDROMORPHONE HYDROCHLORIDE 1 MILLIGRAM(S): 2 INJECTION INTRAMUSCULAR; INTRAVENOUS; SUBCUTANEOUS at 16:39

## 2022-01-01 RX ADMIN — LACTULOSE 20 GRAM(S): 10 SOLUTION ORAL at 18:50

## 2022-01-01 RX ADMIN — HYDROMORPHONE HYDROCHLORIDE 1 MILLIGRAM(S): 2 INJECTION INTRAMUSCULAR; INTRAVENOUS; SUBCUTANEOUS at 00:24

## 2022-01-01 RX ADMIN — MORPHINE SULFATE 60 MILLIGRAM(S): 50 CAPSULE, EXTENDED RELEASE ORAL at 05:34

## 2022-01-01 RX ADMIN — HYDROMORPHONE HYDROCHLORIDE 4 MILLIGRAM(S): 2 INJECTION INTRAMUSCULAR; INTRAVENOUS; SUBCUTANEOUS at 06:09

## 2022-01-01 RX ADMIN — HYDROMORPHONE HYDROCHLORIDE 6 MILLIGRAM(S): 2 INJECTION INTRAMUSCULAR; INTRAVENOUS; SUBCUTANEOUS at 12:04

## 2022-01-01 RX ADMIN — Medication 40 MILLIEQUIVALENT(S): at 14:57

## 2022-01-01 RX ADMIN — HYDROMORPHONE HYDROCHLORIDE 6 MILLIGRAM(S): 2 INJECTION INTRAMUSCULAR; INTRAVENOUS; SUBCUTANEOUS at 19:05

## 2022-01-01 RX ADMIN — Medication 40 MILLIEQUIVALENT(S): at 16:08

## 2022-01-01 RX ADMIN — LACTULOSE 20 GRAM(S): 10 SOLUTION ORAL at 15:25

## 2022-01-01 RX ADMIN — Medication 1 TABLET(S): at 12:14

## 2022-01-01 RX ADMIN — HYDROMORPHONE HYDROCHLORIDE 1.5 MILLIGRAM(S): 2 INJECTION INTRAMUSCULAR; INTRAVENOUS; SUBCUTANEOUS at 10:20

## 2022-01-01 RX ADMIN — HYDROMORPHONE HYDROCHLORIDE 1 MILLIGRAM(S): 2 INJECTION INTRAMUSCULAR; INTRAVENOUS; SUBCUTANEOUS at 06:15

## 2022-01-01 RX ADMIN — HYDROMORPHONE HYDROCHLORIDE 4 MILLIGRAM(S): 2 INJECTION INTRAMUSCULAR; INTRAVENOUS; SUBCUTANEOUS at 11:02

## 2022-01-01 RX ADMIN — HYDROMORPHONE HYDROCHLORIDE 0.75 MILLIGRAM(S): 2 INJECTION INTRAMUSCULAR; INTRAVENOUS; SUBCUTANEOUS at 08:14

## 2022-01-01 RX ADMIN — LACTULOSE 20 GRAM(S): 10 SOLUTION ORAL at 23:31

## 2022-01-01 RX ADMIN — HYDROMORPHONE HYDROCHLORIDE 1 MILLIGRAM(S): 2 INJECTION INTRAMUSCULAR; INTRAVENOUS; SUBCUTANEOUS at 12:47

## 2022-01-01 RX ADMIN — HYDROMORPHONE HYDROCHLORIDE 6 MILLIGRAM(S): 2 INJECTION INTRAMUSCULAR; INTRAVENOUS; SUBCUTANEOUS at 00:41

## 2022-01-01 RX ADMIN — MORPHINE SULFATE 4 MILLIGRAM(S): 50 CAPSULE, EXTENDED RELEASE ORAL at 18:42

## 2022-01-01 RX ADMIN — MORPHINE SULFATE 60 MILLIGRAM(S): 50 CAPSULE, EXTENDED RELEASE ORAL at 22:50

## 2022-01-01 RX ADMIN — LACTULOSE 20 GRAM(S): 10 SOLUTION ORAL at 07:46

## 2022-01-01 RX ADMIN — HYDROMORPHONE HYDROCHLORIDE 6 MILLIGRAM(S): 2 INJECTION INTRAMUSCULAR; INTRAVENOUS; SUBCUTANEOUS at 09:20

## 2022-01-01 RX ADMIN — MORPHINE SULFATE 45 MILLIGRAM(S): 50 CAPSULE, EXTENDED RELEASE ORAL at 10:22

## 2022-01-01 RX ADMIN — HYDROMORPHONE HYDROCHLORIDE 0.75 MILLIGRAM(S): 2 INJECTION INTRAMUSCULAR; INTRAVENOUS; SUBCUTANEOUS at 12:24

## 2022-01-01 RX ADMIN — MAGNESIUM OXIDE 400 MG ORAL TABLET 400 MILLIGRAM(S): 241.3 TABLET ORAL at 12:54

## 2022-01-01 RX ADMIN — MORPHINE SULFATE 60 MILLIGRAM(S): 50 CAPSULE, EXTENDED RELEASE ORAL at 17:22

## 2022-01-01 RX ADMIN — HYDROMORPHONE HYDROCHLORIDE 0.75 MILLIGRAM(S): 2 INJECTION INTRAMUSCULAR; INTRAVENOUS; SUBCUTANEOUS at 09:21

## 2022-01-01 RX ADMIN — HYDROMORPHONE HYDROCHLORIDE 4 MILLIGRAM(S): 2 INJECTION INTRAMUSCULAR; INTRAVENOUS; SUBCUTANEOUS at 16:46

## 2022-01-01 RX ADMIN — BUMETANIDE 2 MILLIGRAM(S): 0.25 INJECTION INTRAMUSCULAR; INTRAVENOUS at 05:59

## 2022-01-01 RX ADMIN — LIDOCAINE 1 PATCH: 4 CREAM TOPICAL at 01:09

## 2022-01-01 RX ADMIN — HYDROMORPHONE HYDROCHLORIDE 1.5 MILLIGRAM(S): 2 INJECTION INTRAMUSCULAR; INTRAVENOUS; SUBCUTANEOUS at 21:05

## 2022-01-01 RX ADMIN — MORPHINE SULFATE 60 MILLIGRAM(S): 50 CAPSULE, EXTENDED RELEASE ORAL at 05:40

## 2022-01-01 RX ADMIN — HYDROMORPHONE HYDROCHLORIDE 6 MILLIGRAM(S): 2 INJECTION INTRAMUSCULAR; INTRAVENOUS; SUBCUTANEOUS at 04:02

## 2022-01-01 RX ADMIN — MORPHINE SULFATE 60 MILLIGRAM(S): 50 CAPSULE, EXTENDED RELEASE ORAL at 00:08

## 2022-01-01 RX ADMIN — HYDROMORPHONE HYDROCHLORIDE 6 MILLIGRAM(S): 2 INJECTION INTRAMUSCULAR; INTRAVENOUS; SUBCUTANEOUS at 18:26

## 2022-01-01 RX ADMIN — SODIUM CHLORIDE 1 GRAM(S): 9 INJECTION INTRAMUSCULAR; INTRAVENOUS; SUBCUTANEOUS at 16:20

## 2022-01-01 RX ADMIN — HYDROMORPHONE HYDROCHLORIDE 1 MILLIGRAM(S): 2 INJECTION INTRAMUSCULAR; INTRAVENOUS; SUBCUTANEOUS at 14:49

## 2022-01-01 RX ADMIN — SIMETHICONE 80 MILLIGRAM(S): 80 TABLET, CHEWABLE ORAL at 05:46

## 2022-01-01 RX ADMIN — Medication 3 MILLIGRAM(S): at 00:33

## 2022-01-01 RX ADMIN — MORPHINE SULFATE 15 MILLIGRAM(S): 50 CAPSULE, EXTENDED RELEASE ORAL at 02:03

## 2022-01-01 RX ADMIN — HYDROMORPHONE HYDROCHLORIDE 6 MILLIGRAM(S): 2 INJECTION INTRAMUSCULAR; INTRAVENOUS; SUBCUTANEOUS at 21:30

## 2022-01-01 RX ADMIN — MORPHINE SULFATE 60 MILLIGRAM(S): 50 CAPSULE, EXTENDED RELEASE ORAL at 09:16

## 2022-01-01 RX ADMIN — Medication 25 MILLIGRAM(S): at 17:28

## 2022-01-01 RX ADMIN — HYDROMORPHONE HYDROCHLORIDE 6 MILLIGRAM(S): 2 INJECTION INTRAMUSCULAR; INTRAVENOUS; SUBCUTANEOUS at 16:56

## 2022-01-01 RX ADMIN — SENNA PLUS 2 TABLET(S): 8.6 TABLET ORAL at 21:22

## 2022-01-01 RX ADMIN — PIPERACILLIN AND TAZOBACTAM 25 GRAM(S): 4; .5 INJECTION, POWDER, LYOPHILIZED, FOR SOLUTION INTRAVENOUS at 05:34

## 2022-01-01 RX ADMIN — HYDROMORPHONE HYDROCHLORIDE 2 MILLIGRAM(S): 2 INJECTION INTRAMUSCULAR; INTRAVENOUS; SUBCUTANEOUS at 04:40

## 2022-01-01 RX ADMIN — MORPHINE SULFATE 30 MILLIGRAM(S): 50 CAPSULE, EXTENDED RELEASE ORAL at 08:47

## 2022-01-01 RX ADMIN — SODIUM CHLORIDE 1 GRAM(S): 9 INJECTION INTRAMUSCULAR; INTRAVENOUS; SUBCUTANEOUS at 22:24

## 2022-01-01 RX ADMIN — SODIUM CHLORIDE 1 GRAM(S): 9 INJECTION INTRAMUSCULAR; INTRAVENOUS; SUBCUTANEOUS at 14:05

## 2022-01-01 RX ADMIN — POLYETHYLENE GLYCOL 3350 17 GRAM(S): 17 POWDER, FOR SOLUTION ORAL at 16:20

## 2022-01-01 RX ADMIN — HYDROMORPHONE HYDROCHLORIDE 1 MILLIGRAM(S): 2 INJECTION INTRAMUSCULAR; INTRAVENOUS; SUBCUTANEOUS at 19:34

## 2022-01-01 RX ADMIN — LACTULOSE 20 GRAM(S): 10 SOLUTION ORAL at 21:40

## 2022-01-01 RX ADMIN — HYDROMORPHONE HYDROCHLORIDE 1 MILLIGRAM(S): 2 INJECTION INTRAMUSCULAR; INTRAVENOUS; SUBCUTANEOUS at 15:15

## 2022-01-01 RX ADMIN — HYDROMORPHONE HYDROCHLORIDE 2 MILLIGRAM(S): 2 INJECTION INTRAMUSCULAR; INTRAVENOUS; SUBCUTANEOUS at 14:23

## 2022-01-01 RX ADMIN — HYDROMORPHONE HYDROCHLORIDE 4 MILLIGRAM(S): 2 INJECTION INTRAMUSCULAR; INTRAVENOUS; SUBCUTANEOUS at 06:58

## 2022-01-01 RX ADMIN — HYDROMORPHONE HYDROCHLORIDE 0.5 MILLIGRAM(S): 2 INJECTION INTRAMUSCULAR; INTRAVENOUS; SUBCUTANEOUS at 11:01

## 2022-01-01 RX ADMIN — HYDROMORPHONE HYDROCHLORIDE 0.75 MILLIGRAM(S): 2 INJECTION INTRAMUSCULAR; INTRAVENOUS; SUBCUTANEOUS at 17:24

## 2022-01-01 RX ADMIN — MORPHINE SULFATE 60 MILLIGRAM(S): 50 CAPSULE, EXTENDED RELEASE ORAL at 22:00

## 2022-01-01 RX ADMIN — HYDROMORPHONE HYDROCHLORIDE 1 MILLIGRAM(S): 2 INJECTION INTRAMUSCULAR; INTRAVENOUS; SUBCUTANEOUS at 17:34

## 2022-01-01 RX ADMIN — HEPARIN SODIUM 5000 UNIT(S): 5000 INJECTION INTRAVENOUS; SUBCUTANEOUS at 01:14

## 2022-01-01 RX ADMIN — MORPHINE SULFATE 60 MILLIGRAM(S): 50 CAPSULE, EXTENDED RELEASE ORAL at 22:46

## 2022-01-01 RX ADMIN — SODIUM CHLORIDE 1 GRAM(S): 9 INJECTION INTRAMUSCULAR; INTRAVENOUS; SUBCUTANEOUS at 22:26

## 2022-01-01 RX ADMIN — IRON SUCROSE 110 MILLIGRAM(S): 20 INJECTION, SOLUTION INTRAVENOUS at 23:11

## 2022-01-01 RX ADMIN — LIDOCAINE 1 PATCH: 4 CREAM TOPICAL at 10:05

## 2022-01-01 RX ADMIN — MORPHINE SULFATE 15 MILLIGRAM(S): 50 CAPSULE, EXTENDED RELEASE ORAL at 13:27

## 2022-01-01 RX ADMIN — HYDROMORPHONE HYDROCHLORIDE 1 MILLIGRAM(S): 2 INJECTION INTRAMUSCULAR; INTRAVENOUS; SUBCUTANEOUS at 09:15

## 2022-01-01 RX ADMIN — HYDROMORPHONE HYDROCHLORIDE 6 MILLIGRAM(S): 2 INJECTION INTRAMUSCULAR; INTRAVENOUS; SUBCUTANEOUS at 02:15

## 2022-01-01 RX ADMIN — MORPHINE SULFATE 60 MILLIGRAM(S): 50 CAPSULE, EXTENDED RELEASE ORAL at 23:48

## 2022-01-01 RX ADMIN — BUMETANIDE 2 MILLIGRAM(S): 0.25 INJECTION INTRAMUSCULAR; INTRAVENOUS at 05:39

## 2022-01-01 RX ADMIN — LACTULOSE 20 GRAM(S): 10 SOLUTION ORAL at 16:22

## 2022-01-01 RX ADMIN — HYDROMORPHONE HYDROCHLORIDE 0.75 MILLIGRAM(S): 2 INJECTION INTRAMUSCULAR; INTRAVENOUS; SUBCUTANEOUS at 04:10

## 2022-01-01 RX ADMIN — BUMETANIDE 2 MILLIGRAM(S): 0.25 INJECTION INTRAMUSCULAR; INTRAVENOUS at 06:43

## 2022-01-01 RX ADMIN — MORPHINE SULFATE 60 MILLIGRAM(S): 50 CAPSULE, EXTENDED RELEASE ORAL at 08:12

## 2022-01-01 RX ADMIN — Medication 15 MILLILITER(S): at 22:21

## 2022-01-01 RX ADMIN — BUMETANIDE 2 MILLIGRAM(S): 0.25 INJECTION INTRAMUSCULAR; INTRAVENOUS at 17:06

## 2022-01-01 RX ADMIN — Medication 1 TABLET(S): at 11:19

## 2022-01-01 RX ADMIN — HYDROMORPHONE HYDROCHLORIDE 6 MILLIGRAM(S): 2 INJECTION INTRAMUSCULAR; INTRAVENOUS; SUBCUTANEOUS at 06:04

## 2022-01-01 RX ADMIN — MORPHINE SULFATE 6 MILLIGRAM(S): 50 CAPSULE, EXTENDED RELEASE ORAL at 05:26

## 2022-01-01 RX ADMIN — HYDROMORPHONE HYDROCHLORIDE 6 MILLIGRAM(S): 2 INJECTION INTRAMUSCULAR; INTRAVENOUS; SUBCUTANEOUS at 02:07

## 2022-01-01 RX ADMIN — LACTULOSE 20 GRAM(S): 10 SOLUTION ORAL at 13:59

## 2022-01-01 RX ADMIN — LACTULOSE 10 GRAM(S): 10 SOLUTION ORAL at 14:03

## 2022-01-01 RX ADMIN — MORPHINE SULFATE 60 MILLIGRAM(S): 50 CAPSULE, EXTENDED RELEASE ORAL at 00:23

## 2022-01-01 RX ADMIN — METHOCARBAMOL 1500 MILLIGRAM(S): 500 TABLET, FILM COATED ORAL at 02:34

## 2022-01-01 RX ADMIN — MORPHINE SULFATE 30 MILLIGRAM(S): 50 CAPSULE, EXTENDED RELEASE ORAL at 08:54

## 2022-01-01 RX ADMIN — HYDROMORPHONE HYDROCHLORIDE 0.75 MILLIGRAM(S): 2 INJECTION INTRAMUSCULAR; INTRAVENOUS; SUBCUTANEOUS at 21:06

## 2022-01-01 RX ADMIN — BUMETANIDE 2 MILLIGRAM(S): 0.25 INJECTION INTRAMUSCULAR; INTRAVENOUS at 06:07

## 2022-01-01 RX ADMIN — MORPHINE SULFATE 60 MILLIGRAM(S): 50 CAPSULE, EXTENDED RELEASE ORAL at 00:27

## 2022-01-01 RX ADMIN — HYDROMORPHONE HYDROCHLORIDE 1 MILLIGRAM(S): 2 INJECTION INTRAMUSCULAR; INTRAVENOUS; SUBCUTANEOUS at 06:16

## 2022-01-01 RX ADMIN — ENOXAPARIN SODIUM 40 MILLIGRAM(S): 100 INJECTION SUBCUTANEOUS at 07:14

## 2022-01-01 RX ADMIN — SODIUM CHLORIDE 1 GRAM(S): 9 INJECTION INTRAMUSCULAR; INTRAVENOUS; SUBCUTANEOUS at 06:56

## 2022-01-01 RX ADMIN — SODIUM CHLORIDE 1 GRAM(S): 9 INJECTION INTRAMUSCULAR; INTRAVENOUS; SUBCUTANEOUS at 21:59

## 2022-01-01 RX ADMIN — HYDROMORPHONE HYDROCHLORIDE 1 MILLIGRAM(S): 2 INJECTION INTRAMUSCULAR; INTRAVENOUS; SUBCUTANEOUS at 01:15

## 2022-01-01 RX ADMIN — HYDROMORPHONE HYDROCHLORIDE 6 MILLIGRAM(S): 2 INJECTION INTRAMUSCULAR; INTRAVENOUS; SUBCUTANEOUS at 13:28

## 2022-01-01 RX ADMIN — LACTULOSE 20 GRAM(S): 10 SOLUTION ORAL at 23:32

## 2022-01-01 RX ADMIN — HYDROMORPHONE HYDROCHLORIDE 1 MILLIGRAM(S): 2 INJECTION INTRAMUSCULAR; INTRAVENOUS; SUBCUTANEOUS at 00:26

## 2022-01-01 RX ADMIN — HYDROMORPHONE HYDROCHLORIDE 1 MILLIGRAM(S): 2 INJECTION INTRAMUSCULAR; INTRAVENOUS; SUBCUTANEOUS at 15:32

## 2022-01-01 RX ADMIN — HYDROMORPHONE HYDROCHLORIDE 0.75 MILLIGRAM(S): 2 INJECTION INTRAMUSCULAR; INTRAVENOUS; SUBCUTANEOUS at 21:42

## 2022-01-01 RX ADMIN — HYDROMORPHONE HYDROCHLORIDE 1 MILLIGRAM(S): 2 INJECTION INTRAMUSCULAR; INTRAVENOUS; SUBCUTANEOUS at 21:24

## 2022-01-01 RX ADMIN — HYDROMORPHONE HYDROCHLORIDE 0.75 MILLIGRAM(S): 2 INJECTION INTRAMUSCULAR; INTRAVENOUS; SUBCUTANEOUS at 18:43

## 2022-01-01 RX ADMIN — HEPARIN SODIUM 5000 UNIT(S): 5000 INJECTION INTRAVENOUS; SUBCUTANEOUS at 18:39

## 2022-01-01 RX ADMIN — HYDROMORPHONE HYDROCHLORIDE 2 MILLIGRAM(S): 2 INJECTION INTRAMUSCULAR; INTRAVENOUS; SUBCUTANEOUS at 14:53

## 2022-01-01 RX ADMIN — LACTULOSE 10 GRAM(S): 10 SOLUTION ORAL at 21:59

## 2022-01-01 RX ADMIN — BUMETANIDE 2 MILLIGRAM(S): 0.25 INJECTION INTRAMUSCULAR; INTRAVENOUS at 23:35

## 2022-01-01 RX ADMIN — HYDROMORPHONE HYDROCHLORIDE 6 MILLIGRAM(S): 2 INJECTION INTRAMUSCULAR; INTRAVENOUS; SUBCUTANEOUS at 03:01

## 2022-01-01 RX ADMIN — MORPHINE SULFATE 60 MILLIGRAM(S): 50 CAPSULE, EXTENDED RELEASE ORAL at 06:37

## 2022-01-01 RX ADMIN — SIMETHICONE 80 MILLIGRAM(S): 80 TABLET, CHEWABLE ORAL at 15:27

## 2022-01-01 RX ADMIN — HYDROMORPHONE HYDROCHLORIDE 6 MILLIGRAM(S): 2 INJECTION INTRAMUSCULAR; INTRAVENOUS; SUBCUTANEOUS at 11:43

## 2022-01-01 RX ADMIN — MORPHINE SULFATE 30 MILLIGRAM(S): 50 CAPSULE, EXTENDED RELEASE ORAL at 21:10

## 2022-01-01 RX ADMIN — SODIUM CHLORIDE 1 GRAM(S): 9 INJECTION INTRAMUSCULAR; INTRAVENOUS; SUBCUTANEOUS at 05:01

## 2022-01-01 RX ADMIN — HYDROMORPHONE HYDROCHLORIDE 2 MILLIGRAM(S): 2 INJECTION INTRAMUSCULAR; INTRAVENOUS; SUBCUTANEOUS at 02:58

## 2022-01-01 RX ADMIN — PIPERACILLIN AND TAZOBACTAM 25 GRAM(S): 4; .5 INJECTION, POWDER, LYOPHILIZED, FOR SOLUTION INTRAVENOUS at 22:29

## 2022-01-01 RX ADMIN — MORPHINE SULFATE 60 MILLIGRAM(S): 50 CAPSULE, EXTENDED RELEASE ORAL at 22:10

## 2022-01-01 RX ADMIN — SODIUM CHLORIDE 1 GRAM(S): 9 INJECTION INTRAMUSCULAR; INTRAVENOUS; SUBCUTANEOUS at 21:48

## 2022-01-01 RX ADMIN — HYDROMORPHONE HYDROCHLORIDE 0.75 MILLIGRAM(S): 2 INJECTION INTRAMUSCULAR; INTRAVENOUS; SUBCUTANEOUS at 13:20

## 2022-01-01 RX ADMIN — HYDROMORPHONE HYDROCHLORIDE 6 MILLIGRAM(S): 2 INJECTION INTRAMUSCULAR; INTRAVENOUS; SUBCUTANEOUS at 05:32

## 2022-01-01 RX ADMIN — METHYLNALTREXONE BROMIDE 12 MILLIGRAM(S): 12 INJECTION, SOLUTION SUBCUTANEOUS at 18:26

## 2022-01-01 RX ADMIN — HYDROMORPHONE HYDROCHLORIDE 0.5 MILLIGRAM(S): 2 INJECTION INTRAMUSCULAR; INTRAVENOUS; SUBCUTANEOUS at 22:29

## 2022-01-01 RX ADMIN — HYDROMORPHONE HYDROCHLORIDE 0.75 MILLIGRAM(S): 2 INJECTION INTRAMUSCULAR; INTRAVENOUS; SUBCUTANEOUS at 04:40

## 2022-01-01 RX ADMIN — HYDROMORPHONE HYDROCHLORIDE 4 MILLIGRAM(S): 2 INJECTION INTRAMUSCULAR; INTRAVENOUS; SUBCUTANEOUS at 12:34

## 2022-01-01 RX ADMIN — MORPHINE SULFATE 60 MILLIGRAM(S): 50 CAPSULE, EXTENDED RELEASE ORAL at 18:44

## 2022-01-01 RX ADMIN — HYDROMORPHONE HYDROCHLORIDE 1 MILLIGRAM(S): 2 INJECTION INTRAMUSCULAR; INTRAVENOUS; SUBCUTANEOUS at 06:01

## 2022-01-01 RX ADMIN — POLYETHYLENE GLYCOL 3350 17 GRAM(S): 17 POWDER, FOR SOLUTION ORAL at 06:28

## 2022-01-01 RX ADMIN — HYDROMORPHONE HYDROCHLORIDE 0.5 MILLIGRAM(S): 2 INJECTION INTRAMUSCULAR; INTRAVENOUS; SUBCUTANEOUS at 01:48

## 2022-01-01 RX ADMIN — SIMETHICONE 80 MILLIGRAM(S): 80 TABLET, CHEWABLE ORAL at 06:14

## 2022-01-01 RX ADMIN — LACTULOSE 20 GRAM(S): 10 SOLUTION ORAL at 02:04

## 2022-01-01 RX ADMIN — SIMETHICONE 80 MILLIGRAM(S): 80 TABLET, CHEWABLE ORAL at 13:08

## 2022-01-01 RX ADMIN — LACTULOSE 20 GRAM(S): 10 SOLUTION ORAL at 06:07

## 2022-01-01 RX ADMIN — LACTULOSE 20 GRAM(S): 10 SOLUTION ORAL at 22:53

## 2022-01-01 RX ADMIN — PIPERACILLIN AND TAZOBACTAM 25 GRAM(S): 4; .5 INJECTION, POWDER, LYOPHILIZED, FOR SOLUTION INTRAVENOUS at 23:10

## 2022-01-01 RX ADMIN — HYDROMORPHONE HYDROCHLORIDE 6 MILLIGRAM(S): 2 INJECTION INTRAMUSCULAR; INTRAVENOUS; SUBCUTANEOUS at 11:50

## 2022-01-01 RX ADMIN — HEPARIN SODIUM 5000 UNIT(S): 5000 INJECTION INTRAVENOUS; SUBCUTANEOUS at 10:54

## 2022-01-01 RX ADMIN — BUMETANIDE 2 MILLIGRAM(S): 0.25 INJECTION INTRAMUSCULAR; INTRAVENOUS at 17:40

## 2022-01-01 RX ADMIN — Medication 40 MILLIEQUIVALENT(S): at 16:28

## 2022-01-01 RX ADMIN — HYDROMORPHONE HYDROCHLORIDE 6 MILLIGRAM(S): 2 INJECTION INTRAMUSCULAR; INTRAVENOUS; SUBCUTANEOUS at 01:00

## 2022-01-01 RX ADMIN — HYDROMORPHONE HYDROCHLORIDE 2 MILLIGRAM(S): 2 INJECTION INTRAMUSCULAR; INTRAVENOUS; SUBCUTANEOUS at 22:15

## 2022-01-01 RX ADMIN — HYDROMORPHONE HYDROCHLORIDE 0.5 MILLIGRAM(S): 2 INJECTION INTRAMUSCULAR; INTRAVENOUS; SUBCUTANEOUS at 12:20

## 2022-01-01 RX ADMIN — PIPERACILLIN AND TAZOBACTAM 25 GRAM(S): 4; .5 INJECTION, POWDER, LYOPHILIZED, FOR SOLUTION INTRAVENOUS at 05:59

## 2022-01-01 RX ADMIN — MORPHINE SULFATE 60 MILLIGRAM(S): 50 CAPSULE, EXTENDED RELEASE ORAL at 07:48

## 2022-01-01 RX ADMIN — BUMETANIDE 2 MILLIGRAM(S): 0.25 INJECTION INTRAMUSCULAR; INTRAVENOUS at 05:05

## 2022-01-01 RX ADMIN — LIDOCAINE 1 PATCH: 4 CREAM TOPICAL at 05:26

## 2022-01-01 RX ADMIN — MORPHINE SULFATE 60 MILLIGRAM(S): 50 CAPSULE, EXTENDED RELEASE ORAL at 05:25

## 2022-01-01 RX ADMIN — HYDROMORPHONE HYDROCHLORIDE 6 MILLIGRAM(S): 2 INJECTION INTRAMUSCULAR; INTRAVENOUS; SUBCUTANEOUS at 08:37

## 2022-01-01 RX ADMIN — POLYETHYLENE GLYCOL 3350 17 GRAM(S): 17 POWDER, FOR SOLUTION ORAL at 05:26

## 2022-01-01 RX ADMIN — HYDROMORPHONE HYDROCHLORIDE 2 MILLIGRAM(S): 2 INJECTION INTRAMUSCULAR; INTRAVENOUS; SUBCUTANEOUS at 06:03

## 2022-01-01 RX ADMIN — SODIUM CHLORIDE 1 GRAM(S): 9 INJECTION INTRAMUSCULAR; INTRAVENOUS; SUBCUTANEOUS at 13:12

## 2022-01-01 RX ADMIN — HYDROMORPHONE HYDROCHLORIDE 1.5 MILLIGRAM(S): 2 INJECTION INTRAMUSCULAR; INTRAVENOUS; SUBCUTANEOUS at 00:09

## 2022-01-01 RX ADMIN — HYDROMORPHONE HYDROCHLORIDE 2 MILLIGRAM(S): 2 INJECTION INTRAMUSCULAR; INTRAVENOUS; SUBCUTANEOUS at 13:04

## 2022-01-01 RX ADMIN — HYDROMORPHONE HYDROCHLORIDE 4 MILLIGRAM(S): 2 INJECTION INTRAMUSCULAR; INTRAVENOUS; SUBCUTANEOUS at 02:07

## 2022-01-01 RX ADMIN — MORPHINE SULFATE 60 MILLIGRAM(S): 50 CAPSULE, EXTENDED RELEASE ORAL at 23:40

## 2022-01-01 RX ADMIN — HYDROMORPHONE HYDROCHLORIDE 6 MILLIGRAM(S): 2 INJECTION INTRAMUSCULAR; INTRAVENOUS; SUBCUTANEOUS at 16:20

## 2022-01-01 RX ADMIN — PIPERACILLIN AND TAZOBACTAM 25 GRAM(S): 4; .5 INJECTION, POWDER, LYOPHILIZED, FOR SOLUTION INTRAVENOUS at 06:26

## 2022-01-01 RX ADMIN — MORPHINE SULFATE 45 MILLIGRAM(S): 50 CAPSULE, EXTENDED RELEASE ORAL at 11:55

## 2022-01-01 RX ADMIN — Medication 100 MILLIEQUIVALENT(S): at 02:45

## 2022-01-01 RX ADMIN — MORPHINE SULFATE 60 MILLIGRAM(S): 50 CAPSULE, EXTENDED RELEASE ORAL at 16:59

## 2022-01-01 RX ADMIN — Medication 0.25 MILLIGRAM(S): at 16:04

## 2022-01-01 RX ADMIN — MORPHINE SULFATE 60 MILLIGRAM(S): 50 CAPSULE, EXTENDED RELEASE ORAL at 21:42

## 2022-01-01 RX ADMIN — HYDROMORPHONE HYDROCHLORIDE 6 MILLIGRAM(S): 2 INJECTION INTRAMUSCULAR; INTRAVENOUS; SUBCUTANEOUS at 18:31

## 2022-01-01 RX ADMIN — METHOCARBAMOL 1500 MILLIGRAM(S): 500 TABLET, FILM COATED ORAL at 19:34

## 2022-01-01 RX ADMIN — MORPHINE SULFATE 30 MILLIGRAM(S): 50 CAPSULE, EXTENDED RELEASE ORAL at 08:37

## 2022-01-01 RX ADMIN — HYDROMORPHONE HYDROCHLORIDE 6 MILLIGRAM(S): 2 INJECTION INTRAMUSCULAR; INTRAVENOUS; SUBCUTANEOUS at 19:24

## 2022-01-01 RX ADMIN — HYDROMORPHONE HYDROCHLORIDE 4 MILLIGRAM(S): 2 INJECTION INTRAMUSCULAR; INTRAVENOUS; SUBCUTANEOUS at 12:20

## 2022-01-01 RX ADMIN — HYDROMORPHONE HYDROCHLORIDE 0.75 MILLIGRAM(S): 2 INJECTION INTRAMUSCULAR; INTRAVENOUS; SUBCUTANEOUS at 13:00

## 2022-01-01 RX ADMIN — HYDROMORPHONE HYDROCHLORIDE 0.5 MILLIGRAM(S): 2 INJECTION INTRAMUSCULAR; INTRAVENOUS; SUBCUTANEOUS at 12:00

## 2022-01-01 RX ADMIN — MORPHINE SULFATE 60 MILLIGRAM(S): 50 CAPSULE, EXTENDED RELEASE ORAL at 22:01

## 2022-01-01 RX ADMIN — HYDROMORPHONE HYDROCHLORIDE 1 MILLIGRAM(S): 2 INJECTION INTRAMUSCULAR; INTRAVENOUS; SUBCUTANEOUS at 09:40

## 2022-01-01 RX ADMIN — HYDROMORPHONE HYDROCHLORIDE 6 MILLIGRAM(S): 2 INJECTION INTRAMUSCULAR; INTRAVENOUS; SUBCUTANEOUS at 09:09

## 2022-01-01 RX ADMIN — MORPHINE SULFATE 4 MILLIGRAM(S): 50 CAPSULE, EXTENDED RELEASE ORAL at 19:51

## 2022-01-01 RX ADMIN — SENNA PLUS 2 TABLET(S): 8.6 TABLET ORAL at 20:54

## 2022-01-01 RX ADMIN — Medication 1 DROP(S): at 05:18

## 2022-01-01 RX ADMIN — POLYETHYLENE GLYCOL 3350 17 GRAM(S): 17 POWDER, FOR SOLUTION ORAL at 14:41

## 2022-01-01 RX ADMIN — HYDROMORPHONE HYDROCHLORIDE 1.5 MILLIGRAM(S): 2 INJECTION INTRAMUSCULAR; INTRAVENOUS; SUBCUTANEOUS at 10:50

## 2022-01-01 RX ADMIN — HYDROMORPHONE HYDROCHLORIDE 0.75 MILLIGRAM(S): 2 INJECTION INTRAMUSCULAR; INTRAVENOUS; SUBCUTANEOUS at 06:55

## 2022-01-01 RX ADMIN — MORPHINE SULFATE 60 MILLIGRAM(S): 50 CAPSULE, EXTENDED RELEASE ORAL at 21:59

## 2022-01-01 RX ADMIN — Medication 1 TABLET(S): at 12:18

## 2022-01-01 RX ADMIN — HYDROMORPHONE HYDROCHLORIDE 2 MILLIGRAM(S): 2 INJECTION INTRAMUSCULAR; INTRAVENOUS; SUBCUTANEOUS at 02:27

## 2022-01-01 RX ADMIN — HYDROMORPHONE HYDROCHLORIDE 3.5 MILLIGRAM(S): 2 INJECTION INTRAMUSCULAR; INTRAVENOUS; SUBCUTANEOUS at 18:14

## 2022-01-01 RX ADMIN — HYDROMORPHONE HYDROCHLORIDE 2 MILLIGRAM(S): 2 INJECTION INTRAMUSCULAR; INTRAVENOUS; SUBCUTANEOUS at 17:16

## 2022-01-01 RX ADMIN — MORPHINE SULFATE 60 MILLIGRAM(S): 50 CAPSULE, EXTENDED RELEASE ORAL at 07:30

## 2022-01-01 RX ADMIN — MORPHINE SULFATE 60 MILLIGRAM(S): 50 CAPSULE, EXTENDED RELEASE ORAL at 18:22

## 2022-01-01 RX ADMIN — HYDROMORPHONE HYDROCHLORIDE 6 MILLIGRAM(S): 2 INJECTION INTRAMUSCULAR; INTRAVENOUS; SUBCUTANEOUS at 03:31

## 2022-01-01 RX ADMIN — HEPARIN SODIUM 5000 UNIT(S): 5000 INJECTION INTRAVENOUS; SUBCUTANEOUS at 11:30

## 2022-01-01 RX ADMIN — BUMETANIDE 2 MILLIGRAM(S): 0.25 INJECTION INTRAMUSCULAR; INTRAVENOUS at 05:25

## 2022-01-01 RX ADMIN — MORPHINE SULFATE 60 MILLIGRAM(S): 50 CAPSULE, EXTENDED RELEASE ORAL at 07:13

## 2022-01-01 RX ADMIN — HYDROMORPHONE HYDROCHLORIDE 2 MILLIGRAM(S): 2 INJECTION INTRAMUSCULAR; INTRAVENOUS; SUBCUTANEOUS at 15:02

## 2022-01-01 RX ADMIN — HYDROMORPHONE HYDROCHLORIDE 1 MILLIGRAM(S): 2 INJECTION INTRAMUSCULAR; INTRAVENOUS; SUBCUTANEOUS at 10:21

## 2022-01-01 RX ADMIN — LACTULOSE 20 GRAM(S): 10 SOLUTION ORAL at 22:45

## 2022-01-01 RX ADMIN — HYDROMORPHONE HYDROCHLORIDE 0.75 MILLIGRAM(S): 2 INJECTION INTRAMUSCULAR; INTRAVENOUS; SUBCUTANEOUS at 04:30

## 2022-01-01 RX ADMIN — MORPHINE SULFATE 60 MILLIGRAM(S): 50 CAPSULE, EXTENDED RELEASE ORAL at 08:41

## 2022-01-01 RX ADMIN — SODIUM CHLORIDE 1 GRAM(S): 9 INJECTION INTRAMUSCULAR; INTRAVENOUS; SUBCUTANEOUS at 06:09

## 2022-01-01 RX ADMIN — HYDROMORPHONE HYDROCHLORIDE 4 MILLIGRAM(S): 2 INJECTION INTRAMUSCULAR; INTRAVENOUS; SUBCUTANEOUS at 20:10

## 2022-01-01 RX ADMIN — BUMETANIDE 2 MILLIGRAM(S): 0.25 INJECTION INTRAMUSCULAR; INTRAVENOUS at 18:00

## 2022-01-01 RX ADMIN — MORPHINE SULFATE 60 MILLIGRAM(S): 50 CAPSULE, EXTENDED RELEASE ORAL at 07:47

## 2022-01-01 RX ADMIN — BUMETANIDE 2 MILLIGRAM(S): 0.25 INJECTION INTRAMUSCULAR; INTRAVENOUS at 21:39

## 2022-01-01 RX ADMIN — Medication 1 TABLET(S): at 12:25

## 2022-01-01 RX ADMIN — MORPHINE SULFATE 60 MILLIGRAM(S): 50 CAPSULE, EXTENDED RELEASE ORAL at 06:27

## 2022-01-01 RX ADMIN — MORPHINE SULFATE 60 MILLIGRAM(S): 50 CAPSULE, EXTENDED RELEASE ORAL at 00:22

## 2022-01-01 RX ADMIN — HYDROMORPHONE HYDROCHLORIDE 3.5 MILLIGRAM(S): 2 INJECTION INTRAMUSCULAR; INTRAVENOUS; SUBCUTANEOUS at 23:03

## 2022-01-01 RX ADMIN — MORPHINE SULFATE 60 MILLIGRAM(S): 50 CAPSULE, EXTENDED RELEASE ORAL at 12:00

## 2022-01-01 RX ADMIN — MORPHINE SULFATE 30 MILLIGRAM(S): 50 CAPSULE, EXTENDED RELEASE ORAL at 14:29

## 2022-01-01 RX ADMIN — HEPARIN SODIUM 5000 UNIT(S): 5000 INJECTION INTRAVENOUS; SUBCUTANEOUS at 01:35

## 2022-01-01 RX ADMIN — HYDROMORPHONE HYDROCHLORIDE 2 MILLIGRAM(S): 2 INJECTION INTRAMUSCULAR; INTRAVENOUS; SUBCUTANEOUS at 07:13

## 2022-01-01 RX ADMIN — ENOXAPARIN SODIUM 40 MILLIGRAM(S): 100 INJECTION SUBCUTANEOUS at 13:38

## 2022-01-01 RX ADMIN — HYDROMORPHONE HYDROCHLORIDE 4 MILLIGRAM(S): 2 INJECTION INTRAMUSCULAR; INTRAVENOUS; SUBCUTANEOUS at 07:08

## 2022-01-01 RX ADMIN — LACTULOSE 20 GRAM(S): 10 SOLUTION ORAL at 21:43

## 2022-01-01 RX ADMIN — HYDROMORPHONE HYDROCHLORIDE 6 MILLIGRAM(S): 2 INJECTION INTRAMUSCULAR; INTRAVENOUS; SUBCUTANEOUS at 19:34

## 2022-01-01 RX ADMIN — MORPHINE SULFATE 60 MILLIGRAM(S): 50 CAPSULE, EXTENDED RELEASE ORAL at 16:45

## 2022-01-01 RX ADMIN — Medication 15 MILLILITER(S): at 21:37

## 2022-01-01 RX ADMIN — MORPHINE SULFATE 60 MILLIGRAM(S): 50 CAPSULE, EXTENDED RELEASE ORAL at 21:40

## 2022-01-01 RX ADMIN — BUMETANIDE 2 MILLIGRAM(S): 0.25 INJECTION INTRAMUSCULAR; INTRAVENOUS at 06:25

## 2022-01-01 RX ADMIN — POLYETHYLENE GLYCOL 3350 17 GRAM(S): 17 POWDER, FOR SOLUTION ORAL at 22:04

## 2022-01-01 RX ADMIN — HYDROMORPHONE HYDROCHLORIDE 4 MILLIGRAM(S): 2 INJECTION INTRAMUSCULAR; INTRAVENOUS; SUBCUTANEOUS at 23:30

## 2022-01-01 RX ADMIN — BUMETANIDE 2 MILLIGRAM(S): 0.25 INJECTION INTRAMUSCULAR; INTRAVENOUS at 13:08

## 2022-01-01 RX ADMIN — MORPHINE SULFATE 60 MILLIGRAM(S): 50 CAPSULE, EXTENDED RELEASE ORAL at 21:29

## 2022-01-01 RX ADMIN — SODIUM CHLORIDE 1 GRAM(S): 9 INJECTION INTRAMUSCULAR; INTRAVENOUS; SUBCUTANEOUS at 15:02

## 2022-01-01 RX ADMIN — MORPHINE SULFATE 60 MILLIGRAM(S): 50 CAPSULE, EXTENDED RELEASE ORAL at 22:02

## 2022-01-01 RX ADMIN — Medication 1 DROP(S): at 19:35

## 2022-01-01 RX ADMIN — HYDROMORPHONE HYDROCHLORIDE 2 MILLIGRAM(S): 2 INJECTION INTRAMUSCULAR; INTRAVENOUS; SUBCUTANEOUS at 09:09

## 2022-01-01 RX ADMIN — MORPHINE SULFATE 60 MILLIGRAM(S): 50 CAPSULE, EXTENDED RELEASE ORAL at 16:12

## 2022-01-01 RX ADMIN — HYDROMORPHONE HYDROCHLORIDE 1 MILLIGRAM(S): 2 INJECTION INTRAMUSCULAR; INTRAVENOUS; SUBCUTANEOUS at 03:24

## 2022-01-01 RX ADMIN — MORPHINE SULFATE 15 MILLIGRAM(S): 50 CAPSULE, EXTENDED RELEASE ORAL at 06:36

## 2022-01-01 RX ADMIN — ENOXAPARIN SODIUM 40 MILLIGRAM(S): 100 INJECTION SUBCUTANEOUS at 08:22

## 2022-01-01 RX ADMIN — Medication 0.5 MILLIGRAM(S): at 17:27

## 2022-01-01 RX ADMIN — HYDROMORPHONE HYDROCHLORIDE 0.75 MILLIGRAM(S): 2 INJECTION INTRAMUSCULAR; INTRAVENOUS; SUBCUTANEOUS at 01:00

## 2022-01-01 RX ADMIN — HYDROMORPHONE HYDROCHLORIDE 4 MILLIGRAM(S): 2 INJECTION INTRAMUSCULAR; INTRAVENOUS; SUBCUTANEOUS at 07:50

## 2022-01-01 RX ADMIN — ENOXAPARIN SODIUM 40 MILLIGRAM(S): 100 INJECTION SUBCUTANEOUS at 16:52

## 2022-01-01 RX ADMIN — HYDROMORPHONE HYDROCHLORIDE 4 MILLIGRAM(S): 2 INJECTION INTRAMUSCULAR; INTRAVENOUS; SUBCUTANEOUS at 03:53

## 2022-01-01 RX ADMIN — HYDROMORPHONE HYDROCHLORIDE 1 MILLIGRAM(S): 2 INJECTION INTRAMUSCULAR; INTRAVENOUS; SUBCUTANEOUS at 15:17

## 2022-01-01 RX ADMIN — PIPERACILLIN AND TAZOBACTAM 25 GRAM(S): 4; .5 INJECTION, POWDER, LYOPHILIZED, FOR SOLUTION INTRAVENOUS at 17:22

## 2022-01-01 RX ADMIN — Medication 15 MILLILITER(S): at 22:36

## 2022-01-01 RX ADMIN — HYDROMORPHONE HYDROCHLORIDE 1.5 MILLIGRAM(S): 2 INJECTION INTRAMUSCULAR; INTRAVENOUS; SUBCUTANEOUS at 02:43

## 2022-01-01 RX ADMIN — LIDOCAINE 1 PATCH: 4 CREAM TOPICAL at 19:00

## 2022-01-01 RX ADMIN — HYDROMORPHONE HYDROCHLORIDE 0.5 MILLIGRAM(S): 2 INJECTION INTRAMUSCULAR; INTRAVENOUS; SUBCUTANEOUS at 12:15

## 2022-01-01 RX ADMIN — MORPHINE SULFATE 60 MILLIGRAM(S): 50 CAPSULE, EXTENDED RELEASE ORAL at 16:07

## 2022-01-01 RX ADMIN — LACTULOSE 20 GRAM(S): 10 SOLUTION ORAL at 05:32

## 2022-01-01 RX ADMIN — HYDROMORPHONE HYDROCHLORIDE 4 MILLIGRAM(S): 2 INJECTION INTRAMUSCULAR; INTRAVENOUS; SUBCUTANEOUS at 14:55

## 2022-01-01 RX ADMIN — HYDROMORPHONE HYDROCHLORIDE 6 MILLIGRAM(S): 2 INJECTION INTRAMUSCULAR; INTRAVENOUS; SUBCUTANEOUS at 10:00

## 2022-01-01 RX ADMIN — HYDROMORPHONE HYDROCHLORIDE 2 MILLIGRAM(S): 2 INJECTION INTRAMUSCULAR; INTRAVENOUS; SUBCUTANEOUS at 21:25

## 2022-01-01 RX ADMIN — HYDROMORPHONE HYDROCHLORIDE 6 MILLIGRAM(S): 2 INJECTION INTRAMUSCULAR; INTRAVENOUS; SUBCUTANEOUS at 11:16

## 2022-01-01 RX ADMIN — Medication 0.25 MILLIGRAM(S): at 18:53

## 2022-01-01 RX ADMIN — MORPHINE SULFATE 60 MILLIGRAM(S): 50 CAPSULE, EXTENDED RELEASE ORAL at 13:59

## 2022-01-01 RX ADMIN — Medication 1 DROP(S): at 06:09

## 2022-01-01 RX ADMIN — HYDROMORPHONE HYDROCHLORIDE 4 MILLIGRAM(S): 2 INJECTION INTRAMUSCULAR; INTRAVENOUS; SUBCUTANEOUS at 20:00

## 2022-01-01 RX ADMIN — ENOXAPARIN SODIUM 40 MILLIGRAM(S): 100 INJECTION SUBCUTANEOUS at 11:52

## 2022-01-01 RX ADMIN — SIMETHICONE 80 MILLIGRAM(S): 80 TABLET, CHEWABLE ORAL at 22:03

## 2022-01-01 RX ADMIN — ACETAZOLAMIDE 250 MILLIGRAM(S): 250 TABLET ORAL at 13:22

## 2022-01-01 RX ADMIN — HYDROMORPHONE HYDROCHLORIDE 1 MILLIGRAM(S): 2 INJECTION INTRAMUSCULAR; INTRAVENOUS; SUBCUTANEOUS at 06:25

## 2022-01-01 RX ADMIN — Medication 5 MILLIGRAM(S): at 21:17

## 2022-01-01 RX ADMIN — POLYETHYLENE GLYCOL 3350 17 GRAM(S): 17 POWDER, FOR SOLUTION ORAL at 06:27

## 2022-01-01 RX ADMIN — MORPHINE SULFATE 60 MILLIGRAM(S): 50 CAPSULE, EXTENDED RELEASE ORAL at 09:14

## 2022-01-01 RX ADMIN — HEPARIN SODIUM 5000 UNIT(S): 5000 INJECTION INTRAVENOUS; SUBCUTANEOUS at 18:50

## 2022-01-01 RX ADMIN — HYDROMORPHONE HYDROCHLORIDE 0.5 MILLIGRAM(S): 2 INJECTION INTRAMUSCULAR; INTRAVENOUS; SUBCUTANEOUS at 17:55

## 2022-01-01 RX ADMIN — HYDROMORPHONE HYDROCHLORIDE 3.5 MILLIGRAM(S): 2 INJECTION INTRAMUSCULAR; INTRAVENOUS; SUBCUTANEOUS at 21:12

## 2022-01-01 RX ADMIN — LACTULOSE 20 GRAM(S): 10 SOLUTION ORAL at 13:16

## 2022-01-01 RX ADMIN — MORPHINE SULFATE 60 MILLIGRAM(S): 50 CAPSULE, EXTENDED RELEASE ORAL at 15:28

## 2022-01-01 RX ADMIN — SODIUM CHLORIDE 50 MILLILITER(S): 9 INJECTION, SOLUTION INTRAVENOUS at 02:28

## 2022-01-01 RX ADMIN — HYDROMORPHONE HYDROCHLORIDE 1.5 MILLIGRAM(S): 2 INJECTION INTRAMUSCULAR; INTRAVENOUS; SUBCUTANEOUS at 12:10

## 2022-01-01 RX ADMIN — SIMETHICONE 80 MILLIGRAM(S): 80 TABLET, CHEWABLE ORAL at 22:15

## 2022-01-01 RX ADMIN — Medication 5 MILLIGRAM(S): at 21:04

## 2022-01-01 RX ADMIN — HYDROMORPHONE HYDROCHLORIDE 6 MILLIGRAM(S): 2 INJECTION INTRAMUSCULAR; INTRAVENOUS; SUBCUTANEOUS at 13:04

## 2022-01-01 RX ADMIN — HYDROMORPHONE HYDROCHLORIDE 0.5 MILLIGRAM(S): 2 INJECTION INTRAMUSCULAR; INTRAVENOUS; SUBCUTANEOUS at 22:27

## 2022-01-01 RX ADMIN — MORPHINE SULFATE 60 MILLIGRAM(S): 50 CAPSULE, EXTENDED RELEASE ORAL at 13:48

## 2022-01-01 RX ADMIN — MORPHINE SULFATE 30 MILLIGRAM(S): 50 CAPSULE, EXTENDED RELEASE ORAL at 14:57

## 2022-01-01 RX ADMIN — HYDROMORPHONE HYDROCHLORIDE 2 MILLIGRAM(S): 2 INJECTION INTRAMUSCULAR; INTRAVENOUS; SUBCUTANEOUS at 20:49

## 2022-01-01 RX ADMIN — MORPHINE SULFATE 60 MILLIGRAM(S): 50 CAPSULE, EXTENDED RELEASE ORAL at 05:12

## 2022-01-01 RX ADMIN — MORPHINE SULFATE 60 MILLIGRAM(S): 50 CAPSULE, EXTENDED RELEASE ORAL at 15:20

## 2022-01-01 RX ADMIN — Medication 1 TABLET(S): at 12:35

## 2022-01-01 RX ADMIN — HYDROMORPHONE HYDROCHLORIDE 6 MILLIGRAM(S): 2 INJECTION INTRAMUSCULAR; INTRAVENOUS; SUBCUTANEOUS at 00:11

## 2022-01-01 RX ADMIN — Medication 40 MILLIEQUIVALENT(S): at 23:44

## 2022-01-01 RX ADMIN — HYDROMORPHONE HYDROCHLORIDE 0.5 MILLIGRAM(S): 2 INJECTION INTRAMUSCULAR; INTRAVENOUS; SUBCUTANEOUS at 13:30

## 2022-01-01 RX ADMIN — POLYETHYLENE GLYCOL 3350 17 GRAM(S): 17 POWDER, FOR SOLUTION ORAL at 22:45

## 2022-01-01 RX ADMIN — METHOCARBAMOL 1500 MILLIGRAM(S): 500 TABLET, FILM COATED ORAL at 14:57

## 2022-01-01 RX ADMIN — MORPHINE SULFATE 60 MILLIGRAM(S): 50 CAPSULE, EXTENDED RELEASE ORAL at 23:53

## 2022-01-01 RX ADMIN — POLYETHYLENE GLYCOL 3350 17 GRAM(S): 17 POWDER, FOR SOLUTION ORAL at 11:30

## 2022-01-01 RX ADMIN — HYDROMORPHONE HYDROCHLORIDE 2 MILLIGRAM(S): 2 INJECTION INTRAMUSCULAR; INTRAVENOUS; SUBCUTANEOUS at 01:58

## 2022-01-01 RX ADMIN — ENOXAPARIN SODIUM 40 MILLIGRAM(S): 100 INJECTION SUBCUTANEOUS at 13:31

## 2022-01-01 RX ADMIN — HYDROMORPHONE HYDROCHLORIDE 1 MILLIGRAM(S): 2 INJECTION INTRAMUSCULAR; INTRAVENOUS; SUBCUTANEOUS at 05:00

## 2022-01-01 RX ADMIN — MORPHINE SULFATE 60 MILLIGRAM(S): 50 CAPSULE, EXTENDED RELEASE ORAL at 14:54

## 2022-01-01 RX ADMIN — MORPHINE SULFATE 60 MILLIGRAM(S): 50 CAPSULE, EXTENDED RELEASE ORAL at 08:00

## 2022-01-01 RX ADMIN — MORPHINE SULFATE 60 MILLIGRAM(S): 50 CAPSULE, EXTENDED RELEASE ORAL at 16:23

## 2022-01-01 RX ADMIN — MORPHINE SULFATE 60 MILLIGRAM(S): 50 CAPSULE, EXTENDED RELEASE ORAL at 16:49

## 2022-01-01 RX ADMIN — HYDROMORPHONE HYDROCHLORIDE 1.5 MILLIGRAM(S): 2 INJECTION INTRAMUSCULAR; INTRAVENOUS; SUBCUTANEOUS at 09:09

## 2022-01-01 RX ADMIN — HYDROMORPHONE HYDROCHLORIDE 2.5 MILLIGRAM(S): 2 INJECTION INTRAMUSCULAR; INTRAVENOUS; SUBCUTANEOUS at 18:13

## 2022-01-01 RX ADMIN — HYDROMORPHONE HYDROCHLORIDE 2 MILLIGRAM(S): 2 INJECTION INTRAMUSCULAR; INTRAVENOUS; SUBCUTANEOUS at 07:00

## 2022-01-01 RX ADMIN — MORPHINE SULFATE 60 MILLIGRAM(S): 50 CAPSULE, EXTENDED RELEASE ORAL at 15:30

## 2022-01-01 RX ADMIN — HYDROMORPHONE HYDROCHLORIDE 0.5 MILLIGRAM(S): 2 INJECTION INTRAMUSCULAR; INTRAVENOUS; SUBCUTANEOUS at 16:45

## 2022-01-01 RX ADMIN — HYDROMORPHONE HYDROCHLORIDE 1.5 MILLIGRAM(S): 2 INJECTION INTRAMUSCULAR; INTRAVENOUS; SUBCUTANEOUS at 00:00

## 2022-01-01 RX ADMIN — LIDOCAINE 1 PATCH: 4 CREAM TOPICAL at 14:29

## 2022-01-01 RX ADMIN — ENOXAPARIN SODIUM 40 MILLIGRAM(S): 100 INJECTION SUBCUTANEOUS at 06:27

## 2022-01-01 RX ADMIN — HYDROMORPHONE HYDROCHLORIDE 0.75 MILLIGRAM(S): 2 INJECTION INTRAMUSCULAR; INTRAVENOUS; SUBCUTANEOUS at 09:16

## 2022-01-01 RX ADMIN — HYDROMORPHONE HYDROCHLORIDE 1 MILLIGRAM(S): 2 INJECTION INTRAMUSCULAR; INTRAVENOUS; SUBCUTANEOUS at 09:03

## 2022-01-01 RX ADMIN — BUMETANIDE 2 MILLIGRAM(S): 0.25 INJECTION INTRAMUSCULAR; INTRAVENOUS at 18:38

## 2022-01-01 RX ADMIN — BUMETANIDE 2 MILLIGRAM(S): 0.25 INJECTION INTRAMUSCULAR; INTRAVENOUS at 06:03

## 2022-01-01 RX ADMIN — HYDROMORPHONE HYDROCHLORIDE 4 MILLIGRAM(S): 2 INJECTION INTRAMUSCULAR; INTRAVENOUS; SUBCUTANEOUS at 20:45

## 2022-01-01 RX ADMIN — BUMETANIDE 2 MILLIGRAM(S): 0.25 INJECTION INTRAMUSCULAR; INTRAVENOUS at 17:55

## 2022-01-01 RX ADMIN — HYDROMORPHONE HYDROCHLORIDE 0.5 MILLIGRAM(S): 2 INJECTION INTRAMUSCULAR; INTRAVENOUS; SUBCUTANEOUS at 13:17

## 2022-01-01 RX ADMIN — HYDROMORPHONE HYDROCHLORIDE 1 MILLIGRAM(S): 2 INJECTION INTRAMUSCULAR; INTRAVENOUS; SUBCUTANEOUS at 16:08

## 2022-01-01 RX ADMIN — POLYETHYLENE GLYCOL 3350 17 GRAM(S): 17 POWDER, FOR SOLUTION ORAL at 19:07

## 2022-01-01 RX ADMIN — BUMETANIDE 2 MILLIGRAM(S): 0.25 INJECTION INTRAMUSCULAR; INTRAVENOUS at 06:14

## 2022-01-01 RX ADMIN — Medication 1 TABLET(S): at 11:38

## 2022-01-01 RX ADMIN — HYDROMORPHONE HYDROCHLORIDE 6 MILLIGRAM(S): 2 INJECTION INTRAMUSCULAR; INTRAVENOUS; SUBCUTANEOUS at 09:47

## 2022-01-01 RX ADMIN — PIPERACILLIN AND TAZOBACTAM 25 GRAM(S): 4; .5 INJECTION, POWDER, LYOPHILIZED, FOR SOLUTION INTRAVENOUS at 15:33

## 2022-01-01 RX ADMIN — MORPHINE SULFATE 60 MILLIGRAM(S): 50 CAPSULE, EXTENDED RELEASE ORAL at 16:03

## 2022-01-01 RX ADMIN — BUMETANIDE 2 MILLIGRAM(S): 0.25 INJECTION INTRAMUSCULAR; INTRAVENOUS at 05:06

## 2022-01-01 RX ADMIN — HYDROMORPHONE HYDROCHLORIDE 6 MILLIGRAM(S): 2 INJECTION INTRAMUSCULAR; INTRAVENOUS; SUBCUTANEOUS at 04:43

## 2022-01-01 RX ADMIN — HYDROMORPHONE HYDROCHLORIDE 6 MILLIGRAM(S): 2 INJECTION INTRAMUSCULAR; INTRAVENOUS; SUBCUTANEOUS at 11:51

## 2022-01-01 RX ADMIN — HYDROMORPHONE HYDROCHLORIDE 2 MILLIGRAM(S): 2 INJECTION INTRAMUSCULAR; INTRAVENOUS; SUBCUTANEOUS at 23:05

## 2022-01-01 RX ADMIN — HYDROMORPHONE HYDROCHLORIDE 0.5 MILLIGRAM(S): 2 INJECTION INTRAMUSCULAR; INTRAVENOUS; SUBCUTANEOUS at 11:53

## 2022-01-01 RX ADMIN — POLYETHYLENE GLYCOL 3350 17 GRAM(S): 17 POWDER, FOR SOLUTION ORAL at 18:50

## 2022-01-01 RX ADMIN — MORPHINE SULFATE 15 MILLIGRAM(S): 50 CAPSULE, EXTENDED RELEASE ORAL at 17:19

## 2022-01-01 RX ADMIN — MORPHINE SULFATE 60 MILLIGRAM(S): 50 CAPSULE, EXTENDED RELEASE ORAL at 00:35

## 2022-01-01 RX ADMIN — MORPHINE SULFATE 60 MILLIGRAM(S): 50 CAPSULE, EXTENDED RELEASE ORAL at 21:04

## 2022-01-01 RX ADMIN — MORPHINE SULFATE 60 MILLIGRAM(S): 50 CAPSULE, EXTENDED RELEASE ORAL at 01:42

## 2022-01-01 RX ADMIN — MORPHINE SULFATE 60 MILLIGRAM(S): 50 CAPSULE, EXTENDED RELEASE ORAL at 21:57

## 2022-01-01 RX ADMIN — PIPERACILLIN AND TAZOBACTAM 200 GRAM(S): 4; .5 INJECTION, POWDER, LYOPHILIZED, FOR SOLUTION INTRAVENOUS at 17:06

## 2022-01-01 RX ADMIN — HYDROMORPHONE HYDROCHLORIDE 2 MILLIGRAM(S): 2 INJECTION INTRAMUSCULAR; INTRAVENOUS; SUBCUTANEOUS at 10:50

## 2022-01-01 RX ADMIN — HYDROMORPHONE HYDROCHLORIDE 1 MILLIGRAM(S): 2 INJECTION INTRAMUSCULAR; INTRAVENOUS; SUBCUTANEOUS at 21:40

## 2022-01-01 RX ADMIN — Medication 1 DROP(S): at 13:28

## 2022-01-01 RX ADMIN — MORPHINE SULFATE 60 MILLIGRAM(S): 50 CAPSULE, EXTENDED RELEASE ORAL at 14:39

## 2022-01-01 RX ADMIN — HYDROMORPHONE HYDROCHLORIDE 1 MILLIGRAM(S): 2 INJECTION INTRAMUSCULAR; INTRAVENOUS; SUBCUTANEOUS at 18:58

## 2022-01-01 RX ADMIN — HYDROMORPHONE HYDROCHLORIDE 3.5 MILLIGRAM(S): 2 INJECTION INTRAMUSCULAR; INTRAVENOUS; SUBCUTANEOUS at 22:00

## 2022-01-01 RX ADMIN — HYDROMORPHONE HYDROCHLORIDE 4 MILLIGRAM(S): 2 INJECTION INTRAMUSCULAR; INTRAVENOUS; SUBCUTANEOUS at 02:30

## 2022-01-01 RX ADMIN — HYDROMORPHONE HYDROCHLORIDE 6 MILLIGRAM(S): 2 INJECTION INTRAMUSCULAR; INTRAVENOUS; SUBCUTANEOUS at 19:04

## 2022-01-01 RX ADMIN — POLYETHYLENE GLYCOL 3350 17 GRAM(S): 17 POWDER, FOR SOLUTION ORAL at 14:09

## 2022-01-01 RX ADMIN — MORPHINE SULFATE 60 MILLIGRAM(S): 50 CAPSULE, EXTENDED RELEASE ORAL at 08:37

## 2022-01-01 RX ADMIN — BUMETANIDE 2 MILLIGRAM(S): 0.25 INJECTION INTRAMUSCULAR; INTRAVENOUS at 18:25

## 2022-01-01 RX ADMIN — HYDROMORPHONE HYDROCHLORIDE 2 MILLIGRAM(S): 2 INJECTION INTRAMUSCULAR; INTRAVENOUS; SUBCUTANEOUS at 22:48

## 2022-01-01 RX ADMIN — HYDROMORPHONE HYDROCHLORIDE 1 MILLIGRAM(S): 2 INJECTION INTRAMUSCULAR; INTRAVENOUS; SUBCUTANEOUS at 18:36

## 2022-01-01 RX ADMIN — LACTULOSE 20 GRAM(S): 10 SOLUTION ORAL at 05:38

## 2022-01-01 RX ADMIN — HEPARIN SODIUM 5000 UNIT(S): 5000 INJECTION INTRAVENOUS; SUBCUTANEOUS at 03:11

## 2022-01-01 RX ADMIN — BUMETANIDE 2 MILLIGRAM(S): 0.25 INJECTION INTRAMUSCULAR; INTRAVENOUS at 05:41

## 2022-01-01 RX ADMIN — Medication 1 TABLET(S): at 16:23

## 2022-01-01 RX ADMIN — LACTULOSE 20 GRAM(S): 10 SOLUTION ORAL at 00:06

## 2022-01-01 RX ADMIN — MORPHINE SULFATE 60 MILLIGRAM(S): 50 CAPSULE, EXTENDED RELEASE ORAL at 09:00

## 2022-01-01 RX ADMIN — HYDROMORPHONE HYDROCHLORIDE 6 MILLIGRAM(S): 2 INJECTION INTRAMUSCULAR; INTRAVENOUS; SUBCUTANEOUS at 11:32

## 2022-01-01 RX ADMIN — HYDROMORPHONE HYDROCHLORIDE 6 MILLIGRAM(S): 2 INJECTION INTRAMUSCULAR; INTRAVENOUS; SUBCUTANEOUS at 14:03

## 2022-01-01 RX ADMIN — HYDROMORPHONE HYDROCHLORIDE 0.5 MILLIGRAM(S): 2 INJECTION INTRAMUSCULAR; INTRAVENOUS; SUBCUTANEOUS at 19:26

## 2022-01-01 RX ADMIN — ENOXAPARIN SODIUM 40 MILLIGRAM(S): 100 INJECTION SUBCUTANEOUS at 05:47

## 2022-01-01 RX ADMIN — HYDROMORPHONE HYDROCHLORIDE 2 MILLIGRAM(S): 2 INJECTION INTRAMUSCULAR; INTRAVENOUS; SUBCUTANEOUS at 10:26

## 2022-01-01 RX ADMIN — Medication 40 MILLIEQUIVALENT(S): at 11:23

## 2022-01-01 RX ADMIN — SODIUM CHLORIDE 1 GRAM(S): 9 INJECTION INTRAMUSCULAR; INTRAVENOUS; SUBCUTANEOUS at 05:33

## 2022-01-01 RX ADMIN — HYDROMORPHONE HYDROCHLORIDE 0.75 MILLIGRAM(S): 2 INJECTION INTRAMUSCULAR; INTRAVENOUS; SUBCUTANEOUS at 11:53

## 2022-01-01 RX ADMIN — HYDROMORPHONE HYDROCHLORIDE 1 MILLIGRAM(S): 2 INJECTION INTRAMUSCULAR; INTRAVENOUS; SUBCUTANEOUS at 03:45

## 2022-01-01 RX ADMIN — ENOXAPARIN SODIUM 40 MILLIGRAM(S): 100 INJECTION SUBCUTANEOUS at 05:45

## 2022-01-01 RX ADMIN — MORPHINE SULFATE 60 MILLIGRAM(S): 50 CAPSULE, EXTENDED RELEASE ORAL at 13:12

## 2022-01-01 RX ADMIN — MORPHINE SULFATE 60 MILLIGRAM(S): 50 CAPSULE, EXTENDED RELEASE ORAL at 23:07

## 2022-01-01 RX ADMIN — Medication 0.5 MILLIGRAM(S): at 00:16

## 2022-01-01 RX ADMIN — MORPHINE SULFATE 60 MILLIGRAM(S): 50 CAPSULE, EXTENDED RELEASE ORAL at 14:48

## 2022-01-01 RX ADMIN — LACTULOSE 10 GRAM(S): 10 SOLUTION ORAL at 16:46

## 2022-01-01 RX ADMIN — MORPHINE SULFATE 60 MILLIGRAM(S): 50 CAPSULE, EXTENDED RELEASE ORAL at 08:38

## 2022-01-01 RX ADMIN — HYDROMORPHONE HYDROCHLORIDE 0.5 MILLIGRAM(S): 2 INJECTION INTRAMUSCULAR; INTRAVENOUS; SUBCUTANEOUS at 12:30

## 2022-01-01 RX ADMIN — HYDROMORPHONE HYDROCHLORIDE 1.5 MILLIGRAM(S): 2 INJECTION INTRAMUSCULAR; INTRAVENOUS; SUBCUTANEOUS at 09:46

## 2022-01-01 RX ADMIN — HYDROMORPHONE HYDROCHLORIDE 6 MILLIGRAM(S): 2 INJECTION INTRAMUSCULAR; INTRAVENOUS; SUBCUTANEOUS at 11:29

## 2022-01-01 RX ADMIN — MORPHINE SULFATE 60 MILLIGRAM(S): 50 CAPSULE, EXTENDED RELEASE ORAL at 21:06

## 2022-01-01 RX ADMIN — HYDROMORPHONE HYDROCHLORIDE 0.5 MILLIGRAM(S): 2 INJECTION INTRAMUSCULAR; INTRAVENOUS; SUBCUTANEOUS at 06:57

## 2022-01-01 RX ADMIN — ENOXAPARIN SODIUM 40 MILLIGRAM(S): 100 INJECTION SUBCUTANEOUS at 17:28

## 2022-01-01 RX ADMIN — HYDROMORPHONE HYDROCHLORIDE 2 MILLIGRAM(S): 2 INJECTION INTRAMUSCULAR; INTRAVENOUS; SUBCUTANEOUS at 17:36

## 2022-01-01 RX ADMIN — MORPHINE SULFATE 60 MILLIGRAM(S): 50 CAPSULE, EXTENDED RELEASE ORAL at 21:22

## 2022-01-01 RX ADMIN — HYDROMORPHONE HYDROCHLORIDE 1 MILLIGRAM(S): 2 INJECTION INTRAMUSCULAR; INTRAVENOUS; SUBCUTANEOUS at 14:09

## 2022-01-01 RX ADMIN — MORPHINE SULFATE 60 MILLIGRAM(S): 50 CAPSULE, EXTENDED RELEASE ORAL at 23:00

## 2022-01-01 RX ADMIN — Medication 0.5 MILLIGRAM(S): at 12:01

## 2022-01-01 RX ADMIN — HYDROMORPHONE HYDROCHLORIDE 4 MILLIGRAM(S): 2 INJECTION INTRAMUSCULAR; INTRAVENOUS; SUBCUTANEOUS at 00:00

## 2022-01-01 RX ADMIN — HYDROMORPHONE HYDROCHLORIDE 1 MILLIGRAM(S): 2 INJECTION INTRAMUSCULAR; INTRAVENOUS; SUBCUTANEOUS at 09:18

## 2022-01-01 RX ADMIN — HYDROMORPHONE HYDROCHLORIDE 1 MILLIGRAM(S): 2 INJECTION INTRAMUSCULAR; INTRAVENOUS; SUBCUTANEOUS at 22:20

## 2022-01-01 RX ADMIN — HYDROMORPHONE HYDROCHLORIDE 0.75 MILLIGRAM(S): 2 INJECTION INTRAMUSCULAR; INTRAVENOUS; SUBCUTANEOUS at 10:12

## 2022-01-01 RX ADMIN — Medication 100 MILLIEQUIVALENT(S): at 00:36

## 2022-01-01 RX ADMIN — MORPHINE SULFATE 45 MILLIGRAM(S): 50 CAPSULE, EXTENDED RELEASE ORAL at 09:53

## 2022-01-01 RX ADMIN — HEPARIN SODIUM 5000 UNIT(S): 5000 INJECTION INTRAVENOUS; SUBCUTANEOUS at 16:49

## 2022-01-01 RX ADMIN — HYDROMORPHONE HYDROCHLORIDE 0.75 MILLIGRAM(S): 2 INJECTION INTRAMUSCULAR; INTRAVENOUS; SUBCUTANEOUS at 12:47

## 2022-01-01 RX ADMIN — HEPARIN SODIUM 5000 UNIT(S): 5000 INJECTION INTRAVENOUS; SUBCUTANEOUS at 09:54

## 2022-01-01 RX ADMIN — MORPHINE SULFATE 60 MILLIGRAM(S): 50 CAPSULE, EXTENDED RELEASE ORAL at 22:29

## 2022-01-01 RX ADMIN — HYDROMORPHONE HYDROCHLORIDE 0.75 MILLIGRAM(S): 2 INJECTION INTRAMUSCULAR; INTRAVENOUS; SUBCUTANEOUS at 01:50

## 2022-01-01 RX ADMIN — MORPHINE SULFATE 15 MILLIGRAM(S): 50 CAPSULE, EXTENDED RELEASE ORAL at 21:58

## 2022-01-01 RX ADMIN — HEPARIN SODIUM 5000 UNIT(S): 5000 INJECTION INTRAVENOUS; SUBCUTANEOUS at 17:51

## 2022-01-01 RX ADMIN — Medication 1 DROP(S): at 01:28

## 2022-01-01 RX ADMIN — HYDROMORPHONE HYDROCHLORIDE 2 MILLIGRAM(S): 2 INJECTION INTRAMUSCULAR; INTRAVENOUS; SUBCUTANEOUS at 12:18

## 2022-01-01 RX ADMIN — PIPERACILLIN AND TAZOBACTAM 25 GRAM(S): 4; .5 INJECTION, POWDER, LYOPHILIZED, FOR SOLUTION INTRAVENOUS at 22:46

## 2022-01-01 RX ADMIN — Medication 1 TABLET(S): at 11:52

## 2022-01-01 RX ADMIN — HYDROMORPHONE HYDROCHLORIDE 2 MILLIGRAM(S): 2 INJECTION INTRAMUSCULAR; INTRAVENOUS; SUBCUTANEOUS at 11:52

## 2022-01-01 RX ADMIN — HEPARIN SODIUM 5000 UNIT(S): 5000 INJECTION INTRAVENOUS; SUBCUTANEOUS at 10:38

## 2022-01-01 RX ADMIN — MORPHINE SULFATE 45 MILLIGRAM(S): 50 CAPSULE, EXTENDED RELEASE ORAL at 15:19

## 2022-01-01 RX ADMIN — LIDOCAINE 1 PATCH: 4 CREAM TOPICAL at 15:22

## 2022-01-01 RX ADMIN — HYDROMORPHONE HYDROCHLORIDE 4 MILLIGRAM(S): 2 INJECTION INTRAMUSCULAR; INTRAVENOUS; SUBCUTANEOUS at 02:53

## 2022-01-01 RX ADMIN — SODIUM CHLORIDE 10 MILLILITER(S): 9 INJECTION INTRAMUSCULAR; INTRAVENOUS; SUBCUTANEOUS at 08:40

## 2022-01-01 RX ADMIN — HEPARIN SODIUM 5000 UNIT(S): 5000 INJECTION INTRAVENOUS; SUBCUTANEOUS at 17:39

## 2022-01-01 RX ADMIN — HYDROMORPHONE HYDROCHLORIDE 0.75 MILLIGRAM(S): 2 INJECTION INTRAMUSCULAR; INTRAVENOUS; SUBCUTANEOUS at 18:14

## 2022-01-01 RX ADMIN — HYDROMORPHONE HYDROCHLORIDE 2 MILLIGRAM(S): 2 INJECTION INTRAMUSCULAR; INTRAVENOUS; SUBCUTANEOUS at 22:03

## 2022-01-01 RX ADMIN — BUMETANIDE 2 MILLIGRAM(S): 0.25 INJECTION INTRAMUSCULAR; INTRAVENOUS at 18:31

## 2022-01-01 RX ADMIN — HYDROMORPHONE HYDROCHLORIDE 6 MILLIGRAM(S): 2 INJECTION INTRAMUSCULAR; INTRAVENOUS; SUBCUTANEOUS at 09:50

## 2022-01-01 RX ADMIN — HYDROMORPHONE HYDROCHLORIDE 2 MILLIGRAM(S): 2 INJECTION INTRAMUSCULAR; INTRAVENOUS; SUBCUTANEOUS at 02:39

## 2022-01-01 RX ADMIN — HYDROMORPHONE HYDROCHLORIDE 2 MILLIGRAM(S): 2 INJECTION INTRAMUSCULAR; INTRAVENOUS; SUBCUTANEOUS at 03:58

## 2022-01-01 RX ADMIN — HYDROMORPHONE HYDROCHLORIDE 1 MG/HR: 2 INJECTION INTRAMUSCULAR; INTRAVENOUS; SUBCUTANEOUS at 19:28

## 2022-01-01 RX ADMIN — Medication 1 DROP(S): at 18:09

## 2022-01-01 RX ADMIN — BUMETANIDE 2 MILLIGRAM(S): 0.25 INJECTION INTRAMUSCULAR; INTRAVENOUS at 05:18

## 2022-01-01 RX ADMIN — MORPHINE SULFATE 45 MILLIGRAM(S): 50 CAPSULE, EXTENDED RELEASE ORAL at 08:03

## 2022-01-01 RX ADMIN — MORPHINE SULFATE 60 MILLIGRAM(S): 50 CAPSULE, EXTENDED RELEASE ORAL at 10:17

## 2022-01-01 RX ADMIN — HYDROMORPHONE HYDROCHLORIDE 6 MILLIGRAM(S): 2 INJECTION INTRAMUSCULAR; INTRAVENOUS; SUBCUTANEOUS at 04:03

## 2022-01-01 RX ADMIN — HYDROMORPHONE HYDROCHLORIDE 4 MILLIGRAM(S): 2 INJECTION INTRAMUSCULAR; INTRAVENOUS; SUBCUTANEOUS at 18:42

## 2022-01-01 RX ADMIN — Medication 15 MILLILITER(S): at 13:27

## 2022-01-01 RX ADMIN — Medication 3 MILLIGRAM(S): at 23:09

## 2022-01-01 RX ADMIN — HYDROMORPHONE HYDROCHLORIDE 2 MILLIGRAM(S): 2 INJECTION INTRAMUSCULAR; INTRAVENOUS; SUBCUTANEOUS at 07:28

## 2022-01-01 RX ADMIN — HYDROMORPHONE HYDROCHLORIDE 2.5 MILLIGRAM(S): 2 INJECTION INTRAMUSCULAR; INTRAVENOUS; SUBCUTANEOUS at 03:27

## 2022-01-01 RX ADMIN — MORPHINE SULFATE 60 MILLIGRAM(S): 50 CAPSULE, EXTENDED RELEASE ORAL at 22:56

## 2022-01-01 RX ADMIN — HYDROMORPHONE HYDROCHLORIDE 6 MILLIGRAM(S): 2 INJECTION INTRAMUSCULAR; INTRAVENOUS; SUBCUTANEOUS at 10:45

## 2022-01-01 RX ADMIN — HYDROMORPHONE HYDROCHLORIDE 2 MILLIGRAM(S): 2 INJECTION INTRAMUSCULAR; INTRAVENOUS; SUBCUTANEOUS at 22:45

## 2022-01-01 RX ADMIN — LIDOCAINE 1 PATCH: 4 CREAM TOPICAL at 07:30

## 2022-01-01 RX ADMIN — Medication 1 DROP(S): at 17:53

## 2022-01-01 RX ADMIN — PIPERACILLIN AND TAZOBACTAM 25 GRAM(S): 4; .5 INJECTION, POWDER, LYOPHILIZED, FOR SOLUTION INTRAVENOUS at 08:00

## 2022-01-01 RX ADMIN — MORPHINE SULFATE 60 MILLIGRAM(S): 50 CAPSULE, EXTENDED RELEASE ORAL at 07:53

## 2022-01-01 RX ADMIN — HYDROMORPHONE HYDROCHLORIDE 6 MILLIGRAM(S): 2 INJECTION INTRAMUSCULAR; INTRAVENOUS; SUBCUTANEOUS at 12:23

## 2022-01-01 RX ADMIN — MORPHINE SULFATE 45 MILLIGRAM(S): 50 CAPSULE, EXTENDED RELEASE ORAL at 22:32

## 2022-01-01 RX ADMIN — HYDROMORPHONE HYDROCHLORIDE 1 MILLIGRAM(S): 2 INJECTION INTRAMUSCULAR; INTRAVENOUS; SUBCUTANEOUS at 14:15

## 2022-01-01 RX ADMIN — BUMETANIDE 2 MILLIGRAM(S): 0.25 INJECTION INTRAMUSCULAR; INTRAVENOUS at 05:58

## 2022-01-01 RX ADMIN — HYDROMORPHONE HYDROCHLORIDE 1 MILLIGRAM(S): 2 INJECTION INTRAMUSCULAR; INTRAVENOUS; SUBCUTANEOUS at 16:09

## 2022-01-01 RX ADMIN — HEPARIN SODIUM 5000 UNIT(S): 5000 INJECTION INTRAVENOUS; SUBCUTANEOUS at 21:59

## 2022-01-01 RX ADMIN — SODIUM CHLORIDE 1 GRAM(S): 9 INJECTION INTRAMUSCULAR; INTRAVENOUS; SUBCUTANEOUS at 22:34

## 2022-01-01 RX ADMIN — SODIUM CHLORIDE 1 GRAM(S): 9 INJECTION INTRAMUSCULAR; INTRAVENOUS; SUBCUTANEOUS at 14:54

## 2022-01-01 RX ADMIN — HYDROMORPHONE HYDROCHLORIDE 0.75 MILLIGRAM(S): 2 INJECTION INTRAMUSCULAR; INTRAVENOUS; SUBCUTANEOUS at 12:36

## 2022-01-01 RX ADMIN — MORPHINE SULFATE 60 MILLIGRAM(S): 50 CAPSULE, EXTENDED RELEASE ORAL at 13:07

## 2022-01-01 RX ADMIN — HYDROMORPHONE HYDROCHLORIDE 6 MILLIGRAM(S): 2 INJECTION INTRAMUSCULAR; INTRAVENOUS; SUBCUTANEOUS at 10:07

## 2022-01-01 RX ADMIN — Medication 300 MILLILITER(S): at 16:19

## 2022-01-01 RX ADMIN — LIDOCAINE 1 PATCH: 4 CREAM TOPICAL at 12:30

## 2022-01-01 RX ADMIN — POLYETHYLENE GLYCOL 3350 17 GRAM(S): 17 POWDER, FOR SOLUTION ORAL at 19:05

## 2022-01-01 RX ADMIN — SODIUM CHLORIDE 1 GRAM(S): 9 INJECTION INTRAMUSCULAR; INTRAVENOUS; SUBCUTANEOUS at 23:23

## 2022-01-01 RX ADMIN — HYDROMORPHONE HYDROCHLORIDE 0.75 MILLIGRAM(S): 2 INJECTION INTRAMUSCULAR; INTRAVENOUS; SUBCUTANEOUS at 12:21

## 2022-01-01 RX ADMIN — HYDROMORPHONE HYDROCHLORIDE 1.5 MILLIGRAM(S): 2 INJECTION INTRAMUSCULAR; INTRAVENOUS; SUBCUTANEOUS at 15:04

## 2022-01-01 RX ADMIN — HYDROMORPHONE HYDROCHLORIDE 0.75 MILLIGRAM(S): 2 INJECTION INTRAMUSCULAR; INTRAVENOUS; SUBCUTANEOUS at 18:51

## 2022-01-01 RX ADMIN — MORPHINE SULFATE 60 MILLIGRAM(S): 50 CAPSULE, EXTENDED RELEASE ORAL at 10:33

## 2022-01-01 RX ADMIN — SENNA PLUS 2 TABLET(S): 8.6 TABLET ORAL at 21:06

## 2022-01-01 RX ADMIN — SIMETHICONE 80 MILLIGRAM(S): 80 TABLET, CHEWABLE ORAL at 02:24

## 2022-01-01 RX ADMIN — HYDROMORPHONE HYDROCHLORIDE 0.75 MILLIGRAM(S): 2 INJECTION INTRAMUSCULAR; INTRAVENOUS; SUBCUTANEOUS at 02:20

## 2022-01-01 RX ADMIN — MORPHINE SULFATE 60 MILLIGRAM(S): 50 CAPSULE, EXTENDED RELEASE ORAL at 23:23

## 2022-01-01 RX ADMIN — BUMETANIDE 2 MILLIGRAM(S): 0.25 INJECTION INTRAMUSCULAR; INTRAVENOUS at 17:34

## 2022-01-01 RX ADMIN — HYDROMORPHONE HYDROCHLORIDE 4 MILLIGRAM(S): 2 INJECTION INTRAMUSCULAR; INTRAVENOUS; SUBCUTANEOUS at 01:37

## 2022-01-01 RX ADMIN — MORPHINE SULFATE 60 MILLIGRAM(S): 50 CAPSULE, EXTENDED RELEASE ORAL at 17:16

## 2022-01-01 RX ADMIN — ENOXAPARIN SODIUM 40 MILLIGRAM(S): 100 INJECTION SUBCUTANEOUS at 05:36

## 2022-01-01 RX ADMIN — SODIUM CHLORIDE 1 GRAM(S): 9 INJECTION INTRAMUSCULAR; INTRAVENOUS; SUBCUTANEOUS at 06:45

## 2022-01-01 RX ADMIN — HYDROMORPHONE HYDROCHLORIDE 2 MILLIGRAM(S): 2 INJECTION INTRAMUSCULAR; INTRAVENOUS; SUBCUTANEOUS at 02:09

## 2022-01-01 RX ADMIN — MORPHINE SULFATE 60 MILLIGRAM(S): 50 CAPSULE, EXTENDED RELEASE ORAL at 22:16

## 2022-01-01 RX ADMIN — Medication 15 MILLILITER(S): at 16:34

## 2022-01-01 RX ADMIN — MORPHINE SULFATE 60 MILLIGRAM(S): 50 CAPSULE, EXTENDED RELEASE ORAL at 13:58

## 2022-01-01 RX ADMIN — BUMETANIDE 2 MILLIGRAM(S): 0.25 INJECTION INTRAMUSCULAR; INTRAVENOUS at 19:07

## 2022-01-01 RX ADMIN — MAGNESIUM OXIDE 400 MG ORAL TABLET 400 MILLIGRAM(S): 241.3 TABLET ORAL at 17:24

## 2022-01-01 RX ADMIN — PIPERACILLIN AND TAZOBACTAM 25 GRAM(S): 4; .5 INJECTION, POWDER, LYOPHILIZED, FOR SOLUTION INTRAVENOUS at 08:02

## 2022-01-01 RX ADMIN — Medication 40 MILLIEQUIVALENT(S): at 17:34

## 2022-01-01 RX ADMIN — Medication 0.25 MILLIGRAM(S): at 13:53

## 2022-01-01 RX ADMIN — HYDROMORPHONE HYDROCHLORIDE 4 MILLIGRAM(S): 2 INJECTION INTRAMUSCULAR; INTRAVENOUS; SUBCUTANEOUS at 03:46

## 2022-01-01 RX ADMIN — HYDROMORPHONE HYDROCHLORIDE 2 MILLIGRAM(S): 2 INJECTION INTRAMUSCULAR; INTRAVENOUS; SUBCUTANEOUS at 09:25

## 2022-01-01 RX ADMIN — BUMETANIDE 2 MILLIGRAM(S): 0.25 INJECTION INTRAMUSCULAR; INTRAVENOUS at 21:30

## 2022-01-01 RX ADMIN — HYDROMORPHONE HYDROCHLORIDE 1 MILLIGRAM(S): 2 INJECTION INTRAMUSCULAR; INTRAVENOUS; SUBCUTANEOUS at 09:48

## 2022-01-01 RX ADMIN — HYDROMORPHONE HYDROCHLORIDE 4 MILLIGRAM(S): 2 INJECTION INTRAMUSCULAR; INTRAVENOUS; SUBCUTANEOUS at 16:59

## 2022-01-01 RX ADMIN — MORPHINE SULFATE 60 MILLIGRAM(S): 50 CAPSULE, EXTENDED RELEASE ORAL at 17:43

## 2022-01-01 RX ADMIN — HYDROMORPHONE HYDROCHLORIDE 3.5 MILLIGRAM(S): 2 INJECTION INTRAMUSCULAR; INTRAVENOUS; SUBCUTANEOUS at 08:27

## 2022-01-01 RX ADMIN — HYDROMORPHONE HYDROCHLORIDE 2 MILLIGRAM(S): 2 INJECTION INTRAMUSCULAR; INTRAVENOUS; SUBCUTANEOUS at 18:05

## 2022-01-01 RX ADMIN — HYDROMORPHONE HYDROCHLORIDE 1 MILLIGRAM(S): 2 INJECTION INTRAMUSCULAR; INTRAVENOUS; SUBCUTANEOUS at 18:32

## 2022-01-01 RX ADMIN — HYDROMORPHONE HYDROCHLORIDE 2 MILLIGRAM(S): 2 INJECTION INTRAMUSCULAR; INTRAVENOUS; SUBCUTANEOUS at 22:33

## 2022-01-01 RX ADMIN — MORPHINE SULFATE 30 MILLIGRAM(S): 50 CAPSULE, EXTENDED RELEASE ORAL at 14:48

## 2022-01-01 RX ADMIN — METHOCARBAMOL 1500 MILLIGRAM(S): 500 TABLET, FILM COATED ORAL at 00:08

## 2022-01-01 RX ADMIN — Medication 40 MILLIEQUIVALENT(S): at 10:50

## 2022-01-01 RX ADMIN — SODIUM CHLORIDE 10 MILLILITER(S): 9 INJECTION INTRAMUSCULAR; INTRAVENOUS; SUBCUTANEOUS at 19:28

## 2022-01-01 RX ADMIN — HYDROMORPHONE HYDROCHLORIDE 6 MILLIGRAM(S): 2 INJECTION INTRAMUSCULAR; INTRAVENOUS; SUBCUTANEOUS at 20:34

## 2022-01-01 RX ADMIN — MORPHINE SULFATE 60 MILLIGRAM(S): 50 CAPSULE, EXTENDED RELEASE ORAL at 18:35

## 2022-01-01 RX ADMIN — MORPHINE SULFATE 45 MILLIGRAM(S): 50 CAPSULE, EXTENDED RELEASE ORAL at 21:48

## 2022-01-01 RX ADMIN — HYDROMORPHONE HYDROCHLORIDE 0.75 MILLIGRAM(S): 2 INJECTION INTRAMUSCULAR; INTRAVENOUS; SUBCUTANEOUS at 13:26

## 2022-01-01 RX ADMIN — HYDROMORPHONE HYDROCHLORIDE 6 MILLIGRAM(S): 2 INJECTION INTRAMUSCULAR; INTRAVENOUS; SUBCUTANEOUS at 05:13

## 2022-01-01 RX ADMIN — POLYETHYLENE GLYCOL 3350 17 GRAM(S): 17 POWDER, FOR SOLUTION ORAL at 14:48

## 2022-01-01 RX ADMIN — Medication 0.5 MILLIGRAM(S): at 20:38

## 2022-01-01 RX ADMIN — SIMETHICONE 80 MILLIGRAM(S): 80 TABLET, CHEWABLE ORAL at 18:03

## 2022-01-01 RX ADMIN — Medication 1 MILLIGRAM(S): at 14:21

## 2022-01-01 RX ADMIN — HYDROMORPHONE HYDROCHLORIDE 4 MILLIGRAM(S): 2 INJECTION INTRAMUSCULAR; INTRAVENOUS; SUBCUTANEOUS at 19:44

## 2022-01-01 RX ADMIN — HYDROMORPHONE HYDROCHLORIDE 0.75 MILLIGRAM(S): 2 INJECTION INTRAMUSCULAR; INTRAVENOUS; SUBCUTANEOUS at 03:25

## 2022-01-01 RX ADMIN — HYDROMORPHONE HYDROCHLORIDE 1 MILLIGRAM(S): 2 INJECTION INTRAMUSCULAR; INTRAVENOUS; SUBCUTANEOUS at 21:17

## 2022-01-01 RX ADMIN — HYDROMORPHONE HYDROCHLORIDE 0.75 MILLIGRAM(S): 2 INJECTION INTRAMUSCULAR; INTRAVENOUS; SUBCUTANEOUS at 16:28

## 2022-01-01 RX ADMIN — HYDROMORPHONE HYDROCHLORIDE 6 MILLIGRAM(S): 2 INJECTION INTRAMUSCULAR; INTRAVENOUS; SUBCUTANEOUS at 01:12

## 2022-01-01 RX ADMIN — HYDROMORPHONE HYDROCHLORIDE 3.5 MILLIGRAM(S): 2 INJECTION INTRAMUSCULAR; INTRAVENOUS; SUBCUTANEOUS at 13:55

## 2022-01-01 RX ADMIN — MORPHINE SULFATE 60 MILLIGRAM(S): 50 CAPSULE, EXTENDED RELEASE ORAL at 21:21

## 2022-01-01 RX ADMIN — MORPHINE SULFATE 60 MILLIGRAM(S): 50 CAPSULE, EXTENDED RELEASE ORAL at 08:35

## 2022-01-01 RX ADMIN — Medication 40 MILLIEQUIVALENT(S): at 09:05

## 2022-01-01 RX ADMIN — POLYETHYLENE GLYCOL 3350 17 GRAM(S): 17 POWDER, FOR SOLUTION ORAL at 12:42

## 2022-01-01 RX ADMIN — PIPERACILLIN AND TAZOBACTAM 25 GRAM(S): 4; .5 INJECTION, POWDER, LYOPHILIZED, FOR SOLUTION INTRAVENOUS at 23:52

## 2022-01-01 RX ADMIN — POLYETHYLENE GLYCOL 3350 17 GRAM(S): 17 POWDER, FOR SOLUTION ORAL at 14:50

## 2022-01-01 RX ADMIN — HYDROMORPHONE HYDROCHLORIDE 1 MILLIGRAM(S): 2 INJECTION INTRAMUSCULAR; INTRAVENOUS; SUBCUTANEOUS at 15:05

## 2022-01-01 RX ADMIN — BUMETANIDE 2 MILLIGRAM(S): 0.25 INJECTION INTRAMUSCULAR; INTRAVENOUS at 05:45

## 2022-01-01 RX ADMIN — Medication 0.25 MILLIGRAM(S): at 09:23

## 2022-01-01 RX ADMIN — LIDOCAINE 1 PATCH: 4 CREAM TOPICAL at 20:16

## 2022-01-01 RX ADMIN — BUMETANIDE 2 MILLIGRAM(S): 0.25 INJECTION INTRAMUSCULAR; INTRAVENOUS at 18:49

## 2022-01-01 RX ADMIN — HYDROMORPHONE HYDROCHLORIDE 6 MILLIGRAM(S): 2 INJECTION INTRAMUSCULAR; INTRAVENOUS; SUBCUTANEOUS at 09:26

## 2022-01-01 RX ADMIN — HYDROMORPHONE HYDROCHLORIDE 1.5 MG/HR: 2 INJECTION INTRAMUSCULAR; INTRAVENOUS; SUBCUTANEOUS at 08:27

## 2022-01-01 RX ADMIN — SODIUM CHLORIDE 1 GRAM(S): 9 INJECTION INTRAMUSCULAR; INTRAVENOUS; SUBCUTANEOUS at 16:45

## 2022-01-01 RX ADMIN — HYDROMORPHONE HYDROCHLORIDE 4 MILLIGRAM(S): 2 INJECTION INTRAMUSCULAR; INTRAVENOUS; SUBCUTANEOUS at 11:29

## 2022-01-01 RX ADMIN — HYDROMORPHONE HYDROCHLORIDE 0.75 MILLIGRAM(S): 2 INJECTION INTRAMUSCULAR; INTRAVENOUS; SUBCUTANEOUS at 21:57

## 2022-01-01 RX ADMIN — HYDROMORPHONE HYDROCHLORIDE 1.5 MILLIGRAM(S): 2 INJECTION INTRAMUSCULAR; INTRAVENOUS; SUBCUTANEOUS at 12:08

## 2022-01-01 RX ADMIN — POLYETHYLENE GLYCOL 3350 17 GRAM(S): 17 POWDER, FOR SOLUTION ORAL at 23:04

## 2022-01-01 RX ADMIN — Medication 1 DROP(S): at 17:40

## 2022-01-01 RX ADMIN — HYDROMORPHONE HYDROCHLORIDE 4 MILLIGRAM(S): 2 INJECTION INTRAMUSCULAR; INTRAVENOUS; SUBCUTANEOUS at 13:21

## 2022-01-01 RX ADMIN — MORPHINE SULFATE 30 MILLIGRAM(S): 50 CAPSULE, EXTENDED RELEASE ORAL at 22:34

## 2022-01-01 RX ADMIN — HYDROMORPHONE HYDROCHLORIDE 1.5 MILLIGRAM(S): 2 INJECTION INTRAMUSCULAR; INTRAVENOUS; SUBCUTANEOUS at 03:57

## 2022-01-01 RX ADMIN — HYDROMORPHONE HYDROCHLORIDE 1 MILLIGRAM(S): 2 INJECTION INTRAMUSCULAR; INTRAVENOUS; SUBCUTANEOUS at 00:56

## 2022-01-01 RX ADMIN — MORPHINE SULFATE 60 MILLIGRAM(S): 50 CAPSULE, EXTENDED RELEASE ORAL at 09:15

## 2022-01-01 RX ADMIN — HYDROMORPHONE HYDROCHLORIDE 4 MILLIGRAM(S): 2 INJECTION INTRAMUSCULAR; INTRAVENOUS; SUBCUTANEOUS at 19:30

## 2022-01-01 RX ADMIN — HYDROMORPHONE HYDROCHLORIDE 6 MILLIGRAM(S): 2 INJECTION INTRAMUSCULAR; INTRAVENOUS; SUBCUTANEOUS at 14:54

## 2022-01-01 RX ADMIN — HYDROMORPHONE HYDROCHLORIDE 6 MILLIGRAM(S): 2 INJECTION INTRAMUSCULAR; INTRAVENOUS; SUBCUTANEOUS at 01:11

## 2022-01-01 RX ADMIN — Medication 1 DROP(S): at 11:54

## 2022-01-01 RX ADMIN — HYDROMORPHONE HYDROCHLORIDE 1 MILLIGRAM(S): 2 INJECTION INTRAMUSCULAR; INTRAVENOUS; SUBCUTANEOUS at 05:44

## 2022-01-01 RX ADMIN — MORPHINE SULFATE 60 MILLIGRAM(S): 50 CAPSULE, EXTENDED RELEASE ORAL at 23:16

## 2022-01-01 RX ADMIN — HYDROMORPHONE HYDROCHLORIDE 1.5 MILLIGRAM(S): 2 INJECTION INTRAMUSCULAR; INTRAVENOUS; SUBCUTANEOUS at 06:23

## 2022-01-01 RX ADMIN — MORPHINE SULFATE 30 MILLIGRAM(S): 50 CAPSULE, EXTENDED RELEASE ORAL at 22:05

## 2022-01-01 RX ADMIN — MORPHINE SULFATE 15 MILLIGRAM(S): 50 CAPSULE, EXTENDED RELEASE ORAL at 10:49

## 2022-01-01 RX ADMIN — HYDROMORPHONE HYDROCHLORIDE 6 MILLIGRAM(S): 2 INJECTION INTRAMUSCULAR; INTRAVENOUS; SUBCUTANEOUS at 05:21

## 2022-01-01 RX ADMIN — MORPHINE SULFATE 60 MILLIGRAM(S): 50 CAPSULE, EXTENDED RELEASE ORAL at 00:18

## 2022-01-01 RX ADMIN — HYDROMORPHONE HYDROCHLORIDE 6 MILLIGRAM(S): 2 INJECTION INTRAMUSCULAR; INTRAVENOUS; SUBCUTANEOUS at 10:56

## 2022-01-01 RX ADMIN — HYDROMORPHONE HYDROCHLORIDE 2 MILLIGRAM(S): 2 INJECTION INTRAMUSCULAR; INTRAVENOUS; SUBCUTANEOUS at 11:42

## 2022-01-01 RX ADMIN — HYDROMORPHONE HYDROCHLORIDE 2 MILLIGRAM(S): 2 INJECTION INTRAMUSCULAR; INTRAVENOUS; SUBCUTANEOUS at 20:01

## 2022-01-01 RX ADMIN — MORPHINE SULFATE 60 MILLIGRAM(S): 50 CAPSULE, EXTENDED RELEASE ORAL at 10:49

## 2022-01-01 RX ADMIN — BUMETANIDE 2 MILLIGRAM(S): 0.25 INJECTION INTRAMUSCULAR; INTRAVENOUS at 06:04

## 2022-01-01 RX ADMIN — MORPHINE SULFATE 60 MILLIGRAM(S): 50 CAPSULE, EXTENDED RELEASE ORAL at 00:42

## 2022-01-01 RX ADMIN — HYDROMORPHONE HYDROCHLORIDE 6 MILLIGRAM(S): 2 INJECTION INTRAMUSCULAR; INTRAVENOUS; SUBCUTANEOUS at 19:55

## 2022-01-01 RX ADMIN — Medication 5 MILLIGRAM(S): at 21:22

## 2022-01-01 RX ADMIN — POLYETHYLENE GLYCOL 3350 17 GRAM(S): 17 POWDER, FOR SOLUTION ORAL at 05:45

## 2022-01-01 RX ADMIN — HYDROMORPHONE HYDROCHLORIDE 4 MILLIGRAM(S): 2 INJECTION INTRAMUSCULAR; INTRAVENOUS; SUBCUTANEOUS at 08:14

## 2022-01-01 RX ADMIN — BUMETANIDE 2 MILLIGRAM(S): 0.25 INJECTION INTRAMUSCULAR; INTRAVENOUS at 17:14

## 2022-01-01 RX ADMIN — HYDROMORPHONE HYDROCHLORIDE 0.75 MILLIGRAM(S): 2 INJECTION INTRAMUSCULAR; INTRAVENOUS; SUBCUTANEOUS at 15:27

## 2022-01-01 RX ADMIN — HYDROMORPHONE HYDROCHLORIDE 0.75 MILLIGRAM(S): 2 INJECTION INTRAMUSCULAR; INTRAVENOUS; SUBCUTANEOUS at 17:16

## 2022-01-01 RX ADMIN — HYDROMORPHONE HYDROCHLORIDE 2 MILLIGRAM(S): 2 INJECTION INTRAMUSCULAR; INTRAVENOUS; SUBCUTANEOUS at 05:10

## 2022-01-01 RX ADMIN — HYDROMORPHONE HYDROCHLORIDE 6 MILLIGRAM(S): 2 INJECTION INTRAMUSCULAR; INTRAVENOUS; SUBCUTANEOUS at 16:59

## 2022-01-01 RX ADMIN — POLYETHYLENE GLYCOL 3350 17 GRAM(S): 17 POWDER, FOR SOLUTION ORAL at 18:28

## 2022-01-01 RX ADMIN — SODIUM CHLORIDE 1 GRAM(S): 9 INJECTION INTRAMUSCULAR; INTRAVENOUS; SUBCUTANEOUS at 06:29

## 2022-01-01 RX ADMIN — ENOXAPARIN SODIUM 40 MILLIGRAM(S): 100 INJECTION SUBCUTANEOUS at 05:41

## 2022-01-01 RX ADMIN — POLYETHYLENE GLYCOL 3350 17 GRAM(S): 17 POWDER, FOR SOLUTION ORAL at 09:05

## 2022-01-01 RX ADMIN — HYDROMORPHONE HYDROCHLORIDE 1 MILLIGRAM(S): 2 INJECTION INTRAMUSCULAR; INTRAVENOUS; SUBCUTANEOUS at 09:51

## 2022-01-01 RX ADMIN — BUMETANIDE 2 MILLIGRAM(S): 0.25 INJECTION INTRAMUSCULAR; INTRAVENOUS at 17:17

## 2022-01-01 RX ADMIN — HYDROMORPHONE HYDROCHLORIDE 1 MILLIGRAM(S): 2 INJECTION INTRAMUSCULAR; INTRAVENOUS; SUBCUTANEOUS at 18:17

## 2022-01-01 RX ADMIN — POLYETHYLENE GLYCOL 3350 17 GRAM(S): 17 POWDER, FOR SOLUTION ORAL at 17:34

## 2022-01-01 RX ADMIN — HEPARIN SODIUM 5000 UNIT(S): 5000 INJECTION INTRAVENOUS; SUBCUTANEOUS at 09:36

## 2022-01-01 RX ADMIN — MORPHINE SULFATE 60 MILLIGRAM(S): 50 CAPSULE, EXTENDED RELEASE ORAL at 15:00

## 2022-01-01 RX ADMIN — HYDROMORPHONE HYDROCHLORIDE 2 MILLIGRAM(S): 2 INJECTION INTRAMUSCULAR; INTRAVENOUS; SUBCUTANEOUS at 12:52

## 2022-01-01 RX ADMIN — SIMETHICONE 80 MILLIGRAM(S): 80 TABLET, CHEWABLE ORAL at 18:31

## 2022-01-01 RX ADMIN — METHOCARBAMOL 1500 MILLIGRAM(S): 500 TABLET, FILM COATED ORAL at 12:48

## 2022-01-01 RX ADMIN — LACTULOSE 10 GRAM(S): 10 SOLUTION ORAL at 22:36

## 2022-01-01 RX ADMIN — MORPHINE SULFATE 60 MILLIGRAM(S): 50 CAPSULE, EXTENDED RELEASE ORAL at 14:20

## 2022-01-01 RX ADMIN — SIMETHICONE 80 MILLIGRAM(S): 80 TABLET, CHEWABLE ORAL at 16:03

## 2022-01-01 RX ADMIN — SIMETHICONE 80 MILLIGRAM(S): 80 TABLET, CHEWABLE ORAL at 05:32

## 2022-01-01 RX ADMIN — HEPARIN SODIUM 5000 UNIT(S): 5000 INJECTION INTRAVENOUS; SUBCUTANEOUS at 18:53

## 2022-01-01 RX ADMIN — HYDROMORPHONE HYDROCHLORIDE 1 MILLIGRAM(S): 2 INJECTION INTRAMUSCULAR; INTRAVENOUS; SUBCUTANEOUS at 09:30

## 2022-01-01 RX ADMIN — LACTULOSE 20 GRAM(S): 10 SOLUTION ORAL at 18:28

## 2022-01-01 RX ADMIN — BUMETANIDE 2 MILLIGRAM(S): 0.25 INJECTION INTRAMUSCULAR; INTRAVENOUS at 18:17

## 2022-01-01 RX ADMIN — BUMETANIDE 2 MILLIGRAM(S): 0.25 INJECTION INTRAMUSCULAR; INTRAVENOUS at 05:33

## 2022-01-01 RX ADMIN — HYDROMORPHONE HYDROCHLORIDE 0.5 MILLIGRAM(S): 2 INJECTION INTRAMUSCULAR; INTRAVENOUS; SUBCUTANEOUS at 12:35

## 2022-01-01 RX ADMIN — HYDROMORPHONE HYDROCHLORIDE 1 MILLIGRAM(S): 2 INJECTION INTRAMUSCULAR; INTRAVENOUS; SUBCUTANEOUS at 20:49

## 2022-01-01 RX ADMIN — ENOXAPARIN SODIUM 40 MILLIGRAM(S): 100 INJECTION SUBCUTANEOUS at 17:16

## 2022-01-01 RX ADMIN — Medication 0.25 MILLIGRAM(S): at 03:02

## 2022-01-01 RX ADMIN — HEPARIN SODIUM 5000 UNIT(S): 5000 INJECTION INTRAVENOUS; SUBCUTANEOUS at 02:04

## 2022-01-01 RX ADMIN — HYDROMORPHONE HYDROCHLORIDE 4 MILLIGRAM(S): 2 INJECTION INTRAMUSCULAR; INTRAVENOUS; SUBCUTANEOUS at 02:01

## 2022-01-01 RX ADMIN — HYDROMORPHONE HYDROCHLORIDE 2 MILLIGRAM(S): 2 INJECTION INTRAMUSCULAR; INTRAVENOUS; SUBCUTANEOUS at 17:03

## 2022-01-01 RX ADMIN — HYDROMORPHONE HYDROCHLORIDE 6 MILLIGRAM(S): 2 INJECTION INTRAMUSCULAR; INTRAVENOUS; SUBCUTANEOUS at 12:02

## 2022-01-01 RX ADMIN — HYDROMORPHONE HYDROCHLORIDE 0.5 MILLIGRAM(S): 2 INJECTION INTRAMUSCULAR; INTRAVENOUS; SUBCUTANEOUS at 02:00

## 2022-01-01 RX ADMIN — MORPHINE SULFATE 6 MILLIGRAM(S): 50 CAPSULE, EXTENDED RELEASE ORAL at 05:00

## 2022-01-01 RX ADMIN — MORPHINE SULFATE 60 MILLIGRAM(S): 50 CAPSULE, EXTENDED RELEASE ORAL at 06:29

## 2022-01-01 RX ADMIN — HEPARIN SODIUM 5000 UNIT(S): 5000 INJECTION INTRAVENOUS; SUBCUTANEOUS at 06:45

## 2022-01-01 RX ADMIN — HYDROMORPHONE HYDROCHLORIDE 4 MILLIGRAM(S): 2 INJECTION INTRAMUSCULAR; INTRAVENOUS; SUBCUTANEOUS at 13:00

## 2022-01-01 RX ADMIN — HYDROMORPHONE HYDROCHLORIDE 1.5 MILLIGRAM(S): 2 INJECTION INTRAMUSCULAR; INTRAVENOUS; SUBCUTANEOUS at 00:40

## 2022-01-01 RX ADMIN — ENOXAPARIN SODIUM 40 MILLIGRAM(S): 100 INJECTION SUBCUTANEOUS at 21:15

## 2022-01-01 RX ADMIN — SIMETHICONE 80 MILLIGRAM(S): 80 TABLET, CHEWABLE ORAL at 21:23

## 2022-01-01 RX ADMIN — HYDROMORPHONE HYDROCHLORIDE 2 MILLIGRAM(S): 2 INJECTION INTRAMUSCULAR; INTRAVENOUS; SUBCUTANEOUS at 21:53

## 2022-01-01 RX ADMIN — HEPARIN SODIUM 5000 UNIT(S): 5000 INJECTION INTRAVENOUS; SUBCUTANEOUS at 01:48

## 2022-01-01 RX ADMIN — HYDROMORPHONE HYDROCHLORIDE 0.5 MILLIGRAM(S): 2 INJECTION INTRAMUSCULAR; INTRAVENOUS; SUBCUTANEOUS at 21:01

## 2022-01-01 RX ADMIN — ENOXAPARIN SODIUM 40 MILLIGRAM(S): 100 INJECTION SUBCUTANEOUS at 15:27

## 2022-01-01 RX ADMIN — HYDROMORPHONE HYDROCHLORIDE 0.5 MILLIGRAM(S): 2 INJECTION INTRAMUSCULAR; INTRAVENOUS; SUBCUTANEOUS at 07:06

## 2022-01-01 RX ADMIN — Medication 1 DROP(S): at 11:51

## 2022-01-01 RX ADMIN — Medication 40 MILLIEQUIVALENT(S): at 16:03

## 2022-01-01 RX ADMIN — HYDROMORPHONE HYDROCHLORIDE 2 MILLIGRAM(S): 2 INJECTION INTRAMUSCULAR; INTRAVENOUS; SUBCUTANEOUS at 23:48

## 2022-01-01 RX ADMIN — Medication 1 TABLET(S): at 12:07

## 2022-01-01 RX ADMIN — HYDROMORPHONE HYDROCHLORIDE 1 MILLIGRAM(S): 2 INJECTION INTRAMUSCULAR; INTRAVENOUS; SUBCUTANEOUS at 01:07

## 2022-01-01 RX ADMIN — PIPERACILLIN AND TAZOBACTAM 25 GRAM(S): 4; .5 INJECTION, POWDER, LYOPHILIZED, FOR SOLUTION INTRAVENOUS at 01:03

## 2022-01-01 RX ADMIN — Medication 0.5 MILLIGRAM(S): at 14:38

## 2022-01-01 RX ADMIN — HYDROMORPHONE HYDROCHLORIDE 6 MILLIGRAM(S): 2 INJECTION INTRAMUSCULAR; INTRAVENOUS; SUBCUTANEOUS at 18:46

## 2022-01-01 RX ADMIN — Medication 25 MILLIGRAM(S): at 00:45

## 2022-01-01 RX ADMIN — Medication 0.5 MILLIGRAM(S): at 00:44

## 2022-01-01 RX ADMIN — SIMETHICONE 80 MILLIGRAM(S): 80 TABLET, CHEWABLE ORAL at 13:22

## 2022-01-01 RX ADMIN — HYDROMORPHONE HYDROCHLORIDE 0.75 MILLIGRAM(S): 2 INJECTION INTRAMUSCULAR; INTRAVENOUS; SUBCUTANEOUS at 13:03

## 2022-01-01 RX ADMIN — HYDROMORPHONE HYDROCHLORIDE 6 MILLIGRAM(S): 2 INJECTION INTRAMUSCULAR; INTRAVENOUS; SUBCUTANEOUS at 01:25

## 2022-01-01 RX ADMIN — HYDROMORPHONE HYDROCHLORIDE 6 MILLIGRAM(S): 2 INJECTION INTRAMUSCULAR; INTRAVENOUS; SUBCUTANEOUS at 20:41

## 2022-01-01 RX ADMIN — HYDROMORPHONE HYDROCHLORIDE 2.5 MILLIGRAM(S): 2 INJECTION INTRAMUSCULAR; INTRAVENOUS; SUBCUTANEOUS at 09:45

## 2022-01-01 RX ADMIN — Medication 40 MILLIEQUIVALENT(S): at 14:05

## 2022-01-01 RX ADMIN — HYDROMORPHONE HYDROCHLORIDE 0.5 MILLIGRAM(S): 2 INJECTION INTRAMUSCULAR; INTRAVENOUS; SUBCUTANEOUS at 13:15

## 2022-01-01 RX ADMIN — Medication 1 DROP(S): at 07:31

## 2022-01-01 RX ADMIN — MORPHINE SULFATE 60 MILLIGRAM(S): 50 CAPSULE, EXTENDED RELEASE ORAL at 14:12

## 2022-01-01 RX ADMIN — HYDROMORPHONE HYDROCHLORIDE 0.75 MILLIGRAM(S): 2 INJECTION INTRAMUSCULAR; INTRAVENOUS; SUBCUTANEOUS at 22:12

## 2022-01-01 RX ADMIN — BUMETANIDE 2 MILLIGRAM(S): 0.25 INJECTION INTRAMUSCULAR; INTRAVENOUS at 16:03

## 2022-01-01 RX ADMIN — HYDROMORPHONE HYDROCHLORIDE 0.5 MILLIGRAM(S): 2 INJECTION INTRAMUSCULAR; INTRAVENOUS; SUBCUTANEOUS at 19:58

## 2022-01-01 RX ADMIN — Medication 1 DROP(S): at 13:17

## 2022-01-01 RX ADMIN — Medication 1 BOTTLE: at 14:58

## 2022-01-01 RX ADMIN — LACTULOSE 20 GRAM(S): 10 SOLUTION ORAL at 20:54

## 2022-01-01 RX ADMIN — HYDROMORPHONE HYDROCHLORIDE 0.5 MILLIGRAM(S): 2 INJECTION INTRAMUSCULAR; INTRAVENOUS; SUBCUTANEOUS at 21:43

## 2022-01-01 RX ADMIN — HYDROMORPHONE HYDROCHLORIDE 0.5 MILLIGRAM(S): 2 INJECTION INTRAMUSCULAR; INTRAVENOUS; SUBCUTANEOUS at 06:51

## 2022-01-01 RX ADMIN — SIMETHICONE 80 MILLIGRAM(S): 80 TABLET, CHEWABLE ORAL at 06:25

## 2022-01-01 RX ADMIN — Medication 3 MILLIGRAM(S): at 21:59

## 2022-01-01 RX ADMIN — HYDROMORPHONE HYDROCHLORIDE 2.5 MILLIGRAM(S): 2 INJECTION INTRAMUSCULAR; INTRAVENOUS; SUBCUTANEOUS at 02:05

## 2022-01-01 RX ADMIN — MORPHINE SULFATE 15 MILLIGRAM(S): 50 CAPSULE, EXTENDED RELEASE ORAL at 01:33

## 2022-01-01 RX ADMIN — LIDOCAINE 1 PATCH: 4 CREAM TOPICAL at 22:40

## 2022-01-01 RX ADMIN — HYDROMORPHONE HYDROCHLORIDE 0.5 MILLIGRAM(S): 2 INJECTION INTRAMUSCULAR; INTRAVENOUS; SUBCUTANEOUS at 15:47

## 2022-01-01 RX ADMIN — POLYETHYLENE GLYCOL 3350 17 GRAM(S): 17 POWDER, FOR SOLUTION ORAL at 05:33

## 2022-01-01 RX ADMIN — Medication 40 MILLIEQUIVALENT(S): at 22:39

## 2022-01-01 RX ADMIN — MORPHINE SULFATE 30 MILLIGRAM(S): 50 CAPSULE, EXTENDED RELEASE ORAL at 15:33

## 2022-01-01 RX ADMIN — POLYETHYLENE GLYCOL 3350 17 GRAM(S): 17 POWDER, FOR SOLUTION ORAL at 18:17

## 2022-01-01 RX ADMIN — HYDROMORPHONE HYDROCHLORIDE 2 MILLIGRAM(S): 2 INJECTION INTRAMUSCULAR; INTRAVENOUS; SUBCUTANEOUS at 12:42

## 2022-01-01 RX ADMIN — HYDROMORPHONE HYDROCHLORIDE 0.5 MILLIGRAM(S): 2 INJECTION INTRAMUSCULAR; INTRAVENOUS; SUBCUTANEOUS at 17:00

## 2022-01-01 RX ADMIN — HYDROMORPHONE HYDROCHLORIDE 6 MILLIGRAM(S): 2 INJECTION INTRAMUSCULAR; INTRAVENOUS; SUBCUTANEOUS at 08:10

## 2022-01-01 RX ADMIN — HYDROMORPHONE HYDROCHLORIDE 6 MILLIGRAM(S): 2 INJECTION INTRAMUSCULAR; INTRAVENOUS; SUBCUTANEOUS at 09:37

## 2022-01-01 RX ADMIN — MORPHINE SULFATE 60 MILLIGRAM(S): 50 CAPSULE, EXTENDED RELEASE ORAL at 21:54

## 2022-01-01 RX ADMIN — SIMETHICONE 80 MILLIGRAM(S): 80 TABLET, CHEWABLE ORAL at 09:13

## 2022-01-01 RX ADMIN — LACTULOSE 20 GRAM(S): 10 SOLUTION ORAL at 17:41

## 2022-01-01 RX ADMIN — Medication 1 DROP(S): at 23:03

## 2022-01-01 RX ADMIN — HYDROMORPHONE HYDROCHLORIDE 4 MILLIGRAM(S): 2 INJECTION INTRAMUSCULAR; INTRAVENOUS; SUBCUTANEOUS at 12:00

## 2022-01-01 RX ADMIN — Medication 1 TABLET(S): at 13:30

## 2022-01-01 RX ADMIN — MORPHINE SULFATE 60 MILLIGRAM(S): 50 CAPSULE, EXTENDED RELEASE ORAL at 06:48

## 2022-01-01 RX ADMIN — MORPHINE SULFATE 60 MILLIGRAM(S): 50 CAPSULE, EXTENDED RELEASE ORAL at 23:38

## 2022-01-01 RX ADMIN — HEPARIN SODIUM 5000 UNIT(S): 5000 INJECTION INTRAVENOUS; SUBCUTANEOUS at 18:34

## 2022-01-01 RX ADMIN — PIPERACILLIN AND TAZOBACTAM 25 GRAM(S): 4; .5 INJECTION, POWDER, LYOPHILIZED, FOR SOLUTION INTRAVENOUS at 06:06

## 2022-01-01 RX ADMIN — HEPARIN SODIUM 5000 UNIT(S): 5000 INJECTION INTRAVENOUS; SUBCUTANEOUS at 01:50

## 2022-01-01 RX ADMIN — HYDROMORPHONE HYDROCHLORIDE 3.5 MILLIGRAM(S): 2 INJECTION INTRAMUSCULAR; INTRAVENOUS; SUBCUTANEOUS at 00:21

## 2022-01-01 RX ADMIN — MORPHINE SULFATE 60 MILLIGRAM(S): 50 CAPSULE, EXTENDED RELEASE ORAL at 06:34

## 2022-01-01 RX ADMIN — HYDROMORPHONE HYDROCHLORIDE 6 MILLIGRAM(S): 2 INJECTION INTRAMUSCULAR; INTRAVENOUS; SUBCUTANEOUS at 04:33

## 2022-01-01 RX ADMIN — MORPHINE SULFATE 60 MILLIGRAM(S): 50 CAPSULE, EXTENDED RELEASE ORAL at 23:14

## 2022-01-01 RX ADMIN — MORPHINE SULFATE 60 MILLIGRAM(S): 50 CAPSULE, EXTENDED RELEASE ORAL at 07:01

## 2022-01-01 RX ADMIN — HYDROMORPHONE HYDROCHLORIDE 4 MILLIGRAM(S): 2 INJECTION INTRAMUSCULAR; INTRAVENOUS; SUBCUTANEOUS at 11:05

## 2022-01-01 RX ADMIN — HYDROMORPHONE HYDROCHLORIDE 1 MILLIGRAM(S): 2 INJECTION INTRAMUSCULAR; INTRAVENOUS; SUBCUTANEOUS at 20:30

## 2022-01-01 RX ADMIN — HYDROMORPHONE HYDROCHLORIDE 0.75 MILLIGRAM(S): 2 INJECTION INTRAMUSCULAR; INTRAVENOUS; SUBCUTANEOUS at 17:23

## 2022-01-01 RX ADMIN — HYDROMORPHONE HYDROCHLORIDE 2.5 MILLIGRAM(S): 2 INJECTION INTRAMUSCULAR; INTRAVENOUS; SUBCUTANEOUS at 17:34

## 2022-01-01 RX ADMIN — HYDROMORPHONE HYDROCHLORIDE 0.75 MILLIGRAM(S): 2 INJECTION INTRAMUSCULAR; INTRAVENOUS; SUBCUTANEOUS at 14:41

## 2022-01-01 RX ADMIN — HYDROMORPHONE HYDROCHLORIDE 6 MILLIGRAM(S): 2 INJECTION INTRAMUSCULAR; INTRAVENOUS; SUBCUTANEOUS at 00:04

## 2022-01-01 RX ADMIN — MORPHINE SULFATE 60 MILLIGRAM(S): 50 CAPSULE, EXTENDED RELEASE ORAL at 01:30

## 2022-01-01 RX ADMIN — LACTULOSE 20 GRAM(S): 10 SOLUTION ORAL at 08:01

## 2022-01-01 RX ADMIN — MORPHINE SULFATE 60 MILLIGRAM(S): 50 CAPSULE, EXTENDED RELEASE ORAL at 23:37

## 2022-01-01 RX ADMIN — HYDROMORPHONE HYDROCHLORIDE 0.5 MILLIGRAM(S): 2 INJECTION INTRAMUSCULAR; INTRAVENOUS; SUBCUTANEOUS at 22:57

## 2022-01-01 RX ADMIN — HYDROMORPHONE HYDROCHLORIDE 2.5 MILLIGRAM(S): 2 INJECTION INTRAMUSCULAR; INTRAVENOUS; SUBCUTANEOUS at 01:50

## 2022-01-01 RX ADMIN — LIDOCAINE 1 PATCH: 4 CREAM TOPICAL at 10:00

## 2022-01-01 RX ADMIN — HYDROMORPHONE HYDROCHLORIDE 6 MILLIGRAM(S): 2 INJECTION INTRAMUSCULAR; INTRAVENOUS; SUBCUTANEOUS at 06:00

## 2022-01-01 RX ADMIN — ROBINUL 0.4 MILLIGRAM(S): 0.2 INJECTION INTRAMUSCULAR; INTRAVENOUS at 23:29

## 2022-01-01 RX ADMIN — HYDROMORPHONE HYDROCHLORIDE 2 MILLIGRAM(S): 2 INJECTION INTRAMUSCULAR; INTRAVENOUS; SUBCUTANEOUS at 14:04

## 2022-01-01 RX ADMIN — HYDROMORPHONE HYDROCHLORIDE 1.5 MILLIGRAM(S): 2 INJECTION INTRAMUSCULAR; INTRAVENOUS; SUBCUTANEOUS at 18:20

## 2022-01-01 RX ADMIN — HYDROMORPHONE HYDROCHLORIDE 6 MILLIGRAM(S): 2 INJECTION INTRAMUSCULAR; INTRAVENOUS; SUBCUTANEOUS at 05:25

## 2022-01-01 RX ADMIN — ENOXAPARIN SODIUM 40 MILLIGRAM(S): 100 INJECTION SUBCUTANEOUS at 05:17

## 2022-01-01 RX ADMIN — PIPERACILLIN AND TAZOBACTAM 25 GRAM(S): 4; .5 INJECTION, POWDER, LYOPHILIZED, FOR SOLUTION INTRAVENOUS at 12:36

## 2022-01-01 RX ADMIN — MORPHINE SULFATE 60 MILLIGRAM(S): 50 CAPSULE, EXTENDED RELEASE ORAL at 08:01

## 2022-01-01 RX ADMIN — Medication 40 MILLIEQUIVALENT(S): at 13:22

## 2022-01-01 RX ADMIN — HYDROMORPHONE HYDROCHLORIDE 1 MILLIGRAM(S): 2 INJECTION INTRAMUSCULAR; INTRAVENOUS; SUBCUTANEOUS at 00:37

## 2022-01-01 RX ADMIN — Medication 40 MILLIEQUIVALENT(S): at 17:28

## 2022-01-01 RX ADMIN — ENOXAPARIN SODIUM 40 MILLIGRAM(S): 100 INJECTION SUBCUTANEOUS at 11:50

## 2022-01-01 RX ADMIN — HYDROMORPHONE HYDROCHLORIDE 6 MILLIGRAM(S): 2 INJECTION INTRAMUSCULAR; INTRAVENOUS; SUBCUTANEOUS at 12:34

## 2022-01-01 RX ADMIN — BUMETANIDE 2 MILLIGRAM(S): 0.25 INJECTION INTRAMUSCULAR; INTRAVENOUS at 06:28

## 2022-01-01 RX ADMIN — HYDROMORPHONE HYDROCHLORIDE 0.75 MILLIGRAM(S): 2 INJECTION INTRAMUSCULAR; INTRAVENOUS; SUBCUTANEOUS at 12:06

## 2022-01-01 RX ADMIN — HYDROMORPHONE HYDROCHLORIDE 1.5 MG/HR: 2 INJECTION INTRAMUSCULAR; INTRAVENOUS; SUBCUTANEOUS at 13:33

## 2022-01-01 RX ADMIN — HYDROMORPHONE HYDROCHLORIDE 1 MILLIGRAM(S): 2 INJECTION INTRAMUSCULAR; INTRAVENOUS; SUBCUTANEOUS at 12:41

## 2022-01-01 RX ADMIN — HYDROMORPHONE HYDROCHLORIDE 2 MILLIGRAM(S): 2 INJECTION INTRAMUSCULAR; INTRAVENOUS; SUBCUTANEOUS at 21:18

## 2022-01-01 RX ADMIN — HYDROMORPHONE HYDROCHLORIDE 1 MILLIGRAM(S): 2 INJECTION INTRAMUSCULAR; INTRAVENOUS; SUBCUTANEOUS at 00:57

## 2022-01-01 RX ADMIN — SIMETHICONE 80 MILLIGRAM(S): 80 TABLET, CHEWABLE ORAL at 05:59

## 2022-01-01 RX ADMIN — Medication 15 MILLILITER(S): at 05:21

## 2022-01-01 RX ADMIN — HYDROMORPHONE HYDROCHLORIDE 6 MILLIGRAM(S): 2 INJECTION INTRAMUSCULAR; INTRAVENOUS; SUBCUTANEOUS at 10:36

## 2022-01-01 RX ADMIN — HYDROMORPHONE HYDROCHLORIDE 4 MILLIGRAM(S): 2 INJECTION INTRAMUSCULAR; INTRAVENOUS; SUBCUTANEOUS at 06:40

## 2022-01-01 RX ADMIN — Medication 40 MILLIEQUIVALENT(S): at 22:14

## 2022-01-01 RX ADMIN — HYDROMORPHONE HYDROCHLORIDE 2 MILLIGRAM(S): 2 INJECTION INTRAMUSCULAR; INTRAVENOUS; SUBCUTANEOUS at 10:32

## 2022-01-01 RX ADMIN — Medication 40 MILLIEQUIVALENT(S): at 13:19

## 2022-01-01 RX ADMIN — HYDROMORPHONE HYDROCHLORIDE 6 MILLIGRAM(S): 2 INJECTION INTRAMUSCULAR; INTRAVENOUS; SUBCUTANEOUS at 17:01

## 2022-01-01 RX ADMIN — MORPHINE SULFATE 60 MILLIGRAM(S): 50 CAPSULE, EXTENDED RELEASE ORAL at 22:24

## 2022-01-01 RX ADMIN — HYDROMORPHONE HYDROCHLORIDE 1.5 MILLIGRAM(S): 2 INJECTION INTRAMUSCULAR; INTRAVENOUS; SUBCUTANEOUS at 20:07

## 2022-01-01 RX ADMIN — ENOXAPARIN SODIUM 40 MILLIGRAM(S): 100 INJECTION SUBCUTANEOUS at 16:05

## 2022-01-01 RX ADMIN — HYDROMORPHONE HYDROCHLORIDE 1 MILLIGRAM(S): 2 INJECTION INTRAMUSCULAR; INTRAVENOUS; SUBCUTANEOUS at 06:42

## 2022-01-01 RX ADMIN — HYDROMORPHONE HYDROCHLORIDE 6 MILLIGRAM(S): 2 INJECTION INTRAMUSCULAR; INTRAVENOUS; SUBCUTANEOUS at 13:14

## 2022-01-01 RX ADMIN — HYDROMORPHONE HYDROCHLORIDE 0.5 MILLIGRAM(S): 2 INJECTION INTRAMUSCULAR; INTRAVENOUS; SUBCUTANEOUS at 22:10

## 2022-01-01 RX ADMIN — HYDROMORPHONE HYDROCHLORIDE 0.75 MILLIGRAM(S): 2 INJECTION INTRAMUSCULAR; INTRAVENOUS; SUBCUTANEOUS at 16:20

## 2022-01-01 RX ADMIN — SIMETHICONE 80 MILLIGRAM(S): 80 TABLET, CHEWABLE ORAL at 15:34

## 2022-01-01 RX ADMIN — BUMETANIDE 2 MILLIGRAM(S): 0.25 INJECTION INTRAMUSCULAR; INTRAVENOUS at 22:15

## 2022-01-01 RX ADMIN — MORPHINE SULFATE 45 MILLIGRAM(S): 50 CAPSULE, EXTENDED RELEASE ORAL at 16:20

## 2022-01-01 RX ADMIN — HYDROMORPHONE HYDROCHLORIDE 0.75 MILLIGRAM(S): 2 INJECTION INTRAMUSCULAR; INTRAVENOUS; SUBCUTANEOUS at 03:13

## 2022-01-01 RX ADMIN — Medication 0.5 MILLIGRAM(S): at 07:50

## 2022-01-01 RX ADMIN — MORPHINE SULFATE 60 MILLIGRAM(S): 50 CAPSULE, EXTENDED RELEASE ORAL at 20:53

## 2022-01-01 RX ADMIN — MORPHINE SULFATE 60 MILLIGRAM(S): 50 CAPSULE, EXTENDED RELEASE ORAL at 21:18

## 2022-01-01 RX ADMIN — HYDROMORPHONE HYDROCHLORIDE 2 MILLIGRAM(S): 2 INJECTION INTRAMUSCULAR; INTRAVENOUS; SUBCUTANEOUS at 04:50

## 2022-01-01 RX ADMIN — POLYETHYLENE GLYCOL 3350 17 GRAM(S): 17 POWDER, FOR SOLUTION ORAL at 17:17

## 2022-01-01 RX ADMIN — Medication 1 DROP(S): at 05:22

## 2022-01-01 RX ADMIN — HYDROMORPHONE HYDROCHLORIDE 1.5 MILLIGRAM(S): 2 INJECTION INTRAMUSCULAR; INTRAVENOUS; SUBCUTANEOUS at 11:50

## 2022-01-01 RX ADMIN — ENOXAPARIN SODIUM 40 MILLIGRAM(S): 100 INJECTION SUBCUTANEOUS at 06:00

## 2022-01-01 RX ADMIN — Medication 40 MILLIEQUIVALENT(S): at 08:41

## 2022-01-01 RX ADMIN — HYDROMORPHONE HYDROCHLORIDE 0.75 MILLIGRAM(S): 2 INJECTION INTRAMUSCULAR; INTRAVENOUS; SUBCUTANEOUS at 02:43

## 2022-01-01 RX ADMIN — HYDROMORPHONE HYDROCHLORIDE 2 MILLIGRAM(S): 2 INJECTION INTRAMUSCULAR; INTRAVENOUS; SUBCUTANEOUS at 10:56

## 2022-01-01 RX ADMIN — MORPHINE SULFATE 60 MILLIGRAM(S): 50 CAPSULE, EXTENDED RELEASE ORAL at 16:47

## 2022-01-01 RX ADMIN — HYDROMORPHONE HYDROCHLORIDE 4 MILLIGRAM(S): 2 INJECTION INTRAMUSCULAR; INTRAVENOUS; SUBCUTANEOUS at 05:40

## 2022-01-01 RX ADMIN — ENOXAPARIN SODIUM 40 MILLIGRAM(S): 100 INJECTION SUBCUTANEOUS at 07:31

## 2022-01-01 RX ADMIN — HYDROMORPHONE HYDROCHLORIDE 0.75 MILLIGRAM(S): 2 INJECTION INTRAMUSCULAR; INTRAVENOUS; SUBCUTANEOUS at 01:15

## 2022-01-01 RX ADMIN — Medication 100 MILLIEQUIVALENT(S): at 22:14

## 2022-01-01 RX ADMIN — HYDROMORPHONE HYDROCHLORIDE 0.75 MILLIGRAM(S): 2 INJECTION INTRAMUSCULAR; INTRAVENOUS; SUBCUTANEOUS at 21:36

## 2022-01-01 RX ADMIN — HYDROMORPHONE HYDROCHLORIDE 2.5 MILLIGRAM(S): 2 INJECTION INTRAMUSCULAR; INTRAVENOUS; SUBCUTANEOUS at 04:00

## 2022-01-01 RX ADMIN — LACTULOSE 20 GRAM(S): 10 SOLUTION ORAL at 14:39

## 2022-01-01 RX ADMIN — HYDROMORPHONE HYDROCHLORIDE 1 MILLIGRAM(S): 2 INJECTION INTRAMUSCULAR; INTRAVENOUS; SUBCUTANEOUS at 15:46

## 2022-01-01 RX ADMIN — ENOXAPARIN SODIUM 40 MILLIGRAM(S): 100 INJECTION SUBCUTANEOUS at 06:37

## 2022-01-01 RX ADMIN — MORPHINE SULFATE 60 MILLIGRAM(S): 50 CAPSULE, EXTENDED RELEASE ORAL at 14:30

## 2022-01-01 RX ADMIN — SODIUM CHLORIDE 1 GRAM(S): 9 INJECTION INTRAMUSCULAR; INTRAVENOUS; SUBCUTANEOUS at 05:38

## 2022-01-01 RX ADMIN — BUMETANIDE 2 MILLIGRAM(S): 0.25 INJECTION INTRAMUSCULAR; INTRAVENOUS at 05:12

## 2022-01-01 RX ADMIN — MORPHINE SULFATE 60 MILLIGRAM(S): 50 CAPSULE, EXTENDED RELEASE ORAL at 09:07

## 2022-01-01 RX ADMIN — HYDROMORPHONE HYDROCHLORIDE 6 MILLIGRAM(S): 2 INJECTION INTRAMUSCULAR; INTRAVENOUS; SUBCUTANEOUS at 16:01

## 2022-01-01 RX ADMIN — Medication 5 MILLIGRAM(S): at 20:54

## 2022-01-01 RX ADMIN — HYDROMORPHONE HYDROCHLORIDE 4 MILLIGRAM(S): 2 INJECTION INTRAMUSCULAR; INTRAVENOUS; SUBCUTANEOUS at 15:01

## 2022-01-01 RX ADMIN — Medication 40 MILLIEQUIVALENT(S): at 22:51

## 2022-01-01 RX ADMIN — HYDROMORPHONE HYDROCHLORIDE 6 MILLIGRAM(S): 2 INJECTION INTRAMUSCULAR; INTRAVENOUS; SUBCUTANEOUS at 04:32

## 2022-01-01 RX ADMIN — PIPERACILLIN AND TAZOBACTAM 25 GRAM(S): 4; .5 INJECTION, POWDER, LYOPHILIZED, FOR SOLUTION INTRAVENOUS at 00:43

## 2022-01-01 RX ADMIN — HEPARIN SODIUM 5000 UNIT(S): 5000 INJECTION INTRAVENOUS; SUBCUTANEOUS at 02:00

## 2022-01-01 RX ADMIN — HYDROMORPHONE HYDROCHLORIDE 2 MILLIGRAM(S): 2 INJECTION INTRAMUSCULAR; INTRAVENOUS; SUBCUTANEOUS at 07:29

## 2022-01-01 RX ADMIN — HYDROMORPHONE HYDROCHLORIDE 1 MILLIGRAM(S): 2 INJECTION INTRAMUSCULAR; INTRAVENOUS; SUBCUTANEOUS at 03:40

## 2022-01-01 RX ADMIN — Medication 0.25 MILLIGRAM(S): at 00:00

## 2022-01-01 RX ADMIN — HYDROMORPHONE HYDROCHLORIDE 1 MILLIGRAM(S): 2 INJECTION INTRAMUSCULAR; INTRAVENOUS; SUBCUTANEOUS at 11:39

## 2022-01-01 RX ADMIN — Medication 40 MILLIEQUIVALENT(S): at 01:45

## 2022-01-01 RX ADMIN — Medication 1 DROP(S): at 11:50

## 2022-01-01 RX ADMIN — HYDROMORPHONE HYDROCHLORIDE 6 MILLIGRAM(S): 2 INJECTION INTRAMUSCULAR; INTRAVENOUS; SUBCUTANEOUS at 20:04

## 2022-01-01 RX ADMIN — HYDROMORPHONE HYDROCHLORIDE 2.5 MILLIGRAM(S): 2 INJECTION INTRAMUSCULAR; INTRAVENOUS; SUBCUTANEOUS at 09:25

## 2022-01-01 RX ADMIN — HYDROMORPHONE HYDROCHLORIDE 2 MILLIGRAM(S): 2 INJECTION INTRAMUSCULAR; INTRAVENOUS; SUBCUTANEOUS at 21:50

## 2022-01-01 RX ADMIN — HYDROMORPHONE HYDROCHLORIDE 0.75 MILLIGRAM(S): 2 INJECTION INTRAMUSCULAR; INTRAVENOUS; SUBCUTANEOUS at 00:30

## 2022-01-01 RX ADMIN — HYDROMORPHONE HYDROCHLORIDE 6 MILLIGRAM(S): 2 INJECTION INTRAMUSCULAR; INTRAVENOUS; SUBCUTANEOUS at 20:11

## 2022-01-01 RX ADMIN — METHOCARBAMOL 750 MILLIGRAM(S): 500 TABLET, FILM COATED ORAL at 22:28

## 2022-01-01 RX ADMIN — HYDROMORPHONE HYDROCHLORIDE 1 MILLIGRAM(S): 2 INJECTION INTRAMUSCULAR; INTRAVENOUS; SUBCUTANEOUS at 17:10

## 2022-01-01 RX ADMIN — BUMETANIDE 2 MILLIGRAM(S): 0.25 INJECTION INTRAMUSCULAR; INTRAVENOUS at 05:32

## 2022-01-01 RX ADMIN — LACTULOSE 20 GRAM(S): 10 SOLUTION ORAL at 11:20

## 2022-01-01 RX ADMIN — HYDROMORPHONE HYDROCHLORIDE 1 MILLIGRAM(S): 2 INJECTION INTRAMUSCULAR; INTRAVENOUS; SUBCUTANEOUS at 04:03

## 2022-01-01 RX ADMIN — MORPHINE SULFATE 60 MILLIGRAM(S): 50 CAPSULE, EXTENDED RELEASE ORAL at 16:00

## 2022-01-01 RX ADMIN — HYDROMORPHONE HYDROCHLORIDE 0.75 MILLIGRAM(S): 2 INJECTION INTRAMUSCULAR; INTRAVENOUS; SUBCUTANEOUS at 15:46

## 2022-01-01 RX ADMIN — METHOCARBAMOL 750 MILLIGRAM(S): 500 TABLET, FILM COATED ORAL at 01:45

## 2022-01-01 RX ADMIN — MAGNESIUM OXIDE 400 MG ORAL TABLET 400 MILLIGRAM(S): 241.3 TABLET ORAL at 08:00

## 2022-01-01 RX ADMIN — HYDROMORPHONE HYDROCHLORIDE 2 MILLIGRAM(S): 2 INJECTION INTRAMUSCULAR; INTRAVENOUS; SUBCUTANEOUS at 22:50

## 2022-01-01 RX ADMIN — HYDROMORPHONE HYDROCHLORIDE 0.5 MILLIGRAM(S): 2 INJECTION INTRAMUSCULAR; INTRAVENOUS; SUBCUTANEOUS at 20:00

## 2022-01-01 RX ADMIN — BUMETANIDE 2 MILLIGRAM(S): 0.25 INJECTION INTRAMUSCULAR; INTRAVENOUS at 17:33

## 2022-01-01 RX ADMIN — MORPHINE SULFATE 45 MILLIGRAM(S): 50 CAPSULE, EXTENDED RELEASE ORAL at 21:59

## 2022-01-01 RX ADMIN — Medication 0.5 MILLIGRAM(S): at 22:39

## 2022-01-01 RX ADMIN — HYDROMORPHONE HYDROCHLORIDE 6 MILLIGRAM(S): 2 INJECTION INTRAMUSCULAR; INTRAVENOUS; SUBCUTANEOUS at 18:41

## 2022-01-01 RX ADMIN — MORPHINE SULFATE 60 MILLIGRAM(S): 50 CAPSULE, EXTENDED RELEASE ORAL at 08:09

## 2022-01-01 RX ADMIN — MORPHINE SULFATE 60 MILLIGRAM(S): 50 CAPSULE, EXTENDED RELEASE ORAL at 13:50

## 2022-01-01 RX ADMIN — Medication 25 MILLIGRAM(S): at 03:34

## 2022-01-01 RX ADMIN — ENOXAPARIN SODIUM 40 MILLIGRAM(S): 100 INJECTION SUBCUTANEOUS at 16:29

## 2022-01-01 RX ADMIN — HYDROMORPHONE HYDROCHLORIDE 3.5 MILLIGRAM(S): 2 INJECTION INTRAMUSCULAR; INTRAVENOUS; SUBCUTANEOUS at 21:45

## 2022-01-05 NOTE — ED PROVIDER NOTE - PHYSICAL EXAMINATION
GEN: Awake, AOx3, NAD.  HEENT: NCAT  ---EYES: no scleral icterus, EOMI, PERRLA  CARDIO: RRR. Normal S1/S2, no m/r/g. No JVD.  RESP: Normal respiratory effort  ABD:   --- surgical scar at midline displaced to the right 2/2 large, reducible hernia  --- diffuse very mild TTP  --- mildly distended  : No CVAT.   MSK: No obvious deformity or ROM deficit. significant b/l LE peripheral edema  SKIN: Warm, dry. No rashes.   NEURO: Moves all four extremities spontaneously  PSYCH: Appropriate mood & affect.

## 2022-01-05 NOTE — ED ADULT NURSE REASSESSMENT NOTE - NS ED NURSE REASSESS COMMENT FT1
Report given to ESSU2 RN. Patient A&Ox4, respirations even and unlabored. Patient denies any complaints at this time. Vitals stable. Patient requesting yogurt but educated about NPO status.

## 2022-01-05 NOTE — ED ADULT NURSE NOTE - OBJECTIVE STATEMENT
Patient is a 52 yo female h/x gastric CA sent in from MD office with "stomach blockage". Patient AAOx4, c/o 10/10 abdominal pain, nausea/vomiting, states she is passing gas.

## 2022-01-05 NOTE — ED ADULT NURSE NOTE - NSFALLRSKINDICATORS_ED_ALL_ED
HPI: CCC, URI, Sore Throat


Time Seen by Provider: 18 07:53


Chief Complaint (Nursing): Flu-like Symptoms


Chief Complaint (Provider): Flu-like Symptoms


History Per: Patient


History/Exam Limitations: no limitations


Onset/Duration Of Symptoms: Days (x2)


Current Symptoms Are (Timing): Still Present


Additional Complaint(s): 


 46 year old female presents to the emergency department complaining of 2 days 

of sore throat, cough, headache, and body aches. Patient also reports vomiting 

and bilateral ear pain. No fever. She took Tylenol at 4AM today. Denies any 

associated abdominal pain, diarrhea, chest pain, or shortness of breath. 





PMD: Dr. Trini Hernandez 





Past Medical History


Reviewed: Historical Data, Nursing Documentation, Vital Signs


Vital Signs: 


 Last Vital Signs











Temp  97 F L  18 07:36


 


Pulse  86   18 07:36


 


Resp  18   18 07:52


 


BP  124/72   18 07:36


 


Pulse Ox  97   18 12:41














- Medical History


PMH: Asthma, Fractures (right foot), Hypercholesterolemia, Malignancy (sarcoma 

rt femur), Migraine


   Denies: Chronic Kidney Disease





- Surgical History


Surgical History: Appendectomy, 





- Family History


Family History: States: CAD





- Social History


Current smoker - smoking cessation education provided: No


Alcohol: None


Drugs: Denies





- Immunization History


Hx Tetanus Toxoid Vaccination: Yes


Hx Influenza Vaccination: Yes


Hx Pneumococcal Vaccination: No





- Home Medications


Home Medications: 


 Ambulatory Orders











 Medication  Instructions  Recorded


 


Ibuprofen [Motrin] 600 mg PO TID 7 Days  tab 17


 


Naproxen [Naprosyn] 500 mg PO BID PRN #15 tablet 17


 


Polyethylene Glycol 3350 [Miralax] 1 tbs PO DAILY PRN #1 bottle 17


 


Naproxen [Naprosyn] 500 mg PO BID PRN #15 tablet 18


 


Nitrofurantoin Macrocrystals 100 mg PO BID #13 cap 18





[Macrobid]  














- Allergies


Allergies/Adverse Reactions: 


 Allergies











Allergy/AdvReac Type Severity Reaction Status Date / Time


 


FISH Allergy Intermediate RASH Verified 17 07:07


 


sulfamethoxazole Allergy  RASH Verified 17 07:07





[From Bactrim]     


 


trimethoprim [From Bactrim] Allergy  RASH Verified 17 07:07


 


vancomycin Allergy  RASH Verified 17 07:07














Review of Systems


ROS Statement: Except As Marked, All Systems Reviewed And Found Negative


Constitutional: Positive for: Other (body aches).  Negative for: Fever, Chills


ENT: Positive for: Ear Pain (bilateral), Throat Pain


Cardiovascular: Negative for: Chest Pain


Respiratory: Positive for: Cough.  Negative for: Shortness of Breath


Gastrointestinal: Positive for: Vomiting.  Negative for: Abdominal Pain, 

Diarrhea


Neurological: Positive for: Headache





Physical Exam





- Reviewed


Nursing Documentation Reviewed: Yes


Vital Signs Reviewed: Yes





- Physical Exam


Appears: Positive for: Non-toxic, No Acute Distress


Head Exam: Positive for: ATRAUMATIC, NORMAL INSPECTION, NORMOCEPHALIC


Skin: Positive for: Normal Color, Warm, Dry


Eye Exam: Positive for: EOMI, Normal appearance, PERRL


ENT: Positive for: Normal ENT Inspection, TM Is/Are (normal bilaterally).  

Negative for: Pharyngeal Erythema, Tonsillar Exudate, Tonsillar Swelling


Neck: Positive for: Normal, Painless ROM, Supple


Cardiovascular/Chest: Positive for: Regular Rate, Rhythm.  Negative for: Murmur


Respiratory: Positive for: Normal Breath Sounds.  Negative for: Accessory 

Muscle Use, Wheezing, Respiratory Distress


Gastrointestinal/Abdominal: Positive for: Normal Exam, Bowel Sounds, Soft.  

Negative for: Tenderness, Distended


Extremity: Positive for: Normal ROM.  Negative for: Pedal Edema, Deformity


Neurologic/Psych: Positive for: Alert, Oriented





- Laboratory Results


Result Diagrams: 


 18 08:50





 18 08:50





- ECG


O2 Sat by Pulse Oximetry: 97 (RA)


Pulse Ox Interpretation: Normal





Medical Decision Making


Medical Decision Making: 


Initial Impression: Flu-like symptoms





Time: 8:13


Initial Plan:


--CMP


--CBC w/ differential 


--Urine pregnancy


--Urine dip


--Influenza A B


--Rapid strep test


--Throat Culture


--Urinalysis 


--Chest x-ray


--NS IV 1000 ml at 1000 mls/hr


--Toradol 15 mg IVP


--Zofran 4 mg IV 


--Reevaluation 





Labs reviewed: Urine is significant for bacteria present. Otherwise, blood work 

is grossly normal. Negative for flu and strep.





Time: 10:16


CHEST X-RAY


FINDINGS:


LUNGS:  No active pulmonary disease.


PLEURA:  No significant pleural effusion identified. No pneumothorax apparent.


CARDIOVASCULAR:  Normal.


OSSEOUS STRUCTURES:  No significant abnormalities.


VISUALIZED UPPER ABDOMEN:  Normal.


OTHER FINDINGS:  None.


IMPRESSION:  No interval acute cardiopulmonary disease appreciated.





Time: 12:38


Given 100mg Macrobid x1 in the ER





Clinical Impression: UTI, Influenza-like symptoms


Patient is medically stable for discharge home, and was provided with rx for 

Macrobid and Naproxen. Counseled regarding diagnosis and the need for follow up 

with PMD. There is agreement to discharge plan. Return if symptoms persist or 

worsen.





--------------------------------------------------------------------------------

-----------------


Scribe Attestation: 


Documented by Sowmya Alex, acting as a scribe for Delia Wade MD 





Provider Scribe Attestation:


All medical record entries made by the Scribe were at my direction and 

personally dictated by me. I have reviewed the chart and agree that the record 

accurately reflects my personal performance of the history, physical exam, 

medical decision making, and the department course for this patient. I have 

also personally directed, reviewed, and agree with the discharge instructions 

and disposition. 





Disposition





- Clinical Impression


Clinical Impression: 


 Influenza-like symptoms, UTI (urinary tract infection)








- Patient ED Disposition


Is Patient to be Admitted: No


Counseled Patient/Family Regarding: Studies Performed, Diagnosis, Need For 

Followup, Rx Given





- Disposition


Disposition: Routine/Home


Disposition Time: 12:40


Condition: STABLE


Additional Instructions: 


FOLLOW-UP WITH PMD WITHIN 2 DAYS FOR REEVALUATION. 


Prescriptions: 


Naproxen [Naprosyn] 500 mg PO BID PRN #15 tablet


 PRN Reason: Pain, Moderate (4-7)


Nitrofurantoin Macrocrystals [Macrobid] 100 mg PO BID #13 cap


Instructions:  Urinary Tract Infection in Women (ED), Viral Syndrome (ED)


Forms:  [facility] (English)





- POA


Present On Arrival: None no

## 2022-01-05 NOTE — ED PROVIDER NOTE - ATTENDING CONTRIBUTION TO CARE
I performed a face-to-face evaluation of the patient and performed a history and physical examination. I agree with the history and physical examination.    Metastatic gastric CA (bones), DC'd from here a few days ago w/ plan for EGD and stenting for anastamosis. P/w dysphagia to solids and liquids. Plan: labs. IVF. Admit.

## 2022-01-05 NOTE — ED PROVIDER NOTE - HIV OFFER
Impression: Central retinal vein occlusion of rt eye w/ macular edema: [id number]. Right. Plan: --exam/OCT reveal new-onset CRVO with severe CME
--findings/diagnosis d/w patient in detail --rec initiation of anti-VEGF therapy to maintain vision  
--r/b/a of Avastin for CRVO with CME discussed --pt understands Avastin is considered off-label for CRVO
--pt elects to initiate intravitreal Avastin 1.25 mg
--injection #1 performed w/o complication, overfill discarded
--plan series of 3 monthly injections to stabilize CME
--pt instructed to call immediately with s/s [facility] loss/pain RTC 1 month for Avastin OD 2/3 Previously Declined (within the last year)

## 2022-01-05 NOTE — ED PROVIDER NOTE - CLINICAL SUMMARY MEDICAL DECISION MAKING FREE TEXT BOX
Ms. Flores is a 51F with gastric signet cell carcinoma with metastasis to peritoneum, bone who p/w progressive abd pain, initially planned for OP EGD with stent but sent d/t inability to tolerate her progressive symptoms long enough to make it to the appointment. Currently no evidence of SBO base don symptomatology and pt is HDS but likely requires admission for expedited intervention.  - CBC, CMP, COVID  - IV APAP, Morphine 4mg IV PRN

## 2022-01-05 NOTE — ED ADULT NURSE NOTE - NSIMPLEMENTINTERV_GEN_ALL_ED
Implemented All Universal Safety Interventions:  [place] to call system. Call bell, personal items and telephone within reach. Instruct patient to call for assistance. Room bathroom lighting operational. Non-slip footwear when patient is off stretcher. Physically safe environment: no spills, clutter or unnecessary equipment. Stretcher in lowest position, wheels locked, appropriate side rails in place.

## 2022-01-05 NOTE — ED PROVIDER NOTE - OBJECTIVE STATEMENT
Ms. Flores is a 51F with h/o Signet Call Gastric Ca with metastasis to peritoneum, bone who p/w persistent nausea/abd pain and PO intolerance (now to even small liquid boluses) who is sent in by her OP Gastroenterologist Dr. Grimm for potentially expedited EGD for ?stent of G-J vs other intervention. She intermittent vomits non-bloody, ?bilious fluid. She continues to have BMs and pass flatus. She has no fevers, chills, or other systemic symptoms. she describes the pain as "just there" 8/10 today.

## 2022-01-05 NOTE — ED PROVIDER NOTE - NS ED ROS FT
GEN: No fever, chills, night sweats, weight loss  EYES: No vision changes, irritation, itchiness  ENT: No ear pain, congestion, sore throat  RESP: No cough or trouble breathing  CARDIOVASCULAR: No chest pain or palpitations  GI: as per HPI  :  No change in urine output; no dysuria, hematuria, or discharge  MSK: No joint or muscle pain  SKIN: No rashes  NEURO: No headache; no abnormal movements; no numbness or tingling  Remainder negative, except as per the HPI

## 2022-01-06 NOTE — CONSULT NOTE ADULT - ASSESSMENT
52yo F w/ metastatic gastric adenocarcinoma (mets to bone, peritoneal carcinomatosis), s/p distal gastrectomy with Billroth II reconstruction who presents with inability to tolerate PO. Had recent admission to [facility] 12/2021 with inability to tolerate PO and found to have diffuse osseous mets (bilateral ribs, sternum, C/T spine), large ascites concerning for peritoneal carcinomatosis (paracentesis + adenocarcinoma). Upper GI series showed narrowing at gastrojejunostomy with minimal contrast passing through stomach into efferent loop. Set up for outpatient EGD with stening but due to worsening symptoms of inability to tolerate PO she was recommended to come to the hospital. S/p upper endoscopy 01/06 which showed GJ anastomotic malignant stricture with infiltrative disease and linitis plastic appearance of gastric remnant with patent efferent jejunal limb; duodenal stent was placed across malignant stricture.           Onc Hx:     - Initially found to have gastric adenocarcinoma during prengnancy in 2011; s/p distal gastrectomy with Billroth II reconstruction, T1aN0. No perioperative chemo, only surveillance     - recurrence of cancer in 2021, endoscopy 05/2021 with biopsy of anastamosis demonstrating poorly differentiated adenocarcinoma with signet ring cell features. CT A/P with recurrent disease at gastrojejunal anastomosis with concern for transmural disease and possibly direct extension to the left lobe of liver     - presented to [facility] 12/2021 with inability to tolerate PO and found to have diffuse osseous mets (bilateral ribs, sternum, C/T spine), large ascites concerning for peritoneal carcinomatosis (paracentesis + adenocarcinoma). Upper GI series showed narrowing at gastrojejunostomy with minimal contrast passing through stomach into efferent loop. Set up for outpatient EGD with stening but due to worsening symptoms of inability to tolerate PO she was recommended to come to the hospital.     - on previous hospitalization she expressed desire to not pursue chemotherapy or follow up with Dr. Ibanez outpatient  52yo F w/ metastatic gastric adenocarcinoma (mets to bone, peritoneal carcinomatosis), s/p distal gastrectomy with Billroth II reconstruction who presents with inability to tolerate PO.     Malignant stricture  - recent admission to [facility] 12/2021 with inability to tolerate PO with upper GI series showing narrowing at gastrojejunostomy with minimal contrast passing through stomach into efferent loop. Set up for outpatient EGD with stenting but due to worsening symptoms of inability to tolerate PO she was recommended to come to the hospital  - s/p upper endoscopy 01/06 which showed GJ anastomotic malignant stricture with infiltrative disease and linitis plastic appearance of gastric remnant with patent efferent jejunal limb; duodenal stent was placed across malignant stricture  - supportive care, advance PO per GI/primary team     Metastatic gastric cancer  - Initially found to have gastric adenocarcinoma during prengnancy in 2011; s/p distal gastrectomy with Billroth II reconstruction, T1aN0. No perioperative chemo, only surveillance   - recurrence of cancer in 2021, endoscopy 05/2021 with biopsy of anastamosis demonstrating poorly differentiated adenocarcinoma with signet ring cell features. CT A/P with recurrent disease at gastrojejunal anastomosis with concern for transmural disease and possibly direct extension to the left lobe of liver   - presented to [facility] 12/2021 with inability to tolerate PO and found to have diffuse osseous mets (bilateral ribs, sternum, C/T spine), large ascites concerning for peritoneal carcinomatosis (paracentesis + adenocarcinoma). Upper GI series showed narrowing at gastrojejunostomy with minimal contrast passing through stomach into efferent loop.   - on previous hospitalization she expressed desire to not pursue chemotherapy or follow up with Dr. Shamar ortiz. Reaffirmed same wishes today  - no further workup/treatment from onc standpoint  - patient can follow up with Dr. Shamar ortiz on discharge

## 2022-01-06 NOTE — CONSULT NOTE ADULT - SUBJECTIVE AND OBJECTIVE BOX
[facility]  Louise Shaikh, Palliative Care Attending  Contact Info: Page [phone] (including Nights/Weekends), message on Microsoft Teams (Louise Shaikh), or leave  at Palliative Office [phone] (non-urgent)     HPI:  This is a 51 year old female with PMH metastatic gastric cancer (bone metastasis and peritoneal carcinomatosis), prior SBO, multiple bowel resections, and recent hospitalization at [facility] for inability to tolerate PO found to have progression of gastric cancer, course c/b large right pleural effusions s/p thoracentesis and large ascites s/p paracentesis. Pt now presenting with worsening inability to tolerate PO intake, can tolerate small amount of liquids, cannot tolerate food or pills. She states she was planned for EGD w/ stenting on 1/24 w/ Outpt GI Dr. Llanos; however due to her worsening symptoms he recommended she come to the hospital to expedite the EGD. She denies choking or aspiration events, reports the food just comes up and she cannot swallow it or keep it down. She endorses diffuse abdominal pain, chronic, about 8/10. States she was discharged on pain meds from [facility], but never picked them up.  She has intermittent nausea and vomiting, NBNB. Last BM was yesterday, she is passing gas. She feels progressively weak and tired from not eating. Also reports intermittent SOB, especially w/ exertion. She has b/l LE edema, which she states is about the same since her DC from [facility].  Pt denies fever, chills, sick contacts, recent travel, chest pain, dysuria, hematuria melena, hematochezia, diarrhea or constipation.       (06 Jan 2022 01:06)    Patient seen this afternoon s/p EGD and with hospitalist at bedside. Patient was seen at [facility] by palliative team, thus she was familiar with pall team role. Patient reports pain after procedure but relieved with IV dilaudid. She states that she was discharged with PO dilaudid but did not have a chance to  the script. She states she has not eaten over the past month but is hopeful that after EGD, she can tolerate more solid food. She admits to lower extremity edema and discomfort in her left shin. She denies current n/v.     She re-affirmed her sister, Eldon Brady ([phone]). HCP completed 12/30/21 at [facility].   2nd HCP - Darius Flores (spouse) [phone].     She has 2 daughters (16 and 10 years old).     PERTINENT PM/SXH:   Bariatric surg stat-unsp    Gastric cancer    Small bowel obstruction    Perforated bowel    Other abdominal hernia    Obesity (BMI 30-39.9)      History of gastrectomy    History of resection of small bowel      FAMILY HISTORY:  FH: diabetes mellitus (Mother)      ITEMS NOT CHECKED ARE NOT PRESENT    SOCIAL HISTORY:   Significant other/partner[ ]  Children[ x]  Moravian/Spirituality:   Substance hx:  [ ]   Tobacco hx:  [ ]   Alcohol hx: [ ]   Home Opioid hx:  NONE [ ] I-Stop Reference No: [id number]  Living Situation: [ x]Home  [ ]Long term care  [ ]Rehab [ ]Other    ADVANCE DIRECTIVES:    MOLST  [ ]  Living Will  [ ]   DECISION MAKER(s):   [x ] Health Care Proxy(s)  [ ] Surrogate(s)  [ ] Guardian           She re-affirmed her sister, Eldon Brady ([phone]). HCP completed 12/30/21 at [facility].   2nd HCP - Darius Flores (spouse) [phone].     BASELINE (I)ADL(s) (prior to admission): independent of ADLs.   Graves: [x ]Total  [ ] Moderate [ ]Dependent    Allergies    Cipro (Rash)  QUINOLONES (Unknown)    Intolerances    MEDICATIONS  (STANDING):  enoxaparin Injectable 40 milliGRAM(s) SubCutaneous at bedtime  lactated ringers. 1000 milliLiter(s) (50 mL/Hr) IV Continuous <Continuous>    MEDICATIONS  (PRN):  HYDROmorphone  Injectable 0.3 milliGRAM(s) IV Push every 3 hours PRN Moderate Pain (4 - 6)  HYDROmorphone  Injectable 0.5 milliGRAM(s) IV Push every 3 hours PRN Severe Pain (7 - 10)  ondansetron Injectable 4 milliGRAM(s) IV Push every 8 hours PRN Nausea and/or Vomiting    PRESENT SYMPTOMS: [ ]Unable to obtain due to poor mentation   Source if other than patient:  [ ]Family   [ ]Team     Pain: [x ]yes [ ]no  QOL impact - inability to eat   Location -    abdominal pain                 Aggravating factors - positional, laying supine?   Quality -   Radiation -   Timing- intermittent   Severity (0-10 scale):   Minimal acceptable level (0-10 scale):     PAIN AD Score:     [email or web address] (press ctrl +  left click to view)    Dyspnea:                           [ ]Mild [ ]Moderate [ ]Severe  Anxiety:                             [ ]Mild [ ]Moderate [ ]Severe  Fatigue:                             [ ]Mild [ ]Moderate [ ]Severe  Nausea:                             [ ]Mild [ ]Moderate [ ]Severe  Loss of appetite:              [ ]Mild [ ]Moderate [ ]Severe  Constipation:                    [ ]Mild [ ]Moderate [ ]Severe    Other Symptoms: +edema   [x ]All other review of systems negative     Palliative Performance Status Version 2:    80     %    http://npcrc.org/files/news/palliative_performance_scale_ppsv2.pdf  PHYSICAL EXAM:  Vital Signs Last 24 Hrs  T(C): 36.4 (06 Jan 2022 11:05), Max: 37.2 (05 Jan 2022 17:09)  T(F): 97.6 (06 Jan 2022 11:05), Max: 99 (05 Jan 2022 17:09)  HR: 90 (06 Jan 2022 12:05) (90 - 105)  BP: 126/75 (06 Jan 2022 12:05) (125/80 - 137/75)  BP(mean): --  RR: 19 (06 Jan 2022 12:05) (16 - 23)  SpO2: 95% (06 Jan 2022 12:05) (94% - 100%) I&O's Summary    GENERAL:  [x ]Alert  [x ]Oriented x4   [ ]Lethargic  [ ]Cachexia  [ ]Unarousable  [ x]Verbal  [ ]Non-Verbal  Behavioral:   [ ] Anxiety  [ ] Delirium [ ] Agitation [x ] Other  HEENT:  [ x]Normal   [ ]Dry mouth   [ ]ET Tube/Trach  [ ]Oral lesions  PULMONARY:   [ ]Clear [ ]Tachypnea  [ ]Audible excessive secretions   [ ]Rhonchi        [ ]Right [ ]Left [ ]Bilateral  [ ]Crackles        [ ]Right [ ]Left [ ]Bilateral  [ ]Wheezing     [ ]Right [ ]Left [ ]Bilateral   [x ]Diminished breath sounds [ ]right [ ]left [ x]bilateral  CARDIOVASCULAR:    [ ]Regular [ ]Irregular [ x]Tachy  [ ]Linus [ ]Murmur [ ]Other  GASTROINTESTINAL:  [ ]Soft  [ ]Distended   [ ]+BS  [ ]Non tender [ x]Tender  [ ]PEG [ ]OGT/ NGT  Last BM: 1/4/2022  GENITOURINARY:  [ x]Normal [ ] Incontinent   [ ]Oliguria/Anuria   [ ]Ansari  MUSCULOSKELETAL:   [ ]Normal   [ ]Weakness  [ ]Bed/Wheelchair bound [ x]Edema  NEUROLOGIC:   [ x]No focal deficits  [ ]Cognitive impairment  [ ]Dysphagia [ ]Dysarthria [ ]Paresis [ ]Other   SKIN:   [x ]Normal    [ ]Rash  [ ]Pressure ulcer(s)       Present on admission [ ]y [ ]n    CRITICAL CARE:  [ ] Shock Present  [ ]Septic [ ]Cardiogenic [ ]Neurologic [ ]Hypovolemic  [ ]  Vasopressors [ ]  Inotropes   [ ]Respiratory failure present [ ]Mechanical ventilation [ ]Non-invasive ventilatory support [ ]High flow  [ ]Acute  [ ]Chronic [ ]Hypoxic  [ ]Hypercarbic [ ]Other  [ ]Other organ failure     LABS:                        8.8    11.02 )-----------( 365      ( 06 Jan 2022 06:47 )             28.2   01-06    136  |  102  |  5<L>  ----------------------------<  93  4.0   |  22  |  0.69    Ca    9.2      06 Jan 2022 06:47  Phos  3.7     01-06  Mg     1.60     01-06    TPro  5.7<L>  /  Alb  2.8<L>  /  TBili  0.4  /  DBili  x   /  AST  19  /  ALT  15  /  AlkPhos  1905<H>  01-06        RADIOLOGY & ADDITIONAL STUDIES: < from: US Duplex Venous Lower Ext Complete, Bilateral (01.06.22 @ 08:14) >  IMPRESSION:  No evidence of deep venous thrombosis in either lower extremity.    < end of copied text >      PROTEIN CALORIE MALNUTRITION PRESENT: [ ]mild [ ]moderate [ ]severe [ ]underweight [ ]morbid obesity  https://www.andeal.org/vault/0060/web/files/ONC/Table_Clinical%20Characteristics%20to%20Document%20Malnutrition-White%20JV%20et%20al%202012.pdf    Height (cm): 167.6 (01-06-22 @ 09:32), 167.6 (12-21-21 @ 17:21), 167.6 (07-14-21 @ 14:07)  Weight (kg): 90.7 (01-06-22 @ 09:32), 95.3 (12-21-21 @ 17:21), 111.1 (07-14-21 @ 14:07)  BMI (kg/m2): 32.3 (01-06-22 @ 09:32), 33.9 (12-21-21 @ 17:21), 39.6 (07-14-21 @ 14:07)    [ ]PPSV2 < or = to 30% [ ]significant weight loss  [ ]poor nutritional intake  [ ]anasarca      [ ]Artificial Nutrition      REFERRALS:   [ ]Chaplaincy  [ ]Hospice  [ ]Child Life  [ ]Social Work  [ ]Case management [ ]Holistic Therapy

## 2022-01-06 NOTE — CONSULT NOTE ADULT - PROBLEM SELECTOR RECOMMENDATION 3
Patient was diagnosed with gastric cancer in 2011, s/p distal gastrectomy and reconstruction. Found to have recurrence in 2021. She had recent [facility] admission 12/2021, found to have diffuse osseous mets, large ascites. Outpatient onc: Dr. Skinny Ibanez   > CT a/p 12/21/21: 1. Apparent marked dilatation afferent loop likely secondary to recurrent disease at the gastrojejunostomy New large right pleural effusion with adjacent atelectasis. New large ascites with multiple loculations and mesenteric fat infiltration likely secondary to peritoneal carcinomatosis. Predominantly sclerotic but some lytic lesions throughout skeleton suggest advanced metastatic disease. Moderate left hydronephrosis and proximal ureteral dilatation. Large abdominal and pelvic wall hernia containing bowel, mesentery and ascites. No significant change.  > Appreciate heme/onc recs. She re-affirmed that she is not interested in pursuing chemotherapy if it is offered, as she would prefer to pursue more conservative/natural management of her cancer.

## 2022-01-06 NOTE — CONSULT NOTE ADULT - SUBJECTIVE AND OBJECTIVE BOX
CHIEF COMPLAINT:Patient is a 51y old  Female who presents with a chief complaint of Dysphagia, abdominal pain (06 Jan 2022 14:57)      HPI:  51 year old female with metastatic gastric cancer (bone metastasis and peritoneal carcinomatosis), prior SBO, multiple bowel resections, and recent hospitalization at [facility] for inability to tolerate PO found to have progression of gastric cancer, course c/b large right pleural effusions s/p thoracentesis and large ascites s/p paracentesis. Pt now presenting with worsening inability to tolerate PO intake, can tolerate small amount of liquids, cannot tolerate food or pills. She states she was planned for EGD w/ stenting on 1/24 w/ Outpt GI Dr. Llanos; however due to her worsening symptoms he recommended she come to the hospital to expedite the EGD. She denies choking or aspiration events, reports the food just comes up and she cannot swallow it or keep it down. She endorses diffuse abdominal pain, chronic, about 8/10. States she was discharged on pain meds from [facility], but never picked them up.  She has intermittent nausea and vomiting, NBNB. Last BM was yesterday, she is passing gas. She feels progressively weak and tired from not eating. Also reports intermittent SOB, especially w/ exertion. She has b/l LE edema, which she states is about the same since her DC from [facility].  Pt denies fever, chills, sick contacts, recent travel, chest pain, dysuria, hematuria melena, hematochezia, diarrhea or constipation.      Patient underwent EGD with stent placement today by GI. On CXR found to have recurrence of her right side pleural effusion. States she will get SOB on activity but not on rest. Discussed thoracentesis vs pleurX cather placement with the patient.     PAST MEDICAL & SURGICAL HISTORY:  Gastric cancer  5/2011 no adjunt trement    Small bowel obstruction  Exp lap complicated with post op Ishemic bowel /perforation    Perforated bowel    Other abdominal hernia    Obesity (BMI 30-39.9)    History of gastrectomy  distal gastrectomy w/ B2 reconstruction (gastrojejunostomy)    History of resection of small bowel  exlap, SBR, meckel&#x27;s diverticulectomy (7/4/2015); exlap, SBR for necrotic bowel, left in discontinuity, Abthera (7/9/2015); exlap, SB anastomosis, Abthera (7/11/15); exlap, washout, abdominal closure w/ Strattice (7/13/15)        FAMILY HISTORY:  FH: diabetes mellitus (Mother)        SOCIAL HISTORY:  Smoking: [ ] Never Smoked [ ] Former Smoker (__ packs x ___ years) [ ] Current Smoker  (__ packs x ___ years)  Substance Use: [ ] Never Used [ ] Used ____  EtOH Use:  Marital Status: [ ] Single [ ]  [ ]  [ ]   Sexual History:   Occupation:  Recent Travel:  Country of Birth:  Advance Directives:    Allergies    Cipro (Rash)  QUINOLONES (Unknown)    Intolerances        HOME MEDICATIONS:  Home Medications:  multivitamin:   once a day (06 Jan 2022 01:02)      REVIEW OF SYSTEMS:  Constitutional: [x ] negative [ ] fevers [ ] chills [ ] weight loss [ ] weight gain  HEENT: [x ] negative [ ] dry eyes [ ] eye irritation [ ] postnasal drip [ ] nasal congestion  CV: [ x] negative  [ ] chest pain [ ] orthopnea [ ] palpitations [ ] murmur  Resp: [ ] negative [ ] cough [x ] shortness of breath [ ] dyspnea [ ] wheezing [ ] sputum [ ] hemoptysis  GI: [x ] negative [ ] nausea [ ] vomiting [ ] diarrhea [ ] constipation [ ] abd pain [ ] dysphagia   : [x ] negative [ ] dysuria [ ] nocturia [ ] hematuria [ ] increased urinary frequency  Musculoskeletal: [ x] negative [ ] back pain [ ] myalgias [ ] arthralgias [ ] fracture  Skin: [ x] negative [ ] rash [ ] itch  Neurological: [ x] negative [ ] headache [ ] dizziness [ ] syncope [ ] weakness [ ] numbness  Psychiatric: [ x] negative [ ] anxiety [ ] depression  Endocrine: [x ] negative [ ] diabetes [ ] thyroid problem  Hematologic/Lymphatic: [ x] negative [ ] anemia [ ] bleeding problem  Allergic/Immunologic: [x ] negative [ ] itchy eyes [ ] nasal discharge [ ] hives [ ] angioedema  [ ] All other systems negative  [ ] Unable to assess ROS because ________    OBJECTIVE:  ICU Vital Signs Last 24 Hrs  T(C): 37 (06 Jan 2022 14:00), Max: 37.2 (05 Jan 2022 17:09)  T(F): 98.6 (06 Jan 2022 14:00), Max: 99 (05 Jan 2022 17:09)  HR: 99 (06 Jan 2022 14:00) (90 - 105)  BP: 131/73 (06 Jan 2022 14:00) (125/80 - 137/75)  BP(mean): --  ABP: --  ABP(mean): --  RR: 18 (06 Jan 2022 14:00) (16 - 23)  SpO2: 100% (06 Jan 2022 14:00) (94% - 100%)        CAPILLARY BLOOD GLUCOSE          PHYSICAL EXAM:  GENERAL: NAD, well-groomed, well-developed  EYES: EOMI, PERRLA, conjunctiva and sclera clear  ENMT: No tonsillar erythema, exudates, or enlargement; Moist mucous membranes, Good dentition, No lesions  CHEST/LUNG: Decreased BS on the right side   HEART: Regular rate and rhythm; No murmurs, rubs, or gallops  ABDOMEN: Soft, Nontender, Nondistended; Bowel sounds present  VASCULAR:  2+ Peripheral Pulses, No clubbing, cyanosis, or edema  LYMPH: No lymphadenopathy noted  SKIN: No rashes or lesions  NERVOUS SYSTEM:  Alert & Oriented X3, Good concentration     HOSPITAL MEDICATIONS:  Standing Meds:  enoxaparin Injectable 40 milliGRAM(s) SubCutaneous at bedtime  lactated ringers. 1000 milliLiter(s) IV Continuous <Continuous>      PRN Meds:  HYDROmorphone  Injectable 0.3 milliGRAM(s) IV Push every 3 hours PRN  HYDROmorphone  Injectable 0.5 milliGRAM(s) IV Push every 3 hours PRN  ondansetron Injectable 4 milliGRAM(s) IV Push every 8 hours PRN      LABS:    The Labs were reviewed by me   The Radiology was reviewed by me    EKG tracing reviewed by me    01-06    136  |  102  |  5<L>  ----------------------------<  93  4.0   |  22  |  0.69  01-05    137  |  101  |  4<L>  ----------------------------<  88  3.7   |  23  |  0.71    Ca    9.2      06 Jan 2022 06:47  Ca    9.1      05 Jan 2022 18:46  Phos  3.7     01-06  Mg     1.60     01-06    TPro  5.7<L>  /  Alb  2.8<L>  /  TBili  0.4  /  DBili  x   /  AST  19  /  ALT  15  /  AlkPhos  1905<H>  01-06  TPro  6.0  /  Alb  3.4  /  TBili  0.4  /  DBili  x   /  AST  21  /  ALT  21  /  AlkPhos  2169<H>  01-05    Magnesium, Serum: 1.60 mg/dL (01-06-22 @ 06:47)  Magnesium, Serum: 1.60 mg/dL (01-05-22 @ 18:46)    Phosphorus Level, Serum: 3.7 mg/dL (01-06-22 @ 06:47)  Phosphorus Level, Serum: 3.4 mg/dL (01-05-22 @ 18:46)      PT/INR - ( 06 Jan 2022 15:59 )   PT: 15.7 sec;   INR: 1.40 ratio         PTT - ( 06 Jan 2022 15:59 )  PTT:33.4 sec                                        8.8    11.02 )-----------( 365      ( 06 Jan 2022 06:47 )             28.2                         9.6    11.09 )-----------( 425      ( 05 Jan 2022 18:46 )             31.4     CAPILLARY BLOOD GLUCOSE            MICROBIOLOGY:       RADIOLOGY:  [ x] Reviewed and interpreted by me    < from: Xray Chest 1 View- PORTABLE-Urgent (Xray Chest 1 View- PORTABLE-Urgent .) (01.06.22 @ 01:51) >    IMPRESSION:    Moderate to large right pleural effusion without significant change.    < end of copied text >      PULMONARY FUNCTION TESTS:    EKG:

## 2022-01-06 NOTE — H&P ADULT - MUSCULOSKELETAL
ROM intact/no joint swelling/no joint erythema/no joint warmth/calf tenderness details… detailed exam

## 2022-01-06 NOTE — CONSULT NOTE ADULT - ASSESSMENT
48F w/ gastric cancer s/p resection w/ Billroth II reconstruction in 2011, now w/ recent diagnosis of cancer recurrence and w/ efferent limb obstruction.    Impression:  #Efferent limb obstruction  #Recurrent gastric cancer at GJ anastomosis    Recommendations:  - Plan for EGD/EUS today w/ either stenting vs endoscopic gastrojejunostomy.  - Keep NPO.  - F/u post procedure recommendations.  - F/u oncology re: further treatment options.    Gene Verdin  Gastroenterology/Hepatology Fellow  Available via Microsoft Teams    NON-URGENT CONSULTS:  Please email [email or web address]  AT NIGHT AND ON WEEKENDS:  Contact on-call GI fellow via answering service ([phone]) from 5pm-8am and on weekends/holidays  MONDAY-FRIDAY 8AM-5PM:  Pager# [phone] ([facility]) or [phone] ([facility])  GI Phone# [phone] ([facility])

## 2022-01-06 NOTE — H&P ADULT - NEGATIVE NEUROLOGICAL SYMPTOMS
no paresthesias/no generalized seizures/no syncope/no vertigo/no loss of sensation/no difficulty walking/no headache/no loss of consciousness

## 2022-01-06 NOTE — CONSULT NOTE ADULT - SUBJECTIVE AND OBJECTIVE BOX
Hematology Oncology Consult Note    HPI:  This is a 51 year old female with PMH metastatic gastric cancer (bone metastasis and peritoneal carcinomatosis), prior SBO, multiple bowel resections, and recent hospitalization at [facility] for inability to tolerate PO found to have progression of gastric cancer, course c/b large right pleural effusions s/p thoracentesis and large ascites s/p paracentesis. Pt now presenting with worsening inability to tolerate PO intake, can tolerate small amount of liquids, cannot tolerate food or pills. She states she was planned for EGD w/ stenting on 1/24 w/ Outpt GI Dr. Llanos; however due to her worsening symptoms he recommended she come to the hospital to expedite the EGD. She denies choking or aspiration events, reports the food just comes up and she cannot swallow it or keep it down. She endorses diffuse abdominal pain, chronic, about 8/10. States she was discharged on pain meds from [facility], but never picked them up.  She has intermittent nausea and vomiting, NBNB. Last BM was yesterday, she is passing gas. She feels progressively weak and tired from not eating. Also reports intermittent SOB, especially w/ exertion. She has b/l LE edema, which she states is about the same since her DC from [facility].  Pt denies fever, chills, sick contacts, recent travel, chest pain, dysuria, hematuria melena, hematochezia, diarrhea or constipation.       (06 Jan 2022 01:06)      PAST MEDICAL & SURGICAL HISTORY:  Gastric cancer  5/2011 no adjunt trement    Small bowel obstruction  Exp lap complicated with post op Ishemic bowel /perforation    Perforated bowel    Other abdominal hernia    Obesity (BMI 30-39.9)    History of gastrectomy  distal gastrectomy w/ B2 reconstruction (gastrojejunostomy)    History of resection of small bowel  exlap, SBR, meckel&#x27;s diverticulectomy (7/4/2015); exlap, SBR for necrotic bowel, left in discontinuity, Abthera (7/9/2015); exlap, SB anastomosis, Abthera (7/11/15); exlap, washout, abdominal closure w/ Strattice (7/13/15)        FAMILY HISTORY:  FH: diabetes mellitus (Mother)        MEDICATIONS  (STANDING):  enoxaparin Injectable 40 milliGRAM(s) SubCutaneous at bedtime  lactated ringers. 1000 milliLiter(s) (50 mL/Hr) IV Continuous <Continuous>    MEDICATIONS  (PRN):  HYDROmorphone  Injectable 0.3 milliGRAM(s) IV Push every 3 hours PRN Moderate Pain (4 - 6)  HYDROmorphone  Injectable 0.5 milliGRAM(s) IV Push every 3 hours PRN Severe Pain (7 - 10)  ondansetron Injectable 4 milliGRAM(s) IV Push every 8 hours PRN Nausea and/or Vomiting      Allergies    Cipro (Rash)  QUINOLONES (Unknown)    Intolerances        SOCIAL HISTORY: No EtOH, no tobacco    Review of Systems:  General: denies fevers/chills  Respiratory: denies cough, shortness of breath  Cardiovascular: denies chest pain, palpitations  Gastrointestinal: denies nausea, vomiting, abdominal pain, constipation, diarrhea, melena, hematochezia  MSK: denies joint pain or muscle pain  Neuro: denies headache, weakness, or parasthesias  Skin: denies rash, petichiae, echymoses  Psych: denies anxiety or sleep disturbances    Height (cm): 167.6 (01-06 @ 09:32)  Weight (kg): 90.7 (01-06 @ 09:32)  BMI (kg/m2): 32.3 (01-06 @ 09:32)  BSA (m2): 2 (01-06 @ 09:32)    T(F): 97.6 (01-06-22 @ 11:05), Max: 99 (01-05-22 @ 17:09)  HR: 90 (01-06-22 @ 12:05)  BP: 126/75 (01-06-22 @ 12:05)  RR: 19 (01-06-22 @ 12:05)  SpO2: 95% (01-06-22 @ 12:05)  Wt(kg): --    PHYSICAL EXAM:    Constitutional: NAD  Respiratory: CTA b/l, symmetric chest rise, with normal respiratory effort  Cardiovascular: RRR  Gastrointestinal: soft, NTND  Extremities:  no edema  MSK: no obvious abnormalities  Neurological: Grossly intact  Skin: no rash, no echymoses, no petichiae  Psych: normal affect                          8.8    11.02 )-----------( 365      ( 06 Jan 2022 06:47 )             28.2       01-06    136  |  102  |  5<L>  ----------------------------<  93  4.0   |  22  |  0.69    Ca    9.2      06 Jan 2022 06:47  Phos  3.7     01-06  Mg     1.60     01-06    TPro  5.7<L>  /  Alb  2.8<L>  /  TBili  0.4  /  DBili  x   /  AST  19  /  ALT  15  /  AlkPhos  1905<H>  01-06      Magnesium, Serum: 1.60 mg/dL (01-06 @ 06:47)  Phosphorus Level, Serum: 3.7 mg/dL (01-06 @ 06:47)  Magnesium, Serum: 1.60 mg/dL (01-05 @ 18:46)  Phosphorus Level, Serum: 3.4 mg/dL (01-05 @ 18:46)       Hematology Oncology Consult Note    HPI:  This is a 51 year old female with PMH metastatic gastric cancer (bone metastasis and peritoneal carcinomatosis), prior SBO, multiple bowel resections, and recent hospitalization at [facility] for inability to tolerate PO found to have progression of gastric cancer, course c/b large right pleural effusions s/p thoracentesis and large ascites s/p paracentesis. Pt now presenting with worsening inability to tolerate PO intake, can tolerate small amount of liquids, cannot tolerate food or pills. She states she was planned for EGD w/ stenting on 1/24 w/ Outpt GI Dr. Llanos; however due to her worsening symptoms he recommended she come to the hospital to expedite the EGD. She denies choking or aspiration events, reports the food just comes up and she cannot swallow it or keep it down. She endorses diffuse abdominal pain, chronic, about 8/10. States she was discharged on pain meds from [facility], but never picked them up.  She has intermittent nausea and vomiting, NBNB. Last BM was yesterday, she is passing gas. She feels progressively weak and tired from not eating. Also reports intermittent SOB, especially w/ exertion. She has b/l LE edema, which she states is about the same since her DC from [facility].  Pt denies fever, chills, sick contacts, recent travel, chest pain, dysuria, hematuria melena, hematochezia, diarrhea or constipation.       (06 Jan 2022 01:06)      PAST MEDICAL & SURGICAL HISTORY:  Gastric cancer  5/2011 no adjunt trement    Small bowel obstruction  Exp lap complicated with post op Ishemic bowel /perforation    Perforated bowel    Other abdominal hernia    Obesity (BMI 30-39.9)    History of gastrectomy  distal gastrectomy w/ B2 reconstruction (gastrojejunostomy)    History of resection of small bowel  exlap, SBR, meckel&#x27;s diverticulectomy (7/4/2015); exlap, SBR for necrotic bowel, left in discontinuity, Abthera (7/9/2015); exlap, SB anastomosis, Abthera (7/11/15); exlap, washout, abdominal closure w/ Strattice (7/13/15)        FAMILY HISTORY:  FH: diabetes mellitus (Mother)        MEDICATIONS  (STANDING):  enoxaparin Injectable 40 milliGRAM(s) SubCutaneous at bedtime  lactated ringers. 1000 milliLiter(s) (50 mL/Hr) IV Continuous <Continuous>    MEDICATIONS  (PRN):  HYDROmorphone  Injectable 0.3 milliGRAM(s) IV Push every 3 hours PRN Moderate Pain (4 - 6)  HYDROmorphone  Injectable 0.5 milliGRAM(s) IV Push every 3 hours PRN Severe Pain (7 - 10)  ondansetron Injectable 4 milliGRAM(s) IV Push every 8 hours PRN Nausea and/or Vomiting      Allergies    Cipro (Rash)  QUINOLONES (Unknown)    Intolerances        SOCIAL HISTORY: No EtOH, no tobacco    Review of Systems:  General: denies fevers/chills  Respiratory: denies cough, shortness of breath  Cardiovascular: denies chest pain, palpitations  Gastrointestinal: + nausea, vomiting, abd pain   MSK: denies joint pain or muscle pain  Neuro: denies headache, weakness, or parasthesias  Skin: denies rash, petichiae, echymoses  Psych: denies anxiety or sleep disturbances    Height (cm): 167.6 (01-06 @ 09:32)  Weight (kg): 90.7 (01-06 @ 09:32)  BMI (kg/m2): 32.3 (01-06 @ 09:32)  BSA (m2): 2 (01-06 @ 09:32)    T(F): 97.6 (01-06-22 @ 11:05), Max: 99 (01-05-22 @ 17:09)  HR: 90 (01-06-22 @ 12:05)  BP: 126/75 (01-06-22 @ 12:05)  RR: 19 (01-06-22 @ 12:05)  SpO2: 95% (01-06-22 @ 12:05)  Wt(kg): --    PHYSICAL EXAM:    Constitutional: NAD  Respiratory: symmetric chest rise, with normal respiratory effort  Cardiovascular: RRR  Gastrointestinal: distended  MSK: no obvious abnormalities  Neurological: Grossly intact  Skin: no rash, no echymoses, no petichiae  Psych: normal affect                          8.8    11.02 )-----------( 365      ( 06 Jan 2022 06:47 )             28.2       01-06    136  |  102  |  5<L>  ----------------------------<  93  4.0   |  22  |  0.69    Ca    9.2      06 Jan 2022 06:47  Phos  3.7     01-06  Mg     1.60     01-06    TPro  5.7<L>  /  Alb  2.8<L>  /  TBili  0.4  /  DBili  x   /  AST  19  /  ALT  15  /  AlkPhos  1905<H>  01-06      Magnesium, Serum: 1.60 mg/dL (01-06 @ 06:47)  Phosphorus Level, Serum: 3.7 mg/dL (01-06 @ 06:47)  Magnesium, Serum: 1.60 mg/dL (01-05 @ 18:46)  Phosphorus Level, Serum: 3.4 mg/dL (01-05 @ 18:46)

## 2022-01-06 NOTE — CONSULT NOTE ADULT - ATTENDING COMMENTS
Post EGD with stenting of the GJ/efferent limb. Liquids only today.
51 year old female with metastatic gastric cancer (bone metastasis and peritoneal carcinomatosis), prior SBO, multiple bowel resections and POD. Pt also w/ recent R pleff s/p thoracentesis. Now here for Gi stent procedure completed today. Pt noted to have inc effusion on R -recurrence of her malignant pleff. Pt on discussion does not feel her dyspnea is bothering her and not sure if she wants a thoracentesis this admission. She will ambulate tonight and let us know tomorrow if her dyspnea is significant. If troubling then plan for thoracentesis. She is reluctant for a pleurex catheter. Would need outpt pulmonary f/u.
pt known to me from prior outpatient visits.  now with stage IV gastric cancer.  pt clearly states she is not interested in receiving chemotherapy and as such doesn't need to follow with us as outpatient.  She is pursuing her own treatment which I hope will help her.    no plans for inpatient chemo and can be discharged home once medically stable

## 2022-01-06 NOTE — PATIENT PROFILE ADULT - NSTRANSFEREYEGLASSESPAIRS_GEN_A_NUR
Detail Level: Simple
Additional Notes: Patient was prescribed Valtrex by PCP, advised him to continue.
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 I encouraged his wife to seek treatment at her gynecologist as precautionary measures. Will recheck him in one month for further evaluation.
Additional Notes: Patient reports that his psoriasis has been minimal to none since starting chemotherapy, he has no plaques upon examination today. Explained the immunosupressive nature of his chemo is likely the cause of his improved psoriasis.
1 pair

## 2022-01-06 NOTE — CONSULT NOTE ADULT - PROBLEM SELECTOR RECOMMENDATION 9
s/p IV morphine 4mg x1 and IV dilaudid 0.5mg x2.   > Continue IV dilaudid PRNs 0.3mg q3h prn moderate pain and 0.5mg IV q3hr prn severe pain for now. Will plan to transition to dilaudid solution tomorrow once she is able to tolerate PO.   > bowel regimen while on opioids  > Introduced medical cannabis to her to assist with alleviating her symptoms. She will think consider it.   > For continued outpatient palliative care, she can follow up with   Dr. Hazel or Dr. Carreon:  [address] [place] [address], [address]   [place] [address]  [phone]

## 2022-01-06 NOTE — CONSULT NOTE ADULT - ASSESSMENT
51 year old female with metastatic gastric cancer (bone metastasis and peritoneal carcinomatosis), prior SBO, multiple bowel resections, and recent hospitalization at [facility] for inability to tolerate PO found to have progression of gastric cancer, course c/b large right pleural effusions s/p thoracentesis and large ascites s/p paracentesis admitted for EGD with stent placement. Pulmonary consulted for thoracentesis of the right side     Plan   - Patient at this time is minimal symtomatic  - Discussed repeat thoracentesis vs pleurX catheter placement   - Patient states she would like to hold of on the thoracentesis for now and get more time to reassess her respiratory status   - Would prefer to get a pleuraX catheter evaluation as an outpatient   - If patient is dyspneic in the AM, will plan for a bedside thoracentesis tomorrow on . If patient is stable she can be seen as an outpatient for pleurX placement   Prior to discharge:  Please email: [email or web address] to setup an appointment prior to discharge. Include the patient's name, , MRN and contact information in the email. Patient should see Dr. Milton Johnson     Pulmonary/Sleep Clinic  [address] [place] [address]  [phone]    Ottoniel Contreras MD   Pulmonary/Critical Care Fellow PGY-6   [facility] Pager #: [phone]  Spectra #: [id number]

## 2022-01-06 NOTE — CONSULT NOTE ADULT - PROBLEM SELECTOR RECOMMENDATION 2
s/p EGD/EUS on 1/6 showing GJ anastomotic malignant stricture with infiltrative dx; duodenal stent placed   > Advance diet as tolerated    > appreciate GI recs

## 2022-01-06 NOTE — H&P ADULT - NSHPLABSRESULTS_GEN_ALL_CORE
Labs:                        9.6    11.09 )-----------( 425      ( 05 Jan 2022 18:46 )             31.4     01-05    137  |  101  |  4<L>  ----------------------------<  88  3.7   |  23  |  0.71    Ca    9.1      05 Jan 2022 18:46  Phos  3.4     01-05  Mg     1.60     01-05    TPro  6.0  /  Alb  3.4  /  TBili  0.4  /  DBili  x   /  AST  21  /  ALT  21  /  AlkPhos  2169<H>  01-05    PT/INR: PT: 15.3 sec | INR: 1.29 ratio (12-29-21 @ 05:05)  PTT: -- (12-29-21 @ 05:05)  PT/INR: PT: 15.2 sec | INR: 1.28 ratio (12-28-21 @ 04:57)  PTT: 30.5 sec (12-28-21 @ 04:57)  PT/INR: PT: 15.0 sec | INR: 1.26 ratio (12-23-21 @ 07:05)  PTT: 29.7 sec (12-23-21 @ 07:05)  PT/INR: PT: 16.1 sec | INR: 1.36 ratio (12-22-21 @ 15:15)  PTT: 30.2 sec (12-22-21 @ 15:15)    CARDIAC ENZYMES:  Troponin T, High Sensitivity: 11 ng/L (12-21-21 @ 17:57)    Serum Pro-Brain Natriuretic Peptide : 63 pg/mL [0 - 300] (12-21-21 @ 17:57)    Blood Gas Venous:  pH: 7.37 | HCO3: 23 | pCO2: 40 | pO2: 26 | Lactate: 1.3 (12-21-21 @ 17:57)    COVID-19/Full RVP Panel:  NotDetec (01-05-22 @ 18:48)  NotDetec (12-26-21 @ 10:25)  NotDetec (12-21-21 @ 17:56)

## 2022-01-06 NOTE — H&P ADULT - PROBLEM SELECTOR PLAN 5
- Notable b/l LE edema, +calf tenderness  - Given hx of active cancer and recent hospitalization, concern for DVT  - B/L LE duplex ordered

## 2022-01-06 NOTE — CONSULT NOTE ADULT - ASSESSMENT
52yo F w/ metastatic gastric adenocarcinoma (mets to bone, peritoneal carcinomatosis), s/p distal gastrectomy with Billroth II reconstruction who presents with inability to tolerate PO. Had recent admission to [facility] 12/2021 with inability to tolerate PO and found to have diffuse osseous mets (bilateral ribs, sternum, C/T spine), large ascites concerning for peritoneal carcinomatosis (paracentesis + adenocarcinoma). Upper GI series showed narrowing at gastrojejunostomy with minimal contrast passing through stomach into efferent loop. Set up for outpatient EGD with stening but due to worsening symptoms of inability to tolerate PO she was recommended to come to the hospital. S/p upper endoscopy 01/06 which showed GJ anastomotic malignant stricture with infiltrative disease and linitis plastic appearance of gastric remnant with patent efferent jejunal limb; duodenal stent was placed across malignant stricture.  Palliative team consulted for sx management.

## 2022-01-06 NOTE — H&P ADULT - ASSESSMENT
This is a 51 year old female with PMH metastatic gastric cancer (bone metastasis and peritoneal carcinomatosis), prior SBO, multiple bowel resections, and recent hospitalization at [facility] for inability to tolerate PO found to have progression of gastric cancer, course c/b large right pleural effusions s/p thoracentesis and large ascites s/p paracentesis. Pt now presenting with worsening inability to tolerate PO intake, can tolerate small amount of liquids, cannot tolerate food or pills. She states she was planned for EGD w/ stenting on 1/24 w/ Outpt GI Dr. Llanos; however due to her worsening symptoms he recommended she come to the hospital to expedite the EGD. Admitted to medicine for further GI w/u.

## 2022-01-06 NOTE — PROGRESS NOTE ADULT - ASSESSMENT
51 year old female with PMH metastatic gastric cancer (bone metastasis and peritoneal carcinomatosis), prior SBO, multiple bowel resections, and recent hospitalization at [facility] for inability to tolerate PO found to have progression of gastric cancer, course c/b large right pleural effusions s/p thoracentesis and large ascites s/p paracentesis. Pt now presenting with worsening inability to tolerate PO intake, can tolerate small amount of liquids, cannot tolerate food or pills. She states she was planned for EGD w/ stenting on 1/24 w/ Outpt GI Dr. Llanos; however due to her worsening symptoms he recommended she come to the hospital to expedite the EGD. Admitted to medicine for further GI w/u.

## 2022-01-06 NOTE — PATIENT PROFILE ADULT - FALL HARM RISK - UNIVERSAL INTERVENTIONS
Bed in lowest position, wheels locked, appropriate side rails in place/Call bell, personal items and telephone in reach/Instruct patient to call for assistance before getting out of bed or chair/Non-slip footwear when patient is out of bed/[place] to call system/Physically safe environment - no spills, clutter or unnecessary equipment/Purposeful Proactive Rounding/Room/bathroom lighting operational, light cord in reach

## 2022-01-06 NOTE — PROGRESS NOTE ADULT - SUBJECTIVE AND OBJECTIVE BOX
[facility] Division of Hospital Medicine  Pascual Ely MD  Pager #[phone]    Patient is a 51y old  Female who presents with a chief complaint of Dysphagia, abdominal pain (06 Jan 2022 14:03)    SUBJECTIVE / OVERNIGHT EVENTS: No acute events. S/p EGD this AM. Currently comfortable. No significant shortness of breath at rest, reports some recent shortness of breath with exertion. No nausea, vomiting, fever, chills, rashes, or signs of bleeding.     MEDICATIONS  (STANDING):  enoxaparin Injectable 40 milliGRAM(s) SubCutaneous at bedtime  lactated ringers. 1000 milliLiter(s) (50 mL/Hr) IV Continuous <Continuous>    MEDICATIONS  (PRN):  HYDROmorphone  Injectable 0.3 milliGRAM(s) IV Push every 3 hours PRN Moderate Pain (4 - 6)  HYDROmorphone  Injectable 0.5 milliGRAM(s) IV Push every 3 hours PRN Severe Pain (7 - 10)  ondansetron Injectable 4 milliGRAM(s) IV Push every 8 hours PRN Nausea and/or Vomiting    CAPILLARY BLOOD GLUCOSE    I&O's Summary    PHYSICAL EXAM:  Vital Signs Last 24 Hrs  T(C): 36.4 (06 Jan 2022 11:05), Max: 37.2 (05 Jan 2022 17:09)  T(F): 97.6 (06 Jan 2022 11:05), Max: 99 (05 Jan 2022 17:09)  HR: 90 (06 Jan 2022 12:05) (90 - 105)  BP: 126/75 (06 Jan 2022 12:05) (125/80 - 137/75)  BP(mean): --  RR: 19 (06 Jan 2022 12:05) (16 - 23)  SpO2: 95% (06 Jan 2022 12:05) (94% - 100%)    CONSTITUTIONAL: NAD, well-developed  EYES: EOMI; conjunctiva and sclera clear  ENMT: Moist oral mucosa  NECK: no JVD  RESPIRATORY: reduced right basilar breath sounds, no wheezing  CARDIOVASCULAR: Regular rate and rhythm, normal S1 and S2; No lower extremity edema; Peripheral pulses are 2+ bilaterally  ABDOMEN: soft, NT, ND, +BS  PSYCH: affect appropriate  NEUROLOGY: moving all extremities, nonfocal  SKIN: No rashes    LABS:                        8.8    11.02 )-----------( 365      ( 06 Jan 2022 06:47 )             28.2     01-06    136  |  102  |  5<L>  ----------------------------<  93  4.0   |  22  |  0.69    Ca    9.2      06 Jan 2022 06:47  Phos  3.7     01-06  Mg     1.60     01-06    TPro  5.7<L>  /  Alb  2.8<L>  /  TBili  0.4  /  DBili  x   /  AST  19  /  ALT  15  /  AlkPhos  1905<H>  01-06    RADIOLOGY & ADDITIONAL TESTS:    1/6/22 US Duplex LE bilateral  No evidence of deep venous thrombosis in either lower extremity    1/6/22 CXR  Moderate to large right pleural effusion without significant change.    COORDINATION OF CARE:  Care Discussed with Consultants/Other Providers [LOUIS spann, RN]

## 2022-01-06 NOTE — H&P ADULT - HISTORY OF PRESENT ILLNESS
This is a 51 year old female with PMH metastatic gastric cancer (bone metastasis and peritoneal carcinomatosis), prior SBO, multiple bowel resections, and recent hospitalization at [facility] for inability to tolerate PO found to have progression of gastric cancer, course c/b large right pleural effusions s/p thoracentesis and large ascites s/p paracentesis. Pt now presenting with worsening inability to tolerate PO intake, can tolerate small amount of liquids, cannot tolerate food or pills. She states she was planned for EGD w/ stenting on 1/24 w/ Outpt GI Dr. Llanos; however due to her worsening symptoms he recommended she come to the hospital to expedite the EGD. She denies choking or aspiration events, reports the food just comes up and she cannot swallow it or keep it down. She endorses diffuse abdominal pain, chronic, about 8/10. States she was discharged on pain meds from [facility], but never picked them up.  She has intermittent nausea and vomiting, NBNB. Last BM was yesterday, she is passing gas. She feels progressively weak and tired from not eating. Also reports intermittent SOB, especially w/ exertion. She has b/l LE edema, which she states is about the same since her DC from [facility].  Pt denies fever, chills, sick contacts, recent travel, chest pain, dysuria, hematuria melena, hematochezia, diarrhea or constipation.

## 2022-01-06 NOTE — PATIENT PROFILE ADULT - NSTRANSFERBELONGINGSRESP_GEN_A_NUR
----- Message from Jose Mccall DO sent at 2/28/2017  6:19 AM CST -----  infrom pt 24 hour urine protein confirms signif kidney disease ,inform him I look froward to him seeing his nephrologist for further evaluation. Strict blood pressure control is very important   yes

## 2022-01-06 NOTE — H&P ADULT - PROBLEM SELECTOR PLAN 3
- Pt w/ metastatic gastric cancer (to bone, and peritoneal carcinomatosis)  - Palliative care c/s in AM for pain management  - Hx of malignant right pleural effusion, + decreased RLL BS on exam. f/u CXR [ ]

## 2022-01-06 NOTE — CONSULT NOTE ADULT - SUBJECTIVE AND OBJECTIVE BOX
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 Patient unable to tolerate any solids and having more trouble w/ liquids. Feeling very weak and tired. Occasional non-bloody vomiting. No diarrhea, melena, hematochezia.     UGIS 12/29 showed minimal contrast into efferent limb.     PMHX/PSHX:  Bariatric surg stat-unsp    Gastric cancer    Small bowel obstruction    Perforated bowel    Other abdominal hernia    Obesity (BMI 30-39.9)    History of gastrectomy    History of resection of small bowel      Allergies:  Cipro (Rash)      Home Medications: reviewed  Hospital Medications:  enoxaparin Injectable 40 milliGRAM(s) SubCutaneous at bedtime  HYDROmorphone  Injectable 0.3 milliGRAM(s) IV Push every 3 hours PRN  HYDROmorphone  Injectable 0.5 milliGRAM(s) IV Push every 3 hours PRN  lactated ringers. 1000 milliLiter(s) IV Continuous <Continuous>  ondansetron Injectable 4 milliGRAM(s) IV Push every 8 hours PRN      Social History:   Tob: Denies  EtOH: Denies  Illicit Drugs: Denies    Family history:  No pertinent family history in first degree relatives    FH: diabetes mellitus (Mother)      Denies family history of colon cancer/polyps, stomach cancer/polyps, pancreatic cancer/masses, liver cancer/disease, ovarian cancer and endometrial cancer.    ROS:   General:  No  fevers, chills, night sweats, fatigue  Eyes:  Good vision, no reported pain  ENT:  No sore throat, pain, runny nose  CV:  No pain, palpitations  Pulm:  No dyspnea, cough  GI:  See HPI, otherwise negative  :  No  incontinence, nocturia  Muscle:  No pain, weakness  Neuro:  No memory problems  Psych:  No insomnia, mood problems, depression  Endocrine:  No polyuria, polydipsia, cold/heat intolerance  Heme:  No petechiae, ecchymosis, easy bruisability  Skin:  No rash    PHYSICAL EXAM:   Vital Signs:  Vital Signs Last 24 Hrs  T(C): 36.7 (06 Jan 2022 06:08), Max: 37.2 (05 Jan 2022 17:09)  T(F): 98.1 (06 Jan 2022 06:08), Max: 99 (05 Jan 2022 17:09)  HR: 104 (06 Jan 2022 06:08) (91 - 104)  BP: 131/69 (06 Jan 2022 06:08) (127/73 - 138/74)  BP(mean): --  RR: 18 (06 Jan 2022 06:08) (16 - 18)  SpO2: 97% (06 Jan 2022 06:08) (97% - 100%)  Daily Height in cm: 167.64 (05 Jan 2022 17:09)    Daily     GENERAL: no acute distress  NEURO: alert  HEENT: anicteric sclera, no conjunctival pallor appreciated  CHEST: no respiratory distress, no accessory muscle use  CARDIAC: regular rate, rhythm  ABDOMEN: soft, non-tender, non-distended, no rebound or guarding  EXTREMITIES: warm, well perfused, no edema  SKIN: no lesions noted    LABS: reviewed                        8.8    11.02 )-----------( 365      ( 06 Jan 2022 06:47 )             28.2     01-06    136  |  102  |  5<L>  ----------------------------<  93  4.0   |  22  |  0.69    Ca    9.2      06 Jan 2022 06:47  Phos  3.7     01-06  Mg     1.60     01-06    TPro  5.7<L>  /  Alb  2.8<L>  /  TBili  0.4  /  DBili  x   /  AST  19  /  ALT  15  /  AlkPhos  1905<H>  01-06    LIVER FUNCTIONS - ( 06 Jan 2022 06:47 )  Alb: 2.8 g/dL / Pro: 5.7 g/dL / ALK PHOS: 1905 U/L / ALT: 15 U/L / AST: 19 U/L / GGT: x               Diagnostic Studies: see sunrise for full report

## 2022-01-06 NOTE — H&P ADULT - PROBLEM SELECTOR PLAN 2
- Chronic, diffuse. +Passing gas, +Bowel sounds.   - While NPO can c/w IV Tylenol PRN for mild pain.  - Palliative care was following for pain management at [facility]. Will resume prior regimen while inpatient at [facility] of Dilaudid 0.3mg IVP q 3hrs for Mod pain, and 0.5mg IVP q 3hrs for Severe pain.  - Call Palliative care c/s in AM for further recs

## 2022-01-07 NOTE — PROGRESS NOTE ADULT - ASSESSMENT
51 year old female with metastatic gastric cancer (bone metastasis and peritoneal carcinomatosis), prior SBO, multiple bowel resections, and recent hospitalization at [facility] for inability to tolerate PO found to have progression of gastric cancer, course c/b large right pleural effusions s/p thoracentesis and large ascites s/p paracentesis admitted for EGD with stent placement. Pulmonary consulted for thoracentesis of the right side     Plan   - Right side thoracentesis performed today with 2.1 L removed   - Pleural fluid sent for testing   - patient should follow up with Dr. Johnson in the clinic for evaluation for PleurX catheter         Ottoniel Contreras MD   Pulmonary/Critical Care Fellow PGY-6   [facility] Pager #: [phone]  Spectra #: [phone]

## 2022-01-07 NOTE — PROGRESS NOTE ADULT - ASSESSMENT
d/c IV dilaudid 0.3mg q3h prn moderate pain   transition to PO dilaudid solution 2mg q4h prn pain/dyspnea    50yo F w/ metastatic gastric adenocarcinoma (mets to bone, peritoneal carcinomatosis), s/p distal gastrectomy with Billroth II reconstruction who presents with inability to tolerate PO. Had recent admission to [facility] 12/2021 with inability to tolerate PO and found to have diffuse osseous mets (bilateral ribs, sternum, C/T spine), large ascites concerning for peritoneal carcinomatosis (paracentesis + adenocarcinoma). Upper GI series showed narrowing at gastrojejunostomy with minimal contrast passing through stomach into efferent loop. Set up for outpatient EGD with stening but due to worsening symptoms of inability to tolerate PO she was recommended to come to the hospital. S/p upper endoscopy 01/06 which showed GJ anastomotic malignant stricture with infiltrative disease and linitis plastic appearance of gastric remnant with patent efferent jejunal limb; duodenal stent was placed across malignant stricture.  Palliative team consulted for sx management.

## 2022-01-07 NOTE — PROGRESS NOTE ADULT - ASSESSMENT
51 year old female with PMH metastatic gastric cancer (bone metastasis and peritoneal carcinomatosis), prior SBO, multiple bowel resections, and recent hospitalization at [facility] for inability to tolerate PO found to have progression of gastric cancer, course c/b large right pleural effusions s/p thoracentesis and large ascites s/p paracentesis. Pt now presenting with worsening inability to tolerate PO intake, can tolerate small amount of liquids, cannot tolerate food or pills. S/p EGD and stenting per GI.

## 2022-01-07 NOTE — DISCHARGE NOTE PROVIDER - HOSPITAL COURSE
51 year old female with PMH metastatic gastric cancer (bone metastasis and peritoneal carcinomatosis), prior SBO, multiple bowel resections, and recent hospitalization at [facility] for inability to tolerate PO found to have progression of gastric cancer, course c/b large right pleural effusions s/p thoracentesis and large ascites s/p paracentesis. Pt now presenting with worsening inability to tolerate PO intake. Pt stated she was planned for EGD w/ stenting on 1/24 w/ Outpt GI ; however due to her worsening symptoms he recommended she come to the hospital to expedite the EGD. Admitted to medicine for further GI w/u.     Dysphagia.   -GI consulted   -S/p EGD/EUS 1/6: s/p 22mm x 9cm duodenal stent placed across the malignant stricture  -Diet advanced per GI     Abdominal pain.   - Chronic, diffuse. +Passing gas, +Bowel sounds.   - Palliative consulted: assisted with pain control regimen     Gastric cancer.   - Pt w/ metastatic gastric cancer (to bone, and peritoneal carcinomatosis)  - Oncology consulted: pt not interested in DMT  - Palliative consulted: plan for home hospice     Shortness of breath   - Hx of malignant right pleural effusion with previous thoracentesis  - CXR with Moderate to large right pleural effusion  - Pulmonary consulted: S/p thoracentesis on 1/7 ----    Lower extremity edema.   - Notable b/l LE edema, +calf tenderness  - B/L LE duplex negative for DVT     Need for prophylactic measure.   - DVT ppx: Sub q Lovenox.    Pt medically stable for discharge on ____ as per discussion with _____.  Dispo: home with home hospice      51 year old female with PMH metastatic gastric cancer (bone metastasis and peritoneal carcinomatosis), prior SBO, multiple bowel resections, and recent hospitalization at [facility] for inability to tolerate PO found to have progression of gastric cancer, course c/b large right pleural effusions s/p thoracentesis and large ascites s/p paracentesis. Pt now presenting with worsening inability to tolerate PO intake. Pt stated she was planned for EGD w/ stenting on 1/24 w/ Outpt GI ; however due to her worsening symptoms he recommended she come to the hospital to expedite the EGD. Admitted to medicine for further GI w/u.     Dysphagia.   -GI consulted   -S/p EGD/EUS 1/6: s/p 22mm x 9cm duodenal stent placed across the malignant stricture  -Diet advanced per GI     Abdominal pain.   - Chronic, diffuse. +Passing gas, +Bowel sounds.   - Palliative consulted: assisted with pain control regimen     Gastric cancer.   - Pt w/ metastatic gastric cancer (to bone, and peritoneal carcinomatosis)  - Oncology consulted: pt not interested in DMT  - Palliative consulted: plan for home hospice     Shortness of breath   - Hx of malignant right pleural effusion with previous thoracentesis  - CXR with Moderate to large right pleural effusion  - Pulmonary consulted: S/p thoracentesis on 1/7 ----    Lower extremity edema.   - Notable b/l LE edema, +calf tenderness  - B/L LE duplex negative for DVT     Need for prophylactic measure.   - DVT ppx: Sub q Lovenox.    Pt medically stable for discharge on 1/9 as per discussion with Dr Ely  Dispo: home with home hospice      51 year old female with PMH metastatic gastric cancer (bone metastasis and peritoneal carcinomatosis), prior SBO, multiple bowel resections, and recent hospitalization at [facility] for inability to tolerate PO found to have progression of gastric cancer, course c/b large right pleural effusions s/p thoracentesis and large ascites s/p paracentesis. Pt now presenting with worsening inability to tolerate PO intake. Pt stated she was planned for EGD w/ stenting on 1/24 w/ Outpt GI ; however due to her worsening symptoms he recommended she come to the hospital to expedite the EGD. Admitted to medicine for further GI w/u.     Dysphagia.   -GI consulted   -S/p EGD/EUS 1/6: s/p 22mm x 9cm duodenal stent placed across the malignant stricture  -Diet advanced per GI   -No further inpatient workup, outpatient GI followup advised    Abdominal pain.   - Chronic, diffuse. +Passing gas, +Bowel sounds.   - Palliative consulted: assisted with pain control regimen     Gastric cancer.   - Pt w/ metastatic gastric cancer (to bone, and peritoneal carcinomatosis)  - Oncology consulted: pt not interested in DMT  - Palliative consulted: plan for home hospice     Shortness of breath   - Hx of malignant right pleural effusion with previous thoracentesis  - CXR with Moderate to large right pleural effusion  - Pulmonary consulted: S/p thoracentesis on 1/7 by Pulmonary with 2.1L removed. Post procedure CXR with pneumothorax. Pulmonary aware, recommending repeat CXRs. On day of discharge, PA/Lateral XR performed, reviewed by Pulmonary, felt to be stable. Pulmonary advised patient to avoid flying and to have repeat outpatient XR in one week (ordered by pulm) with outpatient PCP and Pulmonary follow up with Dr. Johnson (information provided and Pulmonary emailed to arrange appt). Also outpatient follow up with pulm to consider pleurX catheter    LFT elevation  Patient with elevated Alk phos but with new AST and ALT elevation beginning 1/9/22. LFTs repeated and stable/ slightly lower. Patient requesting discharge 1/9/22, advised to repeat LFTs within one week with PCP to ensure improvement. No clear etiology, no hepatotoxic medications recently received, patient to also follow up with GI.    Lower extremity edema.   - Notable b/l LE edema, +calf tenderness  - B/L LE duplex negative for DVT     Need for prophylactic measure.   - DVT ppx: Sub q Lovenox.    Pt medically stable for discharge on 1/9 as per discussion with Dr Ely  Dispo: home with home hospice

## 2022-01-07 NOTE — PROGRESS NOTE ADULT - ASSESSMENT
48F w/ gastric cancer s/p resection w/ Billroth II reconstruction in 2011, now w/ recent diagnosis of cancer recurrence and w/ efferent limb obstruction. S/p stenting.    Impression:  #Efferent limb obstruction s/p stenting  #Recurrent gastric cancer at GJ anastomosis  #Linitis plastica appearance of stomach    Recommendations:  - Advance diet to pureed today.  - No further plan for endoscopic evaluation/treatment.  - Discussed anatomy of patient's stenting in detail, as well as the fact that entire stomach appears to have infiltrative disease.  - F/u oncology and pall care recs.  - Please call us back as needed.  - Patient's insurance is apparently not accepted by [facility], it appears she will have to f/u outside of our system as needed.     Gene Verdin  Gastroenterology/Hepatology Fellow  Available via [employer] Teams    NON-URGENT CONSULTS:  Please email [email or web address]  AT NIGHT AND ON WEEKENDS:  Contact on-call GI fellow via answering service ([phone]) from 5pm-8am and on weekends/holidays  MONDAY-FRIDAY 8AM-5PM:  Pager# [phone] ([facility]) or [phone] ([facility])  GI Phone# [phone] ([facility])

## 2022-01-07 NOTE — DISCHARGE NOTE PROVIDER - NSFOLLOWUPCLINICSTOKEN_GEN_ALL_ED_FT
[id number]: || ||00\01||False; [id number]: || ||00\01||False;[id number]: || ||00\01||False;[id number]: || ||00\01||False;

## 2022-01-07 NOTE — PROGRESS NOTE ADULT - CONVERSATION DETAILS
Palliative provider met with patient at bedside this morning to introduce hospice services as patient has expressed to multiple providers that she is not interested in pursuing DMT (ie. chemo, surgery). She states that she is interested in natural treatment options, holistic therapy but has not finalized her outpatient plans as of yet. Introduced hospice services to her and explained the philosophy of hospice to the patient. She agreed to referral and understands that if she decides to pursue DMT, she will have to dis-enroll in hospice services.

## 2022-01-07 NOTE — CONSULT NOTE ADULT - REASON FOR ADMISSION
Dysphagia, abdominal pain

## 2022-01-07 NOTE — CHART NOTE - NSCHARTNOTEFT_GEN_A_CORE
s/p right side thoracentesis. CXR post procedure shows a small right apical PTX. Discussed with patient the results. Will need to be placed on supplemental O2 and repeat CXR in the next few hours and in the AM.     Plan   - Repeat CXR at 10pm  - Repeat CXR in the AM  - Continue on supplemental O2   - No need for chest tube at this time     Ottoniel Contreras MD   Pulmonary/Critical Care Fellow PGY-6   [facility] Pager #: [phone]  Spectra #: [phone]

## 2022-01-07 NOTE — DISCHARGE NOTE PROVIDER - CARE PROVIDER_API CALL
Shawn James)  Cardiology; Internal Medicine  [address], [address]  [place] [address]  Phone: [phone]  Fax: [phone]  Follow Up Time:

## 2022-01-07 NOTE — DISCHARGE NOTE PROVIDER - NSDCMRMEDTOKEN_GEN_ALL_CORE_FT
multivitamin:   once a day   HYDROmorphone 2 mg oral tablet: 1 tab(s) orally every 4 hours, As needed, Severe Pain (7 - 10) MDD:6 tabs  multivitamin:   once a day

## 2022-01-07 NOTE — PROGRESS NOTE ADULT - SUBJECTIVE AND OBJECTIVE BOX
ANESTHESIA POSTOP CHECK    51y Female POSTOP DAY 1 S/P EGD    Vital Signs Last 24 Hrs  T(C): 36.7 (07 Jan 2022 06:50), Max: 37 (06 Jan 2022 14:00)  T(F): 98 (07 Jan 2022 06:50), Max: 98.6 (06 Jan 2022 14:00)  HR: 100 (07 Jan 2022 06:50) (90 - 101)  BP: 141/71 (07 Jan 2022 06:50) (125/80 - 141/71)  BP(mean): --  RR: 18 (07 Jan 2022 06:50) (16 - 19)  SpO2: 95% (07 Jan 2022 06:50) (94% - 100%)  I&O's Summary      [X] NO APPARENT ANESTHESIA COMPLICATIONS      Comments:

## 2022-01-07 NOTE — CONSULT NOTE ADULT - SUBJECTIVE AND OBJECTIVE BOX
Patient is a 51y old  Female who presents with a chief complaint of Dysphagia, abdominal pain (07 Jan 2022 18:00)      HPI:  This is a 51 year old female with PMH metastatic gastric cancer (bone metastasis and peritoneal carcinomatosis), prior SBO, multiple bowel resections, and recent hospitalization at [facility] for inability to tolerate PO found to have progression of gastric cancer, course c/b large right pleural effusions s/p thoracentesis and large ascites s/p paracentesis. Pt now presenting with worsening inability to tolerate PO intake, can tolerate small amount of liquids, cannot tolerate food or pills. She states she was planned for EGD w/ stenting on 1/24 w/ Outpt GI Dr. Llanos; however due to her worsening symptoms he recommended she come to the hospital to expedite the EGD. She denies choking or aspiration events, reports the food just comes up and she cannot swallow it or keep it down. She endorses diffuse abdominal pain, chronic, about 8/10. States she was discharged on pain meds from [facility], but never picked them up.  She has intermittent nausea and vomiting, NBNB. Last BM was yesterday, she is passing gas. She feels progressively weak and tired from not eating. Also reports intermittent SOB, especially w/ exertion. She has b/l LE edema, which she states is about the same since her DC from [facility].  Pt denies fever, chills, sick contacts, recent travel, chest pain, dysuria, hematuria melena, hematochezia, diarrhea or constipation.       (06 Jan 2022 01:06)       ROS: as above       PAST MEDICAL & SURGICAL HISTORY:  Gastric cancer  5/2011 no adjunt trement    Small bowel obstruction  Exp lap complicated with post op Ishemic bowel /perforation    Perforated bowel    Other abdominal hernia    Obesity (BMI 30-39.9)    History of gastrectomy  distal gastrectomy w/ B2 reconstruction (gastrojejunostomy)    History of resection of small bowel  exlap, SBR, meckel&#x27;s diverticulectomy (7/4/2015); exlap, SBR for necrotic bowel, left in discontinuity, Abthera (7/9/2015); exlap, SB anastomosis, Abthera (7/11/15); exlap, washout, abdominal closure w/ Strattice (7/13/15)        SOCIAL HISTORY:    FAMILY HISTORY:  FH: diabetes mellitus (Mother)        MEDICATIONS  (STANDING):  lactated ringers. 1000 milliLiter(s) (50 mL/Hr) IV Continuous <Continuous>    MEDICATIONS  (PRN):  HYDROmorphone   Solution 2 milliGRAM(s) Oral every 4 hours PRN moderate to severe pain  HYDROmorphone  Injectable 0.5 milliGRAM(s) IV Push every 3 hours PRN breakthrough pain if PO dilaudid does not alleviate pain/dyspnea  ondansetron Injectable 4 milliGRAM(s) IV Push every 8 hours PRN Nausea and/or Vomiting      Allergies    Cipro (Rash)  QUINOLONES (Unknown)    Intolerances        Vital Signs Last 24 Hrs  T(C): 36.7 (07 Jan 2022 14:46), Max: 36.7 (07 Jan 2022 06:50)  T(F): 98 (07 Jan 2022 14:46), Max: 98 (07 Jan 2022 06:50)  HR: 100 (07 Jan 2022 14:46) (100 - 101)  BP: 135/70 (07 Jan 2022 14:46) (135/70 - 141/71)  BP(mean): --  RR: 18 (07 Jan 2022 14:46) (17 - 18)  SpO2: 100% (07 Jan 2022 14:46) (95% - 100%)    PHYSICAL EXAM  General: adult in NAD  HEENT: clear oropharynx, anicteric sclera, pink conjunctiva  Neck: supple  CV: normal S1/S2 with no murmur rubs or gallops  Lungs: positive air movement b/l ant lungs,clear to auscultation, no wheezes, no rales  Abdomen: soft non-tender non-distended, no hepatosplenomegaly  Ext: no clubbing cyanosis or edema  Skin: no rashes and no petechiae  Neuro: alert and oriented X 4, no focal deficits      LABS:                          8.3    10.47 )-----------( 337      ( 07 Jan 2022 07:09 )             27.3         Mean Cell Volume : 77.1 fL  Mean Cell Hemoglobin : 23.4 pg  Mean Cell Hemoglobin Concentration : 30.4 gm/dL  Auto Neutrophil # : x  Auto Lymphocyte # : x  Auto Monocyte # : x  Auto Eosinophil # : x  Auto Basophil # : x  Auto Neutrophil % : x  Auto Lymphocyte % : x  Auto Monocyte % : x  Auto Eosinophil % : x  Auto Basophil % : x      Serial CBC's  01-07 @ 07:09  Hct-27.3 / Hgb-8.3 / Plat-337 / RBC-3.54 / WBC-10.47  Serial CBC's  01-06 @ 06:47  Hct-28.2 / Hgb-8.8 / Plat-365 / RBC-3.78 / WBC-11.02  Serial CBC's  01-05 @ 18:46  Hct-31.4 / Hgb-9.6 / Plat-425 / RBC-4.16 / WBC-11.09      01-07    136  |  100  |  6<L>  ----------------------------<  83  3.5   |  25  |  0.64    Ca    8.6      07 Jan 2022 07:09  Phos  3.2     01-07  Mg     1.60     01-07    TPro  5.1<L>  /  Alb  2.5<L>  /  TBili  0.3  /  DBili  x   /  AST  19  /  ALT  14  /  AlkPhos  1704<H>  01-07      PT/INR - ( 06 Jan 2022 15:59 )   PT: 15.7 sec;   INR: 1.40 ratio         PTT - ( 06 Jan 2022 15:59 )  PTT:33.4 sec

## 2022-01-07 NOTE — PROGRESS NOTE ADULT - SUBJECTIVE AND OBJECTIVE BOX
CHIEF COMPLAINT:Patient is a 51y old  Female who presents with a chief complaint of Dysphagia, abdominal pain (07 Jan 2022 15:24)      Interval Events: states her dyspnea is worse today from yesterday. Would like to get thoracentesis     REVIEW OF SYSTEMS:  [x] All other systems negative  [ ] Unable to assess ROS because ________    OBJECTIVE:  ICU Vital Signs Last 24 Hrs  T(C): 36.7 (07 Jan 2022 14:46), Max: 36.7 (07 Jan 2022 06:50)  T(F): 98 (07 Jan 2022 14:46), Max: 98 (07 Jan 2022 06:50)  HR: 100 (07 Jan 2022 14:46) (100 - 101)  BP: 135/70 (07 Jan 2022 14:46) (135/70 - 141/71)  BP(mean): --  ABP: --  ABP(mean): --  RR: 18 (07 Jan 2022 14:46) (17 - 18)  SpO2: 100% (07 Jan 2022 14:46) (95% - 100%)          PHYSICAL EXAM:  GENERAL: NAD, well-groomed, well-developed  EYES: EOMI, PERRLA, conjunctiva and sclera clear  ENMT: No tonsillar erythema, exudates, or enlargement; Moist mucous membranes, Good dentition, No lesions  CHEST/LUNG: Decreased BS on the right side   HEART: Regular rate and rhythm; No murmurs, rubs, or gallops  ABDOMEN: Soft, Nontender, Nondistended; Bowel sounds present  VASCULAR:  2+ Peripheral Pulses, No clubbing, cyanosis, or edema  LYMPH: No lymphadenopathy noted  SKIN: No rashes or lesions  NERVOUS SYSTEM:  Alert & Oriented X3, Good concentration; Motor Strength 5/5 B/L upper and lower extremities; DTRs 2+ intact and symmetric    HOSPITAL MEDICATIONS:  MEDICATIONS  (STANDING):  lactated ringers. 1000 milliLiter(s) (50 mL/Hr) IV Continuous <Continuous>    MEDICATIONS  (PRN):  HYDROmorphone   Solution 2 milliGRAM(s) Oral every 4 hours PRN moderate to severe pain  HYDROmorphone  Injectable 0.5 milliGRAM(s) IV Push every 3 hours PRN breakthrough pain if PO dilaudid does not alleviate pain/dyspnea  ondansetron Injectable 4 milliGRAM(s) IV Push every 8 hours PRN Nausea and/or Vomiting      LABS:    The Labs were reviewed by me   The Radiology was reviewed by me    EKG tracing reviewed by me    01-07    136  |  100  |  6<L>  ----------------------------<  83  3.5   |  25  |  0.64  01-06    136  |  102  |  5<L>  ----------------------------<  93  4.0   |  22  |  0.69  01-05    137  |  101  |  4<L>  ----------------------------<  88  3.7   |  23  |  0.71    Ca    8.6      07 Jan 2022 07:09  Ca    9.2      06 Jan 2022 06:47  Ca    9.1      05 Jan 2022 18:46  Phos  3.2     01-07  Mg     1.60     01-07    TPro  5.1<L>  /  Alb  2.5<L>  /  TBili  0.3  /  DBili  x   /  AST  19  /  ALT  14  /  AlkPhos  1704<H>  01-07  TPro  5.7<L>  /  Alb  2.8<L>  /  TBili  0.4  /  DBili  x   /  AST  19  /  ALT  15  /  AlkPhos  1905<H>  01-06  TPro  6.0  /  Alb  3.4  /  TBili  0.4  /  DBili  x   /  AST  21  /  ALT  21  /  AlkPhos  2169<H>  01-05    Magnesium, Serum: 1.60 mg/dL (01-07-22 @ 07:09)  Magnesium, Serum: 1.60 mg/dL (01-06-22 @ 06:47)  Magnesium, Serum: 1.60 mg/dL (01-05-22 @ 18:46)    Phosphorus Level, Serum: 3.2 mg/dL (01-07-22 @ 07:09)  Phosphorus Level, Serum: 3.7 mg/dL (01-06-22 @ 06:47)  Phosphorus Level, Serum: 3.4 mg/dL (01-05-22 @ 18:46)      PT/INR - ( 06 Jan 2022 15:59 )   PT: 15.7 sec;   INR: 1.40 ratio         PTT - ( 06 Jan 2022 15:59 )  PTT:33.4 sec                                        8.3    10.47 )-----------( 337      ( 07 Jan 2022 07:09 )             27.3                         8.8    11.02 )-----------( 365      ( 06 Jan 2022 06:47 )             28.2                         9.6    11.09 )-----------( 425      ( 05 Jan 2022 18:46 )             31.4     CAPILLARY BLOOD GLUCOSE            MICROBIOLOGY:     RADIOLOGY:  [ ] Reviewed and interpreted by me    Point of Care Ultrasound Findings:    PFT:    EKG:

## 2022-01-07 NOTE — DISCHARGE NOTE PROVIDER - NSFOLLOWUPCLINICS_GEN_ALL_ED_FT
[facility] Pulmonolgy and Sleep Medicine  Pulmonology  [address] [place] [address], [place] [address]  Phone: [phone]  Fax:      [facility] Gastroenterology  Gastroenterology  [address] [facility] [facility] [address]  [place] [address]  Phone: [phone]  Fax:     [facility] Pulmonolgy and Sleep Medicine  Pulmonology  [address]  [place] [address]  Phone: [phone]  Fax:     [facility] Geriatric and Palliative Care  Geriatrics  [address]  [place] [address]  Phone: [phone]  Fax:

## 2022-01-07 NOTE — PROGRESS NOTE ADULT - SUBJECTIVE AND OBJECTIVE BOX
Chief Complaint:  Patient is a 51y old  Female who presents with a chief complaint of Dysphagia, abdominal pain (07 Jan 2022 08:32)    Reason for consult: efferent limb obstruction    Interval Events: Tolerating full liquid diet today w/o issue. No pain, n/v.    Hospital Medications:  enoxaparin Injectable 40 milliGRAM(s) SubCutaneous at bedtime  HYDROmorphone   Solution 2 milliGRAM(s) Oral every 4 hours PRN  HYDROmorphone  Injectable 0.5 milliGRAM(s) IV Push every 3 hours PRN  lactated ringers. 1000 milliLiter(s) IV Continuous <Continuous>  ondansetron Injectable 4 milliGRAM(s) IV Push every 8 hours PRN      ROS:   General:  No  fevers, chills, night sweats, fatigue  Eyes:  Good vision, no reported pain  ENT:  No sore throat, pain, runny nose  CV:  No pain, palpitations  Pulm:  No dyspnea, cough  GI:  See HPI, otherwise negative  :  No  incontinence, nocturia  Muscle:  No pain, weakness  Neuro:  No memory problems  Psych:  No insomnia, mood problems, depression  Endocrine:  No polyuria, polydipsia, cold/heat intolerance  Heme:  No petechiae, ecchymosis, easy bruisability  Skin:  No rash    PHYSICAL EXAM:   Vital Signs:  Vital Signs Last 24 Hrs  T(C): 36.7 (07 Jan 2022 06:50), Max: 37 (06 Jan 2022 14:00)  T(F): 98 (07 Jan 2022 06:50), Max: 98.6 (06 Jan 2022 14:00)  HR: 100 (07 Jan 2022 06:50) (90 - 105)  BP: 141/71 (07 Jan 2022 06:50) (125/80 - 141/71)  BP(mean): --  RR: 18 (07 Jan 2022 06:50) (16 - 23)  SpO2: 95% (07 Jan 2022 06:50) (94% - 100%)  Daily Height in cm: 167.6 (06 Jan 2022 09:32)    Daily     GENERAL: no acute distress  NEURO: alert  HEENT: anicteric sclera, no conjunctival pallor appreciated  CHEST: no respiratory distress, no accessory muscle use  CARDIAC: regular rate, rhythm  ABDOMEN: soft, non-tender, non-distended, no rebound or guarding  EXTREMITIES: warm, well perfused, no edema  SKIN: no lesions noted    LABS: reviewed                        8.3    10.47 )-----------( 337      ( 07 Jan 2022 07:09 )             27.3     01-07    136  |  100  |  6<L>  ----------------------------<  83  3.5   |  25  |  0.64    Ca    8.6      07 Jan 2022 07:09  Phos  3.2     01-07  Mg     1.60     01-07    TPro  5.1<L>  /  Alb  2.5<L>  /  TBili  0.3  /  DBili  x   /  AST  19  /  ALT  14  /  AlkPhos  1704<H>  01-07    LIVER FUNCTIONS - ( 07 Jan 2022 07:09 )  Alb: 2.5 g/dL / Pro: 5.1 g/dL / ALK PHOS: 1704 U/L / ALT: 14 U/L / AST: 19 U/L / GGT: x             Interval Diagnostic Studies: see sunrise for full report

## 2022-01-07 NOTE — CONSULT NOTE ADULT - ASSESSMENT
Gastric cancer    s/p efferent limb stent    diet as sujit per GI    No role for surgical intervention at this time

## 2022-01-07 NOTE — PROGRESS NOTE ADULT - SUBJECTIVE AND OBJECTIVE BOX
[facility] Geriatrics and Palliative Care  Louise Shaikh, Palliative Care Attending  Contact Info: Page [phone] (including Nights/Weekends), message on Microsoft Teams (Louise Shaikh), or leave  at Palliative Office [phone] (non-urgent)     SUBJECTIVE AND OBJECTIVE: Patient seen this morning, appeared comfortable. States her symptoms are controlled with dilaudid. She tolerated FLD for breakfast and is awaiting a pureed diet for lunch. She feels her breathing is stable and does not want procedures at this time.     INTERVAL HPI/OVERNIGHT EVENTS:  > Over the past 24 hours, patient required PRNs of IV dilaudid 0.5mg x3.     DNR on chart:   Allergies    Cipro (Rash)  QUINOLONES (Unknown)    Intolerances    MEDICATIONS  (STANDING):  enoxaparin Injectable 40 milliGRAM(s) SubCutaneous at bedtime  lactated ringers. 1000 milliLiter(s) (50 mL/Hr) IV Continuous <Continuous>    MEDICATIONS  (PRN):  HYDROmorphone   Solution 2 milliGRAM(s) Oral every 4 hours PRN moderate to severe pain  HYDROmorphone  Injectable 0.5 milliGRAM(s) IV Push every 3 hours PRN breakthrough pain if PO dilaudid does not alleviate pain/dyspnea  ondansetron Injectable 4 milliGRAM(s) IV Push every 8 hours PRN Nausea and/or Vomiting      ITEMS UNCHECKED ARE NOT PRESENT    PRESENT SYMPTOMS: [ ]Unable to obtain due to poor mentation   Source if other than patient:  [ ]Family   [ ]Team     Pain: [x ]yes- improved [ ]no  QOL impact - inability to eat   Location -    abdominal pain                 Aggravating factors - positional, laying supine?   Quality -   Radiation -   Timing- intermittent   Severity (0-10 scale):   Minimal acceptable level (0-10 scale):       Dyspnea:                           [ ]Mild [ ]Moderate [ ]Severe  Anxiety:                             [ ]Mild [ ]Moderate [ ]Severe  Fatigue:                             [ ]Mild [ ]Moderate [ ]Severe  Nausea:                             [ ]Mild [ ]Moderate [ ]Severe  Loss of appetite:              [ ]Mild [ ]Moderate [ ]Severe  Constipation:                    [ ]Mild [ ]Moderate [ ]Severe    PAIN AD Score:	  http://geriatrictoolkit.[facility]/cog/painad.pdf (Ctrl + left click to view)    Other Symptoms:  [ ]All other review of systems negative     Palliative Performance Status Version 2:    80     %      http://npcrc.org/files/news/palliative_performance_scale_ppsv2.pdf  PHYSICAL EXAM:  Vital Signs Last 24 Hrs  T(C): 36.7 (07 Jan 2022 06:50), Max: 37 (06 Jan 2022 14:00)  T(F): 98 (07 Jan 2022 06:50), Max: 98.6 (06 Jan 2022 14:00)  HR: 100 (07 Jan 2022 06:50) (99 - 101)  BP: 141/71 (07 Jan 2022 06:50) (131/73 - 141/71)  BP(mean): --  RR: 18 (07 Jan 2022 06:50) (17 - 18)  SpO2: 95% (07 Jan 2022 06:50) (95% - 100%) I&O's Summary     GENERAL:  [x ]Alert  [x ]Oriented x4   [ ]Lethargic  [ ]Cachexia  [ ]Unarousable  [ x]Verbal  [ ]Non-Verbal  Behavioral:   [ ] Anxiety  [ ] Delirium [ ] Agitation [x ] Other  HEENT:  [ x]Normal   [ ]Dry mouth   [ ]ET Tube/Trach  [ ]Oral lesions  PULMONARY:   [ ]Clear [ ]Tachypnea  [ ]Audible excessive secretions   [ ]Rhonchi        [ ]Right [ ]Left [ ]Bilateral  [ ]Crackles        [ ]Right [ ]Left [ ]Bilateral  [ ]Wheezing     [ ]Right [ ]Left [ ]Bilateral   [x ]Diminished breath sounds [ ]right [ ]left [ x]bilateral  CARDIOVASCULAR:    [ ]Regular [ ]Irregular [ x]Tachy  [ ]Linsu [ ]Murmur [ ]Other  GASTROINTESTINAL:  [ ]Soft  [ x]Distended   [ ]+BS  [ ]Non tender [ x]Tender  [ ]PEG [ ]OGT/ NGT  Last BM: 1/4/2  GENITOURINARY:  [ x]Normal [ ] Incontinent   [ ]Oliguria/Anuria   [ ]Ansari  MUSCULOSKELETAL:   [ ]Normal   [ ]Weakness  [ ]Bed/Wheelchair bound [ x]Edema  NEUROLOGIC:   [ x]No focal deficits  [ ]Cognitive impairment  [ ]Dysphagia [ ]Dysarthria [ ]Paresis [ ]Other   SKIN:   [x ]Normal    [ ]Rash  [ ]Pressure ulcer(s)       Present on admission [ ]y [ ]n    CRITICAL CARE:  [ ] Shock Present  [ ]Septic [ ]Cardiogenic [ ]Neurologic [ ]Hypovolemic  [ ]  Vasopressors [ ]  Inotropes   [ ]Respiratory failure present [ ]Mechanical ventilation [ ]Non-invasive ventilatory support [ ]High flow  [ ]Acute  [ ]Chronic [ ]Hypoxic  [ ]Hypercarbic [ ]Other  [ ]Other organ failure     LABS:                        8.3    10.47 )-----------( 337      ( 07 Jan 2022 07:09 )             27.3   01-07    136  |  100  |  6<L>  ----------------------------<  83  3.5   |  25  |  0.64    Ca    8.6      07 Jan 2022 07:09  Phos  3.2     01-07  Mg     1.60     01-07    TPro  5.1<L>  /  Alb  2.5<L>  /  TBili  0.3  /  DBili  x   /  AST  19  /  ALT  14  /  AlkPhos  1704<H>  01-07  PT/INR - ( 06 Jan 2022 15:59 )   PT: 15.7 sec;   INR: 1.40 ratio         PTT - ( 06 Jan 2022 15:59 )  PTT:33.4 sec      RADIOLOGY & ADDITIONAL STUDIES: n/a     Protein Calorie Malnutrition Present: [ ]mild [ ]moderate [ ]severe [ ]underweight [ ]morbid obesity  https://www.andeal.org/vault/2440/web/files/ONC/Table_Clinical%20Characteristics%20to%20Document%20Malnutrition-White%20JV%20et%20al%202012.pdf    Height (cm): 167.6 (01-06-22 @ 09:32), 167.6 (12-21-21 @ 17:21), 167.6 (07-14-21 @ 14:07)  Weight (kg): 90.7 (01-06-22 @ 09:32), 95.3 (12-21-21 @ 17:21), 111.1 (07-14-21 @ 14:07)  BMI (kg/m2): 32.3 (01-06-22 @ 09:32), 33.9 (12-21-21 @ 17:21), 39.6 (07-14-21 @ 14:07)    [ ]PPSV2 < or = 30%  [ ]significant weight loss [ ]poor nutritional intake [ ]anasarca    [ ]Artificial Nutrition    REFERRALS:   [ ]Chaplaincy  [x ]Hospice  [ ]Child Life  [ ]Social Work  [ ]Case management [ ]Holistic Therapy

## 2022-01-07 NOTE — PROGRESS NOTE ADULT - SUBJECTIVE AND OBJECTIVE BOX
[facility] Division of Hospital Medicine  Pascual lEy MD  Pager #[phone]    Patient is a 51y old  Female who presents with a chief complaint of Dysphagia, abdominal pain (07 Jan 2022 10:45)    SUBJECTIVE / OVERNIGHT EVENTS: No acute events. Breathing comfortably at rest. Diet advanced for lunch per GI. Afebrile. No chest pain.     MEDICATIONS  (STANDING):  enoxaparin Injectable 40 milliGRAM(s) SubCutaneous at bedtime  lactated ringers. 1000 milliLiter(s) (50 mL/Hr) IV Continuous <Continuous>    MEDICATIONS  (PRN):  HYDROmorphone   Solution 2 milliGRAM(s) Oral every 4 hours PRN moderate to severe pain  HYDROmorphone  Injectable 0.5 milliGRAM(s) IV Push every 3 hours PRN breakthrough pain if PO dilaudid does not alleviate pain/dyspnea  ondansetron Injectable 4 milliGRAM(s) IV Push every 8 hours PRN Nausea and/or Vomiting    CAPILLARY BLOOD GLUCOSE    I&O's Summary    PHYSICAL EXAM:  Vital Signs Last 24 Hrs  T(C): 36.7 (07 Jan 2022 14:46), Max: 36.7 (07 Jan 2022 06:50)  T(F): 98 (07 Jan 2022 14:46), Max: 98 (07 Jan 2022 06:50)  HR: 100 (07 Jan 2022 14:46) (100 - 101)  BP: 135/70 (07 Jan 2022 14:46) (135/70 - 141/71)  BP(mean): --  RR: 18 (07 Jan 2022 14:46) (17 - 18)  SpO2: 100% (07 Jan 2022 14:46) (95% - 100%)    CONSTITUTIONAL: NAD, well-developed, sitting in bed  EYES: EOMI; conjunctiva and sclera clear  ENMT: Moist oral mucosa  NECK: no JVD  RESPIRATORY: decreased right basilar breath sounds, no wheezing  CARDIOVASCULAR: Regular rate and rhythm, normal S1 and S2; No lower extremity edema; Peripheral pulses are 2+ bilaterally  ABDOMEN: soft, NT, ND, +BS  PSYCH: affect appropriate  NEUROLOGY: moving all extremities, nonfocal  SKIN: No rashes    LABS:                        8.3    10.47 )-----------( 337      ( 07 Jan 2022 07:09 )             27.3     01-07    136  |  100  |  6<L>  ----------------------------<  83  3.5   |  25  |  0.64    Ca    8.6      07 Jan 2022 07:09  Phos  3.2     01-07  Mg     1.60     01-07    TPro  5.1<L>  /  Alb  2.5<L>  /  TBili  0.3  /  DBili  x   /  AST  19  /  ALT  14  /  AlkPhos  1704<H>  01-07    PT/INR - ( 06 Jan 2022 15:59 )   PT: 15.7 sec;   INR: 1.40 ratio         PTT - ( 06 Jan 2022 15:59 )  PTT:33.4 sec      RADIOLOGY & ADDITIONAL TESTS:    1/6/22 US Duplex LE bilateral  No evidence of deep venous thrombosis in either lower extremity    1/6/22 CXR  Moderate to large right pleural effusion without significant change.    COORDINATION OF CARE:  Care Discussed with Consultants/Other Providers [LOUIS spann, RN]

## 2022-01-07 NOTE — DISCHARGE NOTE PROVIDER - NSDCCPCAREPLAN_GEN_ALL_CORE_FT
PRINCIPAL DISCHARGE DIAGNOSIS  Diagnosis: Dysphagia  Assessment and Plan of Treatment: You presented with difficulty tolerating food. You were evaluated by Gastroenterology. You underewent an EGD with stent placement on 1/6. Continue with soft diet as tolerated.      SECONDARY DISCHARGE DIAGNOSES  Diagnosis: Gastric cancer  Assessment and Plan of Treatment: You were evaluated by oncology, you declined disease modifying therapy. You were also evaluated by palliative care who assisted with pain control. Discharge home with home hospice to focus on comfort and symptom control.    Diagnosis: Pleural effusion  Assessment and Plan of Treatment: You have a history of pleural effusion (fluid in your lungs), you previously had this fluid drained. Chest xray showed repeat fluid in your lungs. Pulmonology was consulted and you underwent a thoracentesis to remove the fluid.     PRINCIPAL DISCHARGE DIAGNOSIS  Diagnosis: Dysphagia  Assessment and Plan of Treatment: You presented with difficulty tolerating food. You were evaluated by Gastroenterology. You underewent an EGD with stent placement on 1/6. Continue with soft diet as tolerated.      SECONDARY DISCHARGE DIAGNOSES  Diagnosis: Gastric cancer  Assessment and Plan of Treatment: You were evaluated by oncology, you declined disease modifying therapy. You were also evaluated by palliative care who assisted with pain control. Discharge home with home hospice to focus on comfort and symptom control.    Diagnosis: Pleural effusion  Assessment and Plan of Treatment: You have a history of pleural effusion (fluid in your lungs), you previously had this fluid drained. Chest xray showed repeat fluid in your lungs. Pulmonology was consulted and you underwent a thoracentesis to remove the fluid.  If experincing rapid accumulation of fluids in the lungs, you will be a candidate for Pleurx catheter placement for frequent drainage. Follow up with Pulmonary for this. Call to schedule an appointment     PRINCIPAL DISCHARGE DIAGNOSIS  Diagnosis: Dysphagia  Assessment and Plan of Treatment: You presented with difficulty tolerating food. You were evaluated by Gastroenterology. You underewent an EGD with stent placement on 1/6. Continue with soft diet as tolerated.      SECONDARY DISCHARGE DIAGNOSES  Diagnosis: Gastric cancer  Assessment and Plan of Treatment: You were evaluated by oncology, you declined disease modifying therapy. You were also evaluated by palliative care who assisted with pain control. Discharge home with home hospice to focus on comfort and symptom control.    Outpatient Palliative follow up with Daisy Carreon or Olivia Hazel [phone].      Diagnosis: Pleural effusion  Assessment and Plan of Treatment: You have a history of pleural effusion (fluid in your lungs), you previously had this fluid drained. Chest xray showed repeat fluid in your lungs. Pulmonology was consulted and you underwent a thoracentesis to remove the fluid.  If experincing rapid accumulation of fluids in the lungs, you will be a candidate for Pleurx catheter placement for frequent drainage. Follow up with Pulmonary for this. Call to schedule an appointment  Per pulmonary, advised not to fly given pneumothorax on chest xray.  Repeat chest xray within one week as ordered by pulm with follow up with PCP and Pulmonary Dr. Milton Johnson  [phone].

## 2022-01-07 NOTE — DISCHARGE NOTE PROVIDER - NSDCFUADDAPPT_GEN_ALL_CORE_FT
Discharge home today with close outpatient PCP, GI, Pulmonary, Palliative Care, Oncology follow up. Repeat CXR and LFTs within one week. Per pulmonary, advised not to fly given pneumothorax on CXR.        Discharge home today with close outpatient PCP, GI, Pulmonary, Palliative Care, Oncology follow up. Repeat chest xray and liver function test within one week.      **** As per pulmonary team, you are advised not to fly given pneumothorax****

## 2022-01-08 NOTE — PROGRESS NOTE ADULT - ASSESSMENT
51 year old female with PMH metastatic gastric cancer (bone metastasis and peritoneal carcinomatosis), prior SBO, multiple bowel resections, and recent hospitalization at [facility] for inability to tolerate PO found to have progression of gastric cancer, course c/b large right pleural effusions s/p thoracentesis and large ascites s/p paracentesis. Pt now presenting with worsening inability to tolerate PO intake, can tolerate small amount of liquids, cannot tolerate food or pills. S/p EGD and stenting per GI. S/p thora by pulmonary 1/7/21.

## 2022-01-08 NOTE — PROGRESS NOTE ADULT - ASSESSMENT
51 year old female with metastatic gastric cancer (bone metastasis and peritoneal carcinomatosis), prior SBO, multiple bowel resections, and recent hospitalization at [facility] for inability to tolerate PO found to have progression of gastric cancer, course c/b large right pleural effusions s/p thoracentesis and large ascites s/p paracentesis admitted for EGD with stent placement. Pulmonary consulted for thoracentesis of the right side     Plan   - Right side thoracentesis performed yesterday with 2.1 L removed   - small R apical PTX after procedure noted  -   -   - patient should follow up with Dr. Johnson in the clinic for evaluation for PleurX catheter       Rajiv Dempsey PGY-6  Pulmonary/Critical Care Fellow  Pager: [phone] (KARINA) [phone] (NS)  Pulmonary Spectra #[phone] (NS) / [phone] (KARINA)   51 year old female with metastatic gastric cancer (bone metastasis and peritoneal carcinomatosis), prior SBO, multiple bowel resections, and recent hospitalization at [facility] for inability to tolerate PO found to have progression of gastric cancer, course c/b large right pleural effusions s/p thoracentesis and large ascites s/p paracentesis admitted for EGD with stent placement. Pulmonary consulted for thoracentesis of the right side     Plan   - Right side thoracentesis performed yesterday with 2.1 L removed   - small R apical PTX after procedure noted  - Recommend a PA/Lateral Xray tomorrow to better assess the PTX  - Educated patient that air travel with PTX would be very dangerous and harmful to her health   - patient should follow up with Dr. Johnson in the clinic for evaluation for PleurX catheter       Ottoniel Contreras MD   Pulmonary/Critical Care Fellow PGY-6  [facility] Pager #: [phone]  [facility] Pager #: [phone]

## 2022-01-08 NOTE — PROGRESS NOTE ADULT - SUBJECTIVE AND OBJECTIVE BOX
CHIEF COMPLAINT:    HPI:    PAST MEDICAL & SURGICAL HISTORY:  Gastric cancer  5/2011 no adjunt trement    Small bowel obstruction  Exp lap complicated with post op Ishemic bowel /perforation    Perforated bowel    Other abdominal hernia    Obesity (BMI 30-39.9)    History of gastrectomy  distal gastrectomy w/ B2 reconstruction (gastrojejunostomy)    History of resection of small bowel  exlap, SBR, meckel&#x27;s diverticulectomy (7/4/2015); exlap, SBR for necrotic bowel, left in discontinuity, Abthera (7/9/2015); exlap, SB anastomosis, Abthera (7/11/15); exlap, washout, abdominal closure w/ Strattice (7/13/15)        FAMILY HISTORY:  FH: diabetes mellitus (Mother)        SOCIAL HISTORY:  Smoking: [ ] Never Smoked [ ] Former Smoker (__ packs x ___ years) [ ] Current Smoker  (__ packs x ___ years)  Substance Use: [ ] Never Used [ ] Used ____  EtOH Use:  Marital Status: [ ] Single [ ]  [ ]  [ ]   Sexual History:   Occupation:  Recent Travel:  Country of Birth:  Advance Directives:    Allergies    Cipro (Rash)  QUINOLONES (Unknown)    Intolerances        HOME MEDICATIONS:  Home Medications:  multivitamin:   once a day (06 Jan 2022 01:02)      REVIEW OF SYSTEMS:  Constitutional: [ ] negative [ ] fevers [ ] chills [ ] weight loss [ ] weight gain  HEENT: [ ] negative [ ] dry eyes [ ] eye irritation [ ] postnasal drip [ ] nasal congestion  CV: [ ] negative  [ ] chest pain [ ] orthopnea [ ] palpitations [ ] murmur  Resp: [ ] negative [ ] cough [ ] shortness of breath [ ] dyspnea [ ] wheezing [ ] sputum [ ] hemoptysis  GI: [ ] negative [ ] nausea [ ] vomiting [ ] diarrhea [ ] constipation [ ] abd pain [ ] dysphagia   : [ ] negative [ ] dysuria [ ] nocturia [ ] hematuria [ ] increased urinary frequency  Musculoskeletal: [ ] negative [ ] back pain [ ] myalgias [ ] arthralgias [ ] fracture  Skin: [ ] negative [ ] rash [ ] itch  Neurological: [ ] negative [ ] headache [ ] dizziness [ ] syncope [ ] weakness [ ] numbness  Psychiatric: [ ] negative [ ] anxiety [ ] depression  Endocrine: [ ] negative [ ] diabetes [ ] thyroid problem  Hematologic/Lymphatic: [ ] negative [ ] anemia [ ] bleeding problem  Allergic/Immunologic: [ ] negative [ ] itchy eyes [ ] nasal discharge [ ] hives [ ] angioedema  [ ] All other systems negative  [ ] Unable to assess ROS because ________    OBJECTIVE:  ICU Vital Signs Last 24 Hrs  T(C): 36.8 (08 Jan 2022 05:22), Max: 36.8 (07 Jan 2022 20:30)  T(F): 98.2 (08 Jan 2022 05:22), Max: 98.2 (07 Jan 2022 20:30)  HR: 95 (08 Jan 2022 05:22) (95 - 107)  BP: 133/55 (08 Jan 2022 05:22) (133/55 - 144/76)  BP(mean): --  ABP: --  ABP(mean): --  RR: 16 (08 Jan 2022 05:22) (16 - 18)  SpO2: 100% (08 Jan 2022 05:22) (98% - 100%)        CAPILLARY BLOOD GLUCOSE          PHYSICAL EXAM:  General: awake and alert, nontoxic appearing *** lying in bed  HEENT: NC/AT, EOMI b/l, conjunctiva normal, MMM  Lymph Nodes: no cervical LAD  Neck: supple. full range of motion  Respiratory: CTA b/l, no w/r/c, appears comfortable on ***, no conversational dyspnea or accessory muscle use  Cardiovascular: S1 S2 present, RRR, no m/r/g  Abdomen: soft, NT/ND, +BS  Extremities: no c/c/e  Skin: no rashes or lesions noted  Neurological: AAOx3, no focal deficits  Psychiatry: calm, cooperative    LINES:     HOSPITAL MEDICATIONS:  Standing Meds:  lactated ringers. 1000 milliLiter(s) IV Continuous <Continuous>      PRN Meds:  HYDROmorphone   Tablet 2 milliGRAM(s) Oral every 4 hours PRN  HYDROmorphone  Injectable 0.5 milliGRAM(s) IV Push every 3 hours PRN  ondansetron Injectable 4 milliGRAM(s) IV Push every 8 hours PRN      LABS:                        9.2    12.44 )-----------( 445      ( 08 Jan 2022 07:00 )             29.5     Hgb Trend: 9.2<--, 8.3<--, 8.8<--, 9.6<--  01-08    135  |  100  |  6<L>  ----------------------------<  94  4.0   |  22  |  0.74    Ca    8.9      08 Jan 2022 07:00  Phos  3.3     01-08  Mg     1.60     01-08    TPro  5.3<L>  /  Alb  3.0<L>  /  TBili  0.4  /  DBili  x   /  AST  20  /  ALT  17  /  AlkPhos  1912<H>  01-08    Creatinine Trend: 0.74<--, 0.64<--, 0.69<--, 0.71<--, 0.82<--, 0.96<--  PT/INR - ( 06 Jan 2022 15:59 )   PT: 15.7 sec;   INR: 1.40 ratio         PTT - ( 06 Jan 2022 15:59 )  PTT:33.4 sec          MICROBIOLOGY:       RADIOLOGY:  [ ] Reviewed and interpreted by me    PULMONARY FUNCTION TESTS:    EKG: CHIEF COMPLAINT:Patient is a 51y old  Female who presents with a chief complaint of Dysphagia, abdominal pain (08 Jan 2022 13:51)      Interval Events: Patient is asymptomatic and feels well. Still has a stable right apical PTX     REVIEW OF SYSTEMS:  [x] All other systems negative  [ ] Unable to assess ROS because ________    OBJECTIVE:  ICU Vital Signs Last 24 Hrs  T(C): 36.8 (08 Jan 2022 14:39), Max: 36.8 (07 Jan 2022 20:30)  T(F): 98.2 (08 Jan 2022 14:39), Max: 98.2 (07 Jan 2022 20:30)  HR: 106 (08 Jan 2022 14:39) (95 - 107)  BP: 122/69 (08 Jan 2022 14:39) (122/69 - 144/76)  BP(mean): --  ABP: --  ABP(mean): --  RR: 17 (08 Jan 2022 14:39) (16 - 18)  SpO2: 100% (08 Jan 2022 14:39) (98% - 100%)          PHYSICAL EXAM:  GENERAL: NAD, well-groomed, well-developed  HEAD:  Atraumatic, Normocephalic  EYES: EOMI, PERRLA, conjunctiva and sclera clear  ENMT: No tonsillar erythema, exudates, or enlargement; Moist mucous membranes, Good dentition, No lesions  NECK: Supple, No JVD, Normal thyroid  CHEST/LUNG: Decreased BS on the right side   HEART: Regular rate and rhythm; No murmurs, rubs, or gallops  ABDOMEN: Soft, Nontender, Nondistended; Bowel sounds present  VASCULAR:  2+ Peripheral Pulses, No clubbing, cyanosis, or edema  LYMPH: No lymphadenopathy noted  SKIN: No rashes or lesions  NERVOUS SYSTEM:  Alert & Oriented X3, Good concentration; Motor Strength 5/5 B/L upper and lower extremities; DTRs 2+ intact and symmetric    HOSPITAL MEDICATIONS:  MEDICATIONS  (STANDING):    MEDICATIONS  (PRN):  HYDROmorphone   Tablet 2 milliGRAM(s) Oral every 4 hours PRN Severe Pain (7 - 10)  HYDROmorphone  Injectable 0.5 milliGRAM(s) IV Push every 3 hours PRN breakthrough pain if PO dilaudid does not alleviate pain/dyspnea  ondansetron Injectable 4 milliGRAM(s) IV Push every 8 hours PRN Nausea and/or Vomiting      LABS:    The Labs were reviewed by me   The Radiology was reviewed by me    EKG tracing reviewed by me    01-08    135  |  100  |  6<L>  ----------------------------<  94  4.0   |  22  |  0.74  01-07    136  |  100  |  6<L>  ----------------------------<  83  3.5   |  25  |  0.64  01-06    136  |  102  |  5<L>  ----------------------------<  93  4.0   |  22  |  0.69    Ca    8.9      08 Jan 2022 07:00  Ca    8.6      07 Jan 2022 07:09  Ca    9.2      06 Jan 2022 06:47  Phos  3.3     01-08  Mg     1.60     01-08    TPro  5.3<L>  /  Alb  3.0<L>  /  TBili  0.4  /  DBili  x   /  AST  20  /  ALT  17  /  AlkPhos  1912<H>  01-08  TPro  5.1<L>  /  Alb  2.5<L>  /  TBili  0.3  /  DBili  x   /  AST  19  /  ALT  14  /  AlkPhos  1704<H>  01-07  TPro  5.7<L>  /  Alb  2.8<L>  /  TBili  0.4  /  DBili  x   /  AST  19  /  ALT  15  /  AlkPhos  1905<H>  01-06    Magnesium, Serum: 1.60 mg/dL (01-08-22 @ 07:00)  Magnesium, Serum: 1.60 mg/dL (01-07-22 @ 07:09)  Magnesium, Serum: 1.60 mg/dL (01-06-22 @ 06:47)  Magnesium, Serum: 1.60 mg/dL (01-05-22 @ 18:46)    Phosphorus Level, Serum: 3.3 mg/dL (01-08-22 @ 07:00)  Phosphorus Level, Serum: 3.2 mg/dL (01-07-22 @ 07:09)  Phosphorus Level, Serum: 3.7 mg/dL (01-06-22 @ 06:47)  Phosphorus Level, Serum: 3.4 mg/dL (01-05-22 @ 18:46)                                              9.2    12.44 )-----------( 445      ( 08 Jan 2022 07:00 )             29.5                         8.3    10.47 )-----------( 337      ( 07 Jan 2022 07:09 )             27.3                         8.8    11.02 )-----------( 365      ( 06 Jan 2022 06:47 )             28.2     CAPILLARY BLOOD GLUCOSE            MICROBIOLOGY:     RADIOLOGY:  [ ] Reviewed and interpreted by me    Point of Care Ultrasound Findings:    PFT:    EKG:

## 2022-01-08 NOTE — PROGRESS NOTE ADULT - ATTENDING COMMENTS
51 year old female with recurrent gastric tumor. S/P efferent limb stent in B2.
51 year old female with metastatic gastric cancer (bone metastasis and peritoneal carcinomatosis), prior SBO, multiple bowel resections, and recent hospitalization at [facility] for inability to tolerate PO found to have progression of gastric cancer. Pt w/ known malignant pleural effusion and has rapid recurrence after last tap. Pt reporting SOB this morning so repeat tap performed and over 2L fluid removed. Procedure itself uncomplicated but cxr w/ small apical ptx. Pt feels well w/ better breathing post fluid removal. Sat 96% on ra. Plan to repeat cxr tonight and place on supplemental o2.    Overall pt does not want further chemo and will try holistic medicine for further treatment. WIll also get hospice support. Pt understands also that her fluid will agan reaccumulate rapidly and that she will need a long term pleural catheter to help her breathing. She agrees with future pleurex catheter and will f/u in pulmonary clinic.
51 year old female with metastatic gastric cancer (bone metastasis and peritoneal carcinomatosis), prior SBO, multiple bowel resections, and recent hospitalization at [facility] for inability to tolerate PO found to have progression of gastric cancer. Pt w/ known malignant pleural effusion and has rapid recurrence after last tap. Pt reporting SOB yesterday so thoracentesis (2L) performed and patient developed post procedure pneumothorax vs non-expandable lung. Patient remains with small right apical Ptx but no pulmonary symptoms. Patient saturating well. Plan to repeat cxr tomorrow and place on supplemental o2. patient does not require chest tube at this time for apical pneumothorax.    Patient wants to fly to [place] for homeopathic treatments and has a plane trip scheduled for Tuesday. From a pulmonary standpoint it would not be safe to travel with pneumothorax and we discussed this with patient who was not happy with this fact. We made it absolutely clear that there was significant risk, morbidity, and possibly mortality if patient were to travel (via plane) with this new pneumothorax.    Overall pt does not want further chemo and will try holistic medicine for further treatment. Will also get hospice support. Pt understands also that her fluid will again reaccumulate rapidly and that she will need a long term pleural catheter to help her breathing. She agrees with future pleurx catheter and will f/u in pulmonary clinic.

## 2022-01-08 NOTE — PROGRESS NOTE ADULT - SUBJECTIVE AND OBJECTIVE BOX
[facility] Division of Hospital Medicine  Pascual Ely MD  Pager #[phone]    Patient is a 51y old  Female who presents with a chief complaint of Dysphagia, abdominal pain (08 Jan 2022 07:57)    SUBJECTIVE / OVERNIGHT EVENTS: S/p thoracentesis yesterday by Pulm  2.1 L removed, post procedure CXR with small PTX. Patient reports breathing feels improved post-thora. No current shortness of breath. No nausea. Tolerating diet. Afebrile.     MEDICATIONS  (STANDING):    MEDICATIONS  (PRN):  HYDROmorphone   Tablet 2 milliGRAM(s) Oral every 4 hours PRN Severe Pain (7 - 10)  HYDROmorphone  Injectable 0.5 milliGRAM(s) IV Push every 3 hours PRN breakthrough pain if PO dilaudid does not alleviate pain/dyspnea  ondansetron Injectable 4 milliGRAM(s) IV Push every 8 hours PRN Nausea and/or Vomiting    CAPILLARY BLOOD GLUCOSE    I&O's Summary    PHYSICAL EXAM:  Vital Signs Last 24 Hrs  T(C): 36.8 (08 Jan 2022 05:22), Max: 36.8 (07 Jan 2022 20:30)  T(F): 98.2 (08 Jan 2022 05:22), Max: 98.2 (07 Jan 2022 20:30)  HR: 95 (08 Jan 2022 05:22) (95 - 107)  BP: 133/55 (08 Jan 2022 05:22) (133/55 - 144/76)  BP(mean): --  RR: 16 (08 Jan 2022 05:22) (16 - 18)  SpO2: 100% (08 Jan 2022 05:22) (98% - 100%)    CONSTITUTIONAL: NAD, well-developed, sitting in bed  EYES: EOMI; conjunctiva and sclera clear  ENMT: Moist oral mucosa  NECK: no JVD  RESPIRATORY: decreased right basilar breath sounds improved from prior, no wheezing  CARDIOVASCULAR: Regular rate and rhythm, normal S1 and S2; No lower extremity edema; Peripheral pulses are 2+ bilaterally  ABDOMEN: soft, NT, ND, +BS  PSYCH: affect appropriate  NEUROLOGY: moving all extremities, nonfocal  SKIN: No rashes    LABS:                        9.2    12.44 )-----------( 445      ( 08 Jan 2022 07:00 )             29.5     01-08    135  |  100  |  6<L>  ----------------------------<  94  4.0   |  22  |  0.74    Ca    8.9      08 Jan 2022 07:00  Phos  3.3     01-08  Mg     1.60     01-08    TPro  5.3<L>  /  Alb  3.0<L>  /  TBili  0.4  /  DBili  x   /  AST  20  /  ALT  17  /  AlkPhos  1912<H>  01-08    PT/INR - ( 06 Jan 2022 15:59 )   PT: 15.7 sec;   INR: 1.40 ratio      PTT - ( 06 Jan 2022 15:59 )  PTT:33.4 sec      Culture - Body Fluid with Gram Stain (collected 08 Jan 2022 06:35)  Source: .Body Fluid Pleural Fluid  Gram Stain (08 Jan 2022 11:02):    polymorphonuclear leukocytes seen    No organisms seen    by cytocentrifuge    RADIOLOGY & ADDITIONAL TESTS:    1/6/22 US Duplex LE bilateral  No evidence of deep venous thrombosis in either lower extremity    1/6/22 CXR  Moderate to large right pleural effusion without significant change.    COORDINATION OF CARE:  Care Discussed with Consultants/Other Providers [Y- Pulmonary, Pall care, RN]

## 2022-01-08 NOTE — HOSPICE CARE NOTE - CONVESATION DETAILS
***Please note: Pt's insurance does not par with [facility]. A referral to a hospice which is in network with pt's insurance is needed. CM apprised.

## 2022-01-09 NOTE — PROGRESS NOTE ADULT - SUBJECTIVE AND OBJECTIVE BOX
[facility] Division of Hospital Medicine  Pascual Ely MD  Pager #[phone]    Patient is a 51y old  Female who presents with a chief complaint of Dysphagia, abdominal pain (08 Jan 2022 13:51)    SUBJECTIVE / OVERNIGHT EVENTS: No acute events. No issues with breathing, feels comfortable at this time. No fevers. Tolerating diet. No nausea or vomiting. No new or worsened abdominal pain.    MEDICATIONS  (STANDING):    MEDICATIONS  (PRN):  HYDROmorphone   Tablet 2 milliGRAM(s) Oral every 4 hours PRN Severe Pain (7 - 10)  HYDROmorphone  Injectable 0.5 milliGRAM(s) IV Push every 3 hours PRN breakthrough pain if PO dilaudid does not alleviate pain/dyspnea  ondansetron Injectable 4 milliGRAM(s) IV Push every 8 hours PRN Nausea and/or Vomiting    CAPILLARY BLOOD GLUCOSE    I&O's Summary    PHYSICAL EXAM:  Vital Signs Last 24 Hrs  T(C): 36.5 (09 Jan 2022 07:02), Max: 36.8 (08 Jan 2022 14:39)  T(F): 97.7 (09 Jan 2022 07:02), Max: 98.3 (08 Jan 2022 22:03)  HR: 91 (09 Jan 2022 07:02) (91 - 106)  BP: 121/60 (09 Jan 2022 07:02) (121/60 - 135/40)  BP(mean): --  RR: 17 (09 Jan 2022 07:02) (17 - 18)  SpO2: 100% (09 Jan 2022 07:02) (100% - 100%)    CONSTITUTIONAL: NAD, well-developed, sitting in bed  EYES: EOMI; conjunctiva and sclera clear  ENMT: Moist oral mucosa  NECK: no JVD  RESPIRATORY: reduced right basilar breath sounds, no wheezing  CARDIOVASCULAR: Regular rate and rhythm, normal S1 and S2; No lower extremity edema; Peripheral pulses are 2+ bilaterally  ABDOMEN: soft, NT, ND, +BS  PSYCH: affect appropriate  NEUROLOGY: moving all extremities, nonfocal  SKIN: No rashes    LABS:                        8.3    6.67  )-----------( 331      ( 09 Jan 2022 07:12 )             27.6     01-09    134<L>  |  99  |  7   ----------------------------<  86  3.7   |  25  |  0.65    Ca    8.6      09 Jan 2022 07:12  Phos  3.4     01-09  Mg     1.60     01-09    TPro  5.0<L>  /  Alb  2.5<L>  /  TBili  0.6  /  DBili  0.3  /  AST  222<H>  /  ALT  114<H>  /  AlkPhos  1923<H>  01-09      Culture - Body Fluid with Gram Stain (collected 08 Jan 2022 06:35)  Source: .Body Fluid Pleural Fluid  Gram Stain (08 Jan 2022 11:02):    polymorphonuclear leukocytes seen    No organisms seen    by cytocentrifuge  Preliminary Report (09 Jan 2022 09:59):    No growth      RADIOLOGY & ADDITIONAL TESTS:    1/6/22 US Duplex LE bilateral  No evidence of deep venous thrombosis in either lower extremity    1/6/22 CXR  Moderate to large right pleural effusion without significant change.    COORDINATION OF CARE:  Care Discussed with Consultants/Other Providers [Y- Pulmonary, RN]

## 2022-01-09 NOTE — PROGRESS NOTE ADULT - PROBLEM SELECTOR PLAN 6
- Notable b/l LE edema, +calf tenderness  - B/L LE duplex NEGATIVE for DVT
- Notable b/l LE edema, +calf tenderness  - B/L LE duplex NEGATIVE for DVT
- DVT ppx: Sub q Lovenox
Hg stable, no signs of bleeding

## 2022-01-09 NOTE — PROGRESS NOTE ADULT - ASSESSMENT
51 year old female with PMH metastatic gastric cancer (bone metastasis and peritoneal carcinomatosis), prior SBO, multiple bowel resections, and recent hospitalization at [facility] for inability to tolerate PO found to have progression of gastric cancer, course c/b large right pleural effusions s/p thoracentesis and large ascites s/p paracentesis. Pt now presenting with worsening inability to tolerate PO intake, can tolerate small amount of liquids, cannot tolerate food or pills. S/p EGD and stenting per GI. S/p thora by pulmonary 1/7/21.

## 2022-01-09 NOTE — DISCHARGE NOTE NURSING/CASE MANAGEMENT/SOCIAL WORK - PATIENT PORTAL LINK FT
You can access the FollowMyHealth Patient Portal offered by [facility] by registering at the following website: [email or web address]. By joining [facility]’s FollowMyHealth portal, you will also be able to view your health information using other applications (apps) compatible with our system.

## 2022-01-09 NOTE — PROGRESS NOTE ADULT - PROBLEM SELECTOR PLAN 7
- DVT ppx: Sub q Lovenox
- DVT ppx: Sub q Lovenox
- Notable b/l LE edema, +calf tenderness  - B/L LE duplex NEGATIVE for DVT

## 2022-01-09 NOTE — PROGRESS NOTE ADULT - REASON FOR ADMISSION
Dysphagia, abdominal pain

## 2022-01-09 NOTE — DISCHARGE NOTE NURSING/CASE MANAGEMENT/SOCIAL WORK - NSDCPEFALRISK_GEN_ALL_CORE
For information on Fall & Injury Prevention, visit: [email or web address] OR  [email or web address] OR  https://www.cdc.gov/steadi/patient.html

## 2022-01-09 NOTE — PROGRESS NOTE ADULT - PROBLEM SELECTOR PLAN 1
S/p EGD/EUS with GI today - s/p 22mm x 9cm duodenal stent placed across the malignant stricture  - clear liquid diet today per GI  - f/u additional GI recs
S/p EGD/EUS with GI - s/p 22mm x 9cm duodenal stent placed across the malignant stricture  - Tolerating diet  - Outpatient GI follow up
S/p EGD/EUS with GI - s/p 22mm x 9cm duodenal stent placed across the malignant stricture  - Tolerating diet  - Outpatient GI follow up
> d/c IV dilaudid 0.3mg q3h prn moderate pain   > transition to PO dilaudid solution 2mg q4h prn pain/dyspnea. If hospice services cannot be set up, please ensure she is sent home with at least 2 weeks of pain medications.   > bowel regimen while on opioids  > Introduced medical cannabis to her to assist with alleviating her symptoms. She will think consider it.   > For continued outpatient palliative care if she does not go home with hospice, she can follow up with   Dr. Hazel or Dr. Carreon:  [address] [place] [address], [address]   [place] [address]  [phone].
S/p EGD/EUS with GI today - s/p 22mm x 9cm duodenal stent placed across the malignant stricture  - Advancing diet today per GI  - f/u additional GI recs

## 2022-01-09 NOTE — CHART NOTE - NSCHARTNOTEFT_GEN_A_CORE
A/P pulmonary team, pt was advised not to fly given pneumothorax.    Kelton Thompson [facility]  Pager # [phone]

## 2022-01-09 NOTE — CHART NOTE - NSCHARTNOTEFT_GEN_A_CORE
Reviewed CXR from today. Right side small apical PTX remains the same size. Discussed with patient that at this time would avoid flying, as it could exacerbate the PTX and lead to worsening respiratory condition and possible death. The patient agreed not to fly.     Plan   - Repeat CXR in one week, Order placed in [employer]  - Patient can follow up with her PCP in one week   - Patient should follow up with pulmonary for evaluation for PleuraX catheter with Dr. Johnson      Pulmonary/Sleep Clinic  [address] [place] [address]  [phone]

## 2022-01-09 NOTE — PROGRESS NOTE ADULT - PROBLEM SELECTOR PROBLEM 4
Pleural effusion
Anemia
Advanced care planning/counseling discussion

## 2022-01-09 NOTE — PROGRESS NOTE ADULT - NSPROGADDITIONALINFOA_GEN_ALL_CORE
Discharge home today with close outpatient PCP, GI, Pulmonary, Palliative Care, Oncology follow up. Repeat CXR and LFTs within one week. Per pulmonary, advised not to fly given pneumothorax on CXR. Discharge planning time 35 minutes. Discharge home today with close outpatient PCP, GI, Pulmonary, Palliative Care, Oncology follow up. Repeat CXR and LFTs within one week. Per pulmonary, advised not to fly given pneumothorax on CXR. Discharge planning time 35 minutes.  Per palliative care attending recommendation, will prescibe PO dilaudid solution 2mg q4h prn pain/dyspnea, since hospice services not set up at this time, 2 weeks of pain medications prescribed per Palliative recommendation.    Outpatient Palliative follow up with Dr. Carreon or Dr. Hazel [phone]  [address] [place] [address], [address]   [place] [address]  [phone].    Outpatient Pulmonary follow up with Dr. Milton Johnson   Pulmonary/Sleep Clinic  [address] [place] [address]  [address]  [place]  [phone]

## 2022-01-09 NOTE — DISCHARGE NOTE NURSING/CASE MANAGEMENT/SOCIAL WORK - NSDCFUADDAPPT_GEN_ALL_CORE_FT
Discharge home today with close outpatient PCP, GI, Pulmonary, Palliative Care, Oncology follow up. Repeat chest xray and liver function test within one week.      **** As per pulmonary team, you are advised not to fly given pneumothorax****

## 2022-01-09 NOTE — PROGRESS NOTE ADULT - PROBLEM SELECTOR PLAN 3
- Pt w/ metastatic gastric cancer (to bone, and peritoneal carcinomatosis)  - Hx of malignant right pleural effusion, not significantly changed on CXR here compared to recent prior  - Appreciate Oncology and Pall care recs  - Patient interested in hospice, referral placed but not covered by patient's insurance. She says she is not interested in waiting in the hospital to apply to other services which may be covered.
- Pt w/ metastatic gastric cancer (to bone, and peritoneal carcinomatosis)  - Hx of malignant right pleural effusion, not significantly changed on CXR here compared to recent prior  - Appreciate Oncology and Pall care recs
- Pt w/ metastatic gastric cancer (to bone, and peritoneal carcinomatosis)  - Hx of malignant right pleural effusion, not significantly changed on CXR here compared to recent prior  - Appreciate Oncology and Pall care recs  - Patient interested in hospice, referral placed
Patient was diagnosed with gastric cancer in 2011, s/p distal gastrectomy and reconstruction. Found to have recurrence in 2021. She had recent [facility] admission 12/2021, found to have diffuse osseous mets, large ascites. Outpatient onc: Dr. Skinny Ibanez   > CT a/p 12/21/21: 1. Apparent marked dilatation afferent loop likely secondary to recurrent disease at the gastrojejunostomy New large right pleural effusion with adjacent atelectasis. New large ascites with multiple loculations and mesenteric fat infiltration likely secondary to peritoneal carcinomatosis. Predominantly sclerotic but some lytic lesions throughout skeleton suggest advanced metastatic disease. Moderate left hydronephrosis and proximal ureteral dilatation. Large abdominal and pelvic wall hernia containing bowel, mesentery and ascites. No significant change.  > Appreciate heme/onc recs. She re-affirmed that she is not interested in pursuing chemotherapy if it is offered, as she would prefer to pursue more conservative/natural management of her cancer.
- Pt w/ metastatic gastric cancer (to bone, and peritoneal carcinomatosis)  - Hx of malignant right pleural effusion, not significantly changed on CXR here compared to recent prior  - Appreciate Oncology and Pall care recs  - Patient interested in hospice, referral placed

## 2022-01-09 NOTE — PROGRESS NOTE ADULT - PROBLEM SELECTOR PLAN 4
HOspice referral  Holistic RN referral     Patient completed HCP on 12/30/21 at [facility]. She re-affirmed her sister, Edlon Brady ([phone]). HCP completed 12/30/21 at [facility].   2nd HCP - Darius Flores (spouse) [phone].
Hg stable, no signs of bleeding
recent hospitalization at [facility] found to have large right pleural effusion s/p thoracentesis. Continued effusion on CXR here. Pulmonary consulted, appreciate recs.  - s/p thora by pulm on 1/7, 2.1L removed, post procedure CXR with small PTX. Pulmonary aware and following.   - Discussed with pulmonary on 1/8, f/u additional pulm eval and recs  - outpatient follow up for consideration of pleurX catheter if interested
recent hospitalization at [facility] found to have large right pleural effusion s/p thoracentesis. Continued effusion on CXR here. Pulmonary consulted, appreciate recs.  - Not requiring supplemental O2, asymptomatic at rest although some symptoms of dyspnea with exertion  - Patient now interested in thoracentesis, Pulmonary informed, f/u recs
recent hospitalization at [facility] found to have large right pleural effusion s/p thoracentesis. Continued effusion on CXR here. Pulmonary consulted, appreciate recs.  - s/p thora by pulm on 1/7, 2.1L removed, post procedure CXR with small PTX. Pulmonary aware and following.   - Repeat CXR today per Pulm, PA/Lat, reviewed by pulmonary, still present, appears stable from prior. Advised patient extensively to avoid flying. Repeat CXR in one week (ordered by pulm) with follow up with PCP and Pulmonary Dr. Milton Johnson  [phone]  - outpatient follow up for consideration of pleurX catheter with Dr. Johnson as above

## 2022-01-09 NOTE — PROGRESS NOTE ADULT - PROBLEM SELECTOR PLAN 2
- Chronic, diffuse. +Passing gas, +Bowel sounds.   - Appreciate Palliative care input and recs regarding pain regimen
- Chronic, diffuse. +Passing gas, +Bowel sounds. Pain currently controlled.   - Appreciate Palliative care input and recs regarding pain regimen
s/p EGD/EUS on 1/6 showing GJ anastomotic malignant stricture with infiltrative dx; duodenal stent placed   > Advance diet as tolerated    > appreciate GI recs.
- Chronic, diffuse. +Passing gas, +Bowel sounds. Pain currently controlled.   - Appreciate Palliative care input and recs regarding pain regimen  - Outpatient Palliative follow up with Daisy Carreon or Olivia Hazel [phone]
- Chronic, diffuse. +Passing gas, +Bowel sounds.   - Appreciate Palliative care input and recs regarding pain regimen

## 2022-01-15 NOTE — CONSULT NOTE ADULT - ASSESSMENT
51yF w/ R malignant pleural effusion    - Recommend Case Management evaluation given transition between insurance providers in terms of what supplies are available to Pt should we place Pleurx catheter  - Pt declined pleurodesis as an option  - Can see about placement of Pleurx catheter pending OR availability and insurance authorization  - Will follow  - Pt discussed w/ Dr. Daniels  - Please call [phone] w/ any questions    BRIGETTE Rodas PGY-3

## 2022-01-15 NOTE — H&P ADULT - PROBLEM SELECTOR PLAN 1
Consulted house pulm for evaluation. The patient will likely require a thoracentesis.   Due to her insurance, if she were to get a Pleurex inpatient, speaking with the CM her insurance would not likely cover home visits.   Supportive care.

## 2022-01-15 NOTE — ED PROVIDER NOTE - OBJECTIVE STATEMENT
52 yo F PMH metastatic gastric CA (bone metastasis and peritoneal carcinomatosis), SBO, multiple bowel resections, and recent hospitalization at [facility] for progression of gastric cancer w/ large right pleural effusions s/p thoracentesis and large ascites s/p paracentesis, discharged home 1/7, now presenting for increasing SOB at home. Was supposed to follow up with pulm for pleurex outpt, however pt not able to. Mild sharp right sided CP. States he LE edema is at her baseline. Denies fevers, coughing, congestion, or changes in abd pain/urination/BM's.

## 2022-01-15 NOTE — ED PROVIDER NOTE - PROGRESS NOTE DETAILS
Bashir Elam PGY2: CXR suggestive of worsening effusion. Will admit for further management. Accepted for admission to hospitalist.

## 2022-01-15 NOTE — ED PROVIDER NOTE - PHYSICAL EXAMINATION
Gen: A&Ox4   HEENT: Atraumatic. Mucous membranes dry, no scleral icterus.  CV: tachy. 2+ bialt lower extremity edema.   Resp: Tachypneic. Scattered crackles bilat, diminished on right.  GI: Abdomen non tender to palpation, soft. Mildly distended.  Skin/MSK: No open wounds. No ecchymosis appreciated.  Neuro: Following commands. No facial drop.   Psych: Appropriate mood, cooperative

## 2022-01-15 NOTE — H&P ADULT - NSHPOUTPATIENTPROVIDERS_GEN_ALL_CORE
Outpatient Palliative follow up with Dr. Carreon or Dr. Hazel [phone]  Outpatient Pulmonary follow up with Dr. Milton Johnson [phone]

## 2022-01-15 NOTE — ED ADULT NURSE REASSESSMENT NOTE - NS ED NURSE REASSESS COMMENT FT1
Report received from Oskar Higgins. Upon assessment, pt is Ao x 4, speaking in full and complete sentences, breathing spontaneous and unlabored. Pt updated on plan of care, fall and safety precautions in place, call bell within reach.

## 2022-01-15 NOTE — PROGRESS NOTE ADULT - ASSESSMENT
female with h/o gastric  ca ,  right  hydro pneumothorax,    pt was scheduled to see Dinah FOR POSSIBLE PLEUREX PLACEMENT    offered   her thoracentesis but pt REFUSED     She insists that she wants to proceed with pleurex placement    called Dr Nix  who is not available till tuesday     IN THI S SITUATION SHE CAN BE ADMITTED AND OBSERVED  with serial ultrasounds to   evaluate the effusion.    kep sat>(0%    will f/u clincially    talked to the NP covering the patient                 female with h/o gastric  ca ,  right  hydro pneumothorax,    pt was scheduled to see Dinah FOR POSSIBLE PLEUREX PLACEMENT    offered   her thoracentesis but pt REFUSED     She insists that she wants to proceed with pleurex placement    called Dr Nix  who is not available till tuesday     let us calll thoracic surgery to see if they help with pleurex    IN THI S SITUATION SHE CAN BE ADMITTED AND OBSERVED  with serial ultrasounds to   evaluate the effusion.    kep sat>(0%    will f/u clincially    talked to the NP covering the patient

## 2022-01-15 NOTE — H&P ADULT - ASSESSMENT
51F metastatic gastric CA (bone metastasis and peritoneal carcinomatosis), h/o SBO, s/p multiple bowel resections, recent hospitalizations for progression of gastric cancer w/ large right pleural effusions s/p thoracentesis and large ascites s/p paracentesis, discharged home 1/9, now presenting for increasing SOB at home. She was supposed to follow up with pulm for pleurex outpt but apparently her insurance doesn't cover this procedure. Additionally, he's also agreeable to hospice services, but once again, her insurance does not cover these services. She's currently awaiting for her new insurance plan for start On February 1st, which will cover hospice care. At this time she's c/o worsening SOB for the last 2-3 days, worse on exertion.

## 2022-01-15 NOTE — H&P ADULT - PROBLEM SELECTOR PLAN 2
She is agreeable to hospice, and her new insurance will cover it on February 1st.   Continue dilaudid PO PRN pain.

## 2022-01-15 NOTE — ED PROVIDER NOTE - ATTENDING CONTRIBUTION TO CARE
attending Pillo: 51yF h/o metastatic gastric CA (bone metastasis and peritoneal carcinomatosis), SBO, multiple bowel resections, and recent hospitalization at [facility] for progression of gastric cancer w/ large R pleural effusions s/p thoracentesis and large ascites s/p paracentesis, discharged home 1/7, now p/w increasing SOB. Was reportedly scheduled for evaluation for pleurex placement outpatient. Decreased breath sounds R mid to lower lung fields. Concern for recurrent effusion. Will obtain labs, cxr, review prior hospitalization records, will likely require readmission

## 2022-01-15 NOTE — CONSULT NOTE ADULT - ATTENDING COMMENTS
Patient seen and examined agree with above note as modified, where appropriate, by me. Right recurrent effusion, will plan for VATS pleurX placement.

## 2022-01-15 NOTE — ED ADULT NURSE NOTE - NSIMPLEMENTINTERV_GEN_ALL_ED
Implemented All Fall Risk Interventions:  [place] to call system. Call bell, personal items and telephone within reach. Instruct patient to call for assistance. Room bathroom lighting operational. Non-slip footwear when patient is off stretcher. Physically safe environment: no spills, clutter or unnecessary equipment. Stretcher in lowest position, wheels locked, appropriate side rails in place. Provide visual cue, wrist band, yellow gown, etc. Monitor gait and stability. Monitor for mental status changes and reorient to person, place, and time. Review medications for side effects contributing to fall risk. Reinforce activity limits and safety measures with patient and family.

## 2022-01-15 NOTE — ED PROVIDER NOTE - CLINICAL SUMMARY MEDICAL DECISION MAKING FREE TEXT BOX
50 yo F PMH metastatic gastric CA (bone metastasis and peritoneal carcinomatosis), SBO, multiple bowel resections, and recent hospitalization at [facility] for progression of gastric cancer w/ large right pleural effusions s/p thoracentesis and large ascites s/p paracentesis, discharged home 1/7, now presenting for increasing SOB at home. Was supposed to follow up with pulm for pleurex outpt, however pt not able to. Mild sharp right sided CP. States he LE edema is at her baseline. Denies fevers, coughing, congestion, or changes in abd pain/urination/BM's. Exam as above. Concern for progression of metastatic disease, worsening effusion. W/o fevers and cough, less likely pna. Given hypoxia, will order CXR, lab work, ecg. Pt on Dilaudid PO at home. Will continue analgesia for pain 2/2 to metastatic disease. Will reassess.

## 2022-01-15 NOTE — H&P ADULT - NSHPLABSRESULTS_GEN_ALL_CORE
Labs reviewed: no leukocytosis, other labs okay. Mild hypokalemia.     CXR personally reviewed: Moderate right hydropneumothorax.    ECG reviewed and interpreted: NSR at 89 bpm, LAFB

## 2022-01-15 NOTE — ED ADULT NURSE REASSESSMENT NOTE - NS ED NURSE REASSESS COMMENT FT1
Report received from Zohreh NEWELL. Pt AAOx4, NAD, resp nonlabored, skin warm/dry, resting comfortably in bed. Pt given po fluids. Pt awaiting admission bed. Safety maintained.

## 2022-01-15 NOTE — CONSULT NOTE ADULT - SUBJECTIVE AND OBJECTIVE BOX
Thoracic Surgery Consult  Consulting surgical team: Thoracic surgery (Spectra [id number])  Consulting attending: Dr. Bashir    HPI:  51F metastatic gastric CA (bone metastasis and peritoneal carcinomatosis), h/o SBO, s/p multiple bowel resections, recent hospitalizations for progression of gastric cancer w/ large right pleural effusions s/p thoracentesis and large ascites s/p paracentesis, discharged home 1/9, now presenting for increasing SOB at home. She was supposed to follow up with pulm for pleurex outpt but apparently her insurance doesn't cover this procedure. Additionally, he's also agreeable to hospice services, but once again, her insurance does not cover these services. She's currently awaiting for her new insurance plan for start On February 1st, which will cover hospice care. At this time she's c/o worsening SOB for the last 2-3 days, worse on exertion. Not associated with pain. Improves a little with rest. Vitals in ED: T 97.7, HR 97, /78, RR 18, 100% RA.  (15 Loki 2022 13:25)    Thoracic surgery consulted for R pleural effusion. Pt w/ gastric CA s/p rsxn 2011 at [facility] c/b multiple intraabdominal surgeries for SBO and perforation as well as recurrence of gastric CA at GJ junction. Pt now w/ R sided malignant pleural effusion. Has had effusion drained twice so far, 2L taken off 12/23 and another 2.1L taken off 1/7.      PAST MEDICAL HISTORY:  Bariatric surg stat-unsp    Gastric cancer    Small bowel obstruction    Perforated bowel    Other abdominal hernia    Obesity (BMI 30-39.9)        PAST SURGICAL HISTORY:  History of gastrectomy    History of resection of small bowel        MEDICATIONS:  acetaminophen     Tablet .. 650 milliGRAM(s) Oral every 6 hours PRN  aluminum hydroxide/magnesium hydroxide/simethicone Suspension 30 milliLiter(s) Oral every 4 hours PRN  HYDROmorphone   Tablet 2 milliGRAM(s) Oral every 4 hours PRN  melatonin 3 milliGRAM(s) Oral at bedtime PRN  multivitamin 1 Tablet(s) Oral daily  ondansetron   Disintegrating Tablet 4 milliGRAM(s) Oral three times a day  simethicone 80 milliGRAM(s) Chew three times a day PRN      ALLERGIES:  Cipro (Rash)  QUINOLONES (Unknown)      VITALS & I/Os:  Vital Signs Last 24 Hrs  T(C): 36.7 (15 Loki 2022 14:00), Max: 37.1 (15 Loki 2022 10:14)  T(F): 98.1 (15 Loki 2022 14:00), Max: 98.8 (15 Loki 2022 10:14)  HR: 99 (15 Loki 2022 14:00) (90 - 99)  BP: 121/63 (15 Loki 2022 14:00) (121/63 - 145/89)  BP(mean): --  RR: 16 (15 Loki 2022 14:00) (16 - 18)  SpO2: 96% (15 Loki 2022 14:00) (96% - 100%)    I&O's Summary      PHYSICAL EXAM:  General: No acute distress  Respiratory: diminished sounds over R chest  Cardiovascular: RRR  Abdominal: Soft, nondistended, nontender. No rebound or guarding. No organomegaly, no palpable mass.  Extremities: Warm    LABS:                        9.4    8.99  )-----------( 421      ( 15 Loki 2022 03:48 )             31.0     01-15    136  |  99  |  10  ----------------------------<  94  3.3<L>   |  25  |  0.73    Ca    9.0      15 Loki 2022 03:48    TPro  6.1  /  Alb  3.2<L>  /  TBili  0.5  /  DBili  x   /  AST  25  /  ALT  31  /  AlkPhos  2682<H>  01-15    Lactate:  01-15 @ 03:49  0.9    PT/INR - ( 15 Loki 2022 03:49 )   PT: 15.6 sec;   INR: 1.32 ratio         PTT - ( 15 Loki 2022 03:49 )  PTT:32.2 sec        IMAGING:  < from: Xray Chest 1 View- PORTABLE-Urgent (Xray Chest 1 View- PORTABLE-Urgent .) (01.15.22 @ 02:31) >  FINDINGS:    The heart size cannot be assessed on this projection.    The lungs are clear. Moderate right hydropneumothorax.    Osseous metastatic disease again noted.    IMPRESSION:  Moderate right hydropneumothorax.    --- End of Report ---    < end of copied text >

## 2022-01-15 NOTE — H&P ADULT - HISTORY OF PRESENT ILLNESS
51F metastatic gastric CA (bone metastasis and peritoneal carcinomatosis), h/o SBO, s/p multiple bowel resections, recent hospitalizations for progression of gastric cancer w/ large right pleural effusions s/p thoracentesis and large ascites s/p paracentesis, discharged home 1/9, now presenting for increasing SOB at home. She was supposed to follow up with pulm for pleurex outpt but apparently her insurance doesn't cover this procedure. Additionally, he's also agreeable to hospice services, but once again, her insurance does not cover these services. She's currently awaiting for her new insurance plan for start On February 1st, which will cover hospice care. At this time she's c/o worsening SOB for the last 2-3 days, worse on exertion. Not associated with pain. Improves a little with rest. Vitals in ED: T 97.7, HR 97, /78, RR 18, 100% RA.

## 2022-01-15 NOTE — PROGRESS NOTE ADULT - SUBJECTIVE AND OBJECTIVE BOX
CHIEF COMPLAINT: h/o pleural effusoin    Interval Events: pt had thoracentesis at [facility] discharged  one week ago.        OBJECTIVE:  ICU Vital Signs Last 24 Hrs  T(C): 36.7 (15 Loki 2022 14:00), Max: 37.1 (15 Loki 2022 10:14)  T(F): 98.1 (15 Loki 2022 14:00), Max: 98.8 (15 Loki 2022 10:14)  HR: 99 (15 Loki 2022 14:00) (90 - 99)  BP: 121/63 (15 Loki 2022 14:00) (121/63 - 145/89)  BP(mean): --  ABP: --  ABP(mean): --  RR: 16 (15 Loki 2022 14:00) (16 - 18)  SpO2: 96% (15 Loki 2022 14:00) (96% - 100%)        CAPILLARY BLOOD GLUCOSE          PHYSICAL EXAM:  General: alert awake oriented  HEENT: no goiter  Lymph Nodes:no cercal ln  Neck: supple  Respiratory: decrease entry right side  Cardiovascular: s1 , s2 normal  Abdomen: bs ++  Extremities: no edema  Skin: no rash  Neurological: cranial nerve exam normal      HOSPITAL MEDICATIONS:  MEDICATIONS  (STANDING):  multivitamin 1 Tablet(s) Oral daily  ondansetron   Disintegrating Tablet 4 milliGRAM(s) Oral three times a day    MEDICATIONS  (PRN):  acetaminophen     Tablet .. 650 milliGRAM(s) Oral every 6 hours PRN Temp greater or equal to 38C (100.4F), Mild Pain (1 - 3)  aluminum hydroxide/magnesium hydroxide/simethicone Suspension 30 milliLiter(s) Oral every 4 hours PRN Dyspepsia  HYDROmorphone   Tablet 2 milliGRAM(s) Oral every 4 hours PRN Severe Pain (7 - 10)  melatonin 3 milliGRAM(s) Oral at bedtime PRN Insomnia  simethicone 80 milliGRAM(s) Chew three times a day PRN Gas      LABS:                        9.4    8.99  )-----------( 421      ( 15 Loki 2022 03:48 )             31.0     01-15    136  |  99  |  10  ----------------------------<  94  3.3<L>   |  25  |  0.73    Ca    9.0      15 Loki 2022 03:48    TPro  6.1  /  Alb  3.2<L>  /  TBili  0.5  /  DBili  x   /  AST  25  /  ALT  31  /  AlkPhos  2682<H>  01-15    PT/INR - ( 15 Loki 2022 03:49 )   PT: 15.6 sec;   INR: 1.32 ratio         PTT - ( 15 Loki 2022 03:49 )  PTT:32.2 sec      Venous Blood Gas:  01-15 @ 03:49  7.37/47/18/27/23.9  VBG Lactate: 0.9    < from: Xray Chest 1 View- PORTABLE-Urgent (Xray Chest 1 View- PORTABLE-Urgent .) (01.15.22 @ 02:31) >    ACC: [id number] EXAM:  XR CHEST PORTABLE URGENT 1V                          PROCEDURE DATE:  01/15/2022          INTERPRETATION:  EXAMINATION: XR CHEST URGENT    CLINICAL INDICATION: SOB    TECHNIQUE: Single frontal view of the chest was obtained.    COMPARISON: Chest x-ray 1/9/2022.    FINDINGS:    The heart size cannot be assessed on this projection.    The lungs are clear. Moderate right hydropneumothorax.    Osseous metastatic disease again noted.    IMPRESSION:  Moderate right hydropneumothorax.    --- End of Report ---    < end of copied text >    MICROBIOLOGY:     RADIOLOGY:  [ ] Reviewed and interpreted by me    PULMONARY FUNCTION TESTS:    EKG:

## 2022-01-15 NOTE — ED ADULT NURSE NOTE - OBJECTIVE STATEMENT
pt here with sob; has dx of gastric ca and right pleural effusion; pt reports undergoing thoracentesis last Friday; pt is not receiving chemo or radiation; c/o pain "all over"; reports taking 2 mg hydromorphone prn; pt is alert and oriented in no acute distress pt here with sob; has hx of gastric ca and gastrectomy in 2011; pt states she eats regular food; reports dx of right pleural effusion since December; pt reports undergoing thoracentesis last Friday; pt is not receiving chemo or radiation; c/o pain "all over"; reports taking 2 mg hydromorphone prn at home; pt is alert and oriented in no acute distress

## 2022-01-16 NOTE — PROGRESS NOTE ADULT - PROBLEM SELECTOR PLAN 2
She is agreeable to hospice, and her new insurance will cover it on February 1st.   Continue dilaudid PO PRN pain. Resolved

## 2022-01-16 NOTE — PROGRESS NOTE ADULT - SUBJECTIVE AND OBJECTIVE BOX
Patient is a 51y old  Female who presents with a chief complaint of SOB (16 Jan 2022 09:02)      Vital Signs Last 24 Hrs  T(C): 36.7 (01-16-22 @ 05:11), Max: 37.1 (01-15-22 @ 10:14)  T(F): 98.1 (01-16-22 @ 05:11), Max: 98.8 (01-15-22 @ 10:14)  HR: 101 (01-16-22 @ 05:11) (98 - 101)  BP: 134/71 (01-16-22 @ 05:11) (121/63 - 134/71)  RR: 18 (01-16-22 @ 05:11) (16 - 18)  SpO2: 98% (01-16-22 @ 05:11) (96% - 98%)                                  9.4    8.99  )-----------( 421      ( 15 Loki 2022 03:48 )             31.0     134<L>  |  98  |  12  ----------------------------<  92  3.6   |  25  |  0.79            PHYSICAL EXAM  Neurology: A&Ox3, NAD  CV : RRR+S1S2  Lungs: Respirations non-labored, B/L BS CTA  Abdomen: Soft, NT/ND, +BSx4Q  Extremities: B/L LE warm, no edema, +PP           MEDICATIONS  acetaminophen     Tablet .. 650 milliGRAM(s) Oral every 6 hours PRN  aluminum hydroxide/magnesium hydroxide/simethicone Suspension 30 milliLiter(s) Oral every 4 hours PRN  HYDROmorphone   Tablet 2 milliGRAM(s) Oral every 4 hours PRN  melatonin 3 milliGRAM(s) Oral at bedtime PRN  multivitamin 1 Tablet(s) Oral daily  ondansetron   Disintegrating Tablet 4 milliGRAM(s) Oral three times a day  simethicone 80 milliGRAM(s) Chew three times a day PRN     Patient is a 51y old  Female who presents with a chief complaint of SOB (16 Jan 2022 09:02)      Vital Signs Last 24 Hrs  T(C): 36.7 (01-16-22 @ 05:11), Max: 37.1 (01-15-22 @ 10:14)  T(F): 98.1 (01-16-22 @ 05:11), Max: 98.8 (01-15-22 @ 10:14)  HR: 101 (01-16-22 @ 05:11) (98 - 101)  BP: 134/71 (01-16-22 @ 05:11) (121/63 - 134/71)  RR: 18 (01-16-22 @ 05:11) (16 - 18)  SpO2: 98% (01-16-22 @ 05:11) (96% - 98%)                                  9.4    8.99  )-----------( 421      ( 15 Loki 2022 03:48 )             31.0     134<L>  |  98  |  12  ----------------------------<  92  3.6   |  25  |  0.79            PHYSICAL EXAM  Neurology: A&Ox3, NAD  CV : RRR+S1S2  Lungs: Respirations non-labored, B/L BS clear, diminished at bases  Abdomen: Soft, NT/ND, +BSx4Q  Extremities: B/L LE warm, no edema, +PP             MEDICATIONS  acetaminophen     Tablet .. 650 milliGRAM(s) Oral every 6 hours PRN  aluminum hydroxide/magnesium hydroxide/simethicone Suspension 30 milliLiter(s) Oral every 4 hours PRN  HYDROmorphone   Tablet 2 milliGRAM(s) Oral every 4 hours PRN  melatonin 3 milliGRAM(s) Oral at bedtime PRN  multivitamin 1 Tablet(s) Oral daily  ondansetron   Disintegrating Tablet 4 milliGRAM(s) Oral three times a day  simethicone 80 milliGRAM(s) Chew three times a day PRN

## 2022-01-16 NOTE — PATIENT PROFILE ADULT - FALL HARM RISK - UNIVERSAL INTERVENTIONS
Bed in lowest position, wheels locked, appropriate side rails in place/Call bell, personal items and telephone in reach/Instruct patient to call for assistance before getting out of bed or chair/Non-slip footwear when patient is out of bed/[place] to call system/Physically safe environment - no spills, clutter or unnecessary equipment/Purposeful Proactive Rounding/Room/bathroom lighting operational, light cord in reach

## 2022-01-16 NOTE — PROGRESS NOTE ADULT - SUBJECTIVE AND OBJECTIVE BOX
Patient is a 51y old  Female who presents with a chief complaint of SOB (15 Loki 2022 19:12)      SUBJECTIVE / OVERNIGHT EVENTS:    MEDICATIONS  (STANDING):  multivitamin 1 Tablet(s) Oral daily  ondansetron   Disintegrating Tablet 4 milliGRAM(s) Oral three times a day    MEDICATIONS  (PRN):  acetaminophen     Tablet .. 650 milliGRAM(s) Oral every 6 hours PRN Temp greater or equal to 38C (100.4F), Mild Pain (1 - 3)  aluminum hydroxide/magnesium hydroxide/simethicone Suspension 30 milliLiter(s) Oral every 4 hours PRN Dyspepsia  HYDROmorphone   Tablet 2 milliGRAM(s) Oral every 4 hours PRN Severe Pain (7 - 10)  melatonin 3 milliGRAM(s) Oral at bedtime PRN Insomnia  simethicone 80 milliGRAM(s) Chew three times a day PRN Gas      Vital Signs Last 24 Hrs  T(C): 36.7 (16 Jan 2022 05:11), Max: 37.1 (15 Loki 2022 10:14)  T(F): 98.1 (16 Jan 2022 05:11), Max: 98.8 (15 Loki 2022 10:14)  HR: 101 (16 Jan 2022 05:11) (98 - 101)  BP: 134/71 (16 Jan 2022 05:11) (121/63 - 134/71)  BP(mean): --  RR: 18 (16 Jan 2022 05:11) (16 - 18)  SpO2: 98% (16 Jan 2022 05:11) (96% - 98%)  CAPILLARY BLOOD GLUCOSE        I&O's Summary      PHYSICAL EXAM:  GENERAL: NAD, well-developed  HEAD:  Atraumatic, Normocephalic  EYES: EOMI, PERRLA, conjunctiva and sclera clear  NECK: Supple, No JVD  CHEST/LUNG: Clear to auscultation bilaterally; No wheeze  HEART: Regular rate and rhythm; No murmurs, rubs, or gallops  ABDOMEN: Soft, Nontender, Nondistended; Bowel sounds present  EXTREMITIES:  2+ Peripheral Pulses, No clubbing, cyanosis, or edema  PSYCH: AAOx3  NEUROLOGY: non-focal  SKIN: No rashes or lesions    LABS:                        9.4    8.99  )-----------( 421      ( 15 Loki 2022 03:48 )             31.0     01-16    134<L>  |  98  |  12  ----------------------------<  92  3.6   |  25  |  0.79    Ca    8.8      16 Jan 2022 06:15    TPro  6.1  /  Alb  3.2<L>  /  TBili  0.5  /  DBili  x   /  AST  25  /  ALT  31  /  AlkPhos  2682<H>  01-15    PT/INR - ( 15 Loki 2022 03:49 )   PT: 15.6 sec;   INR: 1.32 ratio         PTT - ( 15 Loki 2022 03:49 )  PTT:32.2 sec          RADIOLOGY & ADDITIONAL TESTS:    Imaging Personally Reviewed:    Consultant(s) Notes Reviewed:      Care Discussed with Consultants/Other Providers:   Patient is a 51y old  Female who presents with a chief complaint of SOB (15 Loki 2022 19:12)      SUBJECTIVE / OVERNIGHT EVENTS: Still with SOB , no chest pain. no fever or chills    MEDICATIONS  (STANDING):  multivitamin 1 Tablet(s) Oral daily  ondansetron   Disintegrating Tablet 4 milliGRAM(s) Oral three times a day    MEDICATIONS  (PRN):  acetaminophen     Tablet .. 650 milliGRAM(s) Oral every 6 hours PRN Temp greater or equal to 38C (100.4F), Mild Pain (1 - 3)  aluminum hydroxide/magnesium hydroxide/simethicone Suspension 30 milliLiter(s) Oral every 4 hours PRN Dyspepsia  HYDROmorphone   Tablet 2 milliGRAM(s) Oral every 4 hours PRN Severe Pain (7 - 10)  melatonin 3 milliGRAM(s) Oral at bedtime PRN Insomnia  simethicone 80 milliGRAM(s) Chew three times a day PRN Gas      Vital Signs Last 24 Hrs  T(C): 36.7 (16 Jan 2022 05:11), Max: 37.1 (15 Loki 2022 10:14)  T(F): 98.1 (16 Jan 2022 05:11), Max: 98.8 (15 Loki 2022 10:14)  HR: 101 (16 Jan 2022 05:11) (98 - 101)  BP: 134/71 (16 Jan 2022 05:11) (121/63 - 134/71)  BP(mean): --  RR: 18 (16 Jan 2022 05:11) (16 - 18)  SpO2: 98% (16 Jan 2022 05:11) (96% - 98%)  CAPILLARY BLOOD GLUCOSE        I&O's Summary      PHYSICAL EXAM:  GENERAL: NAD  HEAD:  Atraumatic, Normocephalic  EYES: EOMI, PERRLA,  NECK: Supple,  CHEST/LUNG: Decrease air entry on right side of lung, dull on percussion, no wheezing   HEART: Regular rate and rhythm; No murmurs, rubs, or gallops  ABDOMEN: Soft, distended mildly tender, + fluid waves Bowel sounds present  EXTREMITIES:  trace of edema  PSYCH: AAOx3  NEUROLOGY: non-focal  SKIN: No rashes or lesions    LABS:                        9.4    8.99  )-----------( 421      ( 15 Loki 2022 03:48 )             31.0     01-16    134<L>  |  98  |  12  ----------------------------<  92  3.6   |  25  |  0.79    Ca    8.8      16 Jan 2022 06:15    TPro  6.1  /  Alb  3.2<L>  /  TBili  0.5  /  DBili  x   /  AST  25  /  ALT  31  /  AlkPhos  2682<H>  01-15    PT/INR - ( 15 Loki 2022 03:49 )   PT: 15.6 sec;   INR: 1.32 ratio         PTT - ( 15 Loki 2022 03:49 )  PTT:32.2 sec          RADIOLOGY & ADDITIONAL TESTS:    Imaging Personally Reviewed:    Consultant(s) Notes Reviewed:      Care Discussed with Consultants/Other Providers:

## 2022-01-16 NOTE — PROGRESS NOTE ADULT - ASSESSMENT
51F metastatic gastric CA (bone metastasis and peritoneal carcinomatosis), h/o SBO, s/p multiple bowel resections, recent hospitalizations for progression of gastric cancer w/ large right pleural effusions s/p thoracentesis and large ascites s/p paracentesis, discharged home 1/9, now presenting for increasing SOB at home. She was supposed to follow up with pulm for pleurex outpt but apparently her insurance doesn't cover this procedure. Additionally, he's also agreeable to hospice services, but once again, her insurance does not cover these services. She's currently awaiting for her new insurance plan for start On February 1st, which will cover hospice care. At this time she's c/o worsening SOB for the last 2-3 days, worse on exertion.  51F metastatic gastric CA (bone metastasis and peritoneal carcinomatosis), h/o SBO, s/p multiple bowel resections, recent hospitalizations for progression of gastric cancer w/ large right pleural effusions s/p thoracentesis and large ascites s/p paracentesis, discharged home 1/9, now presenting for increasing SOB at home. She was supposed to follow up with pulm for pleurex outpt but apparently her insurance doesn't cover this procedure. Additionally, he's also agreeable to hospice services, but once again, her insurance does not cover these services. She's currently awaiting for her new insurance plan for start On February 1st, which will cover hospice care. At this time she's c/o worsening SOB for the last 2-3 days, worse on exertion. and found to have right sided hydropneumothorax

## 2022-01-16 NOTE — PROGRESS NOTE ADULT - ASSESSMENT
51F metastatic gastric CA (bone metastasis and peritoneal carcinomatosis), h/o SBO, s/p multiple bowel resections, recent hospitalizations for progression of gastric cancer w/ large right pleural effusions s/p thoracentesis and large ascites s/p paracentesis, discharged home 1/9, now presenting for increasing SOB at home.  Thoracic surgery consulted for R sided malignant pleural effusion. Has had effusion drained twice so far, 2L taken off 12/23 and another 2.1L taken off 1/7.

## 2022-01-16 NOTE — PROGRESS NOTE ADULT - PROBLEM SELECTOR PLAN 1
Consulted house pulm for evaluation. The patient will likely require a thoracentesis.   Due to her insurance, if she were to get a Pleurex inpatient, speaking with the CM her insurance would not likely cover home visits.   Supportive care. She is agreeable to hospice, and her new insurance will cover it on February 1st.   Continue dilaudid PO PRN pain and pain is under control

## 2022-01-16 NOTE — PROGRESS NOTE ADULT - PROBLEM SELECTOR PLAN 1
CXR with moderate right hydropneumothorax.  Thoracic surgery consulted for possible pleurx vs pleurodesis  Recommend case management consult for insurance coverage issues   Care as per primary team CXR with moderate right hydropneumothorax.  Patient added on to OR Monday 1/17 for Right VATS pleurx placement with Dr. Daniels  Check pre-op labs - HCG, type & screen, coags, bmp, cbc  Recommend case management consult for insurance coverage issues   Care as per primary team

## 2022-01-17 NOTE — PRE-ANESTHESIA EVALUATION ADULT - NSANTHPMHFT_GEN_ALL_CORE
51F metastatic gastric CA (bone metastasis and peritoneal carcinomatosis), h/o SBO, s/p multiple bowel resections, recent hospitalizations for progression of gastric cancer w/ large right pleural effusions s/p thoracentesis and large ascites s/p paracentesis; p/w SOB in setting of large pleff

## 2022-01-17 NOTE — BRIEF OPERATIVE NOTE - OPERATION/FINDINGS
Large hydrothorax, drained 2750cc, diffuse disease in pleura appears metastatic, pleural bx taken.  PleurX catheter placed in posterior thorax.  Lung reinflated at end of case.  PleurX catheter to gravity.

## 2022-01-18 NOTE — PROGRESS NOTE ADULT - PROBLEM SELECTOR PLAN 1
She is agreeable to hospice, and her new insurance will cover it on February 1st.   Continue dilaudid PO PRN pain and pain is under control s/p VATS with pleurex placement - drained 2750cc, diffuse disease in pleura appears metastatic, pleural bx taken.  PleurX catheter placed in posterior thorax.   cxr today improved   will continue to monitor   CT surgery f/u regarding drainage and discharge plan

## 2022-01-18 NOTE — PROGRESS NOTE ADULT - SUBJECTIVE AND OBJECTIVE BOX
Subjective: Pt states" " Denies any CP, SOB, palpitations. No acute events overnight.    Vital Signs:  Vital Signs Last 24 Hrs  T(C): 36.8 (01-18-22 @ 05:03), Max: 37.1 (01-17-22 @ 12:39)  T(F): 98.2 (01-18-22 @ 05:03), Max: 98.8 (01-17-22 @ 12:39)  HR: 115 (01-18-22 @ 05:03) (92 - 115)  BP: 116/67 (01-18-22 @ 05:03) (105/58 - 137/77)  RR: 20 (01-18-22 @ 05:03) (17 - 21)  SpO2: 95% (01-18-22 @ 05:03) (94% - 100%) on (O2)        Relevant labs, radiology and Medications reviewed                        9.2    10.67 )-----------( 367      ( 18 Jan 2022 06:22 )             29.5     01-18    129<L>  |  96  |  12  ----------------------------<  100<H>  3.9   |  21<L>  |  0.78    Ca    8.5      18 Jan 2022 06:22    TPro  5.1<L>  /  Alb  2.5<L>  /  TBili  0.4  /  DBili  x   /  AST  19  /  ALT  22  /  AlkPhos  2269<H>  01-17    PT/INR - ( 17 Jan 2022 06:14 )   PT: 14.8 sec;   INR: 1.25 ratio         PTT - ( 17 Jan 2022 06:14 )  PTT:31.3 sec  MEDICATIONS  (STANDING):  multivitamin 1 Tablet(s) Oral daily  ondansetron   Disintegrating Tablet 4 milliGRAM(s) Oral three times a day    MEDICATIONS  (PRN):  acetaminophen     Tablet .. 650 milliGRAM(s) Oral every 6 hours PRN Temp greater or equal to 38C (100.4F), Mild Pain (1 - 3)  aluminum hydroxide/magnesium hydroxide/simethicone Suspension 30 milliLiter(s) Oral every 4 hours PRN Dyspepsia  HYDROmorphone   Tablet 2 milliGRAM(s) Oral every 4 hours PRN Moderate Pain (4 - 6)  HYDROmorphone  Injectable 1 milliGRAM(s) IV Push every 6 hours PRN Severe Pain (7 - 10)  melatonin 3 milliGRAM(s) Oral at bedtime PRN Insomnia  simethicone 80 milliGRAM(s) Chew three times a day PRN Gas      I&O's Summary    17 Jan 2022 07:01  -  18 Jan 2022 07:00  --------------------------------------------------------  IN: 0 mL / OUT: 750 mL / NET: -750 mL        IMAGING    CXR:    CT Chest:    PAST MEDICAL & SURGICAL HISTORY:  Gastric cancer  5/2011 no adjunt trement    Small bowel obstruction  Exp lap complicated with post op Ishemic bowel /perforation    Perforated bowel    Other abdominal hernia    Obesity (BMI 30-39.9)    History of gastrectomy  distal gastrectomy w/ B2 reconstruction (gastrojejunostomy)    History of resection of small bowel  exlap, SBR, meckel&#x27;s diverticulectomy (7/4/2015); exlap, SBR for necrotic bowel, left in discontinuity, Abthera (7/9/2015); exlap, SB anastomosis, Abthera (7/11/15); exlap, washout, abdominal closure w/ Strattice (7/13/15)         Physical Exam:  Neurology: A&Ox3, nonfocal, BAIN x 4, NAD  Respiratory: B/L BS CTA, diminished at bases, No wheezing, rales, rhonchi  CV: RRR, S1S2

## 2022-01-18 NOTE — PROGRESS NOTE ADULT - ASSESSMENT
51F metastatic gastric CA (bone metastasis and peritoneal carcinomatosis), h/o SBO, s/p multiple bowel resections, recent hospitalizations for progression of gastric cancer w/ large right pleural effusions s/p thoracentesis and large ascites s/p paracentesis, discharged home 1/9, now presenting for increasing SOB at home.  Thoracic surgery consulted for R sided malignant pleural effusion. s/p multiple drainage with significant recurrence. Now with Right Pleurex catheter placement on 1/17 now capped on 1/18.    Plan: Patient should be taught how to self manage the drainage by Nursing and pain management should be optimal without the dependence on IV breakthrough prior to discharge.     For home drainage of pleurex:    1) pleurex should be drained 3 times a week (M/W/S) into the large bottles (1L) or PRN  2) if output is <500 per drainage, may switch to small pleurex bottles, same schedule  3) for either size bottle, the pleurex should be drained until drainage stops or patient becomes uncomfortable  4) strict recording of the total output for each drainage session.      Pt should follow up with Dr. Daniels in 2 weeks. Please call his office  51F metastatic gastric CA (bone metastasis and peritoneal carcinomatosis), h/o SBO, s/p multiple bowel resections, recent hospitalizations for progression of gastric cancer w/ large right pleural effusions s/p thoracentesis and large ascites s/p paracentesis, discharged home 1/9, now presenting for increasing SOB at home.  Thoracic surgery consulted for R sided malignant pleural effusion. s/p multiple drainage with significant recurrence. Now with Right Pleurex catheter placement on 1/17 now capped on 1/18.    Plan:   Patient should be taught how to self manage the drainage by Nursing and pain management should be optimal without the dependence on IV breakthrough prior to discharge.     For home drainage of pleurex:    1) pleurex should be drained 3 times a week (M/W/S) into the large bottles (1L) or PRN  2) if output is <500 per drainage, may switch to small pleurex bottles, same schedule  3) for either size bottle, the pleurex should be drained until drainage stops or patient becomes uncomfortable  4) strict recording of the total output for each drainage session.      Pt should follow up with Dr. Daniels in 2 weeks. Please call his office

## 2022-01-18 NOTE — PROGRESS NOTE ADULT - PROBLEM SELECTOR PLAN 1
CXR with moderate right hydropneumothorax.  Patient added on to OR Monday 1/17 for Right VATS pleurx placement with Dr. Daniels  Check pre-op labs - HCG, type & screen, coags, bmp, cbc  Recommend case management consult for insurance coverage issues   Care as per primary team

## 2022-01-18 NOTE — PROGRESS NOTE ADULT - ASSESSMENT
51 year old female with metastatic gastric cancer (bone metastasis and peritoneal carcinomatosis), prior SBO, multiple bowel resections, and recent hospitalization at [facility] for inability to tolerate PO found to have progression of gastric cancer, course c/b large right pleural effusions s/p thoracentesis and large ascites s/p paracentesis. Cyto positive for adenocarcinoma where for recurrent effusion S/P VATS and pleurx placement.     # Malignant pleural effusion  # Metastatic gastric ca  # Suq emphysema, Pneumothorax  # S/P Pleurx placement.   - S/P Vats, pleural biopsy and plerux placement by CT surgery, some subq emphysema, CXR from today pending  - C/W with drainage per CT surgery  - Post operative subqemphysema   - Patient has follow up with Dr. Bashir as outpatient. If patient wishes she can also follow up with pulmonary.   - Pleurx and malignant effusion is being managed by CT surgery. Will signs off.     Patient can follow up with pulmonary upon discharge at [address] [place] [address], [address] ([phone]).     Dae Hyeon Kim [id number]  Pulmonary Critical Care Fellow  Pager [phone]

## 2022-01-18 NOTE — PROGRESS NOTE ADULT - SUBJECTIVE AND OBJECTIVE BOX
[facility] Division of Hospital Medicine  Coty Schultz DO  8a-5pm: [phone]  after 5pm call [phone]    Patient is a 51y old  Female who presents with a chief complaint of SOB (18 Jan 2022 12:50)        SUBJECTIVE / OVERNIGHT EVENTS:      MEDICATIONS  (STANDING):  multivitamin 1 Tablet(s) Oral daily  ondansetron   Disintegrating Tablet 4 milliGRAM(s) Oral three times a day    MEDICATIONS  (PRN):  acetaminophen     Tablet .. 650 milliGRAM(s) Oral every 6 hours PRN Temp greater or equal to 38C (100.4F), Mild Pain (1 - 3)  aluminum hydroxide/magnesium hydroxide/simethicone Suspension 30 milliLiter(s) Oral every 4 hours PRN Dyspepsia  HYDROmorphone   Tablet 2 milliGRAM(s) Oral every 4 hours PRN Moderate Pain (4 - 6)  HYDROmorphone  Injectable 1 milliGRAM(s) IV Push every 6 hours PRN Severe Pain (7 - 10)  melatonin 3 milliGRAM(s) Oral at bedtime PRN Insomnia  simethicone 80 milliGRAM(s) Chew three times a day PRN Gas      Vital Signs Last 24 Hrs  T(C): 36.6 (18 Jan 2022 14:02), Max: 36.9 (18 Jan 2022 00:58)  T(F): 97.9 (18 Jan 2022 14:02), Max: 98.4 (18 Jan 2022 00:58)  HR: 101 (18 Jan 2022 14:02) (92 - 115)  BP: 127/79 (18 Jan 2022 14:02) (105/58 - 137/77)  BP(mean): 93 (17 Jan 2022 16:45) (89 - 98)  RR: 20 (18 Jan 2022 14:02) (17 - 21)  SpO2: 97% (18 Jan 2022 14:02) (95% - 100%)  CAPILLARY BLOOD GLUCOSE        I&O's Summary    17 Jan 2022 07:01  -  18 Jan 2022 07:00  --------------------------------------------------------  IN: 0 mL / OUT: 750 mL / NET: -750 mL        PHYSICAL EXAM:  GENERAL: NAD  HEAD:  Atraumatic, Normocephalic  EYES: EOMI, PERRLA,  NECK: Supple,  CHEST/LUNG: Decrease air entry on right side of lung, dull on percussion, no wheezing   HEART: Regular rate and rhythm; No murmurs, rubs, or gallops  ABDOMEN: Soft, distended mildly tender, + fluid waves Bowel sounds present  EXTREMITIES:  trace of edema  PSYCH: AAOx3  NEUROLOGY: non-focal  SKIN: No rashes or lesions    LABS:                        9.2    10.67 )-----------( 367      ( 18 Jan 2022 06:22 )             29.5     01-18    129<L>  |  96  |  12  ----------------------------<  100<H>  3.9   |  21<L>  |  0.78    Ca    8.5      18 Jan 2022 06:22    TPro  5.1<L>  /  Alb  2.5<L>  /  TBili  0.4  /  DBili  x   /  AST  19  /  ALT  22  /  AlkPhos  2269<H>  01-17    PT/INR - ( 17 Jan 2022 06:14 )   PT: 14.8 sec;   INR: 1.25 ratio         PTT - ( 17 Jan 2022 06:14 )  PTT:31.3 sec          RADIOLOGY & ADDITIONAL TESTS:    Imaging Personally Reviewed:  Consultant(s) Notes Reviewed:    Care Discussed with Consultants/Other Providers:   [facility] Division of Hospital Medicine  Coty Schultz DO  8a-5pm: [phone]  after 5pm call [phone]    Patient is a 51y old  Female who presents with a chief complaint of SOB (18 Jan 2022 12:50)        SUBJECTIVE / OVERNIGHT EVENTS: some pain at pleurex site      MEDICATIONS  (STANDING):  multivitamin 1 Tablet(s) Oral daily  ondansetron   Disintegrating Tablet 4 milliGRAM(s) Oral three times a day    MEDICATIONS  (PRN):  acetaminophen     Tablet .. 650 milliGRAM(s) Oral every 6 hours PRN Temp greater or equal to 38C (100.4F), Mild Pain (1 - 3)  aluminum hydroxide/magnesium hydroxide/simethicone Suspension 30 milliLiter(s) Oral every 4 hours PRN Dyspepsia  HYDROmorphone   Tablet 2 milliGRAM(s) Oral every 4 hours PRN Moderate Pain (4 - 6)  HYDROmorphone  Injectable 1 milliGRAM(s) IV Push every 6 hours PRN Severe Pain (7 - 10)  melatonin 3 milliGRAM(s) Oral at bedtime PRN Insomnia  simethicone 80 milliGRAM(s) Chew three times a day PRN Gas      Vital Signs Last 24 Hrs  T(C): 36.6 (18 Jan 2022 14:02), Max: 36.9 (18 Jan 2022 00:58)  T(F): 97.9 (18 Jan 2022 14:02), Max: 98.4 (18 Jan 2022 00:58)  HR: 101 (18 Jan 2022 14:02) (92 - 115)  BP: 127/79 (18 Jan 2022 14:02) (105/58 - 137/77)  BP(mean): 93 (17 Jan 2022 16:45) (89 - 98)  RR: 20 (18 Jan 2022 14:02) (17 - 21)  SpO2: 97% (18 Jan 2022 14:02) (95% - 100%)  CAPILLARY BLOOD GLUCOSE        I&O's Summary    17 Jan 2022 07:01  -  18 Jan 2022 07:00  --------------------------------------------------------  IN: 0 mL / OUT: 750 mL / NET: -750 mL        PHYSICAL EXAM:  GENERAL: NAD  HEAD:  Atraumatic, Normocephalic  EYES: EOMI, PERRLA,  NECK: Supple,  CHEST/LUNG: Decrease air entry on right base, dull on percussion, no wheezing, right pleurex (pt refused exam)  HEART: Regular rate and rhythm; No murmurs, rubs, or gallops  ABDOMEN: Soft, distended mildly tender, + fluid waves Bowel sounds present  EXTREMITIES:  1+b/l LE edema  PSYCH: AAOx3  NEUROLOGY: no facial droop, moving all extremities  SKIN: No rashes or lesions    LABS:                        9.2    10.67 )-----------( 367      ( 18 Jan 2022 06:22 )             29.5     01-18    129<L>  |  96  |  12  ----------------------------<  100<H>  3.9   |  21<L>  |  0.78    Ca    8.5      18 Jan 2022 06:22    TPro  5.1<L>  /  Alb  2.5<L>  /  TBili  0.4  /  DBili  x   /  AST  19  /  ALT  22  /  AlkPhos  2269<H>  01-17    PT/INR - ( 17 Jan 2022 06:14 )   PT: 14.8 sec;   INR: 1.25 ratio         PTT - ( 17 Jan 2022 06:14 )  PTT:31.3 sec          RADIOLOGY & ADDITIONAL TESTS:    Imaging Personally Reviewed:  Consultant(s) Notes Reviewed:    Care Discussed with Consultants/Other Providers:

## 2022-01-18 NOTE — CHART NOTE - NSCHARTNOTEFT_GEN_A_CORE
NP Medicine    CC: Pt c/o pain post VATS procedure with pleurex drain placed on Thoracic surgery. Pt seen at bedside, noted with minimal crepitus and R chest wall swelling.     HPI:  Pt is a 51F metastatic gastric CA (bone metastasis and peritoneal carcinomatosis), h/o SBO, s/p multiple bowel resections, recent hospitalizations for progression of gastric cancer w/ large right pleural effusions s/p thoracentesis and large ascites s/p paracentesis, discharged home 1/9, now presenting for increasing SOB at home secondary to malignant pleural effusion. Pt s/p Puerx catheter placed by Thoracic on 1/17/22. Pt seen at bedside noted with swelling on R chest wall with minimal subcutaneous emphysema. Pt denies any SOB, pt with significant pain post procedure.    Vital Signs Last 24 Hrs  T(C): 36.9 (18 Jan 2022 00:58), Max: 37.1 (17 Jan 2022 12:39)  T(F): 98.4 (18 Jan 2022 00:58), Max: 98.8 (17 Jan 2022 12:39)  HR: 114 (18 Jan 2022 00:58) (92 - 114)  BP: 120/66 (18 Jan 2022 00:58) (105/58 - 137/77)  BP(mean): 93 (17 Jan 2022 16:45) (89 - 98)  RR: 20 (18 Jan 2022 00:58) (17 - 21)  SpO2: 100% (18 Jan 2022 00:58) (94% - 100%)    Physical Exam:  General:  non-toxic,  NAD  Neurology: A&Ox3, normal speech, CN II-XII intact, nonfocal, BAIN x 4  Head:  Normocephalic, atraumatic  Respiratory: CTA B/L asymmetric chest wall. RR stable   CV: RRR, S1S2, no murmur  Abdominal: Soft, NT, ND no palpable mass  MSK: No edema, + peripheral pulses, FROM all 4 extremity    Labs:                          8.9    8.70  )-----------( 358      ( 17 Jan 2022 06:14 )             29.3     01-17    134<L>  |  100  |  12  ----------------------------<  93  3.8   |  21<L>  |  0.80    Ca    8.4      17 Jan 2022 06:14    TPro  5.1<L>  /  Alb  2.5<L>  /  TBili  0.4  /  DBili  x   /  AST  19  /  ALT  22  /  AlkPhos  2269<H>  01-17        Radiology:  < from: Xray Chest 1 View- PORTABLE-Routine (Xray Chest 1 View- PORTABLE-Routine in AM.) (01.17.22 @ 08:23) >    IMPRESSION:    Increasing large right hydropneumothorax.    < end of copied text >          Assessment & Plan:  51 F presenting for increasing SOB at home secondary to malignant pleural effusion. Pt s/p Puerx catheter placed by Thoracic on 1/17/22. Pt seen at bedside noted with swelling on R chest wall with minimal subcutaneous emphysema. Pt denies any SOB, pt with significant pain post procedure. Pt with no noted tachypnea or hypoxia.    - Dilaudid IV prn ordered for severe pain   - Encouraged Dilaudid p.o. and Tylenol prn for pain management  - CXR placed in AM for further eval will order stat if patient becomes symptomatic  - Thoracic Surgery [id number] notified, LEELEE Jones agrees with CXR in AM and evaluated pt at bedside.  - Discussed plan of care with RN[place]  [id number]

## 2022-01-18 NOTE — PROGRESS NOTE ADULT - PROBLEM SELECTOR PLAN 2
Resolved She is agreeable to hospice, and her new insurance will cover it on February 1st.   Continue dilaudid PO/IV PRN pain and pain is under control  will need to transition to po dilaudid for discharge

## 2022-01-18 NOTE — PROGRESS NOTE ADULT - SUBJECTIVE AND OBJECTIVE BOX
Interval Events: Patient was seen and examined at bedside. No overnight events.    REVIEW OF SYSTEMS:  Constitutional: [ ] fevers [ ] chills [ ] weight loss [ ] weight gain  CV: [ ] chest pain [ ] orthopnea [ ] palpitations [ ] murmur  Resp: [ ] cough [ ] shortness of breath [ ] dyspnea [ ] wheezing [ ] sputum [ ] hemoptysis  [ ] All other systems negative  [ ] Unable to assess ROS because ________    OBJECTIVE:  ICU Vital Signs Last 24 Hrs  T(C): 36.8 (18 Jan 2022 05:03), Max: 37.1 (17 Jan 2022 13:11)  T(F): 98.2 (18 Jan 2022 05:03), Max: 98.4 (18 Jan 2022 00:58)  HR: 115 (18 Jan 2022 05:03) (92 - 115)  BP: 116/67 (18 Jan 2022 05:03) (105/58 - 137/77)  BP(mean): 93 (17 Jan 2022 16:45) (89 - 98)  ABP: --  ABP(mean): --  RR: 20 (18 Jan 2022 05:03) (17 - 21)  SpO2: 95% (18 Jan 2022 05:03) (94% - 100%)        01-17 @ 07:01  -  01-18 @ 07:00  --------------------------------------------------------  IN: 0 mL / OUT: 750 mL / NET: -750 mL      CAPILLARY BLOOD GLUCOSE          PHYSICAL EXAM:        HOSPITAL MEDICATIONS:  MEDICATIONS  (STANDING):  multivitamin 1 Tablet(s) Oral daily  ondansetron   Disintegrating Tablet 4 milliGRAM(s) Oral three times a day    MEDICATIONS  (PRN):  acetaminophen     Tablet .. 650 milliGRAM(s) Oral every 6 hours PRN Temp greater or equal to 38C (100.4F), Mild Pain (1 - 3)  aluminum hydroxide/magnesium hydroxide/simethicone Suspension 30 milliLiter(s) Oral every 4 hours PRN Dyspepsia  HYDROmorphone   Tablet 2 milliGRAM(s) Oral every 4 hours PRN Moderate Pain (4 - 6)  HYDROmorphone  Injectable 1 milliGRAM(s) IV Push every 6 hours PRN Severe Pain (7 - 10)  melatonin 3 milliGRAM(s) Oral at bedtime PRN Insomnia  simethicone 80 milliGRAM(s) Chew three times a day PRN Gas      LABS:                        9.2    10.67 )-----------( 367      ( 18 Jan 2022 06:22 )             29.5     Hgb Trend: 9.2<--, 8.9<--, 9.2<--, 9.4<--  01-18    129<L>  |  96  |  12  ----------------------------<  100<H>  3.9   |  21<L>  |  0.78    Ca    8.5      18 Jan 2022 06:22    TPro  5.1<L>  /  Alb  2.5<L>  /  TBili  0.4  /  DBili  x   /  AST  19  /  ALT  22  /  AlkPhos  2269<H>  01-17    Creatinine Trend: 0.78<--, 0.80<--, 0.79<--, 0.73<--, 0.65<--, 0.74<--  PT/INR - ( 17 Jan 2022 06:14 )   PT: 14.8 sec;   INR: 1.25 ratio         PTT - ( 17 Jan 2022 06:14 )  PTT:31.3 sec        MICROBIOLOGY:     RADIOLOGY:  [ ] Reviewed and interpreted by me   Interval Events: Patient was seen and examined at bedside.     Suq emphysema and pain at the surgical site noted.     REVIEW OF SYSTEMS:  Constitutional: [ ] fevers [ ] chills [ ] weight loss [ ] weight gain  CV: [ ] chest pain [ ] orthopnea [ ] palpitations [ ] murmur  Resp: [ ] cough [ ] shortness of breath [ ] dyspnea [ ] wheezing [ ] sputum [ ] hemoptysis  [x ] All other systems negative  [ ] Unable to assess ROS because ________    OBJECTIVE:  ICU Vital Signs Last 24 Hrs  T(C): 36.8 (18 Jan 2022 05:03), Max: 37.1 (17 Jan 2022 13:11)  T(F): 98.2 (18 Jan 2022 05:03), Max: 98.4 (18 Jan 2022 00:58)  HR: 115 (18 Jan 2022 05:03) (92 - 115)  BP: 116/67 (18 Jan 2022 05:03) (105/58 - 137/77)  BP(mean): 93 (17 Jan 2022 16:45) (89 - 98)  ABP: --  ABP(mean): --  RR: 20 (18 Jan 2022 05:03) (17 - 21)  SpO2: 95% (18 Jan 2022 05:03) (94% - 100%)        01-17 @ 07:01  -  01-18 @ 07:00  --------------------------------------------------------  IN: 0 mL / OUT: 750 mL / NET: -750 mL      CAPILLARY BLOOD GLUCOSE    PHYSICAL EXAM:  General: alert awake oriented  HEENT: no goiter  Lymph Nodes:no cercal ln  Neck: supple  Respiratory: decrease entry right side  Cardiovascular: s1 , s2 normal  Abdomen: bs ++  Extremities: no edema  Skin: no rash, TTP at the surgical site, crepitus on exam.   Neurological: cranial nerve exam normal      HOSPITAL MEDICATIONS:  MEDICATIONS  (STANDING):  multivitamin 1 Tablet(s) Oral daily  ondansetron   Disintegrating Tablet 4 milliGRAM(s) Oral three times a day    MEDICATIONS  (PRN):  acetaminophen     Tablet .. 650 milliGRAM(s) Oral every 6 hours PRN Temp greater or equal to 38C (100.4F), Mild Pain (1 - 3)  aluminum hydroxide/magnesium hydroxide/simethicone Suspension 30 milliLiter(s) Oral every 4 hours PRN Dyspepsia  HYDROmorphone   Tablet 2 milliGRAM(s) Oral every 4 hours PRN Moderate Pain (4 - 6)  HYDROmorphone  Injectable 1 milliGRAM(s) IV Push every 6 hours PRN Severe Pain (7 - 10)  melatonin 3 milliGRAM(s) Oral at bedtime PRN Insomnia  simethicone 80 milliGRAM(s) Chew three times a day PRN Gas      LABS:                        9.2    10.67 )-----------( 367      ( 18 Jan 2022 06:22 )             29.5     Hgb Trend: 9.2<--, 8.9<--, 9.2<--, 9.4<--  01-18    129<L>  |  96  |  12  ----------------------------<  100<H>  3.9   |  21<L>  |  0.78    Ca    8.5      18 Jan 2022 06:22    TPro  5.1<L>  /  Alb  2.5<L>  /  TBili  0.4  /  DBili  x   /  AST  19  /  ALT  22  /  AlkPhos  2269<H>  01-17    Creatinine Trend: 0.78<--, 0.80<--, 0.79<--, 0.73<--, 0.65<--, 0.74<--  PT/INR - ( 17 Jan 2022 06:14 )   PT: 14.8 sec;   INR: 1.25 ratio         PTT - ( 17 Jan 2022 06:14 )  PTT:31.3 sec        MICROBIOLOGY:     RADIOLOGY:  [ ] Reviewed and interpreted by me

## 2022-01-18 NOTE — PROGRESS NOTE ADULT - ASSESSMENT
51F metastatic gastric CA (bone metastasis and peritoneal carcinomatosis), h/o SBO, s/p multiple bowel resections, recent hospitalizations for progression of gastric cancer w/ large right pleural effusions s/p thoracentesis and large ascites s/p paracentesis, discharged home 1/9, now presenting for increasing SOB at home. She was supposed to follow up with pulm for pleurex outpt but apparently her insurance doesn't cover this procedure. Additionally, he's also agreeable to hospice services, but once again, her insurance does not cover these services. She's currently awaiting for her new insurance plan for start On February 1st, which will cover hospice care. At this time she's c/o worsening SOB for the last 2-3 days, worse on exertion. and found to have right sided hydropneumothorax  DISPLAY PLAN FREE TEXT DISPLAY PLAN FREE TEXT DISPLAY PLAN FREE TEXT DISPLAY PLAN FREE TEXT DISPLAY PLAN FREE TEXT

## 2022-01-19 NOTE — DISCHARGE NOTE PROVIDER - NSDCFUADDINST_GEN_ALL_CORE_FT
Patient can follow up with pulmonary upon discharge at [address] [facility], [address] ([phone]).   Pt should follow up with Dr. Daniels in 2 weeks. Please call his office   pleurex should be drained 3 times a week (M/W/S) into the large bottles (1L) or PRN  2) if output is <500 per drainage, may switch to small pleurex bottles, same schedule  3) for either size bottle, the pleurex should be drained until drainage stops or patient becomes uncomfortable

## 2022-01-19 NOTE — DISCHARGE NOTE PROVIDER - PROVIDER TOKENS
PROVIDER:[TOKEN:[[id number]],FOLLOWUP:[1 week]],PROVIDER:[TOKEN:[[id number]],FOLLOWUP:[2 weeks]],PROVIDER:[TOKEN:[[id number]],FOLLOWUP:[1 week]]

## 2022-01-19 NOTE — PROGRESS NOTE ADULT - PROVIDER SPECIALTY LIST ADULT
Thoracic Surgery
Pulmonology
Thoracic Surgery
Hospitalist

## 2022-01-19 NOTE — DISCHARGE NOTE PROVIDER - CARE PROVIDERS DIRECT ADDRESSES
[email or web address]

## 2022-01-19 NOTE — DISCHARGE NOTE PROVIDER - NSDCFUSCHEDAPPT_GEN_ALL_CORE_FT
ALLIE ALLRED ; 01/31/2022 ; MARYSOL Nichole [address] ALLIE ALLRED ; 02/02/2022 ; NPVITO [facility] [address] [place] [address]

## 2022-01-19 NOTE — DISCHARGE NOTE PROVIDER - NSDCFUADDAPPT_GEN_ALL_CORE_FT
f/u with Dr Daniels Please arrive for 10 AM chest xray and 11 AM appointmnet  f/u with Dr Daniels Please arrive for 10 AM chest x-ray and 11 AM appointment     For continued outpatient palliative care if she does not go home with hospice, she can follow up with   Dr. Hazel or Dr. Carreon:  [address] [place] [address], [address]   [place] [address]  [phone].

## 2022-01-19 NOTE — DISCHARGE NOTE NURSING/CASE MANAGEMENT/SOCIAL WORK - NSDPDISTO_GEN_ALL_CORE
1. You can take Zofran every 8 hours as needed for nausea.  Drink plenty of fluids and rest as much as possible over the next week.    2. You can take Tylenol or ibuprofen every 8 hours as needed for pain or fevers.  Chloraseptic/throat numbing sprays are also helpful for throat pain.    3. We will call you with results of your strep test today.  Your COVID test should result in about 24 hours.    4. Return to the urgent care if you develop any new or worsening symptoms, otherwise follow-up with your primary care provider.     The CDC recommends public isolation to be discontinued when all of the following have occurred:  1. 10 days has elapsed from the day the patient first experienced symptoms. (IF POSITIVE)  AND  2. The patient is free of fever for 24 hours  AND  3. Cough, shortness of breath, and other symptoms are improving     What to do if you are sick tip sheet:  https://www.cdc.gov/coronavirus/2019-ncov/downloads/sick-with-2019-nCoV-fact-sheet.pdf     If you have any further questions or concerns regarding the Coronavirus, please call our Hotline number at [phone].     Home

## 2022-01-19 NOTE — PROGRESS NOTE ADULT - ASSESSMENT
51F metastatic gastric CA (bone metastasis and peritoneal carcinomatosis), h/o SBO, s/p multiple bowel resections, recent hospitalizations for progression of gastric cancer w/ large right pleural effusions s/p thoracentesis and large ascites s/p paracentesis, discharged home 1/9, now presenting for increasing SOB at home. She was supposed to follow up with pulm for pleurex outpt but apparently her insurance doesn't cover this procedure. Additionally, he's also agreeable to hospice services, but once again, her insurance does not cover these services. She's currently awaiting for her new insurance plan for start On February 1st, which will cover hospice care. At this time she's c/o worsening SOB for the last 2-3 days, worse on exertion. and found to have right sided hydropneumothorax

## 2022-01-19 NOTE — PROGRESS NOTE ADULT - ASSESSMENT
51 year old female with metastatic gastric cancer (bone metastasis and peritoneal carcinomatosis), prior SBO, multiple bowel resections, and recent hospitalization at [facility] for inability to tolerate PO found to have progression of gastric cancer, course c/b large right pleural effusions s/p thoracentesis and large ascites s/p paracentesis. Cyto positive for adenocarcinoma where for recurrent effusion S/P VATS and pleurx placement.     # Malignant pleural effusion  # Metastatic gastric ca  # Suq emphysema, Pneumothorax  # S/P Pleurx placement.  # tachycarida  - Called for tachycardia, patient has bene tachycardic throught admission , on ra. Hospitalist was concerned about PE. Patient is refusing CTA. Has pitting edema, suspicion is low can check LE dopplers before discharge. Does not eliminate possibility of PE but patient does not want contrast and is reasonable alternative   - S/P Vats, pleural biopsy and plerux placement by CT surgery, some subq emphysema,   - C/W with drainage per CT surgery  - Post operative subqemphysema   - Patient has follow up with Dr. Bashir as outpatient. If patient wishes she can also follow up with pulmonary.   - Pleurx and malignant effusion is being managed by CT surgery. Will signs off.     Patient can follow up with pulmonary upon discharge at [address] [place] [address], [address] ([phone]).     Dae Hyeon Kim [id number]  Pulmonary Critical Care Fellow  Pager [phone]

## 2022-01-19 NOTE — PROGRESS NOTE ADULT - ATTENDING COMMENTS
S/p VATS with pleural biopsy on 1/17/22 with drainage of 2750 mL pleural fluid and pleural biopsy followed by placement of tunneled indwelling pleural catheter. Follow-up pleural fluid cytopathology and pleural biopsy results. Would drain PleurX catheter daily while inpatient, then transition to every other day upon discharge. Needs close outpatient follow-up.
S/p VATS with pleural biopsy on 1/17/22 with drainage of 2750 mL pleural fluid and pleural biopsy followed by placement of tunneled indwelling pleural catheter. Continue to drain PleurX catheter daily while inpatient, then transition to every other day upon discharge. The subcutaneous emphysema is improved on chest x-ray today.    Of note, she was found to be tachycardic with lower extremity edema bilaterally in the setting of not being on DVT ppx for surgery. She is declining CTPA at this time. It is reasonable to obtain lower extremity dopplers. I doubt PE given that she has been tachycardic since admission and was tachycardic last admission as well when CTPA showed no PE. She reports improved dyspnea post-op, denies pleuritic chest pain or hemoptysis, and SpO2 is 100% on room air. Agree with starting enoxaparin 40 mg sq daily for DVT ppx. If lower extremity venous duplex is negative, can d/c home.    Needs close outpatient follow-up.

## 2022-01-19 NOTE — DISCHARGE NOTE PROVIDER - HOSPITAL COURSE
51F metastatic gastric CA (bone metastasis and peritoneal carcinomatosis), h/o SBO, s/p multiple bowel resections, recent hospitalizations for progression of gastric cancer w/ large right pleural effusions s/p thoracentesis and large ascites s/p paracentesis, discharged home 1/9, now presenting for increasing SOB at home. She was supposed to follow up with pulm for pleurex outpt but apparently her insurance doesn't cover this procedure. Additionally, he's also agreeable to hospice services, but once again, her insurance does not cover these services. She's currently awaiting for her new insurance plan for start On February 1st, which will cover hospice care. At this time she's c/o worsening SOB for the last 2-3 days, worse on exertion. and found to have right sided hydropneumothorax      Problem/Plan - 1:  ·  Problem: Hydropneumothorax.  -S/p VATS with pleural biopsy on 1/17/22 with drainage of 2750 mL pleural fluid and pleural biopsy followed by placement of tunneled indwelling pleural catheter. diffuse disease in pleura appears metastatic, pleural bx taken.    cxr today improved   symptoms improved     Problem/Plan - 2:  ·  Problem: Metastasis from gastric cancer.  ·  Plan: She is agreeable to hospice, and her new insurance will cover it on February 1st.   Continue dilaudid PO/IV PRN pain and pain is under control  will need to transition to po dilaudid for discharge.     Problem/Plan - 3:  ·  Problem: Hypokalemia.  ·  Plan: Resolved.       Problem/Plan - 4:  ·  Problem: Anemia of chronic disease.   ·  Plan: Secondary to metastatic gastric carcinoma   monitor h/h.    Dyspnea improved. 51F h/o metastatic gastric CA (bone metastasis and peritoneal carcinomatosis), h/o SBO, s/p multiple bowel resections, recent hospitalizations for progression of gastric cancer w/ large right pleural effusions s/p thoracentesis and large ascites s/p paracentesis, discharged home 1/9, presented for increasing SOB at home. patient was found to have large right sided hydropneumothorax.  She was supposed to follow up with pulm for pleurex outpt but apparently her insurance doesn't cover this procedure. Additionally, he's also agreeable to hospice services, but once again, her insurance does not cover these services. patient had VATS with pleurex placement. 2750cc drained in OR and pleural biopsy done. patient taught how to drain pleurex herself. patient was followed by both ct surgery and pulmonary who cleared her for discharge. patient had LE dopplers negative for DVT. patient currently awaiting for her new insurance plan for start On February 1st, which will cover hospice care. patient given supplies for pleurex until appointment with Dr. Daniels (made 1/31 at 11am - patient to have cxr at 10am in the office). more supples of pleurex applied for by CM. patient to follow up with oncology for metastatic gastric ca and palliative/hospice if interested. patient will also f/u with pmd - Dr. James.

## 2022-01-19 NOTE — PROGRESS NOTE ADULT - NSPROGADDITIONALINFOA_GEN_ALL_CORE
D/w Floor ACP Michelle
d/c planning if LE dopplers negative. patient learned to drain pleurex herself. if a/c need for DVT, patient states she knows how to inject lovenox and is willing to continue at home if needed.

## 2022-01-19 NOTE — PROGRESS NOTE ADULT - SUBJECTIVE AND OBJECTIVE BOX
[facility] Division of Hospital Medicine  Coty Schultz DO  8a-5pm: [phone]  after 5pm call [phone]    Patient is a 51y old  Female who presents with a chief complaint of SOB (19 Jan 2022 15:50)        SUBJECTIVE / OVERNIGHT EVENTS:      MEDICATIONS  (STANDING):  enoxaparin Injectable 40 milliGRAM(s) SubCutaneous daily  multivitamin 1 Tablet(s) Oral daily  ondansetron   Disintegrating Tablet 4 milliGRAM(s) Oral three times a day    MEDICATIONS  (PRN):  acetaminophen     Tablet .. 650 milliGRAM(s) Oral every 6 hours PRN Temp greater or equal to 38C (100.4F), Mild Pain (1 - 3)  aluminum hydroxide/magnesium hydroxide/simethicone Suspension 30 milliLiter(s) Oral every 4 hours PRN Dyspepsia  HYDROmorphone   Tablet 2 milliGRAM(s) Oral every 3 hours PRN Severe Pain (7 - 10)  melatonin 3 milliGRAM(s) Oral at bedtime PRN Insomnia  simethicone 80 milliGRAM(s) Chew three times a day PRN Gas      Vital Signs Last 24 Hrs  T(C): 36.8 (19 Jan 2022 17:07), Max: 36.9 (18 Jan 2022 21:14)  T(F): 98.3 (19 Jan 2022 17:07), Max: 98.5 (18 Jan 2022 21:14)  HR: 86 (19 Jan 2022 17:07) (86 - 107)  BP: 110/70 (19 Jan 2022 17:07) (110/70 - 123/70)  BP(mean): --  RR: 20 (19 Jan 2022 17:07) (20 - 20)  SpO2: 97% (19 Jan 2022 17:07) (97% - 100%)  CAPILLARY BLOOD GLUCOSE        I&O's Summary    18 Jan 2022 07:01  -  19 Jan 2022 07:00  --------------------------------------------------------  IN: 480 mL / OUT: 300 mL / NET: 180 mL    19 Jan 2022 07:01  -  19 Jan 2022 17:42  --------------------------------------------------------  IN: 0 mL / OUT: 575 mL / NET: -575 mL        PHYSICAL EXAM:  GENERAL: NAD  HEAD:  Atraumatic, Normocephalic  EYES: EOMI, PERRLA,  NECK: Supple,  CHEST/LUNG: Decrease air entry on right base, dull on percussion, no wheezing, right pleurex (pt refused exam)  HEART: Regular rate and rhythm; No murmurs, rubs, or gallops  ABDOMEN: Soft, distended mildly tender, + fluid waves Bowel sounds present  EXTREMITIES:  1+b/l LE edema  PSYCH: AAOx3  NEUROLOGY: no facial droop, moving all extremities  SKIN: No rashes or lesions    LABS:                        8.5    9.18  )-----------( 324      ( 19 Jan 2022 06:20 )             27.5     01-19    132<L>  |  98  |  14  ----------------------------<  99  3.8   |  24  |  0.77    Ca    8.3<L>      19 Jan 2022 06:19                RADIOLOGY & ADDITIONAL TESTS:    Imaging Personally Reviewed:  Consultant(s) Notes Reviewed:    Care Discussed with Consultants/Other Providers:   [facility] Division of Hospital Medicine  Coty Schultz DO  8a-5pm: [phone]  after 5pm call [phone]    Patient is a 51y old  Female who presents with a chief complaint of SOB (19 Jan 2022 15:50)        SUBJECTIVE / OVERNIGHT EVENTS: patient states her pain at pleurex site worse today requiring IV diluadid but now slightly improved and willing to try just oral dilaudid to see if controls pain.       MEDICATIONS  (STANDING):  enoxaparin Injectable 40 milliGRAM(s) SubCutaneous daily  multivitamin 1 Tablet(s) Oral daily  ondansetron   Disintegrating Tablet 4 milliGRAM(s) Oral three times a day    MEDICATIONS  (PRN):  acetaminophen     Tablet .. 650 milliGRAM(s) Oral every 6 hours PRN Temp greater or equal to 38C (100.4F), Mild Pain (1 - 3)  aluminum hydroxide/magnesium hydroxide/simethicone Suspension 30 milliLiter(s) Oral every 4 hours PRN Dyspepsia  HYDROmorphone   Tablet 2 milliGRAM(s) Oral every 3 hours PRN Severe Pain (7 - 10)  melatonin 3 milliGRAM(s) Oral at bedtime PRN Insomnia  simethicone 80 milliGRAM(s) Chew three times a day PRN Gas      Vital Signs Last 24 Hrs  T(C): 36.8 (19 Jan 2022 17:07), Max: 36.9 (18 Jan 2022 21:14)  T(F): 98.3 (19 Jan 2022 17:07), Max: 98.5 (18 Jan 2022 21:14)  HR: 86 (19 Jan 2022 17:07) (86 - 107)  BP: 110/70 (19 Jan 2022 17:07) (110/70 - 123/70)  BP(mean): --  RR: 20 (19 Jan 2022 17:07) (20 - 20)  SpO2: 97% (19 Jan 2022 17:07) (97% - 100%)  CAPILLARY BLOOD GLUCOSE        I&O's Summary    18 Jan 2022 07:01  -  19 Jan 2022 07:00  --------------------------------------------------------  IN: 480 mL / OUT: 300 mL / NET: 180 mL    19 Jan 2022 07:01  -  19 Jan 2022 17:42  --------------------------------------------------------  IN: 0 mL / OUT: 575 mL / NET: -575 mL        PHYSICAL EXAM:  patient refused exam today  LE look to have persistent b/l edema    LABS:                        8.5    9.18  )-----------( 324      ( 19 Jan 2022 06:20 )             27.5     01-19    132<L>  |  98  |  14  ----------------------------<  99  3.8   |  24  |  0.77    Ca    8.3<L>      19 Jan 2022 06:19                RADIOLOGY & ADDITIONAL TESTS:    Imaging Personally Reviewed:  Consultant(s) Notes Reviewed:    Care Discussed with Consultants/Other Providers:

## 2022-01-19 NOTE — PROGRESS NOTE ADULT - PROBLEM SELECTOR PLAN 2
She is agreeable to hospice, and her new insurance will cover it on February 1st.   Continue dilaudid PO/IV PRN pain and pain is under control  will need to transition to po dilaudid for discharge She is agreeable to hospice, and her new insurance will cover it on February 1st.   pain better controlled now and wants to attempt higher dose of oral dilaudid - will stop IV dilaudid

## 2022-01-19 NOTE — DISCHARGE NOTE PROVIDER - NSDCMRMEDTOKEN_GEN_ALL_CORE_FT
Dilaudid 2 mg oral tablet: 1 tab(s) orally 8 times a day, As Needed MDD:8  multivitamin: 1 tab(s) once a day  simethicone 80 mg oral tablet: 1 tab(s) orally every 8 hours, As Needed  Zofran ODT 4 mg oral tablet, disintegratin tab(s) orally 3 times a day   Dilaudid 2 mg oral tablet: 1 tab(s) orally 8 times a day, As Needed MDD:8  Dilaudid 2 mg oral tablet: 1 tab(s) orally every 4 hours MDD:No more than 6 tablets a day.  multivitamin: 1 tab(s) once a day  simethicone 80 mg oral tablet: 1 tab(s) orally every 8 hours, As Needed  Zofran ODT 4 mg oral tablet, disintegratin tab(s) orally 3 times a day

## 2022-01-19 NOTE — PROGRESS NOTE ADULT - PROBLEM SELECTOR PLAN 4
Secondary to metastatic gastric carcinoma   monitor h/h
Secondary to metastatic gastric carcinoma   monitor h/h

## 2022-01-19 NOTE — DISCHARGE NOTE NURSING/CASE MANAGEMENT/SOCIAL WORK - NSDCPEFALRISK_GEN_ALL_CORE
For information on Fall & Injury Prevention, visit: [email or web address] OR  [email or web address] OR  https://www.cdc.gov/steadi/patient.html

## 2022-01-19 NOTE — DISCHARGE NOTE PROVIDER - NSDCCPCAREPLAN_GEN_ALL_CORE_FT
PRINCIPAL DISCHARGE DIAGNOSIS  Diagnosis: Pleural effusion, right  Assessment and Plan of Treatment:       SECONDARY DISCHARGE DIAGNOSES  Diagnosis: Metastasis from gastric cancer  Assessment and Plan of Treatment:     Diagnosis: Anemia of chronic disease  Assessment and Plan of Treatment:      PRINCIPAL DISCHARGE DIAGNOSIS  Diagnosis: Pleural effusion, right  Assessment and Plan of Treatment: you had R pleurex placed  Drain your R pleurex every other day  Follow up with pulmonology      SECONDARY DISCHARGE DIAGNOSES  Diagnosis: Metastasis from gastric cancer  Assessment and Plan of Treatment: Follow up with Oncology    Diagnosis: Anemia of chronic disease  Assessment and Plan of Treatment: Follow up with oncology for monitoring     PRINCIPAL DISCHARGE DIAGNOSIS  Diagnosis: Pleural effusion, right  Assessment and Plan of Treatment: you had R pleurex placed  Drain your R pleurex every other day  Follow up with pulmonology  f/u with Dr Daniels Please arrive for 10 AM chest xray and 11 AM appointmnet      SECONDARY DISCHARGE DIAGNOSES  Diagnosis: Metastasis from gastric cancer  Assessment and Plan of Treatment: Follow up with Oncology    Diagnosis: Anemia of chronic disease  Assessment and Plan of Treatment: Follow up with oncology for monitoring

## 2022-01-19 NOTE — PROGRESS NOTE ADULT - SUBJECTIVE AND OBJECTIVE BOX
Interval Events: Patient was seen and examined at bedside. No overnight events.    Call by hospitalist for tachycarida. Patient has had tachycardia since admission. Con    REVIEW OF SYSTEMS:  Constitutional: [ ] fevers [ ] chills [ ] weight loss [ ] weight gain  CV: [ ] chest pain [ ] orthopnea [ ] palpitations [ ] murmur  Resp: [ ] cough [ ] shortness of breath [ ] dyspnea [ ] wheezing [ ] sputum [ ] hemoptysis  [ ] All other systems negative  [ ] Unable to assess ROS because ________    OBJECTIVE:  ICU Vital Signs Last 24 Hrs  T(C): 36.8 (19 Jan 2022 17:07), Max: 36.9 (18 Jan 2022 21:14)  T(F): 98.3 (19 Jan 2022 17:07), Max: 98.5 (18 Jan 2022 21:14)  HR: 86 (19 Jan 2022 17:07) (86 - 107)  BP: 110/70 (19 Jan 2022 17:07) (110/70 - 123/70)  BP(mean): --  ABP: --  ABP(mean): --  RR: 20 (19 Jan 2022 17:07) (20 - 20)  SpO2: 97% (19 Jan 2022 17:07) (97% - 100%)        01-18 @ 07:01 - 01-19 @ 07:00  --------------------------------------------------------  IN: 480 mL / OUT: 300 mL / NET: 180 mL    01-19 @ 07:01 - 01-19 @ 17:45  --------------------------------------------------------  IN: 0 mL / OUT: 575 mL / NET: -575 mL      CAPILLARY BLOOD GLUCOSE          PHYSICAL EXAM:        HOSPITAL MEDICATIONS:  MEDICATIONS  (STANDING):  enoxaparin Injectable 40 milliGRAM(s) SubCutaneous daily  multivitamin 1 Tablet(s) Oral daily  ondansetron   Disintegrating Tablet 4 milliGRAM(s) Oral three times a day    MEDICATIONS  (PRN):  acetaminophen     Tablet .. 650 milliGRAM(s) Oral every 6 hours PRN Temp greater or equal to 38C (100.4F), Mild Pain (1 - 3)  aluminum hydroxide/magnesium hydroxide/simethicone Suspension 30 milliLiter(s) Oral every 4 hours PRN Dyspepsia  HYDROmorphone   Tablet 2 milliGRAM(s) Oral every 3 hours PRN Severe Pain (7 - 10)  melatonin 3 milliGRAM(s) Oral at bedtime PRN Insomnia  simethicone 80 milliGRAM(s) Chew three times a day PRN Gas      LABS:                        8.5    9.18  )-----------( 324      ( 19 Jan 2022 06:20 )             27.5     Hgb Trend: 8.5<--, 9.2<--, 8.9<--, 9.2<--, 9.4<--  01-19    132<L>  |  98  |  14  ----------------------------<  99  3.8   |  24  |  0.77    Ca    8.3<L>      19 Jan 2022 06:19      Creatinine Trend: 0.77<--, 0.78<--, 0.80<--, 0.79<--, 0.73<--, 0.65<--          MICROBIOLOGY:     RADIOLOGY:  [ ] Reviewed and interpreted by me   Interval Events: Patient was seen and examined at bedside. No overnight events.    Call by hospitalist for tachycarida. Patient has had tachycardia since admission. Con    REVIEW OF SYSTEMS:  Constitutional: [ ] fevers [ ] chills [ ] weight loss [ ] weight gain  CV: [ ] chest pain [ ] orthopnea [ ] palpitations [ ] murmur  Resp: [ ] cough [ ] shortness of breath [ ] dyspnea [ ] wheezing [ ] sputum [ ] hemoptysis  [ ] All other systems negative  [ ] Unable to assess ROS because ________    OBJECTIVE:  ICU Vital Signs Last 24 Hrs  T(C): 36.8 (19 Jan 2022 17:07), Max: 36.9 (18 Jan 2022 21:14)  T(F): 98.3 (19 Jan 2022 17:07), Max: 98.5 (18 Jan 2022 21:14)  HR: 86 (19 Jan 2022 17:07) (86 - 107)  BP: 110/70 (19 Jan 2022 17:07) (110/70 - 123/70)  BP(mean): --  ABP: --  ABP(mean): --  RR: 20 (19 Jan 2022 17:07) (20 - 20)  SpO2: 97% (19 Jan 2022 17:07) (97% - 100%)        01-18 @ 07:01 - 01-19 @ 07:00  --------------------------------------------------------  IN: 480 mL / OUT: 300 mL / NET: 180 mL    01-19 @ 07:01 - 01-19 @ 17:45  --------------------------------------------------------  IN: 0 mL / OUT: 575 mL / NET: -575 mL      CAPILLARY BLOOD GLUCOSE    PHYSICAL EXAM:  General: alert awake oriented  HEENT: no goiter  Lymph Nodes:no cercal ln  Neck: supple  Respiratory: decrease entry right side  Cardiovascular: s1 , s2 normal  Abdomen: bs ++  Extremities: + 2 pitting edema   Skin: no rash, TTP at the surgical site, crepitus on exam.   Neurological: cranial nerve exam normal        HOSPITAL MEDICATIONS:  MEDICATIONS  (STANDING):  enoxaparin Injectable 40 milliGRAM(s) SubCutaneous daily  multivitamin 1 Tablet(s) Oral daily  ondansetron   Disintegrating Tablet 4 milliGRAM(s) Oral three times a day    MEDICATIONS  (PRN):  acetaminophen     Tablet .. 650 milliGRAM(s) Oral every 6 hours PRN Temp greater or equal to 38C (100.4F), Mild Pain (1 - 3)  aluminum hydroxide/magnesium hydroxide/simethicone Suspension 30 milliLiter(s) Oral every 4 hours PRN Dyspepsia  HYDROmorphone   Tablet 2 milliGRAM(s) Oral every 3 hours PRN Severe Pain (7 - 10)  melatonin 3 milliGRAM(s) Oral at bedtime PRN Insomnia  simethicone 80 milliGRAM(s) Chew three times a day PRN Gas      LABS:                        8.5    9.18  )-----------( 324      ( 19 Jan 2022 06:20 )             27.5     Hgb Trend: 8.5<--, 9.2<--, 8.9<--, 9.2<--, 9.4<--  01-19    132<L>  |  98  |  14  ----------------------------<  99  3.8   |  24  |  0.77    Ca    8.3<L>      19 Jan 2022 06:19      Creatinine Trend: 0.77<--, 0.78<--, 0.80<--, 0.79<--, 0.73<--, 0.65<--          MICROBIOLOGY:     RADIOLOGY:  [ ] Reviewed and interpreted by me

## 2022-01-19 NOTE — DISCHARGE NOTE NURSING/CASE MANAGEMENT/SOCIAL WORK - PATIENT PORTAL LINK FT
You can access the FollowMyHealth Patient Portal offered by [facility] by registering at the following website: [email or web address]. By joining [employer]’s FollowMyHealth portal, you will also be able to view your health information using other applications (apps) compatible with our system.

## 2022-01-19 NOTE — DISCHARGE NOTE PROVIDER - CARE PROVIDER_API CALL
Skinny Ibanez)  Hematology; Medical Oncology  [address] [place] [address]  Phone: [phone]  Fax: [phone]  Follow Up Time: 1 week    Madi Daniels)  Surgery; Thoracic Surgery  [address] [place] [address], Oncology [facility] [place] [address]  Phone: [phone]  Fax: [phone]  Follow Up Time: 2 weeks    Clovis Mak)  Critical Care Medicine; Pulmonary Disease  [address] [facility], [address]  [place], [place] [id number]  Phone: [phone]  Fax: [phone]  Follow Up Time: 1 week

## 2022-01-20 NOTE — PROVIDER CONTACT NOTE (OTHER) - REASON
Pt refusing some nursing assessment
Pt refusing morning labs and VS
Pt stating she is in so much pain, and can not take staying here. Requesting a bed immediately, otherwise stating she will leave.

## 2022-01-20 NOTE — CHART NOTE - NSCHARTNOTEFT_GEN_A_CORE
patient seen and examined. right pleurex catheter site pain improved - controlled with oral dilaudid.   vitals and labs reviewed    PE - decreased bs at right base - improved from yesturday    51F h/o metastatic gastric CA (bone metastasis and peritoneal carcinomatosis), h/o SBO, s/p multiple bowel resections, recent hospitalizations for progression of gastric cancer w/ large right pleural effusions s/p thoracentesis and large ascites s/p paracentesis, discharged home 1/9, presented for increasing SOB at home. patient was found to have large right sided hydropneumothorax.  She was supposed to follow up with pulm for pleurex outpt but apparently her insurance doesn't cover this procedure. Additionally, he's also agreeable to hospice services, but once again, her insurance does not cover these services. patient had VATS with pleurex placement. 2750cc drained in OR and pleural biopsy done. patient taught how to drain pleurex herself. patient was followed by both ct surgery and pulmonary who cleared her for discharge. patient had LE dopplers negative for DVT. patient currently awaiting for her new insurance plan for start On February 1st, which will cover hospice care. patient given supplies for pleurex until appointment with Dr. Daniels (made 1/31 at 11am - patient to have cxr at 10am in the office). more supples of pleurex applied for by CM. patient to follow up with oncology for metastatic gastric ca and palliative/hospice if interested. patient will also f/u with pmd - Dr. James.     discharge time - 37 minutes  Dr. M. Luke  Medicine Hospitalist  [phone]

## 2022-01-20 NOTE — PROVIDER CONTACT NOTE (OTHER) - BACKGROUND
Pt admitted w/ R pleurel effusion, s/p R pleurex catheter
Pt admitted w/ R pleural effusion, s/p R pleurex catheter
Pt admitted for Pleural Effusion.

## 2022-01-20 NOTE — PROVIDER CONTACT NOTE (OTHER) - SITUATION
Pt refusing some nursing care
Pt refusing morning labs and VS
Pt stating she is in so much pain, and can not take staying here. Requesting a bed immediately, otherwise stating she will leave.

## 2022-01-20 NOTE — PROVIDER CONTACT NOTE (OTHER) - ASSESSMENT
Pt A&Ox4. Pt refusing to allow primary RN to listen to lungs and Pt only allowing RN to "look but not touch" dressing/ pleurex catheter. Pt educated on importance of assessment. Pt verbalized understanding & still refusing.
Pt AOx4, VSS. Pt is anxious and agitated.
Pt A&Ox4, VSS, anticipating discharge in the morning per ultrasound results.

## 2022-01-25 NOTE — ED PROVIDER NOTE - CLINICAL SUMMARY MEDICAL DECISION MAKING FREE TEXT BOX
51F metastatic gastric CA (bone metastasis and peritoneal carcinomatosis), h/o SBO, s/p multiple bowel resections, recent hospitalizations for progression of gastric cancer w/ large right pleural effusions s/p thoracentesis and large ascites s/p paracentesis, discharged home 1/9, and readmitted 1/15-1/19 for recurrent symptoms, s/p pleural catheter placement 1/17, and discharged home with PO pain regimen, now returned with sudden worsening of upper back pain and SOB after draining the pleural catheter 1/24.   VSS except tachycardia. Mild distress due to discomfort and anxiety, but stable respiratory status currently. No pain currently after taking 2 mg PO Dilaudid around 2 AM at home.   Will send labs, EKG, CXR, and CT C/A/P non con to r/o PTX and reassess ascites (patient refused IV contrast 2/2 fear of past experience with MARIELLA, regardless of indications, not required currently). Symptomatic support. 51F metastatic gastric CA (bone metastasis and peritoneal carcinomatosis), h/o SBO, s/p multiple bowel resections, recent hospitalizations for progression of gastric cancer w/ large right pleural effusions s/p thoracentesis and large ascites s/p paracentesis, discharged home 1/9, and readmitted 1/15-1/19 for recurrent symptoms, s/p pleural catheter placement 1/17, and discharged home with PO pain regimen, now returned with sudden worsening of upper back pain and SOB after draining the pleural catheter 1/24.   VSS except tachycardia. Mild distress due to discomfort and anxiety, but stable respiratory status currently. No pain currently after taking 2 mg PO Dilaudid around 2 AM at home. Prefers to have IR perform paracentesis if (+) recurrent ascites.   Will send labs, EKG, CXR, and CT C/A/P non con to r/o PTX and reassess ascites (patient refused IV contrast 2/2 fear of past experience with MARIELLA, regardless of indications, not required currently). Symptomatic support. 51F metastatic gastric CA (bone metastasis and peritoneal carcinomatosis), h/o SBO, s/p multiple bowel resections, recent hospitalizations for progression of gastric cancer w/ large right pleural effusions s/p thoracentesis and large ascites s/p paracentesis, discharged home 1/9, and readmitted 1/15-1/19 for recurrent symptoms, s/p pleural catheter placement 1/17, and discharged home with PO pain regimen, now returned with sudden worsening of upper back pain and SOB after draining the pleural catheter 1/24.   VSS except tachycardia. Mild distress due to discomfort and anxiety, but stable respiratory status currently. No pain currently after taking 2 mg PO Dilaudid around 2 AM at home. Prefers to have IR perform paracentesis if (+) recurrent ascites. Declined visual inspection and dressing removal at the pleural catheter site.   Will send labs, EKG, CXR, and CT C/A/P non con to r/o PTX and reassess ascites (patient refused IV contrast 2/2 fear of past experience with MARIELLA, regardless of indications, not required currently). Symptomatic support.

## 2022-01-25 NOTE — ED PROVIDER NOTE - NS ED ROS FT
REVIEW OF SYSTEMS:    CONSTITUTIONAL: No fevers or chills  EYES: No visual changes  ENT:  No vertigo or throat pain   NECK: (+) R-sided neck pain during the back pain episode  RESPIRATORY: (+) cough after lung drainage, (-) wheezing, hemoptysis; (+) shortness of breath after lung drainage, not currently  CARDIOVASCULAR: See HPI  GASTROINTESTINAL: (+) abdominal discomfort. (+) Belching, no vomiting or hematemesis; No diarrhea or constipation. No melena or hematochezia.  GENITOURINARY: No dysuria, frequency or hematuria  NEUROLOGICAL: No numbness or focal weakness  SKIN: No itching, rashes  Psych: No anxiety or depression

## 2022-01-25 NOTE — ED PROVIDER NOTE - ATTENDING CONTRIBUTION TO CARE
51 F w/ PMH metastatic gastric cancer (bone metastasis and peritoneal carcinomatosis), prior SBO, multiple bowel resections, and recent hospitalization for large right pleural effusions s/p thoracentesis and large ascites s/p paracentesis here w/ pain in the back of the shoulder blades and SOB s/p draining the pluerex catheter fluid. Pt refusing to allow physicians to look at the pleurex wound site (pt states is c/d/i) understands we may not be able to fully evaluate her area of concern. States she understands a life threatening process could be causing her symptoms like a sucking chest wound and she doesn't want us to take the dressing off of it because she changed it earlier today. I offered to tget the supplies for the patient so it could be replaced with the same things she had however pt refused. additionally pt w/ abd discomfort, feels like prior ascities. DIsucssion w/ pt that her abd pain could be from a bowel obstruction and I strongly encourage contrast. Pt states she is refusing contrast (no IV due to hx of possible kidney injury; no PO due to her own desire to not drinik contrast at this time) pt states she understsands limited diagnostic utility of tests and that she may need them repeated. s/p dilaudid oral at home (pt states her pain improved and her breathing improved ) currently pt reports minimal to no pain in the back chest abdomen.   Const: no acute distress, Well-developed, Eyes: no conjunctival injection and no scleral icterus ENMT: dry mucus membranes, CVS: catheter in the R MAL w/ dressing covering gauze w/ a tegaderm +S1/S2, radial/DP pulse 2+ bilaterally RESP: Unlabored respiratory effort, Clear to auscultation bilaterally GI: enlarged abdomen, reducible ventral hernia on the L side w/ no surrounding skin chnages soft abdomen, no CVA tenderness MSK: b/l piting edema in the lower legs to the knees pain in the R trapezus muscle > L w/ no overlying erythema , no c/t/l spine tednerness Psych: Awake, Alert, & Orientedx3;  Appropriate mood and affect, cooperative  pt here w/ concern that she may need a paracentesis as well as complaint of SOB and upper scapula pain s/p draining the catheter (plan to start w/ xray to assess for PNS, then to get ct scan to asses for intraadbominal injury. plan for labs and admission for futher monitoring. pt requesting IR for paracentesis. lower suspicion for SBO though also in ddx given pts history. pt not actively vomiting, reports last past gas this AM, and last BM yesterday. sx don't feel like sbo more c/w need for paracentesis (last in dec 2021)

## 2022-01-25 NOTE — ED ADULT NURSE NOTE - CAS TRG GEN SKIN COLOR
Ana Lim Patient Age: 79 year old  MESSAGE:   Patient calling in stating that she was advised to make an appt with Cory for a sleep study. Patient saw Dr. Zabala today in clinic and was advised to call pulmonary. Orders on patients chart are not for a sleep study, only says to see the doctor. Please advise if we will be ordering a sleep study for her or if patient should come in for an appt first.      WEIGHT AND HEIGHT:   Wt Readings from Last 1 Encounters:   03/05/20 65.8 kg (145 lb)     Ht Readings from Last 1 Encounters:   03/05/20 5' 1\" (1.549 m)     BMI Readings from Last 1 Encounters:   03/05/20 27.40 kg/m²       ALLERGIES:  Adhesive   (environmental) and Suture  Current Outpatient Medications   Medication   • colesevelam (WELCHOL) 625 MG tablet   • ursodiol (ACTIGALL) 300 MG capsule   • sertraline (ZOLOFT) 50 MG tablet   • efinaconazole (JUBLIA) 10 % topical solution   • atorvastatin (LIPITOR) 20 MG tablet   • aspirin (ECOTRIN) 81 MG EC tablet   • metroNIDAZOLE (METROGEL) 1 % gel   • SYNTHROID 88 MCG tablet   • diclofenac (VOLTAREN) 1 % gel   • MULTIPLE VITAMIN PO   • Omega-3 Fatty Acids (FISH OIL) 1200 MG capsule     No current facility-administered medications for this visit.      PHARMACY to use: on file          Pharmacy preference(s) on file:   [facility][id number] - [address] [place] [address] [address]  Phone: [phone] Fax: [phone]      CALL BACK INFO: Ok to leave response (including medical information) with family member or on answering machine  ROUTING: Patient's physician/staff        PCP: Guillermina Zabala MD         INS: Payor: MEDICARE / Plan: PARTA AND B / Product Type: MEDICARE   PATIENT ADDRESS:  [address] [place] [address]   [facility] [id number]    
Patient notified she needs to see Dr Ray for Apnea appointment before we can schedule a sleep study. Patient voiced understanding and was transferred to reception.  Ilda Miranda RN    
Normal for race

## 2022-01-25 NOTE — ED PROVIDER NOTE - PHYSICAL EXAMINATION
PHYSICAL EXAM:  GENERAL: In mild distress due to discomfort and anxiety  Eyes: EOMI, conjunctiva and sclera clear  HENT:  Head atraumatic; Moist mucous membranes, normal oropharynx  NECK: Supple, No JVD  CHEST/LUNG: Clear to auscultation bilaterally, (+) air movements b/l; No rales, rhonchi, wheezing, or rubs. Unlabored respirations on room air  HEART: Regular rate and rhythm; No murmurs, rubs, or gallops  ABDOMEN: Bowel sounds present; Soft, Nondistended, moderate discomfort/tenderness with palpation. (+) ventral hernia; R pleural catheter site dressing intact, dry and non-bleeding  EXTREMITIES: Brisk capillary refill. No clubbing, cyanosis; Significant edema up to b/l thighs  NERVOUS SYSTEM: Alert & Oriented X3, non-focal and spontaneous movements of all extremities  SKIN: No rashes or lesions  Psych: Normal behavior, normal affect, normal speech

## 2022-01-25 NOTE — ED PROVIDER NOTE - OBJECTIVE STATEMENT
51F metastatic gastric CA (bone metastasis and peritoneal carcinomatosis), h/o SBO, s/p multiple bowel resections, recent hospitalizations for progression of gastric cancer w/ large right pleural effusions s/p thoracentesis and large ascites s/p paracentesis, discharged home 1/9, and readmitted 1/15-1/19 for recurrent symptoms, s/p pleural catheter placement 1/17, and discharged home with PO pain regimen, now returned with sudden worsening of upper back pain and SOB after draining the pleural catheter 1/24.   Patient stated that symptoms were controlled after discharge until yesterday after draining 500 cc from the pleural catheter. Woke up around 9PM w/ transient severe upper back pain, radiating to the anterior chest, with SOB during the episode. Also started to have belching around the same time without vomiting. (+) abdominal discomfort, and was concerned that it might be due to recurrence of ascites.

## 2022-01-25 NOTE — ED PROVIDER NOTE - NSFOLLOWUPINSTRUCTIONS_ED_ALL_ED_FT
You were seen in the Emergency Room today because of back pain and shortness of breath. At this time there were no significant changes in your ascites fluid. Your lung effusion is about the same as seen in previous studies. Your lab and imaging results are included in your discharge paperwork.     The contact information for a couple of Oncology Clinics are included in your discharge paperwork. They will help connect you to an oncologist who can better monitor your needs.     We have sent medication to your Pharmacy. It is called Dilaudid. You can take 1 tablet every 4 hours as needed for pain.     DISCHARGE INSTRUCTIONS:  Please return to the Emergency Room if:   •Your signs and symptoms are the same or worse within 24 hours of treatment.   •You have shaking chills or a fever over 102°F.   •You have new pain, pressure, or tightness in your chest.   •You have a new or worse cough or wheezing, or you cough up blood.  •You feel like you cannot get enough air.  •The skin over your ribs or on your neck sinks in when you breathe.   •You have a severe headache with vomiting and abdominal pain.   •You feel confused or dizzy.    Follow up with your healthcare provider or specialist as directed: Write down your questions so you remember to ask them during your visits.

## 2022-01-25 NOTE — ED PROVIDER NOTE - EKG ADDITIONAL INFORMATION FREE TEXT
?sinus vs. ectopic P wave, at rate of 91, no acute ST changes, QTc 442.   Similar to previous EKG's.

## 2022-01-25 NOTE — ED PROVIDER NOTE - NSFOLLOWUPCLINICS_GEN_ALL_ED_FT
[facility]  Hematology/Oncology  [address] [place] [address]  Phone: [phone]  Fax:     [facility]  Hematology/Oncology  [address] [place] [address]  Phone: [phone]  Fax:

## 2022-01-25 NOTE — ED PROVIDER NOTE - PROGRESS NOTE DETAILS
Hitesh, PGY1 - Received sign-out on patient. Introduced myself and updated patient on the medical evaluation process. Patient aware and in agreement. No SOB at this time. Hitesh, PGY1 - CT images show no acute changes, metastatic disease present. Patient has no SOB, no pain, would like to go home since she has adequate pain relief. Requesting more dilaudid. Patient stable for discharge. Understands the Emergency Room work-up and discharge precautions. dilaudid sent to pharmacy. Attending MD Gloria: agree with above resident note. CT imaging without urgent acute findings. Patient's pain controlled with oral analgesia, will dc with close outpatient follow up.

## 2022-01-25 NOTE — ED PROVIDER NOTE - PATIENT PORTAL LINK FT
You can access the FollowMyHealth Patient Portal offered by [facility] by registering at the following website: [email or web address]. By joining [employer]’s FollowMyHealth portal, you will also be able to view your health information using other applications (apps) compatible with our system.

## 2022-01-28 NOTE — PROGRESS NOTE ADULT - PROBLEM SELECTOR PROBLEM 2
Abdomen , soft, nontender, nondistended , no guarding or rigidity , no masses palpable , normal bowel sounds , Liver and Spleen , no hepatomegaly present , no hepatosplenomegaly , liver nontender , spleen not palpable Metastasis from gastric cancer Hypokalemia

## 2022-02-04 NOTE — ASSESSMENT
[FreeTextEntry1] : Ms. ALLIE ALLRED, 51 year old female, who metastatic gastric CA (bone metastasis and peritoneal carcinomatosis), h/o SBO, s/p multiple bowel resections, recent hospitalizations for progression of gastric cancer w/ large right pleural effusions s/p thoracentesis and large ascites s/p paracentesis, discharged home 1/9, presented for increasing SOB at home on 01/15/2022,  patient was found to have large right sided hydropneumothorax.\par \par Patient is s/p VATS, pleural bx, PleurX catheter placement on 01/17/2022, 2,750 ml pleural effusion drained. 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 IHC is + fir CEA, BerEP4, and weakly CDX2. Calretinin is negative in the tumor cells. The tumor is microscopic and as such is insufficient for further study. \par \par I have reviewed the patient's medical records and diagnostic images at time of this office consultation and have made the following recommendation:\par 1. CXR reviewed, instructed patient to continue drainage 3x/week and RTC in 8 week with CXR. \par \par I personally performed the services described in the documentation, reviewed the documentation recorded by the scribe in my presence and it accurately and completely records my words and actions.\par \par I, Barbara Martines NP, am scribing for and the presence of CLINTON Avina, the following sections HISTORY OF PRESENT ILLNESS, PAST MEDICAL/FAMILY/SOCIAL HISTORY; REVIEW OF SYSTEMS; VITAL SIGNS; PHYSICAL EXAM; DISPOSITION.

## 2022-02-04 NOTE — HISTORY OF PRESENT ILLNESS
[FreeTextEntry1] : Ms. ALLIE ALLRED, 51 year old female, who has hx of metastatic gastric CA (bone metastasis and peritoneal carcinomatosis), h/o SBO, s/p multiple bowel resections, recent hospitalizations for progression of gastric cancer w/ large right pleural effusions s/p thoracentesis and large ascites s/p paracentesis, discharged home 1/9, presented for increasing SOB at home on 01/15/2022,  patient was found to have large right sided hydropneumothorax.\par \par Patient is s/p VATS, pleural bx, PleurX catheter placement on 01/17/2022, 2,750 ml pleural effusion drained. 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 IHC is + fir CEA, BerEP4, and weakly CDX2. Calretinin is negative in the tumor cells. The tumor is microscopic and as such is insufficient for further study. \par \par CXR today: reviewed. \par \par Patient is currently drained three time/week ~ 1L each time. \par \par Patient is here today for a follow up. Patient c/o SOB on exertion, denies cough, chest pain, fever, chills. \par \par

## 2022-02-04 NOTE — CONSULT LETTER
[Courtesy Letter:] : I had the pleasure of seeing your patient, [unfilled], in my office today. [Sincerely,] : Sincerely, [FreeTextEntry2] : Dr. Skinny Ibanez (Rad/Onc) [FreeTextEntry3] : Madi Daniels MD \par Attending Surgeon \par Division of Thoracic Surgery \par , Cardiovascular and Thoracic Surgery \par [facility]\par \par

## 2022-02-07 PROBLEM — Z23 ENCOUNTER FOR IMMUNIZATION: Status: ACTIVE | Noted: 2021-01-01

## 2022-02-07 PROBLEM — R00.0 TACHYCARDIA: Status: ACTIVE | Noted: 2022-01-01

## 2022-02-07 PROBLEM — Z13.228 SCREENING FOR METABOLIC DISORDER: Status: ACTIVE | Noted: 2019-08-07

## 2022-02-07 PROBLEM — Z00.00 PHYSICAL EXAM: Status: ACTIVE | Noted: 2019-08-07

## 2022-02-07 NOTE — PHYSICAL EXAM

## 2022-02-07 NOTE — HISTORY OF PRESENT ILLNESS
[FreeTextEntry1] :  This is a 51 year old female, with a Pmhx of metastatic gastric CA (bone metastasis and peritoneal carcinomatosis), h/o SBO, s/p multiple bowel resections, recent hospitalizations for progression of gastric cancer w/ large right pleural effusions s/p thoracentesis and large ascites s/p paracentesis Patient is s/p VATS, pleural bx, PleurX catheter placement on 01/17/2022 s/p EUS/EGD with fluoroscopy and stent 1/24/2022 who present to office for follow up.\par \par pt reports SOb is stable. drains plurex MW/F \par pt reports increased LE swelling since Decembers 21 hospitalization. would like to start diuretics for thre swelling\par \par pt reports good appetite\par \par denies any N/V fever or chills Denies any SOB or dizziness \par \par \par \par

## 2022-02-07 NOTE — ASSESSMENT
[FreeTextEntry1] : pt refuses all chemo  care understands risks of death wants to proceed holistically understands risks of death

## 2022-02-24 PROBLEM — J91.0 MALIGNANT PLEURAL EFFUSION: Status: ACTIVE | Noted: 2022-01-01

## 2022-02-24 PROBLEM — C16.9 GASTRIC ADENOCARCINOMA: Status: ACTIVE | Noted: 2021-01-01

## 2022-02-24 PROBLEM — I49.3 PVC (PREMATURE VENTRICULAR CONTRACTION): Status: ACTIVE | Noted: 2022-01-01

## 2022-02-24 PROBLEM — R82.90 ABNORMAL URINALYSIS: Status: ACTIVE | Noted: 2022-01-01

## 2022-02-24 PROBLEM — R74.8 ELEVATED ALKALINE PHOSPHATASE LEVEL: Status: ACTIVE | Noted: 2022-01-01

## 2022-02-24 NOTE — PHYSICAL EXAM

## 2022-02-24 NOTE — HISTORY OF PRESENT ILLNESS
[FreeTextEntry1] : This is a 51 year old female, with a Pmhx of metastatic gastric CA (bone metastasis and peritoneal carcinomatosis), h/o SBO, s/p multiple bowel resections, recent hospitalizations for progression of gastric cancer w/ large right pleural effusions s/p thoracentesis and large ascites s/p paracentesis Patient is s/p VATS, pleural bx, PleurX catheter placement on 01/17/2022 s/p EUS/EGD with fluoroscopy and stent 1/24/2022 who present to office for follow up. pt drainss her cath 3x per week (MWF). She reports that she stopped diuretic which was started last visit because it was not helping her. She does not know if her swelling is due to excess fluid or lymphedema. She now reports that the swelling has worsened however it is now generalized and not only lower extremities. \par

## 2022-03-01 PROBLEM — J90 PLEURAL EFFUSION: Status: ACTIVE | Noted: 2021-01-01

## 2022-03-01 PROBLEM — R52 GENERALIZED PAIN: Status: ACTIVE | Noted: 2022-01-01

## 2022-03-01 PROBLEM — Z71.89 ADVANCED CARE PLANNING/COUNSELING DISCUSSION: Status: ACTIVE | Noted: 2022-01-01

## 2022-03-01 PROBLEM — R60.0 BILATERAL EDEMA OF LOWER EXTREMITY: Status: ACTIVE | Noted: 2022-01-01

## 2022-03-01 NOTE — HISTORY OF PRESENT ILLNESS
[FreeTextEntry1] : Patient is a 50 y/o F with significant hx of gastric cancer w/ mets to peritoneum and bone whom comes for evaluation with palliative care. \par \par PMH: h/o SBO, s/p multiple bowel resections, recent hospitalizations for progression of gastric cancer w/ large right pleural effusions s/p thoracentesis and large ascites s/p paracentesis,  s/p VATS, pleural bx, PleurX catheter placement on 01/17/2022 s/p EUS/EGD with fluoroscopy and stent 1/24/2022 \par \par #PAIN\par Location: generalized, spine, LE \par Intensity: varies, at peak 8-9/10\par Quality: stiffness \par Onset: 6 months ago \par Radiation: none\par Alleviated by: Pain medication \par Exacerbated by: prolonged positioning \par Current Regimen: Dilaudid 2mg Q4H prn (4-6 BT daily) (8-12mg dilaudid = MED 36 mg/ 24 hrs w/ 75% CT)\par \par #LE EDEMA\par Albumin ~2.5 \par possible lymph edema related\par cardio following for LE edema- lasix self dced, patient does not feel its helping\par \par #PLEURAL EFFUSION\par following with CTSX \par s/p right pleurex cath- draining 3x week (MWF)\par \par #ABDOMINAL ASCITES\par cardio note reviewed- concern for accumulation of abdominal fluid\par patient refused for ED eval- not having symptoms \par lost to follow up w/ GI\par no c/o inc abdominal girth, sob, difficulty laying on back \par vitals stable \par \par #METASTATIC GASTRIC CANCER\par not on DMT \par last onc visit- 7/2021\par never completed PET for staging, refused chemo and immunotherapy at that time\par never got apt with [facility] due to insurance issues\par \par #ACP\par please refer to detatil in assessment \par \par ISTOP reviewed and documented in medical history.\par \par \par \par

## 2022-03-01 NOTE — PHYSICAL EXAM
[General Appearance - Alert] : alert [General Appearance - In No Acute Distress] : in no acute distress [Sclera] : the sclera and conjunctiva were normal [Outer Ear] : the ears and nose were normal in appearance [Neck Appearance] : the appearance of the neck was normal [] : no respiratory distress [Heart Sounds] : normal S1 and S2 [Bowel Sounds] : normal bowel sounds [Involuntary Movements] : no involuntary movements were seen [Skin Color & Pigmentation] : normal skin color and pigmentation [No Focal Deficits] : no focal deficits [Affect] : the affect was normal [Mood] : the mood was normal [FreeTextEntry1] : abdominal hernia, incision scar seen, difficult to assess ascites, abdomen soft

## 2022-03-01 NOTE — REVIEW OF SYSTEMS
[Feeling Poorly] : feeling poorly [Lower Ext Edema] : lower extremity edema [SOB on Exertion] : shortness of breath during exertion [Joint Stiffness] : joint stiffness [Limb Pain] : limb pain [As Noted in HPI] : as noted in HPI [Negative] : Endocrine

## 2022-03-02 PROBLEM — E88.09 HYPOALBUMINEMIA: Status: ACTIVE | Noted: 2022-01-01

## 2022-03-02 PROBLEM — R18.8 ASCITES: Status: ACTIVE | Noted: 2022-01-01

## 2022-03-02 PROBLEM — R93.5 ABNORMAL CT OF THE ABDOMEN: Status: ACTIVE | Noted: 2022-01-01

## 2022-03-02 NOTE — HISTORY OF PRESENT ILLNESS
[FreeTextEntry1] : 1:55 PM 3/2/2022.  Telephonic visit--13 minutes (including record review)\par \par Lucinda has metastatic signet ring (gastric) cancer to bone, peritoneum, pleura, and probably left ovary.  Right chest tube has been in place.  She notes leg edema.  CT, ultrasound reports reviewed--concern was whether she had enough ascites to tap--a small amount of loculated ascites was noted.  At this time, she denies abdominal pain, significant bloating, nausea or vomiting.  She has been receiving narcotic analgesics for pain control.

## 2022-03-02 NOTE — REASON FOR VISIT
[Home] : at home, [unfilled] , at the time of the visit. [Medical Office: ([facility])___] : at the medical office located in  [Verbal consent obtained from patient] : the patient, [unfilled] [Follow-Up: _____] : a [unfilled] follow-up visit

## 2022-03-02 NOTE — CONSULT LETTER
[Dear  ___] : Dear  [unfilled], [Please see my note below.] : Please see my note below. [Consult Closing:] : Thank you very much for allowing me to participate in the care of this patient.  If you have any questions, please do not hesitate to contact me. [Sincerely,] : Sincerely, [FreeTextEntry3] : Sohan Rangel M.D.\par

## 2022-03-02 NOTE — ASSESSMENT
[FreeTextEntry1] : Progressive metastatic signet ring cancer with extensive metastasis to bone, peritoneum, pleura, and likely, left ovary.  Relatively small amount of ascites, seems to be loculated; and as she has no significant abdominal symptoms at this time, there is no indication to proceed with therapeutic paracentesis.  The leg edema may be related to tumor load in the abdomen with vascular/lymphatic compression, hypoalbuminemia, mesenteric venous thrombosis, etc. She is at increased risk for progressive intestinal obstruction (and thromboses) from extensive intra-abdominal disease.\par \par Suggest:\par 1.  Follow-up with palliative care team.\par 2.  If ascites becomes rapidly progressive with symptoms, she was advised to go to ER for consideration of therapeutic paracentesis (typically done by IR).

## 2022-03-08 PROBLEM — I89.0 LYMPHEDEMA OF BOTH LOWER EXTREMITIES: Status: ACTIVE | Noted: 2022-01-01

## 2022-03-08 PROBLEM — F11.90 OPIOID USE: Status: ACTIVE | Noted: 2022-01-01

## 2022-03-15 NOTE — H&P ADULT - HISTORY OF PRESENT ILLNESS
51F with a PMH of metastatic gastric CA w/ bone metastasis and peritoneal carcinomatosis, hx SBO, s/p multiple bowel resections, recent hospitalizations for progression of gastric cancer w/ large right pleural effusions s/p R pleurex cath, s/p thoracentesis and large ascites s/p paracentesis presents to ED after being told she was anemic on outpatient labs. She reports shortness of breath w/ exertion for the past 2-3 days. She also reports worsening b/l lower extremity edema and abdominal distention since December. Pt states she is chronically anemic but frequently requires transfusions. She denies black stool, hematemesis, hematuria or hematochezia. She has had palpitations since December as well which range from 102-110. She was monitored at Dr. James's office and informed to come to the ED for a transfusion. She has a pleurex catheter that was placed in January. She was emptying is three times a week but now does so twice weekly. She was diagnosed initially in 2011, had a distal gastrectomy and then underwent routine surveillance every 2 years. In May of 2020 she was found to have recurrence but has never undergone chemo or RT. She is supposed to start an alternative treatment plan soon.

## 2022-03-15 NOTE — ED PROVIDER NOTE - CLINICAL SUMMARY MEDICAL DECISION MAKING FREE TEXT BOX
Anemic on outpt labs but pt presents tachycardic, dyspneic on exertion w/ significant ca hx, likely anemia however cannot exclude, PE, infectious cuase of weakness, not suspecting ACS. Labs, transfusion if needed, likely admission, ct angio chest vs V/Q scan for PE inpatient. Pain control.

## 2022-03-15 NOTE — ED ADULT NURSE REASSESSMENT NOTE - NS ED NURSE REASSESS COMMENT FT1
prbcs initiated. Consent in chart. Risks and benefits of administering product explained to patient. Patient verbalized understanding of risks and benefits. Patient educated on side effects to look out for. VSS. Second RN at beside for confirmation of product and pt identification. pt transported to [address] [place] [address] with rn.

## 2022-03-15 NOTE — H&P ADULT - PROBLEM SELECTOR PLAN 1
SOB and Tachycardia due to Symptomatic Anemia    Transfuse 2U PRBC  Check post transfusion CBC   Monitor for hemostasis  Give 1 dose of IV iron  consult GI

## 2022-03-15 NOTE — ED PROVIDER NOTE - ATTENDING CONTRIBUTION TO CARE
attending Pillo: 51yF h/o metastatic gastric CA w/ bone metastasis and peritoneal carcinomatosis, hx SBO, s/p multiple bowel resections, recent hospitalizations for progression of gastric CA w/ large R pleural effusions s/p R pleurex cath, s/p thoracentesis and large ascites s/p paracentesis sent in for acute on chronic anemia on outpatient labs. +JORDAN. Reports no prior need for blood transfusions. Denies GI or vaginal bleeding. Will obtain ekg, labs including type and screen, likely transfuse and admit

## 2022-03-15 NOTE — H&P ADULT - ASSESSMENT
51F with a PMH of metastatic gastric CA w/ bone metastasis and peritoneal carcinomatosis, hx SBO, s/p multiple bowel resections, recent hospitalizations for progression of gastric cancer w/ large right pleural effusions s/p R pleurex cath, s/p thoracentesis and large ascites s/p paracentesis presents to ED after being told she was anemic on outpatient labs.

## 2022-03-15 NOTE — ED ADULT NURSE REASSESSMENT NOTE - NS ED NURSE REASSESS COMMENT FT1
pt resting comfortably, 1st unit of PRBC infusing. Med team MD finished assessing pt, no s/s of reaction noted. Sister at bedside, pt ate food, nursing care continues.

## 2022-03-15 NOTE — PATIENT PROFILE ADULT - PATIENT'S SEXUAL ORIENTATION
Subjective     REASON FOR CONSULTATION:  Chronic leukopenia, monoclonal gammopathy  Provide an opinion on any further workup or treatment                             REQUESTING PHYSICIAN:  Eduardo    RECORDS OBTAINED:  Records of the patients history including those obtained from the referring provider were reviewed and summarized in detail.      History of Present Illness   This is a very pleasant 79-year-old man seen previously in our practice by Dr. Jenkins for leukopenia and anemiaand IgG paraproteinemia (MGUS).  Labs from our previous [facility] computer system showed a serum protein electrophoresis from 12/19/2011 showing an M spike 1.4 g/dL IgG kappa.  I could not find previous light chain studies within our records.    The patient returns for continued evaluation.   He has had no significant infectious problems.  He denies unusual weight loss or lymphadenopathy.  He denies night sweats.  He remains active.  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  He denies bone pain.    In return today, he is doing well other than mild wrist pain related to arthritis.      Past Medical History:   Diagnosis Date   • Arthritis     hands   • CAD (coronary artery disease)    • Diabetes    • GERD (gastroesophageal reflux disease)    • H/O MGUS (monoclonal gammopathy of unknown significance)    • History of colon polyps    • History of herpes zoster 2007   • History of pneumonia    • Hyperlipidemia    • Hypertension    • Neutropenia     H/O hereditary neutropenia   • Prostate cancer 2007   • Stroke 1997        Past Surgical History:   Procedure Laterality Date   • ANAL FISSURECTOMY     • CATARACT EXTRACTION Bilateral 2009   • COLONOSCOPY  06/2014    cecal polyp, sigmoid diverticulosis   • ENDOSCOPY  2001   • OTHER SURGICAL HISTORY  2007    Decortication and right lower lobe with resection   • PROSTATE BIOPSY  2007   • THORACOSCOPY          Current Outpatient Prescriptions on File Prior to Visit    Medication Sig Dispense Refill   • atorvastatin (LIPITOR) 40 MG tablet      • clopidogrel (PLAVIX) 75 MG tablet Take 75 mg by mouth.     • esomeprazole (nexIUM) 40 MG capsule Take 40 mg by mouth.     • HYDROcodone-acetaminophen (NORCO) 5-325 MG per tablet Take 1 tablet by mouth Every 6 (Six) Hours As Needed.     • Multiple Vitamins-Minerals (MULTIVITAMIN ADULTS 50+ PO) Take  by mouth.     • pregabalin (LYRICA) 50 MG capsule Take 50 mg by mouth 2 (Two) Times a Day.     • sildenafil (REVATIO) 20 MG tablet TK 2 TS PO QD PRN  3   • valsartan-hydrochlorothiazide (DIOVAN-HCT) 320-25 MG per tablet        No current facility-administered medications on file prior to visit.         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 The patient blows a trumpet and enjoys vacationing at [place]. 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        Family History   Problem Relation Age of Onset   • Uterine cancer Mother    • Hypertension Other    • Hyperlipidemia Other    • Arthritis Other    • Stroke Other    • Heart disease Sister    • Hypertension Brother         Review of Systems   Constitutional: Negative.    HENT: Negative.    Eyes: Negative.    Respiratory: Negative.    Cardiovascular: Negative.    Gastrointestinal: Negative.    Genitourinary: Negative.    Musculoskeletal: Positive for arthralgias.   Skin: Negative.    Allergic/Immunologic: Negative.    Neurological: Negative.    Hematological: Negative.    Psychiatric/Behavioral: Negative.           Objective     There were no vitals filed for  this visit.  Current Status 3/12/2018   ECOG score 0       Physical Exam    Con: pleasant, well-appearing man  HEENT: no icterus, no thrush, moist membranes  CV: RRR, S1S2  RESP: CTA bilat, no wheezing  GI: soft, nontender, no splenomegaly, +BS  MS: no edema  SKIN: no petechiae or bruising  NEURO: alert and oriented x3, normal strength, normal muscle tone  PSYCH: normal mood    Physical exam performed and unchanged from above-18    RECENT LABS:  Hematology WBC   Date Value Ref Range Status   2018 3.32 (L) 4.00 - 10.00 10*3/mm3 Final     RBC   Date Value Ref Range Status   2018 3.70 (L) 4.30 - 5.50 10*6/mm3 Final     Hemoglobin   Date Value Ref Range Status   2018 12.7 (L) 13.5 - 16.5 g/dL Final     Hematocrit   Date Value Ref Range Status   2018 35.0 (L) 40.0 - 49.0 % Final     Platelets   Date Value Ref Range Status   2018 194 150 - 375 10*3/mm3 Final        Lab Results   Component Value Date    GLUCOSE 152 (H) 2015    BUN 32 (H) 2015    CREATININE 1.46 (H) 2015    K 4.0 2015    CO2 19 (L) 2015    CALCIUM 8.5 (L) 2015    PROTENTOTREF 7.2 2018    ALBUMIN 4.1 2018    LABIL2 1.3 2018    AST 61 (H) 07/15/2015    ALT 58 (H) 07/15/2015     Peripheral blood flow cytometry 3/12/18: No abnormal myeloid or lymphoid populations identified  B2M.8  FLC ratio 3.96  ESR 12  M-spike 1.4 g/dL IgG kappa    Skeletal survey 3/12/18:  Negative.  I personally reviewed images and see no lytic lesions.      Assessment/Plan     1.  Chronic leukopenia: The patient's ANC has run borderline low for a number of years.  This has been previously attributed to ethnic neutropenia versus a side effect of previous pelvic irradiation for treatment of prostate cancer.  The current ANC is 1.47, similar to his previous values.  He is having no infectious complications.  I favor a benign ethnic neutropenia given the chronicity.      4.  MGUS:  A protein  electrophoresis from December 2011 showed an M spike 1.4 g/dL IgG kappa.    Follow-up labs reviewed show a stable M spike 1.4 g/dL, unchanged from 2011.  The beta 2 microglobulin is normal 1.8.  Skeletal survey is negative for lytic lesions.  The patient has chronic kidney disease and associated mild anemia which are unlikely related to the paraprotein given the stability.  The free light chain ratio is mildly elevated at 3.96 likely as a side effect of his renal dysfunction.  I recommended that we repeat his CBC, CMP, electrophoresis, and free light chain ratio in 6 months.    3.  Mild normocytic anemia:  the patient has stage III chronic kidney disease as the likely cause.  I will add ferritin, iron profile, B12 and folic acid next visit        Withheld/decline to answer

## 2022-03-15 NOTE — H&P ADULT - NSHPLABSRESULTS_GEN_ALL_CORE
Hb 7.1 MCV is normocytic, declined from 8.6    Plts wnl    Hyponatremia at 132    Renal functin preserved    ALP 2307, slightly lower than baseline

## 2022-03-15 NOTE — PATIENT PROFILE ADULT - FALL HARM RISK - HARM RISK INTERVENTIONS
Assistance with ambulation/Assistance OOB with selected safe patient handling equipment/Communicate Risk of Fall with Harm to all staff/Discuss with provider need for PT consult/Monitor gait and stability/Reinforce activity limits and safety measures with patient and family/Tailored Fall Risk Interventions/Visual Cue: Yellow wristband and red socks/Bed in lowest position, wheels locked, appropriate side rails in place/Call bell, personal items and telephone in reach/Instruct patient to call for assistance before getting out of bed or chair/Non-slip footwear when patient is out of bed/[place] to call system/Physically safe environment - no spills, clutter or unnecessary equipment/Purposeful Proactive Rounding/Room/bathroom lighting operational, light cord in reach

## 2022-03-15 NOTE — ED PROVIDER NOTE - OBJECTIVE STATEMENT
51F hx metastatic gastric CA w/ bone metastasis and peritoneal carcinomatosis, hx SBO, s/p multiple bowel resections, recent hospitalizations for progression of gastric cancer w/ large right pleural effusions s/p R pleurex cath, s/p thoracentesis and large ascites s/p paracentesis presents to ED after being told she was anemic on outpatient labs, pt endorses shortness of breath w/ exertion, worsening b/l lower extremity edema and abdominal distention. Pt states she is chronically anemic but frequently requires transfusions. Pt states she has had adverse reactions to IV contrast in the past and had become anuric before. No chest pain, no n/v.

## 2022-03-15 NOTE — ED PROVIDER NOTE - PHYSICAL EXAMINATION
CONSTITUTIONAL: uncomfortable appearing  SKIN: Warm dry, normal skin turgor  HEAD: NCAT  NECK: Supple; non tender. Full ROM.  CARD: tachycardic 110, no murmurs.  RESP: clear to ausculation b/l. No crackles or wheezing.  ABD: soft, distended, no rebound or guarding.  MSK: significant b/l lower extremity pitting edema  PSYCH: Cooperative, appropriate.

## 2022-03-15 NOTE — ED PROVIDER NOTE - NS ED ROS FT
Constitutional:  (-) fever, (-) chills, (-) lethargy  Eyes:  (-) eye pain (-) visual changes  ENMT: (-) nasal discharge, (-) sore throat. (-) neck pain or stiffness  Cardiac: (-) chest pain (-) palpitations  Respiratory:  (-) cough (+) SOB  GI:  (-) nausea (-) vomiting (-) diarrhea (-) abdominal pain.  :  (-) dysuria (-) frequency (-) burning.  MS:  (+) back pain (-) joint pain.  Neuro:  (-) headache (-) numbness (-) tingling (-) focal weakness  Skin:  (-) rash  Except as documented in the HPI,  all other systems are negative

## 2022-03-15 NOTE — H&P ADULT - GIT ABD PE PAL DETAILS PC
midline well healed abdominal scar, abdominal hernia, right abdomen pitting edema tracking up to the lateral chest wall/distended

## 2022-03-15 NOTE — ED ADULT NURSE NOTE - OBJECTIVE STATEMENT
Pt si a 51yoF complaining of lethargy, was sent from oncologist for possible transfusion after getting routine labs sent. Denies vomiting, diarrhea, etc In ER states only complaint is weakness progressively getting worse.

## 2022-03-16 NOTE — PROGRESS NOTE ADULT - PROBLEM SELECTOR PLAN 4
s/p Right pleurex    obtain CXR s/p Right pleurex. CXR showed Mild pulmonary vascular congestive changes.  Bilateral pleural effusions. Small lower, lateral right basilar pneumothorax  -c/w pleurex

## 2022-03-16 NOTE — CONSULT NOTE ADULT - SUBJECTIVE AND OBJECTIVE BOX
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 Work up since then included EUS with Dr. Llanos that showed involvement of the mucosa, submucosa, and muscularis propria (T2) with gastro-hepatic node. Her recurrence of cancer has been associated with pleural effusions and large ascites requiring thoracentesis and paracentesis, respectively. Her most recent hospitalization in January was for w/ dysphagia and efferent limb obstruction from tumor burden. Patient was taken for EGD with Dr. Pavon and was found to have Billroth II gastrojejunostomy with GJ anastamotic malignant stricture and infiltrative, linitis plastica appearance of gastric remnant. Efferent jejunal limb was patent. A 22mm x 9cm duodenal stent placed across the  malignant stricture. Her course in January was complicated by recurrent large right pleural effusions s/p R pleurex cath.     Today, she presents to ED after being told she was anemic on outpatient labs. She reports shortness of breath w/ exertion for the past 2-3 day, palpitations and worsening b/l lower extremity edema and abdominal distention since December. Pt states she is chronically anemic and frequently requires transfusions. She denies black stool, hematemesis, hematuria or hematochezia.     Allergies:  Cipro (Rash)  QUINOLONES (Unknown)      Home Medications:    Hospital Medications:  acetaminophen     Tablet .. 650 milliGRAM(s) Oral every 6 hours PRN  aluminum hydroxide/magnesium hydroxide/simethicone Suspension 30 milliLiter(s) Oral every 4 hours PRN  HYDROmorphone   Tablet 2 milliGRAM(s) Oral every 4 hours PRN  influenza   Vaccine 0.5 milliLiter(s) IntraMuscular once  melatonin 3 milliGRAM(s) Oral at bedtime PRN  morphine ER Tablet 15 milliGRAM(s) Oral two times a day  multivitamin 1 Tablet(s) Oral daily  ondansetron   Disintegrating Tablet 4 milliGRAM(s) Oral three times a day  ondansetron Injectable 4 milliGRAM(s) IV Push every 8 hours PRN  simethicone 80 milliGRAM(s) Chew every 8 hours PRN      PMHX/PSHX:  Bariatric surg stat-unsp    Gastric cancer    Small bowel obstruction    Perforated bowel    Other abdominal hernia    Obesity (BMI 30-39.9)    History of gastrectomy    History of resection of small bowel        Family history:  No pertinent family history in first degree relatives    FH: diabetes mellitus (Mother)      PHYSICAL EXAM:     GENERAL:  Appears stated age, well-groomed, well-nourished, no distress  HEENT:  NC/AT,  conjunctivae clear and pink, no thyromegaly, nodules, adenopathy, no JVD, sclera -anicteric  CHEST:  Full & symmetric excursion, no increased effort, breath sounds clear  HEART:  Regular rhythm, S1, S2, no murmur/rub/S3/S4, no abdominal bruit, no edema  ABDOMEN:  Soft, non-tender, non-distended, normoactive bowel sounds,  no masses ,no hepato-splenomegaly, no signs of chronic liver disease  EXTEREMITIES:  no cyanosis,clubbing or edema  SKIN:  No rash/erythema/ecchymoses/petechiae/wounds/abscess/warm/dry  NEURO:  Alert, oriented, no asterixis, no tremor, no encephalopathy    Vital Signs:  Vital Signs Last 24 Hrs  T(C): 36.7 (16 Mar 2022 04:00), Max: 37.2 (15 Mar 2022 19:00)  T(F): 98.1 (16 Mar 2022 04:00), Max: 98.9 (15 Mar 2022 19:00)  HR: 105 (16 Mar 2022 04:00) (105 - 121)  BP: 107/65 (16 Mar 2022 04:00) (107/65 - 117/72)  BP(mean): 84 (15 Mar 2022 16:39) (84 - 84)  RR: 17 (16 Mar 2022 04:00) (12 - 18)  SpO2: 94% (16 Mar 2022 04:00) (94% - 98%)  Daily Height in cm: 167.64 (15 Mar 2022 13:24)    Daily     LABS:                        8.5    7.11  )-----------( 344      ( 16 Mar 2022 06:58 )             26.9     Mean Cell Volume: 84.1 fl (03-16-22 @ 06:58)    03-16    130<L>  |  97  |  9   ----------------------------<  102<H>  3.6   |  22  |  0.64    Ca    8.0<L>      16 Mar 2022 06:55    TPro  5.4<L>  /  Alb  2.5<L>  /  TBili  0.5  /  DBili  x   /  AST  21  /  ALT  15  /  AlkPhos  2307<H>  03-15    LIVER FUNCTIONS - ( 15 Mar 2022 14:46 )  Alb: 2.5 g/dL / Pro: 5.4 g/dL / ALK PHOS: 2307 U/L / ALT: 15 U/L / AST: 21 U/L / GGT: x           PT/INR - ( 15 Mar 2022 14:46 )   PT: 15.5 sec;   INR: 1.33 ratio         PTT - ( 15 Mar 2022 14:46 )  PTT:28.9 sec                            8.5    7.11  )-----------( 344      ( 16 Mar 2022 06:58 )             26.9                         8.2    7.88  )-----------( 334      ( 16 Mar 2022 00:49 )             26.8                         7.1    7.42  )-----------( 387      ( 15 Mar 2022 14:46 )             24.2     Imaging:             Chief Complaint:  Patient is a 51y old  Female who presents with a chief complaint of SOB (15 Mar 2022 16:39)      HPI:  51F lady with recent recurrent metastatic gastric cancer (mets to bone) and  peritoneal carcinomatosis , 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 Work up since then included EUS with Dr. Llanos that showed involvement of the mucosa, submucosa, and muscularis propria (T2) with gastro-hepatic node. Her recurrence of cancer has been associated with pleural effusions and large ascites requiring thoracentesis and paracentesis, respectively. Her most recent hospitalization in January was for w/ dysphagia and efferent limb obstruction from tumor burden. Patient was taken for EGD with Dr. Pavon and was found to have Billroth II gastrojejunostomy with GJ anastamotic malignant stricture and infiltrative, linitis plastica appearance of gastric remnant. Efferent jejunal limb was patent. A 22mm x 9cm duodenal stent placed across the  malignant stricture. Her course in January was complicated by recurrent large right pleural effusions s/p R pleurex cath.     Today, she presents to ED after being told she was anemic on outpatient labs. She reports shortness of breath w/ exertion for the past 2-3 day, palpitations and worsening b/l lower extremity edema and abdominal distention since December. Pt states she is chronically anemic and frequently requires transfusions. She denies black stool, hematemesis, hematuria or hematochezia. Patient without n/v and is able to tolerate all food as long as she eats slow.     Allergies:  Cipro (Rash)  QUINOLONES (Unknown)      Home Medications:    Hospital Medications:  acetaminophen     Tablet .. 650 milliGRAM(s) Oral every 6 hours PRN  aluminum hydroxide/magnesium hydroxide/simethicone Suspension 30 milliLiter(s) Oral every 4 hours PRN  HYDROmorphone   Tablet 2 milliGRAM(s) Oral every 4 hours PRN  influenza   Vaccine 0.5 milliLiter(s) IntraMuscular once  melatonin 3 milliGRAM(s) Oral at bedtime PRN  morphine ER Tablet 15 milliGRAM(s) Oral two times a day  multivitamin 1 Tablet(s) Oral daily  ondansetron   Disintegrating Tablet 4 milliGRAM(s) Oral three times a day  ondansetron Injectable 4 milliGRAM(s) IV Push every 8 hours PRN  simethicone 80 milliGRAM(s) Chew every 8 hours PRN      PMHX/PSHX:  Bariatric surg stat-unsp    Gastric cancer    Small bowel obstruction    Perforated bowel    Other abdominal hernia    Obesity (BMI 30-39.9)    History of gastrectomy    History of resection of small bowel        Family history:  No pertinent family history in first degree relatives    FH: diabetes mellitus (Mother)      PHYSICAL EXAM:     GENERAL:  Appears stated age, well-groomed, no distress  HEENT:  NC/AT, conjunctivae clear and pink  CHEST:  Full & symmetric excursion, no increased effort, breath sounds clear, right pleurex cath   HEART:  Regular rhythm, S1, S2, no murmur/rub/S3/S4, no abdominal bruit, no edema  ABDOMEN:  Soft, non-tender, distended without fluid wave, normoactive bowel sounds, small hernia noted, reducible, nontender   EXTEREMITIES: 3+ pitting edema   SKIN:  No rash/erythema/ecchymoses/petechiae/wounds/abscess/warm/dry  NEURO:  Alert, oriented, no encephalopathy    Vital Signs:  Vital Signs Last 24 Hrs  T(C): 36.7 (16 Mar 2022 04:00), Max: 37.2 (15 Mar 2022 19:00)  T(F): 98.1 (16 Mar 2022 04:00), Max: 98.9 (15 Mar 2022 19:00)  HR: 105 (16 Mar 2022 04:00) (105 - 121)  BP: 107/65 (16 Mar 2022 04:00) (107/65 - 117/72)  BP(mean): 84 (15 Mar 2022 16:39) (84 - 84)  RR: 17 (16 Mar 2022 04:00) (12 - 18)  SpO2: 94% (16 Mar 2022 04:00) (94% - 98%)  Daily Height in cm: 167.64 (15 Mar 2022 13:24)    Daily     LABS:                        8.5    7.11  )-----------( 344      ( 16 Mar 2022 06:58 )             26.9     Mean Cell Volume: 84.1 fl (03-16-22 @ 06:58)    03-16    130<L>  |  97  |  9   ----------------------------<  102<H>  3.6   |  22  |  0.64    Ca    8.0<L>      16 Mar 2022 06:55    TPro  5.4<L>  /  Alb  2.5<L>  /  TBili  0.5  /  DBili  x   /  AST  21  /  ALT  15  /  AlkPhos  2307<H>  03-15    LIVER FUNCTIONS - ( 15 Mar 2022 14:46 )  Alb: 2.5 g/dL / Pro: 5.4 g/dL / ALK PHOS: 2307 U/L / ALT: 15 U/L / AST: 21 U/L / GGT: x           PT/INR - ( 15 Mar 2022 14:46 )   PT: 15.5 sec;   INR: 1.33 ratio         PTT - ( 15 Mar 2022 14:46 )  PTT:28.9 sec                            8.5    7.11  )-----------( 344      ( 16 Mar 2022 06:58 )             26.9                         8.2    7.88  )-----------( 334      ( 16 Mar 2022 00:49 )             26.8                         7.1    7.42  )-----------( 387      ( 15 Mar 2022 14:46 )             24.2

## 2022-03-16 NOTE — DISCHARGE NOTE NURSING/CASE MANAGEMENT/SOCIAL WORK - NSDCPEFALRISK_GEN_ALL_CORE
For information on Fall & Injury Prevention, visit: [email or web address] OR  [email or web address] OR  https://www.cdc.gov/steadi/patient.html

## 2022-03-16 NOTE — DISCHARGE NOTE NURSING/CASE MANAGEMENT/SOCIAL WORK - PATIENT PORTAL LINK FT
You can access the FollowMyHealth Patient Portal offered by [facility] by registering at the following website: [email or web address]. By joining [facility]’s FollowMyHealth portal, you will also be able to view your health information using other applications (apps) compatible with our system.

## 2022-03-16 NOTE — PROGRESS NOTE ADULT - PROBLEM SELECTOR PLAN 2
Metastatic Gastric Carcinoma with spread to the bone and lymphatic tissue    no chemo or RT  alternative treatment plan per patient  pain control and symptomatic control of sequelae

## 2022-03-16 NOTE — PROGRESS NOTE ADULT - ATTENDING COMMENTS
51F with a PMH of metastatic gastric CA w/ bone metastasis and peritoneal carcinomatosis, hx SBO, s/p multiple bowel resections, recent hospitalizations for progression of gastric cancer w/ large right pleural effusions s/p R pleurex cath, s/p thoracentesis and large ascites s/p paracentesis presents to ED for symptomatic anemia    Symptomatic Anemia  - Pt /p 2 u PRBC with improvement of her symptoms    pt stable for discharge  Discharge time spent: 32 min

## 2022-03-16 NOTE — CONSULT NOTE ADULT - ASSESSMENT
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 Patient received 2U PRBC with hbg going from 7.1--> 8.5. Patient feeling well today and denies any complaints.     #anemia- likely in setting of chronic illness and metastatic GI cancer   #metastatic gastric cancer and  peritoneal carcinomatosis: following with outside oncologist, planning for treatment; no acute decline in functionality or complications 2/2 mass    Rec:   - would continue with outpatient follow up   - no inpatient procedure   - OK to discharge from GI perspective     Thank you for the consult, we will sign off for now   Please resoncult with any issues     Chitra Tovar   PGY2, IM

## 2022-03-16 NOTE — PROGRESS NOTE ADULT - SUBJECTIVE AND OBJECTIVE BOX
PROGRESS NOTE:   Authored by Stephanie García MD     Patient is a 51y old  Female who presents with a chief complaint of SOB (16 Mar 2022 08:43)      SUBJECTIVE / OVERNIGHT EVENTS:    ADDITIONAL REVIEW OF SYSTEMS:    MEDICATIONS  (STANDING):  influenza   Vaccine 0.5 milliLiter(s) IntraMuscular once  morphine ER Tablet 15 milliGRAM(s) Oral two times a day  multivitamin 1 Tablet(s) Oral daily  ondansetron   Disintegrating Tablet 4 milliGRAM(s) Oral three times a day    MEDICATIONS  (PRN):  acetaminophen     Tablet .. 650 milliGRAM(s) Oral every 6 hours PRN Temp greater or equal to 38C (100.4F), Mild Pain (1 - 3)  aluminum hydroxide/magnesium hydroxide/simethicone Suspension 30 milliLiter(s) Oral every 4 hours PRN Dyspepsia  HYDROmorphone   Tablet 2 milliGRAM(s) Oral every 4 hours PRN Moderate Pain (4 - 6)  melatonin 3 milliGRAM(s) Oral at bedtime PRN Insomnia  ondansetron Injectable 4 milliGRAM(s) IV Push every 8 hours PRN Nausea and/or Vomiting  simethicone 80 milliGRAM(s) Chew every 8 hours PRN Upset Stomach      CAPILLARY BLOOD GLUCOSE        I&O's Summary      PHYSICAL EXAM:  Vital Signs Last 24 Hrs  T(C): 36.7 (16 Mar 2022 04:00), Max: 37.2 (15 Mar 2022 19:00)  T(F): 98.1 (16 Mar 2022 04:00), Max: 98.9 (15 Mar 2022 19:00)  HR: 105 (16 Mar 2022 04:00) (105 - 121)  BP: 107/65 (16 Mar 2022 04:00) (107/65 - 117/72)  BP(mean): 84 (15 Mar 2022 16:39) (84 - 84)  RR: 17 (16 Mar 2022 04:00) (12 - 18)  SpO2: 94% (16 Mar 2022 04:00) (94% - 98%)    GENERAL: No acute distress, well-developed  HEAD:  Atraumatic, Normocephalic  EYES: EOMI, PERRLA, conjunctiva and sclera clear  NECK: Supple, no lymphadenopathy, no JVD  CHEST/LUNG: CTAB; No wheezes, rales, or rhonchi  HEART: Regular rate and rhythm; No murmurs, rubs, or gallops  ABDOMEN: Soft, non-tender, non-distended; normal bowel sounds, no organomegaly  EXTREMITIES:  2+ peripheral pulses b/l, No clubbing, cyanosis, or edema  NEUROLOGY: A&O x 3, no focal deficits  SKIN: No rashes or lesions    LABS:                        8.5    7.11  )-----------( 344      ( 16 Mar 2022 06:58 )             26.9     03-16    130<L>  |  97  |  9   ----------------------------<  102<H>  3.6   |  22  |  0.64    Ca    8.0<L>      16 Mar 2022 06:55    TPro  5.4<L>  /  Alb  2.5<L>  /  TBili  0.5  /  DBili  x   /  AST  21  /  ALT  15  /  AlkPhos  2307<H>  03-15    PT/INR - ( 15 Mar 2022 14:46 )   PT: 15.5 sec;   INR: 1.33 ratio         PTT - ( 15 Mar 2022 14:46 )  PTT:28.9 sec            RADIOLOGY & ADDITIONAL TESTS:  Results Reviewed:   Imaging Personally Reviewed:  Electrocardiogram Personally Reviewed:    COORDINATION OF CARE:  Care Discussed with Consultants/Other Providers [Y/N]:  Prior or Outpatient Records Reviewed [Y/N]:   PROGRESS NOTE:   Authored by Stephanie García MD     Patient is a 51y old  Female who presents with a chief complaint of SOB (16 Mar 2022 08:43)      SUBJECTIVE / OVERNIGHT EVENTS:  SOB and leg swelling x 2 months. Currently no SOB resting in chair. She denied chest pain, dizziness, N/V.     ADDITIONAL REVIEW OF SYSTEMS:    MEDICATIONS  (STANDING):  influenza   Vaccine 0.5 milliLiter(s) IntraMuscular once  morphine ER Tablet 15 milliGRAM(s) Oral two times a day  multivitamin 1 Tablet(s) Oral daily  ondansetron   Disintegrating Tablet 4 milliGRAM(s) Oral three times a day    MEDICATIONS  (PRN):  acetaminophen     Tablet .. 650 milliGRAM(s) Oral every 6 hours PRN Temp greater or equal to 38C (100.4F), Mild Pain (1 - 3)  aluminum hydroxide/magnesium hydroxide/simethicone Suspension 30 milliLiter(s) Oral every 4 hours PRN Dyspepsia  HYDROmorphone   Tablet 2 milliGRAM(s) Oral every 4 hours PRN Moderate Pain (4 - 6)  melatonin 3 milliGRAM(s) Oral at bedtime PRN Insomnia  ondansetron Injectable 4 milliGRAM(s) IV Push every 8 hours PRN Nausea and/or Vomiting  simethicone 80 milliGRAM(s) Chew every 8 hours PRN Upset Stomach      CAPILLARY BLOOD GLUCOSE        I&O's Summary      PHYSICAL EXAM:  Vital Signs Last 24 Hrs  T(C): 36.7 (16 Mar 2022 04:00), Max: 37.2 (15 Mar 2022 19:00)  T(F): 98.1 (16 Mar 2022 04:00), Max: 98.9 (15 Mar 2022 19:00)  HR: 105 (16 Mar 2022 04:00) (105 - 121)  BP: 107/65 (16 Mar 2022 04:00) (107/65 - 117/72)  BP(mean): 84 (15 Mar 2022 16:39) (84 - 84)  RR: 17 (16 Mar 2022 04:00) (12 - 18)  SpO2: 94% (16 Mar 2022 04:00) (94% - 98%)    GENERAL: No acute distress, well-developed  HEAD:  Atraumatic, Normocephalic  EYES: EOMI, PERRLA, conjunctiva and sclera clear  NECK: Supple, no lymphadenopathy, no JVD  CHEST/LUNG: CTAB; No wheezes, rales, or rhonchi  HEART: Regular rate and rhythm; No murmurs, rubs, or gallops  ABDOMEN: Soft, non-tender, non-distended; normal bowel sounds, no organomegaly  EXTREMITIES:  2+ peripheral pulses b/l, No clubbing, cyanosis. 4+ pitting edema bilaterally   NEUROLOGY: A&O x 3, no focal deficits  SKIN: No rashes or lesions    LABS:                        8.5    7.11  )-----------( 344      ( 16 Mar 2022 06:58 )             26.9     03-16    130<L>  |  97  |  9   ----------------------------<  102<H>  3.6   |  22  |  0.64    Ca    8.0<L>      16 Mar 2022 06:55    TPro  5.4<L>  /  Alb  2.5<L>  /  TBili  0.5  /  DBili  x   /  AST  21  /  ALT  15  /  AlkPhos  2307<H>  03-15    PT/INR - ( 15 Mar 2022 14:46 )   PT: 15.5 sec;   INR: 1.33 ratio         PTT - ( 15 Mar 2022 14:46 )  PTT:28.9 sec            RADIOLOGY & ADDITIONAL TESTS:  Results Reviewed:   Imaging Personally Reviewed:  Electrocardiogram Personally Reviewed:    COORDINATION OF CARE:  Care Discussed with Consultants/Other Providers [Y/N]:  Prior or Outpatient Records Reviewed [Y/N]:

## 2022-03-16 NOTE — DISCHARGE NOTE PROVIDER - NSFOLLOWUPCLINICS_GEN_ALL_ED_FT
[facility] General Internal Medicine  General Internal Medicine  [address] [place] [address]  Phone: [phone]  Fax:   Follow Up Time: 1 week    [facility]  Hematology/Oncology  [address] [place] [address]  Phone: [phone]  Fax:   Follow Up Time: 1 week

## 2022-03-16 NOTE — DISCHARGE NOTE PROVIDER - HOSPITAL COURSE
51F with a PMH of metastatic gastric CA w/ bone metastasis and peritoneal carcinomatosis, hx SBO, s/p multiple bowel resections, recent hospitalizations for progression of gastric cancer w/ large right pleural effusions s/p R pleurex cath, s/p thoracentesis and large ascites s/p paracentesis presents to ED after being told she was anemic on outpatient labs. Patient received 2 units of pRBC transfusion. Chest X-ray showed -----. Patient was stable to be discharged. 51F with a PMH of metastatic gastric CA w/ bone metastasis and peritoneal carcinomatosis, hx SBO, s/p multiple bowel resections, recent hospitalizations for progression of gastric cancer w/ large right pleural effusions s/p R pleurex cath, s/p thoracentesis and large ascites s/p paracentesis presents to ED after being told she was anemic on outpatient labs. Patient received 2 units of pRBC transfusion. Chest X-ray showed mild pulmonary vascular congestive changes, bilateral pleural effusion, small lower, lateral right basilar pneumothorax. Patient was stable to be discharged.

## 2022-03-16 NOTE — CONSULT NOTE ADULT - ATTENDING COMMENTS
Gastric ca  post stent   tolerating PO  Anemia without overt bleeding  NO suspicion at this time for acute GI bleeding, likely chronic occult  NO indication for EGD at this time  Please reconsult as needed

## 2022-03-16 NOTE — DISCHARGE NOTE PROVIDER - NSDCCPCAREPLAN_GEN_ALL_CORE_FT
PRINCIPAL DISCHARGE DIAGNOSIS  Diagnosis: Anemia  Assessment and Plan of Treatment: You were admitted due to anemia with low hemoglobin level. You received 2 units of packed red blood cell tranfusion. Please follow up with your primary care physician and your oncologist within 1-2 weeks of discharge from the hospital.

## 2022-03-16 NOTE — DISCHARGE NOTE PROVIDER - NSDCMRMEDTOKEN_GEN_ALL_CORE_FT
Dilaudid 2 mg oral tablet: 1 tab(s) orally 8 times a day, As Needed MDD:8  multivitamin: 1 tab(s) once a day  simethicone 80 mg oral tablet: 1 tab(s) orally every 8 hours, As Needed  Zofran ODT 4 mg oral tablet, disintegratin tab(s) orally 3 times a day   Dilaudid 2 mg oral tablet: 1 tab(s) orally every 4 hours, As Needed  MS Contin 15 mg/12 hr oral tablet, extended release: 1 tab(s) orally 2 times a day  multivitamin: 1 tab(s) once a day  simethicone 80 mg oral tablet: 1 tab(s) orally every 8 hours, As Needed  Zofran ODT 4 mg oral tablet, disintegratin tab(s) orally 3 times a day

## 2022-03-16 NOTE — PROGRESS NOTE ADULT - PROBLEM SELECTOR PLAN 5
due to malignancy    compression stockings  elevate the legs due to malignancy. Most likely lymphedema     compression stockings  elevate the legs

## 2022-03-19 NOTE — ED PROVIDER NOTE - NS ED ATTENDING STATEMENT MOD
This was a shared visit with the SIMEON. I reviewed and verified the documentation and independently performed the documented:

## 2022-03-19 NOTE — ED PROVIDER NOTE - PATIENT PORTAL LINK FT
You can access the FollowMyHealth Patient Portal offered by [facility] by registering at the following website: [email or web address]. By joining [facility]’s FollowMyHealth portal, you will also be able to view your health information using other applications (apps) compatible with our system.

## 2022-03-19 NOTE — ED PROVIDER NOTE - CLINICAL SUMMARY MEDICAL DECISION MAKING FREE TEXT BOX
Dr. Wyatt Note: metastatic gastric cancer c/b anemia and bone mets with acute on chronic pain along with new L shoulder pain from fall, r/o pathological/traumatic shoulder fracture, metabolic workup r/o recurrent anemia, pain control

## 2022-03-19 NOTE — ED ADULT NURSE NOTE - OBJECTIVE STATEMENT
51y female PMH metastatic gastric cancer, perforated bowel, to the ED via EMS s/p fall. EMS reports pt was taking a shower and had a slip and fall. Pt reports falling on to her right shoulder. Right shoulder placed in sling by EMS. Pt denies any LOC, dizziness, nausea or vomiting, SOB, chest pain, syncope, vision changes, numbness/tingling anywhere. Stretcher in lowest position and locked, appropriate side rails in place, blankets given for comfort.

## 2022-03-19 NOTE — ED PROVIDER NOTE - CONSTITUTIONAL, MLM
Ill appearing due to pain, awake, alert, oriented to person, place, time/situation and in no apparent distress. normal...

## 2022-03-19 NOTE — ED PROVIDER NOTE - OBJECTIVE STATEMENT
Dr. Wyatt Note: see below for additional information.  Pt presents with LEFT shoulder pain after slip and fall, worse with movement, better with rest, severe pain L shoulder.  No numbness.  PT also with chronic pain from metastatic cancer, requesting high dose dilaudid.  No fever, vomiting.     Copied and pasted previous discharge summary:  Discharge Date	16-Mar-2022  Admission Date	15-Mar-2022 16:11  Reason for Admission	SOB: "Hospital Course: 51F with a PMH of metastatic gastric CA w/ bone metastasis and peritoneal carcinomatosis, hx SBO, s/p multiple bowel resections, recent hospitalizations for progression of gastric cancer w/ large right pleural effusions s/p R pleurex cath, s/p thoracentesis and large ascites s/p paracentesis presents to ED after being told she was anemic on outpatient labs. Patient received 2 units of pRBC transfusion. Chest X-ray showed mild pulmonary vascular congestive changes, bilateral pleural effusion, small lower, lateral right basilar pneumothorax. Patient was stable to be discharged. Dr. Wyatt Note: see below for additional information.  Pt presents with LEFT shoulder pain after slip and fall in shower, worse with movement, better with rest, severe pain L shoulder.  No numbness.  PT also with chronic pain from metastatic cancer, requesting high dose dilaudid.  No fever, vomiting, head trauma, back pain, numbness/tingling, dizziness, lightheadedness, headache, cp, new sob.       Copied and pasted previous discharge summary:  Discharge Date	16-Mar-2022  Admission Date	15-Mar-2022 16:11  Reason for Admission	SOB: "Hospital Course: 51F with a PMH of metastatic gastric CA w/ bone metastasis and peritoneal carcinomatosis, hx SBO, s/p multiple bowel resections, recent hospitalizations for progression of gastric cancer w/ large right pleural effusions s/p R pleurex cath, s/p thoracentesis and large ascites s/p paracentesis presents to ED after being told she was anemic on outpatient labs. Patient received 2 units of pRBC transfusion. Chest X-ray showed mild pulmonary vascular congestive changes, bilateral pleural effusion, small lower, lateral right basilar pneumothorax. Patient was stable to be discharged.

## 2022-03-19 NOTE — ED PROVIDER NOTE - PROGRESS NOTE DETAILS
Dr. Wyatt Note: prelim xray negative for fracture, reviewed by me as well, pt sensitive to touch multiple soft tissue locations of clavicle and L shoulder without obvious bony td.  Likely soft tissue injury, less likely fracture although pt is high risk for fracture given osteopenia.  Pt already in sling (pt's own sling, not applied by me), can continue to use for comfort and f/u outpt.  Pt requesting ambulance transfer back home (sister at bedside), nonemergent transport being ordered. N Dr. Wyatt Note: [facility] arrived, pt received Robaxin for severe pain shoulder/chest wall area strain, no ill effects so far, Rx sent.

## 2022-03-19 NOTE — ED ADULT NURSE REASSESSMENT NOTE - NS ED NURSE REASSESS COMMENT FT1
Pt still endorsing extreme pain to the left shoulder, pt had returned from xray and had moved left arm. Pt's arm immobilized and pt to be provided more medications.
Pt still endorsing pain to the left shoulder, very short relief from 0.5 mg dilaudid provided.

## 2022-03-19 NOTE — ED PROVIDER NOTE - NSFOLLOWUPINSTRUCTIONS_ED_ALL_ED_FT
1. Follow up with your primary physician in 2-3 days for re-evaluation.     2. Rest, stay hydrated. Continue your current home medications as previously prescribed.    3. Continue your current pain medication regimen at home.    4. For additional pain relief, you may use over-the-counter Salonpas (Lidoderm) patches to the area of pain. Follow instructions on the packaging for appropriate usage.    5. Return to the Emergency Department immediately if you develop any new/worsening symptoms including weakness, numbness/tingling, chest pain, shortness of breath or any other concerning symptoms.

## 2022-03-19 NOTE — ED ADULT NURSE NOTE - NSIMPLEMENTINTERV_GEN_ALL_ED
Implemented All Fall Risk Interventions:  [place] to call system. Call bell, personal items and telephone within reach. Instruct patient to call for assistance. Room bathroom lighting operational. Non-slip footwear when patient is off stretcher. Physically safe environment: no spills, clutter or unnecessary equipment. Stretcher in lowest position, wheels locked, appropriate side rails in place. Provide visual cue, wrist band, yellow gown, etc. Monitor gait and stability. Monitor for mental status changes and reorient to person, place, and time. Review medications for side effects contributing to fall risk. Reinforce activity limits and safety measures with patient and family.

## 2022-03-23 NOTE — CONSULT NOTE ADULT - SUBJECTIVE AND OBJECTIVE BOX
HPI:  51F with a PMH of metastatic gastric CA w/ bone metastasis and peritoneal carcinomatosis, hx SBO, s/p multiple bowel resections, recent hospitalizations for progression of gastric cancer w/ large right pleural effusions s/p thoracentesis and large ascites s/p paracentesis in January. s/p R pleurx cath/VATs on   1/17/22 at [facility] by Dr. Daniels. Now presents to [facility] ER today with worsening SOB x several days and decreased drainage from Rt. Pleurx catheter. Pt states normally drains up to 500cc 2x per week at home from Pleurx but when attempted to drain this past Sunday and again Tuesday, minimal output obtained. Pt recently had fall with injury to left shoulder and states  worsening general pain and increased difficulty to ambulate. Pt denies any redness or drainage around Pleurx site. No fever, cough, chills. States worsening anasarca w LE swelling.     PAST MEDICAL & SURGICAL HISTORY:  Gastric cancer  5/2011 no adjunt trement    Small bowel obstruction  Exp lap complicated with post op Ishemic bowel /perforation    Perforated bowel    Other abdominal hernia    Obesity (BMI 30-39.9)    History of gastrectomy  distal gastrectomy w/ B2 reconstruction (gastrojejunostomy)    History of resection of small bowel  exlap, SBR, meckel&#x27;s diverticulectomy (7/4/2015); exlap, SBR for necrotic bowel, left in discontinuity, Abthera (7/9/2015); exlap, SB anastomosis, Abthera (7/11/15); exlap, washout, abdominal closure w/ Strattice (7/13/15)        REVIEW OF SYSTEMS  Negative except for what's noted above.     MEDICATIONS  (STANDING):    MEDICATIONS  (PRN):      Allergies    Cipro (Rash)  QUINOLONES (Unknown)    Intolerances        SOCIAL HISTORY:  Social History:  Social History (marital status, living situation, occupation, tobacco use, alcohol and drug use, and sexual history): Denies cigarettes, alcohol or illicit drug use.      FAMILY HISTORY:  FH: diabetes mellitus (Mother)        Vital Signs Last 24 Hrs  T(C): 37 (23 Mar 2022 14:23), Max: 37 (23 Mar 2022 14:23)  T(F): 98.6 (23 Mar 2022 14:23), Max: 98.6 (23 Mar 2022 14:23)  HR: 103 (23 Mar 2022 14:23) (103 - 106)  BP: 113/67 (23 Mar 2022 14:23) (113/67 - 119/57)  BP(mean): --  RR: 16 (23 Mar 2022 14:23) (16 - 16)  SpO2: 98% (23 Mar 2022 14:23) (98% - 99%)    General: WN/WD mild distress due to pain  Neurology: A&Ox3, nonfocal, BAIN x 4  Eyes: PERRLA/ EOMI, Gross vision intact  ENT/Neck: Neck supple, trachea midline, No JVD, Gross hearing intact  Respiratory: Dec BS bilat, Rt > Left  CV: RRR, S1S2, no murmurs, rubs or gallops  Abdominal: pt with significant anasarca/ascites, hernia noted.   Extremities: 4+ large pitting edema bilat LE   Skin: No Rashes, Hematoma, Ecchymosis  Incisions: Rt Pleurx site c/d/i. NO s/s of infection.   no erythema or drainage. Dressing replaced.           LABS:                        9.2    6.51  )-----------( 385      ( 23 Mar 2022 13:00 )             29.9     03-23    130<L>  |  97<L>  |  9   ----------------------------<  107<H>  4.2   |  21<L>  |  0.65    Ca    8.1<L>      23 Mar 2022 13:00    TPro  5.3<L>  /  Alb  2.3<L>  /  TBili  0.4  /  DBili  x   /  AST  29  /  ALT  15  /  AlkPhos  2385<H>  03-23    PT/INR - ( 23 Mar 2022 13:00 )   PT: 15.4 sec;   INR: 1.32 ratio         PTT - ( 23 Mar 2022 13:00 )  PTT:30.8 sec      RADIOLOGY & ADDITIONAL STUDIES:  CXR with small right pleural effusion. No significant change from previous CXRs.

## 2022-03-23 NOTE — H&P ADULT - NSHPREVIEWOFSYSTEMS_GEN_ALL_CORE
REVIEW OF SYSTEMS:    CONSTITUTIONAL: +weakness, no fever or chills  EYES: No visual changes  ENT: No vertigo or throat pain   NECK: No pain or stiffness  RESPIRATORY: +JORDAN and at rest relieved with leaning forward.   CARDIOVASCULAR: +palpitations, No chest pain including with inhalation  GASTROINTESTINAL: No abdominal or epigastric pain. +expectoration of sputum without any vomiting., +constipation  GENITOURINARY: No dysuria, frequency or hematuria  NEUROLOGICAL: No numbness or weakness  SKIN: No itching, burning, rashes, or lesions   LYMPHATICS: No swollen lymph nodes.  All other review of systems is negative unless indicated above.

## 2022-03-23 NOTE — H&P ADULT - NSICDXFAMILYHX_GEN_ALL_CORE_FT
FAMILY HISTORY:  Father  Still living? Unknown  Family history of abdominal aortic aneurysm (AAA), Age at diagnosis: Age Unknown    Mother  Still living? Unknown  FH: diabetes mellitus, Age at diagnosis: Age Unknown  FH: pulmonary embolism, Age at diagnosis: Age Unknown

## 2022-03-23 NOTE — ED PROVIDER NOTE - CLINICAL SUMMARY MEDICAL DECISION MAKING FREE TEXT BOX
51y Female with PMHx of metastatic gastric cancer with recurrent R pleural effusion with Pleurx catheter presents to the ER for SOB x 2-3 days. States she spoke with her thoracic surgeon Dr. Bashir who advised she come to the ER. No drainage from chest tube the last few days. Reports chronic bilateral LE edema without acute worsening. Denies fever, chills, chest pain. Vital signs stable, dec lung sounds on the R side. Will get labs, CXR, meds, thoracic surg.

## 2022-03-23 NOTE — ED ADULT NURSE NOTE - CHIEF COMPLAINT QUOTE
Pt c/o sob and rapid 's on minimal exertion .  Denies chest pain, cough, N./V.  Pt with R pleurax tube ; Mckay TSANG requesting check for dislodgement.  Pt with L arm injury s/p 3/19 seen at [facility].  Addendum: Pt refusing EKG at [facility]

## 2022-03-23 NOTE — ED ADULT NURSE NOTE - NSIMPLEMENTINTERV_GEN_ALL_ED
Implemented All Fall Risk Interventions:  [place] to call system. Call bell, personal items and telephone within reach. Instruct patient to call for assistance. Room bathroom lighting operational. Non-slip footwear when patient is off stretcher. Physically safe environment: no spills, clutter or unnecessary equipment. Stretcher in lowest position, wheels locked, appropriate side rails in place. Provide visual cue, wrist band, yellow gown, etc. Monitor gait and stability. Monitor for mental status changes and reorient to person, place, and time. Review medications for side effects contributing to fall risk. Reinforce activity limits and safety measures with patient and family.

## 2022-03-23 NOTE — H&P ADULT - ASSESSMENT
51y Female with PMHx of metastatic gastric cancer with recurrent R pleural effusion with Pleurx catheter presenting for SOB likely secondary to bilateral pleural effusions with malfunctioning R basilar pleurx catheter vs symptomatic ascites vs DVT/PE

## 2022-03-23 NOTE — CONSULT NOTE ADULT - NS ATTEND AMEND GEN_ALL_CORE FT
Metastatic gastric CA s/p pleurX placement now with SOB and no drainage from the pleurX. CT shows small loculated right effusion. Fell small right effusion is only a small component of her SOB. TPA unlikely to change effusion significantly. Will follow.

## 2022-03-23 NOTE — H&P ADULT - ATTENDING COMMENTS
52 y/o woman w/ metastatic gastric cancer w/ recurrent R pleural effusion (w/ pleurx) presents to the ER w/ dyspnea and decreased drainage from pleurx catheter. Found on CT to have increased pleural effusion. CTS consulted, to attempt TPA this AM for catheter clearance. Differential includes PE given tachycardia, dyspnea and hx of malignancy, will f/u CTA chest. Pt afebrile w/ no leukocytosis and denies cough, unlikely pna, will hold off antibiotics. Continue pain control

## 2022-03-23 NOTE — H&P ADULT - NSHPSOCIALHISTORY_GEN_ALL_CORE
Lives in a co-op with three steps with her two daughters who are 11yo and 15yo  Sister is an occupational therapist who helps patient daily with ADLs including bathing, transfers  patient is a 75% assist per sister  She will ambulate about her house but likely would not leave the home by herself

## 2022-03-23 NOTE — H&P ADULT - PROBLEM SELECTOR PLAN 1
likely secondary to bilateral pleural effusions with malfunctioning R basilar pleurx catheter vs symptomatic ascites vs DVT/PE  -CTS evaluated R PleurX and pending possible TPA to unclog  -Para in hx, protruding stomach patient states from "hernia", dyspnea worse when lying down  -US Doppler r/o DVT given Left posterior calf erythematous/bruised  -PERC score 1, Wells Score for PE 7.0  -Could consider CTA given risk factors and left calf findings as well as Well's Score  -TTE likely secondary to bilateral pleural effusions with malfunctioning R basilar pleurx catheter vs symptomatic ascites vs DVT/PE  -CTS evaluated R PleurX and pending possible TPA to unclog  -Para in hx, protruding stomach patient states from "hernia", dyspnea worse when lying down  -US Doppler r/o DVT given Left posterior calf erythematous/bruised  -PERC score 1, Wells Score for PE 7.0  -Could consider CTA given risk factors and left calf findings as well as Well's Score  -TTE to assess for right heart strain and ?HF  -repeat EKG ordered not able to locate original EKG in [facility] or in patient chart.

## 2022-03-23 NOTE — H&P ADULT - PROBLEM SELECTOR PLAN 9
DVT PPx SQH  Diet Regular  Dispo PT    Code status Full Code. pending further discussion with palliative care

## 2022-03-23 NOTE — ED PROVIDER NOTE - OBJECTIVE STATEMENT
51y Female with PMHx of metastatic gastric cancer with recurrent R pleural effusion with Pleurx catheter presents to the ER for SOB. Patient reports worsening of SOB for the last few days. States she spoke with her thoracic surgeon Dr. Bashir who advised she come to the ER. Patient self drains chest tube, reports no drainage the last few days. Reports chronic bilateral LE edema without acute worsening. Denies fever, chills, chest pain. Of note, patient had shoulder injury, seen at [facility] with negative xrays, wear sling. On chronic pain medications including PO morphine, Dilaudid and Robaxin. 51y Female with PMHx of metastatic gastric cancer with recurrent R pleural effusion with Pleurx catheter presents to the ER for SOB. Patient reports worsening of SOB for the last few days. States she spoke with her thoracic surgeon Dr. Bashir who advised she come to the ER. Patient self drains chest tube, reports no drainage the last few days. Reports chronic bilateral LE edema without acute worsening. Denies fever, chills, chest pain. Of note, patient had shoulder injury, seen at [facility] with negative xrays, wear sling. On chronic pain medications including PO morphine, Dilaudid and Robaxin.  Attending - Agree with above.  I evaluated patient myself. 51 F with PMH as noted with recurrent right pleural effusion for which Pleurx catheter placed.  Patient has attempted to drain without any fluid out x 2 days.  Now with increased shortness of breath.  No fever, cough.  Reports diffuse body pain and pain in left shoulder from recent fall and using sling for support.  Previous [place] records reviewed.

## 2022-03-23 NOTE — ED ADULT NURSE NOTE - OBJECTIVE STATEMENT
Received pt to bed 20, A+Ox4, ambulatory w/ assist. C/O SOB, states chest tube that was placed on 1/17 is not draining properly. pt also reports left shoulder pain post fall, negative xrays. Respirations even and unlabored, normal work of breathing, no accessory muscle use, speaking in full clear uninterrupted sentences. ABD is soft, non tender, distended. Pt denies any headache, dizziness, N/V/D, fever, chills. . 20G to RAC, Labs sent, Medicated as per MD, will continue to monitor. Received pt to bed 20, A+Ox4, ambulatory w/ assist. C/O SOB, states chest tube that was placed on 1/17 is not draining properly x 2 day, site is dressed with gauze, does not appear saturated, no redness noted. pt refused to remove dressing. pt also reports left shoulder pain post fall, negative xrays. Respirations even and unlabored, normal work of breathing, no accessory muscle use, speaking in full clear uninterrupted sentences. ABD is soft, non tender, distended. Bilateral lower extremity edema noted. Pt denies any headache, dizziness, N/V/D, fever, chills. . 20G to RAC, Labs sent, Medicated as per MD, will continue to monitor.

## 2022-03-23 NOTE — ED ADULT TRIAGE NOTE - CHIEF COMPLAINT QUOTE
Pt c/o sob and rapid 's on minimal exertion .  Denies chest pain, cough, N./V.  Pt with R pleurax tube ; Mckay TSANG requesting check for dislodgement.  Pt with L arm injury s/p 3/19 seen at [facility] Pt c/o sob and rapid 's on minimal exertion .  Denies chest pain, cough, N./V.  Pt with R pleurax tube ; Mckay TSANG requesting check for dislodgement.  Pt with L arm injury s/p 3/19 seen at [facility].  Addendum: Pt refusing EKG at [facility]

## 2022-03-23 NOTE — H&P ADULT - PROBLEM SELECTOR PLAN 6
Likely from hypervolemic hyponatremia versus SIADH in the setting of pain   -urine lytes  -monitor volume status

## 2022-03-23 NOTE — H&P ADULT - PROBLEM SELECTOR PLAN 3
Previously stated wishes and reinforced wishes during H&P that she does not want chemotherapy or radiation therapy and is pursuing alternative medicine for her malignancy    Would like symptom management of her pain, bloating, constipation.   -dilaudid 0.5mg IV q4h moderate  -dilaudid 1mg IV q4h severe   -MS Contin 15mg BID standing hold for resp depression or sedation  -Methocarbamol 1500 TID PRN for muscle spasm Previously stated wishes and reinforced wishes during H&P that she does not want chemotherapy or radiation therapy and is pursuing alternative medicine for her malignancy    Would like symptom management of her pain, bloating, constipation.   -dilaudid 0.5mg IV q4h moderate  -dilaudid 1mg IV q4h severe   -MS Contin 15mg BID standing hold for resp depression or sedation  -Methocarbamol 1500 TID PRN for muscle spasm  -Maalox for bloating  -Miralax for constipation monitor bowel movement

## 2022-03-23 NOTE — ED PROVIDER NOTE - PROGRESS NOTE DETAILS
LEELEE Sullivan: thoracic surg aware of patient, will come see patient. Pending lab results and XR. LEELEE Sullivan: thoracic recommending CT chest non con, patient given additional pain medication, comfortable at this time. Vital signs stable. Will continue to monitor and reassess. Thoracics saw CT results and do not recc intervention at this time. Recc medicine admission

## 2022-03-23 NOTE — ED ADULT NURSE REASSESSMENT NOTE - NS ED NURSE REASSESS COMMENT FT1
Pt at baseline mental status, RR even and unlabored. Sister remains at bedside. Report given to REECE Crowell, pt to be put in for transport to unit
Report received from REECE Trujillo. Pt is A&Ox4, resting in stretcher. RR even and unlabored. Pt denies SOB currently, satting at 99% on RA, states that the SOB is present on exertion. VSS and noted. Pt reporting relief in shoulder pain. 5/10 on pain scale . Family at bedside

## 2022-03-23 NOTE — ED PROVIDER NOTE - PHYSICAL EXAMINATION
ATTENDING PHYSICAL EXAM  GEN - No resp distress, speaking full sentences, A+O x3, O2 99RA.  HEAD - NC/AT; EYES/NOSE - PERRL, EOMI, mucous membranes moist, no discharge; THROAT: Oral cavity and pharynx normal. No inflammation, swelling, exudate, or lesions  NECK: Neck supple, non-tender without lymphadenopathy, no masses, no JVD  PULMONARY - Decreased BSs right base  CARDIAC -s1s2, RRR, no M,R,G  ABDOMEN - Largely distended, nontender   BACK - no CVA tenderness, No vertebral or paravertebral tenderness  EXTREMITIES - Left shoulder with TTP but able to range.  No bony deformity.

## 2022-03-23 NOTE — H&P ADULT - NSHPPHYSICALEXAM_GEN_ALL_CORE
T(C): 37.2 (03-23-22 @ 20:46), Max: 37.2 (03-23-22 @ 20:46)  HR: 110 (03-23-22 @ 20:46) (103 - 110)  BP: 110/54 (03-23-22 @ 20:46) (110/54 - 119/57)  RR: 18 (03-23-22 @ 20:46) (16 - 18)  SpO2: 99% (03-23-22 @ 20:46) (98% - 99%)    GENERAL: patient appears well, no acute distress, appropriate, pleasant  EYES: sclera clear, no exudates  ENMT: oropharynx clear without erythema, no exudates, moist mucous membranes  NECK: supple, soft, no thyromegaly noted  LUNGS: good air entry bilaterally, clear to auscultation, symmetric breath sounds, no wheezing or rhonchi appreciated  HEART: soft S1/S2, regular rate and rhythm, no murmurs noted, no lower extremity edema  GASTROINTESTINAL: abdomen is soft, nontender, nondistended, normoactive bowel sounds, no palpable masses  INTEGUMENT: good skin turgor, no lesions noted  MUSCULOSKELETAL: no clubbing or cyanosis, no obvious deformity  NEUROLOGIC: awake, alert, oriented x3, good muscle tone in 4 extremities, no obvious sensory deficits  PSYCHIATRIC: mood is good, affect is congruent, linear and logical thought process  HEME/LYMPH: no palpable supraclavicular nodules, no obvious ecchymosis or petechiae T(C): 37.2 (03-23-22 @ 20:46), Max: 37.2 (03-23-22 @ 20:46)  HR: 110 (03-23-22 @ 20:46) (103 - 110)  BP: 110/54 (03-23-22 @ 20:46) (110/54 - 119/57)  RR: 18 (03-23-22 @ 20:46) (16 - 18)  SpO2: 99% (03-23-22 @ 20:46) (98% - 99%)    GENERAL: acute distress and discomfort although nontoxic  EYES: sclera clear, no exudates  ENMT: oropharynx clear without erythema, no exudates, moist mucous membranes  NECK: supple, soft, no thyromegaly noted  LUNGS: reduced breath sounds at right and left bases, upper aspects of lung CTAB  HEART: tachycardia with regular rhythm, 3+ edema to hips pitting.   GASTROINTESTINAL: protuding stomach nontender to palpation   INTEGUMENT: good skin turgor, no lesions noted  MUSCULOSKELETAL: no clubbing or cyanosis, no obvious deformity  NEUROLOGIC: awake, alert, oriented x3, good muscle tone in 4 extremities, no obvious sensory deficits  PSYCHIATRIC: anxious affect  HEME/LYMPH: no palpable supraclavicular nodules, no obvious ecchymosis or petechiae

## 2022-03-23 NOTE — H&P ADULT - PROBLEM SELECTOR PLAN 2
9.2 on presentation from baseline 8-9 in 2022  -continue to monitor at this time. without obvious bleeding/blood loss at this time

## 2022-03-23 NOTE — CONSULT NOTE ADULT - PROBLEM SELECTOR RECOMMENDATION 9
Above d/w Dr. Daniels  CXR without significant fluid seen  Would obtain non contrast CT of chest to eval   further  If pt needs admission, would admit to Medicine and we  will follow as consult  Further input pending results of CT scan  Above d/w pt and ER team  Will follow.

## 2022-03-23 NOTE — ED PROVIDER NOTE - NS ED ROS FT
Constitutional: (-) Fever, (-) Chills  Skin: (-) Color changes, (-) Rashes, (-) Wounds  CV: (+) Extremity Swelling, (-) Chest pain, (-) Palpitations,  Resp: (+) Shortness of breath, (+) Dyspnea on Exertion, (-) Cough, (-) Wheezing  GI: (-) Abdominal pain, (-) Nausea, (-) Vomiting, (-) Diarrhea  : (-) Dysuria, (-) Hematuria, (-) Increased frequency  MSK: (-) Myalgias, (-) Back pain, (-) Neck pain  Neuro: (-) Headache
done

## 2022-03-23 NOTE — H&P ADULT - HISTORY OF PRESENT ILLNESS
51y Female with PMHx of metastatic gastric cancer with recurrent R pleural effusion with Pleurx catheter presents to the ER for SOB. Patient reports worsening of SOB for the last few days. States she spoke with her thoracic surgeon Dr. Bashir who advised she come to the ER. Patient self drains chest tube, reports no drainage the last few days. Reports chronic bilateral LE edema without acute worsening. Denies fever, chills, chest pain. Of note, patient had shoulder injury, seen at [facility] with negative xrays, wear sling. On chronic pain medications including PO morphine, Dilaudid and Robaxin.  Attending - Agree with above.  I evaluated patient myself. 51 F with PMH as noted with recurrent right pleural effusion for which Pleurx catheter placed.  Patient has attempted to drain without any fluid out x 2 days.  Now with increased shortness of breath.  No fever, cough.  Reports diffuse body pain and pain in left shoulder from recent fall and using sling for support.  Previous [facility] records reviewed.    Collateral obtained from sister at bedside. Med recc obtained from sister at bedside, patient herself, and prescription bottles in pocket book.  51y Female with PMHx of metastatic gastric cancer and multiple abdominal surgeries with hx of SBO with recurrent R pleural effusion with Pleurx catheter presenting at the reccomendation of her CTS Dr. Bashir for dyspnea. States few day hx dyspnea relieved with leaning forward without chest pain pleuritic or otherwise. Patient has R basilar chest tube typically drained MWF although with decreasing output from 1L -> ~500c -> most recently unable to drain over past days with drops expressed from tube in ED during CTS eval. Hx of chronic bilateral LE edema previously evaluated by cardiology and was prescribed lasix which was self-discontinued.     Gastric cancer- Last heme onc appt Dr. Skinny Ibanez 7/2021 per discussion. Patient is pursuing alternative medicine therapy    Pain- Taking MS contin 15mg BID and dilaudid which she requires every four hours. Pain can be generalized and 8-9/10 at worst relieved with dilaudid although now stating she may need more frequent pain medication. Takes robaxin for muscle spasm. States she sees palliative care as an outpatient.     Of note, patient had shoulder injury, seen at [facility] with negative xrays, wear sling. Found to have fracture of L clavicle on CT in ED on this presentation.    Constipation/ Hx of SBO- Hx of constipation although states last BM 3/22 nonbloody after taking miralax and magnesium. Would prefer to take miralax and magnesium rather than other agents for her constipation and understands importance of bowel regimen while receiving opiate pain medication.     Collateral obtained from sister at bedside. Med recc obtained from sister at bedside, patient herself, and prescription bottles in pocket book.

## 2022-03-24 NOTE — PROGRESS NOTE ADULT - PROBLEM SELECTOR PLAN 3
Previously stated wishes and reinforced wishes during H&P that she does not want chemotherapy or radiation therapy and is pursuing alternative medicine for her malignancy    Would like symptom management of her pain, bloating, constipation.   -dilaudid 0.5mg IV q4h moderate  -dilaudid 1mg IV q4h severe   -MS Contin 15mg BID standing hold for resp depression or sedation  -Methocarbamol 1500 TID PRN for muscle spasm  -Maalox for bloating  -Miralax for constipation monitor bowel movement

## 2022-03-24 NOTE — CONSULT NOTE ADULT - PROBLEM SELECTOR RECOMMENDATION 9
Pt is known to outpatient palliative provider, Dr. Olivia Hazel.   At home, patient is on MS Contin 15mg bid with dilaudid 2mg q4hr prn   Pt currently is on IV dilaudid 0.5mg q4h prn moderate pain and dilaudid 1mg q4hr prn severe pain.  CT surgery recs appreciated  COMPLETE NOTE TO FOLLOW Pt is known to outpatient palliative provider, Dr. Olivia Hazel.   At home, patient is on MS Contin 15mg bid with dilaudid 2mg q4hr prn   Pt currently is on IV dilaudid 0.5mg q4h prn moderate pain and dilaudid 1mg q4hr prn severe pain. Will liberalize frequency to q3hr prn.   Pending hospital course, will transition PRNs to PO   CT surgery recs appreciated

## 2022-03-24 NOTE — PROGRESS NOTE ADULT - SUBJECTIVE AND OBJECTIVE BOX
[facility] Division of Hospital Medicine  Mario Abreu MD  In House Pager [phone]    Patient is a 51y old  Female who presents with a chief complaint of shortness of breath (24 Mar 2022 12:12)      SUBJECTIVE / OVERNIGHT EVENTS:  No overnight events. Labs and vitals reviewed.  Patient seen and examined at bedside, no acute complaints.   No fever, no chills, no SOB, no CP, no n/v/d, no abd pain, no dysuria.       MEDICATIONS  (STANDING):  heparin   Injectable 5000 Unit(s) SubCutaneous every 8 hours  influenza   Vaccine 0.5 milliLiter(s) IntraMuscular once  morphine ER Tablet 15 milliGRAM(s) Oral every 12 hours  polyethylene glycol 3350 17 Gram(s) Oral daily    MEDICATIONS  (PRN):  acetaminophen     Tablet .. 650 milliGRAM(s) Oral every 6 hours PRN Temp greater or equal to 38C (100.4F), Mild Pain (1 - 3)  aluminum hydroxide/magnesium hydroxide/simethicone Suspension 30 milliLiter(s) Oral every 4 hours PRN Dyspepsia  HYDROmorphone  Injectable 1 milliGRAM(s) IV Push every 3 hours PRN Severe Pain (7 - 10)  HYDROmorphone  Injectable 0.5 milliGRAM(s) IV Push every 3 hours PRN Moderate Pain (4 - 6)  melatonin 3 milliGRAM(s) Oral at bedtime PRN Insomnia  methocarbamol 1500 milliGRAM(s) Oral three times a day PRN Muscle Spasm    CAPILLARY BLOOD GLUCOSE        I&O's Summary      PHYSICAL EXAM:  Vital Signs Last 24 Hrs  T(C): 36.8 (24 Mar 2022 06:35), Max: 37.2 (23 Mar 2022 20:46)  T(F): 98.2 (24 Mar 2022 06:35), Max: 98.9 (23 Mar 2022 20:46)  HR: 108 (24 Mar 2022 14:00) (101 - 110)  BP: 111/60 (24 Mar 2022 14:00) (101/55 - 111/60)  BP(mean): --  RR: 18 (24 Mar 2022 14:00) (18 - 19)  SpO2: 99% (24 Mar 2022 14:00) (97% - 99%)    Gen: NAD; sitting in bed comfortably   Pulm: increased wob w/o accessory muscle use. decrased bs in b/l lung bases.   Cards: RRR, nl S1/S2; no LE edema; no JVD  Abd: non-distended; soft and NT on exam; +bs  Ext: YOLIS; no joint effusion or tenderness in upper and lower extremities; no cyanosis  Neuro: Awake and Alert; non-focal; moving all extremities.   Skin: no new rashes; warm to touch;     LABS:                        8.7    6.59  )-----------( 352      ( 24 Mar 2022 07:33 )             28.3     03-24    130<L>  |  97<L>  |  9   ----------------------------<  107<H>  3.8   |  21<L>  |  0.64    Ca    8.2<L>      24 Mar 2022 07:33  Phos  3.0     03-24  Mg     2.20     03-24    TPro  5.3<L>  /  Alb  2.4<L>  /  TBili  0.4  /  DBili  x   /  AST  20  /  ALT  15  /  AlkPhos  2291<H>  03-24    PT/INR - ( 23 Mar 2022 13:00 )   PT: 15.4 sec;   INR: 1.32 ratio         PTT - ( 23 Mar 2022 13:00 )  PTT:30.8 sec            RADIOLOGY & ADDITIONAL TESTS:  Results Reviewed: Y  Imaging Personally Reviewed: Y  Electrocardiogram Personally Reviewed: Y    COORDINATION OF CARE:  Care Discussed with Consultants/Other Providers [Y/N]: Y  Prior or Outpatient Records Reviewed [Y/N]: Y

## 2022-03-24 NOTE — PROGRESS NOTE ADULT - SUBJECTIVE AND OBJECTIVE BOX
no acute events    Vital Signs Last 24 Hrs  T(C): 36.8 (24 Mar 2022 06:35), Max: 37.2 (23 Mar 2022 20:46)  T(F): 98.2 (24 Mar 2022 06:35), Max: 98.9 (23 Mar 2022 20:46)  HR: 108 (24 Mar 2022 14:00) (101 - 110)  BP: 111/60 (24 Mar 2022 14:00) (101/55 - 111/60)  BP(mean): --  RR: 18 (24 Mar 2022 14:00) (18 - 19)  SpO2: 99% (24 Mar 2022 14:00) (97% - 99%)    General: WN/WD mild distress due to pain  Neurology: A&Ox3, nonfocal, BAIN x 4  Eyes: PERRLA/ EOMI, Gross vision intact  ENT/Neck: Neck supple, trachea midline, No JVD, Gross hearing intact  Respiratory: Dec BS bilat, Rt > Left  CV: RRR, S1S2, no murmurs, rubs or gallops  Abdominal: pt with significant anasarca/ascites, hernia noted.   Extremities: 4+ large pitting edema bilat LE   Skin: No Rashes, Hematoma, Ecchymosis  Incisions: Rt Pleurx site c/d/i. NO s/s of infection.   no erythema or drainage. Dressing replaced.     < from: CT Angio Chest PE Protocol w/ IV Cont (03.24.22 @ 11:55) >    ACC: [id number] [facility]                          PROCEDURE DATE:  03/24/2022          INTERPRETATION:  CLINICAL INFORMATION: Left calf erythema, history of   gastric malignancy, evaluate for pulmonary embolus    COMPARISON: CT chest 3/23/2022    CONTRAST/COMPLICATIONS:  IV Contrast: Omnipaque 350  90 cc administered   10 cc discarded  Oral Contrast: NONE  Complications: None reported at time of study completion    PROCEDURE:  CT Angiogram of the chest was obtained with intravenous contrast. Three   dimensional maximum intensity projection (MIP) images were generated.    FINDINGS:    PULMONARY ANGIOGRAM: No main, right, left, lobar or segmental pulmonary   embolus. Limited evaluation of the subsegmental branches.    LYMPH NODES: Subcentimeter axillary and mediastinal lymph nodes,   unchanged from prior.    HEART/VASCULATURE: Heart size is normal. No pericardial effusion.    AIRWAYS/LUNGS/PLEURA: Multiloculated moderate right pleural effusion,   unchanged from day prior. Status post right basilar Pleurx catheter   traversing the right seventh intercostal space, in unchanged position.   Small left pleural effusion, unchanged. Patchy opacities are again seen   within the right middle and lower lobes. There is partial atelectasis of   the right lower lobe. Left basilar subsegmental atelectasis. Diffuse   interlobular septal thickening is again seen.    UPPER ABDOMEN: Status post partial gastrectomy with partially visualized   gastrojejunostomy stent. Partially visualized ascites.    BONES/SOFT TISSUES: Diffuse subcutaneous edema. Diffuse osseous   metastatic is again seen. Acute mildly displaced left clavicular fracture   is again seen.    IMPRESSION:    No pulmonary embolism.    Moderate multiloculatedright pleural effusion, unchanged in size from   day prior.    Patchy opacities within the right middle and lower lobes are again seen.    Diffuse interlobular septal thickening, unchanged. This can represent   pulmonary edema versus lymphangitic carcinomatosis.    Diffuse osseous metastatic disease is again seen within the acute mildly   displaced left clavicular fracture.        --- End of Report ---          ZEV CHOI MD; Resident Radiologist  This document has been electronically signed.  MOISE ASHER MD; Attending Radiologist  This document has been electronically signed. Mar 24 2022 12:14PM    < end of copied text >

## 2022-03-24 NOTE — CONSULT NOTE ADULT - SUBJECTIVE AND OBJECTIVE BOX
[facility]iatrics and Palliative Care  Louise Shaikh, Palliative Care Attending  Contact Info: Page [phone] (including Nights/Weekends), message on Microsoft Teams (Louise Shaikh), or leave  at Palliative Office [phone] (non-urgent)     HPI:  51y Female with PMHx of metastatic gastric cancer and multiple abdominal surgeries with hx of SBO with recurrent R pleural effusion with Pleurx catheter presenting at the reccomendation of her CTS Dr. Bashir for dyspnea. States few day hx dyspnea relieved with leaning forward without chest pain pleuritic or otherwise. Patient has R basilar chest tube typically drained MWF although with decreasing output from 1L -> ~500c -> most recently unable to drain over past days with drops expressed from tube in ED during CTS eval. Hx of chronic bilateral LE edema previously evaluated by cardiology and was prescribed lasix which was self-discontinued.     Gastric cancer- Last heme onc appt Dr. Skinny Ibanez 7/2021 per discussion. Patient is pursuing alternative medicine therapy    Pain- Taking MS contin 15mg BID and dilaudid which she requires every four hours. Pain can be generalized and 8-9/10 at worst relieved with dilaudid although now stating she may need more frequent pain medication. Takes robaxin for muscle spasm. States she sees palliative care as an outpatient.     Of note, patient had shoulder injury, seen at [facility] with negative xrays, wear sling. Found to have fracture of L clavicle on CT in ED on this presentation.    Constipation/ Hx of SBO- Hx of constipation although states last BM 3/22 nonbloody after taking miralax and magnesium. Would prefer to take miralax and magnesium rather than other agents for her constipation and understands importance of bowel regimen while receiving opiate pain medication.     Collateral obtained from sister at bedside. Med recc obtained from sister at bedside, patient herself, and prescription bottles in pocket book.  (23 Mar 2022 23:18)    3/24: Patient is known to palliative care team from previous admission. Over the past 24 hours, patient required PRNs of IV dilaudid 1mg x5, robaxin x1.     PERTINENT PM/SXH:   Bariatric surg stat-unsp  Gastric cancer  Small bowel obstruction  Perforated bowel  Other abdominal hernia  Obesity (BMI 30-39.9)    History of gastrectomy  History of resection of small bowel    FAMILY HISTORY:  FH: diabetes mellitus (Mother)  FH: pulmonary embolism (Mother)  Family history of abdominal aortic aneurysm (AAA) (Father)    ITEMS NOT CHECKED ARE NOT PRESENT    SOCIAL HISTORY:   Significant other/partner[ x-]  Children[x ]  Taoist/Spirituality:  Substance hx:  [ ]   Tobacco hx:  [ ]   Alcohol hx: [ ]   Home Opioid hx:  [ ] I-Stop Reference No:  This report was requested by: Louise Shaikh | Reference #: [id number]    Others' Prescriptions  Patient Name: Lucinda Partida Date: 1970  Address: [address] [place] [id number]: Female  Rx Written	Rx Dispensed	Drug	Quantity	Days Supply	Prescriber Name	Prescriber Franchesca #	Payment Method	Dispenser  03/08/2022	03/16/2022	hydromorphone 2 mg tablet	84	14	[id number]	Insurance	[employer]  03/08/2022	03/11/2022	morphine sulf er 15 mg tablet	28	14	[id number]	Insurance	[employer]  03/01/2022	03/01/2022	morphine sulf er 15 mg tablet	10	10	[facility]	[id number]	Insurance	[employer]  03/01/2022	03/01/2022	hydromorphone 2 mg tablet	84	14	[id number]	Insurance	[employer]  01/19/2022	02/18/2022	hydromorphone 2 mg tablet	42	7	Zoya Peters (ANP)	[id number]	Insurance	[employer]  01/25/2022	02/07/2022	hydromorphone 2 mg tablet	18	3	[facility]	[id number]	Insurance	[employer]  01/19/2022	01/21/2022	hydromorphone 2 mg tablet	63	11	Zoya Peters (ANP)	[id number]	Insurance	[employer]  01/09/2022	01/10/2022	hydromorphone 2 mg tablet	84	14	Kelton Thompson R	[id number]	Insurance	[employer]  Living Situation: [ ]Home  [ ]Long term care  [ ]Rehab [ ]Other    ADVANCE DIRECTIVES:    MOLST  [ ]  Living Will  [ ]   DECISION MAKER(s):  [x ] Health Care Proxy(s)  [ ] Surrogate(s)  [ ] Guardian           Name(s): Eldon Castillo (sister) Phone Number(s): [phone]    BASELINE (I)ADL(s) (prior to admission):  [place]: [ x]Total  [ ] Moderate [ ]Dependent    Allergies    Cipro (Rash)  QUINOLONES (Unknown)    Intolerances    MEDICATIONS  (STANDING):  heparin   Injectable 5000 Unit(s) SubCutaneous every 8 hours  influenza   Vaccine 0.5 milliLiter(s) IntraMuscular once  morphine ER Tablet 15 milliGRAM(s) Oral every 12 hours  polyethylene glycol 3350 17 Gram(s) Oral daily    MEDICATIONS  (PRN):  acetaminophen     Tablet .. 650 milliGRAM(s) Oral every 6 hours PRN Temp greater or equal to 38C (100.4F), Mild Pain (1 - 3)  aluminum hydroxide/magnesium hydroxide/simethicone Suspension 30 milliLiter(s) Oral every 4 hours PRN Dyspepsia  HYDROmorphone  Injectable 1 milliGRAM(s) IV Push every 4 hours PRN Severe Pain (7 - 10)  HYDROmorphone  Injectable 0.5 milliGRAM(s) IV Push every 4 hours PRN Moderate Pain (4 - 6)  melatonin 3 milliGRAM(s) Oral at bedtime PRN Insomnia  methocarbamol 1500 milliGRAM(s) Oral three times a day PRN Muscle Spasm    PRESENT SYMPTOMS: [ ]Unable to obtain due to poor mentation   Source if other than patient:  [ ]Family   [ ]Team     Pain: [ ]yes [ ]no  QOL impact -   Location -                    Aggravating factors -  Quality -  Radiation -  Timing-  Severity (0-10 scale):  Minimal acceptable level (0-10 scale):     PAIN AD Score:     http://geriatrictoolkit.[place].[facility]/cog/painad.pdf (press ctrl +  left click to view)    Dyspnea:                           [ ]Mild [ ]Moderate [ ]Severe  Anxiety:                             [ ]Mild [ ]Moderate [ ]Severe  Fatigue:                             [ ]Mild [ ]Moderate [ ]Severe  Nausea:                             [ ]Mild [ ]Moderate [ ]Severe  Loss of appetite:              [ ]Mild [ ]Moderate [ ]Severe  Constipation:                    [ ]Mild [ ]Moderate [ ]Severe    Other Symptoms:  [ ]All other review of systems negative     Palliative Performance Status Version 2:         %    http://npcrc.org/files/news/palliative_performance_scale_ppsv2.pdf  PHYSICAL EXAM:  Vital Signs Last 24 Hrs  T(C): 36.8 (24 Mar 2022 06:35), Max: 37.2 (23 Mar 2022 20:46)  T(F): 98.2 (24 Mar 2022 06:35), Max: 98.9 (23 Mar 2022 20:46)  HR: 101 (24 Mar 2022 09:00) (101 - 110)  BP: 104/58 (24 Mar 2022 09:00) (101/55 - 113/67)  BP(mean): --  RR: 19 (24 Mar 2022 09:00) (16 - 19)  SpO2: 98% (24 Mar 2022 09:00) (97% - 99%) I&O's Summary    GENERAL:  [ ]Alert  [ ]Oriented x   [ ]Lethargic  [ ]Cachexia  [ ]Unarousable  [ ]Verbal  [ ]Non-Verbal  Behavioral:   [ ] Anxiety  [ ] Delirium [ ] Agitation [ ] Other  HEENT:  [ ]Normal   [ ]Dry mouth   [ ]ET Tube/Trach  [ ]Oral lesions  PULMONARY:   [ ]Clear [ ]Tachypnea  [ ]Audible excessive secretions   [ ]Rhonchi        [ ]Right [ ]Left [ ]Bilateral  [ ]Crackles        [ ]Right [ ]Left [ ]Bilateral  [ ]Wheezing     [ ]Right [ ]Left [ ]Bilatera  [ ]Diminished breath sounds [ ]right [ ]left [ ]bilateral  CARDIOVASCULAR:    [ ]Regular [ ]Irregular [ ]Tachy  [ ]Linus [ ]Murmur [ ]Other  GASTROINTESTINAL:  [ ]Soft  [ ]Distended   [ ]+BS  [ ]Non tender [ ]Tender  [ ]PEG [ ]OGT/ NGT  Last BM:   GENITOURINARY:  [ ]Normal [ ] Incontinent   [ ]Oliguria/Anuria   [ ]Ansari  MUSCULOSKELETAL:   [ ]Normal   [ ]Weakness  [ ]Bed/Wheelchair bound [ ]Edema  NEUROLOGIC:   [ ]No focal deficits  [ ]Cognitive impairment  [ ]Dysphagia [ ]Dysarthria [ ]Paresis [ ]Other   SKIN:   [ ]Normal    [ ]Rash  [ ]Pressure ulcer(s)       Present on admission [ ]y [ ]n    CRITICAL CARE:  [ ] Shock Present  [ ]Septic [ ]Cardiogenic [ ]Neurologic [ ]Hypovolemic  [ ]  Vasopressors [ ]  Inotropes   [ ]Respiratory failure present [ ]Mechanical ventilation [ ]Non-invasive ventilatory support [ ]High flow  [ ]Acute  [ ]Chronic [ ]Hypoxic  [ ]Hypercarbic [ ]Other  [ ]Other organ failure     LABS:                        8.7    6.59  )-----------( 352      ( 24 Mar 2022 07:33 )             28.3   03-24    130<L>  |  97<L>  |  9   ----------------------------<  107<H>  3.8   |  21<L>  |  0.64    Ca    8.2<L>      24 Mar 2022 07:33  Phos  3.0     03-24  Mg     2.20     03-24    TPro  5.3<L>  /  Alb  2.4<L>  /  TBili  0.4  /  DBili  x   /  AST  20  /  ALT  15  /  AlkPhos  2291<H>  03-24  PT/INR - ( 23 Mar 2022 13:00 )   PT: 15.4 sec;   INR: 1.32 ratio         PTT - ( 23 Mar 2022 13:00 )  PTT:30.8 sec      RADIOLOGY & ADDITIONAL STUDIES: < from: CT Angio Chest PE Protocol w/ IV Cont (03.24.22 @ 11:55) >  No pulmonary embolism.    Moderate multiloculatedright pleural effusion, unchanged in size from   day prior.    Patchy opacities within the right middle and lower lobes are again seen.    Diffuse interlobular septal thickening, unchanged. This can represent   pulmonary edema versus lymphangitic carcinomatosis.    Diffuse osseous metastatic disease is again seen within the acute mildly   displaced left clavicular fracture.    < end of copied text >      PROTEIN CALORIE MALNUTRITION PRESENT: [ ]mild [ ]moderate [ ]severe [ ]underweight [ ]morbid obesity  https://www.andeal.org/vault/2440/web/files/ONC/Table_Clinical%20Characteristics%20to%20Document%20Malnutrition-White%20JV%20et%20al%202012.pdf    Height (cm): 167.6 (03-23-22 @ 10:56), 167.6 (03-19-22 @ 17:35), 167.6 (03-15-22 @ 13:24)  Weight (kg): 109.6 (03-24-22 @ 00:15), 113.4 (03-19-22 @ 17:35), 90.7 (01-25-22 @ 04:04)  BMI (kg/m2): 39 (03-24-22 @ 00:15), 40.4 (03-23-22 @ 10:56), 40.4 (03-19-22 @ 17:35)    [ ]PPSV2 < or = to 30% [ ]significant weight loss  [ ]poor nutritional intake  [ ]anasarca      [ ]Artificial Nutrition      REFERRALS:   [ ]Chaplaincy  [ ]Hospice  [ ]Child Life  [ ]Social Work  [ ]Case management [ ]Holistic Therapy      [facility] Geriatrics and Palliative Care  Louise Shaikh, Palliative Care Attending  Contact Info: Page [phone] (including Nights/Weekends), message on Microsoft Teams (Louise Shaikh), or leave  at Palliative Office [phone] (non-urgent)     HPI:  51y Female with PMHx of metastatic gastric cancer and multiple abdominal surgeries with hx of SBO with recurrent R pleural effusion with Pleurx catheter presenting at the reccomendation of her CTS Dr. Bashir for dyspnea. States few day hx dyspnea relieved with leaning forward without chest pain pleuritic or otherwise. Patient has R basilar chest tube typically drained MWF although with decreasing output from 1L -> ~500c -> most recently unable to drain over past days with drops expressed from tube in ED during CTS eval. Hx of chronic bilateral LE edema previously evaluated by cardiology and was prescribed lasix which was self-discontinued.     Gastric cancer- Last heme onc appt Dr. Skinny Ibanez 7/2021 per discussion. Patient is pursuing alternative medicine therapy    Pain- Taking MS contin 15mg BID and dilaudid which she requires every four hours. Pain can be generalized and 8-9/10 at worst relieved with dilaudid although now stating she may need more frequent pain medication. Takes robaxin for muscle spasm. States she sees palliative care as an outpatient.     Of note, patient had shoulder injury, seen at [facility] with negative xrays, wear sling. Found to have fracture of L clavicle on CT in ED on this presentation.    Constipation/ Hx of SBO- Hx of constipation although states last BM 3/22 nonbloody after taking miralax and magnesium. Would prefer to take miralax and magnesium rather than other agents for her constipation and understands importance of bowel regimen while receiving opiate pain medication.     Collateral obtained from sister at bedside. Med recc obtained from sister at bedside, patient herself, and prescription bottles in pocket book.  (23 Mar 2022 23:18)    3/24: Patient is known to palliative care team from previous admission. Over the past 24 hours, patient required PRNs of IV dilaudid 1mg x5, robaxin x1. Patient seen this afternoon after getting CT and ultrasound. She reports dyspnea and chest tube not draining, improved with IV dilaudid but medication is not lasting full 4 hours.     PERTINENT PM/SXH:   Bariatric surg stat-unsp  Gastric cancer  Small bowel obstruction  Perforated bowel  Other abdominal hernia  Obesity (BMI 30-39.9)    History of gastrectomy  History of resection of small bowel    FAMILY HISTORY:  FH: diabetes mellitus (Mother)  FH: pulmonary embolism (Mother)  Family history of abdominal aortic aneurysm (AAA) (Father)    ITEMS NOT CHECKED ARE NOT PRESENT    SOCIAL HISTORY:   Significant other/partner[ x-]  Children[x ]  Religious/Spirituality:  Substance hx:  [ ]   Tobacco hx:  [ ]   Alcohol hx: [ ]   Home Opioid hx:  [x ] I-Stop Reference No:  This report was requested by: Louise Shaikh | Reference #: [id number]    Others' Prescriptions  Patient Name: Lucinda Partida Date: 1970  Address: [address] [place] [id number]: Female  Rx Written	Rx Dispensed	Drug	Quantity	Days Supply	Prescriber Name	Prescriber Franchesca #	Payment Method	Dispenser  03/08/2022	03/16/2022	hydromorphone 2 mg tablet	84	14	[id number]	Insurance	[employer]  03/08/2022	03/11/2022	morphine sulf er 15 mg tablet	28	14	[facility]	[id number]	Insurance	[employer]  03/01/2022	03/01/2022	morphine sulf er 15 mg tablet	10	10	[id number]	Insurance	[employer]  03/01/2022	03/01/2022	hydromorphone 2 mg tablet	84	14	[id number]	Insurance	[employer]  01/19/2022	02/18/2022	hydromorphone 2 mg tablet	42	7	Zoya Peters (JACKLYN)	[id number]	Insurance	[employer]  01/25/2022	02/07/2022	hydromorphone 2 mg tablet	18	3	[facility]	[id number]	Insurance	[employer]  01/19/2022	01/21/2022	hydromorphone 2 mg tablet	63	11	Zoya Peters (ANP)	[id number]	Insurance	[employer]  01/09/2022	01/10/2022	hydromorphone 2 mg tablet	84	14	Kelton Thompson R [id number]	Insurance	[employer]  Living Situation: [ ]Home  [ ]Long term care  [ ]Rehab [ ]Other    ADVANCE DIRECTIVES:    MOLST  [ ]  Living Will  [ ]   DECISION MAKER(s):  [x ] Health Care Proxy(s)  [ ] Surrogate(s)  [ ] Guardian           Name(s): Eldon Castillo (sister) Phone Number(s): [phone]    BASELINE (I)ADL(s) (prior to admission):  [place]: [ x]Total  [ ] Moderate [ ]Dependent    Allergies    Cipro (Rash)  QUINOLONES (Unknown)    Intolerances    MEDICATIONS  (STANDING):  heparin   Injectable 5000 Unit(s) SubCutaneous every 8 hours  influenza   Vaccine 0.5 milliLiter(s) IntraMuscular once  morphine ER Tablet 15 milliGRAM(s) Oral every 12 hours  polyethylene glycol 3350 17 Gram(s) Oral daily    MEDICATIONS  (PRN):  acetaminophen     Tablet .. 650 milliGRAM(s) Oral every 6 hours PRN Temp greater or equal to 38C (100.4F), Mild Pain (1 - 3)  aluminum hydroxide/magnesium hydroxide/simethicone Suspension 30 milliLiter(s) Oral every 4 hours PRN Dyspepsia  HYDROmorphone  Injectable 1 milliGRAM(s) IV Push every 4 hours PRN Severe Pain (7 - 10)  HYDROmorphone  Injectable 0.5 milliGRAM(s) IV Push every 4 hours PRN Moderate Pain (4 - 6)  melatonin 3 milliGRAM(s) Oral at bedtime PRN Insomnia  methocarbamol 1500 milliGRAM(s) Oral three times a day PRN Muscle Spasm    PRESENT SYMPTOMS: [ ]Unable to obtain due to poor mentation   Source if other than patient:  [ ]Family   [ ]Team     Pain: [x ]yes [ ]no  QOL impact - difficulty breathing and performing her ADLs   Location - pleuritic pain, clavical                     Aggravating factors - with deep inspiration, also with pleurX drain not working at the moment   Quality - pleuritic pain   Radiation - clavical--> left shoulder   Timing- constant   Severity (0-10 scale): 10  Minimal acceptable level (0-10 scale): 1    PAIN AD Score:     http://geriatrictoolkit.[facility]/cog/painad.pdf (press ctrl +  left click to view)    Dyspnea:                           [ ]Mild [ x]Moderate [ ]Severe  Anxiety:                             [ ]Mild [ ]Moderate [ ]Severe  Fatigue:                             [ ]Mild [ x]Moderate [ ]Severe  Nausea:                             [ ]Mild [ ]Moderate [ ]Severe  Loss of appetite:              [ ]Mild [ ]Moderate [ ]Severe  Constipation:                    [ ]Mild [ ]Moderate [ ]Severe    Other Symptoms: lymphedema  [ x]All other review of systems negative     Palliative Performance Status Version 2:     60    %    http://[facility].org/files/news/palliative_performance_scale_ppsv2.pdf  PHYSICAL EXAM:  Vital Signs Last 24 Hrs  T(C): 36.8 (24 Mar 2022 06:35), Max: 37.2 (23 Mar 2022 20:46)  T(F): 98.2 (24 Mar 2022 06:35), Max: 98.9 (23 Mar 2022 20:46)  HR: 101 (24 Mar 2022 09:00) (101 - 110)  BP: 104/58 (24 Mar 2022 09:00) (101/55 - 113/67)  BP(mean): --  RR: 19 (24 Mar 2022 09:00) (16 - 19)  SpO2: 98% (24 Mar 2022 09:00) (97% - 99%) I&O's Summary    GENERAL:  [x ]Alert  [x ]Oriented x4   [ ]Lethargic  [ ]Cachexia  [ ]Unarousable  [x ]Verbal  [ ]Non-Verbal  Behavioral:   [ ] Anxiety  [ ] Delirium [ ] Agitation [x ] Other  HEENT:  [x]Normal   [ ]Dry mouth   [ ]ET Tube/Trach  [ ]Oral lesions  PULMONARY: has right PleurX   [ ]Clear [ ]Tachypnea  [ ]Audible excessive secretions   [ ]Rhonchi        [ ]Right [ ]Left [ ]Bilateral  [ ]Crackles        [ ]Right [ ]Left [ ]Bilateral  [ ]Wheezing     [ ]Right [ ]Left [ ]Bilateral   [x ]Diminished breath sounds [ ]right [ ]left [x ]bilateral  CARDIOVASCULAR:    [ ]Regular [ ]Irregular [x ]Tachy  [ ]Linus [ ]Murmur [ ]Other  GASTROINTESTINAL:  [ ]Soft  [x ]Distended   [ ]+BS  [x ]Non tender [ ]Tender  [ ]PEG [ ]OGT/ NGT  Last BM:   GENITOURINARY:  [ x]Normal [ ] Incontinent   [ ]Oliguria/Anuria   [ ]Ansari  MUSCULOSKELETAL: L. shoulder is in sling   [ ]Normal   [ ]Weakness  [ ]Bed/Wheelchair bound [ x]Edema  NEUROLOGIC:   [ x]No focal deficits  [ ]Cognitive impairment  [ ]Dysphagia [ ]Dysarthria [ ]Paresis [ ]Other   SKIN: Please see flowsheets   [ ]Normal    [ ]Rash  [ ]Pressure ulcer(s)       Present on admission [ ]y [ ]n    CRITICAL CARE:  [ ] Shock Present  [ ]Septic [ ]Cardiogenic [ ]Neurologic [ ]Hypovolemic  [ ]  Vasopressors [ ]  Inotropes   [ ]Respiratory failure present [ ]Mechanical ventilation [ ]Non-invasive ventilatory support [ ]High flow  [ ]Acute  [ ]Chronic [ ]Hypoxic  [ ]Hypercarbic [ ]Other  [ ]Other organ failure     LABS:                        8.7    6.59  )-----------( 352      ( 24 Mar 2022 07:33 )             28.3   03-24    130<L>  |  97<L>  |  9   ----------------------------<  107<H>  3.8   |  21<L>  |  0.64    Ca    8.2<L>      24 Mar 2022 07:33  Phos  3.0     03-24  Mg     2.20     03-24    TPro  5.3<L>  /  Alb  2.4<L>  /  TBili  0.4  /  DBili  x   /  AST  20  /  ALT  15  /  AlkPhos  2291<H>  03-24  PT/INR - ( 23 Mar 2022 13:00 )   PT: 15.4 sec;   INR: 1.32 ratio         PTT - ( 23 Mar 2022 13:00 )  PTT:30.8 sec      RADIOLOGY & ADDITIONAL STUDIES: < from: CT Angio Chest PE Protocol w/ IV Cont (03.24.22 @ 11:55) >  No pulmonary embolism.    Moderate multiloculatedright pleural effusion, unchanged in size from   day prior.    Patchy opacities within the right middle and lower lobes are again seen.    Diffuse interlobular septal thickening, unchanged. This can represent   pulmonary edema versus lymphangitic carcinomatosis.    Diffuse osseous metastatic disease is again seen within the acute mildly   displaced left clavicular fracture.    < end of copied text >      PROTEIN CALORIE MALNUTRITION PRESENT: [ ]mild [ ]moderate [ ]severe [ ]underweight [ ]morbid obesity  https://www.andeal.org/vault/2440/web/files/ONC/Table_Clinical%20Characteristics%20to%20Document%20Malnutrition-White%20JV%20et%20al%202012.pdf    Height (cm): 167.6 (03-23-22 @ 10:56), 167.6 (03-19-22 @ 17:35), 167.6 (03-15-22 @ 13:24)  Weight (kg): 109.6 (03-24-22 @ 00:15), 113.4 (03-19-22 @ 17:35), 90.7 (01-25-22 @ 04:04)  BMI (kg/m2): 39 (03-24-22 @ 00:15), 40.4 (03-23-22 @ 10:56), 40.4 (03-19-22 @ 17:35)    [ ]PPSV2 < or = to 30% [ ]significant weight loss  [ ]poor nutritional intake  [ ]anasarca      [ ]Artificial Nutrition      REFERRALS:   [ ]Chaplaincy  [ ]Hospice  [ ]Child Life  [ x]Social Work  [ ]Case management [ ]Holistic Therapy

## 2022-03-24 NOTE — CONSULT NOTE ADULT - PROBLEM SELECTOR RECOMMENDATION 3
Thank you for allowing us to participate in your patient's care. We will continue to follow with you. Please page [phone] for any q's or c's.     Louise Shaikh D.O.   Palliative Medicine Patient with lymphedema and has difficulty ambulating. Her left arm is in a sling after recent fall in bathtub.   Please assist with ADLs

## 2022-03-24 NOTE — PATIENT PROFILE ADULT - FALL HARM RISK - HARM RISK INTERVENTIONS
Assistance with ambulation/Assistance OOB with selected safe patient handling equipment/Communicate Risk of Fall with Harm to all staff/Discuss with provider need for PT consult/Monitor gait and stability/Reinforce activity limits and safety measures with patient and family/Tailored Fall Risk Interventions/Visual Cue: Yellow wristband and red socks/Bed in lowest position, wheels locked, appropriate side rails in place/Call bell, personal items and telephone in reach/Instruct patient to call for assistance before getting out of bed or chair/Non-slip footwear when patient is out of bed/[place] to call system/Physically safe environment - no spills, clutter or unnecessary equipment/Purposeful Proactive Rounding/Room/bathroom lighting operational, light cord in reach

## 2022-03-24 NOTE — CONSULT NOTE ADULT - PROBLEM SELECTOR RECOMMENDATION 2
Gastric cancer- Last heme onc appt Dr. Skinny Ibanez 7/2021 per discussion. Patient is pursuing alternative medicine therapy  > CTangio: No pulmonary embolism. Moderate multiloculated right pleural effusion, unchanged in size from   day prior. Patchy opacities within the right middle and lower lobes are again seen. Diffuse interlobular septal thickening, unchanged. This can represent pulmonary edema versus lymphangitic carcinomatosis. Diffuse osseous metastatic disease is again seen within the acute mildly displaced left clavicular fracture. Pt is known to outpatient palliative provider, Dr. Olivia Hazel.  At home, patient is on MS Contin 15mg bid with dilaudid 2mg q4hr prn  Pain management as above

## 2022-03-24 NOTE — PROGRESS NOTE ADULT - PROBLEM SELECTOR PLAN 1
likely secondary to bilateral pleural effusions with malfunctioning R basilar pleurx catheter  -Para in hx, protruding stomach patient states from "hernia", dyspnea worse when lying down  -US Doppler r/o DVT given Left posterior calf erythematous/bruised  -PERC score 1, Wells Score for PE 7.0  -CTA and doppler neg for PE/DVT.   -TTE to assess for right heart strain and ?HF, given low BNP, less likely HF.   - f/u CTS.

## 2022-03-24 NOTE — CONSULT NOTE ADULT - PROBLEM SELECTOR RECOMMENDATION 4
Gastric cancer- Last heme onc appt Dr. Skinny Ibanez 7/2021 per discussion. Patient is pursuing alternative medicine therapy  > CTangio: No pulmonary embolism. Moderate multiloculated right pleural effusion, unchanged in size from   day prior. Patchy opacities within the right middle and lower lobes are again seen. Diffuse interlobular septal thickening, unchanged. This can represent pulmonary edema versus lymphangitic carcinomatosis. Diffuse osseous metastatic disease is again seen within the acute mildly displaced left clavicular fracture.

## 2022-03-24 NOTE — CONSULT NOTE ADULT - PROBLEM SELECTOR RECOMMENDATION 5
Thank you for allowing us to participate in your patient's care. We will continue to follow with you. Please page [phone] for any q's or c's.     Louise Shaikh D.O.   Palliative Medicine

## 2022-03-25 NOTE — PROGRESS NOTE ADULT - PROBLEM SELECTOR PLAN 3
Previously stated wishes and reinforced wishes during H&P that she does not want chemotherapy or radiation therapy and is pursuing alternative medicine for her malignancy.    Would like symptom management of her pain, bloating, constipation.   -dilaudid 0.5mg IV q4h moderate  -dilaudid 1mg IV q4h severe   -MS Contin 15mg BID standing hold for resp depression or sedation  -Methocarbamol 1500 TID PRN for muscle spasm  -Maalox for bloating  -Miralax for constipation monitor bowel movement

## 2022-03-25 NOTE — PROGRESS NOTE ADULT - SUBJECTIVE AND OBJECTIVE BOX
[facility] Geriatrics and Palliative Care  Louise Shaikh, Palliative Care Attending  Contact Info: Page [phone] (including Nights/Weekends), message on Microsoft Teams (Louise Shaikh), or leave  at Palliative Office [phone] (non-urgent)     SUBJECTIVE AND OBJECTIVE: Patient seen this morning working with physical therapy, stating she continues to feel dyspneic. She is aware that she is not a candidate for surgical intervention.     INTERVAL HPI/OVERNIGHT EVENTS:  > 3/24: Patient is known to palliative care team from previous admission. Over the past 24 hours, patient required PRNs of IV dilaudid 1mg x5, robaxin x1. Patient seen this afternoon after getting CT and ultrasound. She reports dyspnea and chest tube not draining, improved with IV dilaudid but medication is not lasting full 4 hours.   > 3/25: Over the past 24 hours, patient required PRNs IV dilaudid 1mg x6, IV dilaudid 0.5mg x1.     DNR on chart:   Allergies    Cipro (Rash)  QUINOLONES (Unknown)    Intolerances    MEDICATIONS  (STANDING):  heparin   Injectable 5000 Unit(s) SubCutaneous every 8 hours  influenza   Vaccine 0.5 milliLiter(s) IntraMuscular once  lactulose Syrup 10 Gram(s) Oral three times a day  morphine ER Tablet 30 milliGRAM(s) Oral <User Schedule>  polyethylene glycol 3350 17 Gram(s) Oral daily    MEDICATIONS  (PRN):  acetaminophen     Tablet .. 650 milliGRAM(s) Oral every 6 hours PRN Temp greater or equal to 38C (100.4F), Mild Pain (1 - 3)  aluminum hydroxide/magnesium hydroxide/simethicone Suspension 30 milliLiter(s) Oral every 4 hours PRN Dyspepsia  HYDROmorphone  Injectable 0.5 milliGRAM(s) IV Push every 3 hours PRN Moderate Pain (4 - 6)  HYDROmorphone  Injectable 1 milliGRAM(s) IV Push every 3 hours PRN Severe Pain (7 - 10)  melatonin 3 milliGRAM(s) Oral at bedtime PRN Insomnia  methocarbamol 1500 milliGRAM(s) Oral three times a day PRN Muscle Spasm      ITEMS UNCHECKED ARE NOT PRESENT    PRESENT SYMPTOMS: [ ]Unable to obtain due to poor mentation   Source if other than patient:  [ ]Family   [ ]Team     Pain: [x ]yes [ ]no  QOL impact - difficulty breathing and performing her ADLs   Location - pleuritic pain, clavical                     Aggravating factors - with deep inspiration, also with pleurX drain not working at the moment   Quality - pleuritic pain   Radiation - clavical--> left shoulder   Timing- constant   Severity (0-10 scale): 7  Minimal acceptable level (0-10 scale): 2    Dyspnea:                           [ ]Mild [x ]Moderate [ ]Severe  Anxiety:                             [ ]Mild [ ]Moderate [ ]Severe  Fatigue:                             [ ]Mild [ ]Moderate [ ]Severe  Nausea:                             [ ]Mild [ ]Moderate [ ]Severe  Loss of appetite:              [ ]Mild [ ]Moderate [ ]Severe  Constipation:                    [ ]Mild [ ]Moderate [ ]Severe    PAIN AD Score:	  [email or web address] (Ctrl + left click to view)    Other Symptoms:  [ x]All other review of systems negative     Palliative Performance Status Version 2:     60    %      [email or web address]/[email or web address]_[email or web address]  PHYSICAL EXAM:  Vital Signs Last 24 Hrs  T(C): 36.7 (25 Mar 2022 11:56), Max: 36.7 (25 Mar 2022 05:50)  T(F): 98.1 (25 Mar 2022 11:56), Max: 98.1 (25 Mar 2022 11:56)  HR: 113 (25 Mar 2022 11:56) (94 - 113)  BP: 110/56 (25 Mar 2022 11:56) (104/61 - 117/59)  BP(mean): --  RR: 18 (25 Mar 2022 11:56) (18 - 19)  SpO2: 97% (25 Mar 2022 11:56) (96% - 99%) I&O's Summary     GENERAL:  [x ]Alert  [x ]Oriented x4   [ ]Lethargic  [ ]Cachexia  [ ]Unarousable  [x ]Verbal  [ ]Non-Verbal  Behavioral:   [ ] Anxiety  [ ] Delirium [ ] Agitation [x ] Other  HEENT:  [x]Normal   [ ]Dry mouth   [ ]ET Tube/Trach  [ ]Oral lesions  PULMONARY: has right PleurX   [ ]Clear [ ]Tachypnea  [ ]Audible excessive secretions   [ ]Rhonchi        [ ]Right [ ]Left [ ]Bilateral  [ ]Crackles        [ ]Right [ ]Left [ ]Bilateral  [ ]Wheezing     [ ]Right [ ]Left [ ]Bilateral   [x ]Diminished breath sounds [ ]right [ ]left [x ]bilateral  CARDIOVASCULAR:    [ ]Regular [ ]Irregular [x ]Tachy  [ ]Linus [ ]Murmur [ ]Other   GASTROINTESTINAL:  [ ]Soft  [x ]Distended   [ ]+BS  [x ]Non tender [ ]Tender  [ ]PEG [ ]OGT/ NGT  Last BM: 3/22  GENITOURINARY:  [ x]Normal [ ] Incontinent   [ ]Oliguria/Anuria   [ ]Ansari  MUSCULOSKELETAL: L. shoulder is in sling   [ ]Normal   [ ]Weakness  [ ]Bed/Wheelchair bound [ x]Edema  NEUROLOGIC:   [ x]No focal deficits  [ ]Cognitive impairment  [ ]Dysphagia [ ]Dysarthria [ ]Paresis [ ]Other   SKIN: Please see flowsheets   [ ]Normal    [ ]Rash  [ ]Pressure ulcer(s)       Present on admission [ ]y [ ]n    CRITICAL CARE:  [ ] Shock Present  [ ]Septic [ ]Cardiogenic [ ]Neurologic [ ]Hypovolemic  [ ]  Vasopressors [ ]  Inotropes   [ ]Respiratory failure present [ ]Mechanical ventilation [ ]Non-invasive ventilatory support [ ]High flow  [ ]Acute  [ ]Chronic [ ]Hypoxic  [ ]Hypercarbic [ ]Other  [ ]Other organ failure     LABS:                        8.0    7.21  )-----------( 354      ( 25 Mar 2022 06:43 )             26.5   03-25    132<L>  |  99  |  10  ----------------------------<  99  4.0   |  20<L>  |  0.71    Ca    8.3<L>      25 Mar 2022 06:43  Phos  2.9     03-25  Mg     2.30     03-25    TPro  5.3<L>  /  Alb  2.4<L>  /  TBili  0.4  /  DBili  x   /  AST  20  /  ALT  15  /  AlkPhos  2291<H>  03-24  PT/INR - ( 23 Mar 2022 13:00 )   PT: 15.4 sec;   INR: 1.32 ratio         PTT - ( 23 Mar 2022 13:00 )  PTT:30.8 sec      RADIOLOGY & ADDITIONAL STUDIES:     Protein Calorie Malnutrition Present: [< from: US Duplex Venous Lower Ext Complete, Bilateral (03.24.22 @ 11:43) >  IMPRESSION:  No evidence of deep venous thrombosis in the visualized bilateral lower   extremities.    < end of copied text >   ]mild [ ]moderate [ ]severe [ ]underweight [ ]morbid obesity  https://www.andeal.org/vault/3400/web/files/ONC/Table_Clinical%20Characteristics%20to%20Document%20Malnutrition-White%20JV%20et%20al%202012.pdf    Height (cm): 167.6 (03-23-22 @ 10:56), 167.6 (03-19-22 @ 17:35), 167.6 (03-15-22 @ 13:24)  Weight (kg): 109.6 (03-24-22 @ 00:15), 113.4 (03-19-22 @ 17:35), 90.7 (01-25-22 @ 04:04)  BMI (kg/m2): 39 (03-24-22 @ 00:15), 40.4 (03-23-22 @ 10:56), 40.4 (03-19-22 @ 17:35)    [ ]PPSV2 < or = 30%  [ ]significant weight loss [ ]poor nutritional intake [ ]anasarca    [ ]Artificial Nutrition    REFERRALS:   [ ]Chaplaincy  [ ]Hospice  [ ]Child Life  [ ]Social Work  [ ]Case management [ ]Holistic Therapy

## 2022-03-25 NOTE — PROGRESS NOTE ADULT - PROBLEM SELECTOR PLAN 4
Patient with lymphedema and has difficulty ambulating. Her left arm is in a sling after recent fall in bathtub.   Please assist with ADLs.  PT luis

## 2022-03-25 NOTE — PROGRESS NOTE ADULT - PROBLEM SELECTOR PLAN 1
Pt is known to outpatient palliative provider, Dr. Olivia Hazel.   At home, patient is on MS Contin 15mg bid with dilaudid 2mg q4hr prn   > Increase MS Contin to 30mg tid (8am, 2pm, 10pm)   Pt currently is on IV dilaudid 0.5mg q3h prn moderate pain and dilaudid 1mg q3hr prn severe pain   If patient is being discharged, consider transitioning PRNS to dilaudid 4mg PO q4h prn mod-severe pain   CT surgery recs appreciated- no surgical intervention

## 2022-03-25 NOTE — PHYSICAL THERAPY INITIAL EVALUATION ADULT - AMBULATION SKILLS, REHAB EVAL
patient has a wheelchair she has been using recently to move around her coop but it is borrowed/needed assist

## 2022-03-25 NOTE — PROGRESS NOTE ADULT - SUBJECTIVE AND OBJECTIVE BOX
[facility] Division of Hospital Medicine  Mario Abreu MD  In House Pager [phone]    Patient is a 51y old  Female who presents with a chief complaint of shortness of breath (25 Mar 2022 12:51)      SUBJECTIVE / OVERNIGHT EVENTS:  No overnight events. Labs and vitals reviewed. labs stable. CT chest neg for PE, doppler neg for DVT. no significant pleural effusion. minimal output from chest tube.   Patient seen and examined at bedside, immediated reported pain and requesting pain meds. just 2hrs after IV dilaudid. states her pain is limiting her from taking deep breath, thus causing SOB.   No fever, no chills, +SOB, no CP, no n/v/d, no abd pain, no dysuria      MEDICATIONS  (STANDING):  heparin   Injectable 5000 Unit(s) SubCutaneous every 8 hours  influenza   Vaccine 0.5 milliLiter(s) IntraMuscular once  lactulose Syrup 10 Gram(s) Oral three times a day  morphine ER Tablet 30 milliGRAM(s) Oral <User Schedule>  polyethylene glycol 3350 17 Gram(s) Oral daily    MEDICATIONS  (PRN):  acetaminophen     Tablet .. 650 milliGRAM(s) Oral every 6 hours PRN Temp greater or equal to 38C (100.4F), Mild Pain (1 - 3)  aluminum hydroxide/magnesium hydroxide/simethicone Suspension 30 milliLiter(s) Oral every 4 hours PRN Dyspepsia  HYDROmorphone  Injectable 0.5 milliGRAM(s) IV Push every 3 hours PRN Moderate Pain (4 - 6)  HYDROmorphone  Injectable 1 milliGRAM(s) IV Push every 3 hours PRN Severe Pain (7 - 10)  melatonin 3 milliGRAM(s) Oral at bedtime PRN Insomnia  methocarbamol 1500 milliGRAM(s) Oral three times a day PRN Muscle Spasm    CAPILLARY BLOOD GLUCOSE        I&O's Summary      PHYSICAL EXAM:  Vital Signs Last 24 Hrs  T(C): 36.7 (25 Mar 2022 11:56), Max: 36.7 (25 Mar 2022 05:50)  T(F): 98.1 (25 Mar 2022 11:56), Max: 98.1 (25 Mar 2022 11:56)  HR: 113 (25 Mar 2022 11:56) (94 - 113)  BP: 110/56 (25 Mar 2022 11:56) (104/61 - 117/59)  BP(mean): --  RR: 18 (25 Mar 2022 11:56) (18 - 19)  SpO2: 97% (25 Mar 2022 11:56) (96% - 97%)    Gen: NAD; sitting in bed comfortably   Pulm: no accessory muscle use; lungs clear on auscultation bilaterally; no wheezing or crackles.   Cards: RRR, nl S1/S2; no LE edema; no JVD  Abd: non-distended; soft and NT on exam; +bs  Ext: YOLIS; no joint effusion or tenderness in upper and lower extremities; no cyanosis; left arm sling in place.   Neuro: Awake and Alert; non-focal; moving all extremities.   Skin: no new rashes; warm to touch;     LABS:                        8.0    7.21  )-----------( 354      ( 25 Mar 2022 06:43 )             26.5     03-25    132<L>  |  99  |  10  ----------------------------<  99  4.0   |  20<L>  |  0.71    Ca    8.3<L>      25 Mar 2022 06:43  Phos  2.9     03-25  Mg     2.30     03-25    TPro  5.3<L>  /  Alb  2.4<L>  /  TBili  0.4  /  DBili  x   /  AST  20  /  ALT  15  /  AlkPhos  2291<H>  03-24                RADIOLOGY & ADDITIONAL TESTS:  Results Reviewed: Y  Imaging Personally Reviewed: Y  Electrocardiogram Personally Reviewed: Y    COORDINATION OF CARE:  Care Discussed with Consultants/Other Providers [Y/N]: Y  Prior or Outpatient Records Reviewed [Y/N]: Y

## 2022-03-25 NOTE — PROGRESS NOTE ADULT - PROBLEM SELECTOR PLAN 1
likely secondary to bilateral pleural effusions with malfunctioning R basilar pleurx catheter  -Para in hx, protruding stomach patient states from "hernia", dyspnea worse when lying down  -US Doppler r/o DVT given Left posterior calf erythematous/bruised  -PERC score 1, Wells Score for PE 7.0  -CTA and doppler neg for PE/DVT.   -TTE to assess for right heart strain and ?HF, given low BNP, less likely HF. although imaging suggestive of subQ edema.   - no O2 req at rest.  - no drainage from PleuX, will ask CTS to assess for removal of PleurX

## 2022-03-25 NOTE — PHYSICAL THERAPY INITIAL EVALUATION ADULT - GENERAL OBSERVATIONS, REHAB EVAL
Pt found sitting in chair; patient agreeable to PT; adjusted pt's left UE sling; +bilateral LE lymphedema throughout.

## 2022-03-25 NOTE — PROGRESS NOTE ADULT - PROBLEM SELECTOR PLAN 5
Gastric cancer- Last heme onc appt Dr. Skinny Ibanez 7/2021 per discussion. Patient is pursuing alternative medicine therapy  > CT angio: No pulmonary embolism. Moderate multiloculated right pleural effusion, unchanged in size from   day prior. Patchy opacities within the right middle and lower lobes are again seen. Diffuse interlobular septal thickening, unchanged. This can represent pulmonary edema versus lymphangitic carcinomatosis. Diffuse osseous metastatic disease is again seen within the acute mildly displaced left clavicular fracture.

## 2022-03-25 NOTE — PHYSICAL THERAPY INITIAL EVALUATION ADULT - ADDITIONAL COMMENTS
patient has 3 steps to enter with bilateral railings that are too wide to reach; once inside can remain on first floor. Patient has 3 steps to enter with bilateral railings that are too wide to reach; once inside can remain on first floor. Pt reports having no equipment at home. The wheelchair she has been using is borrowed.

## 2022-03-25 NOTE — PHYSICAL THERAPY INITIAL EVALUATION ADULT - PERTINENT HX OF CURRENT PROBLEM, REHAB EVAL
51 year old female with PMHx of metastatic gastric cancer and multiple abdominal surgeries with hx of SBO with recurrent R pleural effusion with Pleurx catheter presenting at the reccomendation of her CTS Dr. Bashir for dyspnea.

## 2022-03-25 NOTE — PROGRESS NOTE ADULT - PROBLEM SELECTOR PLAN 6
Thank you for allowing us to participate in your patient's care. We will continue to follow with you. Please page [phone] for any q's or c's.     Louise Shaikh D.O.   Palliative Medicine.

## 2022-03-25 NOTE — PHYSICAL THERAPY INITIAL EVALUATION ADULT - ACTIVE RANGE OF MOTION EXAMINATION, REHAB EVAL
left UE elbow flexion/ extension WFL; bilateral LE active assist ROM WFL patient limited by +lymphedema/Right UE Active ROM was WFL (within functional limits)

## 2022-03-25 NOTE — PROGRESS NOTE ADULT - PROBLEM SELECTOR PLAN 2
Pt is known to outpatient palliative provider, Dr. Olivia Hazel.  At home, patient is on MS Contin 15mg bid with dilaudid 2mg q4hr prn  Pain management as above.

## 2022-03-25 NOTE — PROGRESS NOTE ADULT - SUBJECTIVE AND OBJECTIVE BOX
no acute event    vitals as documented    General: WN/WD mild distress due to pain  Neurology: A&Ox3, nonfocal, BAIN x 4  Eyes: PERRLA/ EOMI, Gross vision intact  ENT/Neck: Neck supple, trachea midline, No JVD, Gross hearing intact  Respiratory: Dec BS bilat, Rt > Left  CV: RRR, S1S2, no murmurs, rubs or gallops  Abdominal: pt with significant anasarca/ascites, hernia noted.   Extremities: 4+ large pitting edema bilat LE   Skin: No Rashes, Hematoma, Ecchymosis  Incisions: Rt Pleurx site c/d/i. NO s/s of infection.   no erythema or drainage.      CT chest reviewed        52yo F with metastatic Gastric CA s/p Rt VATS/pleurx with Dr. Daniels in January. Pt presented with SOB, decreased output from Pleurx cath    - minimal fluid collection on CT chest, likely not source of pt symptoms  - pt with progression of disease, agree with palliative care consult  - thoracic surgeon, Dr Daniels aware of request to remove pleurX catheter - will likely remove at bedside tomorrow  -continue care per primary team      Nina MCKOY [phone]

## 2022-03-25 NOTE — PHYSICAL THERAPY INITIAL EVALUATION ADULT - PLANNED THERAPY INTERVENTIONS, PT EVAL
Patient left sitting in chair, in NAD, call bell in reach, all lines intact. Pt will have bilateral LEs elevated after eating lunch./balance training/bed mobility training/gait training/ROM/strengthening/transfer training

## 2022-03-26 NOTE — PROGRESS NOTE ADULT - PROBLEM SELECTOR PLAN 1
likely secondary to bilateral pleural effusions with malfunctioning R basilar pleurx catheter  -Para in hx, protruding stomach patient states from "hernia", dyspnea worse when lying down  -US Doppler r/o DVT given Left posterior calf erythematous/bruised  -PERC score 1, Wells Score for PE 7.0  -CTA and doppler neg for PE/DVT.   -TTE to assess for right heart strain and ?HF, given low BNP, less likely HF. although imaging suggestive of subQ edema.   - no O2 req at rest.  - no drainage from PleuX, f/u CTS to remove PleurX

## 2022-03-26 NOTE — PROGRESS NOTE ADULT - SUBJECTIVE AND OBJECTIVE BOX
[facility] Division of Hospital Medicine  Mario Abreu MD  In House Pager [phone]    Patient is a 51y old  Female who presents with a chief complaint of shortness of breath (26 Mar 2022 12:53)      SUBJECTIVE / OVERNIGHT EVENTS:  No overnight events. Labs and vitals reviewed. CTS following, attempting to remove CT tomorrow.   Patient seen and examined at bedside, no acute complaints. pain is controlled, breathing improved with pain control. PT rec rehab, patient interested in exploring this option with SW. Patient has many question regarding her clavicular fx.   No fever, no chills, no SOB, no CP, no n/v/d, no abd pain, no dysuria      MEDICATIONS  (STANDING):  heparin   Injectable 5000 Unit(s) SubCutaneous every 8 hours  influenza   Vaccine 0.5 milliLiter(s) IntraMuscular once  morphine ER Tablet 30 milliGRAM(s) Oral <User Schedule>  polyethylene glycol 3350 17 Gram(s) Oral daily    MEDICATIONS  (PRN):  acetaminophen     Tablet .. 650 milliGRAM(s) Oral every 6 hours PRN Temp greater or equal to 38C (100.4F), Mild Pain (1 - 3)  aluminum hydroxide/magnesium hydroxide/simethicone Suspension 30 milliLiter(s) Oral every 4 hours PRN Dyspepsia  HYDROmorphone  Injectable 0.5 milliGRAM(s) IV Push every 3 hours PRN Moderate Pain (4 - 6)  HYDROmorphone  Injectable 1 milliGRAM(s) IV Push every 3 hours PRN Severe Pain (7 - 10)  melatonin 3 milliGRAM(s) Oral at bedtime PRN Insomnia  methocarbamol 1500 milliGRAM(s) Oral three times a day PRN Muscle Spasm    CAPILLARY BLOOD GLUCOSE        I&O's Summary      PHYSICAL EXAM:  Vital Signs Last 24 Hrs  T(C): 36.7 (26 Mar 2022 12:28), Max: 36.9 (25 Mar 2022 21:20)  T(F): 98 (26 Mar 2022 12:28), Max: 98.4 (25 Mar 2022 21:20)  HR: 110 (26 Mar 2022 12:54) (92 - 120)  BP: 117/52 (26 Mar 2022 12:54) (111/60 - 123/64)  BP(mean): --  RR: 18 (26 Mar 2022 12:54) (18 - 20)  SpO2: 95% (26 Mar 2022 12:54) (95% - 99%)    Gen: NAD; sitting in bed comfortably   Pulm: no accessory muscle use; lungs clear on auscultation bilaterally; no wheezing or crackles.   Cards: RRR, nl S1/S2; no LE edema; no JVD  Abd: non-distended; soft and NT on exam; +bs  Ext: YOLIS; no joint effusion or tenderness in upper and lower extremities; no cyanosis; LUE in sling.   Neuro: Awake and Alert; non-focal; moving all extremities.   Skin: no new rashes; warm to touch;     LABS:                        8.3    7.86  )-----------( 364      ( 26 Mar 2022 07:14 )             27.2     03-26    129<L>  |  97<L>  |  12  ----------------------------<  98  4.1   |  21<L>  |  0.73    Ca    8.2<L>      26 Mar 2022 07:14  Phos  3.0     03-26  Mg     2.20     03-26                  RADIOLOGY & ADDITIONAL TESTS:  Results Reviewed: Y  Imaging Personally Reviewed: Y  Electrocardiogram Personally Reviewed: Y    COORDINATION OF CARE:  Care Discussed with Consultants/Other Providers [Y/N]: Y  Prior or Outpatient Records Reviewed [Y/N]: KOBE

## 2022-03-26 NOTE — PROGRESS NOTE ADULT - SUBJECTIVE AND OBJECTIVE BOX
THORACIC SURGERY PROGRESS NOTE    Patient Status Post: PleurX in January    SUBJECTIVE: Pt seen + examined. Discussed PleurX output, need for coags prior to bedside procedure. Patient understood.      ---------------------------------------------------------------------------------------------   VITALS  T(C): 36.7 (03-26-22 @ 12:28), Max: 36.9 (03-25-22 @ 21:20)  HR: 113 (03-26-22 @ 12:28) (92 - 120)  BP: 118/60 (03-26-22 @ 12:28) (111/60 - 123/64)  RR: 18 (03-26-22 @ 12:28) (18 - 20)  SpO2: 95% (03-26-22 @ 12:28) (95% - 99%)  CAPILLARY BLOOD GLUCOSE          Is/Os      ---------------------------------------------------------------------------------------------   PHYSICAL EXAM:   General: NAD, Lying in bed comfortably  HEENT: NC/AT, EOMI  Neck: Soft, supple  Extremities: L arm sling  Cardio: RRR, well perfused  Resp: No increased work of breathing. PleurX capped and dressed.  GI/Abd: Soft, NT/ND  Vascular: All 4 extremities warm. No edema    ---------------------------------------------------------------------------------------------   MEDICATIONS (STANDING): heparin   Injectable 5000 Unit(s) SubCutaneous every 8 hours  influenza   Vaccine 0.5 milliLiter(s) IntraMuscular once  morphine ER Tablet 30 milliGRAM(s) Oral <User Schedule>  polyethylene glycol 3350 17 Gram(s) Oral daily    MEDICATIONS (PRN):acetaminophen     Tablet .. 650 milliGRAM(s) Oral every 6 hours PRN Temp greater or equal to 38C (100.4F), Mild Pain (1 - 3)  aluminum hydroxide/magnesium hydroxide/simethicone Suspension 30 milliLiter(s) Oral every 4 hours PRN Dyspepsia  HYDROmorphone  Injectable 0.5 milliGRAM(s) IV Push every 3 hours PRN Moderate Pain (4 - 6)  HYDROmorphone  Injectable 1 milliGRAM(s) IV Push every 3 hours PRN Severe Pain (7 - 10)  melatonin 3 milliGRAM(s) Oral at bedtime PRN Insomnia  methocarbamol 1500 milliGRAM(s) Oral three times a day PRN Muscle Spasm      ---------------------------------------------------------------------------------------------   LABS  CBC (03-26 @ 07:14)                              8.3<L>                         7.86    )----------------(  364        --    % Neutrophils, --    % Lymphocytes, ANC: --                                  27.2<L>  CBC (03-25 @ 06:43)                              8.0<L>                         7.21    )----------------(  354        --    % Neutrophils, --    % Lymphocytes, ANC: --                                  26.5<L>    BMP (03-26 @ 07:14)             129<L>  |  97<L>   |  12    		Ca++ --      Ca 8.2<L>             ---------------------------------( 98    		Mg 2.20               4.1     |  21<L>   |  0.73  			Ph 3.0     BMP (03-25 @ 06:43)             132<L>  |  99      |  10    		Ca++ --      Ca 8.3<L>             ---------------------------------( 99    		Mg 2.30               4.0     |  20<L>   |  0.71  			Ph 2.9                   RELEVANT IMAGING STUDIES:      CT chest reviewed: minimal fluid. PleurX in good position    ---------------------------------------------------------------------------------------------       ASSESSMENT  52yo F with metastatic Gastric CA s/p Rt VATS/pleurx with Dr. Daniels in January. Pt presented with SOB, decreased output from Pleurx cath    - minimal fluid collection on CT chest, likely not source of pt symptoms  - stopped at bedside today with Dr. Daniels. Requested am labs including coags. Will remove at bedside on Sunday pending labs    Hemant Johnston PGY5  Thoracic Surgery - [id number]

## 2022-03-27 NOTE — PROGRESS NOTE ADULT - SUBJECTIVE AND OBJECTIVE BOX
THORACIC SURGERY PROGRESS NOTE    Patient Status Post: R PleurX catheter placement in January    SUBJECTIVE: Pt seen + examined. Doing well. Discussed option of doing PleurX removal in procedure room given constraints of her room and her L arm sling. She was understanding and agreeable to doing it tomorrow.      ---------------------------------------------------------------------------------------------   VITALS  T(C): 36.8 (03-27-22 @ 06:20), Max: 36.8 (03-27-22 @ 06:20)  HR: 110 (03-27-22 @ 06:20) (99 - 113)  BP: 111/61 (03-27-22 @ 06:20) (109/56 - 121/58)  RR: 18 (03-27-22 @ 06:20) (17 - 18)  SpO2: 97% (03-27-22 @ 06:20) (93% - 97%)  CAPILLARY BLOOD GLUCOSE          Is/Os      ---------------------------------------------------------------------------------------------     PHYSICAL EXAM:   General: NAD, eating breakfast in chair  HEENT: NC/AT, EOMI  Neck: Soft, supple  Extremities: L arm sling  Cardio: RRR, well perfused  Resp: No increased work of breathing. PleurX capped and dressed.  GI/Abd: Soft, NT/ND  Vascular: All 4 extremities warm. No edema      ---------------------------------------------------------------------------------------------   MEDICATIONS (STANDING): heparin   Injectable 5000 Unit(s) SubCutaneous every 8 hours  influenza   Vaccine 0.5 milliLiter(s) IntraMuscular once  morphine ER Tablet 30 milliGRAM(s) Oral <User Schedule>  polyethylene glycol 3350 17 Gram(s) Oral daily  sodium chloride 0.9%. 1000 milliLiter(s) IV Continuous <Continuous>    MEDICATIONS (PRN):acetaminophen     Tablet .. 650 milliGRAM(s) Oral every 6 hours PRN Temp greater or equal to 38C (100.4F), Mild Pain (1 - 3)  aluminum hydroxide/magnesium hydroxide/simethicone Suspension 30 milliLiter(s) Oral every 4 hours PRN Dyspepsia  HYDROmorphone  Injectable 0.5 milliGRAM(s) IV Push every 3 hours PRN Moderate Pain (4 - 6)  HYDROmorphone  Injectable 1 milliGRAM(s) IV Push every 3 hours PRN Severe Pain (7 - 10)  melatonin 3 milliGRAM(s) Oral at bedtime PRN Insomnia  methocarbamol 1500 milliGRAM(s) Oral three times a day PRN Muscle Spasm      ---------------------------------------------------------------------------------------------   LABS  CBC (03-27 @ 07:42)                              8.9<L>                         8.66    )----------------(  382        --    % Neutrophils, --    % Lymphocytes, ANC: --                                  29.9<L>  CBC (03-26 @ 07:14)                              8.3<L>                         7.86    )----------------(  364        --    % Neutrophils, --    % Lymphocytes, ANC: --                                  27.2<L>    BMP (03-27 @ 07:42)             128<L>  |  95<L>   |  12    		Ca++ --      Ca 8.8                ---------------------------------( 90    		Mg 2.40               4.4     |  22      |  0.74  			Ph 3.1     BMP (03-26 @ 07:14)             129<L>  |  97<L>   |  12    		Ca++ --      Ca 8.2<L>             ---------------------------------( 98    		Mg 2.20               4.1     |  21<L>   |  0.73  			Ph 3.0       LFTs (03-27 @ 07:42)      TPro 6.0 / Alb 2.8<L> / TBili 0.5 / DBili -- / AST 24 / ALT 16 / AlkPhos 2499<H>    Coags (03-27 @ 07:42)  aPTT 30.1 / INR 1.20<H> / PT 13.9<H>            RELEVANT IMAGING STUDIES:    CT: minimal pleural fluid      ---------------------------------------------------------------------------------------------       ASSESSMENT  50yo F with metastatic Gastric CA s/p Rt VATS/pleurx with Dr. Daniels in January. Pt presented with SOB, decreased output from Pleurx cath.    - minimal fluid collection on CT chest, likely not source of pt symptoms  - had planned for bedside removal today but given constraints of room and patient's L arm in sling, will perform PleurX removal in procedure room tomorrow  - discussed with patient who is agreeable  - full set of AM labs tomorrow    Hemant Johnston PGY5  Thoracic Surgery - [id number]

## 2022-03-27 NOTE — PROGRESS NOTE ADULT - PROBLEM SELECTOR PLAN 1
likely secondary to bilateral pleural effusions with malfunctioning R basilar pleurx catheter  -Para in hx, protruding stomach patient states from "hernia", dyspnea worse when lying down  -US Doppler r/o DVT given Left posterior calf erythematous/bruised  -PERC score 1, Wells Score for PE 7.0  -CTA and doppler neg for PE/DVT.   -TTE to assess for right heart strain and ?HF, given low BNP, less likely HF. although imaging suggestive of subQ edema.   - no O2 req at rest.  - no drainage from PleuX, CTSgx plan for CT removal on 3/27. likely secondary to bilateral pleural effusions with malfunctioning R basilar pleurx catheter  -Para in hx, protruding stomach patient states from "hernia", dyspnea worse when lying down  -US Doppler r/o DVT given Left posterior calf erythematous/bruised  -PERC score 1, Wells Score for PE 7.0  -CTA and doppler neg for PE/DVT.   -TTE to assess for right heart strain and ?HF, given low BNP, less likely HF. although imaging suggestive of subQ edema.   - no O2 req at rest.  - no drainage from PleuX, CTSgx plan for CT removal

## 2022-03-27 NOTE — PROGRESS NOTE ADULT - SUBJECTIVE AND OBJECTIVE BOX
[facility] Division of Hospital Medicine  Mario Abreu MD  In House Pager [phone]    Patient is a 51y old  Female who presents with a chief complaint of shortness of breath (26 Mar 2022 15:40)      SUBJECTIVE / OVERNIGHT EVENTS:  No overnight events. Labs and vitals reviewed.  Patient seen and examined at bedside, no acute complaints.  No fever, no chills, no SOB, no CP, no n/v/d, no abd pain, no dysuria      MEDICATIONS  (STANDING):  heparin   Injectable 5000 Unit(s) SubCutaneous every 8 hours  influenza   Vaccine 0.5 milliLiter(s) IntraMuscular once  morphine ER Tablet 30 milliGRAM(s) Oral <User Schedule>  polyethylene glycol 3350 17 Gram(s) Oral daily    MEDICATIONS  (PRN):  acetaminophen     Tablet .. 650 milliGRAM(s) Oral every 6 hours PRN Temp greater or equal to 38C (100.4F), Mild Pain (1 - 3)  aluminum hydroxide/magnesium hydroxide/simethicone Suspension 30 milliLiter(s) Oral every 4 hours PRN Dyspepsia  HYDROmorphone  Injectable 0.5 milliGRAM(s) IV Push every 3 hours PRN Moderate Pain (4 - 6)  HYDROmorphone  Injectable 1 milliGRAM(s) IV Push every 3 hours PRN Severe Pain (7 - 10)  melatonin 3 milliGRAM(s) Oral at bedtime PRN Insomnia  methocarbamol 1500 milliGRAM(s) Oral three times a day PRN Muscle Spasm    CAPILLARY BLOOD GLUCOSE        I&O's Summary      PHYSICAL EXAM:  Vital Signs Last 24 Hrs  T(C): 36.8 (27 Mar 2022 06:20), Max: 36.8 (27 Mar 2022 06:20)  T(F): 98.2 (27 Mar 2022 06:20), Max: 98.2 (27 Mar 2022 06:20)  HR: 110 (27 Mar 2022 06:20) (99 - 120)  BP: 111/61 (27 Mar 2022 06:20) (109/56 - 121/58)  BP(mean): --  RR: 18 (27 Mar 2022 06:20) (17 - 20)  SpO2: 97% (27 Mar 2022 06:20) (93% - 97%)    Gen: NAD; sitting in bed comfortably   Pulm: no accessory muscle use; lungs clear on auscultation bilaterally; no wheezing or crackles.   Cards: RRR, nl S1/S2; no LE edema; no JVD  Abd: non-distended; soft and NT on exam; +bs  Ext: YOLIS; no joint effusion or tenderness in upper and lower extremities; no cyanosis  Neuro: Awake and Alert; non-focal; moving all extremities.   Skin: no new rashes; warm to touch;     LABS:                        8.9    8.66  )-----------( 382      ( 27 Mar 2022 07:42 )             29.9     03-26    129<L>  |  97<L>  |  12  ----------------------------<  98  4.1   |  21<L>  |  0.73    Ca    8.2<L>      26 Mar 2022 07:14  Phos  3.0     03-26  Mg     2.20     03-26      PT/INR - ( 27 Mar 2022 07:42 )   PT: 13.9 sec;   INR: 1.20 ratio         PTT - ( 27 Mar 2022 07:42 )  PTT:30.1 sec            RADIOLOGY & ADDITIONAL TESTS:  Results Reviewed: Y  Imaging Personally Reviewed: Y  Electrocardiogram Personally Reviewed: Y    COORDINATION OF CARE:  Care Discussed with Consultants/Other Providers [Y/N]: Y  Prior or Outpatient Records Reviewed [Y/N]: Y   [facility]  Mario Abreu MD  In House Pager [phone]    Patient is a 51y old  Female who presents with a chief complaint of shortness of breath (26 Mar 2022 15:40)      SUBJECTIVE / OVERNIGHT EVENTS:  No overnight events. Labs and vitals reviewed. nursing reports desaturation on cont pulse ox.   Patient seen and examined at bedside, no acute complaints. reports her pain is controlled. no SOB.   No fever, no chills, no SOB, no CP, no n/v/d, no abd pain, no dysuria      MEDICATIONS  (STANDING):  heparin   Injectable 5000 Unit(s) SubCutaneous every 8 hours  influenza   Vaccine 0.5 milliLiter(s) IntraMuscular once  morphine ER Tablet 30 milliGRAM(s) Oral <User Schedule>  polyethylene glycol 3350 17 Gram(s) Oral daily    MEDICATIONS  (PRN):  acetaminophen     Tablet .. 650 milliGRAM(s) Oral every 6 hours PRN Temp greater or equal to 38C (100.4F), Mild Pain (1 - 3)  aluminum hydroxide/magnesium hydroxide/simethicone Suspension 30 milliLiter(s) Oral every 4 hours PRN Dyspepsia  HYDROmorphone  Injectable 0.5 milliGRAM(s) IV Push every 3 hours PRN Moderate Pain (4 - 6)  HYDROmorphone  Injectable 1 milliGRAM(s) IV Push every 3 hours PRN Severe Pain (7 - 10)  melatonin 3 milliGRAM(s) Oral at bedtime PRN Insomnia  methocarbamol 1500 milliGRAM(s) Oral three times a day PRN Muscle Spasm    CAPILLARY BLOOD GLUCOSE        I&O's Summary      PHYSICAL EXAM:  Vital Signs Last 24 Hrs  T(C): 36.8 (27 Mar 2022 06:20), Max: 36.8 (27 Mar 2022 06:20)  T(F): 98.2 (27 Mar 2022 06:20), Max: 98.2 (27 Mar 2022 06:20)  HR: 110 (27 Mar 2022 06:20) (99 - 120)  BP: 111/61 (27 Mar 2022 06:20) (109/56 - 121/58)  BP(mean): --  RR: 18 (27 Mar 2022 06:20) (17 - 20)  SpO2: 97% (27 Mar 2022 06:20) (93% - 97%)    Gen: NAD; sitting in bed comfortably   Pulm: no accessory muscle use; lungs clear on auscultation bilaterally; no wheezing or crackles.   Cards: RRR, nl S1/S2; +LE edema; no JVD  Abd: non-distended; soft and NT on exam; +bs  Ext: YOLIS; no joint effusion or tenderness in upper and lower extremities; no cyanosis  Neuro: Awake and Alert; non-focal; moving all extremities.   Skin: no new rashes; warm to touch;     LABS:                        8.9    8.66  )-----------( 382      ( 27 Mar 2022 07:42 )             29.9     03-26    129<L>  |  97<L>  |  12  ----------------------------<  98  4.1   |  21<L>  |  0.73    Ca    8.2<L>      26 Mar 2022 07:14  Phos  3.0     03-26  Mg     2.20     03-26      PT/INR - ( 27 Mar 2022 07:42 )   PT: 13.9 sec;   INR: 1.20 ratio         PTT - ( 27 Mar 2022 07:42 )  PTT:30.1 sec            RADIOLOGY & ADDITIONAL TESTS:  Results Reviewed: Y  Imaging Personally Reviewed: Y  Electrocardiogram Personally Reviewed: Y    COORDINATION OF CARE:  Care Discussed with Consultants/Other Providers [Y/N]: Y  Prior or Outpatient Records Reviewed [Y/N]: Y

## 2022-03-27 NOTE — PROGRESS NOTE ADULT - PROBLEM SELECTOR PLAN 3
Previously stated wishes and reinforced wishes during H&P that she does not want chemotherapy or radiation therapy and is pursuing alternative medicine for her malignancy.    Would like symptom management of her pain, bloating, constipation.   -dilaudid 0.5mg IV q4h moderate  -dilaudid 1mg IV q4h severe   -MS Contin 15mg BID standing hold for resp depression or sedation  -Methocarbamol 1500 TID PRN for muscle spasm  -Maalox for bloating  -Miralax for constipation monitor bowel movement Previously stated wishes and reinforced wishes during H&P that she does not want chemotherapy or radiation therapy and is pursuing alternative medicine for her malignancy.    Would like symptom management of her pain, bloating, constipation.   -MS Contin 30mg BID standing hold for resp depression or sedation  -Methocarbamol 1500 TID PRN for muscle spasm  -Maalox for bloating  -Miralax for constipation monitor bowel movement  -Dialudid 0.5mg for moderate and 1mg for severe pain q3hrs.   - cont pulse ox. monitor saturation.

## 2022-03-28 NOTE — PROGRESS NOTE ADULT - SUBJECTIVE AND OBJECTIVE BOX
Pt is without complaint except for soreness ather former PleurX site.    Chest: Clean, dry dressing over the chest tube site.  BS B/L  CXR: No PTX  A: stable   P: as per primaary team Pt is without complaint except for soreness ather former PleurX site.    Chest: Clean, dry dressing over the chest tube site.  BS B/L  CXR: No PTX  A: stable   P: as per primary team; PleurX site sutures to be removed in 10 to 14 days.

## 2022-03-28 NOTE — PROGRESS NOTE ADULT - SUBJECTIVE AND OBJECTIVE BOX
Patient is a 51y old  Female who presents with a chief complaint of shortness of breath (27 Mar 2022 09:24)      SUBJECTIVE / OVERNIGHT EVENTS:  Patient has no new complaints. Denies cp, SOB, abdominal pain, N/V/D     MEDICATIONS  (STANDING):  heparin   Injectable 5000 Unit(s) SubCutaneous every 8 hours  influenza   Vaccine 0.5 milliLiter(s) IntraMuscular once  lactated ringers. 1000 milliLiter(s) (30 mL/Hr) IV Continuous <Continuous>  lactulose Syrup 10 Gram(s) Oral two times a day  morphine ER Tablet 45 milliGRAM(s) Oral <User Schedule>  polyethylene glycol 3350 17 Gram(s) Oral daily  sodium chloride 1 Gram(s) Oral three times a day    MEDICATIONS  (PRN):  acetaminophen     Tablet .. 650 milliGRAM(s) Oral every 6 hours PRN Temp greater or equal to 38C (100.4F), Mild Pain (1 - 3)  aluminum hydroxide/magnesium hydroxide/simethicone Suspension 30 milliLiter(s) Oral every 4 hours PRN Dyspepsia  HYDROmorphone  Injectable 0.5 milliGRAM(s) IV Push every 3 hours PRN Moderate Pain (4 - 6)  HYDROmorphone  Injectable 1 milliGRAM(s) IV Push every 3 hours PRN Severe Pain (7 - 10)  HYDROmorphone  Injectable 0.5 milliGRAM(s) IV Push every 10 minutes PRN Moderate Pain (4 - 6)  HYDROmorphone  Injectable 1 milliGRAM(s) IV Push every 10 minutes PRN Severe Pain (7 - 10)  melatonin 3 milliGRAM(s) Oral at bedtime PRN Insomnia  methocarbamol 1500 milliGRAM(s) Oral three times a day PRN Muscle Spasm  ondansetron Injectable 4 milliGRAM(s) IV Push once PRN Nausea and/or Vomiting  oxyCODONE    IR 5 milliGRAM(s) Oral once PRN Moderate Pain (4 - 6)      Vital Signs Last 24 Hrs  T(C): 36.8 (28 Mar 2022 15:13), Max: 36.9 (27 Mar 2022 20:03)  T(F): 98.2 (28 Mar 2022 15:13), Max: 98.4 (27 Mar 2022 20:03)  HR: 103 (28 Mar 2022 15:13) (100 - 110)  BP: 120/57 (28 Mar 2022 15:13) (105/49 - 120/57)  BP(mean): 56 (28 Mar 2022 14:30) (56 - 72)  RR: 20 (28 Mar 2022 15:13) (14 - 24)  SpO2: 96% (28 Mar 2022 15:13) (93% - 100%)  CAPILLARY BLOOD GLUCOSE        I&O's Summary    28 Mar 2022 07:01  -  28 Mar 2022 15:25  --------------------------------------------------------  IN: 30 mL / OUT: 0 mL / NET: 30 mL        PHYSICAL EXAM:   GENERAL: NAD, well-developed  HEAD:  Atraumatic, Normocephalic  EYES: EOMI, PERRLA, conjunctiva and sclera clear  NECK: Supple, No JVD  CHEST/LUNG: decreased BS B/L L >R ; No wheeze  HEART: Regular rate and rhythm; No murmurs, rubs, or gallops  ABDOMEN: Soft, Nontender, Nondistended; Bowel sounds present  EXTREMITIES:  +LE edema. 2+ Peripheral Pulses, No clubbing, or cyanosis  PSYCH: AAOx3  NEUROLOGY: non-focal  SKIN: No rashes or lesions    LABS:                        8.1    8.30  )-----------( 345      ( 28 Mar 2022 07:59 )             27.3     03-28    128<L>  |  95<L>  |  12  ----------------------------<  93  4.7   |  22  |  0.65    Ca    8.1<L>      28 Mar 2022 07:59  Phos  2.7     03-28  Mg     2.30     03-28    TPro  6.0  /  Alb  2.8<L>  /  TBili  0.5  /  DBili  x   /  AST  24  /  ALT  16  /  AlkPhos  2499<H>  03-27    PT/INR - ( 28 Mar 2022 07:59 )   PT: 13.6 sec;   INR: 1.17 ratio         PTT - ( 28 Mar 2022 07:59 )  PTT:30.8 sec          RADIOLOGY & ADDITIONAL TESTS:    Imaging Personally Reviewed: < from: Xray Chest 1 View- PORTABLE-Routine (Xray Chest 1 View- PORTABLE-Routine in AM.) (03.27.22 @ 08:44) >  Impression:  Interval increase in partly loculated right pleural effusion.  Persistent bilateral patchy opacities.    < end of copied text >      Consultant(s) Notes Reviewed:      Care Discussed with Consultants/Other Providers:

## 2022-03-28 NOTE — PROGRESS NOTE ADULT - PROBLEM SELECTOR PLAN 3
Previously stated wishes and reinforced wishes during H&P that she does not want chemotherapy or radiation therapy and is pursuing alternative medicine for her malignancy.    Would like symptom management of her pain, bloating, constipation.   -MS Contin 30mg BID standing hold for resp depression or sedation  -Methocarbamol 1500 TID PRN for muscle spasm  -Maalox for bloating  -Miralax for constipation monitor bowel movement  -Dialudid 0.5mg for moderate and 1mg for severe pain q3hrs.   - cont pulse ox. monitor saturation.

## 2022-03-28 NOTE — PROVIDER CONTACT NOTE (OTHER) - SITUATION
is refusing for me empty her drainage . She is assuring me it is no longer draining .However ,unable to verify  myself is refusing for me empty her pleurex catheter . She is assuring me it is no longer draining .However ,unable to verify  myself

## 2022-03-28 NOTE — PROGRESS NOTE ADULT - PROBLEM SELECTOR PLAN 9
DVT PPx SQH  Diet Regular  Dispo PT    Code status Full Code. pending further discussion with palliative care
DVT PPx SQH  Diet Regular  Dispo [facility]    Code status Full Code. patient not interested in hospice. no further discussion needed with palliative service.
DVT PPx SQ[facility]  Diet Regular  Dispo PT    Code status Full Code. pending further discussion with palliative care
DVT PPx SQH  Diet Regular  Dispo [facility] planning, f/u SW    Code status Full Code. patient not interested in hospice. no further discussion needed with palliative service.
DVT PPx [facility]  Diet Regular  Dispo [facility] planning, f/u SW    Code status Full Code. patient not interested in hospice. no further discussion needed with palliative service.

## 2022-03-28 NOTE — ASU PREOP CHECKLIST - NS PREOP CHK CHLOROHEX WASH
Patient arrived to floor, oriented to room/callbell. IV inserted tele applied. RON Fernandez notified of patient's arrival to floor.    N/A

## 2022-03-28 NOTE — PROGRESS NOTE ADULT - PROBLEM SELECTOR PLAN 1
likely secondary to bilateral pleural effusions with malfunctioning R basilar pleurx catheter  -Para in hx, protruding stomach patient states from "hernia", dyspnea worse when lying down  -US Doppler r/o DVT given Left posterior calf erythematous/bruised  -PERC score 1, Wells Score for PE 7.0  -CTA and doppler neg for PE/DVT.   -TTE to assess for right heart strain and ?HF, given low BNP, less likely HF. although imaging suggestive of subQ edema.   - no O2 req at rest.  - no drainage from PleuX, CTSgx plan for CT removal today

## 2022-03-29 NOTE — PROGRESS NOTE ADULT - PROBLEM SELECTOR PLAN 4
Patient with lymphedema and has difficulty ambulating. Her left arm is in a sling after recent fall in bathtub.   Please assist with ADLs.  PT eval- restorative rehab

## 2022-03-29 NOTE — CHART NOTE - NSCHARTNOTEFT_GEN_A_CORE
************************************************************************  PALLIATIVE MEDICINE COORDINATION OF CARE NOTE FOR ALLIE ALLRED  [x] Inpatient Consult  [ ] Other:  ************************************************************************  SUMMARY OF RECOMMENDATIONS:    > Chart reviewed. Discussed with IDT.   > SYMPTOM MANAGEMENT: pain, dyspnea- RECOMMEND INCREASING MS CONTIN TO 45MG TID based on PRNs. Transitioned moderate PRN to 4mg PO dilaudid q3hr prn and adjusted severe pain PRN to 0.5mg IV dilaudid q3h prn.   > GOALS OF CARE: Patient is seeking alternative therapies.   > CODE STATUS: Full Code   > HCP/ SURROGATE: Eldon Castillo (sister) [phone]   > Communicated with primary team.We will continue to follow with you.     ************************************************************************  CHART REVIEW:  > 3/28: Attempted to see patient multiple times in her room today, however patient was in OR and then PACU for pleurX removal. Chart reviewed. Over the past 24 hours, patient required PRNs of IV dilaudid 0.5mg x3 and IV dilaudid 1mg x2.     HPI:  51y Female with PMHx of metastatic gastric cancer and multiple abdominal surgeries with hx of SBO with recurrent R pleural effusion with Pleurx catheter presenting at the reccomendation of her CTS Dr. Bashir for dyspnea. States few day hx dyspnea relieved with leaning forward without chest pain pleuritic or otherwise. Patient has R basilar chest tube typically drained MWF although with decreasing output from 1L -> ~500c -> most recently unable to drain over past days with drops expressed from tube in ED during CTS eval. Hx of chronic bilateral LE edema previously evaluated by cardiology and was prescribed lasix which was self-discontinued.     Gastric cancer- Last heme onc appt Dr. Skinny Ibanez 7/2021 per discussion. Patient is pursuing alternative medicine therapy    Pain- Taking MS contin 15mg BID and dilaudid which she requires every four hours. Pain can be generalized and 8-9/10 at worst relieved with dilaudid although now stating she may need more frequent pain medication. Takes robaxin for muscle spasm. States she sees palliative care as an outpatient.     Of note, patient had shoulder injury, seen at [facility] with negative xrays, wear sling. Found to have fracture of L clavicle on CT in ED on this presentation.    Constipation/ Hx of SBO- Hx of constipation although states last BM 3/22 nonbloody after taking miralax and magnesium. Would prefer to take miralax and magnesium rather than other agents for her constipation and understands importance of bowel regimen while receiving opiate pain medication.     Collateral obtained from sister at bedside. Med recc obtained from sister at bedside, patient herself, and prescription bottles in pocket book.  (23 Mar 2022 23:18)      MEDICATIONS  (STANDING):  heparin   Injectable 5000 Unit(s) SubCutaneous every 8 hours  influenza   Vaccine 0.5 milliLiter(s) IntraMuscular once  lactated ringers. 1000 milliLiter(s) (30 mL/Hr) IV Continuous <Continuous>  morphine ER Tablet 45 milliGRAM(s) Oral <User Schedule>  polyethylene glycol 3350 17 Gram(s) Oral daily  sodium chloride 1 Gram(s) Oral three times a day    MEDICATIONS  (PRN):  acetaminophen     Tablet .. 650 milliGRAM(s) Oral every 6 hours PRN Temp greater or equal to 38C (100.4F), Mild Pain (1 - 3)  aluminum hydroxide/magnesium hydroxide/simethicone Suspension 30 milliLiter(s) Oral every 4 hours PRN Dyspepsia  HYDROmorphone  Injectable 0.5 milliGRAM(s) IV Push every 3 hours PRN Moderate Pain (4 - 6)  HYDROmorphone  Injectable 1 milliGRAM(s) IV Push every 3 hours PRN Severe Pain (7 - 10)  melatonin 3 milliGRAM(s) Oral at bedtime PRN Insomnia  methocarbamol 1500 milliGRAM(s) Oral three times a day PRN Muscle Spasm      ************************************************************************  MEDICATION REVIEW:  --- Pls refer to current medicatons in the body of this note  --- PRN usage: Over the past 24 hours, patient required PRNs of IV dilaudid 0.5mg x3 and IV dilaudid 1mg x2.   ------------------------------------------------------------------------  COORDINATION OF CARE:  --- Palliative Care consulted for: sx management   --- Patient assessed: No, patient was in OR and in PACU   --- Patient previously seen by Palliative Care service: Yes   ADVANCE CARE PLANNING  --- Code status: Full Code   --- MOLST reviewed in chart: NONE  --- HCP/ Surrogate: Mylessaida Jonathan (sister) [phone]   --- [facility] document found in Alpha: NONE  --- HCP/ Living will/ Other advanced directives in Alpha: NONE  CARE PROVIDER DOCUMENTATION:  --- SW/CM notes: reviewed   --- [facility] recs[facility]   --- Sp/Sw recs: regular   --- Nutrition recs: regular   PLAN OF CARE  --- Known admissions in past year: 4  --- Current admit date:  3/23/22  --- LOS: 5  --- LACE score: 16  --- Current dispo plan: Rehab  ------------------------------------------------------------------------  --- Chart reviewed: 30 Minutes [including time used to gather, review and transfer data to this note]  --- Start: 5:30  --- End: 6:00  Prolonged services rendered, as part of this patient's care provided by Palliative Medicine, include: i.chart review for provider and ancillary service documentation, ii.pertinent diagnostics including laboratory and imaging studies,iii. medication review including PRN use, iv. admission history including previous palliative care encounters and [facility] notes, v.advance care planning documents including HCP and MOLST forms in Alpha. Part of Palliative Medicine extended evaluation and management also involves coordination of care with our IDT, the primary and consulting una, and unit CM/SW and Hospice if eligible. Recommendations based on the information gathered and discussed are outline in the AP of our notes.    ************************************************************************  Care coordination of ALLIE ALLRED was communicated with the interdisciplinary team during IDT rounds and with the primary team.
CT chest result discussed with Dr. Daniels.  Right pleurx cath drainage attempted but was only able to drain about close to 5cc.  Will try TPA through tube tomorrow.  Above discussed with patient.
Pt s/p Pleurx removal yesterday in OR  CXR this am and yesterday stable and reviewed by Dr. Rosen  No other thoracic surgical interventions indicated at this time  Pt will need suture removal in 10-14 days  She should FU in Dr. Rosen's office at that time  Call for an apt. [phone]  Re-call with any new questions or concerns.
attempted to drain pleurX at bedside  no output  site redressed    Nina MCKOY [id number]
Patient Name: Lucinda Partida Date: 1970  Address: [address] [address] [place] [id number]: Female  Rx Written	Rx Dispensed	Drug	Quantity	Days Supply	Prescriber Name	Prescriber Franchesca #	Payment Method	Dispenser  03/08/2022	03/16/2022	hydromorphone 2 mg tablet	84	14	Yasemin Olivia	[id number]	Insurance	[employer]  03/08/2022	03/11/2022	morphine sulf er 15 mg tablet	28	14	[facility]	[id number]	Insurance	[employer]  03/01/2022	03/01/2022	morphine sulf er 15 mg tablet	10	10	[id number]	Insurance	[employer]  03/01/2022	03/01/2022	hydromorphone 2 mg tablet	84	14	[facility]	[id number]	Insurance	[employer]  01/19/2022	02/18/2022	hydromorphone 2 mg tablet	42	7	Zoya Peters (ANP)	[id number]	Insurance	[employer]  01/25/2022	02/07/2022	hydromorphone 2 mg tablet	18	3	[facility]	[id number]	Insurance	[employer]  01/19/2022	01/21/2022	hydromorphone 2 mg tablet	63	11	Zoya Peters (ANP)	[id number]	Insurance	[employer]  01/09/2022	01/10/2022	hydromorphone 2 mg tablet	84	14	Kelton Thompson R [id number]	Insurance	[employer]

## 2022-03-29 NOTE — DISCHARGE NOTE PROVIDER - CARE PROVIDER_API CALL
Madi Daniels)  Surgery; Thoracic Surgery  [phone] [place] [address], [place] [address]  Phone: [phone]  Fax: [phone]  Established Patient  Follow Up Time: 2 weeks   Madi Daniels)  Surgery; Thoracic Surgery  [phone] [place] [address], Oncology [place] [address]  Phone: [phone]  Fax: [phone]  Established Patient  Follow Up Time: 2 weeks    Jose Ramon Almonte  Orthopedic Surgery  [address] [address]  Phone: [phone]  Fax: [phone]  Follow Up Time:     Skinny Ibanez)  Hematology; Medical Oncology  [address] [place] [address]  Phone: [phone]  Fax: [phone]  Follow Up Time:

## 2022-03-29 NOTE — CONSULT NOTE ADULT - SUBJECTIVE AND OBJECTIVE BOX
51y Female PMH Metastatic gastric cancer (s/p multiple abdominal surgeries w/ hx SBO), recurrent R pleural effusion admitted to hospital with SOB. Patient also complaining of left shoulder pain. Patient states she had mechanical fall in shower on 3/19/2022 and has had pain in shoulder/clavicle pain. Denies HS/LOC. Denies numbness/tingling. Denies pain/injury elsewhere. No other complaints.    REVIEW OF SYSTEMS:    CONSTITUTIONAL: No weakness, fevers or chills  EYES/ENT: No visual changes;  No vertigo or throat pain   NECK: No pain or stiffness  RESPIRATORY: No cough, wheezing, hemoptysis; No shortness of breath  CARDIOVASCULAR: No chest pain or palpitations  GASTROINTESTINAL: No abdominal or epigastric pain. No nausea, vomiting, or hematemesis; No diarrhea or constipation. No melena or hematochezia.  GENITOURINARY: No dysuria, frequency or hematuria  NEUROLOGICAL: No numbness or weakness  SKIN: No itching, rashes    HEALTH ISSUES - PROBLEM Dx:  SOB (shortness of breath)    Anemia    Dyspnea    Elevated INR    Hyponatremia    Metabolic acidosis    Elevated alkaline phosphatase level    Goals of care, counseling/discussion    Prophylactic measure    Gastric cancer    Encounter for palliative care    Debility    Neoplasm related pain    Constipation          MEDICATIONS  (STANDING):  heparin   Injectable 5000 Unit(s) SubCutaneous every 8 hours  influenza   Vaccine 0.5 milliLiter(s) IntraMuscular once  lactated ringers. 1000 milliLiter(s) IV Continuous <Continuous>  morphine ER Tablet 45 milliGRAM(s) Oral <User Schedule>  polyethylene glycol 3350 17 Gram(s) Oral daily  sodium chloride 1 Gram(s) Oral three times a day    Allergies    Cipro (Rash)  QUINOLONES (Unknown)    Intolerances                              8.2    7.82  )-----------( 373      ( 29 Mar 2022 07:12 )             27.5     29 Mar 2022 07:12    130    |  96     |  11     ----------------------------<  92     4.3     |  22     |  0.70     Ca    8.2        29 Mar 2022 07:12  Phos  2.7       29 Mar 2022 07:12  Mg     2.40      29 Mar 2022 07:12      PT/INR - ( 28 Mar 2022 07:59 )   PT: 13.6 sec;   INR: 1.17 ratio         PTT - ( 28 Mar 2022 07:59 )  PTT:30.8 sec  Vital Signs Last 24 Hrs  T(C): 36.8 (03-29-22 @ 05:00), Max: 36.8 (03-28-22 @ 10:58)  T(F): 98.3 (03-29-22 @ 05:00), Max: 98.3 (03-28-22 @ 10:58)  HR: 102 (03-29-22 @ 05:00) (100 - 110)  BP: 112/61 (03-29-22 @ 05:00) (105/49 - 120/57)  BP(mean): 56 (03-28-22 @ 14:30) (56 - 72)  RR: 18 (03-29-22 @ 05:00) (14 - 24)  SpO2: 97% (03-29-22 @ 05:00) (93% - 99%)    Imaging: XR demonstrates R/L clavicle fracture    Gen: NAD  LUE: skin intact, no skin tenting, +ttp clavicle, no bony ttp elsewhere, +ax/msc/r/u/m/ain/pin function, C5-T1 SILT, radial pulse intact, compartments soft, warm/well perfused    A/P: 51y Female w L clavicle fracture    1. Pain control  2. NWB R/L UE in sling  3. FU XR L clavicle  4. Active movement of fingers/wrist/elbow encouraged  5. Ice  6. No acute intervention, ortho stable for DC. Follow up with Dr. Almonte as outpatient in 5-7 days, call office for appointment  7. Above to be reviewed with ortho attending Dr. Almonte.    51y Female PMH Metastatic gastric cancer (s/p multiple abdominal surgeries w/ hx SBO), recurrent R pleural effusion admitted to hospital with SOB. Patient also complaining of left shoulder pain. Patient states she had mechanical fall in shower on 3/19/2022 and has had pain in shoulder/clavicle pain. Denies HS/LOC. Denies numbness/tingling. Denies pain/injury elsewhere. No other complaints.    REVIEW OF SYSTEMS:    CONSTITUTIONAL: No weakness, fevers or chills  EYES/ENT: No visual changes;  No vertigo or throat pain   NECK: No pain or stiffness  RESPIRATORY: No cough, wheezing, hemoptysis; No shortness of breath  CARDIOVASCULAR: No chest pain or palpitations  GASTROINTESTINAL: No abdominal or epigastric pain. No nausea, vomiting, or hematemesis; No diarrhea or constipation. No melena or hematochezia.  GENITOURINARY: No dysuria, frequency or hematuria  NEUROLOGICAL: No numbness or weakness  SKIN: No itching, rashes    HEALTH ISSUES - PROBLEM Dx:  SOB (shortness of breath)    Anemia    Dyspnea    Elevated INR    Hyponatremia    Metabolic acidosis    Elevated alkaline phosphatase level    Goals of care, counseling/discussion    Prophylactic measure    Gastric cancer    Encounter for palliative care    Debility    Neoplasm related pain    Constipation          MEDICATIONS  (STANDING):  heparin   Injectable 5000 Unit(s) SubCutaneous every 8 hours  influenza   Vaccine 0.5 milliLiter(s) IntraMuscular once  lactated ringers. 1000 milliLiter(s) IV Continuous <Continuous>  morphine ER Tablet 45 milliGRAM(s) Oral <User Schedule>  polyethylene glycol 3350 17 Gram(s) Oral daily  sodium chloride 1 Gram(s) Oral three times a day    Allergies    Cipro (Rash)  QUINOLONES (Unknown)    Intolerances                              8.2    7.82  )-----------( 373      ( 29 Mar 2022 07:12 )             27.5     29 Mar 2022 07:12    130    |  96     |  11     ----------------------------<  92     4.3     |  22     |  0.70     Ca    8.2        29 Mar 2022 07:12  Phos  2.7       29 Mar 2022 07:12  Mg     2.40      29 Mar 2022 07:12      PT/INR - ( 28 Mar 2022 07:59 )   PT: 13.6 sec;   INR: 1.17 ratio         PTT - ( 28 Mar 2022 07:59 )  PTT:30.8 sec  Vital Signs Last 24 Hrs  T(C): 36.8 (03-29-22 @ 05:00), Max: 36.8 (03-28-22 @ 10:58)  T(F): 98.3 (03-29-22 @ 05:00), Max: 98.3 (03-28-22 @ 10:58)  HR: 102 (03-29-22 @ 05:00) (100 - 110)  BP: 112/61 (03-29-22 @ 05:00) (105/49 - 120/57)  BP(mean): 56 (03-28-22 @ 14:30) (56 - 72)  RR: 18 (03-29-22 @ 05:00) (14 - 24)  SpO2: 97% (03-29-22 @ 05:00) (93% - 99%)    Imaging: XR demonstrates R/L clavicle fracture    Gen: NAD  LUE: skin intact, no skin tenting, +ttp clavicle, no bony ttp elsewhere, +ax/msc/r/u/m/ain/pin function, C5-T1 SILT, radial pulse intact, compartments soft, warm/well perfused    A/P: 51y Female w L clavicle fracture    1. Pain control  2. NWB LUE in sling  3. Active movement of fingers/wrist/elbow encouraged  4. Ice  5. No acute intervention, ortho stable for DC. Follow up with Dr. Almonte as outpatient in 5-7 days, call office for appointment  6. Above to be reviewed with ortho attending Dr. Almonte.    51y Female PMH Metastatic gastric cancer (s/p multiple abdominal surgeries w/ hx SBO), recurrent R pleural effusion admitted to hospital with SOB. Patient also complaining of left shoulder pain. Patient states she had mechanical fall in shower on 3/19/2022 and has had pain in shoulder/clavicle pain. Denies HS/LOC. Denies numbness/tingling. Denies pain/injury elsewhere. No other complaints.    REVIEW OF SYSTEMS:    CONSTITUTIONAL: No weakness, fevers or chills  EYES/ENT: No visual changes;  No vertigo or throat pain   NECK: No pain or stiffness  RESPIRATORY: No cough, wheezing, hemoptysis; No shortness of breath  CARDIOVASCULAR: No chest pain or palpitations  GASTROINTESTINAL: No abdominal or epigastric pain. No nausea, vomiting, or hematemesis; No diarrhea or constipation. No melena or hematochezia.  GENITOURINARY: No dysuria, frequency or hematuria  NEUROLOGICAL: No numbness or weakness  SKIN: No itching, rashes    HEALTH ISSUES - PROBLEM Dx:  SOB (shortness of breath)    Anemia    Dyspnea    Elevated INR    Hyponatremia    Metabolic acidosis    Elevated alkaline phosphatase level    Goals of care, counseling/discussion    Prophylactic measure    Gastric cancer    Encounter for palliative care    Debility    Neoplasm related pain    Constipation          MEDICATIONS  (STANDING):  heparin   Injectable 5000 Unit(s) SubCutaneous every 8 hours  influenza   Vaccine 0.5 milliLiter(s) IntraMuscular once  lactated ringers. 1000 milliLiter(s) IV Continuous <Continuous>  morphine ER Tablet 45 milliGRAM(s) Oral <User Schedule>  polyethylene glycol 3350 17 Gram(s) Oral daily  sodium chloride 1 Gram(s) Oral three times a day    Allergies    Cipro (Rash)  QUINOLONES (Unknown)    Intolerances                              8.2    7.82  )-----------( 373      ( 29 Mar 2022 07:12 )             27.5     29 Mar 2022 07:12    130    |  96     |  11     ----------------------------<  92     4.3     |  22     |  0.70     Ca    8.2        29 Mar 2022 07:12  Phos  2.7       29 Mar 2022 07:12  Mg     2.40      29 Mar 2022 07:12      PT/INR - ( 28 Mar 2022 07:59 )   PT: 13.6 sec;   INR: 1.17 ratio         PTT - ( 28 Mar 2022 07:59 )  PTT:30.8 sec  Vital Signs Last 24 Hrs  T(C): 36.8 (03-29-22 @ 05:00), Max: 36.8 (03-28-22 @ 10:58)  T(F): 98.3 (03-29-22 @ 05:00), Max: 98.3 (03-28-22 @ 10:58)  HR: 102 (03-29-22 @ 05:00) (100 - 110)  BP: 112/61 (03-29-22 @ 05:00) (105/49 - 120/57)  BP(mean): 56 (03-28-22 @ 14:30) (56 - 72)  RR: 18 (03-29-22 @ 05:00) (14 - 24)  SpO2: 97% (03-29-22 @ 05:00) (93% - 99%)    Imaging: XR demonstrates R/L clavicle fracture    Gen: NAD  LUE: skin intact, no skin tenting,   +ttp clavicle, no bony ttp elsewhere   +ax/msc/r/u/m/ain/pin function   C5-T1 SILT  radial pulse intact, compartments soft, warm/well perfused

## 2022-03-29 NOTE — CHART NOTE - NSCHARTNOTESELECT_GEN_ALL_CORE
ISTOP/Event Note
Palliative/Event Note
Thoracic Surgery/Event Note
Thoracic surgery
attempted to drain pleurX/Event Note

## 2022-03-29 NOTE — DISCHARGE NOTE PROVIDER - HOSPITAL COURSE
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 No segmental wall motion abnormalities. There is a septal bounce suggesting right ventricular volume and pressure overload.  - no other thoracic surgical interventions at this time, f/u outpt Dr. Daniels, f/u for suture removal in 10-14 days     L clavicle fx  - CT chest: diffuse osseous metastatic disease is again seen within the acute mildly displaced left clavicular fracture.  - ortho consulted - pain control, NWB in sling, ice, no acute intervention   - stable for dc per ortho, outpt f/u Dr. Almonte in 5-7 days     Anemia  - baseline 8-9   - no signs/sx active bleeding     Gastric cancer  - Last heme onc appt Dr. Skinny Ibanez 7/2021 per discussion  - Patient seeking alternative means of treatment and refused chemo/RT for her malignancy   - C/w MS Contin 30mg BID standing, dilaudid 4 mg po q3hr prn   - Methocarbamol 1500 TID PRN for muscle spasm  - Maalox for bloating, Miralax and lactulose for constipation   - Code status Full Code. patient not interested in hospice. no further discussion needed with palliative service.    Hyponatremia  - likely from hypervolemic hyponatremia versus SIADH in the setting of pain   - urine sodium <20     Metabolic acidosis  - monitored labs     Elevated alkaline phosphatase level  - in setting of known malignancy with osteosclerotic mets including L clavicle fracture.    On ___ this case was reviewed with  ____, the patient is medically stable and optimized for discharge to rehab. 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 No segmental wall motion abnormalities. There is a septal bounce suggesting right ventricular volume and pressure overload.  - no other thoracic surgical interventions at this time, f/u outpt Dr. Daniels, f/u for suture removal in 10-14 days     L clavicle fx  - CT chest: diffuse osseous metastatic disease is again seen within the acute mildly displaced left clavicular fracture.  - ortho consulted - pain control, NWB in sling, ice, no acute intervention   - stable for dc per ortho, outpt f/u Dr. Almonte in 5-7 days     Anemia  - baseline 8-9   - no signs/sx active bleeding     Gastric cancer  - Last heme onc appt Dr. Skinny Ibanez 7/2021 per discussion  - Patient seeking alternative means of treatment and refused chemo/RT for her malignancy   - C/w MS Contin 30mg BID standing, dilaudid 4 mg po q3hr prn   - Methocarbamol 1500 TID PRN for muscle spasm  - Maalox for bloating, Miralax and lactulose for constipation   - Code status Full Code. patient not interested in hospice. no further discussion needed with palliative service.    Hyponatremia  - likely from hypervolemic hyponatremia versus SIADH in the setting of pain   - urine sodium <20     Metabolic acidosis  - monitored labs     Elevated alkaline phosphatase level  - in setting of known malignancy with osteosclerotic mets including L clavicle fracture.    On 4/1/22 this case was reviewed with Dr. Uriostegui, the patient is medically stable and optimized for discharge to rehab. 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 No segmental wall motion abnormalities. There is a septal bounce suggesting right ventricular volume and pressure overload.  - no other thoracic surgical interventions at this time, f/u outpt Dr. Daniels, f/u for suture removal in 10-14 days     L clavicle fx  - CT chest: diffuse osseous metastatic disease is again seen within the acute mildly displaced left clavicular fracture.  - ortho consulted - pain control, NWB in sling, ice, no acute intervention   - stable for dc per ortho, outpt f/u Dr. Almonte in 5-7 days     Anemia  - baseline 8-9   - no signs/sx active bleeding     Gastric cancer  - Last heme onc appt Dr. Skinny Ibanez 7/2021 per discussion  - Patient seeking alternative means of treatment and refused chemo/RT for her malignancy   - Methocarbamol 1500 TID PRN for muscle spasm  - Maalox for bloating, Miralax and lactulose for constipation   - Code status Full Code. patient not interested in hospice. no further discussion needed with palliative service.  -D/C RECS: MS contin 60mg bid, dilaudid 4mg q4hr prn moderate to severe pain.      Hyponatremia  - likely from hypervolemic hyponatremia versus SIADH in the setting of pain   - urine sodium <20     Metabolic acidosis  - monitored labs     Elevated alkaline phosphatase level  - in setting of known malignancy with osteosclerotic mets including L clavicle fracture.    On 4/1/22 this case was reviewed with Dr. Uriostegui, the patient is medically stable and optimized for discharge to rehab. 51y Female with PMHx of metastatic gastric cancer with recurrent R pleural effusion with Pleurx catheter presenting for SOB likely secondary to bilateral pleural effusions with malfunctioning R basilar pleurx catheter vs symptomatic ascites vs DVT/PE .    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 No segmental wall motion abnormalities. There is a septal bounce suggesting right ventricular volume and pressure overload.  - no other thoracic surgical interventions at this time, f/u outpt Dr. Daniels, f/u for suture removal in 10-14 days     L clavicle fx  - CT chest: diffuse osseous metastatic disease is again seen within the acute mildly displaced left clavicular fracture.  - ortho consulted - pain control, NWB in sling, ice, no acute intervention   - stable for dc per ortho, outpt f/u Dr. Almonte in 5-7 days     Anemia  - baseline 8-9   - no signs/sx active bleeding     Gastric cancer  - Last heme onc appt Dr. Skinny Ibanez 7/2021 per discussion  - Patient seeking alternative means of treatment and refused chemo/RT for her malignancy   - Methocarbamol 1500 TID PRN for muscle spasm  - Maalox for bloating, Miralax and lactulose for constipation   - Code status Full Code. patient not interested in hospice. no further discussion needed with palliative service.  -D/C RECS: MS contin 60mg bid, dilaudid 4mg q4hr prn moderate to severe pain.      Hyponatremia  - likely from hypervolemic hyponatremia versus SIADH in the setting of pain   - urine sodium <20     Metabolic acidosis  - monitored labs     Elevated alkaline phosphatase level  - in setting of known malignancy with osteosclerotic mets including L clavicle fracture.    On 4/1/22 this case was reviewed with Dr. Uriostegui, the patient is medically stable and optimized for discharge to rehab.

## 2022-03-29 NOTE — DISCHARGE NOTE PROVIDER - NSDCMRMEDTOKEN_GEN_ALL_CORE_FT
Dilaudid 2 mg oral tablet: 1 tab(s) orally every 4 hours, As Needed  methocarbamol 750 mg oral tablet: 1-2 tab(s) orally 3 times a day, As Needed -for muscle spasm   MS Contin 15 mg/12 hr oral tablet, extended release: 1 tab(s) orally 2 times a day   acetaminophen 325 mg oral tablet: 2 tab(s) orally every 6 hours, As needed, Temp greater or equal to 38C (100.4F), Mild Pain (1 - 3)  HYDROmorphone 4 mg oral tablet: 1 tab(s) orally every 3 hours, As needed, Moderate Pain (4 - 6)  lactulose 10 g/15 mL oral syrup: 30 milliliter(s) orally every 6 hours for 2 days (stop april 3rd) hold for loose stool   melatonin 3 mg oral tablet: 1 tab(s) orally once a day (at bedtime), As needed, Insomnia  methocarbamol 750 mg oral tablet: 2 tab(s) orally 3 times a day, As needed, Muscle Spasm  morphine 60 mg/8 to 12 hr oral tablet, extended release: 1 tab(s) orally 3 times a day 8am, 2pm, 10pm  Multiple Vitamins oral tablet: 1 tab(s) orally once a day  polyethylene glycol 3350 oral powder for reconstitution: 17 gram(s) orally every 12 hours  sodium chloride 1 g oral tablet: 1 tab(s) orally 3 times a day   acetaminophen 325 mg oral tablet: 2 tab(s) orally every 6 hours, As needed, Temp greater or equal to 38C (100.4F), Mild Pain (1 - 3)  HYDROmorphone 4 mg oral tablet: 1 tab(s) orally every 4 hours (5 times/day), As Needed - 6)  lactulose 10 g/15 mL oral syrup: 30 milliliter(s) orally every 6 hours for 2 days (stop april 3rd) hold for loose stool   melatonin 3 mg oral tablet: 1 tab(s) orally once a day (at bedtime), As needed, Insomnia  methocarbamol 750 mg oral tablet: 2 tab(s) orally 3 times a day, As needed, Muscle Spasm  Multiple Vitamins oral tablet: 1 tab(s) orally once a day  polyethylene glycol 3350 oral powder for reconstitution: 17 gram(s) orally every 12 hours  sodium chloride 1 g oral tablet: 1 tab(s) orally 3 times a day

## 2022-03-29 NOTE — CONSULT NOTE ADULT - ASSESSMENT
52yo F with metastatic Gastric CA s/p Rt VATS/pleurx with Dr. Daniels in January. Now presenting to ER w inc SOB, decreased output from Pleurx cath
51y Female with PMHx of metastatic gastric cancer with recurrent R pleural effusion with Pleurx catheter presenting for SOB likely secondary to bilateral pleural effusions with malfunctioning R basilar pleurx catheter vs symptomatic ascites vs DVT/PE. Palliative consulted for sx management in setting of advanced malignancy.     
A/P: 51y Female w L clavicle fracture    1. Pain control  2. NWB LUE in sling  3. Active movement of fingers/wrist/elbow encouraged  4. Ice  5. No acute intervention, ortho stable for DC. Follow up with Dr. Almonte as outpatient in 5-7 days, call office for appointment  6. Above to be reviewed with ortho attending Dr. Almonte.

## 2022-03-29 NOTE — PROGRESS NOTE ADULT - SUBJECTIVE AND OBJECTIVE BOX
ANESTHESIA POSTOP CHECK    51y Female POSTOP DAY 1 S/P right pleurex catheter removal    Vital Signs Last 24 Hrs  T(C): 36.8 (29 Mar 2022 05:00), Max: 36.8 (28 Mar 2022 15:13)  T(F): 98.3 (29 Mar 2022 05:00), Max: 98.3 (29 Mar 2022 05:00)  HR: 102 (29 Mar 2022 05:00) (100 - 105)  BP: 112/61 (29 Mar 2022 05:00) (110/45 - 120/57)  BP(mean): 56 (28 Mar 2022 14:30) (56 - 59)  RR: 18 (29 Mar 2022 05:00) (14 - 20)  SpO2: 97% (29 Mar 2022 05:00) (93% - 97%)  I&O's Summary    28 Mar 2022 07:01  -  29 Mar 2022 07:00  --------------------------------------------------------  IN: 30 mL / OUT: 0 mL / NET: 30 mL        [x ] NO APPARENT ANESTHESIA COMPLICATIONS      Comments:

## 2022-03-29 NOTE — PROVIDER CONTACT NOTE (OTHER) - ACTION/TREATMENT ORDERED:
Xray to be done tomorrow at patients request.
Okay to give 0.5 mg Dilaudid as long as patient is not destating
No further orders

## 2022-03-29 NOTE — PROVIDER CONTACT NOTE (OTHER) - SITUATION
Pt educated on order for Xray of left shoulder and clavicle. Pt refusing xray today as "I have visitors at the bedside I'll go tomorrow". Xray made aware.

## 2022-03-29 NOTE — PROVIDER CONTACT NOTE (OTHER) - REASON
blood pressure of 118/49
bp of 110/47
is refusing for me empty her drainage . She is assuring me it is no longer draining .However ,unable to verify  myself
Pt refusing xray at this time

## 2022-03-29 NOTE — PROGRESS NOTE ADULT - PROBLEM SELECTOR PLAN 1
likely secondary to bilateral pleural effusions with malfunctioning R basilar pleurx catheter  -Para in hx, protruding stomach patient states from "hernia", dyspnea worse when lying down  -CTA and doppler neg for PE/DVT.   - no O2 req at rest.  - no drainage from PleuX, so CT surgery removed it on 3/28/22

## 2022-03-29 NOTE — PROGRESS NOTE ADULT - SUBJECTIVE AND OBJECTIVE BOX
Patient is a 51y old  Female who presents with a chief complaint of shortness of breath (29 Mar 2022 10:16)      SUBJECTIVE / OVERNIGHT EVENTS:  Patient says she wants to be discharged to rehab. Patient has no new complaints. Denies cp, SOB, abdominal pain, N/V/D     MEDICATIONS  (STANDING):  heparin   Injectable 5000 Unit(s) SubCutaneous every 8 hours  influenza   Vaccine 0.5 milliLiter(s) IntraMuscular once  lactated ringers. 1000 milliLiter(s) (30 mL/Hr) IV Continuous <Continuous>  morphine ER Tablet 45 milliGRAM(s) Oral <User Schedule>  polyethylene glycol 3350 17 Gram(s) Oral daily  sodium chloride 1 Gram(s) Oral three times a day    MEDICATIONS  (PRN):  acetaminophen     Tablet .. 650 milliGRAM(s) Oral every 6 hours PRN Temp greater or equal to 38C (100.4F), Mild Pain (1 - 3)  aluminum hydroxide/magnesium hydroxide/simethicone Suspension 30 milliLiter(s) Oral every 4 hours PRN Dyspepsia  HYDROmorphone   Tablet 4 milliGRAM(s) Oral every 3 hours PRN Moderate Pain (4 - 6)  HYDROmorphone  Injectable 0.5 milliGRAM(s) IV Push every 3 hours PRN Severe Pain (7 - 10)  melatonin 3 milliGRAM(s) Oral at bedtime PRN Insomnia  methocarbamol 1500 milliGRAM(s) Oral three times a day PRN Muscle Spasm      Vital Signs Last 24 Hrs  T(C): 36.8 (29 Mar 2022 05:00), Max: 36.8 (28 Mar 2022 15:13)  T(F): 98.3 (29 Mar 2022 05:00), Max: 98.3 (29 Mar 2022 05:00)  HR: 102 (29 Mar 2022 05:00) (100 - 110)  BP: 112/61 (29 Mar 2022 05:00) (105/49 - 120/57)  BP(mean): 56 (28 Mar 2022 14:30) (56 - 72)  RR: 18 (29 Mar 2022 05:00) (14 - 24)  SpO2: 97% (29 Mar 2022 05:00) (93% - 98%)  CAPILLARY BLOOD GLUCOSE        I&O's Summary    28 Mar 2022 07:01  -  29 Mar 2022 07:00  --------------------------------------------------------  IN: 30 mL / OUT: 0 mL / NET: 30 mL        PHYSICAL EXAM:  GENERAL: NAD, well-developed  HEAD:  Atraumatic, Normocephalic  EYES: EOMI, PERRLA, conjunctiva and sclera clear  NECK: Supple, No JVD  CHEST/LUNG: decreased BS bilaterally; No wheeze  HEART: Regular rate and rhythm; No murmurs, rubs, or gallops  ABDOMEN: Soft, Nontender, Nondistended; Bowel sounds present  EXTREMITIES: 2+ B/L LE edema 2+ Peripheral Pulses, No clubbing, or cyanosis  PSYCH: AAOx3  NEUROLOGY: non-focal  SKIN: No rashes or lesions    LABS:                        8.2    7.82  )-----------( 373      ( 29 Mar 2022 07:12 )             27.5     03-29    130<L>  |  96<L>  |  11  ----------------------------<  92  4.3   |  22  |  0.70    Ca    8.2<L>      29 Mar 2022 07:12  Phos  2.7     03-29  Mg     2.40     03-29      PT/INR - ( 28 Mar 2022 07:59 )   PT: 13.6 sec;   INR: 1.17 ratio         PTT - ( 28 Mar 2022 07:59 )  PTT:30.8 sec          RADIOLOGY & ADDITIONAL TESTS:    Imaging Personally Reviewed: < from: US Duplex Venous Lower Ext Complete, Bilateral (03.24.22 @ 11:43) >  IMPRESSION:  No evidence of deep venous thrombosis in the visualized bilateral lower   extremities.    < end of copied text >          Consultant(s) Notes Reviewed:      Care Discussed with Consultants/Other Providers:

## 2022-03-29 NOTE — PROGRESS NOTE ADULT - PROBLEM SELECTOR PLAN 1
Pt is known to outpatient palliative provider, Dr. Olivia Hazel.   At home, patient is on MS Contin 15mg bid with dilaudid 2mg q4hr prn   >Continue MS Contin 45mg tid   > Continue 4mg PO dilaudid q3hr prn and IV dilaudid 0.5mg q3h prn severe pain. Patient is encouraged to take PO meds.   CT surgery recs appreciated- s/p pleurx removal. F/u in 10-14 days for suture removal.

## 2022-03-29 NOTE — DISCHARGE NOTE PROVIDER - PROVIDER TOKENS
PROVIDER:[TOKEN:[[id number]],FOLLOWUP:[2 weeks],ESTABLISHEDPATIENT:[T]] PROVIDER:[TOKEN:[[id number]],FOLLOWUP:[2 weeks],ESTABLISHEDPATIENT:[T]],PROVIDER:[TOKEN:[[id number]]],PROVIDER:[[id number]]]

## 2022-03-29 NOTE — PROGRESS NOTE ADULT - PROBLEM SELECTOR PLAN 6
Thank you for allowing us to participate in your patient's care. We will continue to follow with you. Please page [phone] for any q's or c's.     Luoise Shaikh D.O.   Palliative Medicine.

## 2022-03-29 NOTE — DISCHARGE NOTE PROVIDER - CARE PROVIDERS DIRECT ADDRESSES
,[email or web address]

## 2022-03-29 NOTE — DISCHARGE NOTE PROVIDER - DETAILS OF MALNUTRITION DIAGNOSIS/DIAGNOSES
This patient has been assessed with a concern for Malnutrition and was treated during this hospitalization for the following Nutrition diagnosis/diagnoses:     -  03/31/2022: Severe protein-calorie malnutrition

## 2022-03-29 NOTE — PROGRESS NOTE ADULT - SUBJECTIVE AND OBJECTIVE BOX
[facility] Geriatrics and Palliative Care  Louise Shaikh, Palliative Care Attending  Contact Info: Page [phone] (including Nights/Weekends), message on Microsoft Teams (Louise Shaikh), or leave  at Palliative Office [phone] (non-urgent)     SUBJECTIVE AND OBJECTIVE: Patient seen this morning sitting up in recliner. She states she had difficulty sleeping overnight and reports difficulty moving around due to arm in sling and lymphedema. Overall, she feels her pain/dyspnea is improved. She has not had a BM in 2 days and would like to have more aggressive bowel regimen.     INTERVAL HPI/OVERNIGHT EVENTS:  > 3/24: Patient is known to palliative care team from previous admission. Over the past 24 hours, patient required PRNs of IV dilaudid 1mg x5, robaxin x1. Patient seen this afternoon after getting CT and ultrasound. She reports dyspnea and chest tube not draining, improved with IV dilaudid but medication is not lasting full 4 hours.   > 3/25: Over the past 24 hours, patient required PRNs IV dilaudid 1mg x6, IV dilaudid 0.5mg x1.  > 3/28: Attempted to see patient multiple times in her room today, however patient was in OR and then PACU for pleurX removal. Chart reviewed. Over the past 24 hours, patient required PRNs of IV dilaudid 0.5mg x3 and IV dilaudid 1mg x2.   > 3/29: Patient is s/p pleurX removal. Over the past 24 hours, patient required PRNs of IV dilaudid 0.5mg x1, and 4mg PO dilaudid x1.     DNR on chart:   Allergies    Cipro (Rash)  QUINOLONES (Unknown)    Intolerances    MEDICATIONS  (STANDING):  heparin   Injectable 5000 Unit(s) SubCutaneous every 8 hours  influenza   Vaccine 0.5 milliLiter(s) IntraMuscular once  lactated ringers. 1000 milliLiter(s) (30 mL/Hr) IV Continuous <Continuous>  lactulose Syrup 10 Gram(s) Oral three times a day  morphine ER Tablet 45 milliGRAM(s) Oral <User Schedule>  polyethylene glycol 3350 17 Gram(s) Oral daily  sodium chloride 1 Gram(s) Oral three times a day    MEDICATIONS  (PRN):  acetaminophen     Tablet .. 650 milliGRAM(s) Oral every 6 hours PRN Temp greater or equal to 38C (100.4F), Mild Pain (1 - 3)  aluminum hydroxide/magnesium hydroxide/simethicone Suspension 30 milliLiter(s) Oral every 4 hours PRN Dyspepsia  HYDROmorphone   Tablet 4 milliGRAM(s) Oral every 3 hours PRN Moderate Pain (4 - 6)  HYDROmorphone  Injectable 0.5 milliGRAM(s) IV Push every 3 hours PRN Severe Pain (7 - 10)  melatonin 3 milliGRAM(s) Oral at bedtime PRN Insomnia  methocarbamol 1500 milliGRAM(s) Oral three times a day PRN Muscle Spasm      ITEMS UNCHECKED ARE NOT PRESENT    PRESENT SYMPTOMS: [ ]Unable to obtain due to poor mentation   Source if other than patient:  [ ]Family   [ ]Team     Pain: [x ]yes [ ]no  QOL impact - difficulty breathing and performing her ADLs   Location - pleuritic pain, clavical                     Aggravating factors - with deep inspiration, also with pleurX drain not working at the moment   Quality - pleuritic pain   Radiation - clavical--> left shoulder   Timing- constant   Severity (0-10 scale): 7  Minimal acceptable level (0-10 scale): 2    Dyspnea:                           [ ]Mild [ ]Moderate [ ]Severe  Anxiety:                             [ ]Mild [ ]Moderate [ ]Severe  Fatigue:                             [ ]Mild [ ]Moderate [ ]Severe  Nausea:                             [ ]Mild [ ]Moderate [ ]Severe  Loss of appetite:              [ ]Mild [ ]Moderate [ ]Severe  Constipation:                    [ ]Mild [ ]Moderate [ ]Severe    PAIN AD Score:	  [email or web address]/painad.pdf (Ctrl + left click to view)    Other Symptoms:  [ ]All other review of systems negative     Palliative Performance Status Version 2:  60       %      http://npcrc.org/files/news/palliative_performance_scale_ppsv2.pdf  PHYSICAL EXAM:  Vital Signs Last 24 Hrs  T(C): 36.8 (29 Mar 2022 05:00), Max: 36.8 (28 Mar 2022 15:13)  T(F): 98.3 (29 Mar 2022 05:00), Max: 98.3 (29 Mar 2022 05:00)  HR: 102 (29 Mar 2022 05:00) (100 - 105)  BP: 112/61 (29 Mar 2022 05:00) (111/40 - 120/57)  BP(mean): 56 (28 Mar 2022 14:30) (56 - 56)  RR: 18 (29 Mar 2022 05:00) (14 - 20)  SpO2: 97% (29 Mar 2022 05:00) (93% - 97%) I&O's Summary    28 Mar 2022 07:01  -  29 Mar 2022 07:00  --------------------------------------------------------  IN: 30 mL / OUT: 0 mL / NET: 30 mL       GENERAL:  [x ]Alert  [x ]Oriented x4   [ ]Lethargic  [ ]Cachexia  [ ]Unarousable  [x ]Verbal  [ ]Non-Verbal  Behavioral:   [ ] Anxiety  [ ] Delirium [ ] Agitation [x ] Other  HEENT:  [x]Normal   [ ]Dry mouth   [ ]ET Tube/Trach  [ ]Oral lesions  PULMONARY: has right PleurX   [ ]Clear [ ]Tachypnea  [ ]Audible excessive secretions   [ ]Rhonchi        [ ]Right [ ]Left [ ]Bilateral  [ ]Crackles        [ ]Right [ ]Left [ ]Bilateral  [ ]Wheezing     [ ]Right [ ]Left [ ]Bilateral   [x ]Diminished breath sounds [ ]right [ ]left [x ]bilateral  CARDIOVASCULAR:    [ ]Regular [ ]Irregular [x ]Tachy  [ ]Linus [ ]Murmur [ ]Other   GASTROINTESTINAL:  [ ]Soft  [x ]Distended   [ ]+BS  [x ]Non tender [ ]Tender  [ ]PEG [ ]OGT/ NGT  Last BM: 3/28  GENITOURINARY:  [ x]Normal [ ] Incontinent   [ ]Oliguria/Anuria   [ ]Ansari  MUSCULOSKELETAL: L. shoulder is in sling   [ ]Normal   [ ]Weakness  [ ]Bed/Wheelchair bound [ x]Edema  NEUROLOGIC:   [ x]No focal deficits  [ ]Cognitive impairment  [ ]Dysphagia [ ]Dysarthria [ ]Paresis [ ]Other   SKIN: Please see flowsheets   [ ]Normal    [ ]Rash  [ ]Pressure ulcer(s)       Present on admission [ ]y [ ]n    CRITICAL CARE:  [ ] Shock Present  [ ]Septic [ ]Cardiogenic [ ]Neurologic [ ]Hypovolemic  [ ]  Vasopressors [ ]  Inotropes   [ ]Respiratory failure present [ ]Mechanical ventilation [ ]Non-invasive ventilatory support [ ]High flow  [ ]Acute  [ ]Chronic [ ]Hypoxic  [ ]Hypercarbic [ ]Other  [ ]Other organ failure     LABS:                        8.2    7.82  )-----------( 373      ( 29 Mar 2022 07:12 )             27.5   03-29    130<L>  |  96<L>  |  11  ----------------------------<  92  4.3   |  22  |  0.70    Ca    8.2<L>      29 Mar 2022 07:12  Phos  2.7     03-29  Mg     2.40     03-29    PT/INR - ( 28 Mar 2022 07:59 )   PT: 13.6 sec;   INR: 1.17 ratio         PTT - ( 28 Mar 2022 07:59 )  PTT:30.8 sec      RADIOLOGY & ADDITIONAL STUDIES: n/a     Protein Calorie Malnutrition Present: [ ]mild [ ]moderate [ ]severe [ ]underweight [ ]morbid obesity  https://www.andeal.org/vault/2440/web/files/ONC/Table_Clinical%20Characteristics%20to%20Document%20Malnutrition-White%20JV%20et%20al%202012.pdf    Height (cm): 167.6 (03-28-22 @ 11:11), 167.6 (03-19-22 @ 17:35), 167.6 (03-15-22 @ 13:24)  Weight (kg): 109.6 (03-28-22 @ 11:11), 113.4 (03-19-22 @ 17:35), 90.7 (01-25-22 @ 04:04)  BMI (kg/m2): 39 (03-28-22 @ 11:11), 40.4 (03-19-22 @ 17:35), 32.3 (03-15-22 @ 13:24)    [ ]PPSV2 < or = 30%  [ ]significant weight loss [ ]poor nutritional intake [ ]anasarca    [ ]Artificial Nutrition    REFERRALS:   [ ]Chaplaincy  [ ]Hospice  [ ]Child Life  [ ]Social Work  [ ]Case management [ ]Holistic Therapy

## 2022-03-29 NOTE — PROGRESS NOTE ADULT - PROBLEM SELECTOR PLAN 3
Patient seeking alternative means of treatment and refused chemo/RT for her malignancy   control pain with   -MS Contin 30mg BID standing   -Methocarbamol 1500 TID PRN for muscle spasm  -Maalox for bloating  -Miralax for constipation monitor bowel movement  -Dialudid 0.5mg for moderate and 1mg for severe pain q3hrs.   - cont pulse ox. monitor saturation.    Code status Full Code. patient not interested in hospice. no further discussion needed with palliative service.

## 2022-03-29 NOTE — DISCHARGE NOTE PROVIDER - NSDCCPCAREPLAN_GEN_ALL_CORE_FT
PRINCIPAL DISCHARGE DIAGNOSIS  Diagnosis: Shortness of breath  Assessment and Plan of Treatment: You were admitted to [facility] with shortness of breath likely due to malfunctioning PleurX catheter. Your PleurX catheter was removed by Cardiothoracic Surgery on 3/28/22 and chest x-ray was negative. You had an ultrasound of the heart which showed preserved left ventricular function with right ventricular volume and pressure overload. You had a CT scan of the chest showing thickening suggestive of malignancy. You should follow up with Cardiothoracic Dr. Daniels for discharge and need suture removal in 10-14 days.      SECONDARY DISCHARGE DIAGNOSES  Diagnosis: Clavicle fracture  Assessment and Plan of Treatment: You were found to have a left clavicle fracture seen on CT scan of the chest. You were seen by Orthopedics who recommeneded pain control, non-weight bearing in sling, ice and no acute surgical intervention. You should follow up outpatient with Orthopedics Dr. Almonte in 5-7 days.    Diagnosis: Gastric cancer  Assessment and Plan of Treatment: You were seen by Palliative Care for pain control. You may follow up with your oncologist Dr. Skinny Ibanez upon discharge.     PRINCIPAL DISCHARGE DIAGNOSIS  Diagnosis: Shortness of breath  Assessment and Plan of Treatment: You were admitted to [facility] with shortness of breath likely due to malfunctioning PleurX catheter. Your PleurX catheter was removed by Cardiothoracic Surgery on 3/28/22 and chest x-ray was negative. You had an ultrasound of the heart which showed preserved left ventricular function with right ventricular volume and pressure overload. You had a CT scan of the chest showing thickening suggestive of malignancy. You should follow up with Cardiothoracic Dr. Daniels for discharge and need suture removal in 10-14 days.      SECONDARY DISCHARGE DIAGNOSES  Diagnosis: Clavicle fracture  Assessment and Plan of Treatment: You were found to have a left clavicle fracture seen on CT scan of the chest. You were seen by Orthopedics who recommeneded pain control, non-weight bearing in sling, ice and no acute surgical intervention. You should follow up outpatient with Orthopedics Dr. Almonte in 5-7 days.    Diagnosis: Gastric cancer  Assessment and Plan of Treatment: You were seen by Palliative Care for pain control. You may follow up with your oncologist Dr. Skinny Ibanez upon discharge.    Diagnosis: Constipation  Assessment and Plan of Treatment: lactulose ordered for 2 days, stop on april 3rd- hold if patient having loose stools    Diagnosis: Hyponatremia  Assessment and Plan of Treatment: continue with salt tabs, monitor sodium levels at rehab    Diagnosis: Encounter for palliative care  Assessment and Plan of Treatment: continue with pain medication as prescribed.

## 2022-03-29 NOTE — DISCHARGE NOTE PROVIDER - NSDCFUADDAPPT_GEN_ALL_CORE_FT
See Dr Daniels about 10 to 14 days after your PleurX catheter was removed (April 7 to 11) for suture removal.  Call for an appointment and bring a new chest X-ray with you when you come.   Follow up with Cardiothoracic Surgery Dr Daniels about 10 to 14 days after your PleurX catheter was removed (April 7 to 11) for suture removal.  Call for an appointment and bring a new chest X-ray with you when you come.      Follow up with Orthopedics Dr. Almonte in 5-7 days upon discharge. Please call for an appointment.     Follow up with your oncologist Dr. Skinny Ibanez upon discharge. Please call for an appointment.

## 2022-03-30 NOTE — PROGRESS NOTE ADULT - PROBLEM SELECTOR PLAN 4
Likely from hypervolemic hyponatremia versus SIADH in the setting of pain   -c/w NACL tabs  -monitor volume status Patient seeking alternative means of treatment and refused chemo/RT for her malignancy   control pain with   -MS Contin 30mg BID standing   -Methocarbamol 1500 TID PRN for muscle spasm  -Maalox for bloating  -Miralax for constipation monitor bowel movement  -Dialudid 0.5mg for moderate and 1mg for severe pain q3hrs.   - cont pulse ox. monitor saturation.    Code status Full Code. patient not interested in hospice. no further discussion needed with palliative service.

## 2022-03-30 NOTE — PROGRESS NOTE ADULT - PROBLEM SELECTOR PLAN 4
Last BM 3/28? per flowsheets   > increase miralax to bid   > increased lactulose to 20g q6hrs x2 days (or until patient has BM)

## 2022-03-30 NOTE — PROGRESS NOTE ADULT - PROBLEM SELECTOR PLAN 3
Patient seeking alternative means of treatment and refused chemo/RT for her malignancy   control pain with   -MS Contin 30mg BID standing   -Methocarbamol 1500 TID PRN for muscle spasm  -Maalox for bloating  -Miralax for constipation monitor bowel movement  -Dialudid 0.5mg for moderate and 1mg for severe pain q3hrs.   - cont pulse ox. monitor saturation.    Code status Full Code. patient not interested in hospice. no further discussion needed with palliative service. 9.2 on presentation from baseline 8-9 in 2022  -continue to monitor at this time. without obvious bleeding/blood loss at this time

## 2022-03-30 NOTE — PROGRESS NOTE ADULT - PROBLEM SELECTOR PLAN 7
Thank you for allowing us to participate in your patient's care. We will continue to follow with you. Please page [phone] for any q's or c's.     Louise Shaikh D.O.   Palliative Medicine.

## 2022-03-30 NOTE — PROGRESS NOTE ADULT - PROBLEM SELECTOR PLAN 7
DVT PPx SQH  Diet Regular  Dispo ZULEYMA planning, In setting of known malignancy with osteosclerotic mets including L clavicle fracture

## 2022-03-30 NOTE — PROGRESS NOTE ADULT - PROBLEM SELECTOR PLAN 2
9.2 on presentation from baseline 8-9 in 2022  -continue to monitor at this time. without obvious bleeding/blood loss at this time multiple episode of panic attacks in last 24 hours  start xanax 0.25mg PO q8hr prn  consult psychiatry in a.m.

## 2022-03-30 NOTE — PROGRESS NOTE ADULT - SUBJECTIVE AND OBJECTIVE BOX
[facility] Geriatrics and Palliative Care  Louise Shaikh, Palliative Care Attending  Contact Info: Page [phone] (including Nights/Weekends), message on Microsoft Teams (Louise Shaikh), or leave  at Palliative Office [phone] (non-urgent)     SUBJECTIVE AND OBJECTIVE: Patient seen this afternoon with sister and her mother at bedside. Patient shared that her pain and breathing are improved with current regimen. She does report anxiety and was tearful as she is more dependent on the nurses to help with her ADLs. She has not had a BM in 2 days and missed lactulose dose this morning.     INTERVAL HPI/OVERNIGHT EVENTS:  > 3/24: Patient is known to palliative care team from previous admission. Over the past 24 hours, patient required PRNs of IV dilaudid 1mg x5, robaxin x1. Patient seen this afternoon after getting CT and ultrasound. She reports dyspnea and chest tube not draining, improved with IV dilaudid but medication is not lasting full 4 hours.   > 3/25: Over the past 24 hours, patient required PRNs IV dilaudid 1mg x6, IV dilaudid 0.5mg x1.  > 3/28: Attempted to see patient multiple times in her room today, however patient was in OR and then PACU for pleurX removal. Chart reviewed. Over the past 24 hours, patient required PRNs of IV dilaudid 0.5mg x3 and IV dilaudid 1mg x2.   > 3/29: Patient is s/p pleurX removal. Over the past 24 hours, patient required PRNs of IV dilaudid 0.5mg x1, and 4mg PO dilaudid x1.   > 3/30: Over the past 24 hours, patient required PRNs of PO dilaudid 4mg x4.     DNR on chart:   Allergies    Cipro (Rash)  QUINOLONES (Unknown)    Intolerances    MEDICATIONS  (STANDING):  heparin   Injectable 5000 Unit(s) SubCutaneous every 8 hours  influenza   Vaccine 0.5 milliLiter(s) IntraMuscular once  lactated ringers. 1000 milliLiter(s) (30 mL/Hr) IV Continuous <Continuous>  lactulose Syrup 20 Gram(s) Oral every 6 hours  morphine ER Tablet 45 milliGRAM(s) Oral <User Schedule>  polyethylene glycol 3350 17 Gram(s) Oral daily  sodium chloride 1 Gram(s) Oral three times a day    MEDICATIONS  (PRN):  acetaminophen     Tablet .. 650 milliGRAM(s) Oral every 6 hours PRN Temp greater or equal to 38C (100.4F), Mild Pain (1 - 3)  ALPRAZolam 0.25 milliGRAM(s) Oral every 8 hours PRN anxiety  aluminum hydroxide/magnesium hydroxide/simethicone Suspension 30 milliLiter(s) Oral every 4 hours PRN Dyspepsia  HYDROmorphone   Tablet 4 milliGRAM(s) Oral every 3 hours PRN Moderate Pain (4 - 6)  HYDROmorphone  Injectable 0.5 milliGRAM(s) IV Push every 3 hours PRN Severe Pain (7 - 10)  melatonin 3 milliGRAM(s) Oral at bedtime PRN Insomnia  methocarbamol 1500 milliGRAM(s) Oral three times a day PRN Muscle Spasm      ITEMS UNCHECKED ARE NOT PRESENT    PRESENT SYMPTOMS: [ ]Unable to obtain due to poor mentation   Source if other than patient:  [ ]Family   [ ]Team     Pain: [x ]yes [ ]no  QOL impact - difficulty breathing and performing her ADLs   Location - pleuritic pain, clavical                     Aggravating factors - with deep inspiration, also with pleurX drain not working at the moment   Quality - pleuritic pain   Radiation - clavical--> left shoulder   Timing- constant   Severity (0-10 scale): 6  Minimal acceptable level (0-10 scale): 0    Dyspnea:                           [ ]Mild [ ]Moderate [ ]Severe  Anxiety:                             [ ]Mild [x ]Moderate [ ]Severe  Fatigue:                             [ ]Mild [ ]Moderate [x ]Severe  Nausea:                             [ ]Mild [ ]Moderate [ ]Severe  Loss of appetite:              [ ]Mild [ ]Moderate [ ]Severe  Constipation:                    [ ]Mild [x ]Moderate [ ]Severe    PAIN AD Score:	  [email or web address] (Ctrl + left click to view)    Other Symptoms:  [x ]All other review of systems negative     Palliative Performance Status Version 2:   60      %      http://npcrc.org/files/news/palliative_performance_scale_ppsv2.pdf  PHYSICAL EXAM:  Vital Signs Last 24 Hrs  T(C): 36.8 (30 Mar 2022 06:21), Max: 36.9 (29 Mar 2022 18:30)  T(F): 98.2 (30 Mar 2022 06:21), Max: 98.4 (29 Mar 2022 18:30)  HR: 109 (30 Mar 2022 06:21) (106 - 110)  BP: 110/60 (30 Mar 2022 06:21) (104/72 - 114/51)  BP(mean): --  RR: 17 (30 Mar 2022 06:21) (17 - 18)  SpO2: 98% (30 Mar 2022 06:21) (98% - 98%) I&O's Summary     GENERAL:  [x ]Alert  [x ]Oriented x4   [ ]Lethargic  [ ]Cachexia  [ ]Unarousable  [x ]Verbal  [ ]Non-Verbal  Behavioral:   [ ] Anxiety  [ ] Delirium [ ] Agitation [x ] Other  HEENT:  [x]Normal   [ ]Dry mouth   [ ]ET Tube/Trach  [ ]Oral lesions  PULMONARY:   [ ]Clear [ ]Tachypnea  [ ]Audible excessive secretions   [ ]Rhonchi        [ ]Right [ ]Left [ ]Bilateral  [ ]Crackles        [ ]Right [ ]Left [ ]Bilateral  [ ]Wheezing     [ ]Right [ ]Left [ ]Bilateral   [x ]Diminished breath sounds [ ]right [ ]left [x ]bilateral  CARDIOVASCULAR:    [ ]Regular [ ]Irregular [x ]Tachy  [ ]Linus [ ]Murmur [ ]Other   GASTROINTESTINAL:  [ ]Soft  [x ]Distended   [ ]+BS  [x ]Non tender [ ]Tender  [ ]PEG [ ]OGT/ NGT  Last BM: 3/28  GENITOURINARY:  [ x]Normal [ ] Incontinent   [ ]Oliguria/Anuria   [ ]Ansari  MUSCULOSKELETAL: L. shoulder is in sling   [ ]Normal   [ ]Weakness  [ ]Bed/Wheelchair bound [ x]Edema  NEUROLOGIC:   [ x]No focal deficits  [ ]Cognitive impairment  [ ]Dysphagia [ ]Dysarthria [ ]Paresis [ ]Other   SKIN: Please see flowsheets   [ ]Normal    [ ]Rash  [ ]Pressure ulcer(s)       Present on admission [ ]y [ ]n    CRITICAL CARE:  [ ] Shock Present  [ ]Septic [ ]Cardiogenic [ ]Neurologic [ ]Hypovolemic  [ ]  Vasopressors [ ]  Inotropes   [ ]Respiratory failure present [ ]Mechanical ventilation [ ]Non-invasive ventilatory support [ ]High flow  [ ]Acute  [ ]Chronic [ ]Hypoxic  [ ]Hypercarbic [ ]Other  [ ]Other organ failure     LABS:                        8.3    8.18  )-----------( 333      ( 30 Mar 2022 06:24 )             27.7   03-30    129<L>  |  97<L>  |  13  ----------------------------<  107<H>  5.1   |  22  |  0.71    Ca    8.3<L>      30 Mar 2022 06:24  Phos  3.0     03-30  Mg     2.40     03-30      RADIOLOGY & ADDITIONAL STUDIES: < from: Xray Chest 1 View- PORTABLE-Routine (Xray Chest 1 View- PORTABLE-Routine in AM.) (03.29.22 @ 07:30) >  Impression:  S/P removal of right Pleurx catheter.  Unchanged interstitial edema and loculated right effusion.    < end of copied text >      Protein Calorie Malnutrition Present: [ ]mild [ ]moderate [ ]severe [ ]underweight [ ]morbid obesity  https://www.andeal.org/vault/2440/web/files/ONC/Table_Clinical%20Characteristics%20to%20Document%20Malnutrition-White%20JV%20et%20al%202012.pdf    Height (cm): 167.6 (03-28-22 @ 11:11), 167.6 (03-19-22 @ 17:35), 167.6 (03-15-22 @ 13:24)  Weight (kg): 109.6 (03-28-22 @ 11:11), 113.4 (03-19-22 @ 17:35), 90.7 (01-25-22 @ 04:04)  BMI (kg/m2): 39 (03-28-22 @ 11:11), 40.4 (03-19-22 @ 17:35), 32.3 (03-15-22 @ 13:24)    [ ]PPSV2 < or = 30%  [ ]significant weight loss [ ]poor nutritional intake [ ]anasarca    [ ]Artificial Nutrition    REFERRALS:   [ ]Chaplaincy  [ ]Hospice  [ ]Child Life  [ ]Social Work  [ ]Case management [ ]Holistic Therapy

## 2022-03-30 NOTE — PROGRESS NOTE ADULT - PROBLEM SELECTOR PLAN 2
start xanax 0.25mg q8hr prn. Will likely need to be discharged with this medication.   emotional support provided   Patient's mood improved with family at bedside.

## 2022-03-30 NOTE — PROGRESS NOTE ADULT - PROBLEM SELECTOR PLAN 3
Pt is known to outpatient palliative provider, Dr. Olivia Hazel.  At home, patient is on MS Contin 15mg bid with dilaudid 2mg q4hr prn  ANTICIPATED D/C RECS: MS contin 45mg bid, dilaudid 4mg q4hr prn moderate to severe pain,

## 2022-03-30 NOTE — PROGRESS NOTE ADULT - PROBLEM SELECTOR PLAN 1
likely secondary to bilateral pleural effusions with malfunctioning R basilar pleurx catheter  -Para in hx, protruding stomach patient states from "hernia", dyspnea worse when lying down  -CTA and doppler neg for PE/DVT.   - no O2 req at rest.  - no drainage from PleuX, so CT surgery removed it on 3/28/22  patient will need suture removal in 10 to 14 days and outpatient F/U with CT surgery

## 2022-03-30 NOTE — PROGRESS NOTE ADULT - PROBLEM SELECTOR PLAN 5
Continue to monitor at this time. Likely from hypervolemic hyponatremia versus SIADH in the setting of pain   -c/w NACL tabs  -monitor volume status

## 2022-03-30 NOTE — PROGRESS NOTE ADULT - SUBJECTIVE AND OBJECTIVE BOX
Patient is a 51y old  Female who presents with a chief complaint of shortness of breath (29 Mar 2022 14:25)      SUBJECTIVE / OVERNIGHT EVENTS:    MEDICATIONS  (STANDING):  heparin   Injectable 5000 Unit(s) SubCutaneous every 8 hours  influenza   Vaccine 0.5 milliLiter(s) IntraMuscular once  lactated ringers. 1000 milliLiter(s) (30 mL/Hr) IV Continuous <Continuous>  lactulose Syrup 10 Gram(s) Oral three times a day  morphine ER Tablet 45 milliGRAM(s) Oral <User Schedule>  polyethylene glycol 3350 17 Gram(s) Oral daily  sodium chloride 1 Gram(s) Oral three times a day    MEDICATIONS  (PRN):  acetaminophen     Tablet .. 650 milliGRAM(s) Oral every 6 hours PRN Temp greater or equal to 38C (100.4F), Mild Pain (1 - 3)  aluminum hydroxide/magnesium hydroxide/simethicone Suspension 30 milliLiter(s) Oral every 4 hours PRN Dyspepsia  HYDROmorphone   Tablet 4 milliGRAM(s) Oral every 3 hours PRN Moderate Pain (4 - 6)  HYDROmorphone  Injectable 0.5 milliGRAM(s) IV Push every 3 hours PRN Severe Pain (7 - 10)  melatonin 3 milliGRAM(s) Oral at bedtime PRN Insomnia  methocarbamol 1500 milliGRAM(s) Oral three times a day PRN Muscle Spasm      Vital Signs Last 24 Hrs  T(C): 36.8 (30 Mar 2022 06:21), Max: 36.9 (29 Mar 2022 18:30)  T(F): 98.2 (30 Mar 2022 06:21), Max: 98.4 (29 Mar 2022 18:30)  HR: 109 (30 Mar 2022 06:21) (106 - 110)  BP: 110/60 (30 Mar 2022 06:21) (104/72 - 114/51)  BP(mean): --  RR: 17 (30 Mar 2022 06:21) (17 - 18)  SpO2: 98% (30 Mar 2022 06:21) (98% - 98%)  CAPILLARY BLOOD GLUCOSE        I&O's Summary      PHYSICAL EXAM:  GENERAL: NAD, well-developed  HEAD:  Atraumatic, Normocephalic  EYES: EOMI, PERRLA, conjunctiva and sclera clear  NECK: Supple, No JVD  CHEST/LUNG: Clear to auscultation bilaterally; No wheeze  HEART: Regular rate and rhythm; No murmurs, rubs, or gallops  ABDOMEN: Soft, Nontender, Nondistended; Bowel sounds present  EXTREMITIES:  2 + B/L LE edema. 2+ Peripheral Pulses, No clubbing, or cyanosis  PSYCH: AAOx3  NEUROLOGY: non-focal  SKIN: No rashes or lesions    LABS:                        8.3    8.18  )-----------( 333      ( 30 Mar 2022 06:24 )             27.7     03-30    129<L>  |  97<L>  |  13  ----------------------------<  107<H>  5.1   |  22  |  0.71    Ca    8.3<L>      30 Mar 2022 06:24  Phos  3.0     03-30  Mg     2.40     03-30                RADIOLOGY & ADDITIONAL TESTS:    Imaging Personally Reviewed: < from: Transthoracic Echocardiogram (03.29.22 @ 08:30) >  CONCLUSIONS:  1. Aortic valve not well visualized; probably normal.  2. Normal left ventricular internal dimensions and wall  thicknesses.  3. Normal left ventricular systolic function. No segmental  wall motion abnormalities.  4. The right ventricle is not well visualized; grossly  normal right ventricular systolic function. Unable to  evalute size.  5. There is a septal bounce suggesting right ventricular  volume and pressure overload.    < end of copied text >      Consultant(s) Notes Reviewed:      Care Discussed with Consultants/Other Providers:   Patient is a 51y old  Female who presents with a chief complaint of shortness of breath (29 Mar 2022 14:25)      SUBJECTIVE / OVERNIGHT EVENTS:  Patient c/o anxiety from all the medical issues she has. Nursing says she has had multiple anxiety attacks in a.m. Denies cp, SOB, abdominal pain, N/V/D      MEDICATIONS  (STANDING):  heparin   Injectable 5000 Unit(s) SubCutaneous every 8 hours  influenza   Vaccine 0.5 milliLiter(s) IntraMuscular once  lactated ringers. 1000 milliLiter(s) (30 mL/Hr) IV Continuous <Continuous>  lactulose Syrup 10 Gram(s) Oral three times a day  morphine ER Tablet 45 milliGRAM(s) Oral <User Schedule>  polyethylene glycol 3350 17 Gram(s) Oral daily  sodium chloride 1 Gram(s) Oral three times a day    MEDICATIONS  (PRN):  acetaminophen     Tablet .. 650 milliGRAM(s) Oral every 6 hours PRN Temp greater or equal to 38C (100.4F), Mild Pain (1 - 3)  aluminum hydroxide/magnesium hydroxide/simethicone Suspension 30 milliLiter(s) Oral every 4 hours PRN Dyspepsia  HYDROmorphone   Tablet 4 milliGRAM(s) Oral every 3 hours PRN Moderate Pain (4 - 6)  HYDROmorphone  Injectable 0.5 milliGRAM(s) IV Push every 3 hours PRN Severe Pain (7 - 10)  melatonin 3 milliGRAM(s) Oral at bedtime PRN Insomnia  methocarbamol 1500 milliGRAM(s) Oral three times a day PRN Muscle Spasm      Vital Signs Last 24 Hrs  T(C): 36.8 (30 Mar 2022 06:21), Max: 36.9 (29 Mar 2022 18:30)  T(F): 98.2 (30 Mar 2022 06:21), Max: 98.4 (29 Mar 2022 18:30)  HR: 109 (30 Mar 2022 06:21) (106 - 110)  BP: 110/60 (30 Mar 2022 06:21) (104/72 - 114/51)  BP(mean): --  RR: 17 (30 Mar 2022 06:21) (17 - 18)  SpO2: 98% (30 Mar 2022 06:21) (98% - 98%)  CAPILLARY BLOOD GLUCOSE        I&O's Summary      PHYSICAL EXAM:  GENERAL: NAD, well-developed  HEAD:  Atraumatic, Normocephalic  EYES: EOMI, PERRLA, conjunctiva and sclera clear  NECK: Supple, No JVD  CHEST/LUNG: Clear to auscultation bilaterally; No wheeze  HEART: Regular rate and rhythm; No murmurs, rubs, or gallops  ABDOMEN: Soft, Nontender, Nondistended; Bowel sounds present  EXTREMITIES:  2 + B/L LE edema. 2+ Peripheral Pulses, No clubbing, or cyanosis  PSYCH: AAOx3  NEUROLOGY: non-focal  SKIN: No rashes or lesions    LABS:                        8.3    8.18  )-----------( 333      ( 30 Mar 2022 06:24 )             27.7     03-30    129<L>  |  97<L>  |  13  ----------------------------<  107<H>  5.1   |  22  |  0.71    Ca    8.3<L>      30 Mar 2022 06:24  Phos  3.0     03-30  Mg     2.40     03-30                RADIOLOGY & ADDITIONAL TESTS:    Imaging Personally Reviewed: < from: Transthoracic Echocardiogram (03.29.22 @ 08:30) >  CONCLUSIONS:  1. Aortic valve not well visualized; probably normal.  2. Normal left ventricular internal dimensions and wall  thicknesses.  3. Normal left ventricular systolic function. No segmental  wall motion abnormalities.  4. The right ventricle is not well visualized; grossly  normal right ventricular systolic function. Unable to  evalute size.  5. There is a septal bounce suggesting right ventricular  volume and pressure overload.    < end of copied text >      Consultant(s) Notes Reviewed:      Care Discussed with Consultants/Other Providers:

## 2022-03-30 NOTE — PROGRESS NOTE ADULT - PROBLEM SELECTOR PLAN 6
In setting of known malignancy with osteosclerotic mets including L clavicle fracture Continue to monitor at this time.

## 2022-03-31 NOTE — PROGRESS NOTE ADULT - PROBLEM SELECTOR PLAN 1
Pt is known to outpatient palliative provider, Dr. Olivia Hazel.   At home, patient is on MS Contin 15mg bid with dilaudid 2mg q4hr prn   > Increase MS Contin to 60mg bid   > Continue 4mg PO dilaudid q3hr prn and IV dilaudid 0.5mg q3h prn severe pain. Patient is encouraged to take PO meds. No IV usage in last 48 hours. Can likely d/c IV dilaudid.   CT surgery recs appreciated- s/p pleurx removal. F/u in 10-14 days for suture removal.

## 2022-03-31 NOTE — PROGRESS NOTE ADULT - PROBLEM SELECTOR PLAN 2
Continue xanax 0.25mg q8hr prn. Will likely need to be discharged with this medication.   emotional support provided   Patient's mood improved with family at bedside.

## 2022-03-31 NOTE — PROGRESS NOTE ADULT - SUBJECTIVE AND OBJECTIVE BOX
[facility] Geriatrics and Palliative Care  Louise Shaikh, Palliative Care Attending  Contact Info: Page [phone] (including Nights/Weekends), message on Microsoft Teams (Louise Shaikh), or leave  at Palliative Office [phone] (non-urgent)     SUBJECTIVE AND OBJECTIVE: Patient seen this morning with cousin Blaze, at bedside. Patient stated she feels sob and also feels anxious as she is reliant on nursing staff to help her with her ADLs.     INTERVAL HPI/OVERNIGHT EVENTS:  > 3/24: Patient is known to palliative care team from previous admission. Over the past 24 hours, patient required PRNs of IV dilaudid 1mg x5, robaxin x1. Patient seen this afternoon after getting CT and ultrasound. She reports dyspnea and chest tube not draining, improved with IV dilaudid but medication is not lasting full 4 hours.   > 3/25: Over the past 24 hours, patient required PRNs IV dilaudid 1mg x6, IV dilaudid 0.5mg x1.  > 3/28: Attempted to see patient multiple times in her room today, however patient was in OR and then PACU for pleurX removal. Chart reviewed. Over the past 24 hours, patient required PRNs of IV dilaudid 0.5mg x3 and IV dilaudid 1mg x2.   > 3/29: Patient is s/p pleurX removal. Over the past 24 hours, patient required PRNs of IV dilaudid 0.5mg x1, and 4mg PO dilaudid x1.   > 3/30: Over the past 24 hours, patient required PRNs of PO dilaudid 4mg x4.   > 3/31: Over the past 24 hours, patient required PRNs of PO dilaudid 4mg x 5.     DNR on chart:   Allergies    Cipro (Rash)  QUINOLONES (Unknown)    Intolerances    MEDICATIONS  (STANDING):  heparin   Injectable 5000 Unit(s) SubCutaneous every 8 hours  influenza   Vaccine 0.5 milliLiter(s) IntraMuscular once  lactated ringers. 1000 milliLiter(s) (30 mL/Hr) IV Continuous <Continuous>  lactulose Syrup 20 Gram(s) Oral every 6 hours  morphine ER Tablet 60 milliGRAM(s) Oral <User Schedule>  polyethylene glycol 3350 17 Gram(s) Oral every 12 hours  sodium chloride 1 Gram(s) Oral three times a day    MEDICATIONS  (PRN):  acetaminophen     Tablet .. 650 milliGRAM(s) Oral every 6 hours PRN Temp greater or equal to 38C (100.4F), Mild Pain (1 - 3)  ALPRAZolam 0.25 milliGRAM(s) Oral every 8 hours PRN anxiety  aluminum hydroxide/magnesium hydroxide/simethicone Suspension 30 milliLiter(s) Oral every 4 hours PRN Dyspepsia  HYDROmorphone   Tablet 4 milliGRAM(s) Oral every 3 hours PRN Moderate Pain (4 - 6)  HYDROmorphone  Injectable 0.5 milliGRAM(s) IV Push every 3 hours PRN Severe Pain (7 - 10)  melatonin 3 milliGRAM(s) Oral at bedtime PRN Insomnia  methocarbamol 1500 milliGRAM(s) Oral three times a day PRN Muscle Spasm      ITEMS UNCHECKED ARE NOT PRESENT    PRESENT SYMPTOMS: [ ]Unable to obtain due to poor mentation   Source if other than patient:  [ ]Family   [ ]Team     Pain: [x ]yes [ ]no  QOL impact - difficulty breathing and performing her ADLs   Location - pleuritic pain, clavical                     Aggravating factors - with deep inspiration, also with pleurX drain not working at the moment   Quality - pleuritic pain   Radiation - clavical--> left shoulder   Timing- constant   Severity (0-10 scale): 6  Minimal acceptable level (0-10 scale): 0    Dyspnea:                           [ ]Mild [x ]Moderate [ ]Severe  Anxiety:                             [ x]Mild [ ]Moderate [ ]Severe  Fatigue:                             [ ]Mild [x ]Moderate [ ]Severe  Nausea:                             [ ]Mild [ ]Moderate [ ]Severe  Loss of appetite:              [ ]Mild [ ]Moderate [ ]Severe  Constipation:                    [x]Mild [ ]Moderate [ ]Severe    PAIN AD Score:	  [email or web address] (Ctrl + left click to view)    Other Symptoms:  [ x]All other review of systems negative     Palliative Performance Status Version 2:     50    %      http://npcrc.org/files/news/palliative_performance_scale_ppsv2.pdf  PHYSICAL EXAM:  Vital Signs Last 24 Hrs  T(C): 37.1 (31 Mar 2022 13:34), Max: 37.1 (31 Mar 2022 13:34)  T(F): 98.7 (31 Mar 2022 13:34), Max: 98.7 (31 Mar 2022 13:34)  HR: 103 (31 Mar 2022 13:34) (103 - 110)  BP: 111/60 (31 Mar 2022 13:34) (108/64 - 114/60)  BP(mean): --  RR: 18 (31 Mar 2022 13:34) (18 - 18)  SpO2: 100% (31 Mar 2022 13:34) (93% - 100%) I&O's Summary    30 Mar 2022 07:01  -  31 Mar 2022 07:00  --------------------------------------------------------  IN: 500 mL / OUT: 550 mL / NET: -50 mL       GENERAL:  [x ]Alert  [x ]Oriented x4   [ ]Lethargic  [ ]Cachexia  [ ]Unarousable  [x ]Verbal  [ ]Non-Verbal  Behavioral:   [ ] Anxiety  [ ] Delirium [ ] Agitation [x ] Other  HEENT:  [x]Normal   [ ]Dry mouth   [ ]ET Tube/Trach  [ ]Oral lesions  PULMONARY:   [ ]Clear [ ]Tachypnea  [ ]Audible excessive secretions   [ ]Rhonchi        [ ]Right [ ]Left [ ]Bilateral  [ ]Crackles        [ ]Right [ ]Left [ ]Bilateral  [ ]Wheezing     [ ]Right [ ]Left [ ]Bilateral   [x ]Diminished breath sounds [ ]right [ ]left [x ]bilateral  CARDIOVASCULAR:    [ ]Regular [ ]Irregular [x ]Tachy  [ ]Linus [ ]Murmur [ ]Other   GASTROINTESTINAL:  [ ]Soft  [x ]Distended   [ ]+BS  [x ]Non tender [ ]Tender  [ ]PEG [ ]OGT/ NGT  Last BM: 3/30  GENITOURINARY:  [ x]Normal [ ] Incontinent   [ ]Oliguria/Anuria   [ ]Ansari  MUSCULOSKELETAL: L. shoulder is in sling   [ ]Normal   [ ]Weakness  [ ]Bed/Wheelchair bound [ x]Edema  NEUROLOGIC:   [ x]No focal deficits  [ ]Cognitive impairment  [ ]Dysphagia [ ]Dysarthria [ ]Paresis [ ]Other   SKIN: Please see flowsheets   [ ]Normal    [ ]Rash  [ ]Pressure ulcer(s)       Present on admission [ ]y [ ]n    CRITICAL CARE:  [ ] Shock Present  [ ]Septic [ ]Cardiogenic [ ]Neurologic [ ]Hypovolemic  [ ]  Vasopressors [ ]  Inotropes   [ ]Respiratory failure present [ ]Mechanical ventilation [ ]Non-invasive ventilatory support [ ]High flow  [ ]Acute  [ ]Chronic [ ]Hypoxic  [ ]Hypercarbic [ ]Other  [ ]Other organ failure     LABS:                        8.1    7.52  )-----------( 345      ( 31 Mar 2022 06:54 )             27.6   03-31    130<L>  |  98  |  13  ----------------------------<  110<H>  5.0   |  22  |  0.72    Ca    8.3<L>      31 Mar 2022 06:54  Phos  3.0     03-31  Mg     2.40     03-31          RADIOLOGY & ADDITIONAL STUDIES: n/a     Protein Calorie Malnutrition Present: [ ]mild [ ]moderate [ ]severe [ ]underweight [ ]morbid obesity  https://www.andeal.org/vault/2440/web/files/ONC/Table_Clinical%20Characteristics%20to%20Document%20Malnutrition-White%20JV%20et%20al%202012.pdf    Height (cm): 167.6 (03-28-22 @ 11:11), 167.6 (03-19-22 @ 17:35), 167.6 (03-15-22 @ 13:24)  Weight (kg): 109.6 (03-28-22 @ 11:11), 113.4 (03-19-22 @ 17:35), 90.7 (01-25-22 @ 04:04)  BMI (kg/m2): 39 (03-28-22 @ 11:11), 40.4 (03-19-22 @ 17:35), 32.3 (03-15-22 @ 13:24)    [ ]PPSV2 < or = 30%  [ ]significant weight loss [ ]poor nutritional intake [ ]anasarca    [ ]Artificial Nutrition    REFERRALS:   [ ]Chaplaincy  [ ]Hospice  [ ]Child Life  [ ]Social Work  [ ]Case management [ ]Holistic Therapy

## 2022-03-31 NOTE — PROGRESS NOTE ADULT - PROBLEM SELECTOR PLAN 6
Gastric cancer- Last heme onc appt Dr. Skinny Ibanez 7/2021 per discussion. Patient is pursuing alternative medicine therapy  > CT angio: No pulmonary embolism. Moderate multiloculated right pleural effusion, unchanged in size from   day prior. Patchy opacities within the right middle and lower lobes are again seen. Diffuse interlobular septal thickening, unchanged. This can represent pulmonary edema versus lymphangitic carcinomatosis. Diffuse osseous metastatic disease is again seen within the acute mildly displaced left clavicular fracture.  > 3/31: Patient shared that she is planning to fly to [place] April 18th for DMT, unclear what the treatment is (low dose chemo with holistic medicine?)

## 2022-03-31 NOTE — DIETITIAN INITIAL EVALUATION ADULT. - PERTINENT MEDS FT
MEDICATIONS  (STANDING):  heparin   Injectable 5000 Unit(s) SubCutaneous every 8 hours  influenza   Vaccine 0.5 milliLiter(s) IntraMuscular once  lactated ringers. 1000 milliLiter(s) (30 mL/Hr) IV Continuous <Continuous>  lactulose Syrup 20 Gram(s) Oral every 6 hours  morphine ER Tablet 60 milliGRAM(s) Oral <User Schedule>  polyethylene glycol 3350 17 Gram(s) Oral every 12 hours  sodium chloride 1 Gram(s) Oral three times a day    MEDICATIONS  (PRN):  acetaminophen     Tablet .. 650 milliGRAM(s) Oral every 6 hours PRN Temp greater or equal to 38C (100.4F), Mild Pain (1 - 3)  ALPRAZolam 0.25 milliGRAM(s) Oral every 8 hours PRN anxiety  aluminum hydroxide/magnesium hydroxide/simethicone Suspension 30 milliLiter(s) Oral every 4 hours PRN Dyspepsia  HYDROmorphone   Tablet 4 milliGRAM(s) Oral every 3 hours PRN Moderate Pain (4 - 6)  HYDROmorphone  Injectable 0.5 milliGRAM(s) IV Push every 3 hours PRN Severe Pain (7 - 10)  melatonin 3 milliGRAM(s) Oral at bedtime PRN Insomnia  methocarbamol 1500 milliGRAM(s) Oral three times a day PRN Muscle Spasm

## 2022-03-31 NOTE — DIETITIAN INITIAL EVALUATION ADULT. - PROBLEM SELECTOR PLAN 1
likely secondary to bilateral pleural effusions with malfunctioning R basilar pleurx catheter vs symptomatic ascites vs DVT/PE  -CTS evaluated R PleurX and pending possible TPA to unclog  -Para in hx, protruding stomach patient states from "hernia", dyspnea worse when lying down  -US Doppler r/o DVT given Left posterior calf erythematous/bruised  -PERC score 1, Wells Score for PE 7.0  -Could consider CTA given risk factors and left calf findings as well as Well's Score  -TTE to assess for right heart strain and ?HF  -repeat EKG ordered not able to locate original EKG in [facility] or in patient chart.

## 2022-03-31 NOTE — DIETITIAN INITIAL EVALUATION ADULT. - ADD RECOMMEND
1. Defer fluid to MD order. 2. Consider daily Multivitamin for micronutrient coverage when medically appropriate. 3. Monitor weights, labs, BM's, skin integrity, PO intake/tolerance. 4. Encourage po intake as tolerated, assist with meals and menu selections, provide alternatives PRN.

## 2022-03-31 NOTE — PROGRESS NOTE ADULT - PROBLEM SELECTOR PLAN 4
Last BM 3/28 per flowsheets. She reports small bm with lactulose but continues to feel constipated.   > increase miralax to bid   >continue lactulose to 20g q6hrs x2 days (or until patient has BM)

## 2022-03-31 NOTE — PROGRESS NOTE ADULT - SUBJECTIVE AND OBJECTIVE BOX
Patient is a 51y old  Female who presents with a chief complaint of shortness of breath (30 Mar 2022 14:57)      SUBJECTIVE / OVERNIGHT EVENTS:  Patient's anxiety improved with xanax. Patient has no new complaints. Denies cp, SOB, abdominal pain, N/V/D     MEDICATIONS  (STANDING):  heparin   Injectable 5000 Unit(s) SubCutaneous every 8 hours  influenza   Vaccine 0.5 milliLiter(s) IntraMuscular once  lactated ringers. 1000 milliLiter(s) (30 mL/Hr) IV Continuous <Continuous>  lactulose Syrup 20 Gram(s) Oral every 6 hours  morphine ER Tablet 45 milliGRAM(s) Oral <User Schedule>  polyethylene glycol 3350 17 Gram(s) Oral every 12 hours  sodium chloride 1 Gram(s) Oral three times a day    MEDICATIONS  (PRN):  acetaminophen     Tablet .. 650 milliGRAM(s) Oral every 6 hours PRN Temp greater or equal to 38C (100.4F), Mild Pain (1 - 3)  ALPRAZolam 0.25 milliGRAM(s) Oral every 8 hours PRN anxiety  aluminum hydroxide/magnesium hydroxide/simethicone Suspension 30 milliLiter(s) Oral every 4 hours PRN Dyspepsia  HYDROmorphone   Tablet 4 milliGRAM(s) Oral every 3 hours PRN Moderate Pain (4 - 6)  HYDROmorphone  Injectable 0.5 milliGRAM(s) IV Push every 3 hours PRN Severe Pain (7 - 10)  melatonin 3 milliGRAM(s) Oral at bedtime PRN Insomnia  methocarbamol 1500 milliGRAM(s) Oral three times a day PRN Muscle Spasm      Vital Signs Last 24 Hrs  T(C): 36.7 (31 Mar 2022 06:41), Max: 36.8 (30 Mar 2022 22:29)  T(F): 98.1 (31 Mar 2022 06:41), Max: 98.3 (30 Mar 2022 22:29)  HR: 104 (31 Mar 2022 06:41) (104 - 110)  BP: 108/64 (31 Mar 2022 06:41) (108/64 - 114/60)  BP(mean): --  RR: 18 (31 Mar 2022 06:41) (18 - 18)  SpO2: 100% (31 Mar 2022 06:41) (93% - 100%)  CAPILLARY BLOOD GLUCOSE        I&O's Summary    30 Mar 2022 07:01  -  31 Mar 2022 07:00  --------------------------------------------------------  IN: 500 mL / OUT: 550 mL / NET: -50 mL        PHYSICAL EXAM:  GENERAL: NAD, well-developed  HEAD:  Atraumatic, Normocephalic  EYES: EOMI, PERRLA, conjunctiva and sclera clear  NECK: Supple, No JVD  CHEST/LUNG: decreased BS bilaterally; No wheeze  HEART: Regular rate and rhythm; No murmurs, rubs, or gallops  ABDOMEN: Soft, Nontender, Nondistended; Bowel sounds present  EXTREMITIES:  2+ B/L LE edema 2+ Peripheral Pulses, No clubbing, or cyanosis  PSYCH: AAOx3  NEUROLOGY: non-focal  SKIN: No rashes or lesions    LABS:                        8.1    7.52  )-----------( 345      ( 31 Mar 2022 06:54 )             27.6     03-31    130<L>  |  98  |  13  ----------------------------<  110<H>  5.0   |  22  |  0.72    Ca    8.3<L>      31 Mar 2022 06:54  Phos  3.0     03-31  Mg     2.40     03-31                RADIOLOGY & ADDITIONAL TESTS:    Imaging Personally Reviewed:    Consultant(s) Notes Reviewed:      Care Discussed with Consultants/Other Providers:

## 2022-03-31 NOTE — PROGRESS NOTE ADULT - PROBLEM SELECTOR PLAN 5
Likely from hypervolemic hyponatremia versus SIADH in the setting of pain   -c/w NACL tabs  -monitor volume status

## 2022-03-31 NOTE — PROGRESS NOTE ADULT - PROBLEM SELECTOR PLAN 2
multiple episode of panic attacks in last 24 hours  c/w xanax 0.25mg PO q8hr prn  consult psychiatry  for long term tx options for anxiety.

## 2022-03-31 NOTE — DIETITIAN INITIAL EVALUATION ADULT. - PERTINENT LABORATORY DATA
03-31 Na130 mmol/L<L> Glu 110 mg/dL<H> K+ 5.0 mmol/L Cr  0.72 mg/dL BUN 13 mg/dL 03-31 Phos 3.0 mg/dL 03-27 Alb 2.8 g/dL<L>    A1C with Estimated Average Glucose in AM (01.06.22 @ 06:47)   A1C with Estimated Average Glucose Result: 5.1: High Risk (prediabetic) 5.7 - 6.4 %   Diabetic, diagnostic > 6.5 %   ADA diabetic treatment goal < 7.0 %   HbA1C values may not accurately reflect mean blood glucose in patients   with Hb variants. Suggest clinical correlation. %   Estimated Average Glucose: 100

## 2022-03-31 NOTE — DIETITIAN INITIAL EVALUATION ADULT. - OTHER INFO
Per chart, 51y Female with PMHx of metastatic gastric cancer with recurrent R pleural effusion with Pleurx catheter presenting for SOB due to POD. CT is not suggestive of large pleural collection or PE. 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 Ct surg removed malfunctioning pleurx catheter on 3/28/22. Patient refused conventional chemo and is seeking alternative medicine for cancer as outpatient. She agrees to go to rehab and understands that no treatment for her cancer can be given while she is at rehab. Patient also with left clavicular fx. Orthopedics recommended conservative management with sling. Patient awaiting rehab.     Patient report decreased appetite with fair PO intake ~50% at meals in setting of mets and fluid retention, currently tolerated diet and denies chewing/swallowing difficulties at meals. Denies N/V/D but c/o constipation which she attributes it to use of narcotic, on miralax and lactulose per MD order, last BM this AM per patient. Patient endorses NKFA, no food preferences at this time. Patient is amenable to have organic version of nutrition supplement -- will send [employer] to optimize po intake. On NaCl tab for hyponatremia.     Reported UBW 240lb. Patient reports wt decreased but unable to quantify amt. Per chart review, wt hx: 210lb in Dec 2021, lost about 30lb/12.5% in 2 months from Oct 2021. Current adm wt 109.6kg/242 lb -- wt gain over the past 3 months likely related to fluid retention 2/2 phillip PE/ascites.

## 2022-03-31 NOTE — PROGRESS NOTE ADULT - PROBLEM SELECTOR PLAN 3
Pt is known to outpatient palliative provider, Dr. Olivia Hazel.  At home, patient is on MS Contin 15mg bid with dilaudid 2mg q4hr prn  ANTICIPATED D/C RECS: MS contin 60mg bid, dilaudid 4mg q4hr prn moderate to severe pain

## 2022-04-01 NOTE — DISCHARGE NOTE NURSING/CASE MANAGEMENT/SOCIAL WORK - PATIENT PORTAL LINK FT
You can access the FollowMyHealth Patient Portal offered by [facility] by registering at the following website: [email or web address]. By joining [facility]’s FollowMyHealth portal, you will also be able to view your health information using other applications (apps) compatible with our system.

## 2022-04-01 NOTE — DISCHARGE NOTE NURSING/CASE MANAGEMENT/SOCIAL WORK - NSDCFUADDAPPT_GEN_ALL_CORE_FT
Follow up with Cardiothoracic Surgery Dr Daniels about 10 to 14 days after your PleurX catheter was removed (April 7 to 11) for suture removal.  Call for an appointment and bring a new chest X-ray with you when you come.      Follow up with Orthopedics Dr. Almonte in 5-7 days upon discharge. Please call for an appointment.     Follow up with your oncologist Dr. Skinny Ibanez upon discharge. Please call for an appointment.

## 2022-04-01 NOTE — PROGRESS NOTE ADULT - PROBLEM SELECTOR PROBLEM 8
Prophylactic measure
Prophylactic measure
Elevated alkaline phosphatase level
Prophylactic measure

## 2022-04-01 NOTE — PROGRESS NOTE ADULT - PROBLEM SELECTOR PLAN 2
anxiety was uncontrolled due to adjust disorder from stress from multiple medical problems  she refused to see psychiatry or take xanax prn  She is calm today and is using her Am[employer] as a coping mechanism.

## 2022-04-01 NOTE — PROGRESS NOTE ADULT - ASSESSMENT
51y Female with PMHx of metastatic gastric cancer with recurrent R pleural effusion with Pleurx catheter presenting for SOB due to POD. CT is not suggestive of large pleural collection or PE. 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 
51y Female with PMHx of metastatic gastric cancer with recurrent R pleural effusion with Pleurx catheter presenting for SOB due to POD. CT is not suggestive of large pleural collection or PE. 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 Ct surg removed malfunctioning pleurx catheter on 3/28/22. Patient refused conventional chemo and is seeking alternative medicine for cancer as outpatient. She agrees to go to rehab and understands that no treatment for her cancer can be given while she is at rehab. Patient also with left clavicular fx. Orthopedics recommended conservative management with sling. D/C 40 minutes. 
51y Female with PMHx of metastatic gastric cancer with recurrent R pleural effusion with Pleurx catheter presenting for SOB due to POD. CT is not suggestive of large pleural collection or PE. 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 Ct surg removed malfunctioning pleurx catheter on 3/28/22. Patient refused conventional chemo and is seeking alternative medicine for cancer as outpatient. She agrees to go to rehab and understands that no treatment for her cancer can be given while she is at rehab. Patient also with left clavicular fx. Orthopedics recommended conservative management with sling. D/C 40 minutes. 
51y Female with PMHx of metastatic gastric cancer with recurrent R pleural effusion with Pleurx catheter presenting for SOB due to POD. CT is not suggestive of large pleural collection or PE. 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 Ct surg removed malfunctioning pleurx catheter on 3/28/22. Patient refused conventional chemo and is seeking alternative medicine for cancer as outpatient. She agrees to go to rehab and understands that no treatment for her cancer can be given while she is at rehab. Patient also with left clavicular fx. Orthopedics recommended conservative management with sling. Patient awaiting rehab. D/C 40 minutes. 
51y Female with PMHx of metastatic gastric cancer with recurrent R pleural effusion with Pleurx catheter presenting for SOB due to POD. CT is not suggestive of large pleural collection or PE. 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 Ct surg removed malfunctioning pleurx catheter on 3/28/22. Patient refused conventional chemo and is seeking alternative medicine for cancer as outpatient. She agrees to go to rehab and understands that no treatment for her cancer can be given while she is at rehab. Patient also with left clavicular fx. Orthopedics recommended conservative management with sling. Patient awaiting rehab. D/C 40 minutes. 
51y Female with PMHx of metastatic gastric cancer with recurrent R pleural effusion with Pleurx catheter presenting for SOB likely secondary to bilateral pleural effusions with malfunctioning R basilar pleurx catheter vs symptomatic ascites vs DVT/PE. Palliative consulted for sx management in setting of advanced malignancy.     
52yo F with metastatic Gastric CA s/p Rt VATS/pleurx with Dr. Daniels in January. Now presenting to ER w inc SOB, decreased output from Pleurx cath    - minimal fluid collection on CT chest, likely not source of pt symptoms  - pt with progression of disease, agree with palliative care consult  - no acute thoracic surgery intervention, please reconsult with concerns    Nina MCKOY [id number]
51y Female with PMHx of metastatic gastric cancer with recurrent R pleural effusion with Pleurx catheter presenting for SOB likely secondary to bilateral pleural effusions with malfunctioning R basilar pleurx catheter vs symptomatic ascites vs DVT/PE. Palliative consulted for sx management in setting of advanced malignancy.     
51y Female with PMHx of metastatic gastric cancer with recurrent R pleural effusion with Pleurx catheter presenting for SOB due to POD. CT is not suggestive of large pleural collection or PE. 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 
51y Female with PMHx of metastatic gastric cancer with recurrent R pleural effusion with Pleurx catheter presenting for SOB likely secondary to bilateral pleural effusions with malfunctioning R basilar pleurx catheter vs symptomatic ascites vs DVT/PE. Palliative consulted for sx management in setting of advanced malignancy.     
51y Female with PMHx of metastatic gastric cancer with recurrent R pleural effusion with Pleurx catheter presenting for SOB due to POD. CT is not suggestive of large pleural collection or PE. 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 
51y Female with PMHx of metastatic gastric cancer with recurrent R pleural effusion with Pleurx catheter presenting for SOB due to POD. CT is not suggestive of large pleural collection or PE. 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 
51y Female with PMHx of metastatic gastric cancer with recurrent R pleural effusion with Pleurx catheter presenting for SOB likely secondary to bilateral pleural effusions with malfunctioning R basilar pleurx catheter vs symptomatic ascites vs DVT/PE. 
51y Female with PMHx of metastatic gastric cancer with recurrent R pleural effusion with Pleurx catheter presenting for SOB likely secondary to bilateral pleural effusions with malfunctioning R basilar pleurx catheter vs symptomatic ascites vs DVT/PE. Palliative consulted for sx management in setting of advanced malignancy.

## 2022-04-01 NOTE — PROGRESS NOTE ADULT - NUTRITIONAL ASSESSMENT
This patient has been assessed with a concern for Malnutrition and has been determined to have a diagnosis/diagnoses of Severe protein-calorie malnutrition.    This patient is being managed with:   Diet Regular-  Entered: Mar 23 2022 11:14PM

## 2022-04-01 NOTE — DISCHARGE NOTE NURSING/CASE MANAGEMENT/SOCIAL WORK - NSDCPEFALRISK_GEN_ALL_CORE
For information on Fall & Injury Prevention, visit: [email or web address] OR  [email or web address] OR  https://www.cdc.gov/steadi/patient.html

## 2022-04-01 NOTE — PROGRESS NOTE ADULT - PROVIDER SPECIALTY LIST ADULT
Anesthesia
Thoracic Surgery
Hospitalist
Palliative Care
Hospitalist

## 2022-04-01 NOTE — PROGRESS NOTE ADULT - SUBJECTIVE AND OBJECTIVE BOX
Patient is a 51y old  Female who presents with a chief complaint of shortness of breath (31 Mar 2022 15:01)      SUBJECTIVE / OVERNIGHT EVENTS:  Patient has no new complaints. Denies cp, SOB, abdominal pain, N/V/D     MEDICATIONS  (STANDING):  heparin   Injectable 5000 Unit(s) SubCutaneous every 8 hours  influenza   Vaccine 0.5 milliLiter(s) IntraMuscular once  lactated ringers. 1000 milliLiter(s) (30 mL/Hr) IV Continuous <Continuous>  lactulose Syrup 20 Gram(s) Oral every 6 hours  morphine ER Tablet 60 milliGRAM(s) Oral <User Schedule>  multivitamin 1 Tablet(s) Oral daily  polyethylene glycol 3350 17 Gram(s) Oral every 12 hours  sodium chloride 1 Gram(s) Oral three times a day    MEDICATIONS  (PRN):  acetaminophen     Tablet .. 650 milliGRAM(s) Oral every 6 hours PRN Temp greater or equal to 38C (100.4F), Mild Pain (1 - 3)  ALPRAZolam 0.25 milliGRAM(s) Oral every 8 hours PRN anxiety  aluminum hydroxide/magnesium hydroxide/simethicone Suspension 30 milliLiter(s) Oral every 4 hours PRN Dyspepsia  HYDROmorphone   Tablet 4 milliGRAM(s) Oral every 3 hours PRN Moderate Pain (4 - 6)  HYDROmorphone  Injectable 0.5 milliGRAM(s) IV Push every 3 hours PRN Severe Pain (7 - 10)  melatonin 3 milliGRAM(s) Oral at bedtime PRN Insomnia  methocarbamol 1500 milliGRAM(s) Oral three times a day PRN Muscle Spasm      Vital Signs Last 24 Hrs  T(C): 36.2 (01 Apr 2022 14:15), Max: 36.5 (31 Mar 2022 20:32)  T(F): 97.1 (01 Apr 2022 14:15), Max: 97.7 (31 Mar 2022 20:32)  HR: 100 (01 Apr 2022 14:15) (100 - 103)  BP: 110/60 (01 Apr 2022 14:15) (110/60 - 115/65)  BP(mean): --  RR: 18 (01 Apr 2022 14:15) (18 - 18)  SpO2: 100% (01 Apr 2022 14:15) (100% - 100%)  CAPILLARY BLOOD GLUCOSE        I&O's Summary    31 Mar 2022 07:01  -  01 Apr 2022 07:00  --------------------------------------------------------  IN: 400 mL / OUT: 250 mL / NET: 150 mL        PHYSICAL EXAM:  GENERAL: NAD, well-developed  HEAD:  Atraumatic, Normocephalic  EYES: EOMI, PERRLA, conjunctiva and sclera clear  NECK: Supple, No JVD  CHEST/LUNG: decreased BS bilaterally; No wheeze  HEART: Regular rate and rhythm; No murmurs, rubs, or gallops  ABDOMEN: Soft, Nontender, Nondistended; Bowel sounds present  EXTREMITIES:  2+ B/L LE edema. LUE sling 2+ Peripheral Pulses, No clubbing, or cyanosis  PSYCH: AAOx3  NEUROLOGY: non-focal  SKIN: No rashes or lesions    LABS:                        8.0    8.34  )-----------( 366      ( 01 Apr 2022 06:49 )             26.9     04-01    128<L>  |  96<L>  |  12  ----------------------------<  101<H>  4.6   |  22  |  0.61    Ca    8.6      01 Apr 2022 06:49  Phos  2.8     04-01  Mg     2.40     04-01                RADIOLOGY & ADDITIONAL TESTS:    Imaging Personally Reviewed:    Consultant(s) Notes Reviewed:      Care Discussed with Consultants/Other Providers:

## 2022-04-01 NOTE — PROVIDER CONTACT NOTE (OTHER) - RECOMMENDATIONS
Xray to be done tomorrow at patients request.
Provider Conchita Campos made aware
[employer] auth/invoice #[id number] obtained. Writer contacted back [facility] and informed Crystal of auth information.
Provider Conchita Campos made aware
Provider Conchita Campos made aware

## 2022-04-01 NOTE — PROGRESS NOTE ADULT - PROBLEM SELECTOR PROBLEM 1
Dyspnea

## 2022-04-01 NOTE — PROVIDER CONTACT NOTE (OTHER) - BACKGROUND
Writer received call from Merv[facility] of [facility] who reported that pt not yet picked up. [facility] contacted by staff told no current booking found for pt. Prior SW notes reviewed.
Pt admitted w/ R pleural effusion, s/p R pleurex catheter
51 year old female admitted with shortness of breath. PMH of Obesity, Gastric Cancer, and pleural effusion.

## 2022-04-01 NOTE — PROVIDER CONTACT NOTE (OTHER) - ASSESSMENT
Pt A&Ox4,.No signs of distress noted . Patient denies dizziness' and shortness of breath
Pt A&Ox4,.No signs of distress noted . Patient denies dizziness' and shortness of breath
Pt for discharge to [facility]. Writer followed up with [facility] and spoke with Dustin who confirmed no trip in system. Trip therefore booked for next available p/up of 11:30 pm with trip #[id number]. Auth to be obtained with [employer]. Writer then contacted [facility] and spoke with Nursing Supervisor, Kristin, who is aware that pt would be arriving after 11:30 pm. Supervisor did report pt could be accepted at anytime as long as they are aware of pt discharge and anticipate arrival. No concerns reported. Pt pending discharge. Unit informed.
A&O x4. Breathing non-labored on room air.
Pt A&Ox4,.No signs of distress noted . Patient denies dizziness' and shortness of breath

## 2022-04-03 NOTE — ED ADULT NURSE REASSESSMENT NOTE - NS ED NURSE REASSESS COMMENT FT1
PT breathing unlabored on 3L. PT reports some improvement in pain s/p Dilaudid. Educated PT on plan of care. Awaiting CT results. Call bell within reach. Lights dimmed for comfort.

## 2022-04-03 NOTE — ED PROVIDER NOTE - PATIENT PORTAL LINK FT
You can access the FollowMyHealth Patient Portal offered by [facility] by registering at the following website: [email or web address]. By joining [facility]’s FollowMyHealth portal, you will also be able to view your health information using other applications (apps) compatible with our system.

## 2022-04-03 NOTE — ED PROVIDER NOTE - NSFOLLOWUPINSTRUCTIONS_ED_ALL_ED_FT
Shortness of Breath, Adult      Shortness of breath means you have trouble breathing. Shortness of breath could be a sign of a medical problem.      Follow these instructions at home:     •Watch for any changes in your symptoms.      • Do not use any products that contain nicotine or tobacco, such as cigarettes, e-cigarettes, and chewing tobacco.      • Do not smoke. Smoking can cause shortness of breath. If you need help to quit smoking, ask your doctor.    •Avoid things that can make it harder to breathe, such as:  •Mold.      •Dust.      •Air pollution.      •Chemical smells.      •Things that can cause allergy symptoms (allergens), if you have allergies.        •Keep your living space clean. Use products that help remove mold and dust.      •Rest as needed. Slowly return to your normal activities.      •Take over-the-counter and prescription medicines only as told by your doctor. This includes oxygen therapy and inhaled medicines.      •Keep all follow-up visits as told by your doctor. This is important.        Contact a doctor if:    •Your condition does not get better as soon as expected.      •You have a hard time doing your normal activities, even after you rest.      •You have new symptoms.        Get help right away if:    •Your shortness of breath gets worse.      •You have trouble breathing when you are resting.      •You feel light-headed or you pass out (faint).      •You have a cough that is not helped by medicines.      •You cough up blood.      •You have pain with breathing.      •You have pain in your chest, arms, shoulders, or belly (abdomen).      •You have a fever.      •You cannot walk up stairs.      •You cannot exercise the way you normally do.      These symptoms may represent a serious problem that is an emergency. Do not wait to see if the symptoms will go away. Get medical help right away. Call your local emergency services (911 in the [place] Do not drive yourself to the hospital.       Summary    •Shortness of breath is when you have trouble breathing enough air. It can be a sign of a medical problem.      •Avoid things that make it hard for you to breathe, such as smoking, pollution, mold, and dust.      •Watch for any changes in your symptoms. Contact your doctor if you do not get better or you get worse.      This information is not intended to replace advice given to you by your health care provider. Make sure you discuss any questions you have with your health care provider.      Document Revised: 05/20/2019 Document Reviewed: 05/20/2019

## 2022-04-03 NOTE — ED ADULT NURSE NOTE - OBJECTIVE STATEMENT
PT is a 51 year old female AXO x3 female with hx of gastric cancer not currently on chemo presenting to ED via EMS from rehab facility with complaints of SOB. Per EMS, PT began having chest pain and SOB at 7:00 AM. Per patient, she is not currently having chest pain but c/o SOB. PT states she is on 3-4L at baseline for comfort. PT states she is at rehab for left clavicle fracture s/p fall in shower. PT arrives breathing unlabored on 3L. PT is able to speak in complete sentences without difficulty and no accessory muscle use noted. PT's abdomen is distended but soft. Positive pitting edema to LE. PT sinus tachycardic on cardiac monitor, heart rate 112. EKG completed. IV established 20G in right AC. Safety and comfort maintained, red socks applied. Call bell within reach.

## 2022-04-03 NOTE — PATIENT PROFILE ADULT - FALL HARM RISK - HARM RISK INTERVENTIONS
Assistance with ambulation/Assistance OOB with selected safe patient handling equipment/Communicate Risk of Fall with Harm to all staff/Discuss with provider need for PT consult/Monitor gait and stability/Reinforce activity limits and safety measures with patient and family/Tailored Fall Risk Interventions/Visual Cue: Yellow wristband and red socks/Bed in lowest position, wheels locked, appropriate side rails in place/Call bell, personal items and telephone in reach/Instruct patient to call for assistance before getting out of bed or chair/Non-slip footwear when patient is out of bed/[place] to call system/Physically safe environment - no spills, clutter or unnecessary equipment/Purposeful Proactive Rounding/Room/bathroom lighting operational, light cord in reach

## 2022-04-03 NOTE — H&P ADULT - HISTORY OF PRESENT ILLNESS
52 y/o f with pmhx Gastric Ca, not currently on treatment with recurring pleural effusion,  presents from [facility] for concern of worsening shortness of breath. Patient was recently admitted to [facility] ( 3/23-4/1)  with same complaint and had a thoracentesis and paracentesis. 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  Ct surg removed pleurex catheter on 3/28/22 and pt was to follow up with Dr Daniels in 10-14 days. Patient refused conventional chemo and is seeking alternative medicine for cancer as outpatient. During the recent hosp admission , pt was being followed by Palliative care team .    In the ED , she remains hemodynamically stable on 5 L NC .  CT PE studies : limited studies with no PE , but shows Large Rt Loculated Pleural effusion that is slightly increased and complete consolidation of the RLL, patichy opacities in RML , small left pleural effusion , Diffuse interlobular septal thickening , may be edema Vs Lymphagitic carcinomatosis     Received :  Ativan 1 mg oral X1, Dilaudid 0.5mg IV X2, Dilaudid 1 mg IV once , Lasix 20mg IV X1 Pepcid 20mg IV X1

## 2022-04-03 NOTE — ED ADULT NURSE REASSESSMENT NOTE - NS ED NURSE REASSESS COMMENT FT1
PT now drowsy but arousable to loud verbal stimuli. MD aware and at bedside, states to hold Dilaudid at this time.

## 2022-04-03 NOTE — ED PROVIDER NOTE - PHYSICAL EXAMINATION
Const: Well-nourished, Well-developed, appearing stated age.  Eyes: no conjunctival injection, and symmetrical lids.  HEENT: Head NCAT, no lesions. Atraumatic external nose and ears.   Neck: Symmetric, trachea midline.   CVS: +S1/S2, Radial and DP pulses 2+ b/l.   RESP: +labored respiratory effort. Diminished bs b/l   GI: Nontender, +distended, No CVA tenderness b/l.   MSK: Normocephalic/Atraumatic, b/l pitting edema of the LE   Skin: Warm, dry and intact.   Neuro: Fluent Speech. Moving all extremities.   Psych: Awake, Alert, & Oriented (AAO) x3. Appropriate mood and affect.

## 2022-04-03 NOTE — PATIENT PROFILE ADULT - FUNCTIONAL ASSESSMENT - DAILY ACTIVITY ASSESSMENT TYPE
CM following patient's chart. No needs have been noted at this time. Please consult CM if any new needs arise. Admission

## 2022-04-03 NOTE — H&P ADULT - NSHPLABSRESULTS_GEN_ALL_CORE
Lab results are reviewed by me Lab results are reviewed by me : WBC + 6.79 H/H = 8.4 / 27.4  PLT = 336    Trop : 31-->29   Cr = 0.56   Alk phos 2186      CTA of chest : limited studies with no PE , but shows Large Rt Loculated Pleural effusion that is slightly increased and complete consolidation of the RLL, patichy opacities in RML , small left pleural effusion , Diffuse interlobular septal thickening , may be edema Vs Lymphagitic carcinomatosis.

## 2022-04-03 NOTE — ED PROVIDER NOTE - OBJECTIVE STATEMENT
51F with gastric cancer presenting with CC of SOB, acute on chronic. Patient was recently admitted for similar complaint - had thora/para, DCed to rehab 2 days ago w 4L O2 for comfort/as needed. Denies any new leg pain/swelling - has chronic b/l lymphedema. No hemoptysis, history of DVT/PE. +Decreased ambulation recently after a fall causing clavicle fracture.

## 2022-04-03 NOTE — H&P ADULT - ASSESSMENT
50 y/o f with pmhx Gastric Ca, not currently on treatment with recurring pleural effusion,  presents from [facility] for concern of worsening shortness of breath. Patient was recently admitted to [facility] ( 3/23-4/1)  with same complaint and had a thoracentesis and paracentesis. 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  Ct surg removed pleurex catheter on 3/28/22 and pt was to follow up with Dr Daniels in 10-14 days. Patient refused conventional chemo and is seeking alternative medicine for cancer as outpatient. During the recent hosp admission , pt was being followed by Palliative care team .  In the ED , she remains hemodynamically stable on 5 L NC .  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

## 2022-04-03 NOTE — H&P ADULT - PROBLEM SELECTOR PLAN 7
Will cont NAHOMI pain meds   Palliative care consult in AM Will  restart her on her recent DC pain meds   Palliative care consult in AM

## 2022-04-03 NOTE — H&P ADULT - NSHPADDITIONALINFOADULT_GEN_ALL_CORE
Shiela Chang   Hospitalist   Pt is signed out to  [facility]   Please start Chemical DVT prophylaxis if there is no plan for PleuRx on 3/4       [facility] Hospitalist

## 2022-04-03 NOTE — ED PROVIDER NOTE - NS ED ROS FT
CONST: no fevers, no chills, no trauma  EYES: no pain, no blurry vision   ENT: no sore throat, no epistaxis, no rhinorrhea  CV: no chest pain, no palpitations, no orthopnea, +b/l LE extremity swelling  RESP: + shortness of breath, no cough, no sputum, no pleurisy, no wheezing  ABD: no abdominal pain, no nausea, no vomiting, no diarrhea, no black or bloody stool  : no dysuria, no hematuria, no frequency, no urgency  MSK: no back pain, no neck pain, no extremity pain  NEURO: no headache, no sensory disturbances, no focal weakness, no dizziness  HEME: no easy bleeding or bruising  SKIN: no diaphoresis, no rash

## 2022-04-03 NOTE — ED ADULT NURSE REASSESSMENT NOTE - NS ED NURSE REASSESS COMMENT FT1
PT states she is having difficulty breathing at this time, vitals show SPO2 at 89% on 3L and . PT requesting pain medication at this time. MD Hayes at bedside, increased O2 to 5L per MD. Lasix and 0.5 mg of Dilaudid given per MD. SPO2 increased to 94% with O2 at 5L. MD ordered high-flow O2 but PT unable to tolerate at this time, MD aware. Safety and comfort provided. Call bell within reach.

## 2022-04-03 NOTE — H&P ADULT - PROBLEM SELECTOR PLAN 1
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    CW with O2 supplement via nC currently   Monitor SPO2   IR consult / MIght need CT surgery reeval   Palliative care consult  Onc consult in AM  Will get Procal and might start ABX if there is any fever , leucocytosis for any superimposed Pneumonia  S/P Lasix IV X1

## 2022-04-03 NOTE — ED PROVIDER NOTE - CLINICAL SUMMARY MEDICAL DECISION MAKING FREE TEXT BOX
51F w active cancer presenting w SOB. On exam, desat to 89 on RA, distended abd but without focal lung findings. Likely sob due to known cancer burden. Will assess for PE given risk factors. Plan: labs, CT, reassess and dispo. Pily Reardon, KIMO PGY3 51F w active cancer presenting w SOB. On exam, desat to 89 on RA, distended abd but without focal lung findings. Likely sob due to known cancer burden. Will assess for PE given risk factors. Plan: labs, CT, reassess and dispo. KIMO Phillips PGY3  ATTG: : short of breath with cancer, concern for worsening infection / effusion / PE, will check ct a chest, check labs, check pro bnp, cardiac work up, xray and re eval for dispo.

## 2022-04-03 NOTE — ED PROVIDER NOTE - ATTENDING CONTRIBUTION TO CARE
50 y/o f with pmhx Gastric Ca, not currently on treatment with recurring pleural effusion presents from [facility] for concern of worsening shortness of breath. Patient was recently admitted to [facility] and discharged on April 1 with same complaint and had a thoracentesis and paracentesis. 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 Ct surg removed malfunctioning pleurex catheter on 3/28/22. Patient refused conventional chemo and is seeking alternative medicine for cancer as outpatient. She was transferred to rehab center. She was started on supplemental oxygen 4 liters NC for comfort as needed.   Gen.  No acute distress, sleepy but can be easily awoken with verbal stimuli   HEENT:  Perrl eomi   Lungs:  b/l bs  CVS: S1S2   Abd;  soft no tender no distention  Ext: b/l lower ext pitting edema no erythema no calf tenderness  Neuro: aaox3   MSK: moves ext spon

## 2022-04-03 NOTE — H&P ADULT - PROBLEM SELECTOR PLAN 4
Most likely multifactorial from pain/ poor oral in take and pulm disease   Will cont to trend the Na   Fluid restriction

## 2022-04-04 NOTE — CONSULT NOTE ADULT - PROBLEM SELECTOR RECOMMENDATION 9
Per documentation, pt has hx of metastatic gastric cancer and multiple abdominal surgeries with hx of SBO   -Last heme onc appt with Dr. Skinny Ibanez 7/2021 per documentation. Patient is currently pursuing alternative medicine therapy  -Alk Phos elevated to 2506 on admission  -Pt pursuing alternative medicine treatment for gastric cancer -Deferred GOC, pending symptom control as pt is currently experiencing severe symptoms as above Pt presented with SOB and chest pain, unable to speak in full sentences on 4L NC  -CTA chest negative for PE, but shows large right loculated pleural effusion increased from prior, and complete consolidation of the RLL, patchy opacities in RML, small left pleural effusion, diffuse interlobular septal thickening, may be edema v lymphangitic carcinomatosis. Trop trend negative, cardiology following, diuresis per primary and cardiology teams  -Would treat dyspnea with same regimen used for pain:     -Dilaudid 4mg PO Q4 hours for severe pain/dyspnea     -Dilaudid IV 0.75mg Q4 hours for breakthrough severe pain/dyspnea     -Xanax 0.25mg Q8 PRN dyspnea (not to be given within 1 hour of IV Dilaudid to prevent oversedation)

## 2022-04-04 NOTE — CONSULT NOTE ADULT - SUBJECTIVE AND OBJECTIVE BOX
DATE OF SERVICE: 04-04-22 @ 14:18    CHIEF COMPLAINT:Patient is a 51y old  Female who presents with a chief complaint of chest pain and shorthness of breath for one day (04 Apr 2022 10:42)      HISTORY OF PRESENT ILLNESS:HPI: 50 y/o f with pmhx Gastric Ca, not currently on treatment with recurring pleural effusion,  presents from [facility] for concern of worsening shortness of breath. Patient was recently admitted to [facility] ( 3/23-4/1) with same complaint and had a thoracentesis and paracentesis. 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  Ct surg removed pleurex catheter on 3/28/22 and pt was to follow up with Dr Daniels in 10-14 days. Patient refused conventional chemo and is seeking alternative medicine for cancer as outpatient. During the recent hosp admission , pt was being followed by Palliative care team .      PAST MEDICAL & SURGICAL HISTORY:  Gastric cancer  5/2011 no adjunt trement    Small bowel obstruction  Exp lap complicated with post op Ishemic bowel /perforation    Perforated bowel    Other abdominal hernia    Obesity (BMI 30-39.9)    History of gastrectomy  distal gastrectomy w/ B2 reconstruction (gastrojejunostomy)    History of resection of small bowel  exlap, SBR, meckel&#x27;s diverticulectomy (7/4/2015); exlap, SBR for necrotic bowel, left in discontinuity, Abthera (7/9/2015); exlap, SB anastomosis, Abthera (7/11/15); exlap, washout, abdominal closure w/ Strattice (7/13/15)      MEDICATIONS:    acetaminophen     Tablet .. 650 milliGRAM(s) Oral every 6 hours PRN  acetaminophen   IVPB .. 1000 milliGRAM(s) IV Intermittent once  ALPRAZolam 0.25 milliGRAM(s) Oral every 6 hours PRN  HYDROmorphone   Tablet 4 milliGRAM(s) Oral every 4 hours PRN  HYDROmorphone  Injectable 0.75 milliGRAM(s) IV Push every 4 hours PRN  melatonin 3 milliGRAM(s) Oral at bedtime PRN  methocarbamol 750 milliGRAM(s) Oral three times a day PRN  morphine ER Tablet 60 milliGRAM(s) Oral every 12 hours  ondansetron Injectable 4 milliGRAM(s) IV Push every 8 hours PRN    aluminum hydroxide/magnesium hydroxide/simethicone Suspension 30 milliLiter(s) Oral every 4 hours PRN  lactulose Syrup 20 Gram(s) Oral every 12 hours  polyethylene glycol 3350 17 Gram(s) Oral two times a day      multivitamin 1 Tablet(s) Oral daily  sodium chloride 1 Gram(s) Oral three times a day      FAMILY HISTORY:  FH: diabetes mellitus (Mother)    FH: pulmonary embolism (Mother)    Family history of abdominal aortic aneurysm (AAA) (Father)        Non-contributory    SOCIAL HISTORY:    Not a current smoker  Allergies    Cipro (Rash)  QUINOLONES (Unknown)    Intolerances    	    REVIEW OF SYSTEMS:  CONSTITUTIONAL: No fever  EYES: No eye pain, visual disturbances, or discharge  ENMT:  No difficulty hearing, tinnitus  NECK: No pain or stiffness  RESPIRATORY: + SOB  CARDIOVASCULAR: + Palpitations  GASTROINTESTINAL:  No nausea, vomiting, diarrhea or constipation. No melena.  GENITOURINARY: No dysuria, hematuria  NEUROLOGICAL: No stroke like symptoms  SKIN: No burning or lesions   ENDOCRINE: No heat or cold intolerance  MUSCULOSKELETAL: + LE swelling  PSYCHIATRIC: No  anxiety, mood swings  HEME/LYMPH: No bleeding gums  ALLERGY AND IMMUNOLOGIC: No hives or eczema	    All other ROS negative    PHYSICAL EXAM:  T(C): 37 (04-04-22 @ 11:15), Max: 37 (04-04-22 @ 11:15)  HR: 120 (04-04-22 @ 11:15) (109 - 121)  BP: 135/77 (04-04-22 @ 11:15) (95/58 - 135/77)  RR: 20 (04-04-22 @ 11:15) (18 - 20)  SpO2: 97% (04-04-22 @ 11:15) (97% - 100%)  Wt(kg): --  I&O's Summary    03 Apr 2022 07:01  -  04 Apr 2022 07:00  --------------------------------------------------------  IN: 700 mL / OUT: 0 mL / NET: 700 mL        Appearance: Normal	  HEENT:   Normal oral mucosa, EOMI	  Cardiovascular:  S1 S2, No JVD,    Respiratory: Right lung diminished, LLL diminished  Psychiatry: Alert  Gastrointestinal:  Soft, Non-tender, + BS	  Skin: No rashes   Neurologic: Non-focal  Extremities:  +2 B/L  LE pitting edema  Vascular: Peripheral pulses palpable    	    	  	  CARDIAC MARKERS:  Labs personally reviewed by me                          8.5    7.24  )-----------( 387      ( 04 Apr 2022 07:39 )             29.1     04-04    132<L>  |  97  |  11  ----------------------------<  92  4.4   |  21<L>  |  0.57    Ca    8.9      04 Apr 2022 07:37  Mg     2.2     04-03    TPro  5.8<L>  /  Alb  2.8<L>  /  TBili  0.4  /  DBili  x   /  AST  27  /  ALT  19  /  AlkPhos  2506<H>  04-04          EKG: Personally reviewed by me - ST at 103 bpm  Radiology: Personally reviewed by me -     Transthoracic Echocardiogram (03.29.22 @ 08:30)   CONCLUSIONS:  1. Aortic valve not well visualized; probably normal.  2. Normal left ventricular internal dimensions and wall  thicknesses.  3. Normal left ventricular systolic function. No segmental  wall motion abnormalities.  4. The right ventricle is not well visualized; grossly  normal right ventricular systolic function. Unable to  evalute size.  5. There is a septal bounce suggesting right ventricular  volume and pressure overload.    Xray Chest 1 View- 04.03.22:  FINDINGS:  The heart is not well assessed on an AP film.  There are masslike densities again seen in the right apex, lateral right   lung and right lung base which may represent loculated pleural fluid or   masses. Pulmonary edema appears unchanged from the prior study.  There may be a small left pleural effusion.  There is no pneumothorax.  Diffuse osseous metastasis are again identified.  IMPRESSION:  Pulmonary edema.  Masslike densities in the right apex,lateral right lung and right lung   base, possibly loculated pleural fluid or masses.  Small left effusion.    Assessment /Plan:     50 y/o f with PMH of Gastric Ca (not currently on treatment), recurring pleural effusion [facility] and chest pain described as her chest pounding out of her chest. Symptoms are non-exertional and have been progressing over the last couple of days.     Problem/Plan #1: Chest Pain  -Troponin negative x2  -EKG shows Sinus Tach with no ischemic changes  -Echo from 3/29/22 shows normal LV function, no RWMA, no valvular dysfunction and possible RV overload  -CT Chest negative for PE  -Continue to monitor on tele  -Chest pain likely not cardiac in origin.     Problem/Plan #2: Shortness of breath  -RV overload on recent TTE  -CXR reveals pulmonary edema, masslike densities in the right apex, lateral right lung and right lung  -CTA chest negative for PE, but shows large right loculated pleural effusion increased from prior, and complete consolidation of the RLL, patchy opacities in RML, small left pleural effusion, diffuse interlobular septal thickening, may be edema v lymphangitic carcinomatosis  -start furosemide 40mg IV BID, hold for SBP < 100  -Appreciate CTSx and IR recs    Problem/Plan #3: Normocytic Anemia  -Iron studies per primary team  -Stable Hgb  -Mgmt per primary team    Problem/Plan #4: Pain  -Palliative Care team following  -Mgmt per primary team    Differential diagnosis and plan of care discussed with patient after the evaluation. Counseling on diet, nutritional counseling, weight management, exercise and medication compliance was done.   Advanced care planning/advanced directives discussed with patient/family. DNR status including forceful chest compressions to attempt to restart the heart, ventilator support/artificial breathing, electric shock, artificial nutrition, health care proxy, Molst form all discussed with pt. Pt wishes to consider. More than fifteen minutes spent on discussing advanced directives.     Louise Fitzgerald [facility]-BC  Richard Howard DO [facility]  Cardiovascular Medicine  [address] [place] [address], [address]  Office [phone]  Cell [phone] DATE OF SERVICE: 04-04-22 @ 14:18    CHIEF COMPLAINT:Patient is a 51y old  Female who presents with a chief complaint of chest pain and shorthness of breath for one day (04 Apr 2022 10:42)      HISTORY OF PRESENT ILLNESS:HPI: 52 y/o f with pmhx Gastric Ca, not currently on treatment with recurring pleural effusion,  presents from [facility] for concern of worsening shortness of breath. Patient was recently admitted to [facility] ( 3/23-4/1) with same complaint and had a thoracentesis and paracentesis. 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  Ct surg removed pleurex catheter on 3/28/22 and pt was to follow up with Dr Daniels in 10-14 days. Patient refused conventional chemo and is seeking alternative medicine for cancer as outpatient. During the recent hosp admission , pt was being followed by Palliative care team .      PAST MEDICAL & SURGICAL HISTORY:  Gastric cancer  5/2011 no adjunt trement    Small bowel obstruction  Exp lap complicated with post op Ishemic bowel /perforation    Perforated bowel    Other abdominal hernia    Obesity (BMI 30-39.9)    History of gastrectomy  distal gastrectomy w/ B2 reconstruction (gastrojejunostomy)    History of resection of small bowel  exlap, SBR, meckel&#x27;s diverticulectomy (7/4/2015); exlap, SBR for necrotic bowel, left in discontinuity, Abthera (7/9/2015); exlap, SB anastomosis, Abthera (7/11/15); exlap, washout, abdominal closure w/ Strattice (7/13/15)      MEDICATIONS:    acetaminophen     Tablet .. 650 milliGRAM(s) Oral every 6 hours PRN  acetaminophen   IVPB .. 1000 milliGRAM(s) IV Intermittent once  ALPRAZolam 0.25 milliGRAM(s) Oral every 6 hours PRN  HYDROmorphone   Tablet 4 milliGRAM(s) Oral every 4 hours PRN  HYDROmorphone  Injectable 0.75 milliGRAM(s) IV Push every 4 hours PRN  melatonin 3 milliGRAM(s) Oral at bedtime PRN  methocarbamol 750 milliGRAM(s) Oral three times a day PRN  morphine ER Tablet 60 milliGRAM(s) Oral every 12 hours  ondansetron Injectable 4 milliGRAM(s) IV Push every 8 hours PRN    aluminum hydroxide/magnesium hydroxide/simethicone Suspension 30 milliLiter(s) Oral every 4 hours PRN  lactulose Syrup 20 Gram(s) Oral every 12 hours  polyethylene glycol 3350 17 Gram(s) Oral two times a day      multivitamin 1 Tablet(s) Oral daily  sodium chloride 1 Gram(s) Oral three times a day      FAMILY HISTORY:  FH: diabetes mellitus (Mother)    FH: pulmonary embolism (Mother)    Family history of abdominal aortic aneurysm (AAA) (Father)        Non-contributory    SOCIAL HISTORY:    Not a current smoker  Allergies    Cipro (Rash)  QUINOLONES (Unknown)    Intolerances    	    REVIEW OF SYSTEMS:  CONSTITUTIONAL: No fever  EYES: No eye pain, visual disturbances, or discharge  ENMT:  No difficulty hearing, tinnitus  NECK: No pain or stiffness  RESPIRATORY: + SOB  CARDIOVASCULAR: + Palpitations  GASTROINTESTINAL:  No nausea, vomiting, diarrhea or constipation. No melena.  GENITOURINARY: No dysuria, hematuria  NEUROLOGICAL: No stroke like symptoms  SKIN: No burning or lesions   ENDOCRINE: No heat or cold intolerance  MUSCULOSKELETAL: + LE swelling  PSYCHIATRIC: No  anxiety, mood swings  HEME/LYMPH: No bleeding gums  ALLERGY AND IMMUNOLOGIC: No hives or eczema	    All other ROS negative    PHYSICAL EXAM:  T(C): 37 (04-04-22 @ 11:15), Max: 37 (04-04-22 @ 11:15)  HR: 120 (04-04-22 @ 11:15) (109 - 121)  BP: 135/77 (04-04-22 @ 11:15) (95/58 - 135/77)  RR: 20 (04-04-22 @ 11:15) (18 - 20)  SpO2: 97% (04-04-22 @ 11:15) (97% - 100%)  Wt(kg): --  I&O's Summary    03 Apr 2022 07:01  -  04 Apr 2022 07:00  --------------------------------------------------------  IN: 700 mL / OUT: 0 mL / NET: 700 mL        Appearance: Normal	  HEENT:   Normal oral mucosa, EOMI	  Cardiovascular:  S1 S2, No JVD,    Respiratory: Right lung diminished, LLL diminished  Psychiatry: Alert  Gastrointestinal:  Soft, Non-tender, + BS	  Skin: No rashes   Neurologic: Non-focal  Extremities:  +2 B/L  LE pitting edema  Vascular: Peripheral pulses palpable    	    	  	  CARDIAC MARKERS:  Labs personally reviewed by me                          8.5    7.24  )-----------( 387      ( 04 Apr 2022 07:39 )             29.1     04-04    132<L>  |  97  |  11  ----------------------------<  92  4.4   |  21<L>  |  0.57    Ca    8.9      04 Apr 2022 07:37  Mg     2.2     04-03    TPro  5.8<L>  /  Alb  2.8<L>  /  TBili  0.4  /  DBili  x   /  AST  27  /  ALT  19  /  AlkPhos  2506<H>  04-04          EKG: Personally reviewed by me - ST at 103 bpm  Radiology: Personally reviewed by me -     Transthoracic Echocardiogram (03.29.22 @ 08:30)   CONCLUSIONS:  1. Aortic valve not well visualized; probably normal.  2. Normal left ventricular internal dimensions and wall  thicknesses.  3. Normal left ventricular systolic function. No segmental  wall motion abnormalities.  4. The right ventricle is not well visualized; grossly  normal right ventricular systolic function. Unable to  evalute size.  5. There is a septal bounce suggesting right ventricular  volume and pressure overload.    Xray Chest 1 View- 04.03.22:  FINDINGS:  The heart is not well assessed on an AP film.  There are masslike densities again seen in the right apex, lateral right   lung and right lung base which may represent loculated pleural fluid or   masses. Pulmonary edema appears unchanged from the prior study.  There may be a small left pleural effusion.  There is no pneumothorax.  Diffuse osseous metastasis are again identified.  IMPRESSION:  Pulmonary edema.  Masslike densities in the right apex,lateral right lung and right lung   base, possibly loculated pleural fluid or masses.  Small left effusion.    Assessment /Plan:     52 y/o f with PMH of Gastric Ca (not currently on treatment), recurring pleural effusion [facility] short[facility] of breath and chest pain described as her chest pounding out of her chest. Symptoms are non-exertional and have been progressing over the last couple of days.     Problem/Plan #1: Chest Pain  -Troponin negative x2  -EKG shows Sinus Tach with no ischemic changes  -Echo from 3/29/22 shows normal LV function, no RWMA, no valvular dysfunction and possible RV overload  -CT Chest negative for PE  -Continue to monitor on tele  -Low probability that chest pain cardiac in origin.     Problem/Plan #2: Shortness of breath  -RV overload on recent TTE  -CXR reveals pulmonary edema, masslike densities in the right apex, lateral right lung and right lung  -CTA chest negative for PE, but shows large right loculated pleural effusion increased from prior, and complete consolidation of the RLL, patchy opacities in RML, small left pleural effusion, diffuse interlobular septal thickening, may be edema v lymphangitic carcinomatosis  -start furosemide 40mg IV BID, hold for SBP < 100  -Appreciate CTSx and IR recs    Problem/Plan #3: Normocytic Anemia  -Iron studies per primary team  -Stable Hgb  -Mgmt per primary team    Problem/Plan #4: Chronic Pain  -Palliative Care team following  -Mgmt per primary team    Differential diagnosis and plan of care discussed with patient after the evaluation. Counseling on diet, nutritional counseling, weight management, exercise and medication compliance was done.   Advanced care planning/advanced directives discussed with patient/family. DNR status including forceful chest compressions to attempt to restart the heart, ventilator support/artificial breathing, electric shock, artificial nutrition, health care proxy, Molst form all discussed with pt. Pt wishes to consider. More than fifteen minutes spent on discussing advanced directives.     Louise [facility]  Richard Howard DO [facility]  Cardiovascular Medicine  [address], [address]  Office [phone]  Cell [phone] DATE OF SERVICE: 04-04-22 @ 14:18    CHIEF COMPLAINT:Patient is a 51y old  Female who presents with a chief complaint of chest pain and shorthness of breath for one day (04 Apr 2022 10:42)      HISTORY OF PRESENT ILLNESS:HPI: 50 y/o f with pmhx Gastric Ca, not currently on treatment with recurring pleural effusion,  presents from [facility] for concern of worsening shortness of breath. Patient was recently admitted to [facility] ( 3/23-4/1) with same complaint and had a thoracentesis and paracentesis. 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  Ct surg removed pleurex catheter on 3/28/22 and pt was to follow up with Dr Daniels in 10-14 days. Patient refused conventional chemo and is seeking alternative medicine for cancer as outpatient. During the recent hosp admission , pt was being followed by Palliative care team .      PAST MEDICAL & SURGICAL HISTORY:  Gastric cancer  5/2011 no adjunt trement    Small bowel obstruction  Exp lap complicated with post op Ishemic bowel /perforation    Perforated bowel    Other abdominal hernia    Obesity (BMI 30-39.9)    History of gastrectomy  distal gastrectomy w/ B2 reconstruction (gastrojejunostomy)    History of resection of small bowel  exlap, SBR, meckel&#x27;s diverticulectomy (7/4/2015); exlap, SBR for necrotic bowel, left in discontinuity, Abthera (7/9/2015); exlap, SB anastomosis, Abthera (7/11/15); exlap, washout, abdominal closure w/ Strattice (7/13/15)      MEDICATIONS:    acetaminophen     Tablet .. 650 milliGRAM(s) Oral every 6 hours PRN  acetaminophen   IVPB .. 1000 milliGRAM(s) IV Intermittent once  ALPRAZolam 0.25 milliGRAM(s) Oral every 6 hours PRN  HYDROmorphone   Tablet 4 milliGRAM(s) Oral every 4 hours PRN  HYDROmorphone  Injectable 0.75 milliGRAM(s) IV Push every 4 hours PRN  melatonin 3 milliGRAM(s) Oral at bedtime PRN  methocarbamol 750 milliGRAM(s) Oral three times a day PRN  morphine ER Tablet 60 milliGRAM(s) Oral every 12 hours  ondansetron Injectable 4 milliGRAM(s) IV Push every 8 hours PRN    aluminum hydroxide/magnesium hydroxide/simethicone Suspension 30 milliLiter(s) Oral every 4 hours PRN  lactulose Syrup 20 Gram(s) Oral every 12 hours  polyethylene glycol 3350 17 Gram(s) Oral two times a day      multivitamin 1 Tablet(s) Oral daily  sodium chloride 1 Gram(s) Oral three times a day      FAMILY HISTORY:  FH: diabetes mellitus (Mother)    FH: pulmonary embolism (Mother)    Family history of abdominal aortic aneurysm (AAA) (Father)        Non-contributory    SOCIAL HISTORY:    Not a current smoker  Allergies    Cipro (Rash)  QUINOLONES (Unknown)    Intolerances    	    REVIEW OF SYSTEMS:  CONSTITUTIONAL: No fever  EYES: No eye pain, visual disturbances, or discharge  ENMT:  No difficulty hearing, tinnitus  NECK: No pain or stiffness  RESPIRATORY: + SOB  CARDIOVASCULAR: + Palpitations  GASTROINTESTINAL:  No nausea, vomiting, diarrhea or constipation. No melena.  GENITOURINARY: No dysuria, hematuria  NEUROLOGICAL: No stroke like symptoms  SKIN: No burning or lesions   ENDOCRINE: No heat or cold intolerance  MUSCULOSKELETAL: + LE swelling  PSYCHIATRIC: No  anxiety, mood swings  HEME/LYMPH: No bleeding gums  ALLERGY AND IMMUNOLOGIC: No hives or eczema	    All other ROS negative    PHYSICAL EXAM:  T(C): 37 (04-04-22 @ 11:15), Max: 37 (04-04-22 @ 11:15)  HR: 120 (04-04-22 @ 11:15) (109 - 121)  BP: 135/77 (04-04-22 @ 11:15) (95/58 - 135/77)  RR: 20 (04-04-22 @ 11:15) (18 - 20)  SpO2: 97% (04-04-22 @ 11:15) (97% - 100%)  Wt(kg): --  I&O's Summary    03 Apr 2022 07:01  -  04 Apr 2022 07:00  --------------------------------------------------------  IN: 700 mL / OUT: 0 mL / NET: 700 mL        Appearance: Normal	  HEENT:   Normal oral mucosa, EOMI	  Cardiovascular:  S1 S2, No JVD,    Respiratory: Right lung diminished, LLL diminished  Psychiatry: Alert  Gastrointestinal:  Soft, Non-tender, + BS	  Skin: No rashes   Neurologic: Non-focal  Extremities:  +2 B/L  LE pitting edema  Vascular: Peripheral pulses palpable    	    	  	  CARDIAC MARKERS:  Labs personally reviewed by me                          8.5    7.24  )-----------( 387      ( 04 Apr 2022 07:39 )             29.1     04-04    132<L>  |  97  |  11  ----------------------------<  92  4.4   |  21<L>  |  0.57    Ca    8.9      04 Apr 2022 07:37  Mg     2.2     04-03    TPro  5.8<L>  /  Alb  2.8<L>  /  TBili  0.4  /  DBili  x   /  AST  27  /  ALT  19  /  AlkPhos  2506<H>  04-04          EKG: Personally reviewed by me - ST at 103 bpm  Radiology: Personally reviewed by me -     Transthoracic Echocardiogram (03.29.22 @ 08:30)   CONCLUSIONS:  1. Aortic valve not well visualized; probably normal.  2. Normal left ventricular internal dimensions and wall  thicknesses.  3. Normal left ventricular systolic function. No segmental  wall motion abnormalities.  4. The right ventricle is not well visualized; grossly  normal right ventricular systolic function. Unable to  evalute size.  5. There is a septal bounce suggesting right ventricular  volume and pressure overload.    Xray Chest 1 View- 04.03.22:  FINDINGS:  The heart is not well assessed on an AP film.  There are masslike densities again seen in the right apex, lateral right   lung and right lung base which may represent loculated pleural fluid or   masses. Pulmonary edema appears unchanged from the prior study.  There may be a small left pleural effusion.  There is no pneumothorax.  Diffuse osseous metastasis are again identified.  IMPRESSION:  Pulmonary edema.  Masslike densities in the right apex,lateral right lung and right lung   base, possibly loculated pleural fluid or masses.  Small left effusion.    Assessment /Plan:     50 y/o f with PMH of Gastric Ca (not currently on treatment), recurring pleural effusion [facility]o worsening shortness of breath and chest pain described as her chest pounding out of her chest. Symptoms are non-exertional and have been progressing over the last couple of days.     Problem/Plan #1: Chest Pain  -Troponin negative x2  -EKG shows Sinus Tach with no ischemic changes  -Echo from 3/29/22 shows normal LV function, no RWMA, no valvular dysfunction and possible RV overload  -CT Chest negative for PE  -Continue to monitor on tele  -Low probability that chest pain cardiac in origin.     Problem/Plan #2: Shortness of breath  -RV overload on recent TTE  -CXR reveals pulmonary edema, masslike densities in the right apex, lateral right lung and right lung  -CTA chest negative for PE, but shows large right loculated pleural effusion increased from prior, and complete consolidation of the RLL, patchy opacities in RML, small left pleural effusion, diffuse interlobular septal thickening, may be edema v lymphangitic carcinomatosis  - Agree with IV diuresis  -Appreciate CTSx and IR recs    Problem/Plan #3: Normocytic Anemia  -Iron studies per primary team  -Stable Hgb  -Mgmt per primary team    Problem/Plan #4: Chronic Pain  -Palliative Care team following  -Mgmt per primary team        Differential diagnosis and plan of care discussed with patient after the evaluation. Counseling on diet, nutritional counseling, weight management, exercise and medication compliance was done.   Advanced care planning/advanced directives discussed with patient/family. DNR status including forceful chest compressions to attempt to restart the heart, ventilator support/artificial breathing, electric shock, artificial nutrition, health care proxy, Molst form all discussed with pt. Pt wishes to consider. More than fifteen minutes spent on discussing advanced directives.     Louise Fitzgerald [facility]-BC  Richard Howard DO [facility]  Cardiovascular Medicine  [address] [place] [address], [address]  Office [phone]  Cell [phone]

## 2022-04-04 NOTE — CONSULT NOTE ADULT - ASSESSMENT
51 year old female with metastatic gastric cancer (bone metastasis and peritoneal carcinomatosis), prior SBO, multiple bowel resections, and recent hospitalization at [facility] for recurrent effusion S/P VATS and pleurx placement, now removed. Patient returning with progressive symptoms of dyspnea. Pulmonary consults for pleural effusions and new consolidation. CT chest reviewed which does not show a PE. There is a loculated right pleural effusion larger than prior with pockets which would be challenging to access for thoracentesis or chest tube. Also rll and rml consolidations and interlobular septal thickening.     Recommendations   -Agree with a course of abx.  -Can trial diuresis which may help but interlobular septal thickening likely representative of lymphangitic spread.  -There is a loculated right pleural effusion larger than prior with collections which are unfortunately challenging to access even with US guidance.  -We will ask our interventional pulmonologist to review the films.   -Would ask thoracic surgery if there is any intervention they can offer the patient.  -Would use Dilaudid for air hunger as well.   -Venous dopplers on 3/24 negative for VTE.  -Palliative care input greatly appreciated.      Dr. Chao Snyder, DO  Pulmonary and Critical Care Medicine Fellow   Available via Microsoft Teams - **Preferred**  [facility]ulmonary [facility] #[id number] ([facility]) / [phone] ([facility])  Pager:  [phone]

## 2022-04-04 NOTE — CONSULT NOTE ADULT - PROBLEM SELECTOR RECOMMENDATION 2
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  -Pt required 4 doses of PO Dilaudid 4mg for severe pain since 19:00 yesterday till 9am today  -Continue with regimen as follows:     -MS Contin 60mg BID as continued from prior hospitalization at [facility]     -Dilaudid 4mg PO Q4 hours for severe pain/dyspnea     -Dilaudid IV 0.75mg Q4 hours for breakthrough severe pain/dyspnea

## 2022-04-04 NOTE — PHYSICAL THERAPY INITIAL EVALUATION ADULT - PLANNED THERAPY INTERVENTIONS, PT EVAL
balance training/bed mobility training/gait training/transfer training balance training/bed mobility training/gait training/strengthening/transfer training

## 2022-04-04 NOTE — PROGRESS NOTE ADULT - PROBLEM SELECTOR PLAN 2
Pt is currently on no disease modifying therapy she only wants to try holistic treatments for her disease. Her goal is to be able to get on a plane to [place] to start treatment.  Palliative care luis banks  will also get Child counselling services for the patients 2 daughters who are at bedside.

## 2022-04-04 NOTE — PROGRESS NOTE ADULT - PROBLEM SELECTOR PLAN 7
Will  restart her on her recent DC pain meds   Palliative care eval appreciated  Continue with regimen as follows:     -MS Contin 60mg BID as continued from prior hospitalization at [facility]     -Dilaudid 4mg PO Q4 hours for severe pain/dyspnea     -Dilaudid IV 0.75mg Q4 hours for breakthrough severe pain/dyspnea  Relistor x1 dose for opioid induced constipation.

## 2022-04-04 NOTE — PROGRESS NOTE ADULT - SUBJECTIVE AND OBJECTIVE BOX
Andre Reyes, M.D.  Pager: [phone]  Office: [phone]    Patient is a 51y old  Female who presents with a chief complaint of chest pain for one day (04 Apr 2022 09:28)          SUBJECTIVE / OVERNIGHT EVENTS:    No acute overnight events.    ROS: ( - ) Fever, ( - )Chills,  ( - )Nausea/Vomiting, ( - ) Cough, ( - )Shortness of breath, ( - )Chest Pain    MEDICATIONS  (STANDING):  acetaminophen   IVPB .. 1000 milliGRAM(s) IV Intermittent once  lactulose Syrup 20 Gram(s) Oral every 6 hours  multivitamin 1 Tablet(s) Oral daily  polyethylene glycol 3350 17 Gram(s) Oral daily  sodium chloride 1 Gram(s) Oral three times a day    MEDICATIONS  (PRN):  acetaminophen     Tablet .. 650 milliGRAM(s) Oral every 6 hours PRN Temp greater or equal to 38C (100.4F)  aluminum hydroxide/magnesium hydroxide/simethicone Suspension 30 milliLiter(s) Oral every 4 hours PRN Dyspepsia  HYDROmorphone   Tablet 4 milliGRAM(s) Oral every 4 hours PRN Moderate Pain (4 - 6)  melatonin 3 milliGRAM(s) Oral at bedtime PRN Insomnia  methocarbamol 750 milliGRAM(s) Oral three times a day PRN Muscle Spasm  ondansetron Injectable 4 milliGRAM(s) IV Push every 8 hours PRN Nausea and/or Vomiting          T(C): 36.6 (04-04 @ 05:20), Max: 36.9 (04-04 @ 00:31)   HR: 121   BP: 104/51   RR: 20   SpO2: 100%    PHYSICAL EXAM:    CONSTITUTIONAL: NAD, well-developed, well-groomed  EYES: PERRLA; conjunctiva and sclera clear  ENMT: Moist oral mucosa, no pharyngeal injection or exudates; normal dentition  NECK: Supple, no palpable masses; no thyromegaly  RESPIRATORY: Normal respiratory effort; lungs are clear to auscultation bilaterally  CARDIOVASCULAR: Regular rate and rhythm, normal S1 and S2, no murmur/rub/gallop; No lower extremity edema; Peripheral pulses are 2+ bilaterally  ABDOMEN: Nontender to palpation, normoactive bowel sounds, no rebound/guarding; No hepatosplenomegaly  MUSCULOSKELETAL:  Normal gait; no clubbing or cyanosis of digits; no joint swelling or tenderness to palpation  PSYCH: A+O to person, place, and time; affect appropriate  NEUROLOGY: CN 2-12 are intact and symmetric; no gross sensory deficits   SKIN: No rashes; no palpable lesions      LABS:                        8.5    7.24  )-----------( 387      ( 04 Apr 2022 07:39 )             29.1      04-04    132<L>  |  97  |  11  ----------------------------<  92  4.4   |  21<L>  |  0.57    Ca    8.9      04 Apr 2022 07:37  Mg     2.2     04-03    TPro  5.8<L>  /  Alb  2.8<L>  /  TBili  0.4  /  DBili  x   /  AST  27  /  ALT  19  /  AlkPhos  2506<H>  04-04       CAPILLARY BLOOD GLUCOSE          RADIOLOGY & ADDITIONAL TESTS:    Imaging Personally Reviewed:  Consultant(s) Notes Reviewed:    Care Discussed with Consultants/Other Providers:   Andre Reyes, M.D.  Pager: [phone]  Office: [phone]    Patient is a 51y old  Female who presents with a chief complaint of chest pain for one day (04 Apr 2022 09:28)          SUBJECTIVE / OVERNIGHT EVENTS:    pt very short of breath upon my eval  pt was tearful.  c/o constipation    ROS: ( - ) Fever, ( - )Chills,  ( - )Nausea/Vomiting, ( - ) Cough, ( + )Shortness of breath, ( - )Chest Pain    MEDICATIONS  (STANDING):  acetaminophen   IVPB .. 1000 milliGRAM(s) IV Intermittent once  lactulose Syrup 20 Gram(s) Oral every 6 hours  multivitamin 1 Tablet(s) Oral daily  polyethylene glycol 3350 17 Gram(s) Oral daily  sodium chloride 1 Gram(s) Oral three times a day    MEDICATIONS  (PRN):  acetaminophen     Tablet .. 650 milliGRAM(s) Oral every 6 hours PRN Temp greater or equal to 38C (100.4F)  aluminum hydroxide/magnesium hydroxide/simethicone Suspension 30 milliLiter(s) Oral every 4 hours PRN Dyspepsia  HYDROmorphone   Tablet 4 milliGRAM(s) Oral every 4 hours PRN Moderate Pain (4 - 6)  melatonin 3 milliGRAM(s) Oral at bedtime PRN Insomnia  methocarbamol 750 milliGRAM(s) Oral three times a day PRN Muscle Spasm  ondansetron Injectable 4 milliGRAM(s) IV Push every 8 hours PRN Nausea and/or Vomiting          T(C): 36.6 (04-04 @ 05:20), Max: 36.9 (04-04 @ 00:31)   HR: 121   BP: 104/51   RR: 20   SpO2: 100%    PHYSICAL EXAM:    CONSTITUTIONAL: NAD, well-developed, well-groomed; frail appearing, tearful, depressed affect  EYES: PERRLA; conjunctiva and sclera clear  ENMT: Moist oral mucosa, no pharyngeal injection or exudates; normal dentition  NECK: Supple, no palpable masses; no thyromegaly  RESPIRATORY: tachypneic poor respiratory effort; lungs are clear to auscultation bilaterally  CARDIOVASCULAR: Regular rate and rhythm, normal S1 and S2, no murmur/rub/gallop; 3+ lower extremity edema; Peripheral pulses are 2+ bilaterally  ABDOMEN: Nontender to palpation, normoactive bowel sounds, no rebound/guarding; No hepatosplenomegaly  MUSCULOSKELETAL:  Normal gait; no clubbing or cyanosis of digits; no joint swelling or tenderness to palpation  PSYCH: A+O to person, place, and time; affect appropriate  NEUROLOGY: CN 2-12 are intact and symmetric; no gross sensory deficits   SKIN: No rashes; no palpable lesions      LABS:                        8.5    7.24  )-----------( 387      ( 04 Apr 2022 07:39 )             29.1      04-04    132<L>  |  97  |  11  ----------------------------<  92  4.4   |  21<L>  |  0.57    Ca    8.9      04 Apr 2022 07:37  Mg     2.2     04-03    TPro  5.8<L>  /  Alb  2.8<L>  /  TBili  0.4  /  DBili  x   /  AST  27  /  ALT  19  /  AlkPhos  2506<H>  04-04       CAPILLARY BLOOD GLUCOSE          RADIOLOGY & ADDITIONAL TESTS:    Imaging Personally Reviewed:  Consultant(s) Notes Reviewed:    Care Discussed with Consultants/Other Providers:

## 2022-04-04 NOTE — PHYSICAL THERAPY INITIAL EVALUATION ADULT - STRENGTHENING, PT EVAL
GOAL: Pt will improve bilateral LE strength to 3+/5, for increased limb stability, to facilitate gait in 4 weeks.

## 2022-04-04 NOTE — PHYSICAL THERAPY INITIAL EVALUATION ADULT - ADDITIONAL COMMENTS
CXR : Pulmonary edema. Mass like densities in the right apex, lateral right lung and right lung base, possibly loculated pleural fluid or masses. Small left effusion.  CTA chest: No pulmonary embolus within the main pulmonary arteries. The lobar, segmental and subsegmental branches of the pulmonary arteries are not evaluated secondary to streak artifact and motion. Large loculated right pleural effusion is slightly increased from 3/24/2022. Previously noted right chest tube is removed. Complete consolidation of right lower lobe is increased. Patchy opacities within right middle lobe are slightly increased. Small left pleural effusion with extension into the left fissure is increased. New perihilar consolidation within left lower lobe. Diffuse interlobular septal thickening may represent edema or lymphangitic carcinomatosis.

## 2022-04-04 NOTE — CONSULT NOTE ADULT - PROBLEM SELECTOR RECOMMENDATION 6
-Pt pursuing alternative medicine treatment for gastric cancer -Iron studies per primary team  -Stable Hgb for now, trend per primary team -Deferred GOC, pending symptom control as pt is currently experiencing severe symptoms as above

## 2022-04-04 NOTE — PROGRESS NOTE ADULT - PROBLEM SELECTOR PLAN 1
CT PE studies : limited studies with no PE , but shows Large Rt Loculated Pleural effusion that is slightly increased and complete consolidation of the RLL, patchy opacities in RML , small left pleural effusion , Diffuse interlobular septal thickening , may be edema Vs Lymphagitic carcinomatosis.    CW with O2 supplement via Nasal Cannula   Monitor SPO2   I spoke with IR consult and  CT surgery - at this time they cannot offer another pleurex due to loculation and positioning of the effusion  Will get pulm eval to see if TPA could help these effusion  Given how sick the patient is I will be empirically started Zosyn for the RML consolidation, although it may just be maligancy  incentive spirometry as tolerated

## 2022-04-04 NOTE — CONSULT NOTE ADULT - PROBLEM SELECTOR RECOMMENDATION 3
Conservative management per primary team  -Pain control as above Pt regularly has daily BMs  -LBM was 48 hours ago per pt  -Continue lactulose 20mg BID and miralax BID, titrate to 1BM a day

## 2022-04-04 NOTE — CONSULT NOTE ADULT - NS ATTEND OPT1 GEN_ALL_CORE
no edema I attest my time as attending is greater than 50% of the total combined time spent on qualifying patient care activities by the PA/NP and attending.

## 2022-04-04 NOTE — PHYSICAL THERAPY INITIAL EVALUATION ADULT - PRECAUTIONS/LIMITATIONS, REHAB EVAL
(continued from above) Pt was recently admitted to [facility] ( 3/23-4/1)  with same complaint, had thoracentesis and paracentesis. During her hospitalization CT revealed interlobular septal thickening suggesting lymphangitic carcinomatosis and patchy opacities in the right middle and lower lobe. CT surg removed pleurex catheter on 3/28/22. Pt refused conventional chemo and is seeking alternative medicine for cancer as outpatient. CT PE studies: limited studies with no PE, but shows large Rt Loculated Pleural effusion that is slightly increased and complete consolidation of the RLL, patichy opacities in RML , small left pleural effusion , Diffuse interlobular septal thickening. May be edema Vs Lymphagitic carcinomatosis./fall precautions

## 2022-04-04 NOTE — CONSULT NOTE ADULT - PROBLEM SELECTOR RECOMMENDATION 5
Pt has mild chronic hyponatremia  -Consider SIADH 2/2 pain, malignancy, per prior studies showing low serum osmolarity to [id number], elevated urine osmolarity to 771, urine studies with FENA <1%  -Current hyponatremia w/u per primary team Per documentation, pt has hx of metastatic gastric cancer and multiple abdominal surgeries with hx of SBO   -Last heme onc appt with Dr. Skinny Ibanez 7/2021 per documentation.  -Alk Phos elevated to 2506 on admission  -Pt pursuing alternative medicine treatment for gastric cancer as below -Pt pursuing alternative medicine treatment in [place] for gastric cancer  -Deferred GOC pending symptom control as pt is currently experiencing severe symptoms as above

## 2022-04-04 NOTE — PHYSICAL THERAPY INITIAL EVALUATION ADULT - TRANSFER TRAINING, PT EVAL
GOAL: Pt will perform ALL transfers with CG assist, w/use of appropriate assistive device as needed, in 4 weeks.

## 2022-04-04 NOTE — PROGRESS NOTE ADULT - ASSESSMENT
52 y/o f with pmhx Gastric Ca, not currently on treatment with recurring pleural effusion,  presents from [facility] for concern of worsening shortness of breath. Patient was recently admitted to [facility] ( 3/23-4/1)  with same complaint and had a thoracentesis and paracentesis. 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  Ct surg removed pleurex catheter on 3/28/22 and pt was to follow up with Dr Daniels in 10-14 days. Patient refused conventional chemo and is seeking alternative medicine for cancer as outpatient. During the recent hosp admission , pt was being followed by Palliative care team .  In the ED , she remains hemodynamically stable on 5 L NC .  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

## 2022-04-04 NOTE — PHYSICAL THERAPY INITIAL EVALUATION ADULT - REFERRING PHYSICIAN, REHAB EVAL
Shiela Chang. Consult- PT evaluate and treat,
Pt brought in by EMS from home after dispute with her mom and pt has been non complaint with meds. Pt denies SI/HI.

## 2022-04-04 NOTE — CHART NOTE - NSCHARTNOTEFT_GEN_A_CORE
Received call from primary team as pt is known to Dr. Daniels and underwent recent Pleurx removal at [facility].   Pt was d/c'd to rehab from [facility] 4/1, returned c/o SOB and was subsequently admitted.   CT chest performed on admission showed large loculated right pleural effusion is slightly increased from 3/24/2022.   D/w Dr. Daniels - no thoracic surgery intervention warranted at this time.   Recommendations d/w primary team

## 2022-04-04 NOTE — PHYSICAL THERAPY INITIAL EVALUATION ADULT - PERTINENT HX OF CURRENT PROBLEM, REHAB EVAL
50 y/o f with pmhx Gastric CA, not currently on treatment with recurring pleural effusion, now presents from [facility]/rehab for worsening SOB.

## 2022-04-04 NOTE — CONSULT NOTE ADULT - SUBJECTIVE AND OBJECTIVE BOX
PULMONARY SERVICE INITIAL CONSULT NOTE    HPI:  50 y/o f with pmhx Gastric Ca, not currently on treatment with recurring pleural effusion,  presents from [facility] for concern of worsening shortness of breath. Patient was recently admitted to [facility] ( 3/23-4/1) with same complaint and had a thoracentesis and paracentesis. 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 CTS removed pleurex catheter on 3/28/22 due ot minimal output and pt was to follow up with Dr Daniels in 10-14 days. Patient refused conventional chemo and is seeking alternative medicine for cancer as outpatient. Patient presenting with progressive symptoms of dyspnea. She has dyspnea at rest and with exertion. She denies fever or chills.     REVIEW OF SYSTEMS:  All additional ROS negative.    PAST MEDICAL & SURGICAL HISTORY:  Gastric cancer  5/2011 no adjunt trement    Small bowel obstruction  Exp lap complicated with post op Ishemic bowel /perforation    Perforated bowel    Other abdominal hernia    Obesity (BMI 30-39.9)    History of gastrectomy  distal gastrectomy w/ B2 reconstruction (gastrojejunostomy)    History of resection of small bowel  exlap, SBR, meckel&#x27;s diverticulectomy (7/4/2015); exlap, SBR for necrotic bowel, left in discontinuity, Abthera (7/9/2015); exlap, SB anastomosis, Abthera (7/11/15); exlap, washout, abdominal closure w/ Strattice (7/13/15)        FAMILY HISTORY:  FH: diabetes mellitus (Mother)    FH: pulmonary embolism (Mother)    Family history of abdominal aortic aneurysm (AAA) (Father)        SOCIAL HISTORY:  Smoking Status: [ ] Current, [ ] Former, [ x] Never    MEDICATIONS:  Pulmonary:    Antimicrobials:  piperacillin/tazobactam IVPB.. 3.375 Gram(s) IV Intermittent every 8 hours    Anticoagulants:    Onc:    GI/:  aluminum hydroxide/magnesium hydroxide/simethicone Suspension 30 milliLiter(s) Oral every 4 hours PRN  lactulose Syrup 20 Gram(s) Oral every 12 hours  methylnaltrexone Injectable 12 milliGRAM(s) SubCutaneous once  polyethylene glycol 3350 17 Gram(s) Oral two times a day    Endocrine:    Cardiac:  buMETAnide Injectable 2 milliGRAM(s) IV Push two times a day    Other Medications:  acetaminophen     Tablet .. 650 milliGRAM(s) Oral every 6 hours PRN  acetaminophen   IVPB .. 1000 milliGRAM(s) IV Intermittent once  ALPRAZolam 0.25 milliGRAM(s) Oral every 6 hours PRN  HYDROmorphone   Tablet 4 milliGRAM(s) Oral every 4 hours PRN  HYDROmorphone  Injectable 0.75 milliGRAM(s) IV Push every 4 hours PRN  melatonin 3 milliGRAM(s) Oral at bedtime PRN  methocarbamol 750 milliGRAM(s) Oral three times a day PRN  morphine ER Tablet 60 milliGRAM(s) Oral every 12 hours  multivitamin 1 Tablet(s) Oral daily  ondansetron Injectable 4 milliGRAM(s) IV Push every 8 hours PRN  sodium chloride 1 Gram(s) Oral three times a day      Allergies    Cipro (Rash)  QUINOLONES (Unknown)    Intolerances        PHYSICAL EXAM  Vital Signs Last 24 Hrs  T(C): 36.9 (04 Apr 2022 16:39), Max: 37 (04 Apr 2022 11:15)  T(F): 98.4 (04 Apr 2022 16:39), Max: 98.6 (04 Apr 2022 11:15)  HR: 110 (04 Apr 2022 16:39) (110 - 121)  BP: 101/61 (04 Apr 2022 16:39) (95/58 - 135/77)  BP(mean): --  RR: 20 (04 Apr 2022 16:39) (18 - 20)  SpO2: 96% (04 Apr 2022 16:39) (96% - 100%)    04-03 @ 07:01  -  04-04 @ 07:00  --------------------------------------------------------  IN: 700 mL / OUT: 0 mL / NET: 700 mL          CONSTITUTIONAL: No acute distress.   HEENT:  Conjunctiva clear B/L.  Moist oral mucosa.   Cardiovascular: RRR with no murmurs. No JVD noted. 2+lower extremity edema B/L. Extremities are warm and well perfused.    Respiratory: Dec bs. No accessory muscle use.   Gastrointestinal:  Soft, nontender. Non-distended. Non-rigid.    Neurologic:  Alert and awake. Moving all extremities. Following commands.    Skin:  No gross rashes notes.      LABS:      CBC Full  -  ( 04 Apr 2022 07:39 )  WBC Count : 7.24 K/uL  RBC Count : 3.30 M/uL  Hemoglobin : 8.5 g/dL  Hematocrit : 29.1 %  Platelet Count - Automated : 387 K/uL  Mean Cell Volume : 88.2 fl  Mean Cell Hemoglobin : 25.8 pg  Mean Cell Hemoglobin Concentration : 29.2 gm/dL  Auto Neutrophil # : 4.62 K/uL  Auto Lymphocyte # : 1.01 K/uL  Auto Monocyte # : 1.00 K/uL  Auto Eosinophil # : 0.16 K/uL  Auto Basophil # : 0.07 K/uL  Auto Neutrophil % : 63.8 %  Auto Lymphocyte % : 14.0 %  Auto Monocyte % : 13.8 %  Auto Eosinophil % : 2.2 %  Auto Basophil % : 1.0 %    04-04    132<L>  |  97  |  11  ----------------------------<  92  4.4   |  21<L>  |  0.57    Ca    8.9      04 Apr 2022 07:37  Mg     2.2     04-03    TPro  5.8<L>  /  Alb  2.8<L>  /  TBili  0.4  /  DBili  x   /  AST  27  /  ALT  19  /  AlkPhos  2506<H>  04-04    PT/INR - ( 03 Apr 2022 09:31 )   PT: 14.0 sec;   INR: 1.20 ratio         PTT - ( 03 Apr 2022 09:31 )  PTT:31.4 sec                  RADIOLOGY & ADDITIONAL STUDIES:

## 2022-04-04 NOTE — PHYSICAL THERAPY INITIAL EVALUATION ADULT - PHYSICAL ASSIST/NONPHYSICAL ASSIST: SIT/SUPINE, REHAB EVAL
nonverbal cues (demo/gestures)/1 person assist/1 person + 1 person to manage equipment/2 person assist

## 2022-04-04 NOTE — CONSULT NOTE ADULT - PROBLEM SELECTOR RECOMMENDATION 8
-Pt pursuing alternative medicine treatment in [place] for gastric cancer  -Deferred GOC pending symptom control as pt is currently experiencing severe symptoms as above

## 2022-04-04 NOTE — CONSULT NOTE ADULT - PROBLEM SELECTOR RECOMMENDATION 7
Pt has mild chronic hyponatremia  -May be 2/2 SIADH from pain, malignancy. Per prior data in [facility], low serum osmolarity to urine 276, elevated urine osmolarity to 771, urine studies with FENA <1%  -Current hyponatremia w/u per primary team  -Pt A/Ox3, does not appear symptomatic at this time We will continue to follow for management of pain, dyspnea, anxiety. Will explore GOC once her symptoms improve given overall disease and symptom burden   Please page Geriatrics and Palliative Medicine Team at [phone] for additional assistance with symptom management during after hours (4pm-8am)

## 2022-04-04 NOTE — PHYSICAL THERAPY INITIAL EVALUATION ADULT - GENERAL OBSERVATIONS, REHAB EVAL
Pt received supine in bed, A&Ox4, +IV, +O2 via NC Pt received supine in bed, A&Ox4, +IV, +O2 via NC, dyspnea when speaking

## 2022-04-04 NOTE — CONSULT NOTE ADULT - PROBLEM SELECTOR RECOMMENDATION 4
-Iron studies per primary team  -Stable Hgb, will follow Pt regularly has daily BMs  -LBM was 48 hours ago per pt  -Continue lactulose 20mg BID and miralax BID, titrate to 1BM a day Per documentation, pt has hx of metastatic gastric cancer and multiple abdominal surgeries with hx of SBO   -Last heme onc appt with Dr. Skinny Ibanez 7/2021 per documentation.  -Alk Phos elevated to 2506 on admission  -Pt pursuing alternative medicine treatment for gastric cancer as below

## 2022-04-04 NOTE — CONSULT NOTE ADULT - ASSESSMENT
51 yoF with PMH of Gastric cancer, not on conventional chemotherapy and pursuing alternative medicine for treatment, presenting with SOB and recurrent pleural effusion, s/p thoracentesis and paracentesis at [facility] (3/23-4/1), s/p Pleurx catheter removal by CT surgery on 3/28/22 51 yoF with PMH of Gastric cancer, not on conventional chemotherapy and pursuing alternative medicine for treatment, presenting with SOB and recurrent pleural effusion, s/p thoracentesis and paracentesis at [facility] (3/23-4/1), s/p Pleurx catheter removal by CT surgery on 3/28/22. 51 yoF with PMH of Gastric cancer, not on conventional chemotherapy and pursuing alternative medicine for treatment, presenting with SOB and recurrent pleural effusion, s/p thoracentesis and paracentesis at [facility] (3/23-4/1), s/p Pleurx catheter removal by CT surgery on 3/28/22. Geriatrics and Palliative Medicine Team is consulted for assistance with pain control

## 2022-04-04 NOTE — PROGRESS NOTE ADULT - PROBLEM SELECTOR PLAN 4
Most likely multifactorial from pain/ poor oral in take and pulm disease and hypervolemia  Will cont to trend the Na   Fluid restriction  start bumex 2mg IV BID

## 2022-04-04 NOTE — PHYSICAL THERAPY INITIAL EVALUATION ADULT - MANUAL MUSCLE TESTING RESULTS, REHAB EVAL
RUE 4/5, L shoulder not assessed 2/2 clavicle fx, R elbow flex, wrist flex grossly at least 3/5 throughout , BLE grossly 3-/5, limited 2/2 body habitus/grossly assessed due to

## 2022-04-04 NOTE — PHYSICAL THERAPY INITIAL EVALUATION ADULT - LIVES WITH, PROFILE
Pt lives with 2 daughters (ages 10 and 16) in co-op apartment with 3 steps to enter and wide b/l HR.  Pt reports she moves around apartment in borrowed WC./children

## 2022-04-04 NOTE — CHART NOTE - NSCHARTNOTEFT_GEN_A_CORE
Notified by RN that pt is complaining of uncontrolled pain.   Saw and examined pt at bedside. Pt states she has not been getting the pain medication she was getting at rehab and that she has increased pain since her chest tube was removed. Pt states she has 10/10 generalized pain. Pt is also complaining of SOB which she presented to the ED with.     Vital Signs Last 24 Hrs  T(C): 36.9 (04 Apr 2022 00:31), Max: 36.9 (04 Apr 2022 00:31)  T(F): 98.4 (04 Apr 2022 00:31), Max: 98.4 (04 Apr 2022 00:31)  HR: 113 (04 Apr 2022 00:31) (62 - 117)  BP: 109/67 (04 Apr 2022 00:31) (95/58 - 127/80)  BP(mean): --  RR: 20 (04 Apr 2022 00:31) (16 - 25)  SpO2: 98% (04 Apr 2022 00:31) (89% - 100%)    < from: CT Angio Chest PE Protocol w/ IV Cont (04.03.22 @ 11:14) >  ACC: [id number] EXAM:  CT ANGIO CHEST PULM [facility]                        PROCEDURE DATE:  04/03/2022    INTERPRETATION:  INDICATION, CLINICAL INFORMATION: Shortness of breath  TECHNIQUE: CT pulmonary angiogram of the chest . Uneventful   administration of 90 cc Omnipaque 350. Coronal, sagittal and 3-D/MIP   images were reconstructed/reviewed.  COMPARISON: 3/24/2022 chest CT.  FINDINGS:  PULMONARY VESSELS: No pulmonary embolus within the main pulmonary   arteries. The lobar, segmental and subsegmental branches of the pulmonary   arteries are not evaluated secondary to streak artifact and motion.  HEART AND VASCULATURE: Heart size is normal. No pericardial effusion. No   aortic aneurysm or dissection.  LUNGS, AIRWAYS, PLEURA: Patent central airways. Large loculated right   pleural effusion is slightly increased. Previously noted right chest tube   is removed. Complete consolidation of right lower lobe is increased.   Patchy opacities within right middle lobe are slightly increased. Small   left pleural effusion with extension into the left fissure is increased.   New perihilar consolidation within left lower lobe. Diffuse interlobular   septal thickening may represent edema or lymphangitic carcinomatosis.  MEDIASTINUM: No mass or lymphadenopathy.  UPPER ABDOMEN: Status post partial gastrectomy with partially visualized   gastrojejunostomy stent. Partially visualized ascites  BONES AND SOFT TISSUES: Anasarca. Diffuse osseous metastases are again   noted.  LOWER NECK: Within normal limits.    IMPRESSION:    No pulmonary embolus within the main pulmonary arteries. The lobar,   segmental and subsegmental branches of the pulmonary arteries are not   evaluated secondary to streak artifact and motion.    Large loculated right pleural effusion is slightly increased from   3/24/2022. Previously noted right chest tube is removed. Complete   consolidation of right lower lobe is increased. Patchy opacities within   right middle lobe are slightly increased. Small left pleural effusion   with extension into the left fissure is increased. New perihilar   consolidation within left lower lobe. Diffuse interlobular septal   thickening may represent edema or lymphangitic carcinomatosis.  --- End of Report ---  GEETHA FAN MD; Attending Radiologist  This document has been electronically signed. Apr  3 2022 12:14PM    < end of copied text >      Pt appears uncomfortable and is unable to speak in full sentences without SOB. VSS with slight tachycardia. Pt on 2L NC saturating at 99%. Pt had these symptoms on arrival. A large reaccumulation of R pleural effusion was noted on imaging (CT results above) with IR consulted for possible drain placement.     Pt has standing 4mg Dilaudid q4hrs. Pt received IV Dilaudid in the ED. 15mg morphine was given overnight with minimal relief. Pt has clavicle fracture which lidocaine patch was applied. Pt refused IV tylenol. Rehab paperwork in chart shows pt taking 60mg ER morphine BID. See iSTOP note in chart.   Discussed pain regimen with [facility]. Recommended 6-10mg of IV morphine for immediate pain relief. 1x dose of 60mg ER morphine ordered. Palliative care consult is pending for pain management.     Plan  - 6mg IV morphine  - 60mg ER morphine 7am  - palliative consult for pain management  - f/u with AM team  - f/u with IR rec for pleural effusion  - continue to monitor especially for signs of respiratory depression or lethargy    Kortney Montes PA-C  Department of Medicine Notified by RN that pt is complaining of uncontrolled pain.   Saw and examined pt at bedside. Pt states she has not been getting the pain medication she was getting at rehab and that she has increased pain since her chest tube was removed. Pt states she has 10/10 generalized pain. Pt is also complaining of SOB which she presented to the ED with.     Vital Signs Last 24 Hrs  T(C): 36.9 (04 Apr 2022 00:31), Max: 36.9 (04 Apr 2022 00:31)  T(F): 98.4 (04 Apr 2022 00:31), Max: 98.4 (04 Apr 2022 00:31)  HR: 113 (04 Apr 2022 00:31) (62 - 117)  BP: 109/67 (04 Apr 2022 00:31) (95/58 - 127/80)  BP(mean): --  RR: 20 (04 Apr 2022 00:31) (16 - 25)  SpO2: 98% (04 Apr 2022 00:31) (89% - 100%)    < from: CT Angio Chest PE Protocol w/ IV Cont (04.03.22 @ 11:14) >  ACC: [id number] EXAM:  CT ANGIO CHEST PULM [facility]                        PROCEDURE DATE:  04/03/2022    INTERPRETATION:  INDICATION, CLINICAL INFORMATION: Shortness of breath  TECHNIQUE: CT pulmonary angiogram of the chest . Uneventful   administration of 90 cc Omnipaque 350. Coronal, sagittal and 3-D/MIP   images were reconstructed/reviewed.  COMPARISON: 3/24/2022 chest CT.  FINDINGS:  PULMONARY VESSELS: No pulmonary embolus within the main pulmonary   arteries. The lobar, segmental and subsegmental branches of the pulmonary   arteries are not evaluated secondary to streak artifact and motion.  HEART AND VASCULATURE: Heart size is normal. No pericardial effusion. No   aortic aneurysm or dissection.  LUNGS, AIRWAYS, PLEURA: Patent central airways. Large loculated right   pleural effusion is slightly increased. Previously noted right chest tube   is removed. Complete consolidation of right lower lobe is increased.   Patchy opacities within right middle lobe are slightly increased. Small   left pleural effusion with extension into the left fissure is increased.   New perihilar consolidation within left lower lobe. Diffuse interlobular   septal thickening may represent edema or lymphangitic carcinomatosis.  MEDIASTINUM: No mass or lymphadenopathy.  UPPER ABDOMEN: Status post partial gastrectomy with partially visualized   gastrojejunostomy stent. Partially visualized ascites  BONES AND SOFT TISSUES: Anasarca. Diffuse osseous metastases are again   noted.  LOWER NECK: Within normal limits.    IMPRESSION:    No pulmonary embolus within the main pulmonary arteries. The lobar,   segmental and subsegmental branches of the pulmonary arteries are not   evaluated secondary to streak artifact and motion.    Large loculated right pleural effusion is slightly increased from   3/24/2022. Previously noted right chest tube is removed. Complete   consolidation of right lower lobe is increased. Patchy opacities within   right middle lobe are slightly increased. Small left pleural effusion   with extension into the left fissure is increased. New perihilar   consolidation within left lower lobe. Diffuse interlobular septal   thickening may represent edema or lymphangitic carcinomatosis.  --- End of Report ---  GEETHA FAN MD; Attending Radiologist  This document has been electronically signed. Apr  3 2022 12:14PM    < end of copied text >      Pt appears uncomfortable and is unable to speak in full sentences without SOB. VSS with slight tachycardia. Pt on 2L NC saturating at 99%. Pt had these symptoms on arrival. A large reaccumulation of R pleural effusion was noted on imaging (CT results above) with IR consulted for possible drain placement.     Pt has standing 4mg Dilaudid q4hrs. Pt received IV Dilaudid in the ED. 15mg morphine was given overnight with minimal relief. Pt has clavicle fracture which lidocaine patch was applied. Pt refused IV tylenol. Rehab paperwork in chart shows pt taking 60mg ER morphine BID. See iSTOP note in chart.   [facility] Recommended 6-10mg of IV morphine for immediate pain relief. 1x dose of 60mg ER morphine ordered. Palliative care consult is pending for pain management.     Plan  - 6mg IV morphine  - 60mg ER morphine 7am  - palliative consult for pain management  - f/u with AM team  - f/u with IR rec for pleural effusion  - continue to monitor especially for signs of respiratory depression or lethargy    Kortney Montes PA-C  Department of Medicine

## 2022-04-05 NOTE — PROGRESS NOTE ADULT - PROBLEM SELECTOR PLAN 3
Hemo is stable   Cont to closely monitor Pt is currently on no disease modifying therapy she only wants to try holistic treatments for her disease. Her goal is to be able to get on a plane to [place] to start treatment which is on 4/17.  Palliative care luis appreciated  Child counselling services for the patients 2 daughters was contacted.

## 2022-04-05 NOTE — PROGRESS NOTE ADULT - PROBLEM SELECTOR PLAN 3
Pt regularly has daily BMs  -LBM was 48 hours ago per pt  -Continue lactulose 20mg BID and miralax BID, titrate to 1BM a day. Pt regularly has daily BMs  -LBM was last night per pt, s/p 1 dose of Relistor yesterday  -Continue lactulose 20mg BID and miralax BID, titrate to at least 1BM a day

## 2022-04-05 NOTE — PROGRESS NOTE ADULT - PROBLEM SELECTOR PLAN 5
Ortho f/up   pain control Most likely multifactorial from pain/ poor oral in take and pulm disease and hypervolemia  Will cont to trend the Na   Fluid restriction  c/w bumex 2mg IV BID

## 2022-04-05 NOTE — PROGRESS NOTE ADULT - ASSESSMENT
51 year old female with metastatic gastric cancer (bone metastasis and peritoneal carcinomatosis), prior SBO, multiple bowel resections, and recent hospitalization at [facility] for recurrent effusion S/P VATS and pleurx placement, now removed (3/28 for decreased output). Patient returning with progressive symptoms of dyspnea. Pulmonary consulted for pleural effusions and new consolidation. CT chest reviewed which does not show a PE. There is a loculated right pleural effusion larger than prior with pockets which would be challenging to access for thoracentesis or chest tube. Also rll and rml consolidations and interlobular septal thickening.     Recommendations   -Agree with a course of abx.  -Can trial diuresis which may help but interlobular septal thickening likely representative of lymphangitic spread.  -There is a loculated right pleural effusion larger than prior with collections which are unfortunately challenging to access even with US guidance.  -We will ask our interventional pulmonologist to review the films.   -Appreciated Dr. Daniels (Thoracic surgery) input. No intervention recommending on their end.   -Would use Dilaudid for air hunger as well.   -Venous dopplers on 3/24 negative for VTE.  -Palliative care input greatly appreciated.      Dr. Chao Snyder, DO  Pulmonary and Critical Care Medicine Fellow   Available via Microsoft Teams - **Preferred**  Pulmonary Spectra #[id number] ([facility]) / [phone] ([facility])  Pager:  [phone] 51 year old female with metastatic gastric cancer (bone metastasis and peritoneal carcinomatosis), prior SBO, multiple bowel resections, and recent hospitalization at [facility] for recurrent effusion S/P VATS and pleurx placement, now removed (3/28 for decreased output). Patient returning with progressive symptoms of dyspnea. Pulmonary consulted for pleural effusions and new consolidation. CT chest reviewed which does not show a PE. There is a loculated right pleural effusion larger than prior with pockets which would be challenging to access for thoracentesis or chest tube. Also rll and rml consolidations and interlobular septal thickening.     Recommendations   -Agree with a course of abx.  -Can trial diuresis which may help but interlobular septal thickening likely representative of lymphangitic spread.  -There is a loculated right pleural effusion larger than prior with collections which are unfortunately challenging to access even with US guidance.  -We will ask our interventional pulmonologist to review the films.   -Appreciated Dr. Daniels (Thoracic surgery) input. No intervention recommending on their end.   -Would use Dilaudid for air hunger as well.   -Venous dopplers on 3/24 negative for VTE.  -Palliative care input greatly appreciated.      Dr. Chao Snyder, DO  Pulmonary and Critical Care Medicine Fellow   Available via Microsoft Teams - **Preferred**  Pulmonary Spectra #[id number] ([facility]) / [phone] ([facility])  Pager:  [phone]

## 2022-04-05 NOTE — PROGRESS NOTE ADULT - SUBJECTIVE AND OBJECTIVE BOX
DATE OF SERVICE: 04-05-22 @ 12:53    Patient is a 51y old  Female who presents with a chief complaint of chest pain for one day (05 Apr 2022 12:20)      INTERVAL HISTORY: Feels ok    REVIEW OF SYSTEMS:  CONSTITUTIONAL: No weakness  EYES/ENT: No visual changes;  No throat pain   NECK: No pain or stiffness  RESPIRATORY: + SOB  CARDIOVASCULAR: No chest pain or palpitations  GASTROINTESTINAL: No abdominal  pain. No nausea, vomiting, or hematemesis  GENITOURINARY: No dysuria, frequency or hematuria  NEUROLOGICAL: No stroke like symptoms  SKIN: No rashes    	  MEDICATIONS:  buMETAnide Injectable 2 milliGRAM(s) IV Push two times a day        PHYSICAL EXAM:  T(C): 36.2 (04-05-22 @ 08:35), Max: 37.1 (04-04-22 @ 20:37)  HR: 100 (04-05-22 @ 08:35) (100 - 118)  BP: 110/69 (04-05-22 @ 08:35) (101/61 - 110/69)  RR: 18 (04-05-22 @ 08:35) (18 - 20)  SpO2: 97% (04-05-22 @ 08:35) (96% - 100%)  Wt(kg): --  I&O's Summary    05 Apr 2022 07:01  -  05 Apr 2022 12:53  --------------------------------------------------------  IN: 0 mL / OUT: 1000 mL / NET: -1000 mL        Appearance: In no distress	  HEENT:    PERRL, EOMI	  Cardiovascular:  S1 S2, No JVD  Respiratory: Diminished B/L	  Gastrointestinal:  Soft, Non-tender, + BS	  Vascularature:  + 2 B/L LE edema (improving)  Psychiatric: Appropriate affect   Neuro: no acute focal deficits                             8.5    7.24  )-----------( 387      ( 04 Apr 2022 07:39 )             29.1     04-04    132<L>  |  97  |  11  ----------------------------<  92  4.4   |  21<L>  |  0.57    Ca    8.9      04 Apr 2022 07:37    TPro  5.8<L>  /  Alb  2.8<L>  /  TBili  0.4  /  DBili  x   /  AST  27  /  ALT  19  /  AlkPhos  2506<H>  04-04        Labs personally reviewed      ASSESSMENT/PLAN: 	    52 y/o f with PMH of Gastric Ca (not currently on treatment), recurring pleural effusion [facility] shortness [facility] breath and chest pain described as her chest pounding out of her chest. Symptoms are non-exertional and have been progressing over the last couple of days.     Problem/Plan #1: Chest Pain  -Troponin negative x2  -EKG shows Sinus Tach with no ischemic changes  -Echo from 3/29/22 shows normal LV function, no RWMA, no valvular dysfunction and possible RV overload  -CT Chest negative for PE  -Low probability that chest pain cardiac in origin.     Problem/Plan #2: Shortness of breath  -RV overload on recent TTE  -CXR reveals pulmonary edema, masslike densities in the right apex, lateral right lung and right lung  -CTA chest negative for PE, but shows large right loculated pleural effusion increased from prior, and complete consolidation of the RLL, patchy opacities in RML, small left pleural effusion, diffuse interlobular septal thickening, may be edema v lymphangitic carcinomatosis  - IV diuresis--> patient reports significant decrease in shortness of breath and LE edema. C/w Bumex.  -Appreciate CTSx and IR recs    Problem/Plan #3: Tachycardia  -HR 90-110s i/s/o pain secondary to gastric cancer  -Continue pain mgmt as per palliative and primary team  -Continue to monitor HR    -Problem/Plan #4: Chronic Pain  -Palliative Care team following  -Mgmt per primary team    RANJIT Vences-STEFFANIE Howard DO [facility]  Cardiovascular Medicine  [address], [address]  Office: [phone]  Cell: [phone]

## 2022-04-05 NOTE — PROGRESS NOTE ADULT - PROBLEM SELECTOR PLAN 7
We will continue to follow for management of pain, dyspnea, anxiety. Will explore GOC once her symptoms improve given overall disease and symptom burden   Please page Geriatrics and Palliative Medicine Team at [phone] for additional assistance with symptom management during after hours (4pm-8am). We will continue to follow for management of pain, dyspnea, anxiety. GOC discussion on 4/5 as above  Please page Geriatrics and Palliative Medicine Team at [phone] for additional assistance with symptom management during after hours (4pm-8am).

## 2022-04-05 NOTE — PROGRESS NOTE ADULT - PROBLEM SELECTOR PLAN 6
Tro 31-->29   most likely from the Large pleural effusion   Last TTE with EF of 65%   Cardio eval Ortho f/up   pain control

## 2022-04-05 NOTE — PROGRESS NOTE ADULT - ASSESSMENT
52 y/o f with pmhx Gastric Ca, not currently on treatment with recurring pleural effusion,  presents from [facility] for concern of worsening shortness of breath. Patient was recently admitted to [facility] ( 3/23-4/1)  with same complaint and had a thoracentesis and paracentesis. 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  Ct surg removed pleurex catheter on 3/28/22 and pt was to follow up with Dr Daniels in 10-14 days. Patient refused conventional chemo and is seeking alternative medicine for cancer as outpatient. During the recent hosp admission , pt was being followed by Palliative care team .  In the ED , she remains hemodynamically stable on 5 L NC .  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

## 2022-04-05 NOTE — PROGRESS NOTE ADULT - CONVERSATION DETAILS
Spoke to patient about her goals. She would like to go on a plane to [place] where she states that everything is "set up" in terms of oncologic treatments and care with an established alternative medicine specialist. She is asking for PT to see her more regularly. She would like to have her pain and respiratory status optimized for her to be able to go on the plane. When we asked pt what would happen if her goals were not met, and her condition were to worsen, she states that she would like everything to be done in terms of CPR and intubation in order for her to "have a chance". She understands that the prognosis of her surviving these drastic measures would be very low given her severe comorbidities and extensive cancer. She states that child life specialists are arriving later today to help speak to her children about her prognosis per pt and family request. Explored goals of care with patient in conversation this afternoon. Pt confirms her HCP to be her sister, who is an occupational therapist and caregiver. Pt has young kids at home who are aware of her cancer diagnosis. Pt has connected with the child life specialist to help with communication with her children.     Pt is very treatment focused at this time. Her goal is to go on a plane to [place] where she states that everything is arranged in terms of oncologic treatments and care with an established alternative medicine specialist. She would like PT to see her more regularly while at [facility]. She would like to have her pain and respiratory status optimized for her to be able to go on the plane. On discussion of code status, pt clearly expressed her wish to remain full code. She understands that the prognosis of her surviving these drastic measures would be very low given her severe comorbidities and extensive cancer.  We discussed the possibility that her health may continue to decline despite our best efforts, and her goal of going to [place] may not be attainable in that situation. If that were to happen, I asked for consideration of hospice care where the focus of care would be symptom management and comfort, rather than treatment oriented. Pt expressed understanding.     Support provided to pt and advised we will adjust her medication regimen to optimize her breathing/pain/anxiety so she can hopefully safely discharge and travel to [place] in time.

## 2022-04-05 NOTE — PROGRESS NOTE ADULT - PROBLEM SELECTOR PLAN 5
Pt pursuing alternative medicine treatment in [place] for gastric cancer  -Deferred GOC pending symptom control as pt is currently experiencing severe symptoms as above. Pt pursuing alternative medicine treatment in [place] for gastric cancer  -GOC discussion 4/5 revealed pt would like to be full code and would like to pursue treatment as she still has hope for the future and she would like the best chance possible

## 2022-04-05 NOTE — PROGRESS NOTE ADULT - PROBLEM SELECTOR PLAN 1
Pt presented with SOB and chest pain, unable to speak in full sentences on 4L NC  -CTA chest negative for PE, but shows large right loculated pleural effusion increased from prior, and complete consolidation of the RLL, patchy opacities in RML, small left pleural effusion, diffuse interlobular septal thickening, may be edema v lymphangitic carcinomatosis. Trop trend negative, cardiology following, diuresis per primary and cardiology teams  -Would treat dyspnea with same regimen used for pain: due to increased need for PRNs over the past 24 hours, will change regimen as below:     -Increase MS Contin to 60mg TID standing (can give at same time as other PRN pain medications if required due to long-acting nature)     -Increase Dilaudid to 6mg PO Q4 hours for severe pain/dyspnea     -Dilaudid IV 0.75mg Q4 hours for breakthrough severe pain/dyspnea     -Change Xanax 0.25mg Q8 to standing for anxiety/dyspnea (not to be given within 1 hour of IV Dilaudid to prevent oversedation) Pt presented with SOB and chest pain, unable to speak in full sentences on 4L NC  -CTA chest negative for PE, but shows large right loculated pleural effusion increased from prior, and complete consolidation of the RLL, patchy opacities in RML, small left pleural effusion, diffuse interlobular septal thickening, may be edema v lymphangitic carcinomatosis. Trop trend negative, cardiology following, diuresis per primary and cardiology teams  -Would treat dyspnea with same regimen used for pain: due to increased need for PRNs over the past 24 hours, will change regimen as below:     -Increase MS Contin to 60mg Q8h standing (can give at same time as other PRN pain medications if required due to long-acting nature)     -Increase Dilaudid to 6mg PO Q4 hours for severe pain/dyspnea     -Dilaudid IV 0.75mg Q4 hours for breakthrough severe pain/dyspnea     -Change Xanax to Ativan and increase to 0.5mg Q8h to standing for anxiety/dyspnea (not to be given within 1 hour of IV Dilaudid to prevent oversedation)

## 2022-04-05 NOTE — PROGRESS NOTE ADULT - PROBLEM SELECTOR PLAN 6
Deferred GOC, pending symptom control as pt is currently experiencing severe symptoms as above. -GOC discussion 4/5 revealed pt would like to be full code and would like to pursue treatment as she still has hope for the future and she would like the best chance possible  -Will continue to follow pt with GOC discussions as indicated We will continue to follow for management of pain, dyspnea, anxiety. GOC discussion on 4/5 as above  Please page Geriatrics and Palliative Medicine Team at [phone] for additional assistance with symptom management during after hours (4pm-8am).

## 2022-04-05 NOTE — PROGRESS NOTE ADULT - PROBLEM SELECTOR PLAN 7
Will  restart her on her recent DC pain meds   Palliative care eval appreciated  Continue with regimen as follows:     -MS Contin 60mg BID as continued from prior hospitalization at [facility]     -Dilaudid 4mg PO Q4 hours for severe pain/dyspnea     -Dilaudid IV 0.75mg Q4 hours for breakthrough severe pain/dyspnea  Relistor x1 dose for opioid induced constipation. Tro 31-->29   most likely from the Large pleural effusion   Last TTE with EF of 65%   Cardio eval

## 2022-04-05 NOTE — PROGRESS NOTE ADULT - PROBLEM SELECTOR PLAN 1
CT PE studies : limited studies with no PE , but shows Large Rt Loculated Pleural effusion that is slightly increased and complete consolidation of the RLL, patchy opacities in RML , small left pleural effusion , Diffuse interlobular septal thickening , may be edema Vs Lymphagitic carcinomatosis.    CW with O2 supplement via Nasal Cannula   Monitor SPO2   I spoke with IR consult and  CT surgery - at this time they cannot offer another pleurex due to loculation and positioning of the effusion  Will get pulm eval to see if TPA could help these effusion  Given how sick the patient is I will be empirically started Zosyn for the RML consolidation, although it may just be maligancy  incentive spirometry as tolerated presumed to be from pleural effusion and from RML consolidation  - wean down oxygen as tolerated  - given possible pneumonia will c/w Zosyn  - incentive spirometry   - c/w bumex 2mg bid  - Given how sick the patient is I will c/w empric Zosyn for the RML consolidation, although it may just be malignancy

## 2022-04-05 NOTE — PROGRESS NOTE ADULT - PROBLEM SELECTOR PLAN 4
Per documentation, pt has hx of metastatic gastric cancer and multiple abdominal surgeries with hx of SBO   -Last heme onc appt with Dr. Skinny Ibanez 7/2021 per documentation.  -Alk Phos elevated to 2506 on admission  -Pt pursuing alternative medicine treatment for gastric cancer as below Per documentation, pt has hx of metastatic gastric cancer and multiple abdominal surgeries with hx of SBO   -Last heme onc appt with Dr. Skinny Ibanez 7/2021 per documentation.  -Alk Phos elevated to 2506 on admission  -Pt pursuing alternative medicine treatment for gastric cancer per GOC conversations

## 2022-04-05 NOTE — PROGRESS NOTE ADULT - SUBJECTIVE AND OBJECTIVE BOX
Andre Reyes, M.D.  Pager: [phone]  Office: [phone]    Patient is a 51y old  Female who presents with a chief complaint of chest pain for one day (04 Apr 2022 17:48)          SUBJECTIVE / OVERNIGHT EVENTS:    No acute overnight events.    ROS: ( - ) Fever, ( - )Chills,  ( - )Nausea/Vomiting, ( - ) Cough, ( - )Shortness of breath, ( - )Chest Pain    MEDICATIONS  (STANDING):  acetaminophen   IVPB .. 1000 milliGRAM(s) IV Intermittent once  buMETAnide Injectable 2 milliGRAM(s) IV Push two times a day  enoxaparin Injectable 40 milliGRAM(s) SubCutaneous every 24 hours  lactulose Syrup 20 Gram(s) Oral every 12 hours  morphine ER Tablet 60 milliGRAM(s) Oral every 12 hours  multivitamin 1 Tablet(s) Oral daily  piperacillin/tazobactam IVPB.. 3.375 Gram(s) IV Intermittent every 8 hours  polyethylene glycol 3350 17 Gram(s) Oral two times a day  sodium chloride 1 Gram(s) Oral three times a day    MEDICATIONS  (PRN):  acetaminophen     Tablet .. 650 milliGRAM(s) Oral every 6 hours PRN Temp greater or equal to 38C (100.4F)  ALPRAZolam 0.25 milliGRAM(s) Oral every 6 hours PRN anxiety or dyspnea  aluminum hydroxide/magnesium hydroxide/simethicone Suspension 30 milliLiter(s) Oral every 4 hours PRN Dyspepsia  HYDROmorphone   Tablet 4 milliGRAM(s) Oral every 4 hours PRN Moderate Pain (4 - 6)  HYDROmorphone  Injectable 0.75 milliGRAM(s) IV Push every 4 hours PRN Severe Pain (7 - 10)  melatonin 3 milliGRAM(s) Oral at bedtime PRN Insomnia  methocarbamol 750 milliGRAM(s) Oral three times a day PRN Muscle Spasm  ondansetron Injectable 4 milliGRAM(s) IV Push every 8 hours PRN Nausea and/or Vomiting          T(C): 36.2 (04-05 @ 08:35), Max: 37.1 (04-04 @ 20:37)   HR: 100   BP: 110/69   RR: 18   SpO2: 97%    PHYSICAL EXAM:    CONSTITUTIONAL: NAD, well-developed, well-groomed  EYES: PERRLA; conjunctiva and sclera clear  ENMT: Moist oral mucosa, no pharyngeal injection or exudates; normal dentition  NECK: Supple, no palpable masses; no thyromegaly  RESPIRATORY: Normal respiratory effort; lungs are clear to auscultation bilaterally  CARDIOVASCULAR: Regular rate and rhythm, normal S1 and S2, no murmur/rub/gallop; No lower extremity edema; Peripheral pulses are 2+ bilaterally  ABDOMEN: Nontender to palpation, normoactive bowel sounds, no rebound/guarding; No hepatosplenomegaly  MUSCULOSKELETAL:  Normal gait; no clubbing or cyanosis of digits; no joint swelling or tenderness to palpation  PSYCH: A+O to person, place, and time; affect appropriate  NEUROLOGY: CN 2-12 are intact and symmetric; no gross sensory deficits   SKIN: No rashes; no palpable lesions      LABS:                        8.5    7.24  )-----------( 387      ( 04 Apr 2022 07:39 )             29.1      04-04    132<L>  |  97  |  11  ----------------------------<  92  4.4   |  21<L>  |  0.57    Ca    8.9      04 Apr 2022 07:37    TPro  5.8<L>  /  Alb  2.8<L>  /  TBili  0.4  /  DBili  x   /  AST  27  /  ALT  19  /  AlkPhos  2506<H>  04-04       CAPILLARY BLOOD GLUCOSE          RADIOLOGY & ADDITIONAL TESTS:    Imaging Personally Reviewed:  Consultant(s) Notes Reviewed:    Care Discussed with Consultants/Other Providers:   Andre Reyes, M.D.  Pager: [phone]  Office: [phone]    Patient is a 51y old  Female who presents with a chief complaint of chest pain for one day (04 Apr 2022 17:48)          SUBJECTIVE / OVERNIGHT EVENTS:    No acute overnight events.  Pt reports feeling better today. Her shortness of breath is improved with the bumex  ROS: ( - ) Fever, ( - )Chills,  ( - )Nausea/Vomiting, ( - ) Cough, ( - )Shortness of breath, ( - )Chest Pain    MEDICATIONS  (STANDING):  acetaminophen   IVPB .. 1000 milliGRAM(s) IV Intermittent once  buMETAnide Injectable 2 milliGRAM(s) IV Push two times a day  enoxaparin Injectable 40 milliGRAM(s) SubCutaneous every 24 hours  lactulose Syrup 20 Gram(s) Oral every 12 hours  morphine ER Tablet 60 milliGRAM(s) Oral every 12 hours  multivitamin 1 Tablet(s) Oral daily  piperacillin/tazobactam IVPB.. 3.375 Gram(s) IV Intermittent every 8 hours  polyethylene glycol 3350 17 Gram(s) Oral two times a day  sodium chloride 1 Gram(s) Oral three times a day    MEDICATIONS  (PRN):  acetaminophen     Tablet .. 650 milliGRAM(s) Oral every 6 hours PRN Temp greater or equal to 38C (100.4F)  ALPRAZolam 0.25 milliGRAM(s) Oral every 6 hours PRN anxiety or dyspnea  aluminum hydroxide/magnesium hydroxide/simethicone Suspension 30 milliLiter(s) Oral every 4 hours PRN Dyspepsia  HYDROmorphone   Tablet 4 milliGRAM(s) Oral every 4 hours PRN Moderate Pain (4 - 6)  HYDROmorphone  Injectable 0.75 milliGRAM(s) IV Push every 4 hours PRN Severe Pain (7 - 10)  melatonin 3 milliGRAM(s) Oral at bedtime PRN Insomnia  methocarbamol 750 milliGRAM(s) Oral three times a day PRN Muscle Spasm  ondansetron Injectable 4 milliGRAM(s) IV Push every 8 hours PRN Nausea and/or Vomiting          T(C): 36.2 (04-05 @ 08:35), Max: 37.1 (04-04 @ 20:37)   HR: 100   BP: 110/69   RR: 18   SpO2: 97%    PHYSICAL EXAM:    CONSTITUTIONAL: NAD, well-developed, well-groomed; frail appearing, tearful, depressed affect  EYES: PERRLA; conjunctiva and sclera clear  ENMT: Moist oral mucosa, no pharyngeal injection or exudates; normal dentition  NECK: Supple, no palpable masses; no thyromegaly  RESPIRATORY: tachypneic poor respiratory effort; lungs are clear to auscultation bilaterally  CARDIOVASCULAR: Regular rate and rhythm, normal S1 and S2, no murmur/rub/gallop; 3+ lower extremity edema; Peripheral pulses are 2+ bilaterally  ABDOMEN: Nontender to palpation, normoactive bowel sounds, no rebound/guarding; No hepatosplenomegaly  MUSCULOSKELETAL:  Normal gait; no clubbing or cyanosis of digits; no joint swelling or tenderness to palpation  PSYCH: A+O to person, place, and time; affect appropriate  NEUROLOGY: CN 2-12 are intact and symmetric; no gross sensory deficits   SKIN: No rashes; no palpable lesions      LABS:                        8.5    7.24  )-----------( 387      ( 04 Apr 2022 07:39 )             29.1      04-04    132<L>  |  97  |  11  ----------------------------<  92  4.4   |  21<L>  |  0.57    Ca    8.9      04 Apr 2022 07:37    TPro  5.8<L>  /  Alb  2.8<L>  /  TBili  0.4  /  DBili  x   /  AST  27  /  ALT  19  /  AlkPhos  2506<H>  04-04       CAPILLARY BLOOD GLUCOSE          RADIOLOGY & ADDITIONAL TESTS:    Imaging Personally Reviewed:  Consultant(s) Notes Reviewed:    Care Discussed with Consultants/Other Providers:

## 2022-04-05 NOTE — PROGRESS NOTE ADULT - ASSESSMENT
51 yoF with PMH of Gastric cancer, not on conventional chemotherapy and pursuing alternative medicine for treatment, presenting with SOB and recurrent pleural effusion, s/p thoracentesis and paracentesis at [facility] (3/23-4/1), s/p Pleurx catheter removal by CT surgery on 3/28/22. Geriatrics and Palliative Medicine Team is consulted for assistance with pain control

## 2022-04-05 NOTE — PROGRESS NOTE ADULT - CONVERSATION/DISCUSSION
Prognosis/MOLST Discussed/Treatment Options/Chaplaincy Referral/Child Life Referral ([facility],KARINA)

## 2022-04-05 NOTE — PROGRESS NOTE ADULT - PROBLEM SELECTOR PLAN 2
Pt presenting with diffuse cancer-related pain in spine and abdomen.  -Would treat dyspnea with same regimen used for pain: due to increased need for PRNs over the past 24 hours, will change regimen as below:     -Increase MS Contin to 60mg TID standing (can give at same time as other PRN pain medications if required due to long-acting nature)     -Increase Dilaudid to 6mg PO Q4 hours for severe pain/dyspnea     -Dilaudid IV 0.75mg Q4 hours for breakthrough severe pain/dyspnea     -Change Xanax 0.25mg Q8 to standing for anxiety/dyspnea (not to be given within 1 hour of IV Dilaudid to prevent oversedation)

## 2022-04-05 NOTE — PROGRESS NOTE ADULT - PROBLEM SELECTOR PLAN 2
Pt is currently on no disease modifying therapy she only wants to try holistic treatments for her disease. Her goal is to be able to get on a plane to [place] to start treatment.  Palliative care luis banks  will also get Child counselling services for the patients 2 daughters who are at bedside. 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    CW with O2 supplement via Nasal Cannula   Monitor SPO2   I spoke with IR, Pulmonary and  CT surgery - at this time they cannot offer another Pleurx due to loculation and positioning of the effusion  c/w Bumex 2mg IV BID    incentive spirometry as tolerated

## 2022-04-05 NOTE — PROGRESS NOTE ADULT - SUBJECTIVE AND OBJECTIVE BOX
CC: dyspnea/weakness  SUBJECTIVE AND OBJECTIVE:  Indication for Geriatrics and Palliative Care Services/INTERVAL HPI: Palliative care consulted for symptom management for pain (spine-cancer pain, L shoulder-clavicle fracture, b/l legs due to edema) and dyspnea, as well as GOC discussions as pt tolerates.     OVERNIGHT EVENTS: Pt received 5 doses of IV PRN Dilaudid 0.75mg and 4 doses of oral PRN Dilaudid 4mg, with which pt found relief. Pt states that she prefers IV since it is faster acting, although oral was encouraged. Pt states that she recieved 2 doses of Xanax so far and was requesting a higher dose to feel more effect since she was still feeling anxious.    DNR on chart:  Allergies    Cipro (Rash)  QUINOLONES (Unknown)    Intolerances    MEDICATIONS  (STANDING):  acetaminophen   IVPB .. 1000 milliGRAM(s) IV Intermittent once  buMETAnide Injectable 2 milliGRAM(s) IV Push two times a day  enoxaparin Injectable 40 milliGRAM(s) SubCutaneous every 24 hours  lactulose Syrup 20 Gram(s) Oral every 12 hours  morphine ER Tablet 60 milliGRAM(s) Oral every 12 hours  multivitamin 1 Tablet(s) Oral daily  piperacillin/tazobactam IVPB.. 3.375 Gram(s) IV Intermittent every 8 hours  polyethylene glycol 3350 17 Gram(s) Oral two times a day  sodium chloride 1 Gram(s) Oral three times a day    MEDICATIONS  (PRN):  acetaminophen     Tablet .. 650 milliGRAM(s) Oral every 6 hours PRN Temp greater or equal to 38C (100.4F)  ALPRAZolam 0.25 milliGRAM(s) Oral every 6 hours PRN anxiety or dyspnea  aluminum hydroxide/magnesium hydroxide/simethicone Suspension 30 milliLiter(s) Oral every 4 hours PRN Dyspepsia  HYDROmorphone   Tablet 4 milliGRAM(s) Oral every 4 hours PRN Moderate Pain (4 - 6)  HYDROmorphone  Injectable 0.75 milliGRAM(s) IV Push every 4 hours PRN Severe Pain (7 - 10)  melatonin 3 milliGRAM(s) Oral at bedtime PRN Insomnia  methocarbamol 750 milliGRAM(s) Oral three times a day PRN Muscle Spasm  ondansetron Injectable 4 milliGRAM(s) IV Push every 8 hours PRN Nausea and/or Vomiting    ITEMS UNCHECKED ARE NOT PRESENT    PRESENT SYMPTOMS: [ ]Unable to self-report - see [x] CPOT [x] PAINADS [x] RDOS  Source if other than patient:  [ ]Family   [ ]Team     Pain:  [x]yes [ ]no  QOL impact - unable to perform ADLs   Location - spine-cancer pain, L shoulder-clavicle fracture, b/l LE from distension (pitting edema)  Aggravating factors - movement  Quality - spine- aching, L shoulder stabbing/aching, and legs are aching.   Radiation - unable to describe  Timing- constant  Severity (0-10 scale): 7-8   Minimal acceptable level (0-10 scale): 3    CPOT:  1  https://www.[facility].org/getattachment/[email or web address]/Critical-Care-Pain-Observation-Tool-(CPOT)    PAIN AD Score:	3  http://geriatrictoolkit.[facility]/cog/painad.pdf (Ctrl + left click to view)    Dyspnea:                           [ ]Mild [ ]Moderate [x]Severe    RDOS: 7  0 to 2  minimal or no respiratory distress   3  mild distress  4 to 6 moderate distress  >7 severe distress  https://[email or web address]/handouts/hen/Respiratory_Distress_Observation_Scale.pdf (Ctrl +  left click to view)     Anxiety:                             [ ]Mild [ ]Moderate [x]Severe  Fatigue:                             [ ]Mild [x]Moderate [ ]Severe  Nausea:                             [ ]Mild [ ]Moderate [ ]Severe  Loss of appetite:              [ ]Mild [ ]Moderate [ ]Severe  Constipation:                    [ ]Mild [x]Moderate [ ]Severe    Other Symptoms:  [x]All other review of systems negative     Palliative Performance Status Version 2:     30  %      http://npcrc.org/files/news/palliative_performance_scale_ppsv2.pdf  PHYSICAL EXAM:  Vital Signs Last 24 Hrs  T(C): 36.2 (05 Apr 2022 08:35), Max: 37.1 (04 Apr 2022 20:37)  T(F): 97.2 (05 Apr 2022 08:35), Max: 98.7 (04 Apr 2022 20:37)  HR: 100 (05 Apr 2022 08:35) (100 - 118)  BP: 110/69 (05 Apr 2022 08:35) (101/61 - 110/69)  RR: 18 (05 Apr 2022 08:35) (18 - 20)  SpO2: 97% (05 Apr 2022 08:35) (96% - 100%) I&O's Summary    05 Apr 2022 07:01  -  05 Apr 2022 12:21  --------------------------------------------------------  IN: 0 mL / OUT: 1000 mL / NET: -1000 mL       GENERAL: [ ]Cachexia    [x]Alert  [x]Oriented x 3  [ ]Lethargic  [ ]Unarousable  [x]Verbal  [ ]Non-Verbal  Behavioral:   [x]Anxiety  [ ]Delirium [ ]Agitation [ ]Other  HEENT:  [x]Normal   [ ]Dry mouth   [ ]ET Tube/Trach  [ ]Oral lesions  PULMONARY:   [ ]Clear [ ]Tachypnea  [ ]Audible excessive secretions   [ ]Rhonchi        [ ]Right [ ]Left [ ]Bilateral  [x]Crackles        [ ]Right [ ]Left [x]Bilateral  [ ]Wheezing     [ ]Right [ ]Left [ ]Bilateral  [x]Diminished BS [x] Right [ ]Left [ ]Bilateral  CARDIOVASCULAR:    [ ]Regular [ ]Irregular [x]Tachy  [ ]Linus [ ]Murmur [ ]Other  GASTROINTESTINAL:  [x]Soft  [x]Distended   [x]+BS  [ ]Non tender [ ]Tender  [x]Other- "discomfort" when palpated all 4 quadrants [ ]PEG [ ]OGT/ NGT   Last BM: last night  GENITOURINARY:  [x]Normal [ ]Incontinent   [ ]Oliguria/Anuria   [ ]Ansari  MUSCULOSKELETAL:   [ ]Normal   [ ]Weakness  [x]Bed/Wheelchair bound [x]Edema +3 pitting above knees b/l  NEUROLOGIC:   [x]No focal deficits  [ ] Cognitive impairment  [ ] Dysphagia [ ]Dysarthria [ ] Paresis [ ]Other   SKIN:   [x]Normal  [ ]Rash  [ ]Other  [ ]Pressure ulcer(s) [ ]y [ ]n present on admission    CRITICAL CARE:  [ ]Shock Present  [ ]Septic [ ]Cardiogenic [ ]Neurologic [ ]Hypovolemic  [ ]Vasopressors [ ]Inotropes  [ ]Respiratory failure present [ ]Mechanical Ventilation [ ]Non-invasive ventilatory support [ ]High-Flow   [ ]Acute  [ ]Chronic [ ]Hypoxic  [ ]Hypercarbic [ ]Other  [ ]Other organ failure     LABS:                        8.5    7.24  )-----------( 387      ( 04 Apr 2022 07:39 )             29.1   04-04    132<L>  |  97  |  11  ----------------------------<  92  4.4   |  21<L>  |  0.57    Ca    8.9      04 Apr 2022 07:37    TPro  5.8<L>  /  Alb  2.8<L>  /  TBili  0.4  /  DBili  x   /  AST  27  /  ALT  19  /  AlkPhos  2506<H>  04-04    RADIOLOGY & ADDITIONAL STUDIES: no new imaging    Protein Calorie Malnutrition Present: [ ]mild [ ]moderate [ ]severe [ ]underweight [ ]morbid obesity  https://www.andeal.org/vault/2440/web/files/ONC/Table_Clinical%20Characteristics%20to%20Document%20Malnutrition-White%20JV%20et%20al%202012.pdf    Height (cm): 167.6 (04-03-22 @ 08:47), 167.6 (03-28-22 @ 11:11), 167.6 (03-19-22 @ 17:35)  Weight (kg): 90.7 (04-03-22 @ 08:47), 109.6 (03-28-22 @ 11:11), 113.4 (03-19-22 @ 17:35)  BMI (kg/m2): 32.3 (04-03-22 @ 08:47), 39 (03-28-22 @ 11:11), 40.4 (03-19-22 @ 17:35)    [x]PPSV2 < or = 30%  [ ]significant weight loss [ ]poor nutritional intake [ ]anasarca[ ]Artificial Nutrition    REFERRALS:   [x]Chaplaincy  [ ]Hospice  [x]Child Life  [ ]Social Work  [x]Case management [ ]Holistic Therapy     Goals of Care Document: HCP form in [place]/[employer] documentation CC: dyspnea/weakness  SUBJECTIVE AND OBJECTIVE:  Indication for Geriatrics and Palliative Care Services/INTERVAL HPI: Palliative care consulted for symptom management for pain (spine-cancer pain, L shoulder-clavicle fracture, b/l legs due to edema) and dyspnea, as well as GOC discussions as pt tolerates.     OVERNIGHT EVENTS: Pt received 5 doses of IV PRN Dilaudid 0.75mg and 4 doses of oral PRN Dilaudid 4mg, with which pt found relief. Pt states that she prefers IV since it is faster acting, although oral was encouraged. Pt states that she recieved 2 doses of Xanax so far and was requesting a higher dose to feel more effect since she was still feeling anxious.    DNR on chart:  Allergies    Cipro (Rash)  QUINOLONES (Unknown)    Intolerances    MEDICATIONS  (STANDING):  acetaminophen   IVPB .. 1000 milliGRAM(s) IV Intermittent once  buMETAnide Injectable 2 milliGRAM(s) IV Push two times a day  enoxaparin Injectable 40 milliGRAM(s) SubCutaneous every 24 hours  lactulose Syrup 20 Gram(s) Oral every 12 hours  morphine ER Tablet 60 milliGRAM(s) Oral every 12 hours  multivitamin 1 Tablet(s) Oral daily  piperacillin/tazobactam IVPB.. 3.375 Gram(s) IV Intermittent every 8 hours  polyethylene glycol 3350 17 Gram(s) Oral two times a day  sodium chloride 1 Gram(s) Oral three times a day    MEDICATIONS  (PRN):  acetaminophen     Tablet .. 650 milliGRAM(s) Oral every 6 hours PRN Temp greater or equal to 38C (100.4F)  ALPRAZolam 0.25 milliGRAM(s) Oral every 6 hours PRN anxiety or dyspnea  aluminum hydroxide/magnesium hydroxide/simethicone Suspension 30 milliLiter(s) Oral every 4 hours PRN Dyspepsia  HYDROmorphone   Tablet 4 milliGRAM(s) Oral every 4 hours PRN Moderate Pain (4 - 6)  HYDROmorphone  Injectable 0.75 milliGRAM(s) IV Push every 4 hours PRN Severe Pain (7 - 10)  melatonin 3 milliGRAM(s) Oral at bedtime PRN Insomnia  methocarbamol 750 milliGRAM(s) Oral three times a day PRN Muscle Spasm  ondansetron Injectable 4 milliGRAM(s) IV Push every 8 hours PRN Nausea and/or Vomiting    ITEMS UNCHECKED ARE NOT PRESENT    PRESENT SYMPTOMS: [ ]Unable to self-report - see [x] CPOT [x] PAINADS [x] RDOS  Source if other than patient:  [ ]Family   [ ]Team     Pain:  [x]yes [ ]no  QOL impact - unable to perform ADLs   Location - spine-cancer pain, L shoulder-clavicle fracture, b/l LE from distension (pitting edema)  Aggravating factors - movement  Quality - spine- aching, L shoulder stabbing/aching, and legs are aching.   Radiation - unable to describe  Timing- constant  Severity (0-10 scale): 7-8   Minimal acceptable level (0-10 scale): 3    CPOT:  2  https://www.[facility].org/getattachment/[email or web address]/Critical-Care-Pain-Observation-Tool-(CPOT)    PAIN AD Score:	3  http://geriatrictoolkit.[facility]/cog/painad.pdf (Ctrl + left click to view)    Dyspnea:                           [ ]Mild [x]Moderate [ ]Severe    RDOS: 5  0 to 2  minimal or no respiratory distress   3  mild distress  4 to 6 moderate distress  >7 severe distress  https://[email or web address]/handouts/hen/Respiratory_Distress_Observation_Scale.pdf (Ctrl +  left click to view)     Anxiety:                             [ ]Mild [ ]Moderate [x]Severe  Fatigue:                             [ ]Mild [x]Moderate [ ]Severe  Nausea:                             [ ]Mild [ ]Moderate [ ]Severe  Loss of appetite:              [ ]Mild [ ]Moderate [ ]Severe  Constipation:                    [ ]Mild [x]Moderate [ ]Severe    Other Symptoms:  [x]All other review of systems negative     Palliative Performance Status Version 2:     30  %      http://npcrc.org/files/news/palliative_performance_scale_ppsv2.pdf  PHYSICAL EXAM:  Vital Signs Last 24 Hrs  T(C): 36.2 (05 Apr 2022 08:35), Max: 37.1 (04 Apr 2022 20:37)  T(F): 97.2 (05 Apr 2022 08:35), Max: 98.7 (04 Apr 2022 20:37)  HR: 100 (05 Apr 2022 08:35) (100 - 118)  BP: 110/69 (05 Apr 2022 08:35) (101/61 - 110/69)  RR: 18 (05 Apr 2022 08:35) (18 - 20)  SpO2: 97% (05 Apr 2022 08:35) (96% - 100%) I&O's Summary    05 Apr 2022 07:01  -  05 Apr 2022 12:21  --------------------------------------------------------  IN: 0 mL / OUT: 1000 mL / NET: -1000 mL    GENERAL: [ ]Cachexia    [x]Alert  [x]Oriented x 3  [ ]Lethargic  [ ]Unarousable  [x]Verbal  [ ]Non-Verbal  Behavioral:   [x]Anxiety  [ ]Delirium [ ]Agitation [ ]Other  HEENT:  [x]Normal   [ ]Dry mouth   [ ]ET Tube/Trach  [ ]Oral lesions  PULMONARY:   [ ]Clear [ ]Tachypnea  [ ]Audible excessive secretions   [ ]Rhonchi        [ ]Right [ ]Left [ ]Bilateral  [x]Crackles        [ ]Right [ ]Left [x]Bilateral  [ ]Wheezing     [ ]Right [ ]Left [ ]Bilateral  [x]Diminished BS [x] Right [ ]Left [ ]Bilateral  CARDIOVASCULAR:    [ ]Regular [ ]Irregular [x]Tachy  [ ]Linus [ ]Murmur [ ]Other  GASTROINTESTINAL:  [x]Soft  [x]Distended   [x]+BS  [ ]Non tender [ ]Tender  [x]Other- "discomfort" when palpated all 4 quadrants [ ]PEG [ ]OGT/ NGT   Last BM: last night  GENITOURINARY:  [x]Normal [ ]Incontinent   [ ]Oliguria/Anuria   [ ]Ansari  MUSCULOSKELETAL:   [ ]Normal   [ ]Weakness  [x]Bed/Wheelchair bound [x]Edema +3 pitting above knees b/l  NEUROLOGIC:   [x]No focal deficits  [ ] Cognitive impairment  [ ] Dysphagia [ ]Dysarthria [ ] Paresis [ ]Other   SKIN:   [x]Normal  [ ]Rash  [ ]Other  [ ]Pressure ulcer(s) [ ]y [ ]n present on admission    CRITICAL CARE:  [ ]Shock Present  [ ]Septic [ ]Cardiogenic [ ]Neurologic [ ]Hypovolemic  [ ]Vasopressors [ ]Inotropes  [ ]Respiratory failure present [ ]Mechanical Ventilation [ ]Non-invasive ventilatory support [ ]High-Flow   [ ]Acute  [ ]Chronic [ ]Hypoxic  [ ]Hypercarbic [ ]Other  [ ]Other organ failure     LABS:                        8.5    7.24  )-----------( 387      ( 04 Apr 2022 07:39 )             29.1   04-04    132<L>  |  97  |  11  ----------------------------<  92  4.4   |  21<L>  |  0.57    Ca    8.9      04 Apr 2022 07:37    TPro  5.8<L>  /  Alb  2.8<L>  /  TBili  0.4  /  DBili  x   /  AST  27  /  ALT  19  /  AlkPhos  2506<H>  04-04    RADIOLOGY & ADDITIONAL STUDIES: no new imaging    Protein Calorie Malnutrition Present: [ ]mild [ ]moderate [ ]severe [ ]underweight [ ]morbid obesity  https://www.andeal.org/vault/2440/web/files/ONC/Table_Clinical%20Characteristics%20to%20Document%20Malnutrition-White%20JV%20et%20al%202012.pdf    Height (cm): 167.6 (04-03-22 @ 08:47), 167.6 (03-28-22 @ 11:11), 167.6 (03-19-22 @ 17:35)  Weight (kg): 90.7 (04-03-22 @ 08:47), 109.6 (03-28-22 @ 11:11), 113.4 (03-19-22 @ 17:35)  BMI (kg/m2): 32.3 (04-03-22 @ 08:47), 39 (03-28-22 @ 11:11), 40.4 (03-19-22 @ 17:35)    [x]PPSV2 < or = 30%  [ ]significant weight loss [ ]poor nutritional intake [ ]anasarca[ ]Artificial Nutrition    REFERRALS:   [x]Chaplaincy  [ ]Hospice  [x]Child Life  [ ]Social Work  [x]Case management [ ]Holistic Therapy     Goals of Care Document: HCP form in [place]/[employer] documentation CC: dyspnea/weakness  SUBJECTIVE AND OBJECTIVE:  Indication for Geriatrics and Palliative Care Services/INTERVAL HPI: Palliative care consulted for symptom management for pain (spine-cancer pain, L shoulder-clavicle fracture, b/l legs due to edema) and dyspnea, as well as GOC discussions as pt tolerates.     OVERNIGHT EVENTS: Pt received 5 doses of IV PRN Dilaudid 0.75mg and 4 doses of oral PRN Dilaudid 4mg, with which pt found relief. Pt states that she prefers IV since it is faster acting, although oral was encouraged. Pt states that she recieved 2 doses of Xanax so far and was requesting a higher dose to feel more effect since she was still feeling anxious. Overall she appears more comfortable, which she notes herself as well. She had 2 BMs yesterday.     GOC discussion held with pt this afternoon, pls see GOC section for details.     DNR on chart:  Allergies    Cipro (Rash)  QUINOLONES (Unknown)    Intolerances    MEDICATIONS  (STANDING):  acetaminophen   IVPB .. 1000 milliGRAM(s) IV Intermittent once  buMETAnide Injectable 2 milliGRAM(s) IV Push two times a day  enoxaparin Injectable 40 milliGRAM(s) SubCutaneous every 24 hours  lactulose Syrup 20 Gram(s) Oral every 12 hours  morphine ER Tablet 60 milliGRAM(s) Oral every 12 hours  multivitamin 1 Tablet(s) Oral daily  piperacillin/tazobactam IVPB.. 3.375 Gram(s) IV Intermittent every 8 hours  polyethylene glycol 3350 17 Gram(s) Oral two times a day  sodium chloride 1 Gram(s) Oral three times a day    MEDICATIONS  (PRN):  acetaminophen     Tablet .. 650 milliGRAM(s) Oral every 6 hours PRN Temp greater or equal to 38C (100.4F)  ALPRAZolam 0.25 milliGRAM(s) Oral every 6 hours PRN anxiety or dyspnea  aluminum hydroxide/magnesium hydroxide/simethicone Suspension 30 milliLiter(s) Oral every 4 hours PRN Dyspepsia  HYDROmorphone   Tablet 4 milliGRAM(s) Oral every 4 hours PRN Moderate Pain (4 - 6)  HYDROmorphone  Injectable 0.75 milliGRAM(s) IV Push every 4 hours PRN Severe Pain (7 - 10)  melatonin 3 milliGRAM(s) Oral at bedtime PRN Insomnia  methocarbamol 750 milliGRAM(s) Oral three times a day PRN Muscle Spasm  ondansetron Injectable 4 milliGRAM(s) IV Push every 8 hours PRN Nausea and/or Vomiting    ITEMS UNCHECKED ARE NOT PRESENT    PRESENT SYMPTOMS: [ ]Unable to self-report - see [x] CPOT [x] PAINADS [x] RDOS  Source if other than patient:  [ ]Family   [ ]Team     Pain:  [x]yes [ ]no  QOL impact - unable to perform ADLs   Location - spine-cancer pain, L shoulder-clavicle fracture, b/l LE from distension (pitting edema)  Aggravating factors - movement  Quality - spine- aching, L shoulder stabbing/aching, and legs are aching.   Radiation - unable to describe  Timing- constant  Severity (0-10 scale): 7-8   Minimal acceptable level (0-10 scale): 3    CPOT:  2  https://www.[facility].org/getattachment/[email or web address]/Critical-Care-Pain-Observation-Tool-(CPOT)    PAIN AD Score:	3  http://geriatrictoolkit.[facility]/cog/painad.pdf (Ctrl + left click to view)    Dyspnea:                           [ ]Mild [x]Moderate [ ]Severe    RDOS: 5  0 to 2  minimal or no respiratory distress   3  mild distress  4 to 6 moderate distress  >7 severe distress  https://[email or web address]/handouts/hen/Respiratory_Distress_Observation_Scale.pdf (Ctrl +  left click to view)     Anxiety:                             [ ]Mild [ ]Moderate [x]Severe  Fatigue:                             [ ]Mild [x]Moderate [ ]Severe  Nausea:                             [ ]Mild [ ]Moderate [ ]Severe  Loss of appetite:              [ ]Mild [ ]Moderate [ ]Severe  Constipation:                    [ ]Mild [x]Moderate [ ]Severe    Other Symptoms:  [x]All other review of systems negative     Palliative Performance Status Version 2:     30  %      http://npcrc.org/files/news/palliative_performance_scale_ppsv2.pdf  PHYSICAL EXAM:  Vital Signs Last 24 Hrs  T(C): 36.2 (05 Apr 2022 08:35), Max: 37.1 (04 Apr 2022 20:37)  T(F): 97.2 (05 Apr 2022 08:35), Max: 98.7 (04 Apr 2022 20:37)  HR: 100 (05 Apr 2022 08:35) (100 - 118)  BP: 110/69 (05 Apr 2022 08:35) (101/61 - 110/69)  RR: 18 (05 Apr 2022 08:35) (18 - 20)  SpO2: 97% (05 Apr 2022 08:35) (96% - 100%) I&O's Summary    05 Apr 2022 07:01  -  05 Apr 2022 12:21  --------------------------------------------------------  IN: 0 mL / OUT: 1000 mL / NET: -1000 mL    GENERAL: [ ]Cachexia    [x]Alert  [x]Oriented x 3  [ ]Lethargic  [ ]Unarousable  [x]Verbal  [ ]Non-Verbal  Behavioral:   [x]Anxiety  [ ]Delirium [ ]Agitation [ ]Other  HEENT:  [x]Normal   [ ]Dry mouth   [ ]ET Tube/Trach  [ ]Oral lesions  PULMONARY:   [ ]Clear [ ]Tachypnea  [ ]Audible excessive secretions   [ ]Rhonchi        [ ]Right [ ]Left [ ]Bilateral  [x]Crackles        [ ]Right [ ]Left [x]Bilateral  [ ]Wheezing     [ ]Right [ ]Left [ ]Bilateral  [x]Diminished BS [x] Right [ ]Left [ ]Bilateral  CARDIOVASCULAR:    [ ]Regular [ ]Irregular [x]Tachy  [ ]Linus [ ]Murmur [ ]Other  GASTROINTESTINAL:  [x]Soft  [x]Distended   [x]+BS  [ ]Non tender [ ]Tender  [x]Other- "discomfort" when palpated all 4 quadrants [ ]PEG [ ]OGT/ NGT   Last BM: last night  GENITOURINARY:  [x]Normal [ ]Incontinent   [ ]Oliguria/Anuria   [ ]Ansari  MUSCULOSKELETAL:   [ ]Normal   [ ]Weakness  [x]Bed/Wheelchair bound [x]Edema +3 pitting above knees b/l  NEUROLOGIC:   [x]No focal deficits  [ ] Cognitive impairment  [ ] Dysphagia [ ]Dysarthria [ ] Paresis [ ]Other   SKIN:   [x]Normal  [ ]Rash  [ ]Other  [ ]Pressure ulcer(s) [ ]y [ ]n present on admission    CRITICAL CARE:  [ ]Shock Present  [ ]Septic [ ]Cardiogenic [ ]Neurologic [ ]Hypovolemic  [ ]Vasopressors [ ]Inotropes  [ ]Respiratory failure present [ ]Mechanical Ventilation [ ]Non-invasive ventilatory support [ ]High-Flow   [ ]Acute  [ ]Chronic [ ]Hypoxic  [ ]Hypercarbic [ ]Other  [ ]Other organ failure     LABS:                        8.5    7.24  )-----------( 387      ( 04 Apr 2022 07:39 )             29.1   04-04    132<L>  |  97  |  11  ----------------------------<  92  4.4   |  21<L>  |  0.57    Ca    8.9      04 Apr 2022 07:37    TPro  5.8<L>  /  Alb  2.8<L>  /  TBili  0.4  /  DBili  x   /  AST  27  /  ALT  19  /  AlkPhos  2506<H>  04-04    RADIOLOGY & ADDITIONAL STUDIES: no new imaging    Protein Calorie Malnutrition Present: [ ]mild [ ]moderate [ ]severe [ ]underweight [ ]morbid obesity  https://www.andeal.org/vault/2440/web/files/ONC/Table_Clinical%20Characteristics%20to%20Document%20Malnutrition-White%20JV%20et%20al%202012.pdf    Height (cm): 167.6 (04-03-22 @ 08:47), 167.6 (03-28-22 @ 11:11), 167.6 (03-19-22 @ 17:35)  Weight (kg): 90.7 (04-03-22 @ 08:47), 109.6 (03-28-22 @ 11:11), 113.4 (03-19-22 @ 17:35)  BMI (kg/m2): 32.3 (04-03-22 @ 08:47), 39 (03-28-22 @ 11:11), 40.4 (03-19-22 @ 17:35)    [x]PPSV2 < or = 30%  [ ]significant weight loss [ ]poor nutritional intake [ ]anasarca[ ]Artificial Nutrition    REFERRALS:   [x]Chaplaincy  [ ]Hospice  [x]Child Life  [ ]Social Work  [x]Case management [ ]Holistic Therapy     Goals of Care Document: HCP form in [place]/[employer] documentation

## 2022-04-05 NOTE — PROGRESS NOTE ADULT - PROBLEM SELECTOR PLAN 8
Will  restart her on her recent DC pain meds   Palliative care eval appreciated  Continue with regimen as follows:     -MS Contin 60mg BID as continued from prior hospitalization at [facility]     -Dilaudid 4mg PO Q4 hours for severe pain/dyspnea     -Dilaudid IV 0.75mg Q4 hours for breakthrough severe pain/dyspnea  s/p Relistor x1 dose for opioid induced constipation.

## 2022-04-05 NOTE — PROGRESS NOTE ADULT - SUBJECTIVE AND OBJECTIVE BOX
PULMONARY SERVICE FOLLOW UP CONSULT NOTE    SUBJECTIVE:  Still with symptoms of dyspnea.     REVIEW OF SYSTEMS:  All additional ROS negative.    MEDICATIONS:  Pulmonary:    Antimicrobials:  piperacillin/tazobactam IVPB.. 3.375 Gram(s) IV Intermittent every 8 hours    Anticoagulants:    Onc:    GI/:  aluminum hydroxide/magnesium hydroxide/simethicone Suspension 30 milliLiter(s) Oral every 4 hours PRN  lactulose Syrup 20 Gram(s) Oral every 12 hours  polyethylene glycol 3350 17 Gram(s) Oral two times a day    Endocrine:    Cardiac:  buMETAnide Injectable 2 milliGRAM(s) IV Push two times a day    Other Medications:  acetaminophen     Tablet .. 650 milliGRAM(s) Oral every 6 hours PRN  acetaminophen   IVPB .. 1000 milliGRAM(s) IV Intermittent once  ALPRAZolam 0.25 milliGRAM(s) Oral every 6 hours PRN  HYDROmorphone   Tablet 4 milliGRAM(s) Oral every 4 hours PRN  HYDROmorphone  Injectable 0.75 milliGRAM(s) IV Push every 4 hours PRN  melatonin 3 milliGRAM(s) Oral at bedtime PRN  methocarbamol 750 milliGRAM(s) Oral three times a day PRN  morphine ER Tablet 60 milliGRAM(s) Oral every 12 hours  multivitamin 1 Tablet(s) Oral daily  ondansetron Injectable 4 milliGRAM(s) IV Push every 8 hours PRN  sodium chloride 1 Gram(s) Oral three times a day      PHYSICAL EXAM  Vital Signs Last 24 Hrs  T(C): 36.9 (05 Apr 2022 04:10), Max: 37.1 (04 Apr 2022 20:37)  T(F): 98.4 (05 Apr 2022 04:10), Max: 98.7 (04 Apr 2022 20:37)  HR: 106 (05 Apr 2022 04:10) (106 - 120)  BP: 103/63 (05 Apr 2022 04:10) (101/61 - 135/77)  BP(mean): --  RR: 20 (05 Apr 2022 04:10) (20 - 20)  SpO2: 98% (05 Apr 2022 04:10) (96% - 100%)          CONSTITUTIONAL: No acute distress.   HEENT:  Conjunctiva clear B/L.  Moist oral mucosa.   Cardiovascular: RRR with no murmurs. No JVD noted. 2+lower extremity edema B/L. Extremities are warm and well perfused.    Respiratory: Dec bs. No accessory muscle use.   Gastrointestinal:  Soft, nontender. Non-distended. Non-rigid.    Neurologic:  Alert and awake. Moving all extremities. Following commands.    Skin:  No gross rashes notes.    LABS:      CBC Full  -  ( 04 Apr 2022 07:39 )  WBC Count : 7.24 K/uL  RBC Count : 3.30 M/uL  Hemoglobin : 8.5 g/dL  Hematocrit : 29.1 %  Platelet Count - Automated : 387 K/uL  Mean Cell Volume : 88.2 fl  Mean Cell Hemoglobin : 25.8 pg  Mean Cell Hemoglobin Concentration : 29.2 gm/dL  Auto Neutrophil # : 4.62 K/uL  Auto Lymphocyte # : 1.01 K/uL  Auto Monocyte # : 1.00 K/uL  Auto Eosinophil # : 0.16 K/uL  Auto Basophil # : 0.07 K/uL  Auto Neutrophil % : 63.8 %  Auto Lymphocyte % : 14.0 %  Auto Monocyte % : 13.8 %  Auto Eosinophil % : 2.2 %  Auto Basophil % : 1.0 %    04-04    132<L>  |  97  |  11  ----------------------------<  92  4.4   |  21<L>  |  0.57    Ca    8.9      04 Apr 2022 07:37  Mg     2.2     04-03    TPro  5.8<L>  /  Alb  2.8<L>  /  TBili  0.4  /  DBili  x   /  AST  27  /  ALT  19  /  AlkPhos  2506<H>  04-04    PT/INR - ( 03 Apr 2022 09:31 )   PT: 14.0 sec;   INR: 1.20 ratio         PTT - ( 03 Apr 2022 09:31 )  PTT:31.4 sec                  RADIOLOGY & ADDITIONAL STUDIES:

## 2022-04-05 NOTE — PROGRESS NOTE ADULT - PROBLEM SELECTOR PLAN 4
Most likely multifactorial from pain/ poor oral in take and pulm disease and hypervolemia  Will cont to trend the Na   Fluid restriction  start bumex 2mg IV BID Hemo is stable   Cont to closely monitor

## 2022-04-06 NOTE — PROGRESS NOTE ADULT - ASSESSMENT
51 year old female with metastatic gastric cancer (bone metastasis and peritoneal carcinomatosis), prior SBO, multiple bowel resections, and recent hospitalization at [facility] for recurrent effusion S/P VATS and pleurx placement, now removed. Patient returning with progressive symptoms of dyspnea. Pulmonary consults for pleural effusions and new consolidation. CT chest reviewed which does not show a PE. There is a loculated right pleural effusion larger than prior with pockets which would be challenging to access for thoracentesis or chest tube. Also rll and rml consolidations and interlobular septal thickening.     Recommendations   -Agree with a course of abx.  -Can trial diuresis for interstitial edema vs lymphangitic spread.   -There is a loculated right pleural effusion larger than prior with collections. Unfortunately the largest pocket is behind the right scapula. The prior IPC location has a small residual simple appearing pleural effusion which is too small to tap and unlikely the cause of her symptoms.   -CTS input appreciated.   -Venous dopplers on 3/24 negative for VTE.  -Agree with palliative care input.   -Excellent care from Dr. Reyes appreciated.      Dr. Chao Snyder,   Pulmonary and Critical Care Medicine Fellow   Available via [employer] Teams - **Preferred**  Pulmonary [facility] #[id number] ([facility]) / [phone] ([facility])  Pager:  [phone]

## 2022-04-06 NOTE — PROGRESS NOTE ADULT - PROBLEM SELECTOR PLAN 5
Pt pursuing alternative medicine treatment in [place] for gastric cancer  -GOC discussion 4/5 revealed pt would like to be full code and would like to pursue treatment as she still has hope for the future and she would like the best chance possible

## 2022-04-06 NOTE — PROGRESS NOTE ADULT - PROBLEM SELECTOR PLAN 6
We will continue to follow for management of pain, dyspnea, anxiety. GOC discussion on 4/5 as above  Please page Geriatrics and Palliative Medicine Team at [phone] for additional assistance with symptom management during after hours (4pm-8am).

## 2022-04-06 NOTE — PROGRESS NOTE ADULT - PROBLEM SELECTOR PLAN 1
Have You Had Fillers Before?: has had fillers When Was Your Last Filler Injection?: 6/27/18 Pt presented with SOB and chest pain, unable to speak in full sentences on 4L NC  -CTA chest negative for PE, but shows large right loculated pleural effusion increased from prior, and complete consolidation of the RLL, patchy opacities in RML, small left pleural effusion, diffuse interlobular septal thickening, may be edema v lymphangitic carcinomatosis. Trop trend negative, cardiology following, diuresis per primary and cardiology teams  -Appreciate primary team input: IR, Pulmonary and  CT surgery consulted- at this time they cannot offer another Pleurx due to loculation and positioning of the effusion. Continue supplementary O2 as needed.  -Would treat dyspnea with same regimen used for pain: due to increased need for PRNs over the past 24 hours, will change regimen as below:     -Continue MS Contin to 60mg Q8h standing (can give at same time as other PRN pain medications if required due to long-acting nature)     -Continue Dilaudid to 6mg PO Q4 hours for severe pain/dyspnea     -Continue Dilaudid IV 0.75mg Q4 hours for breakthrough severe pain/dyspnea     -Change Xanax to Ativan and increase to 0.5mg Q8h to standing for anxiety/dyspnea (not to be given within 1 hour of IV Dilaudid to prevent oversedation) Pt presented with SOB and chest pain, unable to speak in full sentences on 4L NC  -CTA chest negative for PE, but shows large right loculated pleural effusion increased from prior, and complete consolidation of the RLL, patchy opacities in RML, small left pleural effusion, diffuse interlobular septal thickening, may be edema v lymphangitic carcinomatosis. Trop trend negative, cardiology following, diuresis per primary and cardiology teams  -Appreciate primary team input: IR, Pulmonary and  CT surgery consulted- at this time they cannot offer another Pleurx due to loculation and positioning of the effusion. Continue supplementary O2 as needed.  -Would treat dyspnea with same regimen used for pain: due to increased need for PRNs over the past 24 hours, will change regimen as below:     -Continue MS Contin to 60mg Q8h standing (can give at same time as other PRN pain medications if required due to long-acting nature)     -Continue Dilaudid to 6mg PO Q4 hours for severe pain/dyspnea     -Continue Dilaudid IV 0.75mg Q4 hours for breakthrough severe pain/dyspnea     -Change Ativan 0.5mg Q8h to PRN (not to be given within 1 hour of IV Dilaudid to prevent oversedation) Pt presented with SOB and chest pain, unable to speak in full sentences on 4L NC  -CTA chest negative for PE, but shows large right loculated pleural effusion increased from prior, and complete consolidation of the RLL, patchy opacities in RML, small left pleural effusion, diffuse interlobular septal thickening, may be edema v lymphangitic carcinomatosis. Trop trend negative, cardiology following, diuresis per primary and cardiology teams  -Appreciate primary team input: IR, Pulmonary and  CT surgery consulted- at this time they cannot offer another Pleurx due to loculation and positioning of the effusion. Continue supplementary O2 as needed.  -Would treat dyspnea with same regimen used for pain: due to increased need for PRNs over the past 24 hours, will change regimen as below:     -Continue MS Contin to 60mg Q8h standing (can give at same time as other PRN pain medications if required due to long-acting nature)     -Continue Dilaudid to 6mg PO Q4 hours for severe pain/dyspnea     -Continue Dilaudid IV 0.75mg Q4 hours for breakthrough severe pain/dyspnea     -Continue Ativan 0.5mg Q8h (not to be given within 1 hour of IV Dilaudid to prevent oversedation)

## 2022-04-06 NOTE — PROGRESS NOTE ADULT - ATTENDING COMMENTS
52 y/o F w/metastatic gastric CA, not currently on treatment c/b malignant pleural effusion with recent admission to [facility] s/p VATS with placement of pleural catheter which has since been removed now readmitted with worsening dyspnea. CT chest with worsening b/l loculated pleural effusions. Dyspnea likely secondary to combination of worsening effusions and progression of disease. Patient symptomatically improved with diuresis and pain control. No adequate pocket visualized for thoracentesis/repeat pleurX placement.    - Supplemental O2 as needed goal O2 sat >= 90%  - Pain control  - Diuresis goal net negative
51F with gastric cancer s/p abdominal surgeries, not on conventional therapy, c/b pleural effusion s/p pleurex placement and removal who presents with dyspnea and pain.     Symptoms are much improved. Continue MS contin to 60 mg TID, PO dilaudid to 6 mg q4 prn for breakthrough pain/dyspnea. Continue IV dilaudid 0.75 mg q4 prn for severe, intractable pain/dyspnea, this can be d/c'ed upon discharge and pt understands. Continue ativan 0.5 mg q8 hours. Continue bowel regimen with miralax and lactulose TID.     GOC explored on 4/5 and pt remains treatment focused, to remain full code. We will follow.
51F with gastric cancer s/p abdominal surgeries, not on conventional therapy, c/b pleural effusion s/p pleurex placement and removal who presents with dyspnea and pain.     Recommend to increase MS contin to 60 mg TID, increase PO dilaudid to 6 mg q4 prn for breakthrough pain/dyspnea. Continue IV dilaudid 0.75 mg q4 prn for severe, intractable pain/dyspnea. Change xanax to ativan 0.5 mg q8 hours. Continue bowel regimen with miralax and lactulose BID.     GOC explored as above, pt remains treatment focused, not ready to place any limitations at this time. We will continue to follow.

## 2022-04-06 NOTE — PROGRESS NOTE ADULT - PROBLEM SELECTOR PLAN 3
Pt regularly has daily BMs  -LBM was last night per pt, s/p 1 dose of Relistor 4/4  -Continue lactulose 20mg BID and miralax BID, titrate to at least 1BM a day Pt regularly has daily BMs  -LBM was last night per pt, s/p 1 dose of Relistor 4/4  -Continue lactulose 20mg TID and Miralax TID, titrate to at least 1BM a day

## 2022-04-06 NOTE — PROGRESS NOTE ADULT - SUBJECTIVE AND OBJECTIVE BOX
DATE OF SERVICE: 04-06-22 @ 12:07    Patient is a 51y old  Female who presents with a chief complaint of chest pain for one day (06 Apr 2022 10:53)      INTERVAL HISTORY: Feels ok. Breathing much better.    REVIEW OF SYSTEMS:  CONSTITUTIONAL: No weakness  EYES/ENT: No visual changes;  No throat pain   NECK: No pain or stiffness  RESPIRATORY: No cough, wheezing; No shortness of breath  CARDIOVASCULAR: No chest pain or palpitations  GASTROINTESTINAL: No abdominal  pain. No nausea, vomiting, or hematemesis  GENITOURINARY: No dysuria, frequency or hematuria  NEUROLOGICAL: No stroke like symptoms  SKIN: No rashes    	  MEDICATIONS:  buMETAnide Injectable 2 milliGRAM(s) IV Push two times a day      PHYSICAL EXAM:  T(C): 36.6 (04-06-22 @ 08:09), Max: 37 (04-06-22 @ 00:48)  HR: 111 (04-06-22 @ 08:09) (98 - 111)  BP: 104/50 (04-06-22 @ 08:09) (92/54 - 122/78)  RR: 18 (04-06-22 @ 08:09) (18 - 20)  SpO2: 97% (04-06-22 @ 08:09) (96% - 98%)  Wt(kg): --  I&O's Summary    05 Apr 2022 07:01  -  06 Apr 2022 07:00  --------------------------------------------------------  IN: 1140 mL / OUT: 2100 mL / NET: -960 mL    06 Apr 2022 07:01  -  06 Apr 2022 12:07  --------------------------------------------------------  IN: 120 mL / OUT: 0 mL / NET: 120 mL          Appearance: In no distress	  HEENT:    PERRL, EOMI	  Cardiovascular:  S1 S2, No JVD  Respiratory: Lungs clear to auscultation	  Gastrointestinal:  Soft, Non-tender, + BS	  Vascularature:  No edema of LE  Psychiatric: Appropriate affect   Neuro: no acute focal deficits                               7.8    6.30  )-----------( 330      ( 06 Apr 2022 10:15 )             26.1     04-06    135  |  98  |  10  ----------------------------<  116<H>  3.3<L>   |  26  |  0.72    Ca    8.1<L>      06 Apr 2022 10:15  Phos  3.3     04-06  Mg     2.0     04-06          Labs personally reviewed      ASSESSMENT/PLAN: 	    52 y/o f with PMH of Gastric Ca (not currently on treatment), recurring pleural effusion who presents from [facility] with c/o worsening shortness of breath and chest pain described as her chest pounding out of her chest. Symptoms are non-exertional and have been progressing over the last couple of days.     Problem/Plan #1: Chest Pain  -Troponin negative x2  -EKG shows Sinus Tach with no ischemic changes  -Echo from 3/29/22 shows normal LV function, no RWMA, no valvular dysfunction and possible RV overload  -CT Chest negative for PE  -Low probability that chest pain cardiac in origin.   -Now resolved.    Problem/Plan #2: Shortness of breath  -RV overload on recent TTE  -CXR reveals pulmonary edema, masslike densities in the right apex, lateral right lung and right lung  -CTA chest negative for PE, but shows large right loculated pleural effusion increased from prior, and complete consolidation of the RLL, patchy opacities in RML, small left pleural effusion, diffuse interlobular septal thickening, may be edema v lymphangitic carcinomatosis  - IV diuresis--> Continues to report significant decrease in shortness of breath and LE edema. C/w Bumex.  -Appreciate CTSx and IR recs    Problem/Plan #3: Tachycardia  -HR 90-110s i/s/o pain secondary to gastric cancer  -Continue pain mgmt as per palliative and primary team  -Continue to monitor HR    -Problem/Plan #4: Chronic Pain  -Palliative Care team following  -Mgmt per primary team      RANJIT Vences-STEFFANIE Howard DO [facility]  Cardiovascular Medicine  [address], [address]  Office: [phone]  Cell: [phone]

## 2022-04-06 NOTE — PROGRESS NOTE ADULT - PROBLEM SELECTOR PLAN 4
Per documentation, pt has hx of metastatic gastric cancer and multiple abdominal surgeries with hx of SBO   -Last heme onc appt with Dr. Skinny Ibanez 7/2021 per documentation.  -Alk Phos elevated to 2506 on admission  -Pt pursuing alternative medicine treatment for gastric cancer per GOC conversations Per documentation, pt has hx of metastatic gastric cancer and multiple abdominal surgeries with hx of SBO   -Last heme onc appt with Dr. Skinny Ibanez 7/2021 per documentation  -Alk Phos elevated to 2506 on admission  -Pt pursuing alternative medicine treatment for gastric cancer per GOC conversations

## 2022-04-06 NOTE — PROGRESS NOTE ADULT - ASSESSMENT
51 yoF with PMH of Gastric cancer, not on conventional chemotherapy and pursuing alternative medicine for treatment, presenting with SOB and recurrent pleural effusion, s/p thoracentesis and paracentesis at [facility] (3/23-4/1), s/p Pleurx catheter removal by CT surgery on 3/28/22. Geriatrics and Palliative Medicine Team is consulted for assistance with pain control  51 yoF with PMH of Gastric cancer, not on conventional chemotherapy and pursuing alternative medicine for treatment, presenting with SOB and recurrent pleural effusion, s/p thoracentesis and paracentesis at [facility] (3/23-4/1), s/p Pleurx catheter removal by CT surgery on 3/28/22. Geriatrics and Palliative Medicine Team is consulted for assistance with pain control and GOC discussions.

## 2022-04-06 NOTE — CHART NOTE - NSCHARTNOTEFT_GEN_A_CORE
: Chao Snyder    INDICATION:  Dyspnea    PROCEDURE:  [ ] LIMITED ECHO  [ x] LIMITED CHEST  [ ] LIMITED RETROPERITONEAL  [ ] LIMITED ABDOMINAL  [ ] LIMITED DVT  [ ] NEEDLE GUIDANCE VASCULAR  [ ] NEEDLE GUIDANCE THORACENTESIS  [ ] NEEDLE GUIDANCE PARACENTESIS  [ ] NEEDLE GUIDANCE PERICARDIOCENTESIS  [ ] OTHER    FINDINGS:  Lungs: B line predominance over right anterior lung fields. A lines noted on the left. Small right sided simple appearing pleural effusion. Lung sliding seen b/l.     INTERPRETATION:  Right sided interstitial disease pattern. Interstitial edema vs malignancy related.  Small right sided simple appearing pleural effusion.  No ptx.    Images uploaded on [employer] [facility]

## 2022-04-06 NOTE — PROGRESS NOTE ADULT - SUBJECTIVE AND OBJECTIVE BOX
PULMONARY SERVICE FOLLOW UP CONSULT NOTE    SUBJECTIVE:  Persistent dyspnea and pain.        MEDICATIONS:  Pulmonary:    Antimicrobials:  piperacillin/tazobactam IVPB.. 3.375 Gram(s) IV Intermittent every 8 hours    Anticoagulants:  enoxaparin Injectable 40 milliGRAM(s) SubCutaneous every 24 hours    Onc:    GI/:  aluminum hydroxide/magnesium hydroxide/simethicone Suspension 30 milliLiter(s) Oral every 4 hours PRN  lactulose Syrup 20 Gram(s) Oral every 12 hours  polyethylene glycol 3350 17 Gram(s) Oral two times a day    Endocrine:    Cardiac:  buMETAnide Injectable 2 milliGRAM(s) IV Push two times a day    Other Medications:  acetaminophen     Tablet .. 650 milliGRAM(s) Oral every 6 hours PRN  acetaminophen   IVPB .. 1000 milliGRAM(s) IV Intermittent once  HYDROmorphone   Tablet 6 milliGRAM(s) Oral every 4 hours PRN  HYDROmorphone  Injectable 0.75 milliGRAM(s) IV Push every 4 hours PRN  LORazepam     Tablet 0.5 milliGRAM(s) Oral every 8 hours  melatonin 3 milliGRAM(s) Oral at bedtime PRN  methocarbamol 750 milliGRAM(s) Oral three times a day PRN  morphine ER Tablet 60 milliGRAM(s) Oral every 8 hours  multivitamin 1 Tablet(s) Oral daily  ondansetron Injectable 4 milliGRAM(s) IV Push every 8 hours PRN  potassium chloride    Tablet ER 40 milliEquivalent(s) Oral once  sodium chloride 1 Gram(s) Oral three times a day      PHYSICAL EXAM  Vital Signs Last 24 Hrs  T(C): 36.6 (06 Apr 2022 08:09), Max: 37 (06 Apr 2022 00:48)  T(F): 97.8 (06 Apr 2022 08:09), Max: 98.6 (06 Apr 2022 00:48)  HR: 111 (06 Apr 2022 08:09) (98 - 111)  BP: 104/50 (06 Apr 2022 08:09) (92/54 - 122/78)  BP(mean): --  RR: 18 (06 Apr 2022 08:09) (18 - 20)  SpO2: 97% (06 Apr 2022 08:09) (96% - 98%)    04-05 @ 07:01  -  04-06 @ 07:00  --------------------------------------------------------  IN: 1140 mL / OUT: 2100 mL / NET: -960 mL      CONSTITUTIONAL: No acute distress.   HEENT:  Conjunctiva clear B/L.  Moist oral mucosa.   Cardiovascular: RRR with no murmurs. No JVD noted. 2+lower extremity edema B/L. Extremities are warm and well perfused.    Respiratory: Dec bs. No accessory muscle use.   Gastrointestinal:  Soft, nontender. Non-distended. Non-rigid.    Neurologic:  Alert and awake. Moving all extremities. Following commands.    Skin:  No gross rashes notes.    LABS:      CBC Full  -  ( 06 Apr 2022 10:15 )  WBC Count : 6.30 K/uL  RBC Count : 3.00 M/uL  Hemoglobin : 7.8 g/dL  Hematocrit : 26.1 %  Platelet Count - Automated : 330 K/uL  Mean Cell Volume : 87.0 fl  Mean Cell Hemoglobin : 26.0 pg  Mean Cell Hemoglobin Concentration : 29.9 gm/dL  Auto Neutrophil # : x  Auto Lymphocyte # : x  Auto Monocyte # : x  Auto Eosinophil # : x  Auto Basophil # : x  Auto Neutrophil % : x  Auto Lymphocyte % : x  Auto Monocyte % : x  Auto Eosinophil % : x  Auto Basophil % : x    04-06    135  |  98  |  10  ----------------------------<  116<H>  3.3<L>   |  26  |  0.72    Ca    8.1<L>      06 Apr 2022 10:15  Phos  3.3     04-06  Mg     2.0     04-06                        RADIOLOGY & ADDITIONAL STUDIES:

## 2022-04-06 NOTE — PROGRESS NOTE ADULT - PROBLEM SELECTOR PLAN 5
Most likely multifactorial from pain/ poor oral in take and pulm disease and hypervolemia  Will cont to trend the Na   Fluid restriction  c/w bumex 2mg IV BID Most likely multifactorial from pain/ poor oral in take and pulm disease and hypervolemia  Hypernatremia resolved  Fluid restriction  c/w bumex 2mg IV BID  stop NaCl tabs

## 2022-04-06 NOTE — PROGRESS NOTE ADULT - SUBJECTIVE AND OBJECTIVE BOX
Andre Reyes, M.D.  Pager: [phone]  Office: [phone]    Patient is a 51y old  Female who presents with a chief complaint of chest pain for one day (06 Apr 2022 12:07)          SUBJECTIVE / OVERNIGHT EVENTS:    No acute overnight events.    ROS: ( - ) Fever, ( - )Chills,  ( - )Nausea/Vomiting, ( - ) Cough, ( - )Shortness of breath, ( - )Chest Pain    MEDICATIONS  (STANDING):  acetaminophen   IVPB .. 1000 milliGRAM(s) IV Intermittent once  buMETAnide Injectable 2 milliGRAM(s) IV Push two times a day  enoxaparin Injectable 40 milliGRAM(s) SubCutaneous every 24 hours  lactulose Syrup 20 Gram(s) Oral every 8 hours  LORazepam     Tablet 0.5 milliGRAM(s) Oral every 8 hours  morphine ER Tablet 60 milliGRAM(s) Oral every 8 hours  multivitamin 1 Tablet(s) Oral daily  piperacillin/tazobactam IVPB.. 3.375 Gram(s) IV Intermittent every 8 hours  polyethylene glycol 3350 17 Gram(s) Oral <User Schedule>    MEDICATIONS  (PRN):  acetaminophen     Tablet .. 650 milliGRAM(s) Oral every 6 hours PRN Temp greater or equal to 38C (100.4F)  aluminum hydroxide/magnesium hydroxide/simethicone Suspension 30 milliLiter(s) Oral every 4 hours PRN Dyspepsia  HYDROmorphone   Tablet 6 milliGRAM(s) Oral every 4 hours PRN Moderate Pain (4 - 6)  HYDROmorphone  Injectable 0.75 milliGRAM(s) IV Push every 4 hours PRN Severe Pain (7 - 10)  melatonin 3 milliGRAM(s) Oral at bedtime PRN Insomnia  methocarbamol 750 milliGRAM(s) Oral three times a day PRN Muscle Spasm  ondansetron Injectable 4 milliGRAM(s) IV Push every 8 hours PRN Nausea and/or Vomiting          T(C): 36.6 (04-06 @ 08:09), Max: 37 (04-06 @ 00:48)   HR: 111   BP: 104/50   RR: 18   SpO2: 97%    PHYSICAL EXAM:    CONSTITUTIONAL: NAD, well-developed, well-groomed  EYES: PERRLA; conjunctiva and sclera clear  ENMT: Moist oral mucosa, no pharyngeal injection or exudates; normal dentition  NECK: Supple, no palpable masses; no thyromegaly  RESPIRATORY: Normal respiratory effort; lungs are clear to auscultation bilaterally  CARDIOVASCULAR: Regular rate and rhythm, normal S1 and S2, no murmur/rub/gallop; No lower extremity edema; Peripheral pulses are 2+ bilaterally  ABDOMEN: Nontender to palpation, normoactive bowel sounds, no rebound/guarding; No hepatosplenomegaly  MUSCULOSKELETAL:  Normal gait; no clubbing or cyanosis of digits; no joint swelling or tenderness to palpation  PSYCH: A+O to person, place, and time; affect appropriate  NEUROLOGY: CN 2-12 are intact and symmetric; no gross sensory deficits   SKIN: No rashes; no palpable lesions      LABS:                        7.8    6.30  )-----------( 330      ( 06 Apr 2022 10:15 )             26.1      04-06    135  |  98  |  10  ----------------------------<  116<H>  3.3<L>   |  26  |  0.72    Ca    8.1<L>      06 Apr 2022 10:15  Phos  3.3     04-06  Mg     2.0     04-06         CAPILLARY BLOOD GLUCOSE          RADIOLOGY & ADDITIONAL TESTS:    Imaging Personally Reviewed:  Consultant(s) Notes Reviewed:    Care Discussed with Consultants/Other Providers:   Andre Reyes, M.D.  Pager: [phone]  Office: [phone]    Patient is a 51y old  Female who presents with a chief complaint of chest pain for one day (06 Apr 2022 12:07)          SUBJECTIVE / OVERNIGHT EVENTS:    No acute overnight events.  feeling much better today  able to come off of the oxygen    ROS: ( - ) Fever, ( - )Chills,  ( - )Nausea/Vomiting, ( - ) Cough, ( - )Shortness of breath, ( - )Chest Pain    MEDICATIONS  (STANDING):  acetaminophen   IVPB .. 1000 milliGRAM(s) IV Intermittent once  buMETAnide Injectable 2 milliGRAM(s) IV Push two times a day  enoxaparin Injectable 40 milliGRAM(s) SubCutaneous every 24 hours  lactulose Syrup 20 Gram(s) Oral every 8 hours  LORazepam     Tablet 0.5 milliGRAM(s) Oral every 8 hours  morphine ER Tablet 60 milliGRAM(s) Oral every 8 hours  multivitamin 1 Tablet(s) Oral daily  piperacillin/tazobactam IVPB.. 3.375 Gram(s) IV Intermittent every 8 hours  polyethylene glycol 3350 17 Gram(s) Oral <User Schedule>    MEDICATIONS  (PRN):  acetaminophen     Tablet .. 650 milliGRAM(s) Oral every 6 hours PRN Temp greater or equal to 38C (100.4F)  aluminum hydroxide/magnesium hydroxide/simethicone Suspension 30 milliLiter(s) Oral every 4 hours PRN Dyspepsia  HYDROmorphone   Tablet 6 milliGRAM(s) Oral every 4 hours PRN Moderate Pain (4 - 6)  HYDROmorphone  Injectable 0.75 milliGRAM(s) IV Push every 4 hours PRN Severe Pain (7 - 10)  melatonin 3 milliGRAM(s) Oral at bedtime PRN Insomnia  methocarbamol 750 milliGRAM(s) Oral three times a day PRN Muscle Spasm  ondansetron Injectable 4 milliGRAM(s) IV Push every 8 hours PRN Nausea and/or Vomiting          T(C): 36.6 (04-06 @ 08:09), Max: 37 (04-06 @ 00:48)   HR: 111   BP: 104/50   RR: 18   SpO2: 97%    PHYSICAL EXAM:    CONSTITUTIONAL: NAD, well-developed, well-groomed; frail appearing  EYES: PERRLA; conjunctiva and sclera clear  ENMT: Moist oral mucosa, no pharyngeal injection or exudates; normal dentition  NECK: Supple, no palpable masses; no thyromegaly  RESPIRATORY: Normal respiratory effort; lungs are clear to auscultation bilaterally  CARDIOVASCULAR: Regular rate and rhythm, normal S1 and S2, no murmur/rub/gallop; 3+ lower extremity edema; Peripheral pulses are 2+ bilaterally  ABDOMEN: Nontender to palpation, normoactive bowel sounds, no rebound/guarding; No hepatosplenomegaly  MUSCULOSKELETAL:  Normal gait; no clubbing or cyanosis of digits; no joint swelling or tenderness to palpation  PSYCH: A+O to person, place, and time; affect appropriate  NEUROLOGY: CN 2-12 are intact and symmetric; no gross sensory deficits   SKIN: No rashes; no palpable lesions      LABS:                        7.8    6.30  )-----------( 330      ( 06 Apr 2022 10:15 )             26.1      04-06    135  |  98  |  10  ----------------------------<  116<H>  3.3<L>   |  26  |  0.72    Ca    8.1<L>      06 Apr 2022 10:15  Phos  3.3     04-06  Mg     2.0     04-06         CAPILLARY BLOOD GLUCOSE          RADIOLOGY & ADDITIONAL TESTS:    Imaging Personally Reviewed:  Consultant(s) Notes Reviewed:    Care Discussed with Consultants/Other Providers:

## 2022-04-06 NOTE — PROGRESS NOTE ADULT - ASSESSMENT
50 y/o f with pmhx Gastric Ca, not currently on treatment with recurring pleural effusion,  presents from [facility] for concern of worsening shortness of breath. Patient was recently admitted to [facility] ( 3/23-4/1)  with same complaint and had a thoracentesis and paracentesis. 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  Ct surg removed pleurex catheter on 3/28/22 and pt was to follow up with Dr Daniels in 10-14 days. Patient refused conventional chemo and is seeking alternative medicine for cancer as outpatient. During the recent hosp admission , pt was being followed by Palliative care team .  In the ED , she remains hemodynamically stable on 5 L NC .  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

## 2022-04-06 NOTE — PROGRESS NOTE ADULT - PROBLEM SELECTOR PLAN 1
presumed to be from pleural effusion and from RML consolidation  - wean down oxygen as tolerated  - given possible pneumonia will c/w Zosyn  - incentive spirometry   - c/w bumex 2mg bid  - Given how sick the patient is I will c/w empric Zosyn for the RML consolidation, although it may just be malignancy presumed to be from pleural effusion and from RML consolidation  - wean off oxygen today  - given possible pneumonia will c/w Zosyn will complete a 7 day course thru April 11th  - incentive spirometry   - c/w bumex 2mg bid  - Given how sick the patient is I will c/w empric Zosyn for the RML consolidation, although it may just be malignancy

## 2022-04-06 NOTE — PROGRESS NOTE ADULT - PROBLEM SELECTOR PLAN 3
Pt is currently on no disease modifying therapy she only wants to try holistic treatments for her disease. Her goal is to be able to get on a plane to [place] to start treatment which is on 4/17.  Palliative care luis appreciated  Child counselling services for the patients 2 daughters was contacted.

## 2022-04-06 NOTE — CHART NOTE - NSCHARTNOTEFT_GEN_A_CORE
Due to condition of metastatic gastric cancer, patient has a severe mobility limitation and requires a bariatric wheelchair to assist in daily ADLS. Pt has significant LE edema, pt won't fit for standard wheelchair given her condition. Patient cannot ambulate safely with a cane or a walker. Patient has sufficient upper body strength to self propel said wheelchair and has not expressed an unwillingness to use the wheelchair. patient has ample room in the home and a caregiver to assist with the wheelchair, Use of a manual wheelchair will significantly improve the patients ability to participate in daily ADL's and the patient will use it on a regular basis in the home

## 2022-04-06 NOTE — PROGRESS NOTE ADULT - PROBLEM SELECTOR PLAN 7
Tro 31-->29   most likely from the Large pleural effusion   Last TTE with EF of 65%   Cardio eval Tro 31-->29   most likely from the Large pleural effusion   Last TTE with EF of 65%

## 2022-04-06 NOTE — PROGRESS NOTE ADULT - SUBJECTIVE AND OBJECTIVE BOX
SUBJECTIVE AND OBJECTIVE:  Indication for Geriatrics and Palliative Care Services/INTERVAL HPI: Palliative care consulted for symptom management for dyspnea and pain, and to continue GOC as pt tolerates.     OVERNIGHT EVENTS: Pt required 4 PRNs over the past 24 hours (4mg oral at 12:30, 6mg oral at 20:00 and 4:00, and IV dilaudid 0.75mg at 14:00)    DNR on chart:  Allergies    Cipro (Rash)  QUINOLONES (Unknown)    Intolerances    MEDICATIONS  (STANDING):  acetaminophen   IVPB .. 1000 milliGRAM(s) IV Intermittent once  buMETAnide Injectable 2 milliGRAM(s) IV Push two times a day  enoxaparin Injectable 40 milliGRAM(s) SubCutaneous every 24 hours  lactulose Syrup 20 Gram(s) Oral every 12 hours  LORazepam     Tablet 0.5 milliGRAM(s) Oral every 8 hours  morphine ER Tablet 60 milliGRAM(s) Oral every 8 hours  multivitamin 1 Tablet(s) Oral daily  piperacillin/tazobactam IVPB.. 3.375 Gram(s) IV Intermittent every 8 hours  polyethylene glycol 3350 17 Gram(s) Oral two times a day  sodium chloride 1 Gram(s) Oral three times a day    MEDICATIONS  (PRN):  acetaminophen     Tablet .. 650 milliGRAM(s) Oral every 6 hours PRN Temp greater or equal to 38C (100.4F)  aluminum hydroxide/magnesium hydroxide/simethicone Suspension 30 milliLiter(s) Oral every 4 hours PRN Dyspepsia  HYDROmorphone   Tablet 6 milliGRAM(s) Oral every 4 hours PRN Moderate Pain (4 - 6)  HYDROmorphone  Injectable 0.75 milliGRAM(s) IV Push every 4 hours PRN Severe Pain (7 - 10)  melatonin 3 milliGRAM(s) Oral at bedtime PRN Insomnia  methocarbamol 750 milliGRAM(s) Oral three times a day PRN Muscle Spasm  ondansetron Injectable 4 milliGRAM(s) IV Push every 8 hours PRN Nausea and/or Vomiting    ITEMS UNCHECKED ARE NOT PRESENT    PRESENT SYMPTOMS: [ ]Unable to self-report - see [x] CPOT [x ] PAINADS [x ] RDOS  Source if other than patient:  [ ]Family   [ ]Team     Pain:  [x]yes [ ]no  QOL impact - affects ABDLS (especially while turning in bed for changes PRN)  Location - spine, left shoulder (broken clavicle), abdominal discomfort, b/l LE "tightness" from edema                   Aggravating factors - movement  Quality -aching, "fullness" in abdomen  Radiation - none  Timing- constant  Severity (0-10 scale): 5  Minimal acceptable level (0-10 scale): 3    CPOT:    https://www.[facility].org/getattachment/[email or web address]/Critical-Care-Pain-Observation-Tool-(CPOT)    PAIN AD Score:	  http://geriatrictoolkit.[facility]/cog/painad.pdf (Ctrl + left click to view)    Dyspnea:   4                        [ ]Mild [x ]Moderate [ ]Severe    RDOS:  0 to 2  minimal or no respiratory distress   3  mild distress  4 to 6 moderate distress  >7 severe distress  https://homecareinformation.net/handouts/hen/Respiratory_Distress_Observation_Scale.pdf (Ctrl +  left click to view)     Anxiety:                             [ ]Mild [ ]Moderate [x ]Severe  Fatigue:                             [ ]Mild [x ]Moderate [ ]Severe  Nausea:                             [ ]Mild [ ]Moderate [ ]Severe  Loss of appetite:              [x ]Mild [ ]Moderate [ ]Severe  Constipation:                    [ ]Mild [ ]Moderate [ ]Severe    Other Symptoms:  [ ]All other review of systems negative     Palliative Performance Status Version 2:    30 %      http://[facility].org/files/news/palliative_performance_scale_ppsv2.pdf  PHYSICAL EXAM:  Vital Signs Last 24 Hrs  T(C): 36.6 (06 Apr 2022 08:09), Max: 37 (06 Apr 2022 00:48)  T(F): 97.8 (06 Apr 2022 08:09), Max: 98.6 (06 Apr 2022 00:48)  HR: 111 (06 Apr 2022 08:09) (98 - 111)  BP: 104/50 (06 Apr 2022 08:09) (92/54 - 122/78)    RR: 18 (06 Apr 2022 08:09) (18 - 20)  SpO2: 97% (06 Apr 2022 08:09) (96% - 98%) I&O's Summary    05 Apr 2022 07:01  -  06 Apr 2022 07:00  --------------------------------------------------------  IN: 1140 mL / OUT: 2100 mL / NET: -960 mL     GENERAL: [ ]Cachexia    [x]Alert  [x]Oriented x 3  [ ]Lethargic  [ ]Unarousable  [x]Verbal  [ ]Non-Verbal  Behavioral:   [x]Anxiety  [ ]Delirium [ ]Agitation [ ]Other  HEENT:  [x]Normal   [ ]Dry mouth   [ ]ET Tube/Trach  [ ]Oral lesions  PULMONARY:   [ ]Clear [ ]Tachypnea  [ ]Audible excessive secretions   [ ]Rhonchi        [ ]Right [ ]Left [ ]Bilateral  [x]Crackles        [ ]Right [ ]Left [x]Bilateral  [ ]Wheezing     [ ]Right [ ]Left [ ]Bilateral  [x]Diminished BS [x] Right [ ]Left [ ]Bilateral  CARDIOVASCULAR:    [ ]Regular [ ]Irregular [x]Tachy  [ ]Linus [ ]Murmur [ ]Other  GASTROINTESTINAL:  [x]Soft  [x ]Distended   [x ]+BS  [x ]Non tender [ ]Tender  [x ]Other- discomfort (not pain) when palpating abdomen likely due to tumor burden [ ]PEG [ ]OGT/ NGT   Last BM:   GENITOURINARY:  [x ]Normal [ ]Incontinent   [ ]Oliguria/Anuria   [ ]Ansari  MUSCULOSKELETAL:   [ ]Normal   [ ]Weakness  [x ]Bed/Wheelchair bound [ ]Edema  NEUROLOGIC:   [ x]No focal deficits  [ ] Cognitive impairment  [ ] Dysphagia [ ]Dysarthria [ ] Paresis [ ]Other   SKIN:   [x ]Normal  [ ]Rash  [ ]Other  [ ]Pressure ulcer(s) [ ]y [ ]n present on admission    CRITICAL CARE:  [ ]Shock Present  [ ]Septic [ ]Cardiogenic [ ]Neurologic [ ]Hypovolemic  [ ]Vasopressors [ ]Inotropes  [ ]Respiratory failure present [ ]Mechanical Ventilation [ ]Non-invasive ventilatory support [ ]High-Flow   [ ]Acute  [ ]Chronic [ ]Hypoxic  [ ]Hypercarbic [ ]Other  [ ]Other organ failure     LABS:                        8.1    5.93  )-----------( 330      ( 06 Apr 2022 07:20 )             26.5   04-06    134<L>  |  96  |  10  ----------------------------<  96  3.5   |  23  |  0.69    Ca    8.5      06 Apr 2022 07:18  Phos  3.4     04-06  Mg     1.9     04-06    RADIOLOGY & ADDITIONAL STUDIES: no new imaging    Protein Calorie Malnutrition Present: [ ]mild [ ]moderate [ ]severe [ ]underweight [ ]morbid obesity  https://www.andeal.org/vault/2440/web/files/ONC/Table_Clinical%20Characteristics%20to%20Document%20Malnutrition-White%20JV%20et%20al%202012.pdf    Height (cm): 167.6 (04-03-22 @ 08:47), 167.6 (03-28-22 @ 11:11), 167.6 (03-19-22 @ 17:35)  Weight (kg): 90.7 (04-03-22 @ 08:47), 109.6 (03-28-22 @ 11:11), 113.4 (03-19-22 @ 17:35)  BMI (kg/m2): 32.3 (04-03-22 @ 08:47), 39 (03-28-22 @ 11:11), 40.4 (03-19-22 @ 17:35)    [x]PPSV2 < or = 30%  [ ]significant weight loss [ ]poor nutritional intake [ ]anasarca[ ]Artificial Nutrition    REFERRALS:   [x]Chaplaincy  [ ]Hospice  [x]Child Life  [ ]Social Work  [x]Case management [ ]Holistic Therapy     Goals of Care Document: HCP form uploaded to [employer] SUBJECTIVE AND OBJECTIVE:  Indication for Geriatrics and Palliative Care Services/INTERVAL HPI: Palliative care consulted for symptom management for dyspnea and pain, and to continue GOC as pt tolerates.     OVERNIGHT EVENTS: Pt required 4 PRNs over the past 24 hours (4mg oral at 12:30, 6mg oral at 20:00 and 4:00, and IV Dilaudid 0.75mg at 14:00)     DNR on chart:  Allergies    Cipro (Rash)  QUINOLONES (Unknown)    Intolerances    MEDICATIONS  (STANDING):  acetaminophen   IVPB .. 1000 milliGRAM(s) IV Intermittent once  buMETAnide Injectable 2 milliGRAM(s) IV Push two times a day  enoxaparin Injectable 40 milliGRAM(s) SubCutaneous every 24 hours  lactulose Syrup 20 Gram(s) Oral every 12 hours  LORazepam     Tablet 0.5 milliGRAM(s) Oral every 8 hours  morphine ER Tablet 60 milliGRAM(s) Oral every 8 hours  multivitamin 1 Tablet(s) Oral daily  piperacillin/tazobactam IVPB.. 3.375 Gram(s) IV Intermittent every 8 hours  polyethylene glycol 3350 17 Gram(s) Oral two times a day  sodium chloride 1 Gram(s) Oral three times a day    MEDICATIONS  (PRN):  acetaminophen     Tablet .. 650 milliGRAM(s) Oral every 6 hours PRN Temp greater or equal to 38C (100.4F)  aluminum hydroxide/magnesium hydroxide/simethicone Suspension 30 milliLiter(s) Oral every 4 hours PRN Dyspepsia  HYDROmorphone   Tablet 6 milliGRAM(s) Oral every 4 hours PRN Moderate Pain (4 - 6)  HYDROmorphone  Injectable 0.75 milliGRAM(s) IV Push every 4 hours PRN Severe Pain (7 - 10)  melatonin 3 milliGRAM(s) Oral at bedtime PRN Insomnia  methocarbamol 750 milliGRAM(s) Oral three times a day PRN Muscle Spasm  ondansetron Injectable 4 milliGRAM(s) IV Push every 8 hours PRN Nausea and/or Vomiting    ITEMS UNCHECKED ARE NOT PRESENT    PRESENT SYMPTOMS: [ ]Unable to self-report - see [x] CPOT [x ] PAINADS [x ] RDOS  Source if other than patient:  [ ]Family   [ ]Team     Pain:  [x]yes [ ]no  QOL impact - affects ADLS (especially while turning in bed for changes PRN)  Location - spine, left shoulder (broken clavicle), abdominal discomfort (mass effect from GI cancer), b/l LE "tightness" from pitting edema                   Aggravating factors - movement  Quality -aching, "fullness" in abdomen  Radiation - none  Timing- constant  Severity (0-10 scale): 5  Minimal acceptable level (0-10 scale): 3    CPOT:  2  https://www.[facility].org/getattachment/[email or web address]/Critical-Care-Pain-Observation-Tool-(CPOT)    PAIN AD Score:	2  http://geriatrictoolkit.[facility]/cog/painad.pdf (Ctrl + left click to view)    Dyspnea:   4                        [ ]Mild [x ]Moderate [ ]Severe    RDOS:  0 to 2  minimal or no respiratory distress   3  mild distress  4 to 6 moderate distress  >7 severe distress  https://homecareinformation.net/handouts/hen/Respiratory_Distress_Observation_Scale.pdf (Ctrl +  left click to view)     Anxiety:                             [ ]Mild [ ]Moderate [x ]Severe  Fatigue:                             [ ]Mild [x ]Moderate [ ]Severe  Nausea:                             [ ]Mild [ ]Moderate [ ]Severe  Loss of appetite:              [x ]Mild [ ]Moderate [ ]Severe  Constipation:                    [ ]Mild [ ]Moderate [ ]Severe    Other Symptoms:  [ ]All other review of systems negative     Palliative Performance Status Version 2:    30 %      http://[facility].org/files/news/palliative_performance_scale_ppsv2.pdf  PHYSICAL EXAM:  Vital Signs Last 24 Hrs  T(C): 36.6 (06 Apr 2022 08:09), Max: 37 (06 Apr 2022 00:48)  T(F): 97.8 (06 Apr 2022 08:09), Max: 98.6 (06 Apr 2022 00:48)  HR: 111 (06 Apr 2022 08:09) (98 - 111)  BP: 104/50 (06 Apr 2022 08:09) (92/54 - 122/78)    RR: 18 (06 Apr 2022 08:09) (18 - 20)  SpO2: 97% (06 Apr 2022 08:09) (96% - 98%) I&O's Summary    05 Apr 2022 07:01  -  06 Apr 2022 07:00  --------------------------------------------------------  IN: 1140 mL / OUT: 2100 mL / NET: -960 mL     GENERAL: [ ]Cachexia    [x]Alert  [x]Oriented x 3  [ ]Lethargic  [ ]Unarousable  [x]Verbal  [ ]Non-Verbal  Behavioral:   [x]Anxiety  [ ]Delirium [ ]Agitation [ ]Other  HEENT:  [x]Normal   [ ]Dry mouth   [ ]ET Tube/Trach  [ ]Oral lesions  PULMONARY:   [ ]Clear [ ]Tachypnea  [ ]Audible excessive secretions   [ ]Rhonchi        [ ]Right [ ]Left [ ]Bilateral  [x]Crackles        [ ]Right [ ]Left [x]Bilateral  [ ]Wheezing     [ ]Right [ ]Left [ ]Bilateral  [x]Diminished BS [x] Right [ ]Left [ ]Bilateral  CARDIOVASCULAR:    [ ]Regular [ ]Irregular [x]Tachy  [ ]Linus [ ]Murmur [ ]Other  GASTROINTESTINAL:  [x]Soft  [x ]Distended   [x ]+BS  [x ]Non tender [ ]Tender  [x ]Other- discomfort (not pain) when palpating abdomen likely due to tumor burden [ ]PEG [ ]OGT/ NGT   Last BM:   GENITOURINARY:  [x ]Normal [ ]Incontinent   [ ]Oliguria/Anuria   [ ]Ansari  MUSCULOSKELETAL:   [ ]Normal   [ ]Weakness  [x ]Bed/Wheelchair bound [ ]Edema  NEUROLOGIC:   [ x]No focal deficits  [ ] Cognitive impairment  [ ] Dysphagia [ ]Dysarthria [ ] Paresis [ ]Other   SKIN:   [x ]Normal  [ ]Rash  [ ]Other  [ ]Pressure ulcer(s) [ ]y [ ]n present on admission    CRITICAL CARE:  [ ]Shock Present  [ ]Septic [ ]Cardiogenic [ ]Neurologic [ ]Hypovolemic  [ ]Vasopressors [ ]Inotropes  [ ]Respiratory failure present [ ]Mechanical Ventilation [ ]Non-invasive ventilatory support [ ]High-Flow   [ ]Acute  [ ]Chronic [ ]Hypoxic  [ ]Hypercarbic [ ]Other  [ ]Other organ failure     LABS:                        8.1    5.93  )-----------( 330      ( 06 Apr 2022 07:20 )             26.5   04-06    134<L>  |  96  |  10  ----------------------------<  96  3.5   |  23  |  0.69    Ca    8.5      06 Apr 2022 07:18  Phos  3.4     04-06  Mg     1.9     04-06    RADIOLOGY & ADDITIONAL STUDIES: no new imaging    Protein Calorie Malnutrition Present: [ ]mild [ ]moderate [ ]severe [ ]underweight [ ]morbid obesity  https://www.andeal.org/vault/2440/web/files/ONC/Table_Clinical%20Characteristics%20to%20Document%20Malnutrition-White%20JV%20et%20al%202012.pdf    Height (cm): 167.6 (04-03-22 @ 08:47), 167.6 (03-28-22 @ 11:11), 167.6 (03-19-22 @ 17:35)  Weight (kg): 90.7 (04-03-22 @ 08:47), 109.6 (03-28-22 @ 11:11), 113.4 (03-19-22 @ 17:35)  BMI (kg/m2): 32.3 (04-03-22 @ 08:47), 39 (03-28-22 @ 11:11), 40.4 (03-19-22 @ 17:35)    [x]PPSV2 < or = 30%  [ ]significant weight loss [ ]poor nutritional intake [ ]anasarca[ ]Artificial Nutrition    REFERRALS:   [x]Chaplaincy  [ ]Hospice  [x]Child Life  [ ]Social Work  [x]Case management [ ]Holistic Therapy     Goals of Care Document: HCP form uploaded to [employer] SUBJECTIVE AND OBJECTIVE:  Indication for Geriatrics and Palliative Care Services/INTERVAL HPI: Palliative care consulted for symptom management for dyspnea and pain, and to continue GOC as pt tolerates.     OVERNIGHT EVENTS: Pt required 4 PRNs over the past 24 hours (4mg oral at 12:30, 6mg oral at 20:00 and 4:00, and IV Dilaudid 0.75mg at 14:00)     Overall pt is feeling much better. Did not use any ativan but wants to continue to have access to it.     DNR on chart:  Allergies    Cipro (Rash)  QUINOLONES (Unknown)    Intolerances    MEDICATIONS  (STANDING):  acetaminophen   IVPB .. 1000 milliGRAM(s) IV Intermittent once  buMETAnide Injectable 2 milliGRAM(s) IV Push two times a day  enoxaparin Injectable 40 milliGRAM(s) SubCutaneous every 24 hours  lactulose Syrup 20 Gram(s) Oral every 12 hours  LORazepam     Tablet 0.5 milliGRAM(s) Oral every 8 hours  morphine ER Tablet 60 milliGRAM(s) Oral every 8 hours  multivitamin 1 Tablet(s) Oral daily  piperacillin/tazobactam IVPB.. 3.375 Gram(s) IV Intermittent every 8 hours  polyethylene glycol 3350 17 Gram(s) Oral two times a day  sodium chloride 1 Gram(s) Oral three times a day    MEDICATIONS  (PRN):  acetaminophen     Tablet .. 650 milliGRAM(s) Oral every 6 hours PRN Temp greater or equal to 38C (100.4F)  aluminum hydroxide/magnesium hydroxide/simethicone Suspension 30 milliLiter(s) Oral every 4 hours PRN Dyspepsia  HYDROmorphone   Tablet 6 milliGRAM(s) Oral every 4 hours PRN Moderate Pain (4 - 6)  HYDROmorphone  Injectable 0.75 milliGRAM(s) IV Push every 4 hours PRN Severe Pain (7 - 10)  melatonin 3 milliGRAM(s) Oral at bedtime PRN Insomnia  methocarbamol 750 milliGRAM(s) Oral three times a day PRN Muscle Spasm  ondansetron Injectable 4 milliGRAM(s) IV Push every 8 hours PRN Nausea and/or Vomiting    ITEMS UNCHECKED ARE NOT PRESENT    PRESENT SYMPTOMS: [ ]Unable to self-report - see [x] CPOT [x ] PAINADS [x ] RDOS  Source if other than patient:  [ ]Family   [ ]Team     Pain:  [x]yes [ ]no  QOL impact - affects ADLS (especially while turning in bed for changes PRN)  Location - spine, left shoulder (broken clavicle), abdominal discomfort (mass effect from GI cancer), b/l LE "tightness" from pitting edema                   Aggravating factors - movement  Quality -aching, "fullness" in abdomen  Radiation - none  Timing- constant  Severity (0-10 scale): 5  Minimal acceptable level (0-10 scale): 3    CPOT:  2  https://www.[facility].org/getattachment/[email or web address]/Critical-Care-Pain-Observation-Tool-(CPOT)    PAIN AD Score:	2  http://geriatrictoolkit.[facility]/cog/painad.pdf (Ctrl + left click to view)    Dyspnea:   4                        [ ]Mild [x ]Moderate [ ]Severe    RDOS:  0 to 2  minimal or no respiratory distress   3  mild distress  4 to 6 moderate distress  >7 severe distress  https://homecareinformation.net/handouts/hen/Respiratory_Distress_Observation_Scale.pdf (Ctrl +  left click to view)     Anxiety:                             [ ]Mild [ ]Moderate [x ]Severe  Fatigue:                             [ ]Mild [x ]Moderate [ ]Severe  Nausea:                             [ ]Mild [ ]Moderate [ ]Severe  Loss of appetite:              [x ]Mild [ ]Moderate [ ]Severe  Constipation:                    [ ]Mild [ ]Moderate [ ]Severe    Other Symptoms:  [ ]All other review of systems negative     Palliative Performance Status Version 2:    30 %      http://[facility].org/files/news/palliative_performance_scale_ppsv2.pdf  PHYSICAL EXAM:  Vital Signs Last 24 Hrs  T(C): 36.6 (06 Apr 2022 08:09), Max: 37 (06 Apr 2022 00:48)  T(F): 97.8 (06 Apr 2022 08:09), Max: 98.6 (06 Apr 2022 00:48)  HR: 111 (06 Apr 2022 08:09) (98 - 111)  BP: 104/50 (06 Apr 2022 08:09) (92/54 - 122/78)    RR: 18 (06 Apr 2022 08:09) (18 - 20)  SpO2: 97% (06 Apr 2022 08:09) (96% - 98%) I&O's Summary    05 Apr 2022 07:01  -  06 Apr 2022 07:00  --------------------------------------------------------  IN: 1140 mL / OUT: 2100 mL / NET: -960 mL     GENERAL: [ ]Cachexia    [x]Alert  [x]Oriented x 3  [ ]Lethargic  [ ]Unarousable  [x]Verbal  [ ]Non-Verbal  Behavioral:   [x]Anxiety  [ ]Delirium [ ]Agitation [ ]Other  HEENT:  [x]Normal   [ ]Dry mouth   [ ]ET Tube/Trach  [ ]Oral lesions  PULMONARY:   [ ]Clear [ ]Tachypnea  [ ]Audible excessive secretions   [ ]Rhonchi        [ ]Right [ ]Left [ ]Bilateral  [x]Crackles        [ ]Right [ ]Left [x]Bilateral  [ ]Wheezing     [ ]Right [ ]Left [ ]Bilateral  [x]Diminished BS [x] Right [ ]Left [ ]Bilateral  CARDIOVASCULAR:    [ ]Regular [ ]Irregular [x]Tachy  [ ]Linus [ ]Murmur [ ]Other  GASTROINTESTINAL:  [x]Soft  [x ]Distended   [x ]+BS  [x ]Non tender [ ]Tender  [x ]Other- discomfort (not pain) when palpating abdomen likely due to tumor burden [ ]PEG [ ]OGT/ NGT   Last BM:   GENITOURINARY:  [x ]Normal [ ]Incontinent   [ ]Oliguria/Anuria   [ ]Ansari  MUSCULOSKELETAL:   [ ]Normal   [ ]Weakness  [x ]Bed/Wheelchair bound [ ]Edema  NEUROLOGIC:   [ x]No focal deficits  [ ] Cognitive impairment  [ ] Dysphagia [ ]Dysarthria [ ] Paresis [ ]Other   SKIN:   [x ]Normal  [ ]Rash  [ ]Other  [ ]Pressure ulcer(s) [ ]y [ ]n present on admission    CRITICAL CARE:  [ ]Shock Present  [ ]Septic [ ]Cardiogenic [ ]Neurologic [ ]Hypovolemic  [ ]Vasopressors [ ]Inotropes  [ ]Respiratory failure present [ ]Mechanical Ventilation [ ]Non-invasive ventilatory support [ ]High-Flow   [ ]Acute  [ ]Chronic [ ]Hypoxic  [ ]Hypercarbic [ ]Other  [ ]Other organ failure     LABS:                        8.1    5.93  )-----------( 330      ( 06 Apr 2022 07:20 )             26.5   04-06    134<L>  |  96  |  10  ----------------------------<  96  3.5   |  23  |  0.69    Ca    8.5      06 Apr 2022 07:18  Phos  3.4     04-06  Mg     1.9     04-06    RADIOLOGY & ADDITIONAL STUDIES: no new imaging    Protein Calorie Malnutrition Present: [ ]mild [ ]moderate [ ]severe [ ]underweight [ ]morbid obesity  https://www.[facility]al.org/vault/2440/web/files/ONC/Table_Clinical%20Characteristics%20to%20Document%20Malnutrition-White%20JV%20et%20al%202012.pdf    Height (cm): 167.6 (04-03-22 @ 08:47), 167.6 (03-28-22 @ 11:11), 167.6 (03-19-22 @ 17:35)  Weight (kg): 90.7 (04-03-22 @ 08:47), 109.6 (03-28-22 @ 11:11), 113.4 (03-19-22 @ 17:35)  BMI (kg/m2): 32.3 (04-03-22 @ 08:47), 39 (03-28-22 @ 11:11), 40.4 (03-19-22 @ 17:35)    [x]PPSV2 < or = 30%  [ ]significant weight loss [ ]poor nutritional intake [ ]anasarca[ ]Artificial Nutrition    REFERRALS:   [x]Chaplaincy  [ ]Hospice  [x]Child Life  [ ]Social Work  [x]Case management [ ]Holistic Therapy     Goals of Care Document: HCP form uploaded to [employer]

## 2022-04-06 NOTE — PROGRESS NOTE ADULT - PROBLEM SELECTOR PLAN 2
Pt presenting with diffuse cancer-related pain in spine and abdomen.  -Would treat dyspnea with same regimen used for pain: due to increased need for PRNs over the past 24 hours, will change regimen as below:     -Continue MS Contin to 60mg Q8h standing (can give at same time as other PRN pain medications if required due to long-acting nature)     -Continue Dilaudid to 6mg PO Q4 hours for severe pain/dyspnea     -Continue Dilaudid IV 0.75mg Q4 hours for breakthrough severe pain/dyspnea     -Change Xanax to Ativan and increase to 0.5mg Q8h to standing for anxiety/dyspnea (not to be given within 1 hour of IV Dilaudid to prevent oversedation) Pt presenting with diffuse cancer-related pain in spine and abdomen.  -Would treat dyspnea with same regimen used for pain: due to increased need for PRNs over the past 24 hours, will change regimen as below:     -Continue MS Contin to 60mg Q8h standing (can give at same time as other PRN pain medications if required due to long-acting nature)     -Continue Dilaudid to 6mg PO Q4 hours for severe pain/dyspnea     -Continue Dilaudid IV 0.75mg Q4 hours for breakthrough severe pain/dyspnea     -Change Ativan 0.5mg Q8h to PRN (not to be given within 1 hour of IV Dilaudid to prevent oversedation) Pt presenting with diffuse cancer-related pain in spine and abdomen.  -Would treat dyspnea with same regimen used for pain: due to increased need for PRNs over the past 24 hours, will change regimen as below:     -Continue MS Contin to 60mg Q8h standing (can give at same time as other PRN pain medications if required due to long-acting nature)     -Continue Dilaudid to 6mg PO Q4 hours for severe pain/dyspnea     -Continue Dilaudid IV 0.75mg Q4 hours for breakthrough severe pain/dyspnea     -Continue Ativan 0.5mg Q8h (not to be given within 1 hour of IV Dilaudid to prevent oversedation)

## 2022-04-06 NOTE — PROGRESS NOTE ADULT - PROBLEM SELECTOR PLAN 8
Will  restart her on her recent DC pain meds   Palliative care eval appreciated  Continue with regimen as follows:     -MS Contin 60mg BID as continued from prior hospitalization at [facility]     -Dilaudid 4mg PO Q4 hours for severe pain/dyspnea     -Dilaudid IV 0.75mg Q4 hours for breakthrough severe pain/dyspnea  s/p Relistor x1 dose for opioid induced constipation. Palliative care eval appreciated  Continue with regimen as follows:     -MS Contin 60mg BID as continued from prior hospitalization at [facility]     -Dilaudid 4mg PO Q4 hours for severe pain/dyspnea     -Dilaudid IV 0.75mg Q4 hours for breakthrough severe pain/dyspnea  s/p Relistor x1 dose for opioid induced constipation. (it helped)

## 2022-04-07 NOTE — PROGRESS NOTE ADULT - PROBLEM SELECTOR PLAN 3
Pt is currently on no disease modifying therapy she only wants to try holistic treatments for her disease. Her goal is to be able to get on a plane to [place] to start treatment which is on 4/17.  Palliative care luis appreciated  Child counselling services for the patients 2 daughters was contacted. Pt is currently on no disease modifying therapy she only wants to try holistic treatments for her disease. Her goal is to be able to get on a plane to [place] to start treatment which is on 4/17. Will try to help her achieve her goal.  Palliative care luis banks

## 2022-04-07 NOTE — CHART NOTE - NSCHARTNOTEFT_GEN_A_CORE
Based on patients diagnosis of gastric cancer. Patient requires vickie lift to transfer from bed to chair and chair to bed.

## 2022-04-07 NOTE — PROGRESS NOTE ADULT - ASSESSMENT
52 y/o f with pmhx Gastric Ca, not currently on treatment with recurring pleural effusion,  presents from [facility] for concern of worsening shortness of breath. Patient was recently admitted to [facility] ( 3/23-4/1)  with same complaint and had a thoracentesis and paracentesis. 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  Ct surg removed pleurex catheter on 3/28/22 and pt was to follow up with Dr Daniels in 10-14 days. Patient refused conventional chemo and is seeking alternative medicine for cancer as outpatient. During the recent hosp admission , pt was being followed by Palliative care team .  In the ED , she remains hemodynamically stable on 5 L NC .  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

## 2022-04-07 NOTE — PROGRESS NOTE ADULT - PROBLEM SELECTOR PLAN 5
Most likely multifactorial from pain/ poor oral in take and pulm disease and hypervolemia  Hypernatremia resolved  Fluid restriction  c/w bumex 2mg IV BID  stop NaCl tabs Hyponatremia resolved  Most likely multifactorial from pain/ poor oral in take and pulm disease and hypervolemia  Fluid restriction  c/w bumex 2mg IV BID  stopped NaCl tabs

## 2022-04-07 NOTE — PROGRESS NOTE ADULT - PROBLEM SELECTOR PLAN 8
Palliative care eval appreciated  Continue with regimen as follows:     -MS Contin 60mg BID as continued from prior hospitalization at [facility]     -Dilaudid 4mg PO Q4 hours for severe pain/dyspnea     -Dilaudid IV 0.75mg Q4 hours for breakthrough severe pain/dyspnea  s/p Relistor x1 dose for opioid induced constipation. (it helped)

## 2022-04-07 NOTE — PROGRESS NOTE ADULT - SUBJECTIVE AND OBJECTIVE BOX
Andre Reyes, M.D.  Pager: [phone]  Office: [phone]    Patient is a 51y old  Female who presents with a chief complaint of chest pain for one day (07 Apr 2022 10:46)          SUBJECTIVE / OVERNIGHT EVENTS:    No acute overnight events.    ROS: ( - ) Fever, ( - )Chills,  ( - )Nausea/Vomiting, ( - ) Cough, ( - )Shortness of breath, ( - )Chest Pain    MEDICATIONS  (STANDING):  acetaminophen   IVPB .. 1000 milliGRAM(s) IV Intermittent once  buMETAnide Injectable 2 milliGRAM(s) IV Push two times a day  enoxaparin Injectable 40 milliGRAM(s) SubCutaneous every 24 hours  lactulose Syrup 20 Gram(s) Oral every 8 hours  LORazepam     Tablet 0.5 milliGRAM(s) Oral every 8 hours  morphine ER Tablet 60 milliGRAM(s) Oral every 8 hours  multivitamin 1 Tablet(s) Oral daily  piperacillin/tazobactam IVPB.. 3.375 Gram(s) IV Intermittent every 8 hours  polyethylene glycol 3350 17 Gram(s) Oral <User Schedule>  potassium chloride   Solution 40 milliEquivalent(s) Oral every 4 hours    MEDICATIONS  (PRN):  acetaminophen     Tablet .. 650 milliGRAM(s) Oral every 6 hours PRN Temp greater or equal to 38C (100.4F)  aluminum hydroxide/magnesium hydroxide/simethicone Suspension 30 milliLiter(s) Oral every 4 hours PRN Dyspepsia  HYDROmorphone   Tablet 6 milliGRAM(s) Oral every 4 hours PRN Moderate Pain (4 - 6)  HYDROmorphone  Injectable 0.75 milliGRAM(s) IV Push every 4 hours PRN Severe Pain (7 - 10)  melatonin 3 milliGRAM(s) Oral at bedtime PRN Insomnia  methocarbamol 750 milliGRAM(s) Oral three times a day PRN Muscle Spasm  ondansetron Injectable 4 milliGRAM(s) IV Push every 8 hours PRN Nausea and/or Vomiting          T(C): 36.6 (04-07 @ 08:38), Max: 36.9 (04-06 @ 20:26)   HR: 114   BP: 112/71   RR: 18   SpO2: 91%    PHYSICAL EXAM:    CONSTITUTIONAL: NAD, well-developed, well-groomed  EYES: PERRLA; conjunctiva and sclera clear  ENMT: Moist oral mucosa, no pharyngeal injection or exudates; normal dentition  NECK: Supple, no palpable masses; no thyromegaly  RESPIRATORY: Normal respiratory effort; lungs are clear to auscultation bilaterally  CARDIOVASCULAR: Regular rate and rhythm, normal S1 and S2, no murmur/rub/gallop; No lower extremity edema; Peripheral pulses are 2+ bilaterally  ABDOMEN: Nontender to palpation, normoactive bowel sounds, no rebound/guarding; No hepatosplenomegaly  MUSCULOSKELETAL:  Normal gait; no clubbing or cyanosis of digits; no joint swelling or tenderness to palpation  PSYCH: A+O to person, place, and time; affect appropriate  NEUROLOGY: CN 2-12 are intact and symmetric; no gross sensory deficits   SKIN: No rashes; no palpable lesions      LABS:                        7.5    7.06  )-----------( 303      ( 07 Apr 2022 06:24 )             25.5      04-07    135  |  97  |  11  ----------------------------<  113<H>  3.1<L>   |  23  |  0.72    Ca    8.2<L>      07 Apr 2022 06:23  Phos  3.3     04-06  Mg     2.0     04-06         CAPILLARY BLOOD GLUCOSE          RADIOLOGY & ADDITIONAL TESTS:    Imaging Personally Reviewed:  Consultant(s) Notes Reviewed:    Care Discussed with Consultants/Other Providers:   Andre Reyes, M.D.  Pager: [phone]  Office: [phone]    Patient is a 51y old  Female who presents with a chief complaint of chest pain for one day (07 Apr 2022 10:46)          SUBJECTIVE / OVERNIGHT EVENTS:    No acute overnight events.  feels better today.  feels anxious    ROS: ( - ) Fever, ( - )Chills,  ( - )Nausea/Vomiting, ( - ) Cough, ( - )Shortness of breath, ( - )Chest Pain    MEDICATIONS  (STANDING):  acetaminophen   IVPB .. 1000 milliGRAM(s) IV Intermittent once  buMETAnide Injectable 2 milliGRAM(s) IV Push two times a day  enoxaparin Injectable 40 milliGRAM(s) SubCutaneous every 24 hours  lactulose Syrup 20 Gram(s) Oral every 8 hours  LORazepam     Tablet 0.5 milliGRAM(s) Oral every 8 hours  morphine ER Tablet 60 milliGRAM(s) Oral every 8 hours  multivitamin 1 Tablet(s) Oral daily  piperacillin/tazobactam IVPB.. 3.375 Gram(s) IV Intermittent every 8 hours  polyethylene glycol 3350 17 Gram(s) Oral <User Schedule>  potassium chloride   Solution 40 milliEquivalent(s) Oral every 4 hours    MEDICATIONS  (PRN):  acetaminophen     Tablet .. 650 milliGRAM(s) Oral every 6 hours PRN Temp greater or equal to 38C (100.4F)  aluminum hydroxide/magnesium hydroxide/simethicone Suspension 30 milliLiter(s) Oral every 4 hours PRN Dyspepsia  HYDROmorphone   Tablet 6 milliGRAM(s) Oral every 4 hours PRN Moderate Pain (4 - 6)  HYDROmorphone  Injectable 0.75 milliGRAM(s) IV Push every 4 hours PRN Severe Pain (7 - 10)  melatonin 3 milliGRAM(s) Oral at bedtime PRN Insomnia  methocarbamol 750 milliGRAM(s) Oral three times a day PRN Muscle Spasm  ondansetron Injectable 4 milliGRAM(s) IV Push every 8 hours PRN Nausea and/or Vomiting          T(C): 36.6 (04-07 @ 08:38), Max: 36.9 (04-06 @ 20:26)   HR: 114   BP: 112/71   RR: 18   SpO2: 91%    PHYSICAL EXAM:    CONSTITUTIONAL: NAD, well-developed, well-groomed; anxious  EYES: PERRLA; conjunctiva and sclera clear  ENMT: Moist oral mucosa, no pharyngeal injection or exudates; normal dentition  NECK: Supple, no palpable masses; no thyromegaly  RESPIRATORY: Normal respiratory effort; lungs are clear to auscultation bilaterally; decreased right sided breath sounds  CARDIOVASCULAR: Regular rate and rhythm, normal S1 and S2, no murmur/rub/gallop; 3+ lower extremity edema; Peripheral pulses are 2+ bilaterally  ABDOMEN: Nontender to palpation, normoactive bowel sounds, no rebound/guarding; No hepatosplenomegaly  MUSCULOSKELETAL:  Normal gait; no clubbing or cyanosis of digits; no joint swelling or tenderness to palpation  PSYCH: A+O to person, place, and time; affect appropriate  NEUROLOGY: CN 2-12 are intact and symmetric; no gross sensory deficits   SKIN: No rashes; no palpable lesions      LABS:                        7.5    7.06  )-----------( 303      ( 07 Apr 2022 06:24 )             25.5      04-07    135  |  97  |  11  ----------------------------<  113<H>  3.1<L>   |  23  |  0.72    Ca    8.2<L>      07 Apr 2022 06:23  Phos  3.3     04-06  Mg     2.0     04-06         CAPILLARY BLOOD GLUCOSE          RADIOLOGY & ADDITIONAL TESTS:    Imaging Personally Reviewed:  Consultant(s) Notes Reviewed:    Care Discussed with Consultants/Other Providers:

## 2022-04-07 NOTE — PROGRESS NOTE ADULT - PROBLEM SELECTOR PLAN 2
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    Monitor SPO2   I spoke with IR, Pulmonary and  CT surgery - at this time they cannot offer another Pleurx due to loculation and positioning of the effusion  c/w Bumex 2mg IV BID    incentive spirometry as tolerated

## 2022-04-07 NOTE — PROGRESS NOTE ADULT - PROBLEM SELECTOR PLAN 1
presumed to be from pleural effusion and from RML consolidation  - wean off oxygen today  - given possible pneumonia will c/w Zosyn will complete a 7 day course thru April 11th  - incentive spirometry   - c/w bumex 2mg bid  - Given how sick the patient is I will c/w empric Zosyn for the RML consolidation, although it may just be malignancy presumed to be from pleural effusion and from RML consolidation  - wean off oxygen   - given possible pneumonia will c/w Zosyn to complete a 7 day course thru April 11th  - incentive spirometry   - c/w bumex 2mg bid, she is down 9lbs; check daily weight

## 2022-04-07 NOTE — PROGRESS NOTE ADULT - SUBJECTIVE AND OBJECTIVE BOX
DATE OF SERVICE: 04-07-22 @ 10:46    Patient is a 51y old  Female who presents with a chief complaint of chest pain for one day (06 Apr 2022 16:07)      INTERVAL HISTORY: Feels ok.     REVIEW OF SYSTEMS:  CONSTITUTIONAL: No weakness  EYES/ENT: No visual changes;  No throat pain   NECK: No pain or stiffness  RESPIRATORY: + SOB  CARDIOVASCULAR: No chest pain or palpitations  GASTROINTESTINAL: No abdominal  pain. No nausea, vomiting, or hematemesis  GENITOURINARY: No dysuria, frequency or hematuria  NEUROLOGICAL: No stroke like symptoms  SKIN: No rashes    	  MEDICATIONS:  buMETAnide Injectable 2 milliGRAM(s) IV Push two times a day        PHYSICAL EXAM:  T(C): 36.6 (04-07-22 @ 08:38), Max: 36.9 (04-06-22 @ 20:26)  HR: 114 (04-07-22 @ 08:38) (105 - 118)  BP: 112/71 (04-07-22 @ 08:38) (106/55 - 125/63)  RR: 18 (04-07-22 @ 08:38) (18 - 18)  SpO2: 91% (04-07-22 @ 08:38) (90% - 97%)  Wt(kg): --  I&O's Summary    06 Apr 2022 07:01  -  07 Apr 2022 07:00  --------------------------------------------------------  IN: 920 mL / OUT: 400 mL / NET: 520 mL          Appearance: In no distress	  HEENT:    PERRL, EOMI	  Cardiovascular:  S1 S2, No JVD  Respiratory: Diminshed B/L  Gastrointestinal:  Soft, Non-tender, + BS	  Vascularature:  + edema B/L LE  Psychiatric: Appropriate affect   Neuro: no acute focal deficits                               7.5    7.06  )-----------( 303      ( 07 Apr 2022 06:24 )             25.5     04-07    135  |  97  |  11  ----------------------------<  113<H>  3.1<L>   |  23  |  0.72    Ca    8.2<L>      07 Apr 2022 06:23  Phos  3.3     04-06  Mg     2.0     04-06        Labs personally reviewed      ASSESSMENT/PLAN: 	    52 y/o f with PMH of Gastric Ca (not currently on treatment), recurring pleural effusion [facility]o worsening shortness of breath and chest pain described as her chest pounding out of her chest. Symptoms are non-exertional and have been progressing over the last couple of days.     Problem/Plan #1: Chest Pain  -Troponin negative x2  -EKG shows Sinus Tach with no ischemic changes  -Echo from 3/29/22 shows normal LV function, no RWMA, no valvular dysfunction and possible RV overload  -CT Chest negative for PE  -Low probability that chest pain cardiac in origin.   -Now resolved.    Problem/Plan #2: Shortness of breath  -RV overload on recent TTE  -CXR reveals pulmonary edema, masslike densities in the right apex, lateral right lung and right lung  -CTA chest negative for PE, but shows large right loculated pleural effusion increased from prior, and complete consolidation of the RLL, patchy opacities in RML, small left pleural effusion, diffuse interlobular septal thickening, may be edema v lymphangitic carcinomatosis  - IV diuresis--> Continues to report significant decrease in shortness of breath and LE edema. C/w Bumex.  -Appreciate CTSx and IR recs    Problem/Plan #3: Tachycardia  --110s i/s/o pain secondary to gastric cancer  -Continue pain mgmt as per palliative and primary team  -Continue to monitor HR    -Problem/Plan #4: Chronic Pain  -Palliative Care team following  -Mgmt per primary team      Louise Fitzgerald, RANJIT-NP   [facility]  Cardiovascular Medicine  [address], [address]  Office: [phone]  Cell: [phone]

## 2022-04-08 NOTE — PROGRESS NOTE ADULT - SUBJECTIVE AND OBJECTIVE BOX
Andre Reyes, M.D.  Pager: [phone]  Office: [phone]    Patient is a 51y old  Female who presents with a chief complaint of chest pain for one day (08 Apr 2022 12:53)          SUBJECTIVE / OVERNIGHT EVENTS:    No acute overnight events.    ROS: ( - ) Fever, ( - )Chills,  ( - )Nausea/Vomiting, ( - ) Cough, ( - )Shortness of breath, ( - )Chest Pain    MEDICATIONS  (STANDING):  buMETAnide Injectable 2 milliGRAM(s) IV Push two times a day  enoxaparin Injectable 40 milliGRAM(s) SubCutaneous every 24 hours  lactulose Syrup 20 Gram(s) Oral every 12 hours  morphine ER Tablet 60 milliGRAM(s) Oral every 8 hours  multivitamin 1 Tablet(s) Oral daily  piperacillin/tazobactam IVPB.. 3.375 Gram(s) IV Intermittent every 8 hours  polyethylene glycol 3350 17 Gram(s) Oral two times a day    MEDICATIONS  (PRN):  acetaminophen     Tablet .. 650 milliGRAM(s) Oral every 6 hours PRN Temp greater or equal to 38C (100.4F)  aluminum hydroxide/magnesium hydroxide/simethicone Suspension 30 milliLiter(s) Oral every 4 hours PRN Dyspepsia  HYDROmorphone   Tablet 6 milliGRAM(s) Oral every 4 hours PRN Moderate Pain (4 - 6)  HYDROmorphone  Injectable 0.75 milliGRAM(s) IV Push every 4 hours PRN Severe Pain (7 - 10)  LORazepam     Tablet 0.5 milliGRAM(s) Oral every 6 hours PRN Anxiety  melatonin 3 milliGRAM(s) Oral at bedtime PRN Insomnia  methocarbamol 750 milliGRAM(s) Oral three times a day PRN Muscle Spasm  ondansetron Injectable 4 milliGRAM(s) IV Push every 8 hours PRN Nausea and/or Vomiting          T(C): 36.4 (04-08 @ 12:38), Max: 36.8 (04-07 @ 21:21)   HR: 107   BP: 102/60   RR: 18   SpO2: 95%    PHYSICAL EXAM:    CONSTITUTIONAL: NAD, well-developed, well-groomed  EYES: PERRLA; conjunctiva and sclera clear  ENMT: Moist oral mucosa, no pharyngeal injection or exudates; normal dentition  NECK: Supple, no palpable masses; no thyromegaly  RESPIRATORY: Normal respiratory effort; lungs are clear to auscultation bilaterally  CARDIOVASCULAR: Regular rate and rhythm, normal S1 and S2, no murmur/rub/gallop; No lower extremity edema; Peripheral pulses are 2+ bilaterally  ABDOMEN: Nontender to palpation, normoactive bowel sounds, no rebound/guarding; No hepatosplenomegaly  MUSCULOSKELETAL:  Normal gait; no clubbing or cyanosis of digits; no joint swelling or tenderness to palpation  PSYCH: A+O to person, place, and time; affect appropriate  NEUROLOGY: CN 2-12 are intact and symmetric; no gross sensory deficits   SKIN: No rashes; no palpable lesions      LABS:                        8.1    7.27  )-----------( 361      ( 08 Apr 2022 07:13 )             27.6      04-08    134<L>  |  96  |  10  ----------------------------<  108<H>  3.6   |  25  |  0.71    Ca    8.7      08 Apr 2022 07:13  Phos  3.0     04-08  Mg     2.0     04-08         CAPILLARY BLOOD GLUCOSE          RADIOLOGY & ADDITIONAL TESTS:    Imaging Personally Reviewed:  Consultant(s) Notes Reviewed:    Care Discussed with Consultants/Other Providers:   Andre Reyes, M.D.  Pager: [phone]  Office: [phone]    Patient is a 51y old  Female who presents with a chief complaint of chest pain for one day (08 Apr 2022 12:53)          SUBJECTIVE / OVERNIGHT EVENTS:    No acute overnight events.  feels well  no complaints    ROS: ( - ) Fever, ( - )Chills,  ( - )Nausea/Vomiting, ( - ) Cough, ( - )Shortness of breath, ( - )Chest Pain    MEDICATIONS  (STANDING):  buMETAnide Injectable 2 milliGRAM(s) IV Push two times a day  enoxaparin Injectable 40 milliGRAM(s) SubCutaneous every 24 hours  lactulose Syrup 20 Gram(s) Oral every 12 hours  morphine ER Tablet 60 milliGRAM(s) Oral every 8 hours  multivitamin 1 Tablet(s) Oral daily  piperacillin/tazobactam IVPB.. 3.375 Gram(s) IV Intermittent every 8 hours  polyethylene glycol 3350 17 Gram(s) Oral two times a day    MEDICATIONS  (PRN):  acetaminophen     Tablet .. 650 milliGRAM(s) Oral every 6 hours PRN Temp greater or equal to 38C (100.4F)  aluminum hydroxide/magnesium hydroxide/simethicone Suspension 30 milliLiter(s) Oral every 4 hours PRN Dyspepsia  HYDROmorphone   Tablet 6 milliGRAM(s) Oral every 4 hours PRN Moderate Pain (4 - 6)  HYDROmorphone  Injectable 0.75 milliGRAM(s) IV Push every 4 hours PRN Severe Pain (7 - 10)  LORazepam     Tablet 0.5 milliGRAM(s) Oral every 6 hours PRN Anxiety  melatonin 3 milliGRAM(s) Oral at bedtime PRN Insomnia  methocarbamol 750 milliGRAM(s) Oral three times a day PRN Muscle Spasm  ondansetron Injectable 4 milliGRAM(s) IV Push every 8 hours PRN Nausea and/or Vomiting          T(C): 36.4 (04-08 @ 12:38), Max: 36.8 (04-07 @ 21:21)   HR: 107   BP: 102/60   RR: 18   SpO2: 95%    PHYSICAL EXAM:    CONSTITUTIONAL: NAD, well-developed, well-groomed  EYES: PERRLA; conjunctiva and sclera clear  ENMT: Moist oral mucosa, no pharyngeal injection or exudates; normal dentition  NECK: Supple, no palpable masses; no thyromegaly  RESPIRATORY: Normal respiratory effort; lungs are clear to auscultation bilaterally  CARDIOVASCULAR: Regular rate and rhythm, normal S1 and S2, no murmur/rub/gallop; 3+ lower extremity edema (but softer); Peripheral pulses are 2+ bilaterally  ABDOMEN: Nontender to palpation, normoactive bowel sounds, no rebound/guarding; No hepatosplenomegaly  MUSCULOSKELETAL:  Normal gait; no clubbing or cyanosis of digits; no joint swelling or tenderness to palpation  PSYCH: A+O to person, place, and time; affect appropriate  NEUROLOGY: CN 2-12 are intact and symmetric; no gross sensory deficits   SKIN: No rashes; no palpable lesions      LABS:                        8.1    7.27  )-----------( 361      ( 08 Apr 2022 07:13 )             27.6      04-08    134<L>  |  96  |  10  ----------------------------<  108<H>  3.6   |  25  |  0.71    Ca    8.7      08 Apr 2022 07:13  Phos  3.0     04-08  Mg     2.0     04-08         CAPILLARY BLOOD GLUCOSE          RADIOLOGY & ADDITIONAL TESTS:    Imaging Personally Reviewed:  Consultant(s) Notes Reviewed:    Care Discussed with Consultants/Other Providers:

## 2022-04-08 NOTE — PROGRESS NOTE ADULT - SUBJECTIVE AND OBJECTIVE BOX
DATE OF SERVICE: 04-08-22 @ 09:55    Patient is a 51y old  Female who presents with a chief complaint of chest pain for one day (07 Apr 2022 11:58)      INTERVAL HISTORY: Feels ok    REVIEW OF SYSTEMS:  CONSTITUTIONAL: No weakness  EYES/ENT: No visual changes;  No throat pain   NECK: No pain or stiffness  RESPIRATORY: + SOB  CARDIOVASCULAR: No chest pain or palpitations  GASTROINTESTINAL: No abdominal  pain. No nausea, vomiting, or hematemesis  GENITOURINARY: No dysuria, frequency or hematuria  NEUROLOGICAL: No stroke like symptoms  SKIN: No rashes    	  MEDICATIONS:  buMETAnide Injectable 2 milliGRAM(s) IV Push two times a day        PHYSICAL EXAM:  T(C): 36.5 (04-08-22 @ 08:48), Max: 36.8 (04-07-22 @ 21:21)  HR: 120 (04-08-22 @ 08:48) (107 - 125)  BP: 114/70 (04-08-22 @ 08:48) (101/60 - 114/72)  RR: 18 (04-08-22 @ 08:48) (18 - 20)  SpO2: 95% (04-08-22 @ 08:48) (92% - 95%)  Wt(kg): --  I&O's Summary    07 Apr 2022 07:01  -  08 Apr 2022 07:00  --------------------------------------------------------  IN: 940 mL / OUT: 700 mL / NET: 240 mL          Appearance: In no distress	  HEENT:    PERRL, EOMI	  Cardiovascular:  S1 S2, No JVD  Respiratory: Diminished B/L	  Gastrointestinal:  Soft, Non-tender, + BS	  Vascularature:  + LE edema  Psychiatric: Appropriate affect   Neuro: no acute focal deficits                           8.1    7.27  )-----------( 361      ( 08 Apr 2022 07:13 )             27.6     04-08    134<L>  |  96  |  10  ----------------------------<  108<H>  3.6   |  25  |  0.71    Ca    8.7      08 Apr 2022 07:13  Phos  3.0     04-08  Mg     2.0     04-08          Labs personally reviewed      ASSESSMENT/PLAN: 	      50 y/o f with PMH of Gastric Ca (not currently on treatment), recurring pleural effusion [facility] of breath and chest pain described as her chest pounding out of her chest. Symptoms are non-exertional and have been progressing over the last couple of days. Chest pain now resolved but continues to report intermittent SOB mostly a/w acute pain.     Problem/Plan #1: Chest Pain  -Troponin negative x2  -EKG shows Sinus Tach with no ischemic changes  -Echo from 3/29/22 shows normal LV function, no RWMA, no valvular dysfunction and possible RV overload  -CT Chest negative for PE  -Low probability that chest pain cardiac in origin.   -Now resolved.    Problem/Plan #2: Shortness of breath  -RV overload on recent TTE  -CXR reveals pulmonary edema, masslike densities in the right apex, lateral right lung and right lung  -CTA chest negative for PE, but shows large right loculated pleural effusion increased from prior, and complete consolidation of the RLL, patchy opacities in RML, small left pleural effusion, diffuse interlobular septal thickening, may be edema v lymphangitic carcinomatosis  - IV diuresis--> Continues to report significant decrease in shortness of breath and LE edema. C/w Bumex. Cr currently stable.   -Pulm following  -As per IR, Pulm and  CTS - not a candidate for Pleurx due to loculation and positioning of the effusion    Problem/Plan #3: Tachycardia  --110s i/s/o pain secondary to gastric cancer  -Continue pain mgmt as per palliative and primary team  -Continue to monitor HR    -Problem/Plan #4: Chronic Pain  -Palliative Care team following  -Mgmt per primary team    RANJIT Vences-STEFFANIE Howard DO formerly [facility]  Cardiovascular Medicine  [address] [place] [address], [address]  Office: [phone]  Cell: [phone] DATE OF SERVICE: 04-08-22 @ 09:55    Patient is a 51y old  Female who presents with a chief complaint of chest pain for one day (07 Apr 2022 11:58)      INTERVAL HISTORY: Feels ok    REVIEW OF SYSTEMS:  CONSTITUTIONAL: No weakness  EYES/ENT: No visual changes;  No throat pain   NECK: No pain or stiffness  RESPIRATORY: + SOB  CARDIOVASCULAR: No chest pain or palpitations  GASTROINTESTINAL: No abdominal  pain. No nausea, vomiting, or hematemesis  GENITOURINARY: No dysuria, frequency or hematuria  NEUROLOGICAL: No stroke like symptoms  SKIN: No rashes    	  MEDICATIONS:  buMETAnide Injectable 2 milliGRAM(s) IV Push two times a day        PHYSICAL EXAM:  T(C): 36.5 (04-08-22 @ 08:48), Max: 36.8 (04-07-22 @ 21:21)  HR: 120 (04-08-22 @ 08:48) (107 - 125)  BP: 114/70 (04-08-22 @ 08:48) (101/60 - 114/72)  RR: 18 (04-08-22 @ 08:48) (18 - 20)  SpO2: 95% (04-08-22 @ 08:48) (92% - 95%)  Wt(kg): --  I&O's Summary    07 Apr 2022 07:01  -  08 Apr 2022 07:00  --------------------------------------------------------  IN: 940 mL / OUT: 700 mL / NET: 240 mL          Appearance: In no distress	  HEENT:    PERRL, EOMI	  Cardiovascular:  S1 S2, No JVD  Respiratory: Diminished B/L	  Gastrointestinal:  Soft, Non-tender, + BS	  Vascularature:  + LE edema  Psychiatric: Appropriate affect   Neuro: no acute focal deficits                           8.1    7.27  )-----------( 361      ( 08 Apr 2022 07:13 )             27.6     04-08    134<L>  |  96  |  10  ----------------------------<  108<H>  3.6   |  25  |  0.71    Ca    8.7      08 Apr 2022 07:13  Phos  3.0     04-08  Mg     2.0     04-08          Labs personally reviewed      ASSESSMENT/PLAN: 	      52 y/o f with PMH of Gastric Ca (not currently on treatment), recurring [facility] effusion [facility] and chest pain described as her chest pounding out of her chest. Symptoms are non-exertional and have been progressing over the last couple of days. Chest pain now resolved but continues to report intermittent SOB mostly a/w acute pain.     Problem/Plan #1: Chest Pain  -Troponin negative x2  -EKG shows Sinus Tach with no ischemic changes  -Echo from 3/29/22 shows normal LV function, no RWMA, no valvular dysfunction and possible RV overload  -CT Chest negative for PE  -Low probability that chest pain cardiac in origin.   -Now resolved.    Problem/Plan #2: Shortness of breath  -RV overload on recent TTE  -CXR reveals pulmonary edema, masslike densities in the right apex, lateral right lung and right lung  -CTA chest negative for PE, but shows large right loculated pleural effusion increased from prior, and complete consolidation of the RLL, patchy opacities in RML, small left pleural effusion, diffuse interlobular septal thickening, may be edema v lymphangitic carcinomatosis  - IV diuresis--> Continues to report significant decrease in shortness of breath and LE edema. C/w Bumex. Cr currently stable.   - increase Bumec to 2mg TID  -Pulm following  -As per IR, Pulm and  CTS - not a candidate for Pleurx due to loculation and positioning of the effusion    Problem/Plan #3: Tachycardia  --110s i/s/o pain secondary to gastric cancer  -Continue pain mgmt as per palliative and primary team  -Continue to monitor HR    -Problem/Plan #4: Chronic Pain  -Palliative Care team following  -Mgmt per primary team        RANJIT Vences-STEFFANIE Howard DO [facility]  Cardiovascular Medicine  [address], [address]  Office: [phone]  Cell: [phone]

## 2022-04-08 NOTE — PROGRESS NOTE ADULT - PROBLEM SELECTOR PLAN 6
We will continue to follow for management of pain, dyspnea, anxiety. GOC discussion on 4/5 as above  Please page Geriatrics and Palliative Medicine Team at [phone] for additional assistance with symptom management during after hours (4pm-8am). [facility] discussion on 4/5 as above  Seeing that her symptoms are stable on the current regimen, Geriatrics and Palliative Medicine Team will sign off at this time. Please don't hesitate to reconsult or page us at [phone] if additional assistance is required.

## 2022-04-08 NOTE — CHART NOTE - NSCHARTNOTEFT_GEN_A_CORE
Recommendations   -Agree with a course of abx.  -Can trial diuresis for interstitial edema vs lymphangitic spread.   -There is a loculated right pleural effusion larger than prior with collections. Unfortunately the largest pocket is behind the right scapula. The prior IPC location has a small residual simple appearing pleural effusion which is too small to tap and unlikely the cause of her symptoms.   -CTS input appreciated.   -Venous dopplers on 3/24 negative for VTE.  -Agree with palliative care input.   -Excellent care from Dr. Reyes appreciated.     Please call pulmonary if any questions or concerns.      Dr. Chao Snyder, DO  Pulmonary and Critical Care Medicine Fellow   Available via [employer] Teams - **Preferred**  Pulmonary Spectra #[id number] ([facility]) / [phone] ([facility])  Pager:  [phone]

## 2022-04-08 NOTE — PROGRESS NOTE ADULT - PROBLEM SELECTOR PLAN 3
Pt is currently on no disease modifying therapy she only wants to try holistic treatments for her disease. Her goal is to be able to get on a plane to [place] to start treatment which is on 4/17. Will try to help her achieve her goal.  Palliative care luis banks

## 2022-04-08 NOTE — PROGRESS NOTE ADULT - PROBLEM SELECTOR PLAN 1
Pt presented with SOB and chest pain, unable to speak in full sentences on 4L NC  -CTA chest negative for PE, but shows large right loculated pleural effusion increased from prior, and complete consolidation of the RLL, patchy opacities in RML, small left pleural effusion, diffuse interlobular septal thickening, may be edema v lymphangitic carcinomatosis. Trop trend negative, cardiology following, diuresis per primary and cardiology teams  -Appreciate primary team input: IR, Pulmonary and  CT surgery consulted- at this time they cannot offer another Pleurx due to loculation and positioning of the effusion. Continue supplementary O2 as needed.  -Would treat dyspnea with same regimen used for pain: due to increased need for PRNs over the past 24 hours, will change regimen as below:     -Continue MS Contin to 60mg Q8h standing (can give at same time as other PRN pain medications if required due to long-acting nature)     -Continue Dilaudid to 6mg PO Q4 hours for severe pain/dyspnea     -Continue Dilaudid IV 0.75mg Q4 hours for breakthrough severe pain/dyspnea     -Continue Ativan 0.5mg Q8h (not to be given within 1 hour of IV Dilaudid to prevent oversedation) IR, Pulmonary and  CT surgery consulted- at this time they cannot offer another Pleurx due to loculation and positioning of the effusion. Continue supplementary O2 as needed.  - Continue MS Contin to 60mg Q8h standing at 8AM, 4PM and 12AM (can give at same time as other PRN pain medications if required due to long-acting nature)  - Continue Dilaudid to 6mg PO Q4 hours for severe pain/dyspnea  - Continue Dilaudid IV 0.75mg Q4 hours for breakthrough severe pain/dyspnea (pt understands she will not have access to this on d/c)   - Continue Ativan 0.5mg Q6h PRN (not to be given within 1 hour of IV Dilaudid to prevent oversedation)

## 2022-04-08 NOTE — PROGRESS NOTE ADULT - PROBLEM SELECTOR PLAN 2
Pt presenting with diffuse cancer-related pain in spine and abdomen.  -Would treat dyspnea with same regimen used for pain: due to increased need for PRNs over the past 24 hours, will change regimen as below:     -Continue MS Contin to 60mg Q8h standing (can give at same time as other PRN pain medications if required due to long-acting nature)     -Continue Dilaudid to 6mg PO Q4 hours for severe pain/dyspnea     -Continue Dilaudid IV 0.75mg Q4 hours for breakthrough severe pain/dyspnea     -Continue Ativan 0.5mg Q8h (not to be given within 1 hour of IV Dilaudid to prevent oversedation) Pt presenting with diffuse cancer-related pain in spine and abdomen.  - Continue MS Contin to 60mg Q8h standing at 8AM, 4PM and 12AM (can give at same time as other PRN pain medications if required due to long-acting nature)  - Continue Dilaudid to 6mg PO Q4 hours for severe pain/dyspnea  - Continue Dilaudid IV 0.75mg Q4 hours for breakthrough severe pain/dyspnea (pt understands she will not have access to this on d/c)   - Continue Ativan 0.5mg Q6h PRN (not to be given within 1 hour of IV Dilaudid to prevent oversedation)

## 2022-04-08 NOTE — PROGRESS NOTE ADULT - PROBLEM SELECTOR PLAN 5
Pt pursuing alternative medicine treatment in [place] for gastric cancer  -GOC discussion 4/5 revealed pt would like to be full code and would like to pursue treatment as she still has hope for the future and she would like the best chance possible Pt pursuing alternative medicine treatment in [place] for gastric cancer  - GOC discussion 4/5 revealed pt would like to be full code and would like to pursue treatment as she still has hope for the future and she would like the best chance possible

## 2022-04-08 NOTE — PROGRESS NOTE ADULT - PROBLEM SELECTOR PLAN 1
presumed to be from pleural effusion and from RML consolidation  - wean off oxygen   - given possible pneumonia will c/w Zosyn to complete a 7 day course thru April 11th  - incentive spirometry   - c/w bumex 2mg bid, she is down 9lbs; check daily weight

## 2022-04-08 NOTE — PROGRESS NOTE ADULT - ASSESSMENT
51 yoF with PMH of Gastric cancer, not on conventional chemotherapy and pursuing alternative medicine for treatment, presenting with SOB and recurrent pleural effusion, s/p thoracentesis and paracentesis at [facility] (3/23-4/1), s/p Pleurx catheter removal by CT surgery on 3/28/22. Geriatrics and Palliative Medicine Team is consulted for assistance with pain control and GOC discussions.

## 2022-04-08 NOTE — PROGRESS NOTE ADULT - PROBLEM SELECTOR PLAN 4
Per documentation, pt has hx of metastatic gastric cancer and multiple abdominal surgeries with hx of SBO   -Last heme onc appt with Dr. Skinny Ibanez 7/2021 per documentation  -Alk Phos elevated to 2506 on admission  -Pt pursuing alternative medicine treatment for gastric cancer per GOC conversations Per documentation, pt has hx of metastatic gastric cancer and multiple abdominal surgeries with hx of SBO   - Last heme onc appt with Dr. Skinny Ibanez 7/2021 per documentation  - Pt pursuing alternative medicine treatment for gastric cancer by going to [place] on 4/18

## 2022-04-08 NOTE — PROGRESS NOTE ADULT - PROBLEM SELECTOR PLAN 5
Hyponatremia resolved  Most likely multifactorial from pain/ poor oral in take and pulm disease and hypervolemia  Fluid restriction  c/w bumex 2mg IV BID  stopped NaCl tabs

## 2022-04-08 NOTE — PROGRESS NOTE ADULT - PROBLEM SELECTOR PLAN 7
Tro 31-->29   most likely from the Large pleural effusion   Last TTE with EF of 65% Consent (Marginal Mandibular)/Introductory Paragraph: The rationale for Mohs was explained to the patient and consent was obtained. The risks, benefits and alternatives to therapy were discussed in detail. Specifically, the risks of damage to the marginal mandibular branch of the facial nerve, infection, scarring, bleeding, prolonged wound healing, incomplete removal, allergy to anesthesia, and recurrence were addressed. Prior to the procedure, the treatment site was clearly identified and confirmed by the patient. All components of Universal Protocol/PAUSE Rule completed.

## 2022-04-08 NOTE — PROGRESS NOTE ADULT - PROBLEM SELECTOR PLAN 3
Pt regularly has daily BMs  -LBM was last night per pt, s/p 1 dose of Relistor 4/4  -Continue lactulose 20mg TID and Miralax TID, titrate to at least 1BM a day Pt regularly has daily BMs  - s/p 1 dose of Relistor 4/4  - Given watery diarrhea, will reduce lactulose 20mg BID and Miralax BID

## 2022-04-08 NOTE — PROGRESS NOTE ADULT - ASSESSMENT
52 y/o f with pmhx Gastric Ca, not currently on treatment with recurring pleural effusion,  presents from [facility] for concern of worsening shortness of breath. Patient was recently admitted to [facility] ( 3/23-4/1)  with same complaint and had a thoracentesis and paracentesis. 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  Ct surg removed pleurex catheter on 3/28/22 and pt was to follow up with Dr Daniels in 10-14 days. Patient refused conventional chemo and is seeking alternative medicine for cancer as outpatient. During the recent hosp admission , pt was being followed by Palliative care team .  In the ED , she remains hemodynamically stable on 5 L NC .  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

## 2022-04-09 NOTE — PROVIDER CONTACT NOTE (CRITICAL VALUE NOTIFICATION) - ACTION/TREATMENT ORDERED:
NP notified and aware.  As per NP will order x3 doses of potassium IV and 2 cups of potassium solution.  Continue to monitor patient.

## 2022-04-09 NOTE — PROGRESS NOTE ADULT - PROBLEM SELECTOR PLAN 1
presumed to be from pleural effusion and from RML consolidation  - wean off oxygen   - given possible pneumonia will c/w Zosyn to complete a 7 day course thru April 11th  - incentive spirometry   - c/w bumex 2mg bid, she is down 9lbs; check daily weight presumed to be from pleural effusion and from RML consolidation  - wean off oxygen as tolerated  - given possible pneumonia will c/w Zosyn to complete a 7 day course thru April 11th  - incentive spirometry   - will increase bumex 2mg bid to TID ; check daily weight

## 2022-04-09 NOTE — PROGRESS NOTE ADULT - ASSESSMENT
50 y/o f with pmhx Gastric Ca, not currently on treatment with recurring pleural effusion,  presents from [facility] for concern of worsening shortness of breath. Patient was recently admitted to [facility] ( 3/23-4/1)  with same complaint and had a thoracentesis and paracentesis. 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  Ct surg removed pleurex catheter on 3/28/22 and pt was to follow up with Dr Daniels in 10-14 days. Patient refused conventional chemo and is seeking alternative medicine for cancer as outpatient. During the recent hosp admission , pt was being followed by Palliative care team .  In the ED , she remains hemodynamically stable on 5 L NC .  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

## 2022-04-09 NOTE — PROGRESS NOTE ADULT - PROBLEM SELECTOR PLAN 5
Hyponatremia resolved  Most likely multifactorial from pain/ poor oral in take and pulm disease and hypervolemia  Fluid restriction  c/w bumex 2mg IV BID  stopped NaCl tabs Hyponatremia resolved  Most likely multifactorial from pain/ poor oral in take and pulm disease and hypervolemia  Fluid restriction  c/w diuresis  stopped NaCl tabs

## 2022-04-09 NOTE — PROGRESS NOTE ADULT - PROBLEM SELECTOR PLAN 8
Palliative care eval appreciated  Continue with regimen as follows:     -MS Contin 60mg BID as continued from prior hospitalization at [facility]     -Dilaudid 4mg PO Q4 hours for severe pain/dyspnea     -Dilaudid IV 0.75mg Q4 hours for breakthrough severe pain/dyspnea  s/p Relistor x1 dose for opioid induced constipation. (it helped)

## 2022-04-09 NOTE — PROGRESS NOTE ADULT - PROBLEM SELECTOR PLAN 2
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    Monitor SPO2   I spoke with IR, Pulmonary and  CT surgery - at this time they cannot offer another Pleurx due to loculation and positioning of the effusion  c/w Bumex 2mg IV BID    incentive spirometry as tolerated CT PE studies : limited studies with no PE , but shows Large Rt Loculated Pleural effusion that is slightly increased and complete consolidation of the RLL, patchy opacities in RML , small left pleural effusion , Diffuse interlobular septal thickening , may be edema Vs Lymphangitic carcinomatosis.    Monitor SPO2   I spoke with IR, Pulmonary and  CT surgery - at this time they cannot offer another Pleurx due to loculation and positioning of the effusion  increase Bumex 2mg IV BID to TID    incentive spirometry as tolerated

## 2022-04-10 NOTE — PROGRESS NOTE ADULT - SUBJECTIVE AND OBJECTIVE BOX
Andre Reyes, M.D.  Pager: [phone]  Office: [phone]    Patient is a 51y old  Female who presents with a chief complaint of chest pain for one day (09 Apr 2022 11:39)          SUBJECTIVE / OVERNIGHT EVENTS:    No acute overnight events.    ROS: ( - ) Fever, ( - )Chills,  ( - )Nausea/Vomiting, ( - ) Cough, ( - )Shortness of breath, ( - )Chest Pain    MEDICATIONS  (STANDING):  buMETAnide Injectable 2 milliGRAM(s) IV Push every 8 hours  enoxaparin Injectable 40 milliGRAM(s) SubCutaneous every 24 hours  lactulose Syrup 20 Gram(s) Oral every 24 hours  morphine ER Tablet 60 milliGRAM(s) Oral every 8 hours  multivitamin 1 Tablet(s) Oral daily  piperacillin/tazobactam IVPB.. 3.375 Gram(s) IV Intermittent every 8 hours  polyethylene glycol 3350 17 Gram(s) Oral every 24 hours  simethicone 80 milliGRAM(s) Chew three times a day    MEDICATIONS  (PRN):  acetaminophen     Tablet .. 650 milliGRAM(s) Oral every 6 hours PRN Temp greater or equal to 38C (100.4F)  aluminum hydroxide/magnesium hydroxide/simethicone Suspension 30 milliLiter(s) Oral every 4 hours PRN Dyspepsia  famotidine    Tablet 20 milliGRAM(s) Oral two times a day PRN GERD  HYDROmorphone   Tablet 6 milliGRAM(s) Oral every 4 hours PRN Moderate Pain (4 - 6)  HYDROmorphone  Injectable 0.75 milliGRAM(s) IV Push every 4 hours PRN Severe Pain (7 - 10)  LORazepam     Tablet 0.5 milliGRAM(s) Oral every 6 hours PRN Anxiety  melatonin 3 milliGRAM(s) Oral at bedtime PRN Insomnia  methocarbamol 750 milliGRAM(s) Oral three times a day PRN Muscle Spasm  ondansetron Injectable 4 milliGRAM(s) IV Push every 8 hours PRN Nausea and/or Vomiting          T(C): 36.7 (04-10 @ 04:59), Max: 36.9 (04-09 @ 20:00)   HR: 112   BP: 111/69   RR: 20   SpO2: 93%    PHYSICAL EXAM:    CONSTITUTIONAL: NAD, well-developed, well-groomed  EYES: PERRLA; conjunctiva and sclera clear  ENMT: Moist oral mucosa, no pharyngeal injection or exudates; normal dentition  NECK: Supple, no palpable masses; no thyromegaly  RESPIRATORY: Normal respiratory effort; lungs are clear to auscultation bilaterally  CARDIOVASCULAR: Regular rate and rhythm, normal S1 and S2, no murmur/rub/gallop; No lower extremity edema; Peripheral pulses are 2+ bilaterally  ABDOMEN: Nontender to palpation, normoactive bowel sounds, no rebound/guarding; No hepatosplenomegaly  MUSCULOSKELETAL:  Normal gait; no clubbing or cyanosis of digits; no joint swelling or tenderness to palpation  PSYCH: A+O to person, place, and time; affect appropriate  NEUROLOGY: CN 2-12 are intact and symmetric; no gross sensory deficits   SKIN: No rashes; no palpable lesions      LABS:                        8.1    6.49  )-----------( 313      ( 10 Apr 2022 08:30 )             27.2      04-10    137  |  96  |  7   ----------------------------<  109<H>  3.5   |  31  |  0.59    Ca    8.5      10 Apr 2022 08:30         CAPILLARY BLOOD GLUCOSE          RADIOLOGY & ADDITIONAL TESTS:    Imaging Personally Reviewed:  Consultant(s) Notes Reviewed:    Care Discussed with Consultants/Other Providers:   Andre Reyes, M.D.  Pager: [phone]  Office: [phone]    Patient is a 51y old  Female who presents with a chief complaint of chest pain for one day (09 Apr 2022 11:39)          SUBJECTIVE / OVERNIGHT EVENTS:    No acute overnight events.  No new complaints    ROS: ( - ) Fever, ( - )Chills,  ( - )Nausea/Vomiting, ( - ) Cough, ( - )Shortness of breath, ( - )Chest Pain    MEDICATIONS  (STANDING):  buMETAnide Injectable 2 milliGRAM(s) IV Push every 8 hours  enoxaparin Injectable 40 milliGRAM(s) SubCutaneous every 24 hours  lactulose Syrup 20 Gram(s) Oral every 24 hours  morphine ER Tablet 60 milliGRAM(s) Oral every 8 hours  multivitamin 1 Tablet(s) Oral daily  piperacillin/tazobactam IVPB.. 3.375 Gram(s) IV Intermittent every 8 hours  polyethylene glycol 3350 17 Gram(s) Oral every 24 hours  simethicone 80 milliGRAM(s) Chew three times a day    MEDICATIONS  (PRN):  acetaminophen     Tablet .. 650 milliGRAM(s) Oral every 6 hours PRN Temp greater or equal to 38C (100.4F)  aluminum hydroxide/magnesium hydroxide/simethicone Suspension 30 milliLiter(s) Oral every 4 hours PRN Dyspepsia  famotidine    Tablet 20 milliGRAM(s) Oral two times a day PRN GERD  HYDROmorphone   Tablet 6 milliGRAM(s) Oral every 4 hours PRN Moderate Pain (4 - 6)  HYDROmorphone  Injectable 0.75 milliGRAM(s) IV Push every 4 hours PRN Severe Pain (7 - 10)  LORazepam     Tablet 0.5 milliGRAM(s) Oral every 6 hours PRN Anxiety  melatonin 3 milliGRAM(s) Oral at bedtime PRN Insomnia  methocarbamol 750 milliGRAM(s) Oral three times a day PRN Muscle Spasm  ondansetron Injectable 4 milliGRAM(s) IV Push every 8 hours PRN Nausea and/or Vomiting          T(C): 36.7 (04-10 @ 04:59), Max: 36.9 (04-09 @ 20:00)   HR: 112   BP: 111/69   RR: 20   SpO2: 93%    PHYSICAL EXAM:    CONSTITUTIONAL: NAD, well-developed, well-groomed  EYES: PERRLA; conjunctiva and sclera clear  ENMT: Moist oral mucosa, no pharyngeal injection or exudates; normal dentition  NECK: Supple, no palpable masses; no thyromegaly  RESPIRATORY: Normal respiratory effort; decrease breath sounds on the right side; rales noted in right middle lung field  CARDIOVASCULAR: Regular rate and rhythm, normal S1 and S2, no murmur/rub/gallop; 3+ lower extremity edema; Peripheral pulses are 2+ bilaterally  ABDOMEN: Nontender to palpation, normoactive bowel sounds, no rebound/guarding; No hepatosplenomegaly  MUSCULOSKELETAL:  Normal gait; no clubbing or cyanosis of digits; no joint swelling or tenderness to palpation  PSYCH: A+O to person, place, and time; affect appropriate  NEUROLOGY: CN 2-12 are intact and symmetric; no gross sensory deficits   SKIN: No rashes; no palpable lesions      LABS:                        8.1    6.49  )-----------( 313      ( 10 Apr 2022 08:30 )             27.2      04-10    137  |  96  |  7   ----------------------------<  109<H>  3.5   |  31  |  0.59    Ca    8.5      10 Apr 2022 08:30         CAPILLARY BLOOD GLUCOSE          RADIOLOGY & ADDITIONAL TESTS:    Imaging Personally Reviewed:  Consultant(s) Notes Reviewed:    Care Discussed with Consultants/Other Providers:

## 2022-04-10 NOTE — PROGRESS NOTE ADULT - PROBLEM SELECTOR PLAN 5
Hyponatremia resolved  Most likely multifactorial from pain/ poor oral in take and pulm disease and hypervolemia  Fluid restriction  c/w diuresis  stopped NaCl tabs

## 2022-04-10 NOTE — PROGRESS NOTE ADULT - PROBLEM SELECTOR PLAN 8
Palliative care eval appreciated  Continue with regimen as follows:     -MS Contin 60mg BID as continued from prior hospitalization at [facility]     -Dilaudid 4mg PO Q4 hours for severe pain/dyspnea     -Dilaudid IV 0.75mg Q4 hours for breakthrough severe pain/dyspnea  s/p Relistor x1 dose for opioid induced constipation. (it helped)

## 2022-04-10 NOTE — PROGRESS NOTE ADULT - PROBLEM SELECTOR PLAN 1
presumed to be from pleural effusion and from RML consolidation  - wean off oxygen as tolerated  - given possible pneumonia will c/w Zosyn to complete a 7 day course thru April 11th  - incentive spirometry   - will increase bumex 2mg bid to TID ; check daily weight presumed to be from pleural effusion and from RML consolidation  - wean off oxygen as tolerated  - given possible pneumonia will c/w Zosyn to complete a 7 day course thru April 11th  - incentive spirometry   - c/w bumex 2mg bid to TID ; check daily weight

## 2022-04-10 NOTE — PROGRESS NOTE ADULT - ASSESSMENT
50 y/o f with pmhx Gastric Ca, not currently on treatment with recurring pleural effusion,  presents from [facility] for concern of worsening shortness of breath. Patient was recently admitted to [facility] ( 3/23-4/1)  with same complaint and had a thoracentesis and paracentesis. 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  Ct surg removed pleurex catheter on 3/28/22 and pt was to follow up with Dr Daniels in 10-14 days. Patient refused conventional chemo and is seeking alternative medicine for cancer as outpatient. During the recent hosp admission , pt was being followed by Palliative care team .  In the ED , she remains hemodynamically stable on 5 L NC .  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

## 2022-04-10 NOTE — PROGRESS NOTE ADULT - PROBLEM SELECTOR PLAN 2
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    Monitor SPO2   I spoke with IR, Pulmonary and  CT surgery - at this time they cannot offer another Pleurx due to loculation and positioning of the effusion  increase Bumex 2mg IV BID to TID    incentive spirometry as tolerated CT PE studies : limited studies with no PE , but shows Large Rt Loculated Pleural effusion that is slightly increased and complete consolidation of the RLL, patchy opacities in RML , small left pleural effusion , Diffuse interlobular septal thickening , may be edema Vs Lymphangitic carcinomatosis.    Monitor SPO2   I spoke with IR, Pulmonary and  CT surgery - at this time they cannot offer another Pleurx due to loculation and positioning of the effusion  c/w Bumex 2mg IV TID  check bmp TID  incentive spirometry as tolerated

## 2022-04-11 NOTE — PROGRESS NOTE ADULT - SUBJECTIVE AND OBJECTIVE BOX
Patient is a 51y old  Female who presents with a chief complaint of chest pain for one day (10 Apr 2022 10:10)      SUBJECTIVE / OVERNIGHT EVENTS: No ON events. Denies sob at rest while on 2L NC however reports JORDAN with exertion. +abdominal distention, having BMs, no n/v.        ADDITIONAL REVIEW OF SYSTEMS: Negative except for above    MEDICATIONS  (STANDING):  buMETAnide Injectable 2 milliGRAM(s) IV Push every 8 hours  enoxaparin Injectable 40 milliGRAM(s) SubCutaneous every 24 hours  lactulose Syrup 20 Gram(s) Oral every 24 hours  morphine ER Tablet 60 milliGRAM(s) Oral every 8 hours  multivitamin 1 Tablet(s) Oral daily  polyethylene glycol 3350 17 Gram(s) Oral every 24 hours  simethicone 80 milliGRAM(s) Chew three times a day    MEDICATIONS  (PRN):  acetaminophen     Tablet .. 650 milliGRAM(s) Oral every 6 hours PRN Temp greater or equal to 38C (100.4F)  aluminum hydroxide/magnesium hydroxide/simethicone Suspension 30 milliLiter(s) Oral every 4 hours PRN Dyspepsia  famotidine    Tablet 20 milliGRAM(s) Oral two times a day PRN GERD  HYDROmorphone   Tablet 6 milliGRAM(s) Oral every 4 hours PRN Moderate Pain (4 - 6)  HYDROmorphone  Injectable 0.75 milliGRAM(s) IV Push every 4 hours PRN Severe Pain (7 - 10)  LORazepam     Tablet 0.5 milliGRAM(s) Oral every 6 hours PRN Anxiety  melatonin 3 milliGRAM(s) Oral at bedtime PRN Insomnia  methocarbamol 750 milliGRAM(s) Oral three times a day PRN Muscle Spasm  ondansetron Injectable 4 milliGRAM(s) IV Push every 8 hours PRN Nausea and/or Vomiting      CAPILLARY BLOOD GLUCOSE        I&O's Summary    10 Apr 2022 07:01  -  11 Apr 2022 07:00  --------------------------------------------------------  IN: 100 mL / OUT: 1000 mL / NET: -900 mL    11 Apr 2022 07:01  -  11 Apr 2022 12:50  --------------------------------------------------------  IN: 240 mL / OUT: 350 mL / NET: -110 mL        PHYSICAL EXAM:  Vital Signs Last 24 Hrs  T(C): 36.6 (11 Apr 2022 09:15), Max: 36.6 (11 Apr 2022 01:43)  T(F): 97.8 (11 Apr 2022 09:15), Max: 97.9 (11 Apr 2022 01:43)  HR: 108 (11 Apr 2022 09:15) (104 - 116)  BP: 114/70 (11 Apr 2022 09:15) (98/56 - 118/73)  BP(mean): --  RR: 18 (11 Apr 2022 09:15) (17 - 20)  SpO2: 97% (11 Apr 2022 09:15) (83% - 99%)    PHYSICAL EXAM:  GENERAL: NAD, well-developed on 2L NC comfortable  HEAD:  Atraumatic, Normocephalic  EYES:  conjunctiva and sclera clear  NECK: Supple, No JVD  CHEST/LUNG: decrease bs b/l   HEART: Regular rhythm; tachy+  ABDOMEN: +distended, some ascites, NT  EXTREMITIES:  2+ Peripheral Pulses, 2+ pitting edema b/l extending to upper thighs  PSYCH: AAOx3  NEUROLOGY: non-focal  SKIN: No rashes or lesions      LABS:                        8.3    6.21  )-----------( 341      ( 11 Apr 2022 07:24 )             28.6     04-11    138  |  94<L>  |  6<L>  ----------------------------<  102<H>  3.1<L>   |  33<H>  |  0.59    Ca    9.1      11 Apr 2022 07:11  Phos  2.9     04-10  Mg     1.9     04-10                  RADIOLOGY & ADDITIONAL TESTS:    Imaging Personally Reviewed:    Electrocardiogram Personally Reviewed:    COORDINATION OF CARE:  Care Discussed with Consultants/Other Providers [Y/N]:  Prior or Outpatient Records Reviewed [Y/N]:

## 2022-04-11 NOTE — DIETITIAN INITIAL EVALUATION ADULT - ORAL INTAKE PTA/DIET HISTORY
Patient admitted from [facility] [place] for concern of worsening shortness of breath. Patient noted with many varied items on her supper tray which she states she will eat. Per RN patient eats well. Patient has fluctuating weight related to fluid retention in her lower half (thighs and calfs), and receives pain management. Pt admitted at 199lbs but is now noted with 55lb gain.  Patient offers no complaints about menus or food as served. Pt denies nausea, vomiting, constipation. patient noted with loose stools, laxatives noted to be reduced (lactulose).

## 2022-04-11 NOTE — PROGRESS NOTE ADULT - ASSESSMENT
Interventional Radiology  Evaluate for Procedure: Paracetesis    HPI: This is a 50 y/o f with pmhx Gastric Ca, not currently on treatment with recurring pleural effusion,  presents from [facility] for concern of worsening shortness of breath previous pleurex catheter now with acute respiratory failure with hypoxia due to b/l pleural effusion and likely POD. IR consulted for paracentesis.    Allergies: Cipro (Rash)  QUINOLONES (Unknown)    Medications (Abx/Cardiac/Anticoagulation/Blood Products)  buMETAnide Injectable: 2 milliGRAM(s) IV Push (04-11 @ 05:32)  enoxaparin Injectable: 40 milliGRAM(s) SubCutaneous (04-10 @ 17:23)  piperacillin/tazobactam IVPB..: 25 mL/Hr IV Intermittent (04-11 @ 08:02)    Data:  T(C): 36.6  HR: 105  BP: 114/70  RR: 18  SpO2: 93%    -WBC 6.21 / HgB 8.3 / Hct 28.6 / Plt 341  -Na 138 / Cl 94 / BUN 6 / Glucose 102  -K 3.1 / CO2 33 / Cr 0.59  -ALT -- / Alk Phos -- / T.Bili --  -INR 1.20 / PTT 31.4    Radiology: reviewed    Assessment/Plan: This is a 50 y/o f with pmhx Gastric Ca, not currently on treatment with recurring pleural effusion,  presents from [facility] for concern of worsening shortness of breath previous pleurex catheter now with acute respiratory failure with hypoxia due to b/l pleural effusion and likely POD. IR consulted for paracentesis.    - case reviewed and approved for Tuesday, 4/12  - please place IR procedure order under NP Awad  - STAT labs in AM (cbc,coags, bmp, T&S)  - hold AC x12hrs  - does not need to be NPO  - COVID PCR results within 5 days of planned procedure  - d/w primary team

## 2022-04-11 NOTE — PROGRESS NOTE ADULT - PROBLEM SELECTOR PLAN 8
Palliative care eval appreciated  Continue with regimen as follows:     -MS Contin 60mg q8      -Dilaudid 6mg PO Q4 hours for severe pain/dyspnea     -Dilaudid IV 0.75mg Q4 hours for breakthrough severe pain/dyspnea  s/p Relistor x1 dose for opioid induced constipation    dvt ppx: lovenox 40mg daily    dispo: d/c later this week if can optimize to get non-conventional treatment in [place] Palliative care eval appreciated  Continue with regimen as follows:     -MS Contin 60mg q8      -Dilaudid 6mg PO Q4 hours for severe pain/dyspnea     -Dilaudid IV 0.75mg Q4 hours for breakthrough severe pain/dyspnea  s/p Relistor x1 dose for opioid induced constipation    dvt ppx: lovenox 40mg daily    dispo: d/c later this week if can optimize to get non-conventional treatment in [place]  discussed with NP Zeta

## 2022-04-11 NOTE — PROGRESS NOTE ADULT - PROBLEM SELECTOR PLAN 7
resolved, trops stable  most likely from the Large pleural effusion   Last TTE with EF of 65%  cards following

## 2022-04-11 NOTE — DIETITIAN INITIAL EVALUATION ADULT - PERTINENT MEDS FT
MEDICATIONS  (STANDING):  buMETAnide Injectable 2 milliGRAM(s) IV Push every 8 hours  lactulose Syrup 20 Gram(s) Oral every 24 hours  morphine ER Tablet 60 milliGRAM(s) Oral every 8 hours  multivitamin 1 Tablet(s) Oral daily  polyethylene glycol 3350 17 Gram(s) Oral every 24 hours  potassium chloride   Solution 40 milliEquivalent(s) Oral every 4 hours  simethicone 80 milliGRAM(s) Chew three times a day    MEDICATIONS  (PRN):  acetaminophen     Tablet .. 650 milliGRAM(s) Oral every 6 hours PRN Temp greater or equal to 38C (100.4F)  aluminum hydroxide/magnesium hydroxide/simethicone Suspension 30 milliLiter(s) Oral every 4 hours PRN Dyspepsia  famotidine    Tablet 20 milliGRAM(s) Oral two times a day PRN GERD  HYDROmorphone   Tablet 6 milliGRAM(s) Oral every 4 hours PRN Moderate Pain (4 - 6)  HYDROmorphone  Injectable 0.75 milliGRAM(s) IV Push every 4 hours PRN Severe Pain (7 - 10)  LORazepam     Tablet 0.5 milliGRAM(s) Oral every 6 hours PRN Anxiety  melatonin 3 milliGRAM(s) Oral at bedtime PRN Insomnia  methocarbamol 750 milliGRAM(s) Oral three times a day PRN Muscle Spasm  ondansetron Injectable 4 milliGRAM(s) IV Push every 8 hours PRN Nausea and/or Vomiting

## 2022-04-11 NOTE — PROGRESS NOTE ADULT - PROBLEM SELECTOR PLAN 3
currently on no disease modifying therapy she only wants to try holistic treatments for her disease.   Her goal is to be able to get on a plane to [place] to start nonconvential treatment which is on 4/17.   Will try to help her achieve her goal unclear if will be guadalupe to  Palliative care luis banks

## 2022-04-11 NOTE — PROGRESS NOTE ADULT - PROBLEM SELECTOR PLAN 1
multifactorial: b/l pleural effusion, RML consolidation, POD lymphangitic spread, fluid overload  - c/w 2L NC on rest  - get exertional 02 sat   - given possible pneumonia c/w Zosyn to complete a 7 day course thru today  - incentive spirometry   - c/w bumex 2mg TID ; check daily weight, strict I/O  -will get LE duplex to rule DVT   -+ascites on exam, repeat limited abd US to rule ascites: if so will consult IR for paracentesis

## 2022-04-11 NOTE — PROGRESS NOTE ADULT - SUBJECTIVE AND OBJECTIVE BOX
DATE OF SERVICE: 04-11-22 @ 20:57    Patient is a 51y old  Female who presents with a chief complaint of chest pain for one day (11 Apr 2022 15:25)      INTERVAL HISTORY: no events  	  MEDICATIONS:  buMETAnide Injectable 2 milliGRAM(s) IV Push every 8 hours        PHYSICAL EXAM:  T(C): 36.6 (04-11-22 @ 20:14), Max: 36.6 (04-11-22 @ 01:43)  HR: 103 (04-11-22 @ 20:14) (102 - 116)  BP: 109/71 (04-11-22 @ 20:14) (105/67 - 114/70)  RR: 18 (04-11-22 @ 20:14) (17 - 18)  SpO2: 98% (04-11-22 @ 20:14) (93% - 98%)  Wt(kg): --  I&O's Summary    10 Apr 2022 07:01  -  11 Apr 2022 07:00  --------------------------------------------------------  IN: 100 mL / OUT: 1000 mL / NET: -900 mL    11 Apr 2022 07:01  -  11 Apr 2022 20:57  --------------------------------------------------------  IN: 480 mL / OUT: 350 mL / NET: 130 mL          Appearance: In no distress	  HEENT:    PERRL, EOMI	  Cardiovascular:  S1 S2, No JVD  Respiratory: Lungs clear to auscultation	  Gastrointestinal:  Soft, Non-tender, + BS	  Vascularature:  No edema of LE  Psychiatric: Appropriate affect   Neuro: no acute focal deficits                               8.3    6.21  )-----------( 341      ( 11 Apr 2022 07:24 )             28.6     04-11    138  |  94<L>  |  6<L>  ----------------------------<  102<H>  3.1<L>   |  33<H>  |  0.59    Ca    9.1      11 Apr 2022 07:11  Phos  2.9     04-10  Mg     1.9     04-10          Labs personally reviewed      ASSESSMENT/PLAN: 	  52 y/o f with PMH of Gastric Ca (not currently on treatment), recurring pleural effusion [facility]/o worsening shortness of breath and chest pain described as her chest pounding out of her chest. Symptoms are non-exertional and have been progressing over the last couple of days. Chest pain now resolved but continues to report intermittent SOB mostly a/w acute pain.     Problem/Plan #1: Chest Pain  -Troponin negative x2  -EKG shows Sinus Tach with no ischemic changes  -Echo from 3/29/22 shows normal LV function, no RWMA, no valvular dysfunction and possible RV overload  -CT Chest negative for PE  -Low probability that chest pain cardiac in origin.   -Now resolved.    Problem/Plan #2: Shortness of breath  -RV overload on recent TTE  -CXR reveals pulmonary edema, masslike densities in the right apex, lateral right lung and right lung  -CTA chest negative for PE, but shows large right loculated pleural effusion increased from prior, and complete consolidation of the RLL, patchy opacities in RML, small left pleural effusion, diffuse interlobular septal thickening, may be edema v lymphangitic carcinomatosis  - IV diuresis--> Continues to report significant decrease in shortness of breath and LE edema. C/w Bumex. Cr currently stable.   - increase Bumex to 2mg IV TID  -Pulm following  -As per IR, Pulm and  CTS - not a candidate for Pleurx due to loculation and positioning of the effusion    Problem/Plan #3: Tachycardia  --110s i/s/o pain secondary to gastric cancer  -Continue pain mgmt as per palliative and primary team  -Continue to monitor HR    -Problem/Plan #4: Chronic Pain  -Palliative Care team following  -Mgmt per primary team          Richard Howard DO [facility]  Cardiovascular Medicine  [address], [address]  Office: [phone]  Cell: [phone]

## 2022-04-11 NOTE — PROGRESS NOTE ADULT - ASSESSMENT
52 y/o f with pmhx Gastric Ca, not currently on treatment with recurring pleural effusion,  presents from [facility] for concern of worsening shortness of breath previous pleurex catheter now with acute respiratory failure with hypoxia due to b/l pleural effusion and likely POD.

## 2022-04-11 NOTE — DIETITIAN INITIAL EVALUATION ADULT - REASON INDICATOR FOR ASSESSMENT
Length of stay   "51F with gastric cancer presenting with CC of SOB, acute on chronic. Patient was recently admitted for similar complaint - had thora/para, D/[place] to rehab 2 days ago w 4L O2 for comfort/as needed. Has chronic b/l lymphedema"

## 2022-04-11 NOTE — PROGRESS NOTE ADULT - PROBLEM SELECTOR PROBLEM 7
S:  Pt is  at 26w2d for routine OB follow up.  Reports ongoing cramping, no CTXs.  Reports good FM.  Denies VB, LOF, RUCs or vaginal DC.    O:  Please see above vitals.        FHTs: 137        Fundal ht: 26 cm.        S=D        OB US: scheduled.    A:  IUP at 26w2d  Patient Active Problem List    Diagnosis Date Noted   • Marginal placenta previa 2017   • Prenatal care, subsequent pregnancy in second trimester 2017   • History of 2  sections 2017   • History of methamphetamine use 2017        P:  1.  Desires BTL.          2.  Questions answered.          3.  Encourage adequate water intake.        4.  1hr GTT, syphilis and H/H ordered.  Lab slip and instructions given to pt.        5.   labor precautions reviewed.         6.  F/u 2 wks.        7.  FYI: Continue with pelvic rest, lots of water, no nipple or clitoral stimulatiion.     Chest pain

## 2022-04-11 NOTE — PROGRESS NOTE ADULT - PROBLEM SELECTOR PLAN 2
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    -per IR, Pulmonary and  CT surgery - at this time they cannot offer another Pleurx due to loculation and positioning of the effusion  -fluid overloaded on exam but bicarb uptrending, may need to given diamox by maggi if uptrend continues  -c/w Bumex 2mg IV TID  -monitor and replete bid  incentive spirometry as tolerated

## 2022-04-11 NOTE — DIETITIAN INITIAL EVALUATION ADULT - PERTINENT LABORATORY DATA
04-11    138  |  94<L>  |  6<L>  ----------------------------<  102<H>  3.1<L>   |  33<H>  |  0.59    Ca    9.1      11 Apr 2022 07:11  Phos  2.9     04-10  Mg     1.9     04-10    A1C with Estimated Average Glucose Result: 5.1 % (01-06-22 @ 06:47)

## 2022-04-11 NOTE — CHART NOTE - NSCHARTNOTEFT_GEN_A_CORE
Patient has a diagnosis of Metastic cgag  causing hypoxemia. Patient's respiratory status is considered to be medically optimized and requires continuous use of oxygen indefinitely.     At rest without O2 he/she is hypoxemic to 84 %.  At rest on 3 L of O2 he/she saturates at 93 %.     Pt will require supplement Patient has a diagnosis of Metastatic gastric CA with  lung infiltration causing hypoxemia. Patient's respiratory status is considered to be medically optimized and requires continuous use of oxygen indefinitely.     At rest without O2 he/she is hypoxemic to 84 %.  At rest on 3 L of O2 he/she saturates at 93 %.     Pt will require supplement

## 2022-04-12 NOTE — PRE PROCEDURE NOTE - PRE PROCEDURE EVALUATION
Interventional Radiology    HPI:   This is a 52 y/o f with pmhx Gastric Ca, not currently on treatment with recurring pleural effusion,  presents from [facility] for concern of worsening shortness of breath previous pleurex catheter now with acute respiratory failure with hypoxia due to b/l pleural effusion and likely POD. IR consulted for paracentesis.    NPO: n/a    Allergies: Cipro (Rash)  QUINOLONES (Unknown)    Medications (Abx/Cardiac/Anticoagulation/Blood Products)  acetaZOLAMIDE    Tablet: 250 milliGRAM(s) Oral (04-12 @ 13:22)  buMETAnide Injectable: 2 milliGRAM(s) IV Push (04-12 @ 05:58)  enoxaparin Injectable: 40 milliGRAM(s) SubCutaneous (04-11 @ 16:05)  piperacillin/tazobactam IVPB..: 25 mL/Hr IV Intermittent (04-11 @ 08:02)    Data:  T(C): 36.3  HR: 107  BP: 108/69  RR: 18  SpO2: 100%       Exam  General: No acute distress  Chest: Non labored breathing    -WBC 5.92 / HgB 8.4 / Hct 28.5 / Plt 325  -Na 138 / Cl 93 / BUN 6 / Glucose 112  -K 3.0 / CO2 35 / Cr 0.55  -ALT -- / Alk Phos -- / T.Bili --    Imaging:   US Abdomen Limited (04.11.22 @ 14:35)    Mild to moderate volume ascites in the left upper quadrant. Moderate   volume ascites in the right upper, right lower and left lower quadrants.    IMPRESSION:  Moderate volume ascites.        Plan: This is a 52 y/o f with pmhx Gastric Ca, not currently on treatment with recurring pleural effusion,  presents from [facility] for concern of worsening shortness of breath previous pleurex catheter now with acute respiratory failure with hypoxia due to b/l pleural effusion and likely POD. presents to IR on 4/12 for dx and tx  paracentesis.      -Risks/Benefits/alternatives explained with the patient and/or healthcare proxy and witnessed informed consent obtained.    Interventional Radiology    HPI:   This is a 50 y/o f with pmhx Gastric Ca, not currently on treatment with recurring pleural effusion,  presents from [facility] for concern of worsening shortness of breath previous pleurex catheter now with acute respiratory failure with hypoxia due to b/l pleural effusion and likely POD. IR consulted for paracentesis.    NPO: n/a    Allergies: Cipro (Rash)  QUINOLONES (Unknown)    Medications (Abx/Cardiac/Anticoagulation/Blood Products)  acetaZOLAMIDE    Tablet: 250 milliGRAM(s) Oral (04-12 @ 13:22)  buMETAnide Injectable: 2 milliGRAM(s) IV Push (04-12 @ 05:58)  enoxaparin Injectable: 40 milliGRAM(s) SubCutaneous (04-11 @ 16:05)  piperacillin/tazobactam IVPB..: 25 mL/Hr IV Intermittent (04-11 @ 08:02)    Data:  T(C): 36.3  HR: 107  BP: 108/69  RR: 18  SpO2: 100%       Exam  General: No acute distress  Chest: Non labored breathing    -WBC 5.92 / HgB 8.4 / Hct 28.5 / Plt 325  -Na 138 / Cl 93 / BUN 6 / Glucose 112  -K 3.0 / CO2 35 / Cr 0.55  -ALT -- / Alk Phos -- / T.Bili --    Imaging:   US Abdomen Limited (04.11.22 @ 14:35)    Mild to moderate volume ascites in the left upper quadrant. Moderate   volume ascites in the right upper, right lower and left lower quadrants.    IMPRESSION:  Moderate volume ascites.        Plan: This is a 50 y/o f with pmhx Gastric Ca, not currently on treatment with recurring pleural effusion,  presents from [facility] for concern of worsening shortness of breath previous pleurex catheter now with acute respiratory failure with hypoxia due to b/l pleural effusion and likely POD. presents to IR on 4/12 for dx and tx  paracentesis.    of note:   k 3.0 repleted with 40meq thus far.     -Risks/Benefits/alternatives explained with the patient and/or healthcare proxy and witnessed informed consent obtained.

## 2022-04-12 NOTE — PROGRESS NOTE ADULT - PROBLEM SELECTOR PLAN 3
currently on no disease modifying therapy she only wants to try holistic treatments for her disease.   Her goal is to be able to get on a plane to [place] to start nonconvential treatment which is on 4/17.   Will try to help her achieve her goal unclear if will be able to  Palliative care luis banks

## 2022-04-12 NOTE — PROGRESS NOTE ADULT - PROBLEM SELECTOR PLAN 1
multifactorial: b/l pleural effusion, RML consolidation, POD lymphangitic spread, fluid overload, anasarca   - satting well on 2L NC at rest and exeriton but has JORDAN  - completed 7 day course of zosyn for PNA  - incentive spirometry   - LE duplex negative for DVT, CTA negative on admission for PE  -US with mod ascites for IR paracentesis today which may help alleviate some of dyspnea  -mild sinus tachy likely due to deconditioning, advanced cancer, metabolic state.   -monitor 02  -diuresis as below

## 2022-04-12 NOTE — OCCUPATIONAL THERAPY INITIAL EVALUATION ADULT - PERTINENT HX OF CURRENT PROBLEM, REHAB EVAL
50 y/o f with pmhx Gastric Ca, not currently on treatment with recurring pleural effusion,  presents from [facility] for concern of worsening shortness of breath. Patient with same complaint and had a thoracentesis and paracentesis. 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

## 2022-04-12 NOTE — PROGRESS NOTE ADULT - SUBJECTIVE AND OBJECTIVE BOX
Patient is a 51y old  Female who presents with a chief complaint of chest pain for one day (11 Apr 2022 18:57)      SUBJECTIVE / OVERNIGHT EVENTS: No On events. Denies sob at rest but has JORDAN when sitting up or trying to walk        ADDITIONAL REVIEW OF SYSTEMS: Negative except for above    MEDICATIONS  (STANDING):  buMETAnide Injectable 2 milliGRAM(s) IV Push every 8 hours  lactulose Syrup 20 Gram(s) Oral every 24 hours  morphine ER Tablet 60 milliGRAM(s) Oral every 8 hours  multivitamin 1 Tablet(s) Oral daily  polyethylene glycol 3350 17 Gram(s) Oral every 24 hours  potassium chloride    Tablet ER 40 milliEquivalent(s) Oral every 4 hours  simethicone 80 milliGRAM(s) Chew three times a day    MEDICATIONS  (PRN):  acetaminophen     Tablet .. 650 milliGRAM(s) Oral every 6 hours PRN Temp greater or equal to 38C (100.4F)  aluminum hydroxide/magnesium hydroxide/simethicone Suspension 30 milliLiter(s) Oral every 4 hours PRN Dyspepsia  famotidine    Tablet 20 milliGRAM(s) Oral two times a day PRN GERD  HYDROmorphone   Tablet 6 milliGRAM(s) Oral every 4 hours PRN Moderate Pain (4 - 6)  LORazepam     Tablet 0.5 milliGRAM(s) Oral every 6 hours PRN Anxiety  melatonin 3 milliGRAM(s) Oral at bedtime PRN Insomnia  methocarbamol 750 milliGRAM(s) Oral three times a day PRN Muscle Spasm  ondansetron Injectable 4 milliGRAM(s) IV Push every 8 hours PRN Nausea and/or Vomiting      CAPILLARY BLOOD GLUCOSE        I&O's Summary    11 Apr 2022 07:01  -  12 Apr 2022 07:00  --------------------------------------------------------  IN: 480 mL / OUT: 350 mL / NET: 130 mL    12 Apr 2022 07:01  -  12 Apr 2022 12:14  --------------------------------------------------------  IN: 240 mL / OUT: 1100 mL / NET: -860 mL        PHYSICAL EXAM:  Vital Signs Last 24 Hrs  T(C): 36.3 (12 Apr 2022 09:28), Max: 36.6 (11 Apr 2022 20:14)  T(F): 97.4 (12 Apr 2022 09:28), Max: 97.9 (11 Apr 2022 20:14)  HR: 107 (12 Apr 2022 09:28) (102 - 107)  BP: 109/69 (12 Apr 2022 09:28) (105/67 - 116/69)  BP(mean): --  RR: 18 (12 Apr 2022 09:28) (18 - 18)  SpO2: 100% (12 Apr 2022 09:28) (93% - 100%)    PHYSICAL EXAM:  GENERAL: NAD, well-developed on 2L NC comfortable  HEAD:  Atraumatic, Normocephalic  EYES:  conjunctiva and sclera clear  NECK: Supple, No JVD  CHEST/LUNG: decrease bs b/l   HEART: Regular rhythm; tachy+  ABDOMEN: +distended, some ascites, NT  EXTREMITIES:  2+ Peripheral Pulses, 2+ pitting edema b/l extending to upper thighs  PSYCH: AAOx3  NEUROLOGY: non-focal  SKIN: No rashes or lesions    LABS:                        8.4    5.92  )-----------( 325      ( 12 Apr 2022 10:40 )             28.5     04-12    138  |  93<L>  |  6<L>  ----------------------------<  112<H>  3.0<L>   |  35<H>  |  0.55    Ca    8.8      12 Apr 2022 10:35  Phos  2.9     04-10  Mg     1.9     04-10                  RADIOLOGY & ADDITIONAL TESTS:    Imaging Personally Reviewed:    Electrocardiogram Personally Reviewed:    COORDINATION OF CARE:  Care Discussed with Consultants/Other Providers [Y/N]:  Prior or Outpatient Records Reviewed [Y/N]:

## 2022-04-12 NOTE — PROGRESS NOTE ADULT - SUBJECTIVE AND OBJECTIVE BOX
DATE OF SERVICE: 04-12-22 @ 12:33    Patient is a 51y old  Female who presents with a chief complaint of chest pain for one day (12 Apr 2022 12:14)      INTERVAL HISTORY: Feel shefali    REVIEW OF SYSTEMS:  CONSTITUTIONAL: Genrealized weakness  EYES/ENT: No visual changes;  No throat pain   NECK: No pain or stiffness  RESPIRATORY: + SOB with exertion  CARDIOVASCULAR: No chest pain or palpitations  GASTROINTESTINAL: No abdominal  pain. No nausea, vomiting, or hematemesis  GENITOURINARY: No dysuria, frequency or hematuria  NEUROLOGICAL: No stroke like symptoms  SKIN: No rashes    	  MEDICATIONS:  buMETAnide Injectable 2 milliGRAM(s) IV Push every 8 hours        PHYSICAL EXAM:  T(C): 36.3 (04-12-22 @ 09:28), Max: 36.6 (04-11-22 @ 20:14)  HR: 107 (04-12-22 @ 09:28) (102 - 107)  BP: 109/69 (04-12-22 @ 09:28) (105/67 - 116/69)  RR: 18 (04-12-22 @ 09:28) (18 - 18)  SpO2: 100% (04-12-22 @ 09:28) (93% - 100%)  Wt(kg): --  I&O's Summary    11 Apr 2022 07:01  -  12 Apr 2022 07:00  --------------------------------------------------------  IN: 480 mL / OUT: 350 mL / NET: 130 mL    12 Apr 2022 07:01  -  12 Apr 2022 12:33  --------------------------------------------------------  IN: 240 mL / OUT: 1100 mL / NET: -860 mL          Appearance: In no distress	  HEENT:    PERRL, EOMI	  Cardiovascular:  S1 S2, No JVD  Respiratory: Lungs clear to auscultation	  Gastrointestinal:  Soft, Non-tender, + BS	  Vascularature:  No edema of LE  Psychiatric: Appropriate affect   Neuro: no acute focal deficits                               8.4    5.92  )-----------( 325      ( 12 Apr 2022 10:40 )             28.5     04-12    138  |  93<L>  |  6<L>  ----------------------------<  112<H>  3.0<L>   |  35<H>  |  0.55    Ca    8.8      12 Apr 2022 10:35  Phos  2.9     04-10  Mg     1.9     04-10          Labs personally reviewed      ASSESSMENT/PLAN: 	    52 y/o f with PMH of Gastric Ca (not currently on treatment), recurring pleural effusion who [facility] c/o worsening shortness of breath and chest pain described as her chest pounding out of her chest. Symptoms are non-exertional and have been progressing over the last couple of days. Chest pain now resolved but continues to report intermittent SOB mostly a/w acute pain.     Problem/Plan #1: Chest Pain  -Troponin negative x2  -EKG shows Sinus Tach with no ischemic changes  -Echo from 3/29/22 shows normal LV function, no RWMA, no valvular dysfunction and possible RV overload  -CT Chest negative for PE  -Low probability that chest pain cardiac in origin.   -Now resolved.    Problem/Plan #2: Shortness of breath  -RV overload on recent TTE  -CXR reveals pulmonary edema, masslike densities in the right apex, lateral right lung and right lung  -CTA chest negative for PE, but shows large right loculated pleural effusion increased from prior, and complete consolidation of the RLL, patchy opacities in RML, small left pleural effusion, diffuse interlobular septal thickening, may be edema v lymphangitic carcinomatosis  - IV diuresis--> Continues to report significant decrease in shortness of breath and LE edema. C/w Bumex. Cr currently stable.   - increase Bumex to 2mg IV TID  -Pulm following  -As per IR, Pulm and  CTS - not a candidate for Pleurx due to loculation and positioning of the effusion  -+ moderate ascites on Abd US--> Paracentesis planned with IR today    Problem/Plan #3: Tachycardia  --110s i/s/o pain secondary to gastric cancer  -Continue pain mgmt as per palliative and primary team  -Continue to monitor HR    -Problem/Plan #4: Chronic Pain  -Palliative Care team following  -Mgmt per primary team        RANJIT Vences-STEFFANIE Howard DO [facility]  Cardiovascular Medicine  [address], [address]  Office: [phone]  Cell: [phone]

## 2022-04-12 NOTE — OCCUPATIONAL THERAPY INITIAL EVALUATION ADULT - BALANCE DISTURBANCE, SYSTEM IMPAIRMENT CONTRIBUTE, REHAB EVAL
Chief complaint(s): back and neck pain  Date of Injury: 1/2/17  Initial Treatment Date: 1/3/17  Mechanism of Onset: normal activities  Occupation: student  Referred by: her father Braeden  Primary Care Physician: No primary care provider on file.  I have reviewed the past medical history, family history, social history, medications and allergies listed in the medical record.  Chasity  has no tobacco history on file.  Chasity has no allergies on file.  Advance Directive Status:N/a   Visit Number of Current Episode: 1  Discharged from care: No    Subjective: Chasity is a 19 year old female who comes in today for  treatment of back and neck pain. Patient rates her pain today at 2/10 with 10 being worst. Patient reports improvement since her last treatment in December. She states the combination of her treatment and using a heating pad has made a significant improvement with her pain.     Objective:  (C-spine) Cervical spine facet joint function is WNL except for her C4/5 facet joints that exhibited limited passive ROM and segmental restriction with tenderness upon palpation. The following muscles were examined for normal flexibility and tone; right and left upper trapezius m: right and left scalene m; right and left levator scapulae m; deep neck flexor m; right and left SCM m; right and left suboccipital m; these muscles were WNL    (T-Spine) Thoracic spine facet joint function is WNL except for her T5/6/7 facet joints that exhibited limited passive ROM and segmental restriction and tenderness upon palpation; The following muscles were examined for normal flexibility and tone; right and left rhomboid m; right and left serratus m; left and right latissimus dorsi m; These muscles were WNL.    (L & P-spine) Lumbar spine facet joint function is WNL except for her L4/5 facet joints that exhibited limited passive ROM and segmental restriction and tenderness on palpation; SI joint function is WNL; The following muscles were  examined for flexibility and tone at rest; right and left hip flexor m; right and left hip adductor m; right and left hip rotator m; right and left piriformis m; right and left hamstring m; right and left Gluteus medius m and gluteus marge m.; right and left quadratus lumborum m; left and right TFL m.: left and right internal hip rotators m; right and left quadriceps m.  These muscles were found to be WNL.    (Hip)  ROM is WNL.    Patient Emotional screening : Alert, pleasant, responsive.    Orthopedic/Neuroligical Testing:   None performed today    Relevant Diagnostic Imaging/Testing:   None current relating to spine.     Assessment:   1. Pain in thoracic spine    2. Cervicalgia    3. Cervical segment dysfunction    4. Somatic dysfunction of lumbar region    5. Acute midline low back pain without sciatica    6. Thoracic segment dysfunction      Complicating Factors/Co-morbidities:   None determined today.     Plan:  Patient was evaluated and treatment with chiropractic manipulation applied to her thoracic, lumbar and cervical regions to help restore function and help decrease pain.    Therapeutic Exercise/Rehab/Modalities performed today:   None performed today    Patient Instruction/Education: Use heat application to upper back to help reduce tension.   Patient stated understanding of, and was in agreement with, the discussed instructions.    Goals of Care: Goal of care is to improve muscular and skeletal function and provide symptom relief.      Other treatment options discussed with patient  : None at this time    Patient rates her pain post treatment at 1/10 with 10 being worst.    Patient is to return as needed.     Length of Visit: 15 min.    Dr. Hoffmann         musculoskeletal

## 2022-04-12 NOTE — PROGRESS NOTE ADULT - PROBLEM SELECTOR PLAN 8
Palliative care eval appreciated  Continue with regimen as follows:     -MS Contin 60mg q8      -Dilaudid 6mg PO Q4 hours for severe pain/dyspnea     -Dilaudid IV 0.75mg Q4 hours for breakthrough severe pain/dyspnea  s/p Relistor x1 dose for opioid induced constipation    dvt ppx: lovenox on hold for paracentesis today    dispo: d/c later this week if can optimize to get non-conventional treatment in [place]  discussed with STEFFANIE Liu

## 2022-04-12 NOTE — PROGRESS NOTE ADULT - ASSESSMENT
50 y/o f with pmhx Gastric Ca, not currently on treatment with recurring pleural effusion,  presents from [facility] for concern of worsening shortness of breath previous pleurex catheter now with acute respiratory failure with hypoxia due to b/l pleural effusion and likely POD.

## 2022-04-12 NOTE — PROGRESS NOTE ADULT - PROBLEM SELECTOR PLAN 2
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    -per IR, Pulmonary and  CT surgery - at this time they cannot offer another Pleurx due to loculation and positioning of the effusion  -fluid overloaded on exam but bicarb uptrending 35 today, will consider diamox today, awaiting callback to discuss with cardiology  -c/w Bumex 2mg IV TID  -hypokalemia, monitor and replete lytes  incentive spirometry as tolerated

## 2022-04-12 NOTE — OCCUPATIONAL THERAPY INITIAL EVALUATION ADULT - LIVES WITH, PROFILE
pt admitted from rehab. prior to rehab  Pt lives with 2 daughters (ages 10 and 16) in co-op apartment with 3 steps to enter and wide b/l HR.  Pt reports she moves around apartment in borrowed WC. Pt reports sister lives close by and came over to assist with ADLs when needed./children/spouse

## 2022-04-12 NOTE — OCCUPATIONAL THERAPY INITIAL EVALUATION ADULT - PRECAUTIONS/LIMITATIONS, REHAB EVAL
During the recent hosp admission , pt was being followed by Palliative care team./fall precautions During the recent hosp admission, pt was being followed by Palliative care team./fall precautions

## 2022-04-13 NOTE — PROGRESS NOTE ADULT - PROBLEM SELECTOR PLAN 1
IR, Pulmonary and  CT surgery consulted- at this time they cannot offer another Pleurx due to loculation and positioning of the effusion. Continue supplementary O2 as needed.  - Continue MS Contin to 60mg Q8h standing at 8AM, 4PM and 12AM (can give at same time as other PRN pain medications if required due to long-acting nature)  - Continue Dilaudid to 6mg PO Q4 hours for severe pain/dyspnea  - Continue Dilaudid IV 0.75mg Q4 hours for breakthrough severe pain/dyspnea (pt understands she will not have access to this on d/c)   - Continue Ativan 0.5mg Q6h PRN (not to be given within 1 hour of IV Dilaudid to prevent oversedation)  - D/w primary team to prescribe 30 day supply for pt upon discharge

## 2022-04-13 NOTE — PROGRESS NOTE ADULT - TIME BILLING
Symptom assessment and management, support counseling, coordination of care
Symptom assessment and management, ACP, support counseling, coordination of care

## 2022-04-13 NOTE — PROGRESS NOTE ADULT - SUBJECTIVE AND OBJECTIVE BOX
Interventional Radiology Follow-Up Note    Patient seen and examined @ bedside    This is a 51y Female s/p Paracentesis on 4/12 in Interventional Radiology    No complaint offered.    Medication:  acetaZOLAMIDE    Tablet: (04-12)  buMETAnide: (04-13)  buMETAnide Injectable: (04-12)  enoxaparin Injectable: (04-11)  piperacillin/tazobactam IVPB..: (04-11)    Vitals:   T(F): 97.6, Max: 98.1 (16:46)  HR: 100  BP: 119/68  RR: 18  SpO2: 96%    Physical Exam:  General: Nontoxic, in NAD.  Abdomen: Soft. Distended/ ND. NT. Dressing c/d/i.            LABS:  Na: 138 (04-12 @ 10:35), 138 (04-11 @ 07:11), 136 (04-10 @ 17:55), 137 (04-10 @ 08:30)  K: 3.0 (04-12 @ 10:35), 3.1 (04-11 @ 07:11), 3.7 (04-10 @ 17:55), 3.5 (04-10 @ 08:30)  Cl: 93 (04-12 @ 10:35), 94 (04-11 @ 07:11), 94 (04-10 @ 17:55), 96 (04-10 @ 08:30)  CO2: 35 (04-12 @ 10:35), 33 (04-11 @ 07:11), 28 (04-10 @ 17:55), 31 (04-10 @ 08:30)  BUN: 6 (04-12 @ 10:35), 6 (04-11 @ 07:11), 7 (04-10 @ 17:55), 7 (04-10 @ 08:30)  Cr: 0.55 (04-12 @ 10:35), 0.59 (04-11 @ 07:11), 0.56 (04-10 @ 17:55), 0.59 (04-10 @ 08:30)  Glu: 112(04-12 @ 10:35), 102(04-11 @ 07:11), 114(04-10 @ 17:55), 109(04-10 @ 08:30)  Hgb: 8.4 (04-12 @ 10:40), 8.3 (04-11 @ 07:24), 9.6 (04-10 @ 17:56), 8.1 (04-10 @ 08:30)  Hct: 28.5 (04-12 @ 10:40), 28.6 (04-11 @ 07:24), 33.4 (04-10 @ 17:56), 27.2 (04-10 @ 08:30)  WBC: 5.92 (04-12 @ 10:40), 6.21 (04-11 @ 07:24), 6.69 (04-10 @ 17:56), 6.49 (04-10 @ 08:30)  Plt: 325 (04-12 @ 10:40), 341 (04-11 @ 07:24), 316 (04-10 @ 17:56), 313 (04-10 @ 08:30)            Assessment/Plan:  51y Female admitted with Shortness of breath    Pt most recently s/p paracentesis      - Pt doing well.  - Reconsult as needed.  - IR will sign off.     Please call IR at  [phone] with any questions, concerns, or issues regarding above.    Also available on Teams.

## 2022-04-13 NOTE — CONSULT NOTE ADULT - SUBJECTIVE AND OBJECTIVE BOX
52 yo F with a PMH of metastatic gastric cancer (not on treatment) c/b recurrent malignant pleural effusions s/p PleurX (now removed) and ascites who presents from [facility] for concern of worsening shortness of breath. Patient was recently admitted to [facility] ( 3/23-4/1)  with same complaint and had a thoracentesis and paracentesis. During her hospitalization patient had a CT that revealed interlobular septal thickening suggesting lymphangitic carcinomatosis and patchy opacities in the right middle and lower lobe.  CT surgery removed PleurX catheter on 3/28/22 and pt was to follow up with Dr Daniels in 10-14 days.    Patient was originally diagnosed as T1a disease in 2011 during a pregnancy, s/p resection. 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 She was seen by Dr. Skinny Ibanez at [facility], but since that time, she has refused conventional chemo and has been seeking alternative medicine for cancer as outpatient. She was hoping to go to [place] where there is an Oncologist who provides alternative therapies. She was hoping to make the flight there this Sunday 4/17/22. Of note, during the recent hospitalization, she was being followed by Palliative care team.        Allergies    Cipro (Rash)  QUINOLONES (Unknown)    Intolerances        MEDICATIONS  (STANDING):  acetaZOLAMIDE Injectable 250 milliGRAM(s) IV Push once  buMETAnide 2 milliGRAM(s) Oral every 12 hours  enoxaparin Injectable 40 milliGRAM(s) SubCutaneous every 24 hours  lactulose Syrup 20 Gram(s) Oral every 24 hours  morphine ER Tablet 60 milliGRAM(s) Oral every 8 hours  multivitamin 1 Tablet(s) Oral daily  polyethylene glycol 3350 17 Gram(s) Oral every 24 hours  simethicone 80 milliGRAM(s) Chew three times a day    MEDICATIONS  (PRN):  acetaminophen     Tablet .. 650 milliGRAM(s) Oral every 6 hours PRN Temp greater or equal to 38C (100.4F)  aluminum hydroxide/magnesium hydroxide/simethicone Suspension 30 milliLiter(s) Oral every 4 hours PRN Dyspepsia  famotidine    Tablet 20 milliGRAM(s) Oral two times a day PRN GERD  HYDROmorphone   Tablet 6 milliGRAM(s) Oral every 4 hours PRN Moderate Pain (4 - 6)  LORazepam     Tablet 0.5 milliGRAM(s) Oral every 6 hours PRN Anxiety  melatonin 3 milliGRAM(s) Oral at bedtime PRN Insomnia  methocarbamol 750 milliGRAM(s) Oral three times a day PRN Muscle Spasm  ondansetron Injectable 4 milliGRAM(s) IV Push every 8 hours PRN Nausea and/or Vomiting      PAST MEDICAL & SURGICAL HISTORY:  Gastric cancer  5/2011 no adjunt trement    Small bowel obstruction  Exp lap complicated with post op Ishemic bowel /perforation    Perforated bowel    Other abdominal hernia    Obesity (BMI 30-39.9)    History of gastrectomy  distal gastrectomy w/ B2 reconstruction (gastrojejunostomy)    History of resection of small bowel  exlap, SBR, meckel&#x27;s diverticulectomy (7/4/2015); exlap, SBR for necrotic bowel, left in discontinuity, Abthera (7/9/2015); exlap, SB anastomosis, Abthera (7/11/15); exlap, washout, abdominal closure w/ Strattice (7/13/15)        FAMILY HISTORY:  FH: diabetes mellitus (Mother)    FH: pulmonary embolism (Mother)    Family history of abdominal aortic aneurysm (AAA) (Father)        SOCIAL HISTORY: No alcohol, tobacco, or drug use    REVIEW OF SYSTEMS:  CONSTITUTIONAL: no fever  EYES/ENT: No visual changes; no throat pain  NECK: No pain or stiffness  RESPIRATORY: + SOB, cough  CARDIOVASCULAR: No chest pain  GASTROINTESTINAL: + abdominal pain. No diarrhea  GENITOURINARY: No dysuria or hematuria  NEUROLOGICAL: No numbness or focal weakness  SKIN: + edema. No itching, burning, rashes, or lesions  MSK: no joint pain  Allergy: no urticaria        T(F): 98.4 (04-13-22 @ 11:26), Max: 98.4 (04-13-22 @ 11:26)  HR: 105 (04-13-22 @ 11:26)  BP: 104/67 (04-13-22 @ 11:26)  RR: 18 (04-13-22 @ 11:26)  SpO2: 95% (04-13-22 @ 11:26)  Wt(kg): --    GENERAL: NAD  HEENT: EOMI, MMM, no oropharyngeal lesions or erythema appreciated  Pulm: no increased WOB, CTAB/L  CV: RRR, S1, S2, no m/g/r  ABDOMEN: soft, NT, ND, no masses felt, no HSM  MSK: nl ROM  EXTREMITIES: no appreciable edema in b/l LE  Neuro: A&Ox3, no focal deficits  SKIN: warm and dry, no visible rash                          7.9    6.83  )-----------( 303      ( 13 Apr 2022 14:44 )             26.9       04-13    137  |  93<L>  |  7   ----------------------------<  119<H>  3.6   |  35<H>  |  0.60    Ca    8.8      13 Apr 2022 14:44  Mg     1.9     04-13    TPro  5.4<L>  /  Alb  2.7<L>  /  TBili  0.4  /  DBili  x   /  AST  23  /  ALT  15  /  AlkPhos  1935<H>  04-13      Magnesium, Serum: 1.9 mg/dL (04-13 @ 06:53)       50 yo F with a PMH of metastatic gastric cancer (not on treatment) c/b recurrent malignant pleural effusions s/p PleurX (now removed) and ascites who presents from [facility] for concern of worsening shortness of breath. Patient was recently admitted to [facility] ( 3/23-4/1)  with same complaint and had a thoracentesis and paracentesis. During her hospitalization patient had a CT that revealed interlobular septal thickening suggesting lymphangitic carcinomatosis and patchy opacities in the right middle and lower lobe.  CT surgery removed PleurX catheter on 3/28/22 and pt was to follow up with Dr Daniels in 10-14 days.    Patient was originally diagnosed as T1a disease in 2011 during a pregnancy, s/p resection. 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 She was seen by Dr. Skinny Ibanez at [facility], but since that time, she has refused conventional chemo and has been seeking alternative medicine for cancer as outpatient. She was hoping to go to [place] where there is an Oncologist who provides alternative therapies. She was hoping to make the flight there this Sunday 4/17/22. Of note, during the recent hospitalization, she was being followed by Palliative care team.        Allergies    Cipro (Rash)  QUINOLONES (Unknown)    Intolerances        MEDICATIONS  (STANDING):  acetaZOLAMIDE Injectable 250 milliGRAM(s) IV Push once  buMETAnide 2 milliGRAM(s) Oral every 12 hours  enoxaparin Injectable 40 milliGRAM(s) SubCutaneous every 24 hours  lactulose Syrup 20 Gram(s) Oral every 24 hours  morphine ER Tablet 60 milliGRAM(s) Oral every 8 hours  multivitamin 1 Tablet(s) Oral daily  polyethylene glycol 3350 17 Gram(s) Oral every 24 hours  simethicone 80 milliGRAM(s) Chew three times a day    MEDICATIONS  (PRN):  acetaminophen     Tablet .. 650 milliGRAM(s) Oral every 6 hours PRN Temp greater or equal to 38C (100.4F)  aluminum hydroxide/magnesium hydroxide/simethicone Suspension 30 milliLiter(s) Oral every 4 hours PRN Dyspepsia  famotidine    Tablet 20 milliGRAM(s) Oral two times a day PRN GERD  HYDROmorphone   Tablet 6 milliGRAM(s) Oral every 4 hours PRN Moderate Pain (4 - 6)  LORazepam     Tablet 0.5 milliGRAM(s) Oral every 6 hours PRN Anxiety  melatonin 3 milliGRAM(s) Oral at bedtime PRN Insomnia  methocarbamol 750 milliGRAM(s) Oral three times a day PRN Muscle Spasm  ondansetron Injectable 4 milliGRAM(s) IV Push every 8 hours PRN Nausea and/or Vomiting      PAST MEDICAL & SURGICAL HISTORY:  Gastric cancer  5/2011 no adjunt trement    Small bowel obstruction  Exp lap complicated with post op Ishemic bowel /perforation    Perforated bowel    Other abdominal hernia    Obesity (BMI 30-39.9)    History of gastrectomy  distal gastrectomy w/ B2 reconstruction (gastrojejunostomy)    History of resection of small bowel  exlap, SBR, meckel&#x27;s diverticulectomy (7/4/2015); exlap, SBR for necrotic bowel, left in discontinuity, Abthera (7/9/2015); exlap, SB anastomosis, Abthera (7/11/15); exlap, washout, abdominal closure w/ Strattice (7/13/15)        FAMILY HISTORY:  FH: diabetes mellitus (Mother)    FH: pulmonary embolism (Mother)    Family history of abdominal aortic aneurysm (AAA) (Father)        SOCIAL HISTORY: No alcohol, tobacco, or drug use    REVIEW OF SYSTEMS:  CONSTITUTIONAL: no fever  EYES/ENT: No visual changes; no throat pain  NECK: No pain or stiffness  RESPIRATORY: + SOB, cough  CARDIOVASCULAR: No chest pain  GASTROINTESTINAL: + abdominal pain. No diarrhea  GENITOURINARY: No dysuria or hematuria  NEUROLOGICAL: No numbness or focal weakness  SKIN: + edema. No itching, burning, rashes, or lesions  MSK: no joint pain  Allergy: no urticaria        T(F): 98.4 (04-13-22 @ 11:26), Max: 98.4 (04-13-22 @ 11:26)  HR: 105 (04-13-22 @ 11:26)  BP: 104/67 (04-13-22 @ 11:26)  RR: 18 (04-13-22 @ 11:26)  SpO2: 95% (04-13-22 @ 11:26)  Wt(kg): --    GENERAL: NAD but very fatigued, briefly falling asleep at times, cachectic upper half of body  HEENT: EOMI, MMM, no oropharyngeal lesions or erythema appreciated  Pulm: no increased WOB, decreased breath sounds throughout  CV: RRR, S1, S2, no m/g/r  ABDOMEN: soft, NT, ND, no masses felt, no HSM  MSK: nl ROM  EXTREMITIES: no appreciable edema in b/l LE  Neuro: A&Ox3, no focal deficits  SKIN: 3+ b/l LE edema. No rash                        7.9    6.83  )-----------( 303      ( 13 Apr 2022 14:44 )             26.9       04-13    137  |  93<L>  |  7   ----------------------------<  119<H>  3.6   |  35<H>  |  0.60    Ca    8.8      13 Apr 2022 14:44  Mg     1.9     04-13    TPro  5.4<L>  /  Alb  2.7<L>  /  TBili  0.4  /  DBili  x   /  AST  23  /  ALT  15  /  AlkPhos  1935<H>  04-13      Magnesium, Serum: 1.9 mg/dL (04-13 @ 06:53)

## 2022-04-13 NOTE — PROGRESS NOTE ADULT - PROBLEM SELECTOR PLAN 2
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    -per IR, Pulmonary and  CT surgery - at this time they cannot offer another Pleurx due to loculation and positioning of the effusion  -bicarb uptrending 37 today despite po diamox 250, will give IV diamox 250mg today  -given contraction, discussed with cards, change to po bumex to 2mg po bid

## 2022-04-13 NOTE — PROGRESS NOTE ADULT - ASSESSMENT
50 y/o f with pmhx Gastric Ca, not currently on treatment with recurring pleural effusion,  presents from [facility] for concern of worsening shortness of breath previous pleurex catheter now with acute respiratory failure with hypoxia due to b/l pleural effusion and likely POD.

## 2022-04-13 NOTE — PROGRESS NOTE ADULT - PROBLEM SELECTOR PLAN 5
Pt pursuing alternative medicine treatment in [place] for gastric cancer  - GOC discussion 4/5 revealed pt would like to be full code and would like to pursue treatment as she still has hope for the future and she would like the best chance possible

## 2022-04-13 NOTE — PROGRESS NOTE ADULT - SUBJECTIVE AND OBJECTIVE BOX
SUBJECTIVE AND OBJECTIVE:  Indication for Geriatrics and Palliative Care Services/INTERVAL HPI: Palliative care consulted for symptom management for dyspnea and pain, and to continue GOC as pt tolerates.     OVERNIGHT EVENTS:     Called back by primary team to evaluate opioid regimen in anticipation of discharge soon. Chart reviewed, pt remains on MS contin 60 mg q8, oral dilaudid 6 mg prn (3x/24 hours), po ativan 0.5 mg prn (none used). Pt seen this afternoon with sister at bedside. She appears comfortable with normal breathing, sitting up in the chair eating lunch. Reassurance provided as she expressed anxiety surrounding having a 30 day supply of opioids upon discharge. Advised in the state [facility], patients with cancer diagnosis are allowed to have 30 day supply of opioids. Pt would like for her scripts to be sent to her outpatient pharmacy today to ensure her meds will be available and ready upon discharge.    Case d/w primary team. Discharge regimen includes MS contin 60 mg q8 hours, oral dilaudid 6 m q4 prn (please prescribe in 2 mg tablets), ativan 0.5 mg q6 prn. Advised patient if she is in crisis pain at home, ok to try oral dilaudid 8 mg as needed.     DNR on chart:  Allergies    Cipro (Rash)  QUINOLONES (Unknown)    Intolerances    MEDICATIONS  (STANDING):  buMETAnide Injectable 2 milliGRAM(s) IV Push every 8 hours  enoxaparin Injectable 40 milliGRAM(s) SubCutaneous every 24 hours  morphine ER Tablet 60 milliGRAM(s) Oral every 8 hours  multivitamin 1 Tablet(s) Oral daily  piperacillin/tazobactam IVPB.. 3.375 Gram(s) IV Intermittent every 8 hours  polyethylene glycol 3350 17 Gram(s) Oral every 24 hours    MEDICATIONS  (PRN):  acetaminophen     Tablet .. 650 milliGRAM(s) Oral every 6 hours PRN Temp greater or equal to 38C (100.4F)  aluminum hydroxide/magnesium hydroxide/simethicone Suspension 30 milliLiter(s) Oral every 4 hours PRN Dyspepsia  HYDROmorphone   Tablet 6 milliGRAM(s) Oral every 4 hours PRN Moderate Pain (4 - 6)  HYDROmorphone  Injectable 0.75 milliGRAM(s) IV Push every 4 hours PRN Severe Pain (7 - 10)  LORazepam     Tablet 0.5 milliGRAM(s) Oral every 6 hours PRN Anxiety  melatonin 3 milliGRAM(s) Oral at bedtime PRN Insomnia  methocarbamol 750 milliGRAM(s) Oral three times a day PRN Muscle Spasm  ondansetron Injectable 4 milliGRAM(s) IV Push every 8 hours PRN Nausea and/or Vomiting      ITEMS UNCHECKED ARE NOT PRESENT    PRESENT SYMPTOMS: [ ]Unable to self-report - see [x] CPOT [x ] PAINADS [x ] RDOS  Source if other than patient:  [ ]Family   [ ]Team     Pain:  [x]yes [ ]no  QOL impact - affects ADLS (especially while turning in bed for changes PRN)  Location - spine, left shoulder (broken clavicle), abdominal discomfort (mass effect from GI cancer), b/l LE "tightness" from pitting edema                   Aggravating factors - movement  Quality - acheness   Radiation - none  Timing- constant  Severity (0-10 scale): 4  Minimal acceptable level (0-10 scale): 3    CPOT:    https://www.[facility].org/getattachment/[email or web address]/Critical-Care-Pain-Observation-Tool-(CPOT)    PAIN AD Score:	  http://geriatrictoolkit.[place].[facility]/cog/painad.pdf (Ctrl + left click to view)    Dyspnea:                          [x ]Mild [ ]Moderate [ ]Severe    RDOS:  0 to 2  minimal or no respiratory distress   3  mild distress  4 to 6 moderate distress  >7 severe distress  https://homecareinformation.net/handouts/hen/Respiratory_Distress_Observation_Scale.pdf (Ctrl +  left click to view)     Anxiety:                             [ ]Mild [x ]Moderate [ ]Severe  Fatigue:                             [ ]Mild [x ]Moderate [ ]Severe  Nausea:                             [ ]Mild [ ]Moderate [ ]Severe  Loss of appetite:              [x ]Mild [ ]Moderate [ ]Severe  Constipation:                    [ ]Mild [ ]Moderate [ ]Severe    Other Symptoms:  [x ]All other review of systems negative     Palliative Performance Status Version 2:    50 %      http://npcrc.org/files/news/palliative_performance_scale_ppsv2.pdf    PHYSICAL EXAM:    Vital Signs Last 24 Hrs  T(C): 36.9 (13 Apr 2022 11:26), Max: 36.9 (13 Apr 2022 11:26)  T(F): 98.4 (13 Apr 2022 11:26), Max: 98.4 (13 Apr 2022 11:26)  HR: 105 (13 Apr 2022 11:26) (100 - 107)  BP: 104/67 (13 Apr 2022 11:26) (102/59 - 119/68)  BP(mean): --  RR: 18 (13 Apr 2022 11:26) (18 - 18)  SpO2: 95% (13 Apr 2022 11:26) (95% - 100%)     GENERAL: [ ]Cachexia    [x]Alert  [x]Oriented x 3  [ ]Lethargic  [ ]Unarousable  [x]Verbal  [ ]Non-Verbal  Behavioral:   [x]Anxiety  [ ]Delirium [ ]Agitation [ ]Other  HEENT:  [x]Normal   [ ]Dry mouth   [ ]ET Tube/Trach  [ ]Oral lesions  PULMONARY:   [ ]Clear [ ]Tachypnea  [ ]Audible excessive secretions   [ ]Rhonchi        [ ]Right [ ]Left [ ]Bilateral  [x]Crackles        [ ]Right [ ]Left [x]Bilateral  [ ]Wheezing     [ ]Right [ ]Left [ ]Bilateral  [x]Diminished BS [x] Right [ ]Left [ ]Bilateral  CARDIOVASCULAR:    [ ]Regular [ ]Irregular [x]Tachy  [ ]Linus [ ]Murmur [ ]Other  GASTROINTESTINAL:  [x]Soft  [x ]Distended   [x ]+BS  [x ]Non tender [ ]Tender  [x ]Other- discomfort (not pain) when palpating abdomen likely due to tumor burden [ ]PEG [ ]OGT/ NGT   Last BM:   GENITOURINARY:  [x ]Normal [ ]Incontinent   [ ]Oliguria/Anuria   [ ]Ansari  MUSCULOSKELETAL:   [ ]Normal   [ ]Weakness  [x ]Bed/Wheelchair bound [ ]Edema  NEUROLOGIC:   [ x]No focal deficits  [ ] Cognitive impairment  [ ] Dysphagia [ ]Dysarthria [ ] Paresis [ ]Other   SKIN:   [x ]Normal  [ ]Rash  [ ]Other  [ ]Pressure ulcer(s) [ ]y [ ]n present on admission    CRITICAL CARE:  [ ]Shock Present  [ ]Septic [ ]Cardiogenic [ ]Neurologic [ ]Hypovolemic  [ ]Vasopressors [ ]Inotropes  [ ]Respiratory failure present [ ]Mechanical Ventilation [ ]Non-invasive ventilatory support [ ]High-Flow   [ ]Acute  [ ]Chronic [ ]Hypoxic  [ ]Hypercarbic [ ]Other  [ ]Other organ failure     LABS:                                   7.9    6.85  )-----------( 337      ( 13 Apr 2022 06:53 )             26.5     04-13    137  |  94<L>  |  7   ----------------------------<  106<H>  4.0   |  37<H>  |  0.59    Ca    9.0      13 Apr 2022 06:53  Mg     1.9     04-13    TPro  5.5<L>  /  Alb  2.8<L>  /  TBili  0.4  /  DBili  x   /  AST  23  /  ALT  15  /  AlkPhos  1943<H>  04-13    RADIOLOGY & ADDITIONAL STUDIES: no new imaging    Protein Calorie Malnutrition Present: [ ]mild [ ]moderate [ ]severe [ ]underweight [ ]morbid obesity  https://www.andeal.org/vault/2440/web/files/ONC/Table_Clinical%20Characteristics%20to%20Document%20Malnutrition-White%20JV%20et%20al%202012.pdf    Height (cm): 167.6 (04-03-22 @ 08:47), 167.6 (03-28-22 @ 11:11), 167.6 (03-19-22 @ 17:35)  Weight (kg): 90.7 (04-03-22 @ 08:47), 109.6 (03-28-22 @ 11:11), 113.4 (03-19-22 @ 17:35)  BMI (kg/m2): 32.3 (04-03-22 @ 08:47), 39 (03-28-22 @ 11:11), 40.4 (03-19-22 @ 17:35)    [x]PPSV2 < or = 30%  [ ]significant weight loss [ ]poor nutritional intake [ ]anasarca[ ]Artificial Nutrition    REFERRALS:   [x]Chaplaincy  [ ]Hospice  [x]Child Life  [ ]Social Work  [x]Case management [ ]Holistic Therapy     Goals of Care Document: HCP form uploaded to [employer]

## 2022-04-13 NOTE — PROGRESS NOTE ADULT - PROBLEM SELECTOR PLAN 3
metastatic stage 4 gastric cancer, refused disease modifying therapy in past  -patient and sister want oncology consult to rediscuss if any chemo options that she would accept, otherwise per plan is fly to [place] this Sunday to get nonconventrial treatment  -palliative following

## 2022-04-13 NOTE — CHART NOTE - NSCHARTNOTEFT_GEN_A_CORE
PA MEDICINE NOTE:      Due to condition of metastatic gastric cancer, patient has a severe mobility limitation and requires a bariatric wheelchair to assist in daily ADLS. Pt has significant LE edema, pt won't fit for standard wheelchair given her condition. Patient cannot ambulate safely with a cane or a walker. Patient has sufficient upper body strength to self propel said wheelchair and has not expressed an unwillingness to use the wheelchair. patient has ample room in the home and a caregiver to assist with the wheelchair, Use of a manual wheelchair will significantly improve the patients ability to participate in daily ADL's and the patient will use it on a regular basis in the home.        Ebony Tran PA-C   #[id number]

## 2022-04-13 NOTE — PROGRESS NOTE ADULT - ASSESSMENT
51 yoF with PMH of Gastric cancer, not on conventional chemotherapy and pursuing alternative medicine for treatment, presenting with SOB and recurrent pleural effusion, s/p thoracentesis and paracentesis at [facility] (3/23-4/1), s/p Pleurx catheter removal by CT surgery on 3/28/22. Geriatrics and Palliative Medicine Team is consulted for assistance with pain control and GOC discussions.

## 2022-04-13 NOTE — PROGRESS NOTE ADULT - PROBLEM SELECTOR PLAN 4
Per documentation, pt has hx of metastatic gastric cancer and multiple abdominal surgeries with hx of SBO   - Last heme onc appt with Dr. Skinny Ibanez 7/2021 per documentation  - Pt pursuing alternative medicine treatment for gastric cancer by going to [place] on 4/18

## 2022-04-13 NOTE — CONSULT NOTE ADULT - ASSESSMENT
52 yo F with a PMH of metastatic gastric cancer (not on treatment) c/b recurrent malignant pleural effusions s/p PleurX (now removed) and ascites who presents from [facility] for concern of worsening shortness of breath due to worsening malignancy.    #Metastatic Gastric Adenocarcinoma  - Originally diagnosed T1a in 2011, s/p removal without adjuvant chemotherapy  - Recurrence with T2, N1-2 disease in 2021  - Patient declined chemotherapy at that time and has declined it since. 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 Expressed concern that she would also be unlikely to tolerate a flight to [place] at this stage as well. If she became stronger, that decision could be re-evaluated. Also discussed that any future treatments would be for control of disease, but not curative  - Discussed that it would also not be realistic to try a small dose of chemotherapy to allow the patient to go to [place] and receive other treatments  - Patient is understanding of these realities      Aryles Hedjar, MD, PGY-4  Hematology/Oncology Fellow  [facility]  Pager: [phone]  After 5PM and on weekends and holidays, please call the inpatient fellow on call. 50 yo F with a PMH of metastatic gastric cancer (not on treatment) c/b recurrent malignant pleural effusions s/p PleurX (now removed) and ascites who presents from [facility] for concern of worsening shortness of breath due to worsening malignancy.    #Metastatic Gastric Adenocarcinoma  - Originally diagnosed T1a in 2011, s/p removal without adjuvant chemotherapy  - Recurrence with T2, N1-2 disease in 2021  - Patient declined chemotherapy at that time and has declined it since. 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 Expressed concern that she would also be unlikely to tolerate a flight to [place] at this stage as well. If she became stronger, that decision could be re-evaluated. Also discussed that any future treatments would be for control of disease, but not curative  - Discussed that it would also not be realistic to try a small dose of chemotherapy to allow the patient to go to [place] and receive other treatments  - Patient is understanding of these realities      Aryles Hedjar, MD, PGY-4  Hematology/Oncology Fellow  [facility]  Pager: [phone]  After 5PM and on weekends and holidays, please call the inpatient fellow on call. 52 yo F with a PMH of metastatic gastric cancer (not on treatment) c/b recurrent malignant pleural effusions s/p PleurX (now removed) and ascites who presents from [facility] for concern of worsening shortness of breath due to worsening malignancy.    #Metastatic Gastric Adenocarcinoma  - Originally diagnosed T1a in 2011, s/p removal without adjuvant chemotherapy  - Recurrence with T2, N1-2 disease in 2021  - Patient declined chemotherapy at that time and has declined it since. 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 Expressed concern that she would also be unlikely to tolerate a flight to [place] at this stage as well. If she became stronger, that decision could be re-evaluated. Also discussed that any future treatments would be for control of disease, but not curative  - Discussed that it would also not be realistic to try a small dose of chemotherapy to allow the patient to go to [place] and receive other treatments  - Patient is understanding of these realities      Oncology will sign-off. Please call or re-consult for further questions or concerns.    Aryles Hedjar, MD, PGY-4  Hematology/Oncology Fellow  [facility]  Pager: [phone]  After 5PM and on weekends and holidays, please call the inpatient fellow on call.

## 2022-04-13 NOTE — PROGRESS NOTE ADULT - PROBLEM SELECTOR PLAN 2
Pt presenting with diffuse cancer-related pain in spine and abdomen.  - Continue MS Contin to 60mg Q8h standing at 8AM, 4PM and 12AM (can give at same time as other PRN pain medications if required due to long-acting nature)  - Continue Dilaudid to 6mg PO Q4 hours for severe pain/dyspnea  - Continue Dilaudid IV 0.75mg Q4 hours for breakthrough severe pain/dyspnea (pt understands she will not have access to this on d/c)   - Continue Ativan 0.5mg Q6h PRN (not to be given within 1 hour of IV Dilaudid to prevent oversedation)  - D/w primary team to prescribe 30 day supply for pt upon discharge.

## 2022-04-13 NOTE — PROGRESS NOTE ADULT - SUBJECTIVE AND OBJECTIVE BOX
DATE OF SERVICE: 04-13-22 @ 14:19    Patient is a 51y old  Female who presents with a chief complaint of chest pain for one day (13 Apr 2022 13:40)      INTERVAL HISTORY: Feels ok.     REVIEW OF SYSTEMS:  CONSTITUTIONAL: No weakness  EYES/ENT: No visual changes;  No throat pain   NECK: No pain or stiffness  RESPIRATORY: + SOB with exertion  CARDIOVASCULAR: No chest pain or palpitations  GASTROINTESTINAL: + abdominal fullness but better sz/p paracentesis  GENITOURINARY: No dysuria, frequency or hematuria  NEUROLOGICAL: No stroke like symptoms  SKIN: No rashes    	  MEDICATIONS:  acetaZOLAMIDE Injectable 250 milliGRAM(s) IV Push once  buMETAnide 2 milliGRAM(s) Oral every 12 hours        PHYSICAL EXAM:  T(C): 36.9 (04-13-22 @ 11:26), Max: 36.9 (04-13-22 @ 11:26)  HR: 105 (04-13-22 @ 11:26) (100 - 107)  BP: 104/67 (04-13-22 @ 11:26) (102/59 - 119/68)  RR: 18 (04-13-22 @ 11:26) (18 - 18)  SpO2: 95% (04-13-22 @ 11:26) (95% - 100%)  Wt(kg): --  I&O's Summary    12 Apr 2022 07:01  -  13 Apr 2022 07:00  --------------------------------------------------------  IN: 480 mL / OUT: 1450 mL / NET: -970 mL    13 Apr 2022 07:01  -  13 Apr 2022 14:19  --------------------------------------------------------  IN: 240 mL / OUT: 700 mL / NET: -460 mL          Appearance: In no distress	  HEENT:    PERRL, EOMI	  Cardiovascular:  S1 S2, No JVD  Respiratory: Lungs clear to auscultation	  Gastrointestinal:  Abdomen distended but soft	  Vascularature:  + LE edema  Psychiatric: Appropriate affect   Neuro: no acute focal deficits                               7.9    6.85  )-----------( 337      ( 13 Apr 2022 06:53 )             26.5     04-13    137  |  94<L>  |  7   ----------------------------<  106<H>  4.0   |  37<H>  |  0.59    Ca    9.0      13 Apr 2022 06:53  Mg     1.9     04-13    TPro  5.5<L>  /  Alb  2.8<L>  /  TBili  0.4  /  DBili  x   /  AST  23  /  ALT  15  /  AlkPhos  1943<H>  04-13        Labs personally reviewed      ASSESSMENT/PLAN: 	    50 y/o f with PMH of Gastric Ca (not currently on treatment), recurring pleural effusion who [facility] with [facility]/o worsening shortness of breath and chest pain described as her chest pounding out of her chest. Symptoms are non-exertional and have been progressing over the last couple of days. Chest pain now resolved but continues to report intermittent SOB mostly a/w acute pain.     Problem/Plan #1: Chest Pain  -Troponin negative x2  -EKG shows Sinus Tach with no ischemic changes  -Echo from 3/29/22 shows normal LV function, no RWMA, no valvular dysfunction and possible RV overload  -CT Chest negative for PE  -Low probability that chest pain cardiac in origin.   -Now resolved.    Problem/Plan #2: Shortness of breath  -RV overload on recent TTE  -CXR reveals pulmonary edema, masslike densities in the right apex, lateral right lung and right lung  -CTA chest negative for PE, but shows large right loculated pleural effusion increased from prior, and complete consolidation of the RLL, patchy opacities in RML, small left pleural effusion, diffuse interlobular septal thickening, may be edema v lymphangitic carcinomatosis  - IV diuresis--> Continues to report significant decrease in shortness of breath and LE edema. C/w Bumex PO BID. Cr currently stable.   - increase Bumex to 2mg IV TID  -Pulm following  -As per IR, Pulm and  CTS - not a candidate for Pleurx due to loculation and positioning of the effusion  -+ moderate ascites on Abd US-->s/po paracentesis 4/12 with 3.5L removed    Problem/Plan #3: Tachycardia  --110s i/s/o pain secondary to gastric cancer  -Continue pain mgmt as per palliative and primary team  -Continue to monitor HR    -Problem/Plan #4: Chronic Pain  -Palliative Care team following  -Mgmt per primary team      Louise Fitzgerald, RANJIT-NP   Richard Howard DO [facility]  Cardiovascular Medicine  [address] [place] [address], [address]  Office: [phone]  Cell: [phone]

## 2022-04-13 NOTE — PROGRESS NOTE ADULT - PROBLEM SELECTOR PLAN 6
[facility] discussion on 4/5 as above  Case d/w primary team to prescribe 30 day supply of her regimen to outpatient pharmacy today to ensure it's ready for discharge   Discharge regimen includes MS contin 60 mg TID, oral dilaudid 6 m q4 prn (please prescribe in 2 mg tablets), ativan 0.5 mg q6 prn. Advised patient if she is in crisis pain at home, ok to try oral dilaudid 8 mg as needed.   The Geriatrics and Palliative Medicine Team will sign off at this time. Please don't hesitate to reconsult or page us at [phone] if additional assistance is required.

## 2022-04-13 NOTE — PROGRESS NOTE ADULT - SUBJECTIVE AND OBJECTIVE BOX
Patient is a 51y old  Female who presents with a chief complaint of chest pain for one day (13 Apr 2022 07:00)      SUBJECTIVE / OVERNIGHT EVENTS: No On events. Reports some improvement with JORDAN after paracentesiss. Very anxious wants to talk to oncology and palliative again.     ADDITIONAL REVIEW OF SYSTEMS: Negative except for above    MEDICATIONS  (STANDING):  acetaZOLAMIDE  IVPB 250 milliGRAM(s) IV Intermittent once  buMETAnide 2 milliGRAM(s) Oral every 12 hours  lactulose Syrup 20 Gram(s) Oral every 24 hours  morphine ER Tablet 60 milliGRAM(s) Oral every 8 hours  multivitamin 1 Tablet(s) Oral daily  polyethylene glycol 3350 17 Gram(s) Oral every 24 hours  simethicone 80 milliGRAM(s) Chew three times a day    MEDICATIONS  (PRN):  acetaminophen     Tablet .. 650 milliGRAM(s) Oral every 6 hours PRN Temp greater or equal to 38C (100.4F)  aluminum hydroxide/magnesium hydroxide/simethicone Suspension 30 milliLiter(s) Oral every 4 hours PRN Dyspepsia  famotidine    Tablet 20 milliGRAM(s) Oral two times a day PRN GERD  HYDROmorphone   Tablet 6 milliGRAM(s) Oral every 4 hours PRN Moderate Pain (4 - 6)  LORazepam     Tablet 0.5 milliGRAM(s) Oral every 6 hours PRN Anxiety  melatonin 3 milliGRAM(s) Oral at bedtime PRN Insomnia  methocarbamol 750 milliGRAM(s) Oral three times a day PRN Muscle Spasm  ondansetron Injectable 4 milliGRAM(s) IV Push every 8 hours PRN Nausea and/or Vomiting      CAPILLARY BLOOD GLUCOSE        I&O's Summary    12 Apr 2022 07:01  -  13 Apr 2022 07:00  --------------------------------------------------------  IN: 480 mL / OUT: 1450 mL / NET: -970 mL    13 Apr 2022 07:01  -  13 Apr 2022 12:32  --------------------------------------------------------  IN: 240 mL / OUT: 400 mL / NET: -160 mL        PHYSICAL EXAM:  Vital Signs Last 24 Hrs  T(C): 36.9 (13 Apr 2022 11:26), Max: 36.9 (13 Apr 2022 11:26)  T(F): 98.4 (13 Apr 2022 11:26), Max: 98.4 (13 Apr 2022 11:26)  HR: 105 (13 Apr 2022 11:26) (100 - 107)  BP: 104/67 (13 Apr 2022 11:26) (102/59 - 119/68)  BP(mean): --  RR: 18 (13 Apr 2022 11:26) (18 - 18)  SpO2: 95% (13 Apr 2022 11:26) (95% - 100%)    PHYSICAL EXAM:  GENERAL: NAD, well-developed on 2L NC comfortable  HEAD:  Atraumatic, Normocephalic  EYES:  conjunctiva and sclera clear  NECK: Supple, No JVD  CHEST/LUNG: decrease bs b/l   HEART: Regular rhythm; tachy+  ABDOMEN: +distention improved but not resolved,, NT  EXTREMITIES:  2+ Peripheral Pulses, 2+ pitting edema b/l extending to upper thighs  PSYCH: AAOx3  NEUROLOGY: non-focal  SKIN: No rashes or lesions          LABS:                        7.9    6.85  )-----------( 337      ( 13 Apr 2022 06:53 )             26.5     04-13    137  |  94<L>  |  7   ----------------------------<  106<H>  4.0   |  37<H>  |  0.59    Ca    9.0      13 Apr 2022 06:53  Mg     1.9     04-13    TPro  5.5<L>  /  Alb  2.8<L>  /  TBili  0.4  /  DBili  x   /  AST  23  /  ALT  15  /  AlkPhos  1943<H>  04-13              Culture - Fungal, Body Fluid (collected 12 Apr 2022 23:01)  Source: .Body Fluid Peritoneal Fluid  Preliminary Report (13 Apr 2022 08:18):    Testing in progress    Culture - Body Fluid with Gram Stain (collected 12 Apr 2022 23:01)  Source: .Body Fluid Peritoneal Fluid  Gram Stain (13 Apr 2022 02:22):    polymorphonuclear leukocytes seen    No organisms seen    by cytocentrifuge        RADIOLOGY & ADDITIONAL TESTS:    Imaging Personally Reviewed:    Electrocardiogram Personally Reviewed:    COORDINATION OF CARE:  Care Discussed with Consultants/Other Providers [Y/N]:  Prior or Outpatient Records Reviewed [Y/N]:

## 2022-04-13 NOTE — PROGRESS NOTE ADULT - PROBLEM SELECTOR PLAN 4
Palliative care eval appreciated:     -MS Contin 60mg q8      -Dilaudid 6mg PO Q4 hours for severe pain/dyspnea     -Dilaudid IV 0.75mg Q4 hours for breakthrough severe pain/dyspnea

## 2022-04-13 NOTE — CONSULT NOTE ADULT - ATTENDING COMMENTS
50 y/o F w/metastatic gastric CA, not currently on treatment c/b malignant pleural effusion with recent admission to [facility] s/p VATS with placement of pleural catheter which has since been removed now readmitted with worsening dyspnea. CT chest with worsening b/l loculated pleural effusions. Dyspnea likely secondary to combination of worsening effusions and progression of disease.    - Supplemental O2 as needed goal O2 sat >= 90%  - Pain control  - Diuresis goal net negative  - CT surgery consult  - Will discuss case with interventional pulmonologist  - Onc consult
Patient with recurrent/metastatic gastric cancer who has refused standard of care treatment, including chemotherapy.    Discussion held with patient; sister at bedside.  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    Patient has known metastatic involvement of peritoneum, bones and malignant effusion/ascites.    She planned for an upcoming trip to [place] to see an alternative medicine practitioner.    Currently her PS is very poor and she is not currently a candidate for systemic therapy.  Also discussed that there was not a role for giving her lower doses of chemotherapy as a "bridge" to enable her to travel to [place].  Discussed my impression that it was not logistical or realistic to make that kind of trip at this time given her medical condition.  Discussed how there was a window of opportunity in the past for the option of systemic chemotherapy but that is not currently an option unless her functional status improves.    Recommend a focus on symptom management/palliation.    All questions answered to their apparent satisfaction.      Madi Hein MD
51F with gastric cancer s/p abdominal surgeries, not on conventional therapy, c/b pleural effusion s/p pleurex placement and removal who presents with dyspnea and pain. Recommend to restart MS contin 60 mg BID, add IV dilaudid 0.75 mg q4 prn in addition to PO dilaudid 4 mg q4 prn for breakthrough. Add xanax 0.25 mg q6 prn for anxiety. Bowel regimen with miralax and lactulose BID. Will explore GOC once symptoms improve. We will follow.

## 2022-04-13 NOTE — PROGRESS NOTE ADULT - PROBLEM SELECTOR PLAN 1
multifactorial: b/l pleural effusion, RML consolidation, POD lymphangitic spread, fluid overload, anasarca   - satting well on 2L NC at rest and exertion but has JORDAN  - completed 7 day course of zosyn for PNA  - LE duplex negative for DVT, CTA negative on admission for PE  -US with mod ascites sp IR paracentesis 4/12 with 3.5 L remove  -mild sinus tachy likely due to deconditioning, advanced cancer, metabolic state.   -diuresis as below

## 2022-04-13 NOTE — PROGRESS NOTE ADULT - PROBLEM SELECTOR PLAN 8
resolved, trops stable  most likely from the Large pleural effusion   Last TTE with EF of 65%  cards following      dvt ppx: restart lovenox 40mg daily today  very poor prognosis  dispo: d/c later this week if can optimize to get non-conventional treatment in [place] pending onc and pallative recs    discussed with STEFFANIE Beck and sister bedside

## 2022-04-14 NOTE — PROGRESS NOTE ADULT - PROBLEM SELECTOR PLAN 4
Palliative care eval appreciated:     -MS Contin 60mg q8      -Dilaudid 6mg PO Q4 hours prn severe pain/dyspnea     -Dilaudid IV 0.75mg Q4 hours for breakthrough severe pain/dyspnea  - discussed with palliative, Dr Jim and Yasemin, pt can get 30 day of supply on discharge from me given that she is a cancer patient and may be traveling.

## 2022-04-14 NOTE — PROVIDER CONTACT NOTE (OTHER) - ACTION/TREATMENT ORDERED:
As per provider, Q12 order of CBC and CMP does not need to be completed, only need to draw  labs of CMP and CBC.

## 2022-04-14 NOTE — PROGRESS NOTE ADULT - PROBLEM SELECTOR PLAN 1
multifactorial: b/l pleural effusion, RML consolidation, POD lymphangitic spread, fluid overload, anasarca   - satting well on 2L NC at rest and 2L exertion but has JORDAN, can use 4-6L on exertion for comfort  - completed 7 day course of zosyn for PNA  - LE duplex negative for DVT, CTA negative on admission for PE  -US with mod ascites sp IR paracentesis 4/12 with 3.5 L remove  -mild sinus tachy likely due to deconditioning, advanced cancer, metabolic state.   -diuresis as below

## 2022-04-14 NOTE — PROGRESS NOTE ADULT - SUBJECTIVE AND OBJECTIVE BOX
Patient is a 51y old  Female who presents with a chief complaint of chest pain for one day (14 Apr 2022 08:25)      SUBJECTIVE / OVERNIGHT EVENTS: Had pain near clavicle ON now resolved. Denies cp, sob, but reports anxiety at times. Pain well controlled.       ADDITIONAL REVIEW OF SYSTEMS: Negative except for above    MEDICATIONS  (STANDING):  acetaZOLAMIDE Injectable 250 milliGRAM(s) IV Push once  buMETAnide 2 milliGRAM(s) Oral every 12 hours  enoxaparin Injectable 40 milliGRAM(s) SubCutaneous every 24 hours  lactulose Syrup 20 Gram(s) Oral every 24 hours  morphine ER Tablet 60 milliGRAM(s) Oral every 8 hours  multivitamin 1 Tablet(s) Oral daily  polyethylene glycol 3350 17 Gram(s) Oral every 24 hours  potassium chloride   Solution 40 milliEquivalent(s) Oral once    MEDICATIONS  (PRN):  acetaminophen     Tablet .. 650 milliGRAM(s) Oral every 6 hours PRN Temp greater or equal to 38C (100.4F)  aluminum hydroxide/magnesium hydroxide/simethicone Suspension 30 milliLiter(s) Oral every 4 hours PRN Dyspepsia  famotidine    Tablet 20 milliGRAM(s) Oral two times a day PRN GERD  HYDROmorphone   Tablet 6 milliGRAM(s) Oral every 4 hours PRN Moderate Pain (4 - 6)  LORazepam     Tablet 0.5 milliGRAM(s) Oral every 6 hours PRN Anxiety  melatonin 3 milliGRAM(s) Oral at bedtime PRN Insomnia  methocarbamol 750 milliGRAM(s) Oral three times a day PRN Muscle Spasm  ondansetron Injectable 4 milliGRAM(s) IV Push every 8 hours PRN Nausea and/or Vomiting  simethicone 80 milliGRAM(s) Chew three times a day PRN Dyspepsia      CAPILLARY BLOOD GLUCOSE        I&O's Summary    13 Apr 2022 07:01  -  14 Apr 2022 07:00  --------------------------------------------------------  IN: 1020 mL / OUT: 1700 mL / NET: -680 mL    14 Apr 2022 07:01  -  14 Apr 2022 12:09  --------------------------------------------------------  IN: 0 mL / OUT: 400 mL / NET: -400 mL        PHYSICAL EXAM:  Vital Signs Last 24 Hrs  T(C): 36.8 (14 Apr 2022 08:20), Max: 36.9 (14 Apr 2022 00:07)  T(F): 98.2 (14 Apr 2022 08:20), Max: 98.4 (14 Apr 2022 00:07)  HR: 115 (14 Apr 2022 08:20) (112 - 121)  BP: 117/70 (14 Apr 2022 08:20) (100/61 - 127/75)  BP(mean): --  RR: 18 (14 Apr 2022 08:20) (18 - 18)  SpO2: 94% (14 Apr 2022 08:20) (94% - 96%)      PHYSICAL EXAM:  GENERAL: NAD, well-developed on 2L NC comfortable  HEAD:  Atraumatic, Normocephalic  EYES:  conjunctiva and sclera clear  NECK: Supple, No JVD  CHEST/LUNG: decrease bs b/l   HEART: Regular rhythm; tachy+  ABDOMEN: +abd distention, , NT  EXTREMITIES:  2+ Peripheral Pulses, 2+ pitting edema b/l extending to upper thighs  PSYCH: AAOx3  NEUROLOGY: non-focal  SKIN: No rashes or lesions              LABS:                        8.1    6.99  )-----------( 298      ( 14 Apr 2022 06:24 )             27.1     04-14    135  |  92<L>  |  7   ----------------------------<  98  3.2<L>   |  32<H>  |  0.56    Ca    8.9      14 Apr 2022 06:21  Mg     1.9     04-13    TPro  5.4<L>  /  Alb  2.7<L>  /  TBili  0.4  /  DBili  x   /  AST  23  /  ALT  15  /  AlkPhos  1935<H>  04-13              Culture - Fungal, Body Fluid (collected 12 Apr 2022 23:01)  Source: .Body Fluid Peritoneal Fluid  Preliminary Report (13 Apr 2022 08:18):    Testing in progress    Culture - Body Fluid with Gram Stain (collected 12 Apr 2022 23:01)  Source: .Body Fluid Peritoneal Fluid  Gram Stain (13 Apr 2022 02:22):    polymorphonuclear leukocytes seen    No organisms seen    by cytocentrifuge  Preliminary Report (13 Apr 2022 21:27):    No growth        RADIOLOGY & ADDITIONAL TESTS:    Imaging Personally Reviewed:    Electrocardiogram Personally Reviewed:    COORDINATION OF CARE:  Care Discussed with Consultants/Other Providers [Y/N]:  Prior or Outpatient Records Reviewed [Y/N]:

## 2022-04-14 NOTE — PROVIDER CONTACT NOTE (MEDICATION) - SITUATION
Patient noted with lethargy, VVS. Patient response to commands and vocals.
Patient c/o of pain
Patient c/o of pain generalized, d/x with possible mets

## 2022-04-14 NOTE — PROVIDER CONTACT NOTE (OTHER) - ASSESSMENT
Patient has Q12 CBC and CMP and 0600 morning labs of CBC and CMP. Clarify with provider which order she would like to be completed.

## 2022-04-14 NOTE — PROGRESS NOTE ADULT - PROBLEM SELECTOR PLAN 2
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    -per IR, Pulmonary and  CT surgery - at this time they cannot offer another Pleurx due to loculation and positioning of the effusion  -TTE with EF 65%, increase RV overload but CTA negative for PE  -bicarb improved today 32  -remains fluid overloaded but multifactorial and likely cannot further diurese aggressively   -c/w po bumex 2mg po bid per cards  -replete and monitor k

## 2022-04-14 NOTE — PROGRESS NOTE ADULT - PROBLEM SELECTOR PLAN 8
resolved, trops stable  most likely from the Large pleural effusion   resolved likely due to fracture  Last TTE with EF of 65%  cards following      dvt ppx:  lovenox 40mg daily   very poor prognosis    dispo: ongoing discussions, sister and patient trying to go to [place] to get non conventional treatment, explained to them how we think this is feasibly very difficult given her condition     discussed with NP Barrett and sister bedside resolved, trops stable   likely due to fracture  Last TTE with EF of 65%, cards following      dvt ppx:  lovenox 40mg daily   very poor prognosis    dispo: ongoing discussions, sister and patient trying to go to [place] to get non conventional treatment, explained to them how we think this is feasibly very difficult given her condition     discussed with NP Barrett and sister

## 2022-04-14 NOTE — PROVIDER CONTACT NOTE (MEDICATION) - ACTION/TREATMENT ORDERED:
As per provider, new orders placed for 4mg of Dilaudid.
As per PA, medications will be held for oversedation.
Provider ordered a one time dose of .75 IV push Dilaudid

## 2022-04-14 NOTE — PROVIDER CONTACT NOTE (OTHER) - SITUATION
pt c/o of left shoulder pain
Patient has Q12 CBC and CMP and 0600 morning labs of CBC and CMP. Clarify with provider which order she would like to be completed.

## 2022-04-14 NOTE — PROGRESS NOTE ADULT - ASSESSMENT
52 y/o f with pmhx Gastric Ca, not currently on treatment with recurring pleural effusion,  presents from [facility] for concern of worsening shortness of breath previous pleurex catheter now with acute respiratory failure with hypoxia due to b/l pleural effusion and likely POD.

## 2022-04-14 NOTE — PROGRESS NOTE ADULT - SUBJECTIVE AND OBJECTIVE BOX
Date of Service   04-14-22 @ 08:25    Patient is a 51y old  Female who presents with a chief complaint of chest pain for one day (13 Apr 2022 17:25)      INTERVAL HISTORY: pt feels ok     REVIEW OF SYSTEMS:   CONSTITUTIONAL: No weakness  EYES/ENT: No visual changes; No throat pain  Neck: No pain or stiffness  Respiratory: No cough, wheezing, + shortness of breath  CARDIOVASCULAR: no chest pain or palpitations  GASTROINTESTINAL: No abdominal pain, no nausea, vomiting or hematemesis  GENITOURINARY: No dysuria, frequency or hematuria  NEUROLOGICAL: No stroke like symptoms  SKIN: No rashes    	  MEDICATIONS:  acetaZOLAMIDE Injectable 250 milliGRAM(s) IV Push once  buMETAnide 2 milliGRAM(s) Oral every 12 hours        PHYSICAL EXAM:  T(C): 36.9 (04-14-22 @ 04:50), Max: 36.9 (04-13-22 @ 11:26)  HR: 112 (04-14-22 @ 04:50) (105 - 121)  BP: 108/66 (04-14-22 @ 04:50) (100/61 - 127/75)  RR: 18 (04-14-22 @ 04:50) (18 - 18)  SpO2: 95% (04-14-22 @ 04:50) (95% - 96%)  Wt(kg): --  I&O's Summary    13 Apr 2022 07:01  -  14 Apr 2022 07:00  --------------------------------------------------------  IN: 1020 mL / OUT: 1700 mL / NET: -680 mL          Appearance: In no distress	  HEENT:    PERRL, EOMI	  Cardiovascular:  S1 S2, No JVD  Respiratory: Lungs clear to auscultation	  Gastrointestinal:  Soft, Non-tender, + BS	  Vascularature:  No edema of LE  Psychiatric: Appropriate affect   Neuro: no acute focal deficits                               8.1    6.99  )-----------( 298      ( 14 Apr 2022 06:24 )             27.1     04-14    135  |  92<L>  |  7   ----------------------------<  98  3.2<L>   |  32<H>  |  0.56    Ca    8.9      14 Apr 2022 06:21  Mg     1.9     04-13    TPro  5.4<L>  /  Alb  2.7<L>  /  TBili  0.4  /  DBili  x   /  AST  23  /  ALT  15  /  AlkPhos  1935<H>  04-13        Labs personally reviewed      ASSESSMENT/PLAN: 	  50 y/o f with PMH of Gastric Ca (not currently on treatment), recurring [facility] effusion [facility] and chest pain described as her chest pounding out of her chest. Symptoms are non-exertional and have been progressing over the last couple of days. Chest pain now resolved but continues to report intermittent SOB mostly a/w acute pain.     Problem/Plan #1: Chest Pain  -Troponin negative x2  -EKG shows Sinus Tach with no ischemic changes  -Echo from 3/29/22 shows normal LV function, no RWMA, no valvular dysfunction and possible RV overload  -CT Chest negative for PE  -Low probability that chest pain cardiac in origin.   -Now resolved.    Problem/Plan #2: Shortness of breath  -RV overload on recent TTE  -CXR reveals pulmonary edema, masslike densities in the right apex, lateral right lung and right lung  -CTA chest negative for PE, but shows large right loculated pleural effusion increased from prior, and complete consolidation of the RLL, patchy opacities in RML, small left pleural effusion, diffuse interlobular septal thickening, may be edema v lymphangitic carcinomatosis  - IV diuresis--> Continues to report significant decrease in shortness of breath and LE edema. C/w Bumex PO BID. Cr currently stable.   - increase Bumex to 2mg IV TID  -Pulm following  -As per IR, Pulm and  CTS - not a candidate for Pleurx due to loculation and positioning of the effusion  -+ moderate ascites on Abd US-->s/po paracentesis 4/12 with 3.5L removed    Problem/Plan #3: Tachycardia  --110s i/s/o pain secondary to gastric cancer  -Continue pain mgmt as per palliative and primary team  -Continue to monitor HR    -Problem/Plan #4: Chronic Pain  -Palliative Care team following  -Mgmt per primary team    Andreina Cao [facility]   Richard Howard DO [facility]  Cardiovascular Medicine  [address] [place] [address], [address]  Office: [phone]   Date of Service   04-14-22 @ 08:25    Patient is a 51y old  Female who presents with a chief complaint of chest pain for one day (13 Apr 2022 17:25)      INTERVAL HISTORY: pt feels ok     REVIEW OF SYSTEMS:   CONSTITUTIONAL: No weakness  EYES/ENT: No visual changes; No throat pain  Neck: No pain or stiffness  Respiratory: No cough, wheezing, + shortness of breath  CARDIOVASCULAR: no chest pain or palpitations  GASTROINTESTINAL: No abdominal pain, no nausea, vomiting or hematemesis  GENITOURINARY: No dysuria, frequency or hematuria  NEUROLOGICAL: No stroke like symptoms  SKIN: No rashes    	  MEDICATIONS:  acetaZOLAMIDE Injectable 250 milliGRAM(s) IV Push once  buMETAnide 2 milliGRAM(s) Oral every 12 hours        PHYSICAL EXAM:  T(C): 36.9 (04-14-22 @ 04:50), Max: 36.9 (04-13-22 @ 11:26)  HR: 112 (04-14-22 @ 04:50) (105 - 121)  BP: 108/66 (04-14-22 @ 04:50) (100/61 - 127/75)  RR: 18 (04-14-22 @ 04:50) (18 - 18)  SpO2: 95% (04-14-22 @ 04:50) (95% - 96%)  Wt(kg): --  I&O's Summary    13 Apr 2022 07:01  -  14 Apr 2022 07:00  --------------------------------------------------------  IN: 1020 mL / OUT: 1700 mL / NET: -680 mL          Appearance: In no distress	  HEENT:    PERRL, EOMI	  Cardiovascular:  S1 S2, No JVD  Respiratory: Lungs clear to auscultation	  Gastrointestinal:  Soft, Non-tender, + BS	  Vascularature:  + edema of b/l LE  Psychiatric: Appropriate affect   Neuro: no acute focal deficits                               8.1    6.99  )-----------( 298      ( 14 Apr 2022 06:24 )             27.1     04-14    135  |  92<L>  |  7   ----------------------------<  98  3.2<L>   |  32<H>  |  0.56    Ca    8.9      14 Apr 2022 06:21  Mg     1.9     04-13    TPro  5.4<L>  /  Alb  2.7<L>  /  TBili  0.4  /  DBili  x   /  AST  23  /  ALT  15  /  AlkPhos  1935<H>  04-13        Labs personally reviewed      ASSESSMENT/PLAN: 	  50 y/o f with PMH of Gastric Ca (not currently on treatment), recurring pleural effusion [facility] breath and chest pain described as her chest pounding out of her chest. Symptoms are non-exertional and have been progressing over the last couple of days. Chest pain now resolved but continues to report intermittent SOB mostly a/w acute pain.     Problem/Plan #1: Chest Pain  -Troponin negative x2  -EKG shows Sinus Tach with no ischemic changes  -Echo from 3/29/22 shows normal LV function, no RWMA, no valvular dysfunction and possible RV overload  -CT Chest negative for PE  -Low probability that chest pain cardiac in origin.   -Now resolved.    Problem/Plan #2: Shortness of breath  -RV overload on recent TTE  -CXR reveals pulmonary edema, masslike densities in the right apex, lateral right lung and right lung  -CTA chest negative for PE, but shows large right loculated pleural effusion increased from prior, and complete consolidation of the RLL, patchy opacities in RML, small left pleural effusion, diffuse interlobular septal thickening, may be edema v lymphangitic carcinomatosis  - IV diuresis--> Continues to report significant decrease in shortness of breath and LE edema. C/w Bumex PO BID. Cr currently stable.   -Pulm following  -As per IR, Pulm and  CTS - not a candidate for Pleurx due to loculation and positioning of the effusion  -+ moderate ascites on Abd US-->s/po paracentesis 4/12 with 3.5L removed    Problem/Plan #3: Tachycardia  --110s i/s/o pain secondary to gastric cancer  -Continue pain mgmt as per palliative and primary team  -Continue to monitor HR    -Problem/Plan #4: Chronic Pain  -Palliative Care team following  -Mgmt per primary team  - on dilaudid PO for maintenance and IV for breakthrough       Andreina Cao [facility]   Richard Howard DO [facility]  Cardiovascular Medicine  [address], [address]  Office: [phone]

## 2022-04-14 NOTE — PROGRESS NOTE ADULT - PROBLEM SELECTOR PLAN 3
metastatic stage 4 gastric cancer, peritoneum, bones and malignant effusion/ascites.  refused disease modifying therapy in past  -oncology consult appreciated:  not currently a candidate for systemic therapy given poor functional status  -pt/sister plan is fly to [place] early next week to get nonconventional treatment however I advised her and sister that it is unrealistic to fly there given her condition  -palliative following

## 2022-04-14 NOTE — PROVIDER CONTACT NOTE (MEDICATION) - ASSESSMENT
Patient c/o of pain, PRN 6mg of Dilaudid was given and Ms cottin was given as 11. Patient still c/o of pain.
Patient c/o of pain generalized, d/x with possible mets
Patient noted with lethargy and oversedation. VVS. Patient response to commands and vocals.

## 2022-04-15 NOTE — ED PROVIDER NOTE - ATTENDING CONTRIBUTION TO CARE
Patient with gastric ca, lymphedema, dced home today, went home and anxious, sob, felt dc too early and reported back s leaving stretcher, 88% Ra and on 2L nc chronically Patient with gastric ca, lymphedema, dced home today, went home and anxious, sob, felt dc too early and reported back without leaving stretcher, 88% Ra and on 2L nc chronically  ill appearing  trachea midline  mild distress secondary to shortness of breath   tachycardic   tachypneic   soft, ntnd, no rebound/guarding  no rash/vesicles/petechiae  no gross deformity of extremities, no asymmetry   cooperative, anxious about health  Ryan Parson MD, FACEP: In this physician's medical judgement based on clinical history and physical exam: will get iv, cbc, cmp, cxr, probnp, ce, ekg, and admit for wob and shortness of breath   Will follow up on labs, analgesia, imaging, reassess and disposition to the inpatient team as clinically indicated.  *The above represents an initial assessment/impression. Please refer to my progress notes below for potential changes in patient clinical course*   Patient endorsed to Dr. Murphy at the time of admission. Based on patient's history and physical exam, as well as the results of today's workup, I feel that patient warrants admission to the hospital for further workup/evaluation and continued management. I discussed the findings of today's workup with the patient and addressed the patient's questions and concerns. The patient was agreeable with admission. Our team spoke with the inpatient receiving team who accepted the patient for admission and subsequently took over the patient's care at the time of admission. The receiving team will follow up on pending labs, analgesia, any clinical imaging results, ancillary findings, reassess, and disposition as clinically indicated. Details of patient and plan conveyed to receiving physician team and conveyed back for understanding. There were no questions at this time about the patient's status, disposition, and plan. Patient's care to be taken over by receiving physician team at this time, all decisions regarding the progression of care will be made at their discretion.

## 2022-04-15 NOTE — PROGRESS NOTE ADULT - PROBLEM SELECTOR PLAN 3
metastatic stage 4 gastric cancer, peritoneum, bones and malignant effusion/ascites.  refused disease modifying therapy in past  -oncology consult appreciated:  not currently a candidate for systemic therapy given poor functional status  -pt/sister plan is fly to [place] early next week to get nonconventional treatment however I advised her and sister that it is unrealistic to fly there given her condition  -palliative following  - refusing hospice

## 2022-04-15 NOTE — PROGRESS NOTE ADULT - NS_MD_PANP_GEN_ALL_CORE

## 2022-04-15 NOTE — ED PROVIDER NOTE - PROGRESS NOTE DETAILS
Joslein Major MD (PGY2) -  Pt endorsed to hospitalist at this time EKG w/o any acute changes, trop 62, cards consulted, recommend echo AM.

## 2022-04-15 NOTE — ED PROVIDER NOTE - CLINICAL SUMMARY MEDICAL DECISION MAKING FREE TEXT BOX
50yo F PMH chronic lymphedema, gastric cancer, anxiety presents today with SOB and diffuse body aches. CTAB. nWOB. 3+ pitting edema BL up to thighs, abd distended, NT. Plan to obtain labs, CXR, ECG, trop, reassess.

## 2022-04-15 NOTE — PROGRESS NOTE ADULT - SUBJECTIVE AND OBJECTIVE BOX
Patient is a 51y old  Female who presents with a chief complaint of chest pain for one day (15 Apr 2022 11:59)      SUBJECTIVE / OVERNIGHT EVENTS: No On events. Pain well controlled. Denies cp, sob, n/v, abd pain          ADDITIONAL REVIEW OF SYSTEMS: Negative except for above    MEDICATIONS  (STANDING):  acetaZOLAMIDE Injectable 250 milliGRAM(s) IV Push once  buMETAnide 2 milliGRAM(s) Oral every 12 hours  enoxaparin Injectable 40 milliGRAM(s) SubCutaneous every 24 hours  lactulose Syrup 20 Gram(s) Oral every 24 hours  morphine ER Tablet 60 milliGRAM(s) Oral every 8 hours  multivitamin 1 Tablet(s) Oral daily  polyethylene glycol 3350 17 Gram(s) Oral every 24 hours    MEDICATIONS  (PRN):  acetaminophen     Tablet .. 650 milliGRAM(s) Oral every 6 hours PRN Temp greater or equal to 38C (100.4F)  aluminum hydroxide/magnesium hydroxide/simethicone Suspension 30 milliLiter(s) Oral every 4 hours PRN Dyspepsia  famotidine    Tablet 20 milliGRAM(s) Oral two times a day PRN GERD  HYDROmorphone   Tablet 6 milliGRAM(s) Oral every 4 hours PRN Moderate Pain (4 - 6)  HYDROmorphone  Injectable 0.75 milliGRAM(s) IV Push every 4 hours PRN Severe Pain (7 - 10)  LORazepam     Tablet 0.5 milliGRAM(s) Oral every 6 hours PRN Anxiety  melatonin 3 milliGRAM(s) Oral at bedtime PRN Insomnia  methocarbamol 750 milliGRAM(s) Oral three times a day PRN Muscle Spasm  ondansetron Injectable 4 milliGRAM(s) IV Push every 8 hours PRN Nausea and/or Vomiting  simethicone 80 milliGRAM(s) Chew three times a day PRN Dyspepsia      CAPILLARY BLOOD GLUCOSE        I&O's Summary    14 Apr 2022 07:01  -  15 Apr 2022 07:00  --------------------------------------------------------  IN: 880 mL / OUT: 1500 mL / NET: -620 mL    15 Apr 2022 07:01  -  15 Apr 2022 12:27  --------------------------------------------------------  IN: 480 mL / OUT: 0 mL / NET: 480 mL        PHYSICAL EXAM:  Vital Signs Last 24 Hrs  T(C): 36.8 (15 Apr 2022 08:28), Max: 36.9 (14 Apr 2022 12:45)  T(F): 98.2 (15 Apr 2022 08:28), Max: 98.4 (14 Apr 2022 12:45)  HR: 108 (15 Apr 2022 08:28) (107 - 114)  BP: 114/69 (15 Apr 2022 08:28) (97/58 - 125/69)  BP(mean): --  RR: 18 (15 Apr 2022 08:28) (17 - 20)  SpO2: 97% (15 Apr 2022 08:28) (96% - 98%)    PHYSICAL EXAM:  GENERAL: NAD, well-developed on 2L NC comfortable  HEAD:  Atraumatic, Normocephalic  EYES:  conjunctiva and sclera clear  NECK: Supple, No JVD  CHEST/LUNG: decrease bs b/l   HEART: Regular rhythm; tachy+  ABDOMEN: soft, +abd distention improved, , NT  EXTREMITIES:  2+ Peripheral Pulses, 2+ pitting edema b/l extending to upper thighs  PSYCH: AAOx3  NEUROLOGY: non-focal  SKIN: No rashes or lesions        LABS:                        8.2    6.79  )-----------( 342      ( 15 Apr 2022 07:25 )             27.2     04-15    138  |  92<L>  |  8   ----------------------------<  87  3.7   |  31  |  0.56    Ca    8.8      15 Apr 2022 07:24  Mg     2.0     04-15    TPro  5.4<L>  /  Alb  2.7<L>  /  TBili  0.4  /  DBili  x   /  AST  23  /  ALT  15  /  AlkPhos  1935<H>  04-13              Culture - Fungal, Body Fluid (collected 12 Apr 2022 23:01)  Source: .Body Fluid Peritoneal Fluid  Preliminary Report (13 Apr 2022 08:18):    Testing in progress    Culture - Body Fluid with Gram Stain (collected 12 Apr 2022 23:01)  Source: .Body Fluid Peritoneal Fluid  Gram Stain (13 Apr 2022 02:22):    polymorphonuclear leukocytes seen    No organisms seen    by cytocentrifuge  Preliminary Report (13 Apr 2022 21:27):    No growth        RADIOLOGY & ADDITIONAL TESTS:    Imaging Personally Reviewed:    Electrocardiogram Personally Reviewed:    COORDINATION OF CARE:  Care Discussed with Consultants/Other Providers [Y/N]:  Prior or Outpatient Records Reviewed [Y/N]:

## 2022-04-15 NOTE — DISCHARGE NOTE PROVIDER - CARE PROVIDER_API CALL
Shawn James)  Cardiology; Internal Medicine  [address], [address]  [place] [address]  Phone: [phone]  Fax: [phone]  Established Patient  Follow Up Time: 1 week    Olivia Hazel)  Internal Medicine  [address], [address]  [place] [address]  Phone: [phone]  Fax: [phone]  Established Patient  Follow Up Time: 1 week    Skinny Ibanez)  Hematology; Medical Oncology  [address] [place] [address]  Phone: [phone]  Fax: [phone]  Established Patient  Follow Up Time: 1 week

## 2022-04-15 NOTE — PROGRESS NOTE ADULT - PROVIDER SPECIALTY LIST ADULT
Cardiology
Cardiology
Hospitalist
Intervent Radiology
Pulmonology
Cardiology
Hospitalist
Intervent Radiology
Cardiology
Hospitalist
Hospitalist
Pulmonology
Hospitalist
Palliative Care
Hospitalist
Palliative Care
Hospitalist

## 2022-04-15 NOTE — PROGRESS NOTE ADULT - ASSESSMENT
52 y/o f with pmhx Gastric Ca, not currently on treatment with recurring pleural effusion,  presents from [facility] for concern of worsening shortness of breath previous pleurex catheter now with acute respiratory failure with hypoxia due to b/l pleural effusion and likely POD.

## 2022-04-15 NOTE — ED ADULT NURSE NOTE - CHIEF COMPLAINT QUOTE
returned to hospital for sob and anxiety   was d/c from floor still on [facility] stretcher

## 2022-04-15 NOTE — ED ADULT NURSE NOTE - OBJECTIVE STATEMENT
51 year old female presented to ED via [facility] from home with c/o of weakness and shortness of breath. hx gastric ca, prescribed morphine and dilaudid daily. pt was discharged from hospital today, took non emergent home, when arriving home reports 'I was having a hard time catching my breath', reports breathing is anxiety producing. pt denies CP, nausea/vomiting, numbness/tingling, fever, cough, chills, dizziness, headache, blurred vision, neuro intact. pt a&ox3, lung sounds clear, heart rate tachy ~111, on cardiac monitor, abdomen soft nontender distended to palp. bruising noted on bilateral hands and left forearm, bilateral leg +2 pitting edema. IV in right AC 18G and patent. Will continue to monitor and assess while offering support and reassurance.

## 2022-04-15 NOTE — DISCHARGE NOTE PROVIDER - NSDCCPCAREPLAN_GEN_ALL_CORE_FT
PRINCIPAL DISCHARGE DIAGNOSIS  Diagnosis: Acute respiratory failure with hypoxia  Assessment and Plan of Treatment: due to effusions and progressive cancer  use 2 Liter nasal cannula at rest  use 2-6 Liter nasal cannula on exertion depedneing on your need  goal oxygen greater then 90%      SECONDARY DISCHARGE DIAGNOSES  Diagnosis: Cancer-related pain  Assessment and Plan of Treatment: recommend pallative care  continue opoids as precribed    Diagnosis: Recurrent right pleural effusion  Assessment and Plan of Treatment: not amendable to drainage IR, pulm, Thoracic drainage  continue oxygen  continue diuretics  recommend pallative care  continue opoids as precribed  return to ER for concernign syptoms    Diagnosis: Clavicular fracture  Assessment and Plan of Treatment: pain control    Diagnosis: Gastric cancer  Assessment and Plan of Treatment: recommend pallative care and hospice  pain control with opoids  followup with pallative care and oncology     PRINCIPAL DISCHARGE DIAGNOSIS  Diagnosis: Acute respiratory failure with hypoxia  Assessment and Plan of Treatment: due to effusions and progressive cancer  use 2 Liter nasal cannula at rest  use 2-6 Liter nasal cannula on exertion depedneing on your need  goal oxygen greater then 90%      SECONDARY DISCHARGE DIAGNOSES  Diagnosis: Recurrent right pleural effusion  Assessment and Plan of Treatment: not amendable to drainage IR, pulm, Thoracic drainage  continue oxygen  continue diuretics  recommend pallative care  continue opoids as precribed  return to ER for concernign syptoms    Diagnosis: Cancer-related pain  Assessment and Plan of Treatment: recommend pallative care  continue opoids as precribed    Diagnosis: Gastric cancer  Assessment and Plan of Treatment: recommend pallative care and hospice  pain control with opoids  followup with pallative care and oncology    Diagnosis: Clavicular fracture  Assessment and Plan of Treatment: pain control

## 2022-04-15 NOTE — ED ADULT NURSE NOTE - NSIMPLEMENTINTERV_GEN_ALL_ED
Implemented All Fall with Harm Risk Interventions:  [place] to call system. Call bell, personal items and telephone within reach. Instruct patient to call for assistance. Room bathroom lighting operational. Non-slip footwear when patient is off stretcher. Physically safe environment: no spills, clutter or unnecessary equipment. Stretcher in lowest position, wheels locked, appropriate side rails in place. Provide visual cue, wrist band, yellow gown, etc. Monitor gait and stability. Monitor for mental status changes and reorient to person, place, and time. Review medications for side effects contributing to fall risk. Reinforce activity limits and safety measures with patient and family. Provide visual clues: red socks.

## 2022-04-15 NOTE — DISCHARGE NOTE PROVIDER - CARE PROVIDERS DIRECT ADDRESSES
[email or web address]

## 2022-04-15 NOTE — PROGRESS NOTE ADULT - PROBLEM SELECTOR PROBLEM 8
Chest pain
Cancer-related pain
Chest pain
Cancer-related pain
Chest pain
Cancer-related pain

## 2022-04-15 NOTE — PROGRESS NOTE ADULT - NS ATTEND OPT1 GEN_ALL_CORE

## 2022-04-15 NOTE — DISCHARGE NOTE PROVIDER - PROVIDER TOKENS
PROVIDER:[TOKEN:[[id number]],FOLLOWUP:[1 week],ESTABLISHEDPATIENT:[T]],PROVIDER:[TOKEN:[[id number]],FOLLOWUP:[1 week],ESTABLISHEDPATIENT:[T]],PROVIDER:[TOKEN:[[id number]],FOLLOWUP:[1 week],ESTABLISHEDPATIENT:[T]]

## 2022-04-15 NOTE — PROGRESS NOTE ADULT - NS ATTEND BILL GEN_ALL_CORE
Attending to bill

## 2022-04-15 NOTE — PROGRESS NOTE ADULT - PROBLEM SELECTOR PLAN 2
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    -per IR, Pulmonary and  CT surgery - at this time they cannot offer another Pleurx due to loculation and positioning of the effusion  -TTE with EF 65%, increase RV overload but CTA negative for PE  -bicarb improved today 32  -remains fluid overloaded but multifactorial and likely cannot further diurese aggressively   -c/w po bumex 2mg po bid, lytes wnl

## 2022-04-15 NOTE — ED PROVIDER NOTE - OBJECTIVE STATEMENT
50yo F PMH chronic lymphedema, gastric cancer, SBO, anxiety presents today with SOB and diffuse body aches. Pt discharged from the hospital earlier this evening. Per EMS, patient got home and c/o difficulty breathing and body aches. Feels she need further care before being discharged. Patient denies chest pain or discomfort, shortness of breath, diaphoresis, nausea and vomiting.

## 2022-04-15 NOTE — PROGRESS NOTE ADULT - PROBLEM SELECTOR PLAN 8
resolved, trops stable   likely due to fracture  Last TTE with EF of 65%, cards following      dvt ppx:  lovenox 40mg daily   very poor prognosis    Meds to beds  Advised sister and patient that travel to [place] for nonconvententialtravel may unsafe given her condition   dispo: discharge home today with close palliative, PCP, oncology followup  discussed with STEFFANIE Hyde, sister, CM  spent 50 min on d/c time

## 2022-04-15 NOTE — PROGRESS NOTE ADULT - REASON FOR ADMISSION
chest pain for one day

## 2022-04-15 NOTE — PROGRESS NOTE ADULT - NS ATTEND AMEND GEN_ALL_CORE FT
Pt care and plan discussed and reviewed with NP. Plan as outlined above edited by me to reflect our discussion.

## 2022-04-15 NOTE — PROGRESS NOTE ADULT - PROBLEM SELECTOR PLAN 4
Palliative care eval appreciated:     -MS Contin 60mg q8 x  30 days     -Dilaudid 6mg PO Q4 hours prn severe pain/dyspnea x30 days     -c/w ativan 1mg q12 bid anxiety x 10 days  - discussed with palliative, Dr Jim and Yasemin, roger giving 30 day of supply on discharge from me given that she is a cancer patient and traveling to [place] for nonconvential treatment this weekend or early this week  - ISTOP reviewed

## 2022-04-15 NOTE — ED ADULT TRIAGE NOTE - CHIEF COMPLAINT QUOTE
returned to hospital for sob and anxiety   was d/c from floor still on [facility] stretcher

## 2022-04-15 NOTE — DISCHARGE NOTE NURSING/CASE MANAGEMENT/SOCIAL WORK - NSDCPEFALRISK_GEN_ALL_CORE
For information on Fall & Injury Prevention, visit: [email or web address] OR  [email or web address] OR  https://www.cdc.gov/steadi/patient.html

## 2022-04-15 NOTE — DISCHARGE NOTE PROVIDER - HOSPITAL COURSE
50 y/o f with pmhx Gastric Ca, not currently on treatment with recurring pleural effusion,  presents from [facility] for concern of worsening shortness of breath previous pleurex catheter now with acute respiratory failure with hypoxia due to b/l pleural effusion and likely POD.      Problem/Plan - 1:  (Data referenced from "Progress Note Adult" 14-Apr-2022 12:09)  ·  Problem: Acute respiratory failure with hypoxia.   ·  Plan: multifactorial: b/l pleural effusion, RML consolidation, POD lymphangitic spread, fluid overload, anasarca   - satting well on 2L NC at rest and 2L exertion but has JORDAN, can use 4-6L on exertion for comfort  - completed 7 day course of zosyn for PNA  - LE duplex negative for DVT, CTA negative on admission for PE  -US with mod ascites sp IR paracentesis 4/12 with 3.5 L remove  -mild sinus tachy likely due to deconditioning, advanced cancer, metabolic state.   -diuresis as below.     Problem/Plan - 2:  ·  Problem: Recurrent right pleural effusion.   ·  Plan: CT PE studies : limited studies with no PE , but shows Large Rt Loculated Pleural effusion that is slightly increased and complete consolidation of the RLL, patchy opacities in RML , small left pleural effusion , Diffuse interlobular septal thickening , may be edema Vs Lymphangitic carcinomatosis.    -per IR, Pulmonary and  CT surgery - at this time they cannot offer another Pleurx due to loculation and positioning of the effusion  -TTE with EF 65%, increase RV overload but CTA negative for PE  -bicarb improved today 32  -remains fluid overloaded but multifactorial and likely cannot further diurese aggressively   -c/w po bumex 2mg po bid, lytes wnl.     Problem/Plan - 3:  ·  Problem: Gastric cancer.   ·  Plan: metastatic stage 4 gastric cancer, peritoneum, bones and malignant effusion/ascites.  refused disease modifying therapy in past  -oncology consult appreciated:  not currently a candidate for systemic therapy given poor functional status  -pt/sister plan is fly to [place] early next week to get nonconventional treatment however I advised her and sister that it is unrealistic to fly there given her condition  -palliative following  - refusing hospice.     Problem/Plan - 4:  ·  Problem: Cancer-related pain.   ·  Plan: Palliative care eval appreciated:     -MS Contin 60mg q8 x  30 days     -Dilaudid 6mg PO Q4 hours prn severe pain/dyspnea x30 days     -c/w ativan 1mg q12 bid anxiety x 10 days  - discussed with palliative, Dr Jim and roger Hazel giving 30 day of supply on discharge from me given that she is a cancer patient and traveling to [place] for nonconvential treatment this weekend or early this week  - ISTOP reviewed.     Problem/Plan - 5:  (Data referenced from "Progress Note Adult" 14-Apr-2022 12:09)  ·  Problem: Normocytic anemia.   ·  Plan: hgb stable  no s/s of bleeding  -monitor.     Problem/Plan - 6:  (Data referenced from "Progress Note Adult" 14-Apr-2022 12:09)  ·  Problem: Hyponatremia.   ·  Plan: Hyponatremia resolved  Most likely multifactorial from pain/ poor oral in take and pulm disease and hypervolemia  Fluid restriction  c/w diuresis  stopped NaCl tabs.     Problem/Plan - 7:  (Data referenced from "Progress Note Adult" 14-Apr-2022 12:09)  ·  Problem: Clavicular fracture.   ·  Plan: Ortho f/up   pain control per palliative.     Problem/Plan - 8:  ·  Problem: Chest pain.   ·  Plan: resolved, trops stable   likely due to fracture  Last TTE with EF of 65%, cards following      dvt ppx:  lovenox 40mg daily   very poor prognosis    Meds to beds  Advised sister and patient that travel to [place] for nonconvententialtravel may unsafe given her condition   dispo: discharge home today with close palliative, PCP, oncology followup       50 y/o f with pmhx Gastric Ca, not currently on treatment with recurring pleural effusion,  presents from [facility] for concern of worsening shortness of breath previous pleurex catheter now with acute respiratory failure with hypoxia due to b/l pleural effusion and likely POD.      Problem/Plan - 1:  (Data referenced from "Progress Note Adult" 14-Apr-2022 12:09)  ·  Problem: Acute respiratory failure with hypoxia.   ·  Plan: multifactorial: b/l pleural effusion, RML consolidation, POD lymphangitic spread, fluid overload, anasarca   - satting well on 2L NC at rest and 2L exertion but has JORDAN, can use 4-6L on exertion for comfort  - completed 7 day course of zosyn for PNA  - LE duplex negative for DVT, CTA negative on admission for PE  -US with mod ascites sp IR paracentesis 4/12 with 3.5 L remove  -mild sinus tachy likely due to deconditioning, advanced cancer, metabolic state.   -diuresis as below.    Recurrent right pleural effusion:   CT PE studies : limited studies with no PE , but shows Large Rt Loculated Pleural effusion that is slightly increased and complete consolidation of the RLL, patchy opacities in RML , small left pleural effusion , Diffuse interlobular septal thickening , may be edema Vs Lymphangitic carcinomatosis.    -per IR, Pulmonary and  CT surgery - at this time they cannot offer another Pleurx due to loculation and positioning of the effusion  -TTE with EF 65%, increase RV overload but CTA negative for PE  -bicarb improved today 32  -remains fluid overloaded but multifactorial and likely cannot further diurese aggressively   -c/w po bumex 2mg po bid, lytes wnl.    Gastric cancer:  Metastatic stage 4 gastric cancer, peritoneum, bones and malignant effusion/ascites.  refused disease modifying therapy in past  -oncology consult appreciated:  not currently a candidate for systemic therapy given poor functional status  -pt/sister plan is fly to [place] early next week to get nonconventional treatment however I advised her and sister that it is unrealistic to fly there given her condition  -palliative following  - refusing hospice.    Cancer-related pain:  Palliative care eval appreciated:     -MS Contin 60mg q8 x  30 days     -Dilaudid 6mg PO Q4 hours prn severe pain/dyspnea x30 days     -c/w ativan 1mg q12 bid anxiety x 10 days  - discussed with palliative, Dr Jim and roger Hazel giving 30 day of supply on discharge from me given that she is a cancer patient and traveling to [place] for nonconvential treatment this weekend or early this week  - ISTOP reviewed.    Normocytic anemia:  Hgb stable  no s/s of bleeding  -monitor.    Hyponatremia.   Hyponatremia resolved  Most likely multifactorial from pain/ poor oral in take and pulm disease and hypervolemia  Fluid restriction  c/w diuresis  stopped NaCl tabs.     Problem/Plan - 7:  (Data referenced from "Progress Note Adult" 14-Apr-2022 12:09)  ·  Problem: Clavicular fracture.   ·  Plan: Ortho f/up   pain control per palliative.      Chest pain:  Retrops stable   likely due to fracture  Last TTE with EF of 65%, cards following      dvt ppx:  lovenox 40mg daily   very poor prognosis    Meds to beds  Advised sister and patient that travel to [place] for nonconvententialtravel may unsafe given her condition   dispo: discharge home today with close palliative, PCP, oncology followup       50 y/o f with pmhx Gastric Ca, not currently on treatment with recurring pleural effusion,  presents from [facility] for concern of worsening shortness of breath previous pleurex catheter now with acute respiratory failure with hypoxia due to b/l pleural effusion and likely POD.      Problem/Plan - 1:  (Data referenced from "Progress Note Adult" 14-Apr-2022 12:09)  ·  Problem: Acute respiratory failure with hypoxia.   ·  Plan: multifactorial: b/l pleural effusion, RML consolidation, POD lymphangitic spread, fluid overload, anasarca   - satting well on 2L NC at rest and 2L exertion but has JORDAN, can use 4-6L on exertion for comfort  - completed 7 day course of zosyn for PNA  - LE duplex negative for DVT, CTA negative on admission for PE  -US with mod ascites sp IR paracentesis 4/12 with 3.5 L remove  -mild sinus tachy likely due to deconditioning, advanced cancer, metabolic state.   -diuresis as below.    Recurrent right pleural effusion:   CT PE studies : limited studies with no PE , but shows Large Rt Loculated Pleural effusion that is slightly increased and complete consolidation of the RLL, patchy opacities in RML , small left pleural effusion , Diffuse interlobular septal thickening , may be edema Vs Lymphangitic carcinomatosis.    -per IR, Pulmonary and  CT surgery - at this time they cannot offer another Pleurx due to loculation and positioning of the effusion  -TTE with EF 65%, increase RV overload but CTA negative for PE  -bicarb improved today 32  -remains fluid overloaded but multifactorial and likely cannot further diurese aggressively   -c/w po bumex 2mg po bid, lytes wnl.    Gastric cancer:  Metastatic stage 4 gastric cancer, peritoneum, bones and malignant effusion/ascites.  refused disease modifying therapy in past  -oncology consult appreciated:  not currently a candidate for systemic therapy given poor functional status  -pt/sister plan is fly to [place] early next week to get nonconventional treatment however I advised her and sister that it is unrealistic to fly there given her condition  -palliative following  - refusing hospice.    Cancer-related pain:  Palliative care eval appreciated:     -MS Contin 60mg q8 x  30 days     -Dilaudid 6mg PO Q4 hours prn severe pain/dyspnea x30 days     -c/w ativan 1mg q12 bid anxiety x 10 days  - discussed with palliative, Dr Jim and roger Hazel giving 30 day of supply on discharge from me given that she is a cancer patient and traveling to [place] for nonconvential treatment this weekend or early this week  - ISTOP reviewed.    Normocytic anemia:  Hgb stable  no s/s of bleeding  -monitor.    Hyponatremia.   Hyponatremia resolved  Most likely multifactorial from pain/ poor oral in take and pulm disease and hypervolemia  Fluid restriction  c/w diuresis  stopped NaCl tabs.    Clavicular fracture:  Ortho f/up   pain control per palliative.      Chest pain:  Trops stable  Likely due to fracture  Last TTE with EF of 65%, cards following      dvt ppx:  lovenox 40mg daily   very poor prognosis    Meds to beds  Advised sister and patient that travel to [place] for nonconvententialtravel may unsafe given her condition   dispo: discharge home today with close palliative, PCP, oncology followup.       50 y/o f with pmhx Gastric Ca, not currently on treatment with recurring pleural effusion,  presents from [facility] for concern of worsening shortness of breath previous pleurex catheter now with acute respiratory failure with hypoxia due to b/l pleural effusion and likely POD.      Problem/Plan - 1:  (Data referenced from "Progress Note Adult" 14-Apr-2022 12:09)  ·  Problem: Acute respiratory failure with hypoxia.   ·  Plan: multifactorial: b/l pleural effusion, RML consolidation, POD lymphangitic spread, fluid overload, anasarca   - satting well on 2L NC at rest and 2L exertion but has JORDAN, can use 4-6L on exertion for comfort  - completed 7 day course of zosyn for PNA  - LE duplex negative for DVT, CTA negative on admission for PE  -US with mod ascites sp IR paracentesis 4/12 with 3.5 L remove  -mild sinus tachy likely due to deconditioning, advanced cancer, metabolic state.   -diuresis as below.    Recurrent right pleural effusion:   CT PE studies : limited studies with no PE , but shows Large Rt Loculated Pleural effusion that is slightly increased and complete consolidation of the RLL, patchy opacities in RML , small left pleural effusion , Diffuse interlobular septal thickening , may be edema Vs Lymphangitic carcinomatosis.    -per IR, Pulmonary and  CT surgery - at this time they cannot offer another Pleurx due to loculation and positioning of the effusion  -TTE with EF 65%, increase RV overload but CTA negative for PE  -bicarb improved today 32  -remains fluid overloaded but multifactorial and likely cannot further diurese aggressively   -c/w po bumex 2mg po bid, lytes wnl.    Gastric cancer:  Metastatic stage 4 gastric cancer, peritoneum, bones and malignant effusion/ascites.  refused disease modifying therapy in past  -oncology consult appreciated:  not currently a candidate for systemic therapy given poor functional status  -pt/sister plan is fly to [place] early next week to get nonconventional treatment however I advised her and sister that it is unrealistic to fly there given her condition  -palliative following  - refusing hospice.    Cancer-related pain:  Palliative care eval appreciated:     -MS Contin 60mg q8 x  30 days     -Dilaudid 6mg PO Q4 hours prn severe pain/dyspnea x30 days     -c/w ativan 1mg q12 bid anxiety x 10 days  - discussed with palliative, Dr Jim and roger Hazel giving 30 day of supply on discharge from me given that she is a cancer patient and traveling to [place] for nonconvential treatment this weekend or early this week  - ISTOP reviewed.    Normocytic anemia:  Hgb stable  no s/s of bleeding  -monitor.    Hyponatremia.   Hyponatremia resolved  Most likely multifactorial from pain/ poor oral in take and pulm disease and hypervolemia  Fluid restriction  c/w diuresis  stopped NaCl tabs.    Clavicular fracture:  Ortho f/up   pain control per palliative.      Chest pain:  Trops stable  Likely due to fracture  Last TTE with EF of 65%, cards following      dvt ppx:  lovenox 40mg daily   very poor prognosis    Meds to beds  Advised sister and patient that travel to [place] for nonconvententialtravel may unsafe given her condition   Dispo: Discharge home today with close palliative, PCP, oncology followup.

## 2022-04-15 NOTE — DISCHARGE NOTE PROVIDER - NSDCMRMEDTOKEN_GEN_ALL_CORE_FT
acetaminophen 325 mg oral tablet: 2 tab(s) orally every 6 hours, As needed, Temp greater or equal to 38C (100.4F), Mild Pain (1 - 3)  Bariatric wheelchair: Dx metastatic gastric cancer ICD 10: C16    Cynthia lift: Dx gastric cancer ICD 10 C16  melatonin 3 mg oral tablet: 1 tab(s) orally once a day (at bedtime), As needed, Insomnia  methocarbamol 750 mg oral tablet: 2 tab(s) orally 3 times a day, As needed, Muscle Spasm  Multiple Vitamins oral tablet: 1 tab(s) orally once a day  Rolling walker: Dx metastatic gastric cancer ICD 10: C16    standard wheelchair  minimum 26 inches:    acetaminophen 325 mg oral tablet: 2 tab(s) orally every 6 hours, As needed, Temp greater or equal to 38C (100.4F), Mild Pain (1 - 3)  Bariatric wheelchair: Dx metastatic gastric cancer ICD 10: C16    bumetanide 2 mg oral tablet: 1 tab(s) orally every 12 hours  Cynthia lift: Dx gastric cancer ICD 10 C16  HYDROmorphone 2 mg oral tablet: 3 tab(s) orally every 4 hours, As Needed -for severe pain MDD:18 tabs  lactulose 10 g/15 mL oral syrup: 30 milliliter(s) orally every 24 hours  LORazepam 0.5 mg oral tablet: 1 tab(s) orally every 12 hours, As Needed -Anxiety MDD:2 tabs  melatonin 3 mg oral tablet: 1 tab(s) orally once a day (at bedtime), As needed, Insomnia  morphine 60 mg/8 to 12 hr oral tablet, extended release: 1 tab(s) orally every 8 hours MDD:3 tabs  Multiple Vitamins oral tablet: 1 tab(s) orally once a day  polyethylene glycol 3350 oral powder for reconstitution: 17 gram(s) orally every 24 hours  Rolling walker: Dx metastatic gastric cancer ICD 10: C16    simethicone 80 mg oral tablet, chewable: 1 tab(s) orally 3 times a day, As needed, Dyspepsia  standard wheelchair  minimum 26 inches:    acetaminophen 325 mg oral tablet: 2 tab(s) orally every 6 hours, As needed, Temp greater or equal to 38C (100.4F), Mild Pain (1 - 3)  Bariatric wheelchair: Dx metastatic gastric cancer ICD 10: C16    bumetanide 2 mg oral tablet: 1 tab(s) orally every 12 hours  Cynthia lift: Dx gastric cancer ICD 10 C16  HYDROmorphone 2 mg oral tablet: 3 tab(s) orally every 4 hours, As Needed -for severe pain MDD:18 tabs  lactulose 10 g/15 mL oral syrup: 30 milliliter(s) orally every 24 hours  LORazepam 0.5 mg oral tablet: 1 tab(s) orally every 12 hours, As Needed -Anxiety MDD:2 tabs  melatonin 3 mg oral tablet: 1 tab(s) orally once a day (at bedtime), As needed, Insomnia  morphine 60 mg/8 to 12 hr oral tablet, extended release: 1 tab(s) orally every 8 hours MDD:3 tabs  Multiple Vitamins oral tablet: 1 tab(s) orally once a day  Narcan 4 mg/0.1 mL nasal spray: 4 milligram(s) intranasally once, alernate in each nostril every 2 to 3 minutes until help arrives MDD:1  polyethylene glycol 3350 oral powder for reconstitution: 17 gram(s) orally every 24 hours  Rolling walker: Dx metastatic gastric cancer ICD 10: C16    simethicone 80 mg oral tablet, chewable: 1 tab(s) orally 3 times a day, As needed, Dyspepsia  standard wheelchair  minimum 26 inches:

## 2022-04-15 NOTE — PROGRESS NOTE ADULT - SUBJECTIVE AND OBJECTIVE BOX
DATE OF SERVICE: 04-15-22 @ 14:18    Patient is a 51y old  Female who presents with a chief complaint of chest pain for one day (15 Apr 2022 12:27)      INTERVAL HISTORY: Feels ok    REVIEW OF SYSTEMS:  CONSTITUTIONAL: No weakness  EYES/ENT: No visual changes;  No throat pain   NECK: No pain or stiffness  RESPIRATORY: + SOB  CARDIOVASCULAR: No chest pain or palpitations  GASTROINTESTINAL: No abdominal  pain. No nausea, vomiting, or hematemesis  GENITOURINARY: No dysuria, frequency or hematuria  NEUROLOGICAL: No stroke like symptoms  SKIN: No rashes    	  MEDICATIONS:  acetaZOLAMIDE Injectable 250 milliGRAM(s) IV Push once  buMETAnide 2 milliGRAM(s) Oral every 12 hours        PHYSICAL EXAM:  T(C): 36.8 (04-15-22 @ 08:28), Max: 36.9 (04-15-22 @ 04:50)  HR: 108 (04-15-22 @ 08:28) (107 - 114)  BP: 114/69 (04-15-22 @ 08:28) (97/58 - 116/68)  RR: 18 (04-15-22 @ 08:28) (17 - 20)  SpO2: 97% (04-15-22 @ 08:28) (96% - 98%)  Wt(kg): --  I&O's Summary    14 Apr 2022 07:01  -  15 Apr 2022 07:00  --------------------------------------------------------  IN: 880 mL / OUT: 1500 mL / NET: -620 mL    15 Apr 2022 07:01  -  15 Apr 2022 14:18  --------------------------------------------------------  IN: 480 mL / OUT: 0 mL / NET: 480 mL          Appearance: In no distress	  HEENT:    PERRL, EOMI	  Cardiovascular:  S1 S2, No JVD  Respiratory: Lungs clear to auscultation	  Gastrointestinal:  Soft, Non-tender, + BS	  Vascularature:  No edema of LE  Psychiatric: Appropriate affect   Neuro: no acute focal deficits                               8.2    6.79  )-----------( 342      ( 15 Apr 2022 07:25 )             27.2     04-15    138  |  92<L>  |  8   ----------------------------<  87  3.7   |  31  |  0.56    Ca    8.8      15 Apr 2022 07:24  Mg     2.0     04-15    TPro  5.4<L>  /  Alb  2.7<L>  /  TBili  0.4  /  DBili  x   /  AST  23  /  ALT  15  /  AlkPhos  1935<H>  04-13        Labs personally reviewed      ASSESSMENT/PLAN: 	    50 y/o f with PMH of Gastric Ca (not currently on treatment), recurring pleural effusion [facility]/o worsening shortness of breath and chest pain described as her chest pounding out of her chest. Symptoms are non-exertional and have been progressing over the last couple of days. Chest pain now resolved but continues to report intermittent SOB mostly a/w acute pain.     Problem/Plan #1: Chest Pain  -Troponin negative x2  -EKG shows Sinus Tach with no ischemic changes  -Echo from 3/29/22 shows normal LV function, no RWMA, no valvular dysfunction and possible RV overload  -CT Chest negative for PE  -Low probability that chest pain cardiac in origin.   -Now resolved.    Problem/Plan #2: Shortness of breath  -RV overload on recent TTE  -CXR reveals pulmonary edema, masslike densities in the right apex, lateral right lung and right lung  -CTA chest negative for PE, but shows large right loculated pleural effusion increased from prior, and complete consolidation of the RLL, patchy opacities in RML, small left pleural effusion, diffuse interlobular septal thickening, may be edema v lymphangitic carcinomatosis  - IV diuresis--> Continues to report significant decrease in shortness of breath and LE edema. C/w Bumex PO BID. Cr currently stable.   -Pulm following  -As per IR, Pulm and  CTS - not a candidate for Pleurx due to loculation and positioning of the effusion  -+ moderate ascites on Abd US-->s/po paracentesis 4/12 with 3.5L removed    Problem/Plan #3: Tachycardia  --110s i/s/o pain secondary to gastric cancer  -Continue pain mgmt as per palliative       -Problem/Plan #4: Chronic Pain  -Palliative Care team following  -Mgmt per primary team      RANJIT Vences-NP   Richard Howard DO [facility]  Cardiovascular Medicine  [address] [place] [address], [address]  Office: [phone]  Cell: [phone]

## 2022-04-16 NOTE — H&P ADULT - NSHPREVIEWOFSYSTEMS_GEN_ALL_CORE
REVIEW OF SYSTEMS:  CONSTITUTIONAL: +weakness. No fevers. No chills. No rigors. No weight loss. No night sweats.   EYES: No blurry or double vision. No eye pain.  ENT: No hearing difficulty. No vertigo. No dysphagia. No sore throat. No Sinusitis/rhinorrhea.   NECK: No pain. No stiffness/rigidity.  CARDIAC: No chest pain. No palpitations. No lightheadedness. No syncope.  RESPIRATORY: No cough. +SOB. No hemoptysis.  GASTROINTESTINAL: No abdominal pain. No nausea. No vomiting. No hematemesis. No diarrhea. No constipation.   GENITOURINARY: No dysuria. No frequency. No hesitancy.   NEUROLOGICAL: No numbness/tingling. No focal weakness. No urinary or fecal incontinence. No headache. +unsteady gait.  BACK: +back pain. No flank pain.  EXTREMITIES: +lower extremity edema. limited ROM LUE 2/2 pain. No joint pain.  SKIN: No rashes. No itching. No other lesions.  PSYCHIATRIC: +anxiety. No SI/HI  ALLERGIC: No lip swelling. No hives.  All other review of systems is negative unless indicated above.  Unless indicated above, unable to assess ROS 2/2

## 2022-04-16 NOTE — H&P ADULT - ASSESSMENT
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

## 2022-04-16 NOTE — H&P ADULT - PROBLEM SELECTOR PLAN 2
- cont bumex as above  - last duplex 4/11/22 neg for DVT  - suspect either RHF vs lymphatic obstruction 2/2 malignancy  - elevate legs

## 2022-04-16 NOTE — H&P ADULT - NSHPSOCIALHISTORY_GEN_ALL_CORE
Social History:    Marital Status: (  ) , ( x ) Single, (  ) , (  ) , (  )   # of Children: 2  Lives with: (  ) alone, ( x ) children, (  ) spouse, (  ) parents, (  ) siblings, (  ) friends, (  ) other:   Occupation:     Substance Use/Illicit Drugs: (  ) never used vs other:   Tobacco Usage: ( x ) never smoked, (  ) former smoker, (  ) current smoker and Total Pack-Years:   Last Alcohol Usage/Frequency/Amount/Withdrawal/Hx of Abuse:  none  Foreign travel:   Animal exposure:

## 2022-04-16 NOTE — H&P ADULT - NSHPPHYSICALEXAM_GEN_ALL_CORE
PHYSICAL EXAM:   GENERAL: Alert. Not confused. +tachypneic, mild resp distress. Not thin. Not cachectic. Not obese.  HEAD:  Atraumatic. Normocephalic.  EYES: EOMI. PERRLA. Normal conjunctiva/sclera.  ENT: Neck supple. No JVD. Moist oral mucosa. Not edentulous. No thrush.  LYMPH: Normal supraclavicular/cervical lymph nodes.   CARDIAC: +tachy, Not amado. Regular rhythm. Not irregularly irregular. S1. S2. No murmur.  LUNG/CHEST: BS equal bilaterally. No wheezes. No rales. +rhonchi.  ABDOMEN: Soft. No tenderness. +minimal distension. No fluid wave. Normal bowel sounds.  BACK: No midline/vertebral tenderness. No flank tenderness.  VASCULAR: +2 b/l radial or ulnar pulses. Palpable DP pulses.  EXTREMITIES:  No clubbing. No cyanosis. +4 b/l LE pitting edema. Moving all 4.  NEUROLOGY: A&Ox3. Non-focal exam. Cranial nerves intact. Normal speech. Sensation intact.  PSYCH: Normal behavior. Anxious affect.  SKIN: No jaundice. No erythema. No rash/lesion.  Vascular Access:     ICU Vital Signs Last 24 Hrs  T(C): 36.7 (16 Apr 2022 03:59), Max: 36.9 (15 Apr 2022 04:50)  T(F): 98 (16 Apr 2022 03:59), Max: 98.4 (15 Apr 2022 04:50)  HR: 117 (16 Apr 2022 03:59) (96 - 117)  BP: 113/66 (16 Apr 2022 03:59) (109/53 - 116/68)  BP(mean): 70 (15 Apr 2022 22:15) (70 - 70)  ABP: --  ABP(mean): --  RR: 19 (16 Apr 2022 03:59) (16 - 20)  SpO2: 98% (16 Apr 2022 03:59) (97% - 100%)      I&O's Summary

## 2022-04-16 NOTE — H&P ADULT - PROBLEM SELECTOR PLAN 5
- pt is amenable to hospice eval, although pt states that she hopes to get chemo in [place]  - pt requests her sister nikole yuan to make medical decisions for pt if pt unable to  - pt requests full code  - pt requests adequate pain control with po and IV opiate meds  - pt requests all medical therapy as necessary and indicated  - time spent 20 min

## 2022-04-16 NOTE — CHART NOTE - NSCHARTNOTEFT_GEN_A_CORE
Pt seen and examined at bedside- still feel's short of breath. STates she came back to the hospital mainly out of 'panic.' She is not so sure she will be able to make it across the country to start chemo in [place] with DR. Layne. She requests that our team not try to contact him. States she does not want to undergo any tests/procedures this weekend including CT chest to evaluate pleural effusion- amenable to discussing GOC with palliative care on monday. States her sister is HCP. At present she remains full code. If she opts to undergo procedures, would consult pulm for bedside sono and possible thoracentesis vs. pleurx placement.

## 2022-04-16 NOTE — H&P ADULT - PROBLEM SELECTOR PLAN 1
- reportedly 88% on RA in the ED  - CXR showing prior gastric ca disease, loculated pleural effusions, poss pulm edema vs lymphangitic spread  - CT chest offered but pt wishes to defer for now, stating that she's had "so many CT scans".  - cont bumex 2mg bid for diuresis, consider increasing dose  - poss IR vs CTS consult for replacement of chest tube  - consider pulm consult in AM  - cont O2 NC 2L  - poor prognosis

## 2022-04-16 NOTE — CHART NOTE - NSCHARTNOTEFT_GEN_A_CORE
52 yo F with hx of gastric ca, recurring pleural effusion who re-presented with worsening SOB after being discharged this am with similar symptoms. She says her breathing is worse given her pleural effusions, cancer pain, and an enlarging abdomen. She denies any chest pain. Cardiology consulted for troponin of 62. EKG non-ischemic.    Troponin is demand 2/2 multiple systemic processes leading to sinus tachycardia. She is HDS without low voltages on EKG, therefore lower suspicion for pericardial/effusion or tamponade, but can repeat TTE in the am to assess for pericardial effusion.     No indications for LHC at this time, please call Dr. Howard for formal consult in the am.

## 2022-04-16 NOTE — H&P ADULT - PROBLEM SELECTOR PLAN 3
- seen by onc 4/13/22, and no chemo offered at that time  - poor prognosis  - pt is appropriate for hospice eval to be called in AM

## 2022-04-16 NOTE — H&P ADULT - HISTORY OF PRESENT ILLNESS
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    Pt states she is supposed to go to a [facility] for treatment, but as soon as she arrived home, she had SOB and had to come back. Pt hasn't walked in 2-3 weeks. Pt states the lymphedema only started about 2 months ago. Pt states the oncologists here told her they can't do anything for her. Pt reports pain diffusely in her back and legs. Denies CP or abd pain. Abd is not much more swollen since her paracentesis. Pt denies fevers, chills, URI symptoms.

## 2022-04-16 NOTE — H&P ADULT - NSHPLABSRESULTS_GEN_ALL_CORE
Personally reviewed old records.  Personally reviewed labs.  Personally reviewed imaging.                        8.8    6.72  )-----------( 329      ( 15 Apr 2022 21:32 )             29.5       04-15    136  |  90<L>  |  8   ----------------------------<  98  3.7   |  29  |  0.53    Ca    8.8      15 Apr 2022 21:32  Mg     2.0     04-15    TPro  6.0  /  Alb  2.8<L>  /  TBili  0.6  /  DBili  x   /  AST  34  /  ALT  21  /  AlkPhos  2336<H>  04-15            LIVER FUNCTIONS - ( 15 Apr 2022 21:32 )  Alb: 2.8 g/dL / Pro: 6.0 g/dL / ALK PHOS: 2336 U/L / ALT: 21 U/L / AST: 34 U/L / GGT: x

## 2022-04-16 NOTE — PATIENT PROFILE ADULT - FALL HARM RISK - HARM RISK INTERVENTIONS

## 2022-04-17 NOTE — PROGRESS NOTE ADULT - PROBLEM SELECTOR PLAN 3
metastatic stage 4 gastric cancer, peritoneum, bones and malignant effusion/ascites.  refused disease modifying therapy in past  - now wishes to fly to [place] for nonconventional treatment with Dr. Layne- does not want us to discuss her case with him. I recommended against this but stated that we would optimize her as best we can should she decide to go.  - palliative reconsulted for refocusing of GOC

## 2022-04-17 NOTE — PHYSICAL THERAPY INITIAL EVALUATION ADULT - ACTIVE RANGE OF MOTION EXAMINATION, REHAB EVAL
LUE clavicular fx NWB/Right UE Active ROM was WFL (within functional limits)/bilateral  lower extremity Active ROM was WFL (within functional limits)

## 2022-04-17 NOTE — PHYSICAL THERAPY INITIAL EVALUATION ADULT - RANGE OF MOTION EXAMINATION, REHAB EVAL
LUE clavicular fx NWB/Right UE ROM was WFL (within functional limits)/bilateral lower extremity ROM was WFL (within functional limits)

## 2022-04-17 NOTE — PHYSICAL THERAPY INITIAL EVALUATION ADULT - ADDITIONAL COMMENTS
Pt lives with 2 daughters (ages 10 and 16) in co-op apartment with 3 steps to enter and wide b/l HR.  Pt reports she moves around apartment in borrowed WC.

## 2022-04-17 NOTE — PROGRESS NOTE ADULT - SUBJECTIVE AND OBJECTIVE BOX
Jadiel Juarez MD  Division of Hospital Medicine  Pager: [phone]  If no response or off-hours, page [phone]  -------------------------------------    Patient is a 51y old  Female who presents with a chief complaint of sob (16 Apr 2022 04:10)      SUBJECTIVE / OVERNIGHT EVENTS: none acute  ADDITIONAL REVIEW OF SYSTEMS: pt reports her symptoms are essentially the same- admits there hasn't been much of a change since her last admission. STable on 2 L NC. Believes a major contributor to her symptoms is panic- feels undecided about her decision to go to [place] for further treatment. Requests that we not discuss her clinical status with [place] oncologist.     MEDICATIONS  (STANDING):  buMETAnide 2 milliGRAM(s) Oral two times a day  enoxaparin Injectable 40 milliGRAM(s) SubCutaneous every 24 hours  lactulose Syrup 20 Gram(s) Oral every 24 hours  morphine ER Tablet 60 milliGRAM(s) Oral every 8 hours  polyethylene glycol 3350 17 Gram(s) Oral every 24 hours    MEDICATIONS  (PRN):  HYDROmorphone   Tablet 6 milliGRAM(s) Oral every 4 hours PRN Mod to Severe Pain (7 - 10)  HYDROmorphone  Injectable 0.5 milliGRAM(s) IV Push every 4 hours PRN breakthrough pain  LORazepam     Tablet 0.5 milliGRAM(s) Oral every 6 hours PRN Anxiety  melatonin 3 milliGRAM(s) Oral at bedtime PRN Insomnia  simethicone 80 milliGRAM(s) Chew three times a day PRN Dyspepsia      CAPILLARY BLOOD GLUCOSE        I&O's Summary    16 Apr 2022 07:01  -  17 Apr 2022 07:00  --------------------------------------------------------  IN: 400 mL / OUT: 1500 mL / NET: -1100 mL    17 Apr 2022 07:01  -  17 Apr 2022 15:23  --------------------------------------------------------  IN: 480 mL / OUT: 1100 mL / NET: -620 mL        PHYSICAL EXAM:  Vital Signs Last 24 Hrs  T(C): 36.4 (17 Apr 2022 14:52), Max: 37 (17 Apr 2022 04:57)  T(F): 97.5 (17 Apr 2022 14:52), Max: 98.6 (17 Apr 2022 04:57)  HR: 107 (17 Apr 2022 14:52) (107 - 116)  BP: 102/61 (17 Apr 2022 14:52) (97/55 - 116/68)  BP(mean): --  RR: 18 (17 Apr 2022 14:52) (18 - 18)  SpO2: 98% (17 Apr 2022 14:52) (98% - 100%)  CONSTITUTIONAL: NAD, well-developed, well-groomed  EYES: PERRLA; conjunctiva and sclera clear  ENMT: Moist oral mucosa, no pharyngeal injection or exudates; normal dentition  NECK: Supple, no palpable masses; no thyromegaly  RESPIRATORY: faint crackles R base  CARDIOVASCULAR: Regular rate and rhythm, normal S1 and S2, no murmur/rub/gallop; +anasarca, Peripheral pulses are 2+ bilaterally  ABDOMEN: +softly distended  MUSCLOSKELETAL:  Normal gait; no clubbing or cyanosis of digits; no joint swelling or tenderness to palpation  PSYCH: A+O to person, place, and time; affect appropriate  NEUROLOGY: CN 2-12 are intact and symmetric; no gross sensory deficits;   SKIN: No rashes; no palpable lesions    LABS:                        8.8    7.12  )-----------( 308      ( 16 Apr 2022 10:51 )             29.6     04-16    138  |  92<L>  |  8   ----------------------------<  96  3.5   |  34<H>  |  0.55    Ca    9.0      16 Apr 2022 10:51    TPro  5.7<L>  /  Alb  2.9<L>  /  TBili  0.6  /  DBili  x   /  AST  25  /  ALT  19  /  AlkPhos  2194<H>  04-16                RADIOLOGY & ADDITIONAL TESTS:  Results Reviewed:   Imaging Personally Reviewed:  Electrocardiogram Personally Reviewed:    COORDINATION OF CARE:  Care Discussed with Consultants/Other Providers [Y/N]:  Prior or Outpatient Records Reviewed [Y/N]:

## 2022-04-17 NOTE — PROGRESS NOTE ADULT - PROBLEM SELECTOR PLAN 2
Prior CT PE studies : limited studies with no PE , but shows Large Rt Loculated Pleural effusion that is slightly increased and complete consolidation of the RLL, patchy opacities in RML , small left pleural effusion , Diffuse interlobular septal thickening , may be edema Vs Lymphangitic carcinomatosis.    -c/w po bumex 2mg po bid, lytes wnl  - defer repeat CT chest pending GOC

## 2022-04-17 NOTE — PROGRESS NOTE ADULT - PROBLEM SELECTOR PLAN 1
multifactorial: b/l pleural effusion, RML consolidation, POD lymphangitic spread, fluid overload, anasarca   - satting well on 2L NC at rest and 2L exertion but has JORDAN, can use 4-6L on exertion for comfort  - completed 7 day course of zosyn for PNA last admission  -mild sinus tachy likely due to deconditioning, advanced cancer, metabolic state.   -diuresis as below

## 2022-04-17 NOTE — PROGRESS NOTE ADULT - PROBLEM SELECTOR PLAN 6
Hyponatremia resolved- continue monitoring  Most likely multifactorial from pain/ poor oral in take and pulm disease and hypervolemia

## 2022-04-17 NOTE — PHYSICAL THERAPY INITIAL EVALUATION ADULT - PERTINENT HX OF CURRENT PROBLEM, REHAB EVAL
Pt is a 50 y/o female admitted to [facility] on 4/16/22  c hx metastatic gastric ca s/p partial gastrectomy, with mets to bone, peritoneal carcinomatosis, lungs, never on treatment, recent r pleurx cath removal (3/28/22), paracentesis (most recently 4/12/22), on 2L NC O2, new lymphedema over 2 months, SBO s/p multiple resections and GJ stent, left arm pain without fractures after fall currently in sling, anxiety, anasarca, d/c'd yesterday, pw SOB.

## 2022-04-17 NOTE — PHYSICAL THERAPY INITIAL EVALUATION ADULT - PRECAUTIONS/LIMITATIONS, REHAB EVAL
Pt states she is supposed to go to a [facility] for treatment, but as soon as she arrived home, she had SOB and had to come back. Pt hasn't walked in 2-3 weeks. Pt states the lymphedema only started about 2 months ago. Pt states she is supposed to go to a [facility] for treatment, but as soon as she arrived home, she had SOB and had to come back. Pt hasn't walked in 2-3 weeks. Pt states the lymphedema only started about 2 months ago./fall precautions

## 2022-04-17 NOTE — PROGRESS NOTE ADULT - PROBLEM SELECTOR PLAN 4
Palliative care eval appreciated:     -MS Contin 60mg q8 x  30 days     -Dilaudid 6mg PO Q4 hours prn severe pain/dyspnea x30 days     -c/w ativan 1mg q12 bid anxiety x 10 days

## 2022-04-18 NOTE — PROGRESS NOTE ADULT - NSPROGADDITIONALINFOA_GEN_ALL_CORE
above plans discussed with NP Elena Murphy MD  Division of Hospital Medicine  Contact via [employer] Teams  Office: [phone]

## 2022-04-18 NOTE — PROVIDER CONTACT NOTE (OTHER) - ACTION/TREATMENT ORDERED:
STEFFANIE- Tess made aware. with an order of blood draw to recheck potassium level and to see the patient at bedside.

## 2022-04-18 NOTE — PROGRESS NOTE ADULT - SUBJECTIVE AND OBJECTIVE BOX
Patient is a 51y old  Female who presents with a chief complaint of sob (17 Apr 2022 15:23)      SUBJECTIVE / OVERNIGHT EVENTS:  no acute events overnight, vss, afebrile  pt reports that her breathing is now a bit better  pt still hesitant to have CT chest, rather wants bedside US if possible  pt agreeable to discussion with palliative care  she was discouraged by how she felt when she got home - doesn't think she can make it to [place]    tele reviewed: sinus 90-120s    ROS:  14 point ROS negative in detail except stated as above    MEDICATIONS  (STANDING):  buMETAnide 2 milliGRAM(s) Oral two times a day  enoxaparin Injectable 40 milliGRAM(s) SubCutaneous every 24 hours  lactulose Syrup 20 Gram(s) Oral every 24 hours  morphine ER Tablet 60 milliGRAM(s) Oral every 8 hours  polyethylene glycol 3350 17 Gram(s) Oral every 24 hours  potassium chloride   Powder 40 milliEquivalent(s) Oral every 2 hours    MEDICATIONS  (PRN):  HYDROmorphone   Tablet 6 milliGRAM(s) Oral every 4 hours PRN Mod to Severe Pain (7 - 10)  HYDROmorphone  Injectable 0.5 milliGRAM(s) IV Push every 4 hours PRN breakthrough pain  LORazepam     Tablet 0.5 milliGRAM(s) Oral every 6 hours PRN Anxiety  melatonin 3 milliGRAM(s) Oral at bedtime PRN Insomnia  simethicone 80 milliGRAM(s) Chew three times a day PRN Dyspepsia      CAPILLARY BLOOD GLUCOSE        I&O's Summary    17 Apr 2022 07:01  -  18 Apr 2022 07:00  --------------------------------------------------------  IN: 920 mL / OUT: 1750 mL / NET: -830 mL        PHYSICAL EXAM:  Vital Signs Last 24 Hrs  T(C): 36.8 (18 Apr 2022 04:58), Max: 36.8 (17 Apr 2022 21:37)  T(F): 98.2 (18 Apr 2022 04:58), Max: 98.3 (17 Apr 2022 21:37)  HR: 107 (18 Apr 2022 04:58) (101 - 115)  BP: 104/66 (18 Apr 2022 04:58) (101/63 - 115/69)  BP(mean): --  RR: 18 (18 Apr 2022 04:58) (18 - 18)  SpO2: 98% (18 Apr 2022 04:58) (97% - 98%)    GENERAL: NAD, well-developed  HEAD:  Atraumatic, Normocephalic  EYES: EOMI, PERRLA, conjunctiva and sclera clear  NECK: Supple, No JVD  CHEST/LUNG: Clear to auscultation bilaterally; No wheeze  HEART: tachycardic  ABDOMEN: Soft, Nontender, Nondistended; Bowel sounds present  EXTREMITIES:  3+ pitting LE edema bilaterally  NEUROLOGY: AAOx3; non-focal  SKIN: No rashes or lesions    LABS:  personally reviewed                        7.7    5.88  )-----------( 250      ( 18 Apr 2022 08:37 )             25.8     04-18    136  |  90<L>  |  8   ----------------------------<  101<H>  2.9<LL>   |  33<H>  |  0.48<L>    Ca    8.7      18 Apr 2022 06:25                RADIOLOGY & ADDITIONAL TESTS:    Imaging Personally Reviewed:    Consultant(s) Notes Reviewed:  pulm, cards    Care Discussed with Consultants/Other Providers: Dr. Howard (cards)

## 2022-04-18 NOTE — PROGRESS NOTE ADULT - PROBLEM SELECTOR PLAN 1
multifactorial: b/l pleural effusion, RML consolidation, POD lymphangitic spread, fluid overload, anasarca   - stable on 2L NC at rest and 2L exertion but has JORDAN, can use 4-6L on exertion for comfort  - completed 7 day course of zosyn for PNA last admission  - no signs/symptoms of active infection at this time; hold off abx  - mild sinus tachy likely due to deconditioning, advanced cancer, metabolic state.   - diuresis as below

## 2022-04-18 NOTE — PROGRESS NOTE ADULT - PROBLEM SELECTOR PLAN 4
Palliative care eval appreciated:     -MS Contin 60mg q8 x  30 days     -Dilaudid 6mg PO Q4 hours prn severe pain/dyspnea x30 days     -c/w ativan 1mg q12 bid anxiety x 10 days Palliative care eval appreciated:  - MS Contin 60mg q8 x  30 days  - Dilaudid 6mg PO Q4 hours prn severe pain/dyspnea x30 days  - c/w ativan 1mg q12 bid anxiety x 10 days

## 2022-04-18 NOTE — PROVIDER CONTACT NOTE (CRITICAL VALUE NOTIFICATION) - ASSESSMENT
Upon assessment, pt with no c/o sob, cp, or discomfort. Pt appears anxious. No other complaints at this time.

## 2022-04-18 NOTE — CONSULT NOTE ADULT - PROBLEM SELECTOR RECOMMENDATION 3
Pt states in our GOC discussion on last admission that her goal is to pursue cancer directed therapy in [place]. We briefly discussed option of hospice should that goal become unattainable. Seeing her poor performance status, going to [place] is no longer realistic. She was seen by oncology last admission who states pt is not a candidate for systemic therapy given poor performance status  - Plan for GOC discussion with pt and her sister/HCP tomorrow 1pm to discuss best next steps and explore home with hospice

## 2022-04-18 NOTE — CONSULT NOTE ADULT - PROBLEM SELECTOR RECOMMENDATION 9
With pleural effusion that's loculated. On recent admission IR, Pulmonary and CT surgery were consulted, unable to offer another Pleurx due to loculation and positioning of the effusion. Continue supplementary O2 as needed.  - Further work up per primary  - Continue MS Contin to 60mg Q8h   - Continue Dilaudid to 6mg PO Q4 hours for pain/dyspnea  - Continue Dilaudid IV 0.75mg Q4 hours for breakthrough severe pain/dyspnea   - Continue Ativan 0.5mg Q6h PRN

## 2022-04-18 NOTE — PROGRESS NOTE ADULT - PROBLEM SELECTOR PLAN 3
metastatic stage 4 gastric cancer, peritoneum, bones and malignant effusion/ascites.  refused disease modifying therapy in past  - now wishes to fly to [place] for nonconventional treatment with Dr. Layne- does not want us to discuss her case with him. I recommended against this but stated that we would optimize her as best we can should she decide to go.  - palliative reconsulted for refocusing of GOC metastatic stage 4 gastric cancer, peritoneum, bones and malignant effusion/ascites.  refused disease modifying therapy in past  - wishes to fly to [place] for nonconventional treatment with Dr. Layne- does not want us to discuss her case with him. I recommended against this but stated that we would optimize her as best we can should she decide to go.  - palliative reconsulted for refocusing of GOC

## 2022-04-18 NOTE — PROGRESS NOTE ADULT - PROBLEM SELECTOR PLAN 2
Prior CT PE studies : limited studies with no PE , but shows Large Rt Loculated Pleural effusion that is slightly increased and complete consolidation of the RLL, patchy opacities in RML , small left pleural effusion , Diffuse interlobular septal thickening , may be edema Vs Lymphangitic carcinomatosis.    - c/w po bumex 2mg po bid, lytes wnl  - defer repeat CT chest pending GOC  - pt prefers bedside US to assess pleural effusion rather than CT chest

## 2022-04-18 NOTE — CONSULT NOTE ADULT - PROBLEM SELECTOR RECOMMENDATION 4
Pain control as above, plan for family meeting tomorrow at 1pm. Case d/w primary team.   Please page Geriatrics and Palliative Medicine Team at [phone] for additional assistance with pain control

## 2022-04-18 NOTE — CHART NOTE - NSCHARTNOTEFT_GEN_A_CORE
MEDICINE NP     CHALINO ALLIE  51y Female  Patient is a 51y old  Female who presents with a chief complaint of sob (18 Apr 2022 18:05)       Event Summary:   Call by RN to see patient with increase in HR and patient is very anxious, patient seen at bedside   -140, RR 30   02 sats 90% on 4 L NC , patient is extremely anxious, tried to calm patient down with breathing technique but to no avail. then descided  to give Ativan 0.5 mg IVP x 1, patient's sister was on the phone as well, suggested trying Anab[employer] music to also assist in calming patient down.   -137.  Ativan 0.5 mg IVP given x1  Patient starting to relax, HR decrease to 124 RR 18.    Blood drawn for BMP to  follow up potassium level.  patient left sitting in chair recliner relaxed HR down to 120        Vital Signs Last 24 Hrs  T(C): 36.7 (18 Apr 2022 18:12), Max: 36.8 (17 Apr 2022 21:37)  T(F): 98 (18 Apr 2022 18:12), Max: 98.3 (17 Apr 2022 21:37)  HR: 132 (18 Apr 2022 18:12) (101 - 132)  BP: 120/71 (18 Apr 2022 18:12) (101/63 - 120/71)  BP(mean): --  RR: 18 (18 Apr 2022 18:12) (18 - 18)  SpO2: 93% (18 Apr 2022 18:12) (93% - 100%)      Will continue  to  monitor   Report given to oncoming team     Elena Mcclellan, GERBER-C  Medicine Department

## 2022-04-19 NOTE — CONSULT NOTE ADULT - ASSESSMENT
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 Please call back with questions. 
51 yoF with PMH of Gastric cancer, not on conventional chemotherapy and pursuing alternative medicine for treatment, presenting with SOB and recurrent pleural effusion, s/p thoracentesis and paracentesis at [facility] (3/23-4/1), s/p Pleurx catheter removal by CT surgery on 3/28/22. Geriatrics and Palliative Medicine Team is consulted for assistance with pain control and GOC discussions.

## 2022-04-19 NOTE — PROGRESS NOTE ADULT - PROBLEM SELECTOR PLAN 4
Palliative care eval appreciated:  - MS Contin 60mg q8 x  30 days  - Dilaudid 6mg PO Q4 hours prn severe pain/dyspnea x30 days  - c/w ativan 1mg q12 bid anxiety x 10 days Palliative care eval appreciated:  - MS Contin 60mg q8 x  30 days  - Dilaudid 6mg PO Q4 hours prn severe pain/dyspnea x30 days  - ativan 0.5mg IV q4hr prn for anxiety

## 2022-04-19 NOTE — PROGRESS NOTE ADULT - SUBJECTIVE AND OBJECTIVE BOX
Patient is a 51y old  Female who presents with a chief complaint of sob (18 Apr 2022 18:05)      SUBJECTIVE / OVERNIGHT EVENTS:  pt noted to be tachy to 130s likely in setting of severe anxiety, s/p ativan with improvement  pt continues to endorse severe anxiety this morning, asking for more ativan  pt has family meeting with palliative 1pm today    tele reviewed: sinus tach 110-130s    ROS:  14 point ROS negative in detail except stated as above    MEDICATIONS  (STANDING):  buMETAnide 2 milliGRAM(s) Oral two times a day  enoxaparin Injectable 40 milliGRAM(s) SubCutaneous every 24 hours  lactulose Syrup 20 Gram(s) Oral every 24 hours  morphine ER Tablet 60 milliGRAM(s) Oral every 8 hours  polyethylene glycol 3350 17 Gram(s) Oral every 24 hours  senna 2 Tablet(s) Oral at bedtime    MEDICATIONS  (PRN):  HYDROmorphone   Tablet 6 milliGRAM(s) Oral every 4 hours PRN Mod to Severe Pain (7 - 10)  HYDROmorphone  Injectable 0.75 milliGRAM(s) IV Push every 4 hours PRN breakthrough pain  LORazepam   Injectable 0.5 milliGRAM(s) IV Push every 4 hours PRN Anxiety  melatonin 3 milliGRAM(s) Oral at bedtime PRN Insomnia  simethicone 80 milliGRAM(s) Chew three times a day PRN Dyspepsia      CAPILLARY BLOOD GLUCOSE        I&O's Summary    18 Apr 2022 07:01  -  19 Apr 2022 07:00  --------------------------------------------------------  IN: 540 mL / OUT: 600 mL / NET: -60 mL        PHYSICAL EXAM:  Vital Signs Last 24 Hrs  T(C): 36.8 (19 Apr 2022 04:59), Max: 36.8 (19 Apr 2022 04:59)  T(F): 98.3 (19 Apr 2022 04:59), Max: 98.3 (19 Apr 2022 04:59)  HR: 129 (19 Apr 2022 08:30) (102 - 132)  BP: 104/55 (19 Apr 2022 08:30) (102/63 - 120/71)  BP(mean): --  RR: 18 (19 Apr 2022 04:59) (18 - 19)  SpO2: 100% (19 Apr 2022 04:59) (93% - 100%)    GENERAL: NAD, well-developed  HEAD:  Atraumatic, Normocephalic  EYES: EOMI, PERRLA, conjunctiva and sclera clear  NECK: Supple, No JVD  CHEST/LUNG: Clear to auscultation bilaterally; No wheeze  HEART: sinus tachy  ABDOMEN: Soft, Nontender, Nondistended; Bowel sounds present  EXTREMITIES:  3+ pitting LE edema  NEUROLOGY: AAOx3; non-focal  SKIN: No rashes or lesions    LABS:  personally reviewed                        7.7    9.08  )-----------( 254      ( 19 Apr 2022 06:18 )             26.0     04-19    135  |  89<L>  |  10  ----------------------------<  101<H>  3.7   |  33<H>  |  0.57    Ca    8.5      19 Apr 2022 06:18                RADIOLOGY & ADDITIONAL TESTS:    Imaging Personally Reviewed:    Consultant(s) Notes Reviewed:  palliative care    Care Discussed with Consultants/Other Providers: Dr. Jim (pall)

## 2022-04-19 NOTE — CONSULT NOTE ADULT - ATTENDING COMMENTS
Attending Attestation:    Patient seen and examined with resident/fellow.  Agree with above except as noted.       52 yo female with metastatic gastric cancer pleura, pulmonary lymphangitic spread and peritoneum c/o sob. Pt has been having persistent SOB throughout her treatment for her cancer. Pain and dyspnea  has been controlled in part by morphine po and Dilaudid prn.  Pt also is very anxious.  POCUS today reveals no pleural effusion on L and minimal r pleural effusion and  very small  loculated pleural effusion behind scapula.    A/P 52 yo female with metastatic gastric cancer with lymphangitic and pleural spread with SOB.  Minimal pleural effusion are  likel NOT contributing  a lot to SOB.  there is not enough fluid to drain. Pt also has large anxiety component to her symptoms.  Recommend:    1. Increase dose of long acting morphine to 80mg TID from 60 mg TID.  2.Anxiety: Start baseline Ativan 2mg bid plus prn. Titrate up as needed.  3. No indication for thoracentesis.

## 2022-04-19 NOTE — PROCEDURE NOTE - NSTOLERANCE_GEN_A_CORE
Patient tolerated procedure well. Quality 265: Biopsy Follow-Up: Biopsy Results not Reviewed, not Communicated to the Patient and the Patient's Primary Care/Referring Physician, Communication not Tracked in a Log, and/or Tracking Process not Documented in the Patient's Medical Record. Quality 410: Psoriasis Clinical Response To Oral Systemic Or Biologic Mediations: Psoriasis Assessment Tool Documented, Not Meeting Specified Benchmark Quality 111:Pneumonia Vaccination Status For Older Adults: Pneumococcal Vaccination not Administered or Previously Received, Reason not Otherwise Specified Quality 138: Melanoma: Coordination Of Care: Treatment plan not communicated, reason not otherwise specified. Quality 130: Documentation Of Current Medications In The Medical Record: Current Medications Documented Quality 431: Preventive Care And Screening: Unhealthy Alcohol Use - Screening: Patient did not receive brief counseling if identified as an unhealthy alcohol user, reason not given Quality 337: Tuberculosis Prevention For Psoriasis And Psoriatic Arthritis Patients On A Biological Immune Response Modifier: No documentation of negative or managed positive TB screen Detail Level: Detailed Quality 47: Advance Care Plan: Advance care planning not documented, reason not otherwise specified. Quality 137: Melanoma: Continuity Of Care - Recall System: Recall system not utilized, reason not otherwise specified Quality 226: Preventive Care And Screening: Tobacco Use: Screening And Cessation Intervention: Patient screened for tobacco use, is a smoker AND received Cessation Counseling

## 2022-04-19 NOTE — PROGRESS NOTE ADULT - NSPROGADDITIONALINFOA_GEN_ALL_CORE
above plans discussed with NP Elena Murphy MD  Division of Hospital Medicine  Contact via [employer] Teams  Office: [phone] above plans discussed with LEELEE Murphy MD  Division of Hospital Medicine  Contact via [employer] Teams  Office: [phone]

## 2022-04-19 NOTE — PROGRESS NOTE ADULT - PROBLEM SELECTOR PLAN 3
metastatic stage 4 gastric cancer, peritoneum, bones and malignant effusion/ascites.  refused disease modifying therapy in past  - wishes to fly to [place] for nonconventional treatment with Dr. Layne- does not want us to discuss her case with him. I recommended against this but stated that we would optimize her as best we can should she decide to go.  - palliative reconsulted for refocusing of GOC metastatic stage 4 gastric cancer, peritoneum, bones and malignant effusion/ascites.  refused disease modifying therapy in past  - wishes to fly to [place] for nonconventional treatment with Dr. Layne- does not want us to discuss her case with him. I recommended against this but stated that we would optimize her as best we can should she decide to go.  - palliative reconsulted for refocusing of GOC  - family meeting today at 1pm

## 2022-04-19 NOTE — CHART NOTE - NSCHARTNOTEFT_GEN_A_CORE
: Robert Rudd    INDICATION: Hypoxemic resp failure    PROCEDURE:  [ ] LIMITED ECHO  [X ] LIMITED CHEST  [ ] LIMITED RETROPERITONEAL  [ ] LIMITED ABDOMINAL  [ ] LIMITED DVT  [ ] NEEDLE GUIDANCE VASCULAR  [ ] NEEDLE GUIDANCE THORACENTESIS  [ ] NEEDLE GUIDANCE PARACENTESIS  [ ] NEEDLE GUIDANCE PERICARDIOCENTESIS  [ ] OTHER    FINDINGS:  Diffuse B lines in the anterior lung fields with focal areas of confluence. Small loculated effusion adjacent to scapula on the right. Small basilar pleural effusion on right. No effusion on left.    INTERPRETATION:  Diffuse airspace disease. Small effusion on right not amenable to thoracentesis.    Images stored on [employer]. : Robert Rudd    INDICATION: Hypoxemic resp failure    PROCEDURE:  [ ] LIMITED ECHO  [X ] LIMITED CHEST  [ ] LIMITED RETROPERITONEAL  [ ] LIMITED ABDOMINAL  [ ] LIMITED DVT  [ ] NEEDLE GUIDANCE VASCULAR  [ ] NEEDLE GUIDANCE THORACENTESIS  [ ] NEEDLE GUIDANCE PARACENTESIS  [ ] NEEDLE GUIDANCE PERICARDIOCENTESIS  [ ] OTHER    FINDINGS:  Diffuse B lines in the anterior lung fields with focal areas of confluence. Small loculated effusion adjacent to scapula on the right. Small basilar pleural effusion on right. No effusion on left.    INTERPRETATION:  Diffuse airspace disease. Small effusion on right not amenable to thoracentesis.            Images stored on [employer].

## 2022-04-20 NOTE — PROGRESS NOTE ADULT - PROBLEM SELECTOR PLAN 1
With pleural effusion that's loculated. On recent admission IR, Pulmonary and CT surgery were consulted, unable to offer another Pleurx due to loculation and positioning of the effusion. Continue supplementary O2 as needed.  - Pulmonary c/s complete, worsening findings c/f lymphangitic spread  - Continue MS Contin to 60mg Q8h   - Continue Dilaudid to 6mg PO Q4 hours for pain/dyspnea  - Continue Dilaudid IV 0.75mg Q4 hours for breakthrough severe pain/dyspnea   - Continue PO Ativan 0.5mg Q6h PRN anxiety, and IV ativan 0.5 mg q4 prn panic attacks

## 2022-04-20 NOTE — PROGRESS NOTE ADULT - PROBLEM SELECTOR PLAN 3
Please see [facility] discussion on 4/19 for details. Pt is focused on going to [place] despite being told that this is an unattainable goal. Her family understands this. Code status remains full despite counseling and education.

## 2022-04-20 NOTE — PROGRESS NOTE ADULT - PROBLEM SELECTOR PLAN 4
Palliative care eval appreciated:  - MS Contin 60mg q8 x  30 days  - Dilaudid 6mg PO Q4 hours prn severe pain/dyspnea x30 days  - ativan 0.5mg IV q4hr prn for anxiety  - ativan 1mg PO q12hr standing

## 2022-04-20 NOTE — PROGRESS NOTE ADULT - PROBLEM SELECTOR PLAN 2
Prior CT PE studies : limited studies with no PE , but shows Large Rt Loculated Pleural effusion that is slightly increased and complete consolidation of the RLL, patchy opacities in RML , small left pleural effusion , Diffuse interlobular septal thickening , may be edema Vs Lymphangitic carcinomatosis.    - c/w po bumex 2mg po bid, lytes wnl  - defer repeat CT chest pending GOC  - appreciate pulm: POCUS at bedside without any drainable pleural effusion

## 2022-04-20 NOTE — CHART NOTE - NSCHARTNOTEFT_GEN_A_CORE
Notified by RN; pt with Paroxysmal Atrial tachycardia for Notified by RN; pt with Paroxysmal Atrial tachycardia at rate of 140 for 3.3 seconds.    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 Likely 2/2 progression of disease.    Pt seen and evaluated at bedside. Pt reports feeling anxious and mildly dyspneic. Pt with no other acute complaints; denying active chest pain, abdominal pain, nausea, vomiting, headache, dizziness, visual changes, or fevers.    Vital Signs Last 24 Hrs  T(C): 37.1 (20 Apr 2022 22:20), Max: 37.1 (20 Apr 2022 22:20)  T(F): 98.7 (20 Apr 2022 22:20), Max: 98.7 (20 Apr 2022 22:20)  HR: 114 (20 Apr 2022 22:20) (112 - 118)  BP: 94/56 (20 Apr 2022 22:20) (94/56 - 104/62)  BP(mean): --  RR: 19 (20 Apr 2022 22:20) (18 - 20)  SpO2: 100% (20 Apr 2022 22:20) (97% - 100%)    LABS 4/20/2022               7.6    6.02  )-----------( 252      ( 20 Apr 2022 07:12 )             25.9     20 Apr 2022 06:58    133    |  88     |  14     ----------------------------<  99     3.5     |  34     |  0.60     Ca    8.9        20 Apr 2022 06:58  Phos  2.8       20 Apr 2022 06:58  Mg     1.8       20 Apr 2022 06:58    CAPILLARY BLOOD GLUCOSE    #Paroxysmal Atrial Tachycardia  - Paroxysmal Atrial tachycardia at rate of 140 for 3.3 seconds.  - Likely multifactorial in setting of recurrent right pleural effusion, deconditioning, metabolic, poor po intake.  - 4L NC bumped up to 6L NC  - VS as above, labs as above  - Ativan 0.5mg IV given at 8:38pm  - Continue PO Ativan q6hrs prn anxiety  - Pt denies active pain  - Monitor VS closely  - Will endorse to AM team

## 2022-04-20 NOTE — PROGRESS NOTE ADULT - PROBLEM SELECTOR PLAN 4
Pain control as above, GOC ongoing. Case d/w primary team.   Please page Geriatrics and Palliative Medicine Team at [phone] for additional assistance with pain control.

## 2022-04-20 NOTE — PROGRESS NOTE ADULT - NSPROGADDITIONALINFOA_GEN_ALL_CORE
above plans discussed with LEELEE Murphy MD  Division of Hospital Medicine  Contact via [employer] Teams  Office: [phone]

## 2022-04-20 NOTE — PROGRESS NOTE ADULT - SUBJECTIVE AND OBJECTIVE BOX
Patient is a 51y old  Female who presents with a chief complaint of sob (19 Apr 2022 16:56)      SUBJECTIVE / OVERNIGHT EVENTS:  no acute events overnight  pt still tachy with anxiety overnight, s/p IV ativan  pt concerned about her pain regimen    tele reviewed: sinus 80-120s    ROS:  14 point ROS negative in detail except stated as above    MEDICATIONS  (STANDING):  buMETAnide 2 milliGRAM(s) Oral two times a day  enoxaparin Injectable 40 milliGRAM(s) SubCutaneous every 24 hours  lactulose Syrup 20 Gram(s) Oral every 24 hours  LORazepam     Tablet 1 milliGRAM(s) Oral two times a day  morphine ER Tablet 60 milliGRAM(s) Oral every 8 hours  polyethylene glycol 3350 17 Gram(s) Oral every 24 hours  senna 2 Tablet(s) Oral at bedtime    MEDICATIONS  (PRN):  HYDROmorphone   Tablet 6 milliGRAM(s) Oral every 4 hours PRN Mod to Severe Pain (7 - 10)  HYDROmorphone  Injectable 0.75 milliGRAM(s) IV Push every 4 hours PRN breakthrough pain  LORazepam   Injectable 0.5 milliGRAM(s) IV Push every 4 hours PRN Anxiety  melatonin 3 milliGRAM(s) Oral at bedtime PRN Insomnia  simethicone 80 milliGRAM(s) Chew three times a day PRN Dyspepsia      CAPILLARY BLOOD GLUCOSE        I&O's Summary    19 Apr 2022 07:01  -  20 Apr 2022 07:00  --------------------------------------------------------  IN: 790 mL / OUT: 700 mL / NET: 90 mL        PHYSICAL EXAM:  Vital Signs Last 24 Hrs  T(C): 36.9 (20 Apr 2022 04:58), Max: 36.9 (20 Apr 2022 04:58)  T(F): 98.4 (20 Apr 2022 04:58), Max: 98.4 (20 Apr 2022 04:58)  HR: 112 (20 Apr 2022 04:58) (112 - 120)  BP: 101/65 (20 Apr 2022 04:58) (91/60 - 106/62)  BP(mean): --  RR: 18 (20 Apr 2022 04:58) (18 - 18)  SpO2: 97% (20 Apr 2022 04:58) (97% - 98%)    GENERAL: uncomfortable  HEAD:  Atraumatic, Normocephalic  EYES: EOMI, PERRLA, conjunctiva and sclera clear  NECK: Supple, No JVD  CHEST/LUNG: Clear to auscultation bilaterally; No wheeze  HEART: tachy  ABDOMEN: Soft, Nontender, Nondistended; Bowel sounds present  EXTREMITIES: 4+ pitting LE edema  NEUROLOGY: AAOx3; non-focal  SKIN: No rashes or lesions    LABS:  personally reviewed                        7.6    6.02  )-----------( 252      ( 20 Apr 2022 07:12 )             25.9     04-20    133<L>  |  88<L>  |  14  ----------------------------<  99  3.5   |  34<H>  |  0.60    Ca    8.9      20 Apr 2022 06:58  Phos  2.8     04-20  Mg     1.8     04-20                RADIOLOGY & ADDITIONAL TESTS:    Imaging Personally Reviewed:    Consultant(s) Notes Reviewed:  palliative care,  pulm    Care Discussed with Consultants/Other Providers: Dr. Jim (pall)

## 2022-04-20 NOTE — PROGRESS NOTE ADULT - SUBJECTIVE AND OBJECTIVE BOX
SUBJECTIVE AND OBJECTIVE:  Indication for Geriatrics and Palliative Care Services/INTERVAL HPI:  Patient wanted clarification with her pain medications and her anxiety medications were the same as her home dose.     OVERNIGHT EVENTS:  Anxiety overnight.     DNR on chart:  Allergies    Cipro (Rash)  QUINOLONES (Unknown)    Intolerances    MEDICATIONS  (STANDING):  buMETAnide 2 milliGRAM(s) Oral two times a day  enoxaparin Injectable 40 milliGRAM(s) SubCutaneous every 24 hours  lactulose Syrup 20 Gram(s) Oral every 24 hours  LORazepam     Tablet 1 milliGRAM(s) Oral two times a day  morphine ER Tablet 60 milliGRAM(s) Oral every 8 hours  polyethylene glycol 3350 17 Gram(s) Oral every 24 hours  senna 2 Tablet(s) Oral at bedtime    MEDICATIONS  (PRN):  HYDROmorphone   Tablet 6 milliGRAM(s) Oral every 4 hours PRN Mod to Severe Pain (7 - 10)  HYDROmorphone  Injectable 0.75 milliGRAM(s) IV Push every 4 hours PRN breakthrough pain  LORazepam   Injectable 0.5 milliGRAM(s) IV Push every 4 hours PRN Anxiety  melatonin 3 milliGRAM(s) Oral at bedtime PRN Insomnia  simethicone 80 milliGRAM(s) Chew three times a day PRN Dyspepsia      ITEMS UNCHECKED ARE NOT PRESENT    PRESENT SYMPTOMS: [ ]Unable to self-report - see [ ] CPOT [ ] PAINADS [ ] RDOS  Source if other than patient:  [ ]Family   [ ]Team     Pain:  [x ]yes [ ]no  QOL impact -   Location -                    Aggravating factors -  Quality -  Radiation -  Timing-  Severity (0-10 scale):  Minimal acceptable level (0-10 scale):     CPOT:    https://www.[facility].org/getattachment/[email or web address]/Critical-Care-Pain-Observation-Tool-(CPOT)    PAIN AD Score:	  http://geriatrictoolkit.[place].[email or web address]/cog/painad.pdf (Ctrl + left click to view)    Dyspnea:                           [ ]Mild [ ]Moderate [ ]Severe    RDOS:  0 to 2  minimal or no respiratory distress   3  mild distress  4 to 6 moderate distress  >7 severe distress  https://[email or web address]/handouts/hen/Respiratory_Distress_Observation_Scale.pdf (Ctrl +  left click to view)     Anxiety:                             [x ]Mild [ ]Moderate [ ]Severe  Fatigue:                             [ ]Mild [ ]Moderate [ ]Severe  Nausea:                             [ ]Mild [ ]Moderate [ ]Severe  Loss of appetite:              [ ]Mild [ ]Moderate [ ]Severe  Constipation:                    [ ]Mild [ ]Moderate [ ]Severe    Other Symptoms:  [x ]All other review of systems negative     Palliative Performance Status Version 2:         %      http://[facility].org/files/news/palliative_performance_scale_ppsv2.pdf  PHYSICAL EXAM:  Vital Signs Last 24 Hrs  T(C): 36.9 (20 Apr 2022 04:58), Max: 36.9 (20 Apr 2022 04:58)  T(F): 98.4 (20 Apr 2022 04:58), Max: 98.4 (20 Apr 2022 04:58)  HR: 112 (20 Apr 2022 04:58) (112 - 120)  BP: 101/65 (20 Apr 2022 04:58) (91/60 - 106/62)  BP(mean): --  RR: 18 (20 Apr 2022 04:58) (18 - 18)  SpO2: 97% (20 Apr 2022 04:58) (97% - 98%) I&O's Summary    19 Apr 2022 07:01  -  20 Apr 2022 07:00  --------------------------------------------------------  IN: 790 mL / OUT: 700 mL / NET: 90 mL       GENERAL: [ ]Cachexia    [x ]Alert  [x ]Oriented x3   [ ]Lethargic  [ ]Unarousable  [ ]Verbal  [ ]Non-Verbal  Behavioral:   [ x] Anxiety  [ ] Delirium [ ] Agitation [ ] Other  HEENT:  [x ]Normal   [ ]Dry mouth   [ ]ET Tube/Trach  [ ]Oral lesions  PULMONARY:   [ ]Clear [ ]Tachypnea  [ ]Audible excessive secretions   [ ]Rhonchi        [ ]Right [ ]Left [ ]Bilateral  [ ]Crackles        [ ]Right [ ]Left [ ]Bilateral  [ ]Wheezing     [ ]Right [ ]Left [ ]Bilateral  [x ]Diminished breath sounds [ ]right [ ]left [x ]bilateral  CARDIOVASCULAR:    [ x]Regular [ ]Irregular [ ]Tachy  [ ]Linus [ ]Murmur [ ]Other  GASTROINTESTINAL:  [x ]Soft  [ ]Distended   [x ]+BS  [x ]Non tender [ ]Tender  [ ]Other [ ]PEG [ ]OGT/ NGT  Last BM:  GENITOURINARY:  [x ]Normal [ ] Incontinent   [ ]Oliguria/Anuria   [ ]Ansari  MUSCULOSKELETAL:   [ ]Normal   [x ]Weakness  [ ]Bed/Wheelchair bound [ ]Edema  NEUROLOGIC:   [x ]No focal deficits  [ ]Cognitive impairment  [ ]Dysphagia [ ]Dysarthria [ ]Paresis [ ]Other   SKIN:   [x ]Normal  [ ]Rash  [ ]Other  [ ]Pressure ulcer(s)       Present on admission [ ]y [ ]n    CRITICAL CARE:  [ ]Shock Present  [ ]Septic [ ]Cardiogenic [ ]Neurologic [ ]Hypovolemic  [ ]Vasopressors [ ]Inotropes  [ ]Respiratory failure present [ ]Mechanical Ventilation [ ]Non-invasive ventilatory support [ ]High-Flow   [ ]Acute  [ ]Chronic [ ]Hypoxic  [ ]Hypercarbic [ ]Other  [ ]Other organ failure     LABS:                        7.6    6.02  )-----------( 252      ( 20 Apr 2022 07:12 )             25.9   04-20    133<L>  |  88<L>  |  14  ----------------------------<  99  3.5   |  34<H>  |  0.60    Ca    8.9      20 Apr 2022 06:58  Phos  2.8     04-20  Mg     1.8     04-20          RADIOLOGY & ADDITIONAL STUDIES:    Protein Calorie Malnutrition Present: [ ]mild [ ]moderate [ ]severe [ ]underweight [ ]morbid obesity  https://www.andeal.org/vault/2440/web/files/ONC/Table_Clinical%20Characteristics%20to%20Document%20Malnutrition-White%20JV%20et%20al%202012.pdf    Height (cm): 167.6 (04-15-22 @ 20:57), 167.6 (04-03-22 @ 08:47), 167.6 (03-28-22 @ 11:11)  Weight (kg): 90.7 (04-03-22 @ 08:47), 109.6 (03-28-22 @ 11:11), 113.4 (03-19-22 @ 17:35)  BMI (kg/m2): 32.3 (04-15-22 @ 20:57), 32.3 (04-03-22 @ 08:47), 39 (03-28-22 @ 11:11)    [ ]PPSV2 < or = 30%  [ ]significant weight loss [ ]poor nutritional intake [ ]anasarca[ ]Artificial Nutrition    REFERRALS:   [ ]Chaplaincy  [ ]Hospice  [ ]Child Life  [ ]Social Work  [ ]Case management [ ]Holistic Therapy     Goals of Care Document:DESTINY Jim (04-19-22 @ 14:37)  Goals of Care Conversation:   Participants:  · Participants  Patient; Family  · Relative  sister Jeannette, ex  Constantin    Advance Directives:  · Caregiver:  no    Conversation Discussion:  · Conversation  Prognosis; Treatment Options  · Conversation Details    Location of Discussion:  · Location of discussion  Face to face         SUBJECTIVE AND OBJECTIVE:  Indication for Geriatrics and Palliative Care Services/INTERVAL HPI:  Patient wanted clarification with her pain medications and her anxiety medications, done with several providers including myself this morning. She received ativan and feels sleepy, she does not want scheduled ativan due to sedation. She asks if paracentesis should be considered. I advised that it's unlikely to significantly reduce her dyspnea, but I will ask primary team to check. I asked again about code status, she is not ready to consider DNR. She still wants to go to [place].     Case d/w primary team, would avoid making significant changes to her opioid/anxiolytic regimen without discussing with her, as pt is highly anxious about changes. Aside from benzos which pt is resistant to take, I am not sure if her prognosis will allow initiation of an SSRI to be effective in time.     OVERNIGHT EVENTS:  Anxiety overnight.     DNR on chart:  Allergies    Cipro (Rash)  QUINOLONES (Unknown)    Intolerances    MEDICATIONS  (STANDING):  buMETAnide 2 milliGRAM(s) Oral two times a day  enoxaparin Injectable 40 milliGRAM(s) SubCutaneous every 24 hours  lactulose Syrup 20 Gram(s) Oral every 24 hours  LORazepam     Tablet 1 milliGRAM(s) Oral two times a day  morphine ER Tablet 60 milliGRAM(s) Oral every 8 hours  polyethylene glycol 3350 17 Gram(s) Oral every 24 hours  senna 2 Tablet(s) Oral at bedtime    MEDICATIONS  (PRN):  HYDROmorphone   Tablet 6 milliGRAM(s) Oral every 4 hours PRN Mod to Severe Pain (7 - 10)  HYDROmorphone  Injectable 0.75 milliGRAM(s) IV Push every 4 hours PRN breakthrough pain  LORazepam   Injectable 0.5 milliGRAM(s) IV Push every 4 hours PRN Anxiety  melatonin 3 milliGRAM(s) Oral at bedtime PRN Insomnia  simethicone 80 milliGRAM(s) Chew three times a day PRN Dyspepsia      ITEMS UNCHECKED ARE NOT PRESENT    PRESENT SYMPTOMS: [ ]Unable to self-report - see [ ] CPOT [ ] PAINADS [ ] RDOS  Source if other than patient:  [ ]Family   [ ]Team     Pain:  [x ]yes [ ]no  QOL impact -   Location -                    Aggravating factors -  Quality -  Radiation -  Timing-  Severity (0-10 scale):  Minimal acceptable level (0-10 scale):     CPOT:    https://www.[facility].org/getattachment/[email or web address]/Critical-Care-Pain-Observation-Tool-(CPOT)    PAIN AD Score:	  http://geriatrictoolkit.[email or web address]/cog/painad.pdf (Ctrl + left click to view)    Dyspnea:                           [ ]Mild [ ]Moderate [ ]Severe    RDOS:  0 to 2  minimal or no respiratory distress   3  mild distress  4 to 6 moderate distress  >7 severe distress  https://[email or web address]/handouts/hen/Respiratory_Distress_Observation_Scale.pdf (Ctrl +  left click to view)     Anxiety:                             [x ]Mild [ ]Moderate [ ]Severe  Fatigue:                             [ ]Mild [ ]Moderate [ ]Severe  Nausea:                             [ ]Mild [ ]Moderate [ ]Severe  Loss of appetite:              [ ]Mild [ ]Moderate [ ]Severe  Constipation:                    [ ]Mild [ ]Moderate [ ]Severe    Other Symptoms:  [x ]All other review of systems negative     Palliative Performance Status Version 2:    40     %      http://[facility].org/files/news/palliative_performance_scale_ppsv2.pdf  PHYSICAL EXAM:  Vital Signs Last 24 Hrs  T(C): 36.9 (20 Apr 2022 04:58), Max: 36.9 (20 Apr 2022 04:58)  T(F): 98.4 (20 Apr 2022 04:58), Max: 98.4 (20 Apr 2022 04:58)  HR: 112 (20 Apr 2022 04:58) (112 - 120)  BP: 101/65 (20 Apr 2022 04:58) (91/60 - 106/62)  BP(mean): --  RR: 18 (20 Apr 2022 04:58) (18 - 18)  SpO2: 97% (20 Apr 2022 04:58) (97% - 98%) I&O's Summary    19 Apr 2022 07:01  -  20 Apr 2022 07:00  --------------------------------------------------------  IN: 790 mL / OUT: 700 mL / NET: 90 mL       GENERAL: [ ]Cachexia    [x ]Alert  [x ]Oriented x3   [ ]Lethargic  [ ]Unarousable  [ ]Verbal  [ ]Non-Verbal  Behavioral:   [ x] Anxiety  [ ] Delirium [ ] Agitation [ ] Other  HEENT:  [x ]Normal   [ ]Dry mouth   [ ]ET Tube/Trach  [ ]Oral lesions  PULMONARY:   [ ]Clear [ ]Tachypnea  [ ]Audible excessive secretions   [ ]Rhonchi        [ ]Right [ ]Left [ ]Bilateral  [ ]Crackles        [ ]Right [ ]Left [ ]Bilateral  [ ]Wheezing     [ ]Right [ ]Left [ ]Bilateral  [x ]Diminished breath sounds [ ]right [ ]left [x ]bilateral  CARDIOVASCULAR:    [ x]Regular [ ]Irregular [ ]Tachy  [ ]Linus [ ]Murmur [ ]Other  GASTROINTESTINAL:  [x ]Soft  [ ]Distended   [x ]+BS  [x ]Non tender [ ]Tender  [ ]Other [ ]PEG [ ]OGT/ NGT  Last BM:  GENITOURINARY:  [x ]Normal [ ] Incontinent   [ ]Oliguria/Anuria   [ ]Ansari  MUSCULOSKELETAL:   [ ]Normal   [x ]Weakness  [ ]Bed/Wheelchair bound [ ]Edema  NEUROLOGIC:   [x ]No focal deficits  [ ]Cognitive impairment  [ ]Dysphagia [ ]Dysarthria [ ]Paresis [ ]Other   SKIN:   [x ]Normal  [ ]Rash  [ ]Other  [ ]Pressure ulcer(s)       Present on admission [ ]y [ ]n    CRITICAL CARE:  [ ]Shock Present  [ ]Septic [ ]Cardiogenic [ ]Neurologic [ ]Hypovolemic  [ ]Vasopressors [ ]Inotropes  [ ]Respiratory failure present [ ]Mechanical Ventilation [ ]Non-invasive ventilatory support [ ]High-Flow   [ ]Acute  [ ]Chronic [ ]Hypoxic  [ ]Hypercarbic [ ]Other  [ ]Other organ failure     LABS:                        7.6    6.02  )-----------( 252      ( 20 Apr 2022 07:12 )             25.9   04-20    133<L>  |  88<L>  |  14  ----------------------------<  99  3.5   |  34<H>  |  0.60    Ca    8.9      20 Apr 2022 06:58  Phos  2.8     04-20  Mg     1.8     04-20          RADIOLOGY & ADDITIONAL STUDIES:    Protein Calorie Malnutrition Present: [ ]mild [ ]moderate [ ]severe [ ]underweight [ ]morbid obesity  https://www.andeal.org/vault/2440/web/files/ONC/Table_Clinical%20Characteristics%20to%20Document%20Malnutrition-White%20JV%20et%20al%794822.pdf    Height (cm): 167.6 (04-15-22 @ 20:57), 167.6 (04-03-22 @ 08:47), 167.6 (03-28-22 @ 11:11)  Weight (kg): 90.7 (04-03-22 @ 08:47), 109.6 (03-28-22 @ 11:11), 113.4 (03-19-22 @ 17:35)  BMI (kg/m2): 32.3 (04-15-22 @ 20:57), 32.3 (04-03-22 @ 08:47), 39 (03-28-22 @ 11:11)    [ ]PPSV2 < or = 30%  [ ]significant weight loss [ ]poor nutritional intake [ ]anasarca[ ]Artificial Nutrition    REFERRALS:   [ ]Chaplaincy  [ ]Hospice  [ ]Child Life  [ ]Social Work  [ ]Case management [ ]Holistic Therapy     Goals of Care Document:DESTINY Jim (04-19-22 @ 14:37)  Goals of Care Conversation:   Participants:  · Participants  Patient; Family  · Relative  sister Jeannette, ex  Constantin    Advance Directives:  · Caregiver:  no    Conversation Discussion:  · Conversation  Prognosis; Treatment Options  · Conversation Details    Location of Discussion:  · Location of discussion  Face to face

## 2022-04-21 NOTE — PROGRESS NOTE ADULT - PROBLEM SELECTOR PLAN 4
Pain control as above, GOC ongoing. Case d/w primary team.   Please page Geriatrics and Palliative Medicine Team at [phone] for additional assistance with pain control.

## 2022-04-21 NOTE — PROGRESS NOTE ADULT - NSPROGADDITIONALINFOA_GEN_ALL_CORE
above plans discussed with NP Elena  very poor prognosis  pt remains full code at this time    Kristal Murphy MD  Division of Hospital Medicine  Contact via [employer] Teams  Office: [phone]

## 2022-04-21 NOTE — PROGRESS NOTE ADULT - PROBLEM SELECTOR PLAN 4
Palliative care eval appreciated:  - holding this AM given lethargy  - MS Contin 60mg q8 x  30 days  - Dilaudid 6mg PO Q4 hours prn severe pain/dyspnea x30 days  - ativan 0.5mg IV q4hr prn for anxiety

## 2022-04-21 NOTE — PROGRESS NOTE ADULT - SUBJECTIVE AND OBJECTIVE BOX
Patient is a 51y old  Female who presents with a chief complaint of sob (20 Apr 2022 10:54)      SUBJECTIVE / OVERNIGHT EVENTS:  no acute events overnight  pt still tachy all night, s/p ativan   pt sleeping this morning    tele reviewed: sinus tach 110-120s    ROS:  14 point ROS negative in detail except stated as above    MEDICATIONS  (STANDING):  buMETAnide 2 milliGRAM(s) Oral two times a day  enoxaparin Injectable 40 milliGRAM(s) SubCutaneous every 24 hours  lactulose Syrup 20 Gram(s) Oral every 24 hours  morphine ER Tablet 60 milliGRAM(s) Oral every 8 hours  polyethylene glycol 3350 17 Gram(s) Oral every 24 hours  senna 2 Tablet(s) Oral at bedtime    MEDICATIONS  (PRN):  ALPRAZolam 0.25 milliGRAM(s) Oral every 6 hours PRN anxiety  HYDROmorphone   Tablet 6 milliGRAM(s) Oral every 4 hours PRN Mod to Severe Pain (7 - 10)  HYDROmorphone  Injectable 0.75 milliGRAM(s) IV Push every 4 hours PRN breakthrough pain  hydrOXYzine hydrochloride 25 milliGRAM(s) Oral every 6 hours PRN Anxiety  melatonin 3 milliGRAM(s) Oral at bedtime PRN Insomnia  simethicone 80 milliGRAM(s) Chew three times a day PRN Dyspepsia      CAPILLARY BLOOD GLUCOSE        I&O's Summary    20 Apr 2022 07:01  -  21 Apr 2022 07:00  --------------------------------------------------------  IN: 765 mL / OUT: 700 mL / NET: 65 mL        PHYSICAL EXAM:  Vital Signs Last 24 Hrs  T(C): 36.9 (21 Apr 2022 04:00), Max: 37.1 (20 Apr 2022 22:20)  T(F): 98.4 (21 Apr 2022 04:00), Max: 98.7 (20 Apr 2022 22:20)  HR: 115 (21 Apr 2022 04:00) (114 - 118)  BP: 101/62 (21 Apr 2022 04:00) (94/56 - 104/62)  BP(mean): --  RR: 17 (21 Apr 2022 04:00) (17 - 20)  SpO2: 100% (21 Apr 2022 04:00) (98% - 100%)    GENERAL: sleeping  HEAD:  Atraumatic, Normocephalic  EYES: EOMI, PERRLA, conjunctiva and sclera clear  NECK: Supple, No JVD  CHEST/LUNG: crackles  HEART: Regular rate and rhythm; No murmurs, rubs, or gallops  ABDOMEN: Soft, Nontender, Nondistended; Bowel sounds present  EXTREMITIES:  3+ pitting edema  NEUROLOGY: AAOx3; non-focal  SKIN: No rashes or lesions    LABS:  personally reviewed                        7.6    6.02  )-----------( 252      ( 20 Apr 2022 07:12 )             25.9     04-20    133<L>  |  88<L>  |  14  ----------------------------<  99  3.5   |  34<H>  |  0.60    Ca    8.9      20 Apr 2022 06:58  Phos  2.8     04-20  Mg     1.8     04-20                RADIOLOGY & ADDITIONAL TESTS:    Imaging Personally Reviewed:    Consultant(s) Notes Reviewed:  palliative care, pulm    Care Discussed with Consultants/Other Providers: Dr. Jim (pall)

## 2022-04-21 NOTE — PROGRESS NOTE ADULT - SUBJECTIVE AND OBJECTIVE BOX
SUBJECTIVE AND OBJECTIVE:  Indication for Geriatrics and Palliative Care Services/INTERVAL HPI:  Patient was seen this morning and stated she was comfortable but was slurring her words. She appeared to be very groggy possible due to her benzodiazepine or dilaudid medication. Her sister was present and she was also concerned for her mental status possibly due to her medications.     DNR on chart:  Allergies    Cipro (Rash)  QUINOLONES (Unknown)    Intolerances    MEDICATIONS  (STANDING):  buMETAnide 2 milliGRAM(s) Oral two times a day  enoxaparin Injectable 40 milliGRAM(s) SubCutaneous every 24 hours  lactulose Syrup 20 Gram(s) Oral every 24 hours  morphine ER Tablet 60 milliGRAM(s) Oral every 8 hours  polyethylene glycol 3350 17 Gram(s) Oral every 24 hours  senna 2 Tablet(s) Oral at bedtime    MEDICATIONS  (PRN):  ALPRAZolam 0.25 milliGRAM(s) Oral every 6 hours PRN anxiety  HYDROmorphone   Tablet 6 milliGRAM(s) Oral every 4 hours PRN Mod to Severe Pain (7 - 10)  HYDROmorphone  Injectable 0.75 milliGRAM(s) IV Push every 4 hours PRN breakthrough pain  hydrOXYzine hydrochloride 25 milliGRAM(s) Oral every 6 hours PRN Anxiety  melatonin 3 milliGRAM(s) Oral at bedtime PRN Insomnia  simethicone 80 milliGRAM(s) Chew three times a day PRN Dyspepsia      ITEMS UNCHECKED ARE NOT PRESENT    PRESENT SYMPTOMS: [ ]Unable to self-report - see [ ] CPOT [ ] PAINADS [ ] RDOS  Source if other than patient:  [ ]Family   [ ]Team     Pain:  [ ]yes [x ]no  QOL impact -   Location -                    Aggravating factors -  Quality -  Radiation -  Timing-  Severity (0-10 scale):  Minimal acceptable level (0-10 scale):     CPOT:    https://www.[facility].org/getattachment/[email or web address]/Critical-Care-Pain-Observation-Tool-(CPOT)    PAIN AD Score:	  http://geriatrictoolkit.[facility]/cog/painad.pdf (Ctrl + left click to view)    Dyspnea:                           [ ]Mild [ x]Moderate [ ]Severe    RDOS:  0 to 2  minimal or no respiratory distress   3  mild distress  4 to 6 moderate distress  >7 severe distress  https://[email or web address]/handouts/hen/Respiratory_Distress_Observation_Scale.pdf (Ctrl +  left click to view)     Anxiety:                             [ ]Mild [ x]Moderate [ ]Severe  Fatigue:                             [ ]Mild [ ]Moderate [ ]Severe  Nausea:                             [ ]Mild [ ]Moderate [ ]Severe  Loss of appetite:              [ ]Mild [ ]Moderate [ ]Severe  Constipation:                    [ ]Mild [ ]Moderate [ ]Severe    Other Symptoms:  [x ]All other review of systems negative     Palliative Performance Status Version 2:         %      http://[email or web address].org/files/news/palliative_performance_scale_ppsv2.pdf  PHYSICAL EXAM:  Vital Signs Last 24 Hrs  T(C): 36.9 (21 Apr 2022 04:00), Max: 37.1 (20 Apr 2022 22:20)  T(F): 98.4 (21 Apr 2022 04:00), Max: 98.7 (20 Apr 2022 22:20)  HR: 115 (21 Apr 2022 04:00) (114 - 118)  BP: 101/62 (21 Apr 2022 04:00) (94/56 - 104/62)  BP(mean): --  RR: 17 (21 Apr 2022 04:00) (17 - 20)  SpO2: 100% (21 Apr 2022 04:00) (98% - 100%) I&O's Summary    20 Apr 2022 07:01  -  21 Apr 2022 07:00  --------------------------------------------------------  IN: 765 mL / OUT: 700 mL / NET: 65 mL       GENERAL:    [ ]Cachexia    [x ]Alert  [x ]Oriented x3   [ ]Lethargic  [ ]Unarousable  [ ]Verbal  [ ]Non-Verbal  Behavioral:   [ x] Anxiety  [ ] Delirium [ ] Agitation [ ] Other  HEENT:  [x ]Normal   [ ]Dry mouth   [ ]ET Tube/Trach  [ ]Oral lesions  PULMONARY:   [ ]Clear [ ]Tachypnea  [ ]Audible excessive secretions   [ ]Rhonchi        [ ]Right [ ]Left [ ]Bilateral  [ ]Crackles        [ ]Right [ ]Left [ ]Bilateral  [ ]Wheezing     [ ]Right [ ]Left [ ]Bilateral  [x ]Diminished breath sounds [ ]right [ ]left [x ]bilateral  CARDIOVASCULAR:    [ x]Regular [ ]Irregular [ ]Tachy  [ ]Linus [ ]Murmur [ ]Other  GASTROINTESTINAL:  [x ]Soft  [ ]Distended   [x ]+BS  [x ]Non tender [ ]Tender  [ ]Other [ ]PEG [ ]OGT/ NGT  Last BM:  GENITOURINARY:  [x ]Normal [ ] Incontinent   [ ]Oliguria/Anuria   [ ]Ansari  MUSCULOSKELETAL:   [ ]Normal   [x ]Weakness  [ ]Bed/Wheelchair bound [ ]Edema  NEUROLOGIC:   [x ]No focal deficits  [ ]Cognitive impairment  [ ]Dysphagia [ ]Dysarthria [ ]Paresis [ ]Other   SKIN:   [x ]Normal  [ ]Rash  [ ]Other  [ ]Pressure ulcer(s)       Present on admission [ ]y [ ]n            CRITICAL CARE:  [ ]Shock Present  [ ]Septic [ ]Cardiogenic [ ]Neurologic [ ]Hypovolemic  [ ]Vasopressors [ ]Inotropes  [ ]Respiratory failure present [ ]Mechanical Ventilation [ ]Non-invasive ventilatory support [ ]High-Flow   [ ]Acute  [ ]Chronic [ ]Hypoxic  [ ]Hypercarbic [ ]Other  [ ]Other organ failure     LABS:                        7.6    6.02  )-----------( 252      ( 20 Apr 2022 07:12 )             25.9   04-20    133<L>  |  88<L>  |  14  ----------------------------<  99  3.5   |  34<H>  |  0.60    Ca    8.9      20 Apr 2022 06:58  Phos  2.8     04-20  Mg     1.8     04-20          RADIOLOGY & ADDITIONAL STUDIES:    Protein Calorie Malnutrition Present: [ ]mild [ ]moderate [ ]severe [ ]underweight [ ]morbid obesity  https://www.andeal.org/vault/2440/web/files/ONC/Table_Clinical%20Characteristics%20to%20Document%20Malnutrition-White%20JV%20et%20al%202012.pdf    Height (cm): 167.6 (04-15-22 @ 20:57), 167.6 (04-03-22 @ 08:47), 167.6 (03-28-22 @ 11:11)  Weight (kg): 90.7 (04-03-22 @ 08:47), 109.6 (03-28-22 @ 11:11), 113.4 (03-19-22 @ 17:35)  BMI (kg/m2): 32.3 (04-15-22 @ 20:57), 32.3 (04-03-22 @ 08:47), 39 (03-28-22 @ 11:11)    [ ]PPSV2 < or = 30%  [ ]significant weight loss [ ]poor nutritional intake [ ]anasarca[ ]Artificial Nutrition    REFERRALS:   [ ]Chaplaincy  [ ]Hospice  [ ]Child Life  [ ]Social Work  [ ]Case management [ ]Holistic Therapy     Goals of Care Document:DESTINY Jim (04-19-22 @ 14:37)  Goals of Care Conversation:   Participants:  · Participants  Patient; Family  · Relative  sister Jeannette, ex  Constantin    Advance Directives:  · Caregiver:  no    Conversation Discussion:  · Conversation  Prognosis; Treatment Options  · Conversation Details  Family meeting held this afternoon with palliative team, patient, pt's ex- Constantin and sister/HCP Jeannette. Family revealed that following discharge from the hospital this past weekend, she became very dyspneic and anxious which prompted quick return to the hospital. Pt felt it was because she didn't have a hospital bed that brought her back. Pt continues to voice her goal of going to [place] to get treatment. She has rescheduled her appt to next Monday.    Given her significant debility and symptom burden, I advised pt and family that her goal of going to [place] is unrealistic and unattainable at this time. Explained my rational behind my recommendation including the many barriers in the steps of traveling, as well as how unsafe it would be. Family was able to understand and accept this, patient however is unwilling. I expressed my concern for pt spending her limited ingrid time/energy on working towards an unrealistic goal rather than being with her children and family. Again discussed the risks and low chances with CPR in event of cardiac arrest. Pt states she thought about DNR last night but changed her mind to full code this morning. She will need more time to think about her code status. Meanwhile, she asks for pulmonary consult to see if there's anything that can be done for her breathing. Emotional support provided to pt and family throughout our conversation. Advised we will continue to follow.    Case d/w primary team including pt request for pulm c/s.    Personal Advance Directives Treatment Guidelines:   Treatment Guidelines:  · Decision Maker  Patient    Location of Discussion:   Time Spent on Advance Care Planning:  I spent 40 (in minutes) on advance care planning services with the patient.  This time is separate and distinct from any other care management services provided on this date.    Location of Discussion:  · Location of discussion  Face to face      51 yoF with PMH of Gastric cancer, not on conventional chemotherapy and pursuing alternative medicine for treatment, presenting with SOB and recurrent pleural effusion, s/p thoracentesis and paracentesis at [facility] (3/23-4/1), s/p Pleurx catheter removal by CT surgery on 3/28/22. Geriatrics and Palliative Medicine Team is consulted for assistance with pain control and GOC discussions.      Problem/Plan - 1:  ·  Problem: SOB (shortness of breath).   ·  Plan: With pleural effusion that's loculated. On recent admission IR, Pulmonary and CT surgery were consulted, unable to offer another Pleurx due to loculation and positioning of the effusion. Continue supplementary O2 as needed.  - Pulmonary c/s complete, worsening findings c/f lymphangitic spread  - Continue MS Contin to 60mg Q8h   - Continue Dilaudid to 6mg PO Q4 hours for pain/dyspnea  - Continue Dilaudid IV 0.75mg Q4 hours for breakthrough severe pain/dyspnea   - Continue PO Ativan 0.5mg Q6h PRN anxiety, and IV ativan 0.5 mg q4 prn panic attacks.    Problem/Plan - 2:  ·  Problem: Therapeutic opioid induced constipation.   ·  Plan: Miralax and lactulose daily for goal 1 BM/day.    Problem/Plan - 3:  ·  Problem: Advanced care planning/counseling discussion.   ·  Plan: Please see GOC discussion on 4/19 for details. Pt is focused on going to [place] despite being told that this is an unattainable goal. Her family understands this. Code status remains full despite counseling and education.    Problem/Plan - 4:  ·  Problem: Palliative care encounter.   ·  Plan: Pain control as above, GOC ongoing. Case d/w primary team.   Please page Geriatrics and Palliative Medicine Team at [phone] for additional assistance with pain control.     SUBJECTIVE AND OBJECTIVE:  Indication for Geriatrics and Palliative Care Services/INTERVAL HPI:    Patient was seen this morning and stated she was comfortable but was slurring her words. She appeared to be very groggy possible due to her benzodiazepine or dilaudid medication. Her sister was present and she was also concerned for her mental status possibly due to her medications.     DNR on chart:  Allergies    Cipro (Rash)  QUINOLONES (Unknown)    Intolerances    MEDICATIONS  (STANDING):  buMETAnide 2 milliGRAM(s) Oral two times a day  enoxaparin Injectable 40 milliGRAM(s) SubCutaneous every 24 hours  lactulose Syrup 20 Gram(s) Oral every 24 hours  morphine ER Tablet 60 milliGRAM(s) Oral every 8 hours  polyethylene glycol 3350 17 Gram(s) Oral every 24 hours  senna 2 Tablet(s) Oral at bedtime    MEDICATIONS  (PRN):  ALPRAZolam 0.25 milliGRAM(s) Oral every 6 hours PRN anxiety  HYDROmorphone   Tablet 6 milliGRAM(s) Oral every 4 hours PRN Mod to Severe Pain (7 - 10)  HYDROmorphone  Injectable 0.75 milliGRAM(s) IV Push every 4 hours PRN breakthrough pain  hydrOXYzine hydrochloride 25 milliGRAM(s) Oral every 6 hours PRN Anxiety  melatonin 3 milliGRAM(s) Oral at bedtime PRN Insomnia  simethicone 80 milliGRAM(s) Chew three times a day PRN Dyspepsia      ITEMS UNCHECKED ARE NOT PRESENT    PRESENT SYMPTOMS: [ ]Unable to self-report - see [ ] CPOT [ ] PAINADS [ ] RDOS  Source if other than patient:  [ ]Family   [ ]Team     Pain:  [ ]yes [x ]no  QOL impact -   Location -                    Aggravating factors -  Quality -  Radiation -  Timing-  Severity (0-10 scale):  Minimal acceptable level (0-10 scale):     CPOT:    https://www.[facility].org/getattachment/[email or web address]/Critical-Care-Pain-Observation-Tool-(CPOT)    PAIN AD Score:	  http://geriatrictoolkit.[facility]/cog/painad.pdf (Ctrl + left click to view)    Dyspnea:                           [ ]Mild [ x]Moderate [ ]Severe    RDOS:  0 to 2  minimal or no respiratory distress   3  mild distress  4 to 6 moderate distress  >7 severe distress  https://[email or web address]/handouts/hen/Respiratory_Distress_Observation_Scale.pdf (Ctrl +  left click to view)     Anxiety:                             [ ]Mild [ x]Moderate [ ]Severe  Fatigue:                             [ ]Mild [ ]Moderate [ ]Severe  Nausea:                             [ ]Mild [ ]Moderate [ ]Severe  Loss of appetite:              [ ]Mild [ ]Moderate [ ]Severe  Constipation:                    [ ]Mild [ ]Moderate [ ]Severe    Other Symptoms:  [x ]All other review of systems negative     Palliative Performance Status Version 2:     20    %      http://[email or web address]/files/news/palliative_performance_scale_[email or web address].pdf  PHYSICAL EXAM:  Vital Signs Last 24 Hrs  T(C): 36.9 (21 Apr 2022 04:00), Max: 37.1 (20 Apr 2022 22:20)  T(F): 98.4 (21 Apr 2022 04:00), Max: 98.7 (20 Apr 2022 22:20)  HR: 115 (21 Apr 2022 04:00) (114 - 118)  BP: 101/62 (21 Apr 2022 04:00) (94/56 - 104/62)  BP(mean): --  RR: 17 (21 Apr 2022 04:00) (17 - 20)  SpO2: 100% (21 Apr 2022 04:00) (98% - 100%)     GENERAL:    [ ]Cachexia    [ ]Alert  [ ]Oriented   [x ]Lethargic  [ ]Unarousable  [ ]Verbal  [ ]Non-Verbal  Behavioral:   [ ] Anxiety  [ ] Delirium [ ] Agitation [ ] Other  HEENT:  [x ]Normal   [ ]Dry mouth   [ ]ET Tube/Trach  [ ]Oral lesions  PULMONARY:   [ ]Clear [ ]Tachypnea  [ ]Audible excessive secretions   [ ]Rhonchi        [ ]Right [ ]Left [ ]Bilateral  [ ]Crackles        [ ]Right [ ]Left [ ]Bilateral  [ ]Wheezing     [ ]Right [ ]Left [ ]Bilateral  [x ]Diminished breath sounds [ ]right [ ]left [x ]bilateral  CARDIOVASCULAR:    [ x]Regular [ ]Irregular [ ]Tachy  [ ]Linus [ ]Murmur [ ]Other  GASTROINTESTINAL:  [x ]Soft  [ ]Distended   [x ]+BS  [x ]Non tender [ ]Tender  [ ]Other [ ]PEG [ ]OGT/ NGT  Last BM:  GENITOURINARY:  [x ]Normal [ ] Incontinent   [ ]Oliguria/Anuria   [ ]Ansari  MUSCULOSKELETAL:   [ ]Normal   [x ]Weakness  [ ]Bed/Wheelchair bound [ ]Edema  NEUROLOGIC:   [ ]No focal deficits  [x ]Cognitive impairment  [ ]Dysphagia [ ]Dysarthria [ ]Paresis [ ]Other   SKIN:   [x ]Normal  [ ]Rash  [ ]Other  [ ]Pressure ulcer(s)       Present on admission [ ]y [ ]n    CRITICAL CARE:  [ ]Shock Present  [ ]Septic [ ]Cardiogenic [ ]Neurologic [ ]Hypovolemic  [ ]Vasopressors [ ]Inotropes  [ ]Respiratory failure present [ ]Mechanical Ventilation [ ]Non-invasive ventilatory support [ ]High-Flow   [ ]Acute  [ ]Chronic [ ]Hypoxic  [ ]Hypercarbic [ ]Other  [ ]Other organ failure     LABS:                                   7.6    6.02  )-----------( 252      ( 20 Apr 2022 07:12 )             25.9     04-20    133<L>  |  88<L>  |  14  ----------------------------<  99  3.5   |  34<H>  |  0.60    Ca    8.9      20 Apr 2022 06:58  Phos  2.8     04-20  Mg     1.8     04-20    RADIOLOGY & ADDITIONAL STUDIES:    Protein Calorie Malnutrition Present: [ ]mild [ ]moderate [ ]severe [ ]underweight [ ]morbid obesity  https://www.andeal.org/vault/2440/web/files/ONC/Table_Clinical%20Characteristics%20to%20Document%20Malnutrition-White%20JV%20et%20al%202012.pdf    Height (cm): 167.6 (04-15-22 @ 20:57), 167.6 (04-03-22 @ 08:47), 167.6 (03-28-22 @ 11:11)  Weight (kg): 90.7 (04-03-22 @ 08:47), 109.6 (03-28-22 @ 11:11), 113.4 (03-19-22 @ 17:35)  BMI (kg/m2): 32.3 (04-15-22 @ 20:57), 32.3 (04-03-22 @ 08:47), 39 (03-28-22 @ 11:11)    [ ]PPSV2 < or = 30%  [ ]significant weight loss [ ]poor nutritional intake [ ]anasarca[ ]Artificial Nutrition    REFERRALS:   [ ]Chaplaincy  [ ]Hospice  [ ]Child Life  [ ]Social Work  [ ]Case management [ ]Holistic Therapy

## 2022-04-21 NOTE — PROGRESS NOTE ADULT - PROBLEM SELECTOR PLAN 3
metastatic stage 4 gastric cancer, peritoneum, bones and malignant effusion/ascites.  refused disease modifying therapy in past  - wishes to fly to [place] for nonconventional treatment with Dr. Layne- does not want us to discuss her case with him. I recommended against this but stated that we would optimize her as best we can should she decide to go.  - palliative reconsulted for refocusing of GOC: pt still wants to be full code

## 2022-04-21 NOTE — PROGRESS NOTE ADULT - PROBLEM SELECTOR PLAN 3
Please see [facility] discussion on 4/19 for details. Pt is focused on going to [place] despite being told that this is an unattainable goal. Her family understands this. Code status remains full despite counseling and education.

## 2022-04-21 NOTE — CHART NOTE - NSCHARTNOTEFT_GEN_A_CORE
Need for Hospital Bed       Based on  patient's on going issues with deconditioning and generalized weakness secondary to patient diagnosis of metastatic gastric ca to the bones.  Patient will require a semi-electric hospital bed with a   gel overlay to prevent further skin irritation and/or pressure ulcers.    This is necessary to achieve positioning and elevation not able to obtain in an ordinary bed.  Bed pillows and wedges have been   tried and ruled out.  Patient and family are in agreement with a semi- electric bed.

## 2022-04-21 NOTE — PROGRESS NOTE ADULT - PROBLEM SELECTOR PLAN 1
With pleural effusion that's loculated. On recent admission IR, Pulmonary and CT surgery were consulted, unable to offer another Pleurx due to loculation and positioning of the effusion. Continue supplementary O2 as needed.  - Pulmonary c/s complete, worsening findings c/f lymphangitic spread  - Continue MS Contin to 60mg Q8h - hold AM dose given lethargy  - Continue Dilaudid to 6mg PO Q4 hours for pain/dyspnea  - Continue Dilaudid IV 0.75mg Q4 hours for breakthrough severe pain/dyspnea   - D/c Ativan given lethargy. Order xanax 0.25 mg q6 prn anxiety (medication with shorter half life), will try hydroxyzine 25 mg q6 prn anxiety as well given less sedating side effect

## 2022-04-21 NOTE — PROGRESS NOTE ADULT - ATTENDING COMMENTS
51 yoF with PMH of Gastric cancer, not on conventional chemotherapy and pursuing alternative medicine for treatment, presenting with SOB and recurrent pleural effusion, s/p thoracentesis and paracentesis at [facility] (3/23-4/1), s/p Pleurx catheter removal by CT surgery on 3/28/22. Geriatrics and Palliative Medicine Team is consulted for assistance with pain control and GOC discussions.     Maintain opioid regimen as above, benzos adjusted after extensive discussion with pt. Her goal remains to go to [place] for alternative treatment despite being informed that this is an unattainable goal. She is considering DNR.
51F with Gastric cancer, not on conventional chemotherapy and pursuing alternative medicine for treatment, presenting with SOB and recurrent pleural effusion, s/p thoracentesis and paracentesis at [facility] (3/23-4/1), s/p Pleurx catheter removal by CT surgery on 3/28/22. Geriatrics and Palliative Medicine Team is consulted for assistance with pain control and GOC discussions.     Pt received 3 doses of PRN IV ativan 0.5 mg for anxiety yesterday, last dose of IV ativan and IV dilaudid is at midnight. Pt remains lethargic at 10AM this morning, though with tachypnea. Pt's sister at bedside this morning, asking if her regimen can be adjusted so pt is less sedated. Advised that we may not be able to achieve this goal as I fear that pt may be dying. Addressed code status with sister once again, she is not ready to make a decision for the patient, as she is trying very hard to honor what pt has previously stated, which is to remain full code.     Advised we will d/c ativan and trial xanax given its short half life. Also trial hydroxyzine prn to see if it might alleviate anxiety without sedation. RN to hold MS contin today until mentation improves. Her goal remains to go to [place] for alternative treatment despite being informed that this is an unattainable goal. We will continue to follow.

## 2022-04-22 NOTE — PROGRESS NOTE ADULT - PROBLEM SELECTOR PLAN 4
Palliative care eval appreciated:  - holding ativan given lethargy/somnolent  - MS Contin 60mg q8   - atarax 25mg prn for anxiety  - pt refusing xanax

## 2022-04-22 NOTE — PROVIDER CONTACT NOTE (OTHER) - ACTION/TREATMENT ORDERED:
LEELEE German made aware. MS Contin given. Pt repositioned. Will continue to monitor and maintain pt safety. LEELEE Mack made aware. MS Contin given. Pt repositioned. Will continue to monitor and maintain pt safety.

## 2022-04-22 NOTE — CONSULT NOTE ADULT - SUBJECTIVE AND OBJECTIVE BOX
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    Pt states she is supposed to go to a [facility] for treatment, but as soon as she arrived home, she had SOB and had to come back. Pt hasn't walked in 2-3 weeks. Pt states the lymphedema only started about 2 months ago. Pt states the oncologists here told her they can't do anything for her. Pt reports pain diffusely in her back and legs. Denies CP or abd pain. Abd is not much more swollen since her paracentesis. Pt denies fevers, chills, URI symptoms.   (16 Apr 2022 04:10)      PAST MEDICAL & SURGICAL HISTORY:  Gastric cancer  5/2011 no adjunt trement  Small bowel obstruction  Exp lap complicated with post op Ishemic bowel /perforation  Perforated bowel  Other abdominal hernia  Obesity (BMI 30-39.9)  History of gastrectomy  distal gastrectomy w/ B2 reconstruction (gastrojejunostomy)  History of resection of small bowel  exlap, SBR, meckel&#x27;s diverticulectomy (7/4/2015); exlap, SBR for necrotic bowel, left in discontinuity, Abthera (7/9/2015); exlap, SB anastomosis, Abthera (7/11/15); exlap, washout, abdominal closure w/ Strattice (7/13/15)        MEDICATIONS:  buMETAnide 2 milliGRAM(s) Oral two times a day  enoxaparin Injectable 40 milliGRAM(s) SubCutaneous every 24 hours        HYDROmorphone   Tablet 6 milliGRAM(s) Oral every 4 hours PRN  hydrOXYzine hydrochloride 25 milliGRAM(s) Oral every 6 hours PRN  melatonin 3 milliGRAM(s) Oral at bedtime PRN  morphine ER Tablet 60 milliGRAM(s) Oral every 8 hours    lactulose Syrup 20 Gram(s) Oral every 24 hours  polyethylene glycol 3350 17 Gram(s) Oral every 24 hours  senna 2 Tablet(s) Oral at bedtime  simethicone 80 milliGRAM(s) Chew three times a day PRN          FAMILY HISTORY:  FH: diabetes mellitus (Mother)  FH: pulmonary embolism (Mother)  Family history of abdominal aortic aneurysm (AAA) (Father)    Non-contributory    SOCIAL HISTORY:    Not an active smoker    Allergies    Cipro (Rash)  QUINOLONES (Unknown)    Intolerances      REVIEW OF SYSTEMS:  CONSTITUTIONAL: No fever  EYES: No eye pain, visual disturbances, or discharge  ENMT:  No difficulty hearing, tinnitus  NECK: No pain or stiffness  RESPIRATORY: No cough, wheezing,  CARDIOVASCULAR: No chest pain, palpitations, passing out, dizziness, or leg swelling  GASTROINTESTINAL:  No nausea, vomiting, diarrhea or constipation. No melena.  GENITOURINARY: No dysuria, hematuria  NEUROLOGICAL: No stroke like symptoms  SKIN: No burning or lesions   ENDOCRINE: No heat or cold intolerance  MUSCULOSKELETAL: No joint pain or swelling  PSYCHIATRIC: No  anxiety, mood swings  HEME/LYMPH: No bleeding gums  ALLERGY AND IMMUNOLOGIC: No hives or eczema	    All other ROS negative    PHYSICAL EXAM:  T(C): 36.4 (04-22-22 @ 12:28), Max: 36.9 (04-21-22 @ 21:24)  HR: 125 (04-22-22 @ 12:28) (125 - 127)  BP: 128/78 (04-22-22 @ 12:28) (93/56 - 128/78)  RR: 18 (04-22-22 @ 12:28) (18 - 19)  SpO2: 97% (04-22-22 @ 12:28) (97% - 97%)  Wt(kg): --  I&O's Summary    21 Apr 2022 07:01  -  22 Apr 2022 07:00  --------------------------------------------------------  IN: 660 mL / OUT: 380 mL / NET: 280 mL    22 Apr 2022 07:01  -  22 Apr 2022 15:26  --------------------------------------------------------  IN: 0 mL / OUT: 400 mL / NET: -400 mL        Appearance: Normal, lethargic	  HEENT:   Normal oral mucosa, EOMI	  Cardiovascular:  S1 S2, No JVD,    Respiratory: Diminshed B/L  Psychiatry: Lethargic  Gastrointestinal:  Distended abdomen	  Skin: No rashes   Neurologic: Non-focal  Extremities:  +2 edema B/L  Vascular: Peripheral pulses palpable    	    	  	  CARDIAC MARKERS:  Labs personally reviewed by me                                  7.4    5.17  )-----------( 249      ( 22 Apr 2022 05:26 )             24.9     04-22    135  |  88<L>  |  16  ----------------------------<  112<H>  3.6   |  37<H>  |  0.51    Ca    9.0      22 Apr 2022 05:26            EKG: Personally reviewed by me - ST  Radiology: Personally reviewed by me -       Assessment /Plan:      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 She was aggressively diuresed with some improvement in her symptoms. She presents now tachycardic and shortness of breath secondary to anxiety.    - No further cardiac workup or interventions at this time.  - Tachycardia likely i/s/o metabolic state and anxiety.  - Please continue supportive care and diuresis as per primary team.  - Palliative care appreciated.     Differential diagnosis and plan of care discussed with patient after the evaluation. Counseling on diet, nutritional counseling, weight management, exercise and medication compliance was done.   Advanced care planning/advanced directives discussed with patient/family. DNR status including forceful chest compressions to attempt to restart the heart, ventilator support/artificial breathing, electric shock, artificial nutrition, health care proxy, Molst form all discussed with pt. Pt wishes to consider. More than fifteen minutes spent on discussing advanced directives.     Louise Fitzgerald [facility]  Richard Howard DO [facility]  Cardiovascular Medicine  [address] [place] [address], [address]  Office [phone]  Cell [phone]
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    Pt states she is supposed to go to a [facility] for treatment, but as soon as she arrived home, she had SOB and had to come back. Pt hasn't walked in 2-3 weeks. Pt states the lymphedema only started about 2 months ago. Pt states the oncologists here told her they can't do anything for her. Pt reports pain diffusely in her back and legs. Denies CP or abd pain. Abd is not much more swollen since her paracentesis. Pt denies fevers, chills, URI symptoms.    Pulmonary consulted for persistent shortness of breath.      PAST MEDICAL & SURGICAL HISTORY:  Gastric cancer  5/2011 no adjunt trement    Small bowel obstruction  Exp lap complicated with post op Ishemic bowel /perforation    Perforated bowel    Other abdominal hernia    Obesity (BMI 30-39.9)    History of gastrectomy  distal gastrectomy w/ B2 reconstruction (gastrojejunostomy)    History of resection of small bowel  exlap, SBR, meckel&#x27;s diverticulectomy (7/4/2015); exlap, SBR for necrotic bowel, left in discontinuity, Abthera (7/9/2015); exlap, SB anastomosis, Abthera (7/11/15); exlap, washout, abdominal closure w/ Strattice (7/13/15)        FAMILY HISTORY:  FH: diabetes mellitus (Mother)    FH: pulmonary embolism (Mother)    Family history of abdominal aortic aneurysm (AAA) (Father)        SOCIAL HISTORY:  Smoking: [ ] Never Smoked [ ] Former Smoker (__ packs x ___ years) [ ] Current Smoker  (__ packs x ___ years)  Substance Use: [ ] Never Used [ ] Used ____  EtOH Use:  Marital Status: [ ] Single [ ]  [ ]  [ ]   Sexual History:   Occupation:  Recent Travel:  Country of Birth:  Advance Directives:    Allergies    Cipro (Rash)  QUINOLONES (Unknown)    Intolerances        HOME MEDICATIONS:    REVIEW OF SYSTEMS:  Constitutional: [X ] negative [ ] fevers [ ] chills [ ] weight loss [ ] weight gain  HEENT: [X] negative [ ] dry eyes [ ] eye irritation [ ] postnasal drip [ ] nasal congestion  CV: [X ] negative  [ ] chest pain [ ] orthopnea [ ] palpitations [ ] murmur  Resp: [ ] negative [ ] cough [X ] shortness of breath [ ] dyspnea [ ] wheezing [ ] sputum [ ] hemoptysis  GI: [ ] negative [ ] nausea [ ] vomiting [ ] diarrhea [X ] constipation [ ] abd pain [ ] dysphagia   : [X ] negative [ ] dysuria [ ] nocturia [ ] hematuria [ ] increased urinary frequency  Musculoskeletal: [ ] negative [X ] back pain [ ] myalgias [ ] arthralgias [X ] fracture  Skin: [ ] negative [ ] rash [ ] itch  Neurological: [ ] negative [ ] headache [ ] dizziness [ ] syncope [ ] weakness [ ] numbness  Psychiatric: [ ] negative [X ] anxiety [ ] depression  Endocrine: [ ] negative [ ] diabetes [ ] thyroid problem  Hematologic/Lymphatic: [ ] negative [ ] anemia [ ] bleeding problem  Allergic/Immunologic: [ ] negative [ ] itchy eyes [ ] nasal discharge [ ] hives [ ] angioedema  [ ] All other systems negative  [ ] Unable to assess ROS because ________    OBJECTIVE:  ICU Vital Signs Last 24 Hrs  T(C): 36.7 (19 Apr 2022 12:23), Max: 36.8 (19 Apr 2022 04:59)  T(F): 98.1 (19 Apr 2022 12:23), Max: 98.3 (19 Apr 2022 04:59)  HR: 120 (19 Apr 2022 15:22) (107 - 132)  BP: 106/62 (19 Apr 2022 15:22) (91/60 - 120/71)  BP(mean): --  ABP: --  ABP(mean): --  RR: 18 (19 Apr 2022 12:23) (18 - 19)  SpO2: 98% (19 Apr 2022 12:23) (93% - 100%)        04-18 @ 07:01  -  04-19 @ 07:00  --------------------------------------------------------  IN: 540 mL / OUT: 600 mL / NET: -60 mL    04-19 @ 07:01  -  04-19 @ 16:58  --------------------------------------------------------  IN: 290 mL / OUT: 500 mL / NET: -210 mL      CAPILLARY BLOOD GLUCOSE          PHYSICAL EXAM:  General: ill appearing female, sitting in chair, on O2 NC   HEENT: no icterus  Neck: suppled  Respiratory: bilateral crackles  Cardiovascular: s1/s2, rrr  Abdomen: soft, nontender, mildly distended  Extremities: 4+ pitting edema  Skin: no rashes  Neurological: AxOx3  Psychiatry: anxious     HOSPITAL MEDICATIONS:  enoxaparin Injectable 40 milliGRAM(s) SubCutaneous every 24 hours      buMETAnide 2 milliGRAM(s) Oral two times a day        HYDROmorphone   Tablet 6 milliGRAM(s) Oral every 4 hours PRN  HYDROmorphone  Injectable 0.75 milliGRAM(s) IV Push every 4 hours PRN  LORazepam   Injectable 0.5 milliGRAM(s) IV Push every 4 hours PRN  melatonin 3 milliGRAM(s) Oral at bedtime PRN  morphine ER Tablet 60 milliGRAM(s) Oral every 8 hours    lactulose Syrup 20 Gram(s) Oral every 24 hours  polyethylene glycol 3350 17 Gram(s) Oral every 24 hours  senna 2 Tablet(s) Oral at bedtime  simethicone 80 milliGRAM(s) Chew three times a day PRN                  LABS:                        7.7    9.08  )-----------( 254      ( 19 Apr 2022 06:18 )             26.0     Hgb Trend: 7.7<--, 7.7<--, 7.8<--, 8.8<--, 8.8<--  04-19    135  |  89<L>  |  10  ----------------------------<  101<H>  3.7   |  33<H>  |  0.57    Ca    8.5      19 Apr 2022 06:18      Creatinine Trend: 0.57<--, 0.48<--, 0.55<--, 0.53<--, 0.56<--, 0.56<--            MICROBIOLOGY:     RADIOLOGY:  [ ] Reviewed and interpreted by me    PULMONARY FUNCTION TESTS:    EKG:
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    Pt states she is supposed to go to a [facility] for treatment, but as soon as she arrived home, she had SOB and had to come back. Pt hasn't walked in 2-3 weeks. Pt states the lymphedema only started about 2 months ago. Pt states the oncologists here told her they can't do anything for her. Pt reports pain diffusely in her back and legs. Denies CP or abd pain. Abd is not much more swollen since her paracentesis. Pt denies fevers, chills, URI symptoms.    Additional HPI: Pt is well known to me from recent admission. Unfortunately she was only discharged for a few hours before returning to the hospital on saturday. Pt seen this morning with cousin and aunt at bedside. She states she got home and felt very panicked at home with pain and dyspnea, prompting return to [facility]. Since admit she was resumed on her recent regimen of MS contin 60 mg TID and oral dilaudid 6 mg q4 prn, she feels comfortable on this regimen. She asked for IV dilaudid to be available like last admission for rescue in event of crisis pain/dyspnea.     Pt appears worried and dejected. She is not sure what the next step is. I advised for a family meeting between myself, her and her sister. Pt agrees for meeting tomorrow at 1PM.     PERTINENT PM/SXH:   Bariatric surg stat-unsp    Gastric cancer    Small bowel obstruction    Perforated bowel    Other abdominal hernia    Obesity (BMI 30-39.9)      History of gastrectomy    History of resection of small bowel      FAMILY HISTORY:  FH: diabetes mellitus (Mother)    FH: pulmonary embolism (Mother)    Family history of abdominal aortic aneurysm (AAA) (Father)      Family Hx substance abuse [ ]yes [ ]no  ITEMS NOT CHECKED ARE NOT PRESENT    SOCIAL HISTORY:   Significant other/partner[ x]  Children[x ]  Yazidi/Spirituality:  Substance hx:  [ ]   Tobacco hx:  [ ]   Alcohol hx: [ ]   Home Opioid hx:  [ ] I-Stop Reference No:  Living Situation: [x ]Home  [ ]Long term care  [ ]Rehab [ ]Other    ADVANCE DIRECTIVES:    DNR/MOLST  [ ]  Living Will  [ ]   DECISION MAKER(s):  [x ] Health Care Proxy(s)  [ ] Surrogate(s)  [ ] Guardian           Name(s): Phone Number(s):    BASELINE (I)ADL(s) (prior to admission):  [place]: [ ]Total  [ ] Moderate [x ]Dependent    Allergies    Cipro (Rash)  QUINOLONES (Unknown)    Intolerances    MEDICATIONS  (STANDING):  buMETAnide 2 milliGRAM(s) Oral two times a day  enoxaparin Injectable 40 milliGRAM(s) SubCutaneous every 24 hours  lactulose Syrup 20 Gram(s) Oral every 24 hours  morphine ER Tablet 60 milliGRAM(s) Oral every 8 hours  polyethylene glycol 3350 17 Gram(s) Oral every 24 hours    MEDICATIONS  (PRN):  HYDROmorphone   Tablet 6 milliGRAM(s) Oral every 4 hours PRN Mod to Severe Pain (7 - 10)  HYDROmorphone  Injectable 0.75 milliGRAM(s) IV Push every 4 hours PRN breakthrough pain  LORazepam     Tablet 0.5 milliGRAM(s) Oral every 6 hours PRN Anxiety  melatonin 3 milliGRAM(s) Oral at bedtime PRN Insomnia  simethicone 80 milliGRAM(s) Chew three times a day PRN Dyspepsia    PRESENT SYMPTOMS: [ ]Unable to self-report  [ ] CPOT [ ] PAINADs [ ] RDOS  Source if other than patient:  [ ]Family   [ ]Team     Pain: [ ]yes [ x]no  QOL impact -   Location -                    Aggravating factors -  Quality -  Radiation -  Timing-  Severity (0-10 scale):  Minimal acceptable level (0-10 scale):     CPOT:    https://www.sccm.org/getattachment/mfw00r17-3r9p-2a2b-4v1x-9154y1247u5a/Critical-Care-Pain-Observation-Tool-(CPOT)    PAIN AD Score:   http://geriatrictoolkit.[email or web address]/painad.pdf (press ctrl +  left click to view)    Dyspnea:                           [x ]Mild [ ]Moderate [ ]Severe      RDOS:  0 to 2  minimal or no respiratory distress   3  mild distress  4 to 6 moderate distress  >7 severe distress  https://Yeb[employer].net/handouts/hen/Respiratory_Distress_Observation_Scale.pdf (Ctrl +  left click to view)     Anxiety:                             [ ]Mild [ x]Moderate [ ]Severe  Fatigue:                             [ ]Mild [ ]Moderate [ ]Severe  Nausea:                             [ ]Mild [ ]Moderate [ ]Severe  Loss of appetite:              [ ]Mild [ ]Moderate [ ]Severe  Constipation:                    [ ]Mild [ ]Moderate [ ]Severe    Other Symptoms:  [x ]All other review of systems negative     Palliative Performance Status Version 2:   50      %    http://[facility].org/files/news/palliative_performance_scale_ppsv2.pdf  PHYSICAL EXAM:  Vital Signs Last 24 Hrs  T(C): 36.4 (18 Apr 2022 17:00), Max: 36.8 (17 Apr 2022 21:37)  T(F): 97.5 (18 Apr 2022 17:00), Max: 98.3 (17 Apr 2022 21:37)  HR: 107 (18 Apr 2022 17:00) (101 - 115)  BP: 105/63 (18 Apr 2022 17:00) (101/63 - 115/69)  BP(mean): --  RR: 18 (18 Apr 2022 17:00) (18 - 18)  SpO2: 96% (18 Apr 2022 17:00) (96% - 100%) I&O's Summary    17 Apr 2022 07:01  -  18 Apr 2022 07:00  --------------------------------------------------------  IN: 920 mL / OUT: 1750 mL / NET: -830 mL    18 Apr 2022 07:01  -  18 Apr 2022 18:06  --------------------------------------------------------  IN: 540 mL / OUT: 600 mL / NET: -60 mL      GENERAL: [ ]Cachexia    [x ]Alert  [x ]Oriented x3   [ ]Lethargic  [ ]Unarousable  [ ]Verbal  [ ]Non-Verbal  Behavioral:   [ x] Anxiety  [ ] Delirium [ ] Agitation [ ] Other  HEENT:  [x ]Normal   [ ]Dry mouth   [ ]ET Tube/Trach  [ ]Oral lesions  PULMONARY:   [ ]Clear [ ]Tachypnea  [ ]Audible excessive secretions   [ ]Rhonchi        [ ]Right [ ]Left [ ]Bilateral  [ ]Crackles        [ ]Right [ ]Left [ ]Bilateral  [ ]Wheezing     [ ]Right [ ]Left [ ]Bilateral  [x ]Diminished breath sounds [ ]right [ ]left [x ]bilateral  CARDIOVASCULAR:    [ x]Regular [ ]Irregular [ ]Tachy  [ ]Linus [ ]Murmur [ ]Other  GASTROINTESTINAL:  [x ]Soft  [ ]Distended   [x ]+BS  [x ]Non tender [ ]Tender  [ ]Other [ ]PEG [ ]OGT/ NGT  Last BM:  GENITOURINARY:  [x ]Normal [ ] Incontinent   [ ]Oliguria/Anuria   [ ]Ansari  MUSCULOSKELETAL:   [ ]Normal   [x ]Weakness  [ ]Bed/Wheelchair bound [ ]Edema  NEUROLOGIC:   [x ]No focal deficits  [ ]Cognitive impairment  [ ]Dysphagia [ ]Dysarthria [ ]Paresis [ ]Other   SKIN:   [x ]Normal  [ ]Rash  [ ]Other  [ ]Pressure ulcer(s)       Present on admission [ ]y [ ]n    CRITICAL CARE:  [ ] Shock Present  [ ]Septic [ ]Cardiogenic [ ]Neurologic [ ]Hypovolemic  [ ]  Vasopressors [ ]  Inotropes   [ ]Respiratory failure present [ ]Mechanical ventilation [ ]Non-invasive ventilatory support [ ]High flow    [ ]Acute  [ ]Chronic [ ]Hypoxic  [ ]Hypercarbic [ ]Other  [ ]Other organ failure     LABS:                        7.7    5.88  )-----------( 250      ( 18 Apr 2022 08:37 )             25.8   04-18    136  |  90<L>  |  8   ----------------------------<  101<H>  2.9<LL>   |  33<H>  |  0.48<L>    Ca    8.7      18 Apr 2022 06:25          RADIOLOGY & ADDITIONAL STUDIES:  Pt refusing imaging, amenable to bedside US    PROTEIN CALORIE MALNUTRITION PRESENT: [ ]mild [ ]moderate [ ]severe [ ]underweight [ ]morbid obesity  https://www.andeal.org/vault/3610/web/files/ONC/Table_Clinical%20Characteristics%20to%20Document%20Malnutrition-White%20JV%20et%20al%202012.pdf    Height (cm): 167.6 (04-15-22 @ 20:57), 167.6 (04-03-22 @ 08:47), 167.6 (03-28-22 @ 11:11)  Weight (kg): 90.7 (04-03-22 @ 08:47), 109.6 (03-28-22 @ 11:11), 113.4 (03-19-22 @ 17:35)  BMI (kg/m2): 32.3 (04-15-22 @ 20:57), 32.3 (04-03-22 @ 08:47), 39 (03-28-22 @ 11:11)    [ ]PPSV2 < or = to 30% [ ]significant weight loss  [ ]poor nutritional intake  [ ]anasarca[ ]Artificial Nutrition      REFERRALS:   [ ]Chaplaincy  [ ]Hospice  [ ]Child Life  [ ]Social Work  [ ]Case management [ ]Holistic Therapy     Goals of Care Document:

## 2022-04-22 NOTE — PROGRESS NOTE ADULT - PROBLEM SELECTOR PLAN 6
GOC discussed as noted above.   Patient and family remain hopeful to be able to travel to [place] to seek another opinion. Sister understands concern of medical providers regarding patients condition to travel but wishes to continue to try to optimize her.  Pt remains full code.

## 2022-04-22 NOTE — PROGRESS NOTE ADULT - NSPROGADDITIONALINFOA_GEN_ALL_CORE
above plans discussed with STEFAFNIE Parker  very poor prognosis, pt actively dying  pt remains full code at this time    Kristal Murphy MD  Division of Hospital Medicine  Contact via [employer] Teams  Office: [phone]

## 2022-04-22 NOTE — PROGRESS NOTE ADULT - PROBLEM SELECTOR PLAN 1
multifactorial: b/l pleural effusion, RML consolidation, POD lymphangitic spread, fluid overload, anasarca   - O2 requirement going up to 4L  - completed 7 day course of zosyn for PNA last admission  - no signs/symptoms of active infection at this time; hold off abx  - sinus tachy likely due to deconditioning, advanced cancer, metabolic state.   - diuresis as below

## 2022-04-22 NOTE — PROGRESS NOTE ADULT - PROBLEM SELECTOR PLAN 2
IV ativan d/c due to sedation  Pt refused PO xanax overnight  Continue atarax 25mg q6h PRN  Extensive discussion with sister at bedside regarding symptoms. Sister expressed concern regarding controlling patient's anxiety while keeping patient awake and alert as patient's wish is to go to [place] this weekend. Explained that at this point given patient's symptom burden and response to medications it will not be possible to keep patient awake and alert with anxiety completely controlled. Discussed options to try to maximize symptom control but explained that patient likely to experience increased sedation 2/2 medications in addition to progression of underlying disease as patient is lethargic today without use of benzodiazepines.   -If atarax ineffective discussed trying PO ativan 0.5mg q4-6h PRN knowing that patient may become sedated like with IV ativan. Sister understanding and would like to monitor symptoms on above regimen before trying PO ativan.

## 2022-04-22 NOTE — PROGRESS NOTE ADULT - SUBJECTIVE AND OBJECTIVE BOX
Patient is a 51y old  Female who presents with a chief complaint of sob (21 Apr 2022 11:52)      SUBJECTIVE / OVERNIGHT EVENTS:  pt continues to have tachycardia in setting of severe anxiety  dc'ed ativan yesterday as pt was too lethargic/somnolent  pt complained of sob overnight, refused xanax, received     ROS:  14 point ROS negative in detail except stated as above    MEDICATIONS  (STANDING):  buMETAnide 2 milliGRAM(s) Oral two times a day  enoxaparin Injectable 40 milliGRAM(s) SubCutaneous every 24 hours  lactulose Syrup 20 Gram(s) Oral every 24 hours  morphine ER Tablet 60 milliGRAM(s) Oral every 8 hours  polyethylene glycol 3350 17 Gram(s) Oral every 24 hours  senna 2 Tablet(s) Oral at bedtime    MEDICATIONS  (PRN):  HYDROmorphone   Tablet 6 milliGRAM(s) Oral every 4 hours PRN Mod to Severe Pain (7 - 10)  hydrOXYzine hydrochloride 25 milliGRAM(s) Oral every 6 hours PRN Anxiety  melatonin 3 milliGRAM(s) Oral at bedtime PRN Insomnia  simethicone 80 milliGRAM(s) Chew three times a day PRN Dyspepsia      CAPILLARY BLOOD GLUCOSE        I&O's Summary    21 Apr 2022 07:01  -  22 Apr 2022 07:00  --------------------------------------------------------  IN: 660 mL / OUT: 380 mL / NET: 280 mL        PHYSICAL EXAM:  Vital Signs Last 24 Hrs  T(C): 36.3 (22 Apr 2022 04:00), Max: 36.9 (21 Apr 2022 21:24)  T(F): 97.3 (22 Apr 2022 04:00), Max: 98.4 (21 Apr 2022 21:24)  HR: 126 (22 Apr 2022 04:00) (110 - 127)  BP: 93/56 (22 Apr 2022 04:00) (93/56 - 116/66)  BP(mean): 66 (21 Apr 2022 21:24) (66 - 66)  RR: 18 (22 Apr 2022 04:00) (18 - 19)  SpO2: 97% (22 Apr 2022 04:00) (97% - 98%)  GENERAL: NAD, well-developed  HEAD:  Atraumatic, Normocephalic  EYES: EOMI, PERRLA, conjunctiva and sclera clear  NECK: Supple, No JVD  CHEST/LUNG: Clear to auscultation bilaterally; No wheeze  HEART: Regular rate and rhythm; No murmurs, rubs, or gallops  ABDOMEN: Soft, Nontender, Nondistended; Bowel sounds present  EXTREMITIES:  2+ Peripheral Pulses, No clubbing, cyanosis, or edema  NEUROLOGY: AAOx3; non-focal  SKIN: No rashes or lesions    LABS:                        7.4    5.17  )-----------( 249      ( 22 Apr 2022 05:26 )             24.9     04-22    135  |  88<L>  |  16  ----------------------------<  112<H>  3.6   |  37<H>  |  0.51    Ca    9.0      22 Apr 2022 05:26                RADIOLOGY & ADDITIONAL TESTS:    Imaging Personally Reviewed:    Consultant(s) Notes Reviewed:      Care Discussed with Consultants/Other Providers:   Patient is a 51y old  Female who presents with a chief complaint of sob (21 Apr 2022 11:52)      SUBJECTIVE / OVERNIGHT EVENTS:  pt continues to have tachycardia in setting of severe anxiety  dc'ed ativan yesterday as pt was too lethargic/somnolent  pt complained of sob overnight, refused xanax, received atarax and morphine  pt sleeping this morning    spoke with sister at bedside - pt still wants to go to [place] and flight scheduled for this Sunday  she feels bad but doesn't know what to tell her    tele reviewed: sinus 60-100s    ROS:  14 point ROS negative in detail except stated as above    MEDICATIONS  (STANDING):  buMETAnide 2 milliGRAM(s) Oral two times a day  enoxaparin Injectable 40 milliGRAM(s) SubCutaneous every 24 hours  lactulose Syrup 20 Gram(s) Oral every 24 hours  morphine ER Tablet 60 milliGRAM(s) Oral every 8 hours  polyethylene glycol 3350 17 Gram(s) Oral every 24 hours  senna 2 Tablet(s) Oral at bedtime    MEDICATIONS  (PRN):  HYDROmorphone   Tablet 6 milliGRAM(s) Oral every 4 hours PRN Mod to Severe Pain (7 - 10)  hydrOXYzine hydrochloride 25 milliGRAM(s) Oral every 6 hours PRN Anxiety  melatonin 3 milliGRAM(s) Oral at bedtime PRN Insomnia  simethicone 80 milliGRAM(s) Chew three times a day PRN Dyspepsia      CAPILLARY BLOOD GLUCOSE        I&O's Summary    21 Apr 2022 07:01  -  22 Apr 2022 07:00  --------------------------------------------------------  IN: 660 mL / OUT: 380 mL / NET: 280 mL        PHYSICAL EXAM:  Vital Signs Last 24 Hrs  T(C): 36.3 (22 Apr 2022 04:00), Max: 36.9 (21 Apr 2022 21:24)  T(F): 97.3 (22 Apr 2022 04:00), Max: 98.4 (21 Apr 2022 21:24)  HR: 126 (22 Apr 2022 04:00) (110 - 127)  BP: 93/56 (22 Apr 2022 04:00) (93/56 - 116/66)  BP(mean): 66 (21 Apr 2022 21:24) (66 - 66)  RR: 18 (22 Apr 2022 04:00) (18 - 19)  SpO2: 97% (22 Apr 2022 04:00) (97% - 98%)    GENERAL: lethargic, in distress  HEAD:  Atraumatic, Normocephalic  EYES: EOMI, PERRLA, conjunctiva and sclera clear  NECK: Supple, No JVD  CHEST/LUNG: rhonchi  HEART: Regular rate and rhythm; No murmurs, rubs, or gallops  ABDOMEN: Soft, Nontender, Nondistended; Bowel sounds present  EXTREMITIES:  4+ LE pitting edema  NEUROLOGY: AAOx3; non-focal  SKIN: No rashes or lesions    LABS:  personally reviewed                        7.4    5.17  )-----------( 249      ( 22 Apr 2022 05:26 )             24.9     04-22    135  |  88<L>  |  16  ----------------------------<  112<H>  3.6   |  37<H>  |  0.51    Ca    9.0      22 Apr 2022 05:26                RADIOLOGY & ADDITIONAL TESTS:    Imaging Personally Reviewed:    Consultant(s) Notes Reviewed:  palliative care    Care Discussed with Consultants/Other Providers: Dr. Jim (pall)

## 2022-04-22 NOTE — PROGRESS NOTE ADULT - TIME BILLING
providing education regarding risk/benefits of medications for symptom management and counseling on what to expect given patient's advanced disease process

## 2022-04-22 NOTE — PROGRESS NOTE ADULT - SUBJECTIVE AND OBJECTIVE BOX
SUBJECTIVE AND OBJECTIVE: Pt seen and examined at bedside. Pt reports feeling her heart racing but shortly after fell asleep during encounter. Sister and cousin present at bedside. Sister expresses concern regarding controlling patient's symptoms while trying to optimizer her to fly to [place] this weekend.   Indication for Geriatrics and Palliative Care Services: symptom management and GOC in setting of advanced malignancy   INTERVAL HPI: IV ativan d/c yesterday due to concern for sedation. Pt refused PO xanax overnight. Received PO atarax x2 in last 24 hours.     DNR on chart:  Allergies    Cipro (Rash)  QUINOLONES (Unknown)    Intolerances    MEDICATIONS  (STANDING):  buMETAnide 2 milliGRAM(s) Oral two times a day  enoxaparin Injectable 40 milliGRAM(s) SubCutaneous every 24 hours  lactulose Syrup 20 Gram(s) Oral every 24 hours  morphine ER Tablet 60 milliGRAM(s) Oral every 8 hours  polyethylene glycol 3350 17 Gram(s) Oral every 24 hours  senna 2 Tablet(s) Oral at bedtime    MEDICATIONS  (PRN):  HYDROmorphone   Tablet 6 milliGRAM(s) Oral every 4 hours PRN Mod to Severe Pain (7 - 10)  hydrOXYzine hydrochloride 25 milliGRAM(s) Oral every 6 hours PRN Anxiety  melatonin 3 milliGRAM(s) Oral at bedtime PRN Insomnia  simethicone 80 milliGRAM(s) Chew three times a day PRN Dyspepsia    ITEMS UNCHECKED ARE NOT PRESENT    PRESENT SYMPTOMS: [ ]Unable to self-report - see [ ] CPOT [ ] PAINADS [ ] RDOS  Source if other than patient:  [ ]Family   [ ]Team     Pain:  [ ]yes [x ]no   QOL impact -   Location -                    Aggravating factors -  Quality -  Radiation -  Timing-  Severity (0-10 scale):  Minimal acceptable level (0-10 scale):     CPOT:    https://www.[facility].org/getattachment/vri80i28-8u4r-9i1h-0q1z-5934d6795d1t/Critical-Care-Pain-Observation-Tool-(CPOT)    PAIN AD Score:	0  [email or web address].[email or web address]/cog/painad.pdf (Ctrl + left click to view)    Dyspnea:                           [ ]Mild [ x]Moderate [ ]Severe    RDOS:  0 to 2  minimal or no respiratory distress   3  mild distress  4 to 6 moderate distress  >7 severe distress  https://homecareinformation.net/handouts/hen/Respiratory_Distress_Observation_Scale.pdf (Ctrl +  left click to view)     Anxiety:                             [ ]Mild [ x]Moderate [ ]Severe  Fatigue:                             [ ]Mild [ ]Moderate [ ]Severe  Nausea:                             [ ]Mild [ ]Moderate [ ]Severe  Loss of appetite:              [ ]Mild [ ]Moderate [ ]Severe  Constipation:                    [ ]Mild [ ]Moderate [ ]Severe    Other Symptoms:  [x ]All other review of systems negative     Palliative Performance Status Version 2:     20    %      http://[facility].org/files/news/palliative_performance_scale_ppsv2.pdf  PHYSICAL EXAM:  Vital Signs Last 24 Hrs  T(C): 36.4 (22 Apr 2022 12:28), Max: 36.9 (21 Apr 2022 21:24)  T(F): 97.5 (22 Apr 2022 12:28), Max: 98.4 (21 Apr 2022 21:24)  HR: 125 (22 Apr 2022 12:28) (125 - 127)  BP: 128/78 (22 Apr 2022 12:28) (93/56 - 128/78)  BP(mean): 66 (21 Apr 2022 21:24) (66 - 66)  RR: 18 (22 Apr 2022 12:28) (18 - 19)  SpO2: 97% (22 Apr 2022 12:28) (97% - 97%)     GENERAL:    [ ]Cachexia    [ ]Alert  [ ]Oriented   [x ]Lethargic  [ ]Unarousable  [x]Verbal  [ ]Non-Verbal  Behavioral:   [ ] Anxiety  [ ] Delirium [ ] Agitation [ ] Other  HEENT:  [x ]Normal   [ ]Dry mouth   [ ]ET Tube/Trach  [ ]Oral lesions  PULMONARY:   [ ]Clear [ ]Tachypnea  [ ]Audible excessive secretions   [ ]Rhonchi        [ ]Right [ ]Left [ ]Bilateral  [ ]Crackles        [ ]Right [ ]Left [ ]Bilateral  [ ]Wheezing     [ ]Right [ ]Left [ ]Bilateral  [x ]Diminished breath sounds [ ]right [ ]left [x ]bilateral  CARDIOVASCULAR:    [ x]Regular [ ]Irregular [ ]Tachy  [ ]Linus [ ]Murmur [ ]Other  GASTROINTESTINAL:  [x ]Soft  [ ]Distended   [x ]+BS  [x ]Non tender [ ]Tender  [ ]Other [ ]PEG [ ]OGT/ NGT  Last BM:  GENITOURINARY:  [x ]Normal [ ] Incontinent   [ ]Oliguria/Anuria   [ ]Ansari  MUSCULOSKELETAL:   [ ]Normal   [x ]Weakness  [ ]Bed/Wheelchair bound [ ]Edema  NEUROLOGIC:   [ ]No focal deficits  [x ]Cognitive impairment  [ ]Dysphagia [ ]Dysarthria [ ]Paresis [ ]Other   SKIN:   [x ]Normal  [ ]Rash  [ ]Other  [ ]Pressure ulcer(s)       Present on admission [ ]y [ ]n    CRITICAL CARE:  [ ]Shock Present  [ ]Septic [ ]Cardiogenic [ ]Neurologic [ ]Hypovolemic  [ ]Vasopressors [ ]Inotropes  [ ]Respiratory failure present [ ]Mechanical Ventilation [ ]Non-invasive ventilatory support [ ]High-Flow   [ ]Acute  [ ]Chronic [ ]Hypoxic  [ ]Hypercarbic [ ]Other  [ ]Other organ failure     LABS:                                              7.4    5.17  )-----------( 249      ( 22 Apr 2022 05:26 )             24.9   04-22    135  |  88<L>  |  16  ----------------------------<  112<H>  3.6   |  37<H>  |  0.51    Ca    9.0      22 Apr 2022 05:26        RADIOLOGY & ADDITIONAL STUDIES:  < from: US Abdomen Limited (04.21.22 @ 12:26) >    ACC: [id number] EXAM:  US ABDOMEN LIMITED                          PROCEDURE DATE:  04/21/2022          INTERPRETATION:  CLINICAL INFORMATION: 51-year-old with malignant ascites.    COMPARISON: 4/11/2022    Ultrasound examination of the abdomen was performed at the bedside to   assess for ascites.    Moderate ascites is identified in the right upper and lower quadrants.   There is trace ascites in the left upper quadrant. Moderate ascites is   seen in the left lower quadrant.    IMPRESSION: Moderate ascites.      --- End of Report ---      JACINDA CARL MD; Attending Radiologist  This document has been electronically signed. Apr 21 2022 12:48PM    < end of copied text >      Protein Calorie Malnutrition Present: [ ]mild [ ]moderate [ ]severe [ ]underweight [ ]morbid obesity  https://www.andeal.org/vault/2510/web/files/ONC/Table_Clinical%20Characteristics%20to%20Document%20Malnutrition-White%20JV%20et%20al%008468.pdf    Height (cm): 167.6 (04-15-22 @ 20:57), 167.6 (04-03-22 @ 08:47), 167.6 (03-28-22 @ 11:11)  Weight (kg): 90.7 (04-03-22 @ 08:47), 109.6 (03-28-22 @ 11:11), 113.4 (03-19-22 @ 17:35)  BMI (kg/m2): 32.3 (04-15-22 @ 20:57), 32.3 (04-03-22 @ 08:47), 39 (03-28-22 @ 11:11)    [x]PPSV2 < or = 30%  [ ]significant weight loss [ ]poor nutritional intake [ ]anasarca[ ]Artificial Nutrition    REFERRALS:   [ ]Chaplaincy  [ ]Hospice  [ ]Child Life  [x]Social Work  [ ]Case management [ ]Holistic Therapy

## 2022-04-22 NOTE — CHART NOTE - NSCHARTNOTEFT_GEN_A_CORE
Notified by RN; pt with complaints of shortness of breath     51F RN c hx metastatic gastric ca s/p partial gastrectomy, with mets to bone, peritoneal carcinomatosis, lungs, never on treatment, recent r pleurx cath removal (3/28/22), paracentesis (most recently 4/12/22), on 2L NC O2, new lymphedema over 2 months, SBO s/p multiple resections and GJ stent, left arm pain without fractures after fall currently in sling, anxiety, anasarca, d/c'd 4/15 now here, pw SOB. Likely 2/2 progression of disease.    Pt seen and evaluated at bedside. Pt reports feeling anxious and mildly dyspneic. O2 saturation 98% on 2LNC. Pt's sister at bedside. Pt with no other acute complaints; denying active chest pain, abdominal pain, nausea, vomiting, headache, dizziness, visual changes, or fevers. Pt requesting medication review with writer. Provided at bedside with sister and pt.    Vital Signs  /63    Temperature 98.4F  SpO2 98%     LABS 4/20/2022               7.6    6.02  )-----------( 252      ( 20 Apr 2022 07:12 )             25.9     20 Apr 2022 06:58    133    |  88     |  14     ----------------------------<  99     3.5     |  34     |  0.60     Ca    8.9        20 Apr 2022 06:58  Phos  2.8       20 Apr 2022 06:58  Mg     1.8       20 Apr 2022 06:58    CAPILLARY BLOOD GLUCOSE    #Dyspnea in setting of AHRF known loculated pleural effusion, superimposed anxiety,   - 2LNC increased to 4LNC for comfort  - VS as above; sPO2 saturation 98%  - Pt refusing Xanax 0.25mg q6hrs prn, changed from Ativan 0.5mg IV q4hrs prn for severe anxiety 2ry to increased somnolence and lethargy 4/21/2022 AM.  - Atarax 25mg po x1 given  - Morphine ER 60mg x1 given  - Pt reports improvement in dyspnea  - Continue with addressment of GOC.  - Monitor VS closely

## 2022-04-22 NOTE — PROGRESS NOTE ADULT - PROBLEM SELECTOR PLAN 3
With pleural effusion that's loculated. On recent admission IR, Pulmonary and CT surgery were consulted, unable to offer another Pleurx due to loculation and positioning of the effusion. Continue supplementary O2 as needed.  - Pulmonary c/s complete, worsening findings c/f lymphangitic spread  - Continue MS Contin to 60mg Q8h  - Continue Dilaudid to 6mg PO Q4 hours for pain/dyspnea  - D/C Dilaudid IV 0.75mg Q4 hours for breakthrough severe pain/dyspnea as pt hoping to go to [place] this weekend  - D/C xanax as pt refusing and concern for sedation

## 2022-04-23 NOTE — PROGRESS NOTE ADULT - SUBJECTIVE AND OBJECTIVE BOX
Patient is a 51y old  Female who presents with a chief complaint of sob (23 Apr 2022 12:28)      SUBJECTIVE / OVERNIGHT EVENTS: Patient seen and examined at bedside. No acute events overnight. Patient remains in sinus tachycardia overnight. She requested several Atarax. She states she has poorly controlled pain and attributes to possibly slow response. She has sensation of dyspnea. She does not think she had bowel movement recently. Patient fixated on going to [place], but I told her medically she does not have the physical reserve to withstand the trip.    MEDICATIONS  (STANDING):  buMETAnide 2 milliGRAM(s) Oral two times a day  enoxaparin Injectable 40 milliGRAM(s) SubCutaneous every 24 hours  lactulose Syrup 20 Gram(s) Oral every 24 hours  melatonin 5 milliGRAM(s) Oral at bedtime  morphine ER Tablet 60 milliGRAM(s) Oral every 8 hours  polyethylene glycol 3350 17 Gram(s) Oral two times a day  potassium chloride    Tablet ER 40 milliEquivalent(s) Oral once  senna 2 Tablet(s) Oral at bedtime    MEDICATIONS  (PRN):  HYDROmorphone   Tablet 6 milliGRAM(s) Oral every 4 hours PRN Mod to Severe Pain (7 - 10)  HYDROmorphone  Injectable 2.5 milliGRAM(s) IV Push every 8 hours PRN Breakthrough in between PO Dilaudid  hydrOXYzine hydrochloride 25 milliGRAM(s) Oral every 6 hours PRN Anxiety  simethicone 80 milliGRAM(s) Chew three times a day PRN Dyspepsia      CAPILLARY BLOOD GLUCOSE        I&O's Summary    22 Apr 2022 07:01  -  23 Apr 2022 07:00  --------------------------------------------------------  IN: 0 mL / OUT: 1400 mL / NET: -1400 mL    23 Apr 2022 07:01  -  23 Apr 2022 15:33  --------------------------------------------------------  IN: 0 mL / OUT: 850 mL / NET: -850 mL        PHYSICAL EXAM:  Vital Signs Last 24 Hrs  T(C): 36.8 (23 Apr 2022 11:38), Max: 36.8 (23 Apr 2022 11:38)  T(F): 98.3 (23 Apr 2022 11:38), Max: 98.3 (23 Apr 2022 11:38)  HR: 123 (23 Apr 2022 11:38) (123 - 127)  BP: 106/62 (23 Apr 2022 11:38) (106/62 - 122/70)  BP(mean): --  RR: 18 (23 Apr 2022 11:38) (18 - 20)  SpO2: 98% (23 Apr 2022 11:38) (94% - 98%)    GEN: female appears in pain, writhing in bed  EYES: No scleral injection, PERRL, EOMI  ENTM: neck supple & symmetric without tracheal deviation, moist membranes, no gross hearing impairment, thyroid gland not enlarged  CV: +S1/S2, no m/r/g, no abdominal bruit  RESP: breathing comfortably, no respiratory accessory muscle use, CTAB, no w/r/r  GI: normoactive BS, soft, NTND, no rebounding/guarding, no palpable masses    LABS:                        7.7    5.64  )-----------( 279      ( 23 Apr 2022 10:12 )             26.6     04-23    137  |  87<L>  |  13  ----------------------------<  113<H>  3.2<L>   |  38<H>  |  0.45<L>    Ca    8.9      23 Apr 2022 10:12                  RADIOLOGY & ADDITIONAL TESTS:  Results Reviewed:   Imaging Personally Reviewed:  Electrocardiogram Personally Reviewed:    COORDINATION OF CARE:  Care Discussed with Consultants/Other Providers [Y/N]:  Prior or Outpatient Records Reviewed [Y/N]:

## 2022-04-23 NOTE — PROGRESS NOTE ADULT - NSPROGADDITIONALINFOA_GEN_ALL_CORE
Discussed with patient and sister at bedside. Patient is not cleared for discharge and I told her explicitly she does not have the reserve to make the trip. I will add extra Dilaudid and determine if I can increase her morphine. She is anxious, but BZD makes her drowsy. Currently on Atarax. Will monitor, GOC/palliative help appreciated

## 2022-04-23 NOTE — PROGRESS NOTE ADULT - PROBLEM SELECTOR PLAN 3
metastatic stage 4 gastric cancer, peritoneum, bones and malignant effusion/ascites.  refused disease modifying therapy in past    - wishes to fly to [place] for unconventional treatment with Dr. Layne- does not want us to discuss her case with him. I recommended against this but stated that we would optimize her as best we can should she decide to go.  - palliative reconsulted for refocusing of GOC: pt still wants to be full code

## 2022-04-23 NOTE — PROGRESS NOTE ADULT - PROBLEM SELECTOR PLAN 1
multifactorial: b/l pleural effusion, RML consolidation, POD lymphangitic spread, fluid overload, anasarca   - O2 requirement currently 2 L  - completed 7 day course of zosyn for PNA last admission  - no signs/symptoms of active infection at this time; hold off abx  - sinus tachy likely due to deconditioning, advanced cancer, metabolic state.   - diuresis as below

## 2022-04-23 NOTE — PROGRESS NOTE ADULT - PROBLEM SELECTOR PLAN 4
Palliative care eval appreciated:  - holding ativan given lethargy/somnolent  - MS Contin 60mg q8   - atarax 25mg prn for anxiety  - pt refusing xanax  - Dilaudid PO PRN Pain  - Add Dilaudid IV for breakthrough, will adjust frequency if requesting a lot  - Bowel regimen

## 2022-04-23 NOTE — PROGRESS NOTE ADULT - PROBLEM SELECTOR PLAN 2
Prior CT PE studies : limited studies with no PE , but shows Large Rt Loculated Pleural effusion that is slightly increased and complete consolidation of the RLL, patchy opacities in RML , small left pleural effusion , Diffuse interlobular septal thickening , may be edema Vs Lymphangitic carcinomatosis.    - c/w po Bumex 2mg po bid, lytes wnl  - appreciate pulm: POCUS at bedside without any drainable pleural effusion

## 2022-04-24 NOTE — PROGRESS NOTE ADULT - SUBJECTIVE AND OBJECTIVE BOX
Indication for Geriatrics and Palliative Care Services: symptom management and GOC in setting of advanced malignancy   INTERVAL HPI:   IV Dilaudid 2.5mg started by primary team yesterday for breakthrough pain.   In past 24 hours, received 3 prn doses of PO Dilaudid 6mg and 2 prn doses IV Dilaudid 2.5mg.     SUBJECTIVE AND OBJECTIVE:   Pt seen and examined at bedside. Pt lethargic but awakes to voice and goes in and out of sleep when speaking to her.   Sister at bedside. Sister concerned about patient's intermittent restlessness - see below for discussion about anxiety management.  See GOC note below       DNR on chart:  Allergies    Cipro (Rash)  QUINOLONES (Unknown)    Intolerances    MEDICATIONS  (STANDING):  buMETAnide 2 milliGRAM(s) Oral two times a day  enoxaparin Injectable 40 milliGRAM(s) SubCutaneous every 24 hours  lactulose Syrup 20 Gram(s) Oral three times a day  melatonin 5 milliGRAM(s) Oral at bedtime  morphine ER Tablet 60 milliGRAM(s) Oral every 8 hours  polyethylene glycol 3350 17 Gram(s) Oral two times a day  potassium chloride    Tablet ER 40 milliEquivalent(s) Oral once  senna 2 Tablet(s) Oral at bedtime    MEDICATIONS  (PRN):  HYDROmorphone   Tablet 6 milliGRAM(s) Oral every 4 hours PRN Mod to Severe Pain (7 - 10)  HYDROmorphone  Injectable 2.5 milliGRAM(s) IV Push every 8 hours PRN Breakthrough in between PO Dilaudid  hydrOXYzine hydrochloride 25 milliGRAM(s) Oral every 6 hours PRN Anxiety  simethicone 80 milliGRAM(s) Chew three times a day PRN Dyspepsia    ITEMS UNCHECKED ARE NOT PRESENT    PRESENT SYMPTOMS: [ ]Unable to self-report - see [ ] CPOT [ ] PAINADS [ ] RDOS  Source if other than patient:  [ ]Family   [ ]Team     Pain:  [ ]yes [x ]no   QOL impact -   Location -                    Aggravating factors -  Quality -  Radiation -  Timing-  Severity (0-10 scale):  Minimal acceptable level (0-10 scale):     CPOT:    https://www.[facility].org/getattachment/[email or web address]/Critical-Care-Pain-Observation-Tool-(CPOT)    PAIN AD Score:	0  [email or web address]/painad.pdf (Ctrl + left click to view)    Dyspnea:                           [ ]Mild [ x]Moderate [ ]Severe    RDOS:  0 to 2  minimal or no respiratory distress   3  mild distress  4 to 6 moderate distress  >7 severe distress  https://[email or web address]/handouts/hen/Respiratory_Distress_Observation_Scale.pdf (Ctrl +  left click to view)     Anxiety:                             [ ]Mild [ x]Moderate [ ]Severe  Fatigue:                             [ ]Mild [ ]Moderate [ ]Severe  Nausea:                             [ ]Mild [ ]Moderate [ ]Severe  Loss of appetite:              [ ]Mild [ ]Moderate [ ]Severe  Constipation:                    [ ]Mild [ ]Moderate [ ]Severe    Other Symptoms:  [x ]All other review of systems negative     Palliative Performance Status Version 2:     20    %      http://[email or web address].org/files/news/palliative_performance_scale_ppsv2.pdf    PHYSICAL EXAM:  Vital Signs Last 24 Hrs  T(C): 36.4 (24 Apr 2022 09:11), Max: 36.8 (23 Apr 2022 11:38)  T(F): 97.6 (24 Apr 2022 09:11), Max: 98.3 (23 Apr 2022 11:38)  HR: 120 (24 Apr 2022 10:34) (118 - 130)  BP: 118/68 (24 Apr 2022 10:34) (106/62 - 129/80)  BP(mean): --  RR: 18 (24 Apr 2022 10:34) (18 - 18)  SpO2: 100% (24 Apr 2022 10:34) (98% - 100%)     GENERAL:    [ ]Cachexia    [ ]Alert  [ ]Oriented   [x ]Lethargic  [ ]Unarousable  [x]Verbal  [ ]Non-Verbal  Behavioral:   [ ] Anxiety  [ ] Delirium [ ] Agitation [ ] Other  HEENT:  [x ]Normal   [ ]Dry mouth   [ ]ET Tube/Trach  [ ]Oral lesions  PULMONARY:   [ ]Clear [ ]Tachypnea  [ ]Audible excessive secretions   [ ]Rhonchi        [ ]Right [ ]Left [ ]Bilateral  [ ]Crackles        [ ]Right [ ]Left [ ]Bilateral  [ ]Wheezing     [ ]Right [ ]Left [ ]Bilateral  [x ]Diminished breath sounds [ ]right [ ]left [x ]bilateral  CARDIOVASCULAR:    [ x]Regular [ ]Irregular [ ]Tachy  [ ]Linus [ ]Murmur [ ]Other  GASTROINTESTINAL:  [x ]Soft  [ ]Distended   [x ]+BS  [x ]Non tender [ ]Tender  [ ]Other [ ]PEG [ ]OGT/ NGT  Last BM:  GENITOURINARY:  [x ]Normal [x ] Incontinent   [ ]Oliguria/Anuria   [ ]Ansari  MUSCULOSKELETAL:   [ ]Normal   [x ]Weakness  [ x]Bed/Wheelchair bound [ x]Edema in b/l lower extremities   NEUROLOGIC:   [ ]No focal deficits  [ ]Cognitive impairment  [ ]Dysphagia [ ]Dysarthria [ ]Paresis [x ]Other - encephalopathy   SKIN:   [x ]Normal  [ ]Rash  [ ]Other  [ ]Pressure ulcer(s)       Present on admission [ ]y [ ]n    CRITICAL CARE:  [ ]Shock Present  [ ]Septic [ ]Cardiogenic [ ]Neurologic [ ]Hypovolemic  [ ]Vasopressors [ ]Inotropes  [ ]Respiratory failure present [ ]Mechanical Ventilation [ ]Non-invasive ventilatory support [ ]High-Flow   [ ]Acute  [ ]Chronic [ ]Hypoxic  [ ]Hypercarbic [ ]Other  [ ]Other organ failure     LABS:                                                       8.0    5.86  )-----------( 294      ( 24 Apr 2022 06:21 )             27.2       04-24    138  |  88<L>  |  13  ----------------------------<  117<H>  3.5   |  39<H>  |  0.46<L>    Ca    9.1      24 Apr 2022 06:19  Phos  3.0     04-24  Mg     1.9     04-24    TPro  5.9<L>  /  Alb  2.8<L>  /  TBili  0.6  /  DBili  x   /  AST  23  /  ALT  20  /  AlkPhos  2041<H>  04-24      RADIOLOGY & ADDITIONAL STUDIES:  reviewed     Protein Calorie Malnutrition Present: [ ]mild [ ]moderate [ ]severe [ ]underweight [ ]morbid obesity  https://www.andeal.org/vault/3330/web/files/ONC/Table_Clinical%20Characteristics%20to%20Document%20Malnutrition-White%20JV%20et%20al%202012.pdf    Height (cm): 167.6 (04-15-22 @ 20:57), 167.6 (04-03-22 @ 08:47), 167.6 (03-28-22 @ 11:11)  Weight (kg): 90.7 (04-03-22 @ 08:47), 109.6 (03-28-22 @ 11:11), 113.4 (03-19-22 @ 17:35)  BMI (kg/m2): 32.3 (04-15-22 @ 20:57), 32.3 (04-03-22 @ 08:47), 39 (03-28-22 @ 11:11)    [x]PPSV2 < or = 30%  [ ]significant weight loss [ ]poor nutritional intake [ ]anasarca[ ]Artificial Nutrition    REFERRALS:   [ ]Chaplaincy  [ ]Hospice  [ ]Child Life  [x]Social Work  [ ]Case management [ ]Holistic Therapy

## 2022-04-24 NOTE — PROGRESS NOTE ADULT - PROBLEM SELECTOR PLAN 2
Continue atarax 25mg q6h PRN  Extensive discussion with sister at bedside regarding symptoms. Sister expressed concern regarding controlling patient's anxiety while keeping patient awake and alert as patient's wish is to go to [place]. Explained that at this point given patient's symptom burden and response to medications it will not be possible to keep patient awake and alert with anxiety completely controlled as family states she is restless if symptoms are not well managed. Discussed options to try to maximize symptom control but explained that patient likely to experience increased sedation 2/2 medications in addition to progression of underlying disease as patient is lethargic today without use of atarax in past 24 hours

## 2022-04-24 NOTE — PROGRESS NOTE ADULT - PROBLEM SELECTOR PLAN 3
With pleural effusion that's loculated. On recent admission IR, Pulmonary and CT surgery were consulted, unable to offer another Pleurx due to loculation and positioning of the effusion. Continue supplementary O2 as needed.  - Pulmonary c/s complete, worsening findings c/f lymphangitic spread  - Continue MS Contin to 60mg Q8h  - Continue Dilaudid to 6mg PO Q4 hours for pain/dyspnea  - started on IV Dilaudid 2.5mg q8 PRN for breakthrough pain by primary team; can consider decreasing dose to IV Dilaudid 1mg PRN for breakthrough pain if considered about oversedation

## 2022-04-24 NOTE — PROGRESS NOTE ADULT - PROBLEM SELECTOR PLAN 4
Palliative care eval appreciated:  - holding ativan given lethargy/somnolent  - MS Contin 60mg q8   - atarax 25mg prn for anxiety  - pt refusing xanax  - Dilaudid PO PRN Pain  - Add Dilaudid IV for breakthrough, will adjust frequency if requesting a lot  - Bowel regimen (increase Lactulose to TID)

## 2022-04-24 NOTE — PROGRESS NOTE ADULT - NSPROGADDITIONALINFOA_GEN_ALL_CORE
Given sizeable PRN use I did consider increasing standing Morphine, though will hold off for now given drowsiness. We will reassess patient tomorrow and consider increasing long acting. I increased patient's bowel regimen. Plan discussed with sister at bedside. Explained that patient would not be able to endure a commercial flight to [place].

## 2022-04-24 NOTE — PROGRESS NOTE ADULT - SUBJECTIVE AND OBJECTIVE BOX
Date of Service   04-24-22 @ 12:00    Patient is a 51y old  Female who presents with a chief complaint of sob (24 Apr 2022 10:52)      INTERVAL HISTORY: pt feels ok   TELEMETRY Personally reviewed: 'S-120's     REVIEW OF SYSTEMS:   CONSTITUTIONAL: No weakness  EYES/ENT: No visual changes; No throat pain  Neck: No pain or stiffness  Respiratory: No cough, wheezing, No shortness of breath  CARDIOVASCULAR: no chest pain or palpitations  GASTROINTESTINAL: No abdominal pain, no nausea, vomiting or hematemesis  GENITOURINARY: No dysuria, frequency or hematuria  NEUROLOGICAL: No stroke like symptoms  SKIN: No rashes    	  MEDICATIONS:  buMETAnide 2 milliGRAM(s) Oral two times a day        PHYSICAL EXAM:  T(C): 36.7 (04-24-22 @ 11:50), Max: 36.8 (04-23-22 @ 20:21)  HR: 120 (04-24-22 @ 11:50) (118 - 130)  BP: 113/70 (04-24-22 @ 11:50) (110/70 - 129/80)  RR: 18 (04-24-22 @ 11:50) (18 - 18)  SpO2: 100% (04-24-22 @ 11:50) (99% - 100%)  Wt(kg): --  I&O's Summary    23 Apr 2022 07:01  -  24 Apr 2022 07:00  --------------------------------------------------------  IN: 0 mL / OUT: 1300 mL / NET: -1300 mL          Appearance: In no distress	  HEENT:    PERRL, EOMI	  Cardiovascular:  S1 S2, No JVD  Respiratory: Lungs clear to auscultation	  Gastrointestinal:  Soft, ascitic, large , Non-tender, + BS	  Vascularature:  generalized anasarca LE   Psychiatric: Appropriate affect   Neuro: no acute focal deficits                               8.0    5.86  )-----------( 294      ( 24 Apr 2022 06:21 )             27.2     04-24    138  |  88<L>  |  13  ----------------------------<  117<H>  3.5   |  39<H>  |  0.46<L>    Ca    9.1      24 Apr 2022 06:19  Phos  3.0     04-24  Mg     1.9     04-24    TPro  5.9<L>  /  Alb  2.8<L>  /  TBili  0.6  /  DBili  x   /  AST  23  /  ALT  20  /  AlkPhos  2041<H>  04-24        Labs personally reviewed      ASSESSMENT/PLAN: 	    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 She was aggressively diuresed with some improvement in her symptoms. She presents now tachycardic and shortness of breath secondary to anxiety.    - No further cardiac workup or interventions at this time.  - Tachycardia likely i/s/o metabolic state and anxiety.  - -120's   - Please continue supportive care and diuresis as per primary team.  - on bumex   - Palliative care appreciated.   - Supplemental O2 to keep sats >92 and pt comfortable     - Per palliative: patient and family remain hopeful to be able to travel to [place] to seek another opinion. Sister understands concern of medical providers regarding patients condition to travel but wishes to continue to try to optimize her.      Andreina Cao [facility]   Richard Howard DO [facility]  Cardiovascular Medicine  [address], [address]  Office: [phone]

## 2022-04-24 NOTE — CHART NOTE - NSCHARTNOTESELECT_GEN_ALL_CORE
Event Note
POCUS
d/c appointment/Event Note
d/c/Event Note
Cynthia lift/Event Note
Discharge Planning - Oxygen Requirement./Event Note
Hypoxia/Event Note
ISTOP/Event Note
PULM
Thoracic Surgery/Off Service Note
WC/Event Note
WC/Event Note
d/c appointment/Event Note
d/c appt/Event Note
d/c/Event Note
lost balance

## 2022-04-24 NOTE — PROGRESS NOTE ADULT - SUBJECTIVE AND OBJECTIVE BOX
Patient is a 51y old  Female who presents with a chief complaint of sob (24 Apr 2022 12:00)      SUBJECTIVE / OVERNIGHT EVENTS: Patient seen and examined at bedside. No acute events overnight. Doing poorly. Cannot tell if pain is controlled. Non-verbal queues indicate patient is pain and distress. She has received approximately 172 MME with the extra Dilaudid that has been ordered. Sister thinks she is conditioned to request pain meds for other reasons such as anxiety.     MEDICATIONS  (STANDING):  artificial tears (preservative free) Ophthalmic Solution 1 Drop(s) Both EYES four times a day  buMETAnide 2 milliGRAM(s) Oral two times a day  enoxaparin Injectable 40 milliGRAM(s) SubCutaneous every 24 hours  lactulose Syrup 20 Gram(s) Oral three times a day  melatonin 5 milliGRAM(s) Oral at bedtime  morphine ER Tablet 60 milliGRAM(s) Oral every 8 hours  polyethylene glycol 3350 17 Gram(s) Oral two times a day  potassium chloride    Tablet ER 40 milliEquivalent(s) Oral once  senna 2 Tablet(s) Oral at bedtime    MEDICATIONS  (PRN):  HYDROmorphone   Tablet 6 milliGRAM(s) Oral every 4 hours PRN Mod to Severe Pain (7 - 10)  HYDROmorphone  Injectable 2.5 milliGRAM(s) IV Push every 8 hours PRN Breakthrough in between PO Dilaudid  hydrOXYzine hydrochloride 25 milliGRAM(s) Oral every 6 hours PRN Anxiety  simethicone 80 milliGRAM(s) Chew three times a day PRN Dyspepsia      CAPILLARY BLOOD GLUCOSE        I&O's Summary    23 Apr 2022 07:01  -  24 Apr 2022 07:00  --------------------------------------------------------  IN: 0 mL / OUT: 1300 mL / NET: -1300 mL        PHYSICAL EXAM:  Vital Signs Last 24 Hrs  T(C): 36.7 (24 Apr 2022 11:50), Max: 36.8 (23 Apr 2022 20:21)  T(F): 98 (24 Apr 2022 11:50), Max: 98.2 (23 Apr 2022 20:21)  HR: 128 (24 Apr 2022 14:36) (118 - 130)  BP: 113/61 (24 Apr 2022 14:36) (110/70 - 129/80)  BP(mean): --  RR: 22 (24 Apr 2022 14:36) (18 - 22)  SpO2: 97% (24 Apr 2022 14:36) (97% - 100%)    GEN: female slightly tachypneic, moderate distress  EYES: No scleral injection, PERRL, EOMI  ENTM: neck supple & symmetric without tracheal deviation, moist membranes, no gross hearing impairment, thyroid gland not enlarged  CV: +S1/S2, no m/r/g, no abdominal bruit, bilateral edema of feet  RESP: breathing comfortably, no respiratory accessory muscle use, CTAB, no w/r/r  GI: normoactive BS, soft, NTND, no rebounding/guarding, no palpable masses    LABS:                        8.0    5.86  )-----------( 294      ( 24 Apr 2022 06:21 )             27.2     04-24    138  |  88<L>  |  13  ----------------------------<  117<H>  3.5   |  39<H>  |  0.46<L>    Ca    9.1      24 Apr 2022 06:19  Phos  3.0     04-24  Mg     1.9     04-24    TPro  5.9<L>  /  Alb  2.8<L>  /  TBili  0.6  /  DBili  x   /  AST  23  /  ALT  20  /  AlkPhos  2041<H>  04-24                RADIOLOGY & ADDITIONAL TESTS:  Results Reviewed:   Imaging Personally Reviewed:  Electrocardiogram Personally Reviewed:    COORDINATION OF CARE:  Care Discussed with Consultants/Other Providers [Y/N]:  Prior or Outpatient Records Reviewed [Y/N]:

## 2022-04-24 NOTE — PROGRESS NOTE ADULT - PROBLEM SELECTOR PLAN 6
GOC discussed as noted above.   Patient and family remain hopeful to be able to travel to [place] to seek another opinion. Sister understands concern of medical providers regarding patients condition to travel but wishes to continue to try to optimize her.  Extensive discussion with sister at bedside regarding symptoms as above.

## 2022-04-25 NOTE — PROGRESS NOTE ADULT - SUBJECTIVE AND OBJECTIVE BOX
Patient is a 51y old  Female who presents with a chief complaint of sob (24 Apr 2022 12:00)      SUBJECTIVE / OVERNIGHT EVENTS:  no acute events overnight  pain regimen escalated over the weekend  pt agreed to DNR and focus on pain control     ROS:  14 point ROS negative in detail except stated as above    MEDICATIONS  (STANDING):  artificial tears (preservative free) Ophthalmic Solution 1 Drop(s) Both EYES four times a day  buMETAnide 2 milliGRAM(s) Oral two times a day  enoxaparin Injectable 40 milliGRAM(s) SubCutaneous every 24 hours  lactulose Syrup 20 Gram(s) Oral three times a day  melatonin 5 milliGRAM(s) Oral at bedtime  morphine ER Tablet 60 milliGRAM(s) Oral every 8 hours  polyethylene glycol 3350 17 Gram(s) Oral two times a day  potassium chloride    Tablet ER 40 milliEquivalent(s) Oral once  senna 2 Tablet(s) Oral at bedtime    MEDICATIONS  (PRN):  HYDROmorphone   Tablet 6 milliGRAM(s) Oral every 4 hours PRN Mod to Severe Pain (7 - 10)  HYDROmorphone  Injectable 1.5 milliGRAM(s) IV Push every 3 hours PRN severe pain or dyspnea  hydrOXYzine hydrochloride 25 milliGRAM(s) Oral every 6 hours PRN Anxiety  simethicone 80 milliGRAM(s) Chew three times a day PRN Dyspepsia      CAPILLARY BLOOD GLUCOSE        I&O's Summary    24 Apr 2022 07:01  -  25 Apr 2022 07:00  --------------------------------------------------------  IN: 720 mL / OUT: 1300 mL / NET: -580 mL    25 Apr 2022 07:01  -  25 Apr 2022 11:16  --------------------------------------------------------  IN: 0 mL / OUT: 0 mL / NET: 0 mL        PHYSICAL EXAM:  Vital Signs Last 24 Hrs  T(C): 36.7 (25 Apr 2022 11:07), Max: 36.7 (24 Apr 2022 11:50)  T(F): 98.1 (25 Apr 2022 11:07), Max: 98.1 (25 Apr 2022 11:07)  HR: 104 (25 Apr 2022 11:07) (104 - 128)  BP: 121/72 (25 Apr 2022 11:07) (113/61 - 127/74)  BP(mean): --  RR: 18 (25 Apr 2022 11:07) (18 - 22)  SpO2: 99% (25 Apr 2022 04:02) (97% - 100%)    GENERAL: in distress  HEAD:  Atraumatic, Normocephalic  EYES: EOMI, PERRLA, conjunctiva and sclera clear  NECK: Supple, No JVD  CHEST/LUNG: Clear to auscultation bilaterally; No wheeze  HEART: tachy  ABDOMEN: Soft, Nontender, Nondistended; Bowel sounds present  EXTREMITIES:  4+ pitting LE edema  NEUROLOGY: AAOx3; non-focal  SKIN: No rashes or lesions    LABS:  personally reviewed                        7.4    5.67  )-----------( 283      ( 25 Apr 2022 06:28 )             25.3     04-25    141  |  86<L>  |  12  ----------------------------<  111<H>  3.0<L>   |  40<H>  |  0.43<L>    Ca    9.2      25 Apr 2022 06:27  Phos  3.0     04-24  Mg     1.8     04-25    TPro  5.9<L>  /  Alb  2.8<L>  /  TBili  0.6  /  DBili  x   /  AST  23  /  ALT  20  /  AlkPhos  2041<H>  04-24              RADIOLOGY & ADDITIONAL TESTS:    Imaging Personally Reviewed:    Consultant(s) Notes Reviewed:  palliative    Care Discussed with Consultants/Other Providers: Dr. Jim (pall)

## 2022-04-25 NOTE — PROGRESS NOTE ADULT - PROBLEM SELECTOR PLAN 3
metastatic stage 4 gastric cancer, peritoneum, bones and malignant effusion/ascites.  refused disease modifying therapy in past    - wishes to fly to [place] for unconventional treatment with Dr. Layne- does not want us to discuss her case with him. I recommended against this but stated that we would optimize her as best we can should she decide to go.  - palliative closely following: pt agreed to focus on symptom management and DNR

## 2022-04-25 NOTE — PROGRESS NOTE ADULT - CONVERSATION DETAILS
Sister expresses concern regarding controlling patient's symptoms while trying to optimize her to fly to [place]. Sister states she is aware hospice may be the next step and that patient's restlessness may be signs that she is declining and may soon enter dying process, but she wishes to respect patient's wishes to got [place] for treatment if there is hope and a possibility that it may help her.  Shared with sister that patient is unstable for travel to [place] with her current condition. She acknowledges that she wants patient's symptom burden to be addressed while balancing her ability to be awake and alert.   All questions answered and emotional support provided.
Tried to elucidate patient's care preferences and goals. Initially she was amenable to repeat CT chest to evaluate status of her pleural effusion and see whether there was anything that could be tapped. Then shifted to say that her symptoms hadn't worsened and she'd remained stable on 2L NC so would like to forgo CT scan if possible.     Initially stated she was interested in flying to [place], but then expressed concerns over her ability to fly and safety of this plan, and also questioned the benefit of further chemo.    I discussed that while pt may derive more time from chemo, her disease is unfortunately incurable and that the toxicity and risks of chemo given in her state will likely predispose her to further iatrogenic complications and hospitalizations, infections, bleeding, etc. This would impair her quality of life.    Ultimately patient opted to forego CT scan for now and wishes to discuss further GOC with palliative care team before making any further decisions, including the decision to fly to [place].     For now she remains full code.
Patient unable to participate in goals of care discussion due to poor mentation. Patient's sister Jeannette present at bedside. Jeannette has good understanding of patient's current condition. She shared that patient remains hopeful to make it to [place] to see another opinion for alternative treatment of malignancy. We discussed patient's debilitated state and increasing symptom burden. She understands the challenges with optimizing patient to travel in her current state but would like to try everything possible as patient's wish is to go to [place]. Asked Jeannette what has been discussed with patient if she is not able to travel by plane to get to [place]. Jeannette shared that family is realistic that patient will need hospice services at that point. We also discussed code status and eJannette stated pt wishes to remain full code for now but aware that it will need to be discussed again, especially if patient is not able travel to [place]. All questions answered and emotional support provided.
Pt seen this morning alone in her room, she is awake and alert, asking repeatedly "what should I do." I recommended to transition to comfort measures with hospice to better control her symptoms, with understanding that her mentation may not be as alert. I recommended for DNR/DNI. Pt did agree to my recommendations. We then called her sister Eldon on the phone and informed her of these updates. Pt was able to confirm her wishes for DNR and comfort measures on the phone. Advised Eldon that we will return when she's at bedside to discuss next step ie tx to PCU.    Later this afternoon, palliative SW and myself returned to bedside to meet with pt, her sister and mother. Pt is very confused, fidgeting with bed control panel. Despite confusion, she is awake and conversant. She is no longer sure about DNR. She expressed a lot of fear and anxiety about it. I counselled that it's dying she is afraid of, rather than DNR, and DNR will protect her from unnecessary trauma/harm at end of life. Sister is fully understanding of my point but unable to make a decision for DNR for the patient. We briefly discussed the option to transfer to PCU to purely focus on symptom management/comfort, but pt and family is not ready for it at this time. The MOLST form that was created this morning was voided after this encounter.

## 2022-04-25 NOTE — PROGRESS NOTE ADULT - PROBLEM SELECTOR PLAN 2
Continue atarax 25mg q6h PRN  Consider restarting oral xanax 0.25 mg q4 prn, if uncontrolled can consider IV ativan 0.25 mg q4 prn. Pt understands and accepts sedation as side effect Pt is highly anxious which is likely exacerbated by ongoing dyspnea. She has tried ativan with heavy sedation.   Continue atarax 25mg q6h PRN  Will restart oral xanax 0.25 mg q4 prn for severe anxiety/panic attacks, if uncontrolled can consider adding IV ativan 0.25 mg q4 prn. Family understands and accepts sedation as side effect

## 2022-04-25 NOTE — PROGRESS NOTE ADULT - PROBLEM SELECTOR PLAN 6
ALLISONC discussed as noted above. GOC discussed as noted above. Code status full. Pt and family will consider DNR and transfer to PCU.

## 2022-04-25 NOTE — PROGRESS NOTE ADULT - TIME BILLING
time spent counseling family about management of symptom burden Symptom assessment and management, support counseling, coordination of care

## 2022-04-25 NOTE — PROGRESS NOTE ADULT - SUBJECTIVE AND OBJECTIVE BOX
Indication for Geriatrics and Palliative Care Services: symptom management and GOC in setting of advanced malignancy     INTERVAL HPI:   IV Dilaudid 2.5mg started by primary team over the weekend for breakthrough pain.   In past 24 hours, received 3 prn doses of IV Dilaudid 2.5mg PRN    SUBJECTIVE AND OBJECTIVE:   Pt seen and examined at bedside. She is awake and alert with moderate work of breathing. She asked repeated "what should I do." I recommended to transition to comfort measures with hospice to better control her symptoms, with understanding that her mentation may not be as alert. I recommended for DNR/DNI. Pt did agree to my recommendations. We then called her sister Eldon on the phone and informed her of these updates. Pt was able to confirm her wishes for DNR and comfort measures on the phone. Advised Eldon that we will return when she's at bedside to discuss next step ie tx to PCU. Also for the time being, will change IV dilaudid to 1.5 mg q3 prn as previous dosing was likely too high and too sedating.     HOLD NOTE    DNR on chart:  Allergies    Cipro (Rash)  QUINOLONES (Unknown)    Intolerances    MEDICATIONS  (STANDING):  artificial tears (preservative free) Ophthalmic Solution 1 Drop(s) Both EYES four times a day  buMETAnide 2 milliGRAM(s) Oral two times a day  enoxaparin Injectable 40 milliGRAM(s) SubCutaneous every 24 hours  lactulose Syrup 20 Gram(s) Oral three times a day  melatonin 5 milliGRAM(s) Oral at bedtime  morphine ER Tablet 60 milliGRAM(s) Oral every 8 hours  polyethylene glycol 3350 17 Gram(s) Oral two times a day  potassium chloride    Tablet ER 40 milliEquivalent(s) Oral once  senna 2 Tablet(s) Oral at bedtime    MEDICATIONS  (PRN):  HYDROmorphone   Tablet 6 milliGRAM(s) Oral every 4 hours PRN Mod to Severe Pain (7 - 10)  HYDROmorphone  Injectable 1.5 milliGRAM(s) IV Push every 3 hours PRN severe pain or dyspnea  hydrOXYzine hydrochloride 25 milliGRAM(s) Oral every 6 hours PRN Anxiety  simethicone 80 milliGRAM(s) Chew three times a day PRN Dyspepsia    ITEMS UNCHECKED ARE NOT PRESENT    PRESENT SYMPTOMS: [ ]Unable to self-report - see [ ] CPOT [ ] PAINADS [ ] RDOS  Source if other than patient:  [ ]Family   [ ]Team     Pain:  [ ]yes [x ]no   QOL impact -   Location -                    Aggravating factors -  Quality -  Radiation -  Timing-  Severity (0-10 scale):  Minimal acceptable level (0-10 scale):     CPOT:    https://www.[facility].org/getattachment/[email or web address]/Critical-Care-Pain-Observation-Tool-(CPOT)    PAIN AD Score:	0  http://geriatrictoolkit.[email or web address]/cog/painad.pdf (Ctrl + left click to view)    Dyspnea:                           [ ]Mild [ ]Moderate [ x]Severe    RDOS:  0 to 2  minimal or no respiratory distress   3  mild distress  4 to 6 moderate distress  >7 severe distress  https://[email or web address]/handouts/hen/Respiratory_Distress_Observation_Scale.pdf (Ctrl +  left click to view)     Anxiety:                             [ ]Mild [ x]Moderate [ ]Severe  Fatigue:                             [ ]Mild [ ]Moderate [ ]Severe  Nausea:                             [ ]Mild [ ]Moderate [ ]Severe  Loss of appetite:              [ ]Mild [ ]Moderate [ ]Severe  Constipation:                    [ ]Mild [ ]Moderate [ ]Severe    Other Symptoms:  [x ]All other review of systems negative     Palliative Performance Status Version 2:     30    %      http://[facility].org/files/news/palliative_performance_scale_ppsv2.pdf    PHYSICAL EXAM:  Vital Signs Last 24 Hrs  T(C): 36.7 (25 Apr 2022 11:07), Max: 36.7 (24 Apr 2022 11:50)  T(F): 98.1 (25 Apr 2022 11:07), Max: 98.1 (25 Apr 2022 11:07)  HR: 104 (25 Apr 2022 11:07) (104 - 128)  BP: 121/72 (25 Apr 2022 11:07) (113/61 - 127/74)  BP(mean): --  RR: 18 (25 Apr 2022 11:07) (18 - 22)  SpO2: 99% (25 Apr 2022 04:02) (97% - 100%)     GENERAL:    [ ]Cachexia    [x ]Alert  [x ]Oriented x3   [ ]Lethargic  [ ]Unarousable  [x]Verbal  [ ]Non-Verbal  Behavioral:   [ ] Anxiety  [ ] Delirium [ ] Agitation [ ] Other  HEENT:  [x ]Normal   [ ]Dry mouth   [ ]ET Tube/Trach  [ ]Oral lesions  PULMONARY:   [ ]Clear [ ]Tachypnea  [ ]Audible excessive secretions   [ ]Rhonchi        [ ]Right [ ]Left [ ]Bilateral  [ ]Crackles        [ ]Right [ ]Left [ ]Bilateral  [ ]Wheezing     [ ]Right [ ]Left [ ]Bilateral  [x ]Diminished breath sounds [ ]right [ ]left [x ]bilateral  CARDIOVASCULAR:    [ x]Regular [ ]Irregular [ ]Tachy  [ ]Linus [ ]Murmur [ ]Other  GASTROINTESTINAL:  [x ]Soft  [ ]Distended   [x ]+BS  [x ]Non tender [ ]Tender  [ ]Other [ ]PEG [ ]OGT/ NGT  Last BM:  GENITOURINARY:  [x ]Normal [x ] Incontinent   [ ]Oliguria/Anuria   [ ]Ansari  MUSCULOSKELETAL:   [ ]Normal   [x ]Weakness  [ x]Bed/Wheelchair bound [ x]Edema in b/l lower extremities   NEUROLOGIC:   [ ]No focal deficits  [ ]Cognitive impairment  [ ]Dysphagia [ ]Dysarthria [ ]Paresis [x ]Other - encephalopathy   SKIN:   [x ]Normal  [ ]Rash  [ ]Other  [ ]Pressure ulcer(s)       Present on admission [ ]y [ ]n    CRITICAL CARE:  [ ]Shock Present  [ ]Septic [ ]Cardiogenic [ ]Neurologic [ ]Hypovolemic  [ ]Vasopressors [ ]Inotropes  [ ]Respiratory failure present [ ]Mechanical Ventilation [ ]Non-invasive ventilatory support [ ]High-Flow   [ ]Acute  [ ]Chronic [ ]Hypoxic  [ ]Hypercarbic [ ]Other  [ ]Other organ failure     LABS:                                   7.4    5.67  )-----------( 283      ( 25 Apr 2022 06:28 )             25.3     04-25    141  |  86<L>  |  12  ----------------------------<  111<H>  3.0<L>   |  40<H>  |  0.43<L>    Ca    9.2      25 Apr 2022 06:27  Phos  3.0     04-24  Mg     1.8     04-25    TPro  5.9<L>  /  Alb  2.8<L>  /  TBili  0.6  /  DBili  x   /  AST  23  /  ALT  20  /  AlkPhos  2041<H>  04-24    RADIOLOGY & ADDITIONAL STUDIES:  reviewed     Protein Calorie Malnutrition Present: [ ]mild [ ]moderate [ ]severe [ ]underweight [ ]morbid obesity  https://www.andeal.org/vault/2440/web/files/ONC/Table_Clinical%20Characteristics%20to%20Document%20Malnutrition-White%20JV%20et%20al%650289.pdf    Height (cm): 167.6 (04-15-22 @ 20:57), 167.6 (04-03-22 @ 08:47), 167.6 (03-28-22 @ 11:11)  Weight (kg): 90.7 (04-03-22 @ 08:47), 109.6 (03-28-22 @ 11:11), 113.4 (03-19-22 @ 17:35)  BMI (kg/m2): 32.3 (04-15-22 @ 20:57), 32.3 (04-03-22 @ 08:47), 39 (03-28-22 @ 11:11)    [x]PPSV2 < or = 30%  [ ]significant weight loss [ ]poor nutritional intake [ ]anasarca[ ]Artificial Nutrition    REFERRALS:   [ ]Chaplaincy  [ ]Hospice  [ ]Child Life  [x]Social Work  [ ]Case management [ ]Holistic Therapy  Indication for Geriatrics and Palliative Care Services: symptom management and GOC in setting of advanced malignancy     INTERVAL HPI:   IV Dilaudid 2.5mg started by primary team over the weekend for breakthrough pain.   In past 24 hours, received 3 prn doses of IV Dilaudid 2.5mg PRN    SUBJECTIVE AND OBJECTIVE:   Pt seen and examined at bedside. She is awake and alert with moderate work of breathing. Her mentation was more clear this morning but became increasingly confused this afternoon. She continues to be highly anxious. Advise that we will add PRN xanax and lower IV dilaudid but make it more frequent. Advised we may not be able to adequately treat her symptoms without sedation.     Please see GOC section below for discussions on goals.     DNR on chart:  Allergies    Cipro (Rash)  QUINOLONES (Unknown)    Intolerances    MEDICATIONS  (STANDING):  artificial tears (preservative free) Ophthalmic Solution 1 Drop(s) Both EYES four times a day  buMETAnide 2 milliGRAM(s) Oral two times a day  enoxaparin Injectable 40 milliGRAM(s) SubCutaneous every 24 hours  lactulose Syrup 20 Gram(s) Oral three times a day  melatonin 5 milliGRAM(s) Oral at bedtime  morphine ER Tablet 60 milliGRAM(s) Oral every 8 hours  polyethylene glycol 3350 17 Gram(s) Oral two times a day  potassium chloride    Tablet ER 40 milliEquivalent(s) Oral once  senna 2 Tablet(s) Oral at bedtime    MEDICATIONS  (PRN):  HYDROmorphone   Tablet 6 milliGRAM(s) Oral every 4 hours PRN Mod to Severe Pain (7 - 10)  HYDROmorphone  Injectable 1.5 milliGRAM(s) IV Push every 3 hours PRN severe pain or dyspnea  hydrOXYzine hydrochloride 25 milliGRAM(s) Oral every 6 hours PRN Anxiety  simethicone 80 milliGRAM(s) Chew three times a day PRN Dyspepsia    ITEMS UNCHECKED ARE NOT PRESENT    PRESENT SYMPTOMS: [ ]Unable to self-report - see [ ] CPOT [ ] PAINADS [ ] RDOS  Source if other than patient:  [ ]Family   [ ]Team     Pain:  [ ]yes [x ]no   QOL impact -   Location -                    Aggravating factors -  Quality -  Radiation -  Timing-  Severity (0-10 scale):  Minimal acceptable level (0-10 scale):     CPOT:    https://www.[facility].org/getattachment/qxs04c75-3h5d-5t5s-4k6w-8602h3556d6c/Critical-Care-Pain-Observation-Tool-(CPOT)    PAIN AD Score:	0  http://geriatrictoolkit.[email or web address]/cog/painad.pdf (Ctrl + left click to view)    Dyspnea:                           [ ]Mild [ ]Moderate [ x]Severe    RDOS:  0 to 2  minimal or no respiratory distress   3  mild distress  4 to 6 moderate distress  >7 severe distress  https://[email or web address].[email or web address]/handouts/hen/Respiratory_Distress_Observation_Scale.pdf (Ctrl +  left click to view)     Anxiety:                             [ ]Mild [ x]Moderate [ ]Severe  Fatigue:                             [ ]Mild [ ]Moderate [ ]Severe  Nausea:                             [ ]Mild [ ]Moderate [ ]Severe  Loss of appetite:              [ ]Mild [ ]Moderate [ ]Severe  Constipation:                    [ ]Mild [ ]Moderate [ ]Severe    Other Symptoms:  [x ]All other review of systems negative     Palliative Performance Status Version 2:     30    %      http://npcrc.org/files/news/palliative_performance_scale_ppsv2.pdf    PHYSICAL EXAM:  Vital Signs Last 24 Hrs  T(C): 36.7 (25 Apr 2022 11:07), Max: 36.7 (24 Apr 2022 11:50)  T(F): 98.1 (25 Apr 2022 11:07), Max: 98.1 (25 Apr 2022 11:07)  HR: 104 (25 Apr 2022 11:07) (104 - 128)  BP: 121/72 (25 Apr 2022 11:07) (113/61 - 127/74)  BP(mean): --  RR: 18 (25 Apr 2022 11:07) (18 - 22)  SpO2: 99% (25 Apr 2022 04:02) (97% - 100%)     GENERAL:    [ ]Cachexia    [x ]Alert  [x ]Oriented x3   [ ]Lethargic  [ ]Unarousable  [x]Verbal  [ ]Non-Verbal  Behavioral:   [ ] Anxiety  [ ] Delirium [ ] Agitation [ ] Other  HEENT:  [x ]Normal   [ ]Dry mouth   [ ]ET Tube/Trach  [ ]Oral lesions  PULMONARY:   [ ]Clear [ ]Tachypnea  [ ]Audible excessive secretions   [ ]Rhonchi        [ ]Right [ ]Left [ ]Bilateral  [ ]Crackles        [ ]Right [ ]Left [ ]Bilateral  [ ]Wheezing     [ ]Right [ ]Left [ ]Bilateral  [x ]Diminished breath sounds [ ]right [ ]left [x ]bilateral  CARDIOVASCULAR:    [ x]Regular [ ]Irregular [ ]Tachy  [ ]Linus [ ]Murmur [ ]Other  GASTROINTESTINAL:  [x ]Soft  [ ]Distended   [x ]+BS  [x ]Non tender [ ]Tender  [ ]Other [ ]PEG [ ]OGT/ NGT  Last BM:  GENITOURINARY:  [x ]Normal [x ] Incontinent   [ ]Oliguria/Anuria   [ ]Ansari  MUSCULOSKELETAL:   [ ]Normal   [x ]Weakness  [ x]Bed/Wheelchair bound [ x]Edema in b/l lower extremities   NEUROLOGIC:   [ ]No focal deficits  [ ]Cognitive impairment  [ ]Dysphagia [ ]Dysarthria [ ]Paresis [x ]Other - encephalopathy   SKIN:   [x ]Normal  [ ]Rash  [ ]Other  [ ]Pressure ulcer(s)       Present on admission [ ]y [ ]n    CRITICAL CARE:  [ ]Shock Present  [ ]Septic [ ]Cardiogenic [ ]Neurologic [ ]Hypovolemic  [ ]Vasopressors [ ]Inotropes  [ ]Respiratory failure present [ ]Mechanical Ventilation [ ]Non-invasive ventilatory support [ ]High-Flow   [ ]Acute  [ ]Chronic [ ]Hypoxic  [ ]Hypercarbic [ ]Other  [ ]Other organ failure     LABS:                                   7.4    5.67  )-----------( 283      ( 25 Apr 2022 06:28 )             25.3     04-25    141  |  86<L>  |  12  ----------------------------<  111<H>  3.0<L>   |  40<H>  |  0.43<L>    Ca    9.2      25 Apr 2022 06:27  Phos  3.0     04-24  Mg     1.8     04-25    TPro  5.9<L>  /  Alb  2.8<L>  /  TBili  0.6  /  DBili  x   /  AST  23  /  ALT  20  /  AlkPhos  2041<H>  04-24    RADIOLOGY & ADDITIONAL STUDIES:  reviewed     Protein Calorie Malnutrition Present: [ ]mild [ ]moderate [ ]severe [ ]underweight [ ]morbid obesity  https://www.andeal.org/vault/2440/web/files/ONC/Table_Clinical%20Characteristics%20to%20Document%20Malnutrition-White%20JV%20et%20al%202012.pdf    Height (cm): 167.6 (04-15-22 @ 20:57), 167.6 (04-03-22 @ 08:47), 167.6 (03-28-22 @ 11:11)  Weight (kg): 90.7 (04-03-22 @ 08:47), 109.6 (03-28-22 @ 11:11), 113.4 (03-19-22 @ 17:35)  BMI (kg/m2): 32.3 (04-15-22 @ 20:57), 32.3 (04-03-22 @ 08:47), 39 (03-28-22 @ 11:11)    [x]PPSV2 < or = 30%  [ ]significant weight loss [ ]poor nutritional intake [ ]anasarca[ ]Artificial Nutrition    REFERRALS:   [ ]Chaplaincy  [ ]Hospice  [ ]Child Life  [x]Social Work  [ ]Case management [ ]Holistic Therapy

## 2022-04-25 NOTE — PROGRESS NOTE ADULT - SUBJECTIVE AND OBJECTIVE BOX
Date of Service   04-25-22 @ 12:15    Patient is a 51y old  Female who presents with a chief complaint of sob (25 Apr 2022 11:17)      INTERVAL HISTORY: pt feels ok  TELEMETRY Personally reviewed: -120's     REVIEW OF SYSTEMS:   CONSTITUTIONAL: No weakness  EYES/ENT: No visual changes; No throat pain  Neck: No pain or stiffness  Respiratory: No cough, wheezing, No shortness of breath  CARDIOVASCULAR: no chest pain or palpitations  GASTROINTESTINAL: No abdominal pain, no nausea, vomiting or hematemesis  GENITOURINARY: No dysuria, frequency or hematuria  NEUROLOGICAL: No stroke like symptoms  SKIN: No rashes    	  MEDICATIONS:  buMETAnide 2 milliGRAM(s) Oral two times a day        PHYSICAL EXAM:  T(C): 36.7 (04-25-22 @ 11:07), Max: 36.7 (04-25-22 @ 11:07)  HR: 104 (04-25-22 @ 11:07) (104 - 128)  BP: 121/72 (04-25-22 @ 11:07) (113/61 - 127/74)  RR: 18 (04-25-22 @ 11:07) (18 - 22)  SpO2: 99% (04-25-22 @ 04:02) (97% - 100%)  Wt(kg): --  I&O's Summary    24 Apr 2022 07:01  -  25 Apr 2022 07:00  --------------------------------------------------------  IN: 720 mL / OUT: 1300 mL / NET: -580 mL    25 Apr 2022 07:01  -  25 Apr 2022 12:15  --------------------------------------------------------  IN: 50 mL / OUT: 0 mL / NET: 50 mL          Appearance: In no distress	  HEENT:    PERRL, EOMI	  Cardiovascular:  S1 S2, No JVD  Respiratory: Lungs clear to auscultation	  Gastrointestinal:  Soft, large and ascitic, Non-tender, + BS	  Vascularature: anasarca b/l LE  Psychiatric: Appropriate affect   Neuro: no acute focal deficits                               7.4    5.67  )-----------( 283      ( 25 Apr 2022 06:28 )             25.3     04-25    141  |  86<L>  |  12  ----------------------------<  111<H>  3.0<L>   |  40<H>  |  0.43<L>    Ca    9.2      25 Apr 2022 06:27  Phos  3.0     04-24  Mg     1.8     04-25    TPro  5.9<L>  /  Alb  2.8<L>  /  TBili  0.6  /  DBili  x   /  AST  23  /  ALT  20  /  AlkPhos  2041<H>  04-24        Labs personally reviewed      ASSESSMENT/PLAN: 	    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 She was aggressively diuresed with some improvement in her symptoms. She presents now tachycardic and shortness of breath secondary to anxiety.    - No further cardiac workup or interventions at this time.  - Tachycardia likely i/s/o metabolic state and anxiety.  - -120's   - Please continue supportive care and diuresis as per primary team.  - on bumex   - Palliative care appreciated.   - Supplemental O2 to keep sats >92 and pt comfortable   - on atarax or anxiety   - Palliative following     Andreina Cao [facility]   Richard Howard DO [facility]  Cardiovascular Medicine  [address], [address]  Office: [phone]

## 2022-04-25 NOTE — PROGRESS NOTE ADULT - PROBLEM SELECTOR PLAN 4
Palliative care eval appreciated:  - holding ativan given lethargy/somnolent  - MS Contin 60mg q8   - atarax 25mg prn for anxiety  - pt refusing xanax  - dilaudid 1.5mg IV prn for pain  - Dilaudid PO PRN Pain  - Bowel regimen (increase Lactulose to TID)

## 2022-04-25 NOTE — PROGRESS NOTE ADULT - CONVERSATION/DISCUSSION
Diagnosis/Prognosis/Treatment Options
Diagnosis/Prognosis/MOLST Discussed/Treatment Options
Prognosis/MOLST Discussed/Treatment Options

## 2022-04-25 NOTE — CHART NOTE - NSCHARTNOTESELECT_GEN_ALL_CORE
Cardiology/Event Note
Event Note
Event Note
POCUS
Day Attending/Event Note
Event Note

## 2022-04-25 NOTE — PROGRESS NOTE ADULT - NSPROGADDITIONALINFOA_GEN_ALL_CORE
DNR order placed in [facility]  palliative team to further discuss with patient and sister re: PCU  very poor prognosis    Kristal Murphy MD  Division of Hospital Medicine  Contact via [employer] Teams  Office: [phone]

## 2022-04-25 NOTE — PROGRESS NOTE ADULT - PROBLEM SELECTOR PLAN 3
With pleural effusion that's loculated. On recent admission IR, Pulmonary and CT surgery were consulted, unable to offer another Pleurx due to loculation and positioning of the effusion. Continue supplementary O2 as needed.  - Pulmonary c/s complete, worsening findings c/f lymphangitic spread  - Continue MS Contin to 60mg Q8h  - Continue Dilaudid to 6mg PO Q4 hours for pain/dyspnea  - Change IV Dilaudid to 1.5mg q3 PRN for breakthrough pain and dyspnea

## 2022-04-26 NOTE — PROGRESS NOTE ADULT - NSPROGADDITIONALINFOA_GEN_ALL_CORE
DNR/DNI order placed in [facility], MOLST completed  very poor prognosis    Kristal Murphy MD  Division of Hospital Medicine  Contact via [employer] Teams  Office: [phone]

## 2022-04-26 NOTE — PROGRESS NOTE ADULT - SUBJECTIVE AND OBJECTIVE BOX
Patient is a 51y old  Female who presents with a chief complaint of sob (25 Apr 2022 12:15)      SUBJECTIVE / OVERNIGHT EVENTS:  pt changed her mind overnight and decided for DNR/DNI, comfort only  I confirmed the above decision with patient and sister at bedside  both in agreement with "symptom management" at this time  when brought up the PCU transfer, sister phrased it as "hospice" and pt wasn't ready for it yet  pt states that her pain is okay at this time  pt continues to endorse difficulty with breathing    tele reviewed: sinus 100-120s    ROS:  14 point ROS negative in detail except stated as above    MEDICATIONS  (STANDING):  artificial tears (preservative free) Ophthalmic Solution 1 Drop(s) Both EYES four times a day  buMETAnide 2 milliGRAM(s) Oral two times a day  enoxaparin Injectable 40 milliGRAM(s) SubCutaneous every 24 hours  lactulose Syrup 20 Gram(s) Oral three times a day  melatonin 5 milliGRAM(s) Oral at bedtime  morphine ER Tablet 60 milliGRAM(s) Oral every 8 hours  polyethylene glycol 3350 17 Gram(s) Oral two times a day  potassium chloride    Tablet ER 40 milliEquivalent(s) Oral once  senna 2 Tablet(s) Oral at bedtime    MEDICATIONS  (PRN):  ALPRAZolam 0.25 milliGRAM(s) Oral every 6 hours PRN anxiety/panic attacks  HYDROmorphone   Tablet 6 milliGRAM(s) Oral every 4 hours PRN Mod to Severe Pain (7 - 10)  HYDROmorphone  Injectable 1.5 milliGRAM(s) IV Push every 3 hours PRN severe pain or dyspnea  hydrOXYzine hydrochloride 25 milliGRAM(s) Oral every 6 hours PRN Anxiety  simethicone 80 milliGRAM(s) Chew three times a day PRN Dyspepsia      CAPILLARY BLOOD GLUCOSE        I&O's Summary    25 Apr 2022 07:01  -  26 Apr 2022 07:00  --------------------------------------------------------  IN: 50 mL / OUT: 850 mL / NET: -800 mL    26 Apr 2022 07:01  -  26 Apr 2022 10:57  --------------------------------------------------------  IN: 120 mL / OUT: 0 mL / NET: 120 mL        PHYSICAL EXAM:  Vital Signs Last 24 Hrs  T(C): 36.8 (26 Apr 2022 04:27), Max: 36.8 (26 Apr 2022 04:27)  T(F): 98.2 (26 Apr 2022 04:27), Max: 98.2 (26 Apr 2022 04:27)  HR: 123 (26 Apr 2022 04:27) (104 - 123)  BP: 113/64 (26 Apr 2022 04:27) (113/64 - 122/674)  BP(mean): --  RR: 19 (26 Apr 2022 04:27) (18 - 20)  SpO2: 100% (26 Apr 2022 04:27) (98% - 100%)    GENERAL: in mild distress, well-developed  HEAD:  Atraumatic, Normocephalic  EYES: EOMI, PERRLA, conjunctiva and sclera clear  NECK: Supple, No JVD  CHEST/LUNG: Clear to auscultation bilaterally; No wheeze  HEART: Regular rate and rhythm; No murmurs, rubs, or gallops  ABDOMEN: Soft, Nontender, Nondistended; Bowel sounds present  EXTREMITIES:  4+ pitting edema bilaterally  NEUROLOGY: AAOx3; non-focal  SKIN: No rashes or lesions    LABS:  personally reviewed                        7.5    6.16  )-----------( 304      ( 26 Apr 2022 06:34 )             25.4     04-26    139  |  85<L>  |  13  ----------------------------<  100<H>  3.3<L>   |  42<H>  |  0.42<L>    Ca    9.1      26 Apr 2022 06:34  Mg     1.8     04-25                RADIOLOGY & ADDITIONAL TESTS:    Imaging Personally Reviewed:    Consultant(s) Notes Reviewed:  palliative care    Care Discussed with Consultants/Other Providers: Dr. Jim (palliative)

## 2022-04-27 NOTE — DIETITIAN INITIAL EVALUATION ADULT - PERTINENT MEDS FT
MEDICATIONS  (STANDING):  artificial tears (preservative free) Ophthalmic Solution 1 Drop(s) Both EYES four times a day  Biotene Dry Mouth Oral Rinse 15 milliLiter(s) Swish and Spit three times a day  buMETAnide 2 milliGRAM(s) Oral two times a day  enoxaparin Injectable 40 milliGRAM(s) SubCutaneous every 24 hours  lactulose Syrup 20 Gram(s) Oral three times a day  melatonin 5 milliGRAM(s) Oral at bedtime  morphine ER Tablet 60 milliGRAM(s) Oral every 8 hours  polyethylene glycol 3350 17 Gram(s) Oral two times a day  senna 2 Tablet(s) Oral at bedtime    MEDICATIONS  (PRN):  ALPRAZolam 0.25 milliGRAM(s) Oral every 6 hours PRN anxiety/panic attacks  HYDROmorphone   Tablet 6 milliGRAM(s) Oral every 4 hours PRN Mod to Severe Pain (7 - 10)  HYDROmorphone  Injectable 1.5 milliGRAM(s) IV Push every 3 hours PRN severe pain or dyspnea  hydrOXYzine hydrochloride 25 milliGRAM(s) Oral every 6 hours PRN Anxiety  simethicone 80 milliGRAM(s) Chew three times a day PRN Dyspepsia

## 2022-04-27 NOTE — DIETITIAN INITIAL EVALUATION ADULT - PERTINENT LABORATORY DATA
04-27    141  |  84<L>  |  12  ----------------------------<  112<H>  2.9<LL>   |  43<H>  |  0.42<L>    Ca    8.9      27 Apr 2022 06:44  Mg     1.8     04-25    A1C with Estimated Average Glucose Result: 5.1 % (01-06-22 @ 06:47)

## 2022-04-27 NOTE — PROGRESS NOTE ADULT - SUBJECTIVE AND OBJECTIVE BOX
Patient is a 51y old  Female who presents with a chief complaint of sob (26 Apr 2022 10:56)      SUBJECTIVE / OVERNIGHT EVENTS:  no acute events overnight  pt took xanax last night but reports that it didn't help much  pt complains of dry mouth this morning    ROS:  14 point ROS negative in detail except stated as above    MEDICATIONS  (STANDING):  artificial tears (preservative free) Ophthalmic Solution 1 Drop(s) Both EYES four times a day  Biotene Dry Mouth Oral Rinse 15 milliLiter(s) Swish and Spit three times a day  buMETAnide 2 milliGRAM(s) Oral two times a day  enoxaparin Injectable 40 milliGRAM(s) SubCutaneous every 24 hours  lactulose Syrup 20 Gram(s) Oral three times a day  melatonin 5 milliGRAM(s) Oral at bedtime  morphine ER Tablet 60 milliGRAM(s) Oral every 8 hours  polyethylene glycol 3350 17 Gram(s) Oral two times a day  potassium chloride    Tablet ER 40 milliEquivalent(s) Oral every 4 hours  senna 2 Tablet(s) Oral at bedtime    MEDICATIONS  (PRN):  ALPRAZolam 0.25 milliGRAM(s) Oral every 6 hours PRN anxiety/panic attacks  HYDROmorphone   Tablet 6 milliGRAM(s) Oral every 4 hours PRN Mod to Severe Pain (7 - 10)  HYDROmorphone  Injectable 1.5 milliGRAM(s) IV Push every 3 hours PRN severe pain or dyspnea  hydrOXYzine hydrochloride 25 milliGRAM(s) Oral every 6 hours PRN Anxiety  simethicone 80 milliGRAM(s) Chew three times a day PRN Dyspepsia      CAPILLARY BLOOD GLUCOSE        I&O's Summary    26 Apr 2022 07:01  -  27 Apr 2022 07:00  --------------------------------------------------------  IN: 240 mL / OUT: 2300 mL / NET: -2060 mL        PHYSICAL EXAM:  Vital Signs Last 24 Hrs  T(C): 36.8 (27 Apr 2022 04:04), Max: 36.9 (26 Apr 2022 21:57)  T(F): 98.3 (27 Apr 2022 04:04), Max: 98.5 (26 Apr 2022 21:57)  HR: 128 (27 Apr 2022 04:04) (120 - 133)  BP: 118/64 (27 Apr 2022 04:04) (118/64 - 133/80)  BP(mean): --  RR: 18 (27 Apr 2022 04:04) (18 - 18)  SpO2: 95% (27 Apr 2022 04:04) (94% - 99%)    GENERAL: NAD, well-developed  HEAD:  Atraumatic, Normocephalic  EYES: EOMI, PERRLA, conjunctiva and sclera clear  NECK: Supple, No JVD  CHEST/LUNG: Clear to auscultation bilaterally; No wheeze  HEART: Regular rate and rhythm; No murmurs, rubs, or gallops  ABDOMEN: Soft, Nontender, Nondistended; Bowel sounds present  EXTREMITIES:  4+ pitting edema  NEUROLOGY: AAOx2 non-focal  SKIN: No rashes or lesions    LABS:  personally reviewed                        7.1    5.50  )-----------( 286      ( 27 Apr 2022 06:47 )             24.5     04-27    141  |  84<L>  |  12  ----------------------------<  112<H>  2.9<LL>   |  43<H>  |  0.42<L>    Ca    8.9      27 Apr 2022 06:44  Mg     1.8     04-25                RADIOLOGY & ADDITIONAL TESTS:    Imaging Personally Reviewed:    Consultant(s) Notes Reviewed:  palliative care    Care Discussed with Consultants/Other Providers: Dr. Jim (palliative care)

## 2022-04-27 NOTE — PROGRESS NOTE ADULT - PROBLEM SELECTOR PROBLEM 7
Clavicular fracture
Palliative care encounter
Clavicular fracture
Palliative care encounter
Clavicular fracture
Palliative care encounter
Clavicular fracture

## 2022-04-27 NOTE — PROGRESS NOTE ADULT - PROBLEM SELECTOR PLAN 7
Ortho f/up   pain control per palliative  - continue sling for now
Ortho f/up   pain control per palliative  - continue sling for now
Case discussed with ACP of primary team    In the event of newly developing, evolving, or worsening symptoms, please contact the Palliative Medicine team via pager (if the patient is at [facility] #[phone] or if the patient is at [facility] #[phone]) The Geriatric and Palliative Medicine service has coverage 24 hours a day/ 7 days a week to provide medical recommendations regarding symptom management needs via telephone.
Ortho f/up   pain control per palliative  - continue sling for now
Case discussed with primary team attending, Dr. Murphy.     If patient is discharged over weekend please d/c with following medications:  MS Continue 30mg q8h ATC  PO dilaudid 6mg q4h PRN moderate to severe pain  PO atarax 25mg q6h PRN anxiety   bowel regimen  narcan nasal kit    For acute issues or uncontrolled symptoms please page palliative team.    Jerrica Cartwright MD  Geriatrics and Palliative Medicine Attending  [facility] pager: [phone]     The Geriatrics and Palliative Medicine consult service has 24/7 coverage for medical recommendations, including symptom management needs.
Ortho f/up   pain control per palliative  - continue sling for now
Case discussed with ACP of primary team    In the event of newly developing, evolving, or worsening symptoms, please contact the Palliative Medicine team via pager (if the patient is at [facility] #[phone] or if the patient is at [facility] #[phone]) The Geriatric and Palliative Medicine service has coverage 24 hours a day/ 7 days a week to provide medical recommendations regarding symptom management needs via telephone.

## 2022-04-27 NOTE — GOALS OF CARE CONVERSATION - ADVANCED CARE PLANNING - CONVERSATION DETAILS
Family meeting held this afternoon with palliative team, patient, pt's ex- Constantin and sister/HCP Jeannette. Family revealed that following discharge from the hospital this past weekend, she became very dyspneic and anxious which prompted quick return to the hospital. Pt felt it was because she didn't have a hospital bed that brought her back. Pt continues to voice her goal of going to [place] to get treatment. She has rescheduled her appt to next Monday.    Given her significant debility and symptom burden, I advised pt and family that her goal of going to [place] is unrealistic and unattainable at this time. Explained my rational behind my recommendation including the many barriers in the steps of traveling, as well as how unsafe it would be. Family was able to understand and accept this, patient however is unwilling. I expressed my concern for pt spending her limited ingrid time/energy on working towards an unrealistic goal rather than being with her children and family. Again discussed the risks and low chances with CPR in event of cardiac arrest. Pt states she thought about DNR last night but changed her mind to full code this morning. She will need more time to think about her code status. Meanwhile, she asks for pulmonary consult to see if there's anything that can be done for her breathing. Emotional support provided to pt and family throughout our conversation. Advised we will continue to follow.    Case d/w primary team including pt request for pulm c/s.
Called by primary team that pt's sister/HCP Eldon would like to further discuss PCU transfer. Note that her code status was changed to DNR/DNI yesterday with primary team. Please refer to my other [facility] notes for previous [facility] discussions.    Today Eldon states that since our last conversation, pt continued to be confused in addition to having ongoing severe anxiety. Eldon understands that pt cannot safely go to [place] for more treatment. At this time she is ready to transition the focus of care to comfort measures only with transfer to palliative care unit. Advised I will d/c lab draws and non-essential meds at this time. Advised that IF pt's able to stabilize with adequate symptom control, transition from PCU to hospice at home vs inpatient can be considered as next step. Sister in agreement.     Updates d/w primary team Dr. Murphy. Appreciate patient centered medicine and child life specialist following to support this pt and her family.    Added IV ativan 0.25 mg q2 prn for severe anxiety, in addition to increasing oral xanax to 0.5 mg q6 prn. Maintain IV dilaudid 1.5 mg q4 prn, oral dilaudid 6 mg q4 prn and MS contin 60 mg TID for pain and dyspnea.

## 2022-04-27 NOTE — PROGRESS NOTE ADULT - PROBLEM SELECTOR PLAN 8
resolved, trops elevated but stable, not uptrending- advised no role for cardiology involvement or cath at this juncture.   Last TTE with EF of 65%      dvt ppx:  lovenox 40mg daily
resolved, trops elevated but stable, not uptrending- advised no role for cardiology involvement or cath at this juncture.   Last TTE with EF of 65%      dvt ppx:  lovenox 40mg daily   very poor prognosis
resolved, trops elevated but stable, not uptrending- advised no role for cardiology involvement or cath at this juncture.   Last TTE with EF of 65%      dvt ppx:  lovenox 40mg daily   very poor prognosis
resolved, trops elevated but stable, not uptrending- advised no role for cardiology involvement or cath at this juncture.   Last TTE with EF of 65%    dvt ppx:  lovenox 40mg daily
resolved, trops elevated but stable, not uptrending- advised no role for cardiology involvement or cath at this juncture.   Last TTE with EF of 65%      dvt ppx:  lovenox 40mg daily   very poor prognosis
resolved, trops elevated but stable, not uptrending- advised no role for cardiology involvement or cath at this juncture.   Last TTE with EF of 65%    dvt ppx:  lovenox 40mg daily
resolved, trops elevated but stable, not uptrending- advised no role for cardiology involvement or cath at this juncture.   Last TTE with EF of 65%      dvt ppx:  lovenox 40mg daily   very poor prognosis
resolved, trops elevated but stable, not uptrending- advised no role for cardiology involvement or cath at this juncture.   Last TTE with EF of 65%    dvt ppx:  lovenox 40mg daily
resolved, trops elevated but stable, not uptrending- advised no role for cardiology involvement or cath at this juncture.   Last TTE with EF of 65%      dvt ppx:  lovenox 40mg daily   very poor prognosis
resolved, trops elevated but stable, not uptrending- advised no role for cardiology involvement or cath at this juncture.   Last TTE with EF of 65%      dvt ppx:  lovenox 40mg daily   very poor prognosis
resolved, trops elevated but stable, not uptrending- advised no role for cardiology involvement or cath at this juncture.   Last TTE with EF of 65%    dvt ppx:  lovenox 40mg daily

## 2022-04-27 NOTE — PROGRESS NOTE ADULT - NSPROGADDITIONALINFOA_GEN_ALL_CORE
DNR/DNI order placed in [facility], MOLST completed  had a discussion with sister - wants pt to be comfortable but also somewhat alert during the day. Will have discussion with patient re: transfer to PCU.  very poor prognosis    Kristal Murphy MD  Division of Hospital Medicine  Contact via [employer] Teams  Office: [phone]

## 2022-04-28 NOTE — PROGRESS NOTE ADULT - NS ATTEST RISK PROBLEM GEN_ALL_CORE FT
introduction of intravenous opiate infusion- monitoring for safety/toxicity in setting of multiorgan dysfunction.

## 2022-04-28 NOTE — PROGRESS NOTE ADULT - NUTRITIONAL ASSESSMENT
This patient has been assessed with a concern for Malnutrition and has been determined to have a diagnosis/diagnoses of Severe protein-calorie malnutrition.    This patient is being managed with:   Diet Regular-  Entered: Apr 16 2022  5:22AM

## 2022-04-28 NOTE — PROGRESS NOTE ADULT - PROBLEM SELECTOR PLAN 3
- PPSV 10-20% The patient requires nursing assistance with all ADLs   - Supportive care  - Turn and position  - Continue with good skin care

## 2022-04-28 NOTE — PROGRESS NOTE ADULT - PROBLEM SELECTOR PLAN 2
Pt is highly anxious which is likely exacerbated by ongoing dyspnea. Patient with no reliable oral route  -Ativan 0.25mg IV q1hr PRN (1 required within a 24hr period 8am-8am)

## 2022-04-28 NOTE — PROGRESS NOTE ADULT - PROBLEM SELECTOR PLAN 1
With pleural effusion that's loculated. On recent admission IR, Pulmonary and CT surgery were consulted, unable to offer another Pleurx due to loculation and positioning of the effusion. Continue supplementary O2 as needed.  - She required PRN Dilaudid 1.5mg IV X3 for dyspnea and X2 for pain within a 24hr period.  - Patient with no reliable oral route. Will convert MS contin and required PRN within her last 24hr into a Dilaudid gtt  - Dilaudid gtt @ 1mg/hr ordered  - Increased Dilaudid from 1.5mg to 2mg IV q1hr PRN  - Bowel regimen while on opioids With pleural effusion that's loculated. On recent admission IR, Pulmonary and CT surgery were consulted, unable to offer another Pleurx due to loculation and positioning of the effusion. Continue supplementary O2 as needed.  - She required PRN Dilaudid 1.5mg IV X3 for dyspnea and X2 for pain within a 24hr period.  - Patient with no reliable oral route. Will convert MS contin and required PRN within her last 24hr into a Dilaudid gtt  - Dilaudid gtt @ 1mg/hr ordered-> uncontrolled symptoms  - Increased Dilaudid from 1.5mg to 2mg IV q1hr PRN  - Bowel regimen while on opioids

## 2022-04-28 NOTE — PROGRESS NOTE ADULT - PROBLEM SELECTOR PLAN 4
oncology note appreciated, not a candidate for conventional therapy due to poor performance status  Pt had been focused on going to [place] for alternative therapy, though unable to due to high symptom burden and poor performance status

## 2022-04-29 NOTE — PROGRESS NOTE ADULT - PROBLEM SELECTOR PROBLEM 2
Pt mom states pt has been treating wart on her foot since last office visit and it has not changed.  Pt mom is not sure if they should keep trying or bring pt in to be seen.  Please call pt mom to discuss [phone]  
Recurrent right pleural effusion
Anxiety
Therapeutic opioid induced constipation
Anxiety
Recurrent right pleural effusion
Pain
Recurrent right pleural effusion
Therapeutic opioid induced constipation
Anxiety
Recurrent right pleural effusion
Recurrent right pleural effusion
Anxiety
Recurrent right pleural effusion

## 2022-04-29 NOTE — PROGRESS NOTE ADULT - PROVIDER SPECIALTY LIST ADULT
Cardiology
Cardiology
Internal Medicine
Internal Medicine
Palliative Care
Cardiology
Hospitalist
Palliative Care
Hospitalist
Palliative Care
Hospitalist
Palliative Care
Palliative Care

## 2022-04-29 NOTE — PROGRESS NOTE ADULT - PROBLEM SELECTOR PLAN 5
hgb stable  no s/s of bleeding  -monitor
oncology note appreciated  patient wishes to go to [place] to seek another opinion  >if no further DMT were offered/pursued, then patient would be a candidate for hospice services.
hgb stable  no s/s of bleeding  -monitor
Spoke with the patient's sister Eldon (HCP).  educated as to what to expect.  Questions answered and provided her with an update to the changes in medication. Emotional support provided.  Will continue to monitor
oncology note appreciated, not a candidate for conventional therapy due to poor performance status  Pt had been focused on going to [place] for alternative therapy, though unable to due to high symptom burden and poor performance status
hgb stable  no s/s of bleeding  -monitor
oncology note appreciated, not a candidate for conventional therapy due to poor performance status  Pt had been focused on going to [place] for alternative therapy, though unable to due to high symptom burden and poor performance status
hgb stable  no s/s of bleeding  -monitor
oncology note appreciated  patient wishes to go to [place] to seek another opinion

## 2022-04-29 NOTE — PROGRESS NOTE ADULT - PROBLEM SELECTOR PROBLEM 6
Hyponatremia
Advanced care planning/counseling discussion
Hyponatremia
Palliative care encounter
Hyponatremia
Advanced care planning/counseling discussion
Advanced care planning/counseling discussion
Hyponatremia
Hyponatremia

## 2022-04-29 NOTE — PROGRESS NOTE ADULT - NS ATTEND AMEND GEN_ALL_CORE FT
Pt care and plan discussed and reviewed with NP. Plan as outlined above edited by me to reflect our discussion.
Patient seen and examined, transferred to PCU for symptom directed care in setting of gastric cancer- main symptoms pain, dyspnea, anxiety.  unreliable oral route and decision made to transition to IV dilaudid infusion at 1m/hr given on MS contin 50mg q8 and required frequent PRNs with uncontrolled dyspnea on examination this morning. Increase PRN dose to 2mg. discussed with patients sister/HCP to provide update who is amenable to plan. patient clinically declining and appears to have very limited life expectancy. other family at bedside and emotional support provided. continue care on PCU and plan discussed with IDT.
Patient seen and examined, uncontrolled pain/dyspnea/anxiety over last 24 hours requiring multiple PRN doses of medication.  Decision made today to increase dilaudid infusion to 1.5mg/hr and increase PRN dilaudid dose to 3mg.  Vital signs otherwise stable and LBM 4/28. SW and chaplaincy follow up for patient and family for support. continue care on PCU, will consider disposition options pending clinical course as patient symptoms need to be better managed/stable prior to consideration of transfer to outside facility. plan discussed with IDT.

## 2022-04-29 NOTE — PROGRESS NOTE ADULT - ASSESSMENT
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 Likely 2/2 progression of disease and component of anxiety/panic.
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 Likely 2/2 progression of disease and component of anxiety/panic. Plan for family meeting today at 1pm
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 Likely 2/2 progression of disease and component of anxiety/panic.
51 yoF with PMH of Gastric cancer, not on conventional chemotherapy and pursuing alternative medicine for treatment, presenting with SOB and recurrent pleural effusion, s/p thoracentesis and paracentesis at [facility] (3/23-4/1), s/p Pleurx catheter removal by CT surgery on 3/28/22. Geriatrics and Palliative Medicine Team is consulted for assistance with pain control and GOC discussions. The patient was transferred to the PCU for symptom management. 
51 yoF with PMH of Gastric cancer, not on conventional chemotherapy and pursuing alternative medicine for treatment, presenting with SOB and recurrent pleural effusion, s/p thoracentesis and paracentesis at [facility] (3/23-4/1), s/p Pleurx catheter removal by CT surgery on 3/28/22. Geriatrics and Palliative Medicine Team is consulted for assistance with pain control and GOC discussions.  
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 Likely 2/2 progression of disease and component of anxiety/panic. Needs refocusing of GOC.   
51 yoF with PMH of Gastric cancer, not on conventional chemotherapy and pursuing alternative medicine for treatment, presenting with SOB and recurrent pleural effusion, s/p thoracentesis and paracentesis at [facility] (3/23-4/1), s/p Pleurx catheter removal by CT surgery on 3/28/22. Geriatrics and Palliative Medicine Team is consulted for assistance with pain control and GOC discussions. The patient was transferred to the PCU for symptom management. 
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 Likely 2/2 progression of disease and component of anxiety/panic.
51 yoF with PMH of Gastric cancer, not on conventional chemotherapy and pursuing alternative medicine for treatment, presenting with SOB and recurrent pleural effusion, s/p thoracentesis and paracentesis at [facility] (3/23-4/1), s/p Pleurx catheter removal by CT surgery on 3/28/22. Geriatrics and Palliative Medicine Team is consulted for assistance with pain control and GOC discussions.  
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 Likely 2/2 progression of disease and component of anxiety/panic.
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 Likely 2/2 progression of disease and component of anxiety/panic.
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 Likely 2/2 progression of disease and component of anxiety/panic. Needs refocusing of GOC.   
51 yoF with PMH of Gastric cancer, not on conventional chemotherapy and pursuing alternative medicine for treatment, presenting with SOB and recurrent pleural effusion, s/p thoracentesis and paracentesis at [facility] (3/23-4/1), s/p Pleurx catheter removal by CT surgery on 3/28/22. Geriatrics and Palliative Medicine Team is consulted for assistance with pain control and GOC discussions.  
51 yoF with PMH of Gastric cancer, not on conventional chemotherapy and pursuing alternative medicine for treatment, presenting with SOB and recurrent pleural effusion, s/p thoracentesis and paracentesis at [facility] (3/23-4/1), s/p Pleurx catheter removal by CT surgery on 3/28/22. Geriatrics and Palliative Medicine Team is consulted for assistance with pain control and GOC discussions. 
51 yoF with PMH of Gastric cancer, not on conventional chemotherapy and pursuing alternative medicine for treatment, presenting with SOB and recurrent pleural effusion, s/p thoracentesis and paracentesis at [facility] (3/23-4/1), s/p Pleurx catheter removal by CT surgery on 3/28/22. Geriatrics and Palliative Medicine Team is consulted for assistance with pain control and GOC discussions.  
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 Likely 2/2 progression of disease and component of anxiety/panic.

## 2022-04-29 NOTE — PROGRESS NOTE ADULT - PROBLEM SELECTOR PLAN 1
With pleural effusion that's loculated. On recent admission IR, Pulmonary and CT surgery were consulted, unable to offer another Pleurx due to loculation and positioning of the effusion. Continue supplementary O2 as needed.  - The patient required PRN Dilaudid 2mg IV X2 for dyspnea and X3 for severe pain within a 24hr period 8am-8am.  - Increased Dilaudid gtt  from 1mg/hr to 1.5mg /hr -> uncontrolled symptoms  - Increased Dilaudid from 2mg to 3.5mg IV q1hr PRN  - Bowel regimen while on opioids

## 2022-04-29 NOTE — PROGRESS NOTE ADULT - PROBLEM SELECTOR PROBLEM 3
Gastric cancer
Gastric cancer
SOB (shortness of breath)
Gastric cancer
Advanced care planning/counseling discussion
Advanced care planning/counseling discussion
Gastric cancer
Functional quadriplegia
Anxiety
SOB (shortness of breath)
SOB (shortness of breath)
Gastric cancer

## 2022-04-29 NOTE — PROGRESS NOTE ADULT - PROBLEM SELECTOR PLAN 2
The patient required PRN Dilaudid 2mg IV X2 for dyspnea and X3 for severe pain within a 24hr period 8am-8am.  - Increased Dilaudid gtt  from 1mg/hr to 1.5mg /hr -> uncontrolled symptoms  - Increased Dilaudid from 2mg to 3.5mg IV q1hr PRN for severe pain  - Dilaudid 3mg IV q1hr PRN ordered for moderate pain  - Bowel regimen while on opioids

## 2022-04-29 NOTE — PROGRESS NOTE ADULT - SUBJECTIVE AND OBJECTIVE BOX
GAP TEAM PALLIATIVE CARE UNIT PROGRESS NOTE:      [  ] Patient on hospice program.    INDICATION FOR PALLIATIVE CARE UNIT SERVICES/Interval HPI: Symptom management in the setting of a 50y/o F with PMH gastric cancer presenting with SOB and recurrent pleural effusion    OVERNIGHT EVENTS: Chart reviewed. The patient is seen and examined at the bedside. The patient required PRN Dilaudid 2mg IV X2 for dyspnea and X3 for severe pain within a 24hr period 8am-8am.    DNR on chart: Yes  Yes      Allergies    Cipro (Rash)  QUINOLONES (Unknown)    Intolerances    MEDICATIONS  (STANDING):  artificial tears (preservative free) Ophthalmic Solution 1 Drop(s) Both EYES four times a day  Biotene Dry Mouth Oral Rinse 15 milliLiter(s) Swish and Spit three times a day  HYDROmorphone Infusion 1.5 mG/Hr (1.5 mL/Hr) IV Continuous <Continuous>  sodium chloride 0.9%. 1000 milliLiter(s) (10 mL/Hr) IV Continuous <Continuous>    MEDICATIONS  (PRN):  acetaminophen  Suppository .. 650 milliGRAM(s) Rectal every 6 hours PRN Temp greater or equal to 38C (100.4F), Mild Pain (1 - 3)  bisacodyl Suppository 10 milliGRAM(s) Rectal daily PRN Constipation  HYDROmorphone  Injectable 3.5 milliGRAM(s) IV Push every 1 hour PRN Severe Pain (7 - 10)  HYDROmorphone  Injectable 3.5 milliGRAM(s) IV Push every 1 hour PRN dyspnea  HYDROmorphone  Injectable 3 milliGRAM(s) IV Push every 1 hour PRN Moderate Pain (4 - 6)  LORazepam   Injectable 0.25 milliGRAM(s) IV Push every 1 hour PRN anxiety and agitation    ITEMS UNCHECKED ARE NOT PRESENT    PRESENT SYMPTOMS: [ X]Unable to self-report  [X ]PAINADs [ X]RDOS  Source if other than patient:  [ ]Family   [X ]Team     Pain: [ ] yes [ ] no  QOL impact -   Location -                    Aggravating factors -  Quality -  Radiation -  Timing-  Severity (0-10 scale):  Minimal acceptable level (0-10 scale):     PAINAD Score:  [email or web address]/painad.pdf (Ctrl +  left click to view)    Dyspnea:                           [ ]Mild [ ]Moderate [ ]Severe    RDOS:  0 to 2  minimal or no respiratory distress   3  mild distress  4 to 6 moderate distress  >7 severe distress  https://[email or web address].net/handouts/hen/Respiratory_Distress_Observation_Scale.pdf (Ctrl +  left click to view)     Anxiety:                             [ ]Mild [ ]Moderate [ ]Severe  Fatigue:                             [ ]Mild [ ]Moderate [ ]Severe  Nausea:                             [ ]Mild [ ]Moderate [ ]Severe  Loss of appetite:              [ ]Mild [ ]Moderate [ ]Severe  Constipation:                    [ ]Mild [ ]Moderate [ ]Severe  		  Other Symptoms:  [ ]All other review of systems negative     Palliative Performance Status Version 2:         %         http://npcrc.org/files/news/palliative_performance_scale_ppsv2.pdf  PHYSICAL EXAM:   Vital Signs Last 24 Hrs  T(C): 37.2 (29 Apr 2022 08:10), Max: 37.2 (29 Apr 2022 08:10)  T(F): 99 (29 Apr 2022 08:10), Max: 99 (29 Apr 2022 08:10)  HR: 134 (29 Apr 2022 08:10) (134 - 134)  BP: 120/71 (29 Apr 2022 08:10) (120/71 - 120/71)  BP(mean): --  RR: 20 (29 Apr 2022 08:10) (20 - 20)  SpO2: 96% (29 Apr 2022 08:10) (96% - 96%) I&O's Summary    28 Apr 2022 07:01  -  29 Apr 2022 07:00  --------------------------------------------------------  IN: 0 mL / OUT: 400 mL / NET: -400 mL      GENERAL: [ ] Cachexia  [ ]Alert  [ ]Oriented x   [ ]Lethargic  [ ]Unarousable  [ ]Verbal  [ ]Non-Verbal  Behavioral:   [ ] Anxiety  [ ] Delirium [ ] Agitation [ ] Other  HEENT:  [ ]Normal   [ ]Dry mouth   [ ]ET Tube/Trach  [ ]Oral lesions  PULMONARY:   [ ]Clear [ ]Tachypnea  [ ]Audible excessive secretions   [ ]Rhonchi        [ ]Right [ ]Left [ ]Bilateral  [ ]Crackles        [ ]Right [ ]Left [ ]Bilateral  [ ]Wheezing     [ ]Right [ ]Left [ ]Bilateral  [ ]Diminished BS [ ]Right [ ]Left [ ]Bilateral    CARDIOVASCULAR:    [ ]Regular [ ]Irregular [ ]Tachy  [ ]Linus [ ]Murmur [ ]Other  GASTROINTESTINAL:  [ ]Soft  [ ]Distended   [ ]+BS  [ ]Non tender [ ]Tender  [ ]Other [ ]PEG [ ]OGT/ NGT   Last BM:    GENITOURINARY:  [ ]Normal [ ] Incontinent   [ ]Oliguria/Anuria   [ ]Ansari  MUSCULOSKELETAL:   [ ]Normal   [ ]Weakness  [ ]Bed/Wheelchair bound [ ]Edema  NEUROLOGIC:   [ ]No focal deficits  [ ] Cognitive impairment  [ ] Dysphagia [ ]Dysarthria [ ] Paresis [ ]Other   SKIN:   [ ]Normal  [ ]Rash  [ ]Other  [ ]Pressure ulcer(s)  [ ]y [ ]n  Present on admission      CRITICAL CARE:  [ ] Shock Present  [ ]Septic [ ]Cardiogenic [ ]Neurologic [ ]Hypovolemic  [ ]  Vasopressors [ ]  Inotropes   [ ] Respiratory failure present [ ] Mechanical Ventilation [ ] Non-invasive ventilatory support [ ] High-Flow  [ ] Acute  [ ] Chronic [ ] Hypoxic  [ ] Hypercarbic [ ] Other  [ ] Other organ failure     LABS:            RADIOLOGY & ADDITIONAL STUDIES:    PROTEIN CALORIE MALNUTRITION: [ ] mild [ ] moderate [ ] severe  [ ] underweight [ ] morbid obesity    https://www.andeal.org/vault/2440/web/files/ONC/Table_Clinical%20Characteristics%20to%20Document%20Malnutrition-White%20JV%20et%20al%911410.pdf    Height (cm): 167.6 (04-15-22 @ 20:57), 167.6 (04-03-22 @ 08:47), 167.6 (03-28-22 @ 11:11)  Weight (kg): 90.7 (04-03-22 @ 08:47), 109.6 (03-28-22 @ 11:11), 113.4 (03-19-22 @ 17:35)  BMI (kg/m2): 32.3 (04-15-22 @ 20:57), 32.3 (04-03-22 @ 08:47), 39 (03-28-22 @ 11:11)    [ ] PPSV2 < or = 30% [ ] significant weight loss [ ] poor nutritional intake [ ] anasarca   Artificial Nutrition [ ]     REFERRALS:   [ ]Chaplaincy  [ ] Hospice  [ ]Child Life  [ ]Social Work  [ ]Case management [ ]Holistic Therapy [ ] Physical Therapy [ ] Dietary   Goals of Care Document: DESTINY Jim (04-27-22 @ 16:27)  Goals of Care Conversation:   Participants:  · Participants  Family  · Relative  sister/HCP Eldon    Advance Directives:  · Does patient have Advance Directive  Yes  · Indicate Type  Health Care Proxy (HCP)  · Agent's Name  Eldon  · Are any of the items on the chart  Yes  EMR  · Specify which ones are on chart  Medical Orders for Life-Sustaining Treatment (MOLST)  · Caregiver:  no    Conversation Discussion:  · Conversation  Prognosis; Treatment Options  · Conversation Details  Called by primary team that pt's sister/HCP Eldon would like to further discuss PCU transfer. Note that her code status was changed to DNR/DNI yesterday with primary team. Please refer to my other [facility] notes for previous GOC discussions.    Today Eldon states that since our last conversation, pt continued to be confused in addition to having ongoing severe anxiety. Eldon understands that pt cannot safely go to [place] for more treatment. At this time she is ready to transition the focus of care to comfort measures only with transfer to palliative care unit. Advised I will d/c lab draws and non-essential meds at this time. Advised that IF pt's able to stabilize with adequate symptom control, transition from PCU to hospice at home vs inpatient can be considered as next step. Sister in agreement.     Updates d/w primary team Dr. Murphy. Appreciate patient centered medicine and child life specialist following to support this pt and her family.    Added IV ativan 0.25 mg q2 prn for severe anxiety, in addition to increasing oral xanax to 0.5 mg q6 prn. Maintain IV dilaudid 1.5 mg q4 prn, oral dilaudid 6 mg q4 prn and MS contin 60 mg TID for pain and dyspnea.    What Matters Most To Patient and Family:  · What matters most to patient and family  comfort focused care    Personal Advance Directives Treatment Guidelines:   Treatment Guidelines:  · Decision Maker  Health Care Proxy    Location of Discussion:   Time Spent on Advance Care Planning:  I spent 18 (in minutes) on advance care planning services with the patient.  This time is separate and distinct from any other care management services provided on this date.    Location of Discussion:  · Location of discussion  Telephone      Electronic Signatures:  Mimi Jim)  (Signed 27-Apr-2022 16:56)  	Authored: Goals of Care Conversation, Personal Advance Directives Treatment Guidelines, Location of Discussion      Last Updated: 27-Apr-2022 16:56 by Mimi Jim)      GAP TEAM PALLIATIVE CARE UNIT PROGRESS NOTE:      [  ] Patient on hospice program.    INDICATION FOR PALLIATIVE CARE UNIT SERVICES/Interval HPI: Symptom management in the setting of a 52y/o F with PMH gastric cancer presenting with SOB and recurrent pleural effusion    OVERNIGHT EVENTS: Chart reviewed. The patient is seen and examined at the bedside. The patient required PRN Dilaudid 2mg IV X2 for dyspnea and X3 for severe pain within a 24hr period 8am-8am.    DNR on chart: Yes  Yes      Allergies    Cipro (Rash)  QUINOLONES (Unknown)    Intolerances    MEDICATIONS  (STANDING):  artificial tears (preservative free) Ophthalmic Solution 1 Drop(s) Both EYES four times a day  Biotene Dry Mouth Oral Rinse 15 milliLiter(s) Swish and Spit three times a day  HYDROmorphone Infusion 1.5 mG/Hr (1.5 mL/Hr) IV Continuous <Continuous>  sodium chloride 0.9%. 1000 milliLiter(s) (10 mL/Hr) IV Continuous <Continuous>    MEDICATIONS  (PRN):  acetaminophen  Suppository .. 650 milliGRAM(s) Rectal every 6 hours PRN Temp greater or equal to 38C (100.4F), Mild Pain (1 - 3)  bisacodyl Suppository 10 milliGRAM(s) Rectal daily PRN Constipation  HYDROmorphone  Injectable 3.5 milliGRAM(s) IV Push every 1 hour PRN Severe Pain (7 - 10)  HYDROmorphone  Injectable 3.5 milliGRAM(s) IV Push every 1 hour PRN dyspnea  HYDROmorphone  Injectable 3 milliGRAM(s) IV Push every 1 hour PRN Moderate Pain (4 - 6)  LORazepam   Injectable 0.25 milliGRAM(s) IV Push every 1 hour PRN anxiety and agitation    ITEMS UNCHECKED ARE NOT PRESENT    PRESENT SYMPTOMS: [ X]Unable to self-report  [X ]PAINADs [ X]RDOS  Source if other than patient:  [ ]Family   [X ]Team     Pain: [X ] yes [ ] no  QOL impact -   Location -                    Aggravating factors -  Quality -  Radiation -  Timing-  Severity (0-10 scale):  Minimal acceptable level (0-10 scale):     PAINAD Score: a score range of 7-8 within a 24hr period 8am-8am  http://geriatrictoolkit.[email or web address].[email or web address]/cog/painad.pdf (Ctrl +  left click to view)    Dyspnea:                           [ ]Mild [ ]Moderate [ ]Severe    RDOS: 2  0 to 2  minimal or no respiratory distress   3  mild distress  4 to 6 moderate distress  >7 severe distress  https://[email or web address].net/handouts/hen/Respiratory_Distress_Observation_Scale.pdf (Ctrl +  left click to view)     Anxiety:                             [ ]Mild [ ]Moderate [ ]Severe  Fatigue:                             [ ]Mild [ ]Moderate [ ]Severe  Nausea:                             [ ]Mild [ ]Moderate [ ]Severe  Loss of appetite:              [ ]Mild [ ]Moderate [ ]Severe  Constipation:                    [ ]Mild [ ]Moderate [ ]Severe  		  Other Symptoms:  [ ]All other review of systems negative- unable to assess     Palliative Performance Status Version 2:   10-20 %         http://npcrc.org/files/news/palliative_performance_scale_ppsv2.pdf  PHYSICAL EXAM:   Vital Signs Last 24 Hrs  T(C): 37.2 (29 Apr 2022 08:10), Max: 37.2 (29 Apr 2022 08:10)  T(F): 99 (29 Apr 2022 08:10), Max: 99 (29 Apr 2022 08:10)  HR: 134 (29 Apr 2022 08:10) (134 - 134)  BP: 120/71 (29 Apr 2022 08:10) (120/71 - 120/71)  BP(mean): --  RR: 20 (29 Apr 2022 08:10) (20 - 20)  SpO2: 96% (29 Apr 2022 08:10) (96% - 96%) I&O's Summary    28 Apr 2022 07:01  -  29 Apr 2022 07:00  --------------------------------------------------------  IN: 0 mL / OUT: 400 mL / NET: -400 mL    GENERAL: [ ] Cachexia  [ ]Alert  [ ]Oriented x   [X ]Lethargic  [ ]Unarousable  [ X]Verbal- spontaneous   [ ]Non-Verbal  Behavioral:   [ ] Anxiety  [ ] Delirium [ ] Agitation [ ] Other  HEENT:  [ ]Normal   [ X]Dry mouth   [ ]ET Tube/Trach  [ ]Oral lesions  PULMONARY:   [ ]Clear [ ]Tachypnea  [ ]Audible excessive secretions   [ ]Rhonchi        [ ]Right [ ]Left [ ]Bilateral  [ ]Crackles        [ ]Right [ ]Left [ ]Bilateral  [ ]Wheezing     [ ]Right [ ]Left [ ]Bilateral  [X ]Diminished BS [ ]Right [ ]Left [ X]Bilateral    CARDIOVASCULAR:    [ ]Regular [ ]Irregular [ X]Tachy  [ ]Linus [ ]Murmur [ ]Other  GASTROINTESTINAL:  [ X]Soft  [X ]Distended   [ X]+BS  [X ]Non tender [ ]Tender  [ ]Other [ ]PEG [ ]OGT/ NGT   Last BM:  4/28/22  GENITOURINARY:  [ ]Normal [X ] Incontinent   [ ]Oliguria/Anuria   [ ]Ansari [X] External Cath  MUSCULOSKELETAL:   [ ]Normal   [ X]Weakness  [ X]Bed/Wheelchair bound [ X]Edema  NEUROLOGIC:   [ ]No focal deficits  [X ] Cognitive impairment  [ ] Dysphagia [ ]Dysarthria [ ] Paresis [ ]Other   SKIN: Incontinence associated dermatitis   [ ]Normal  [ ]Rash  [ ]Other  [ ]Pressure ulcer(s)  [ ]y [ ]n  Present on admission      CRITICAL CARE:  [ ] Shock Present  [ ]Septic [ ]Cardiogenic [ ]Neurologic [ ]Hypovolemic  [ ]  Vasopressors [ ]  Inotropes   [ ] Respiratory failure present [ ] Mechanical Ventilation [ ] Non-invasive ventilatory support [ ] High-Flow  [ ] Acute  [ ] Chronic [ ] Hypoxic  [ ] Hypercarbic [ ] Other  [X ] Other organ failure- GI tract    LABS: None new    RADIOLOGY & ADDITIONAL STUDIES: None new    PROTEIN CALORIE MALNUTRITION: [ ] mild [ ] moderate [X ] severe- please see the nutritionist note  [ ] underweight [ ] morbid obesity    https://www.andeal.org/vault/2440/web/files/ONC/Table_Clinical%20Characteristics%20to%20Document%20Malnutrition-White%20JV%20et%20al%202012.pdf    Height (cm): 167.6 (04-15-22 @ 20:57), 167.6 (04-03-22 @ 08:47), 167.6 (03-28-22 @ 11:11)  Weight (kg): 90.7 (04-03-22 @ 08:47), 109.6 (03-28-22 @ 11:11), 113.4 (03-19-22 @ 17:35)  BMI (kg/m2): 32.3 (04-15-22 @ 20:57), 32.3 (04-03-22 @ 08:47), 39 (03-28-22 @ 11:11)    [X ] PPSV2 < or = 30% [ ] significant weight loss [ ] poor nutritional intake [ ] anasarca   Artificial Nutrition [ ]     REFERRALS:   [ ]Chaplaincy  [ ] Hospice  [ ]Child Life  [ X]Social Work  [ ]Case management [ ]Holistic Therapy [ ] Physical Therapy [ ] Dietary   Goals of Care Document: DESTINY Jim (04-27-22 @ 16:27)  Goals of Care Conversation:   Participants:  · Participants  Family  · Relative  sister/HCP Eldon    Advance Directives:  · Does patient have Advance Directive  Yes  · Indicate Type  Health Care Proxy (HCP)  · Agent's Name  Eldon  · Are any of the items on the chart  Yes  EMR  · Specify which ones are on chart  Medical Orders for Life-Sustaining Treatment (MOLST)  · Caregiver:  no    Conversation Discussion:  · Conversation  Prognosis; Treatment Options  · Conversation Details  Called by primary team that pt's sister/HCP Eldon would like to further discuss PCU transfer. Note that her code status was changed to DNR/DNI yesterday with primary team. Please refer to my other [facility] notes for previous [facility] discussions.    Today Eldon states that since our last conversation, pt continued to be confused in addition to having ongoing severe anxiety. Eldon understands that pt cannot safely go to [place] for more treatment. At this time she is ready to transition the focus of care to comfort measures only with transfer to palliative care unit. Advised I will d/c lab draws and non-essential meds at this time. Advised that IF pt's able to stabilize with adequate symptom control, transition from PCU to hospice at home vs inpatient can be considered as next step. Sister in agreement.     Updates d/w primary team Dr. Murphy. Appreciate patient centered medicine and child life specialist following to support this pt and her family.    Added IV ativan 0.25 mg q2 prn for severe anxiety, in addition to increasing oral xanax to 0.5 mg q6 prn. Maintain IV dilaudid 1.5 mg q4 prn, oral dilaudid 6 mg q4 prn and MS contin 60 mg TID for pain and dyspnea.    What Matters Most To Patient and Family:  · What matters most to patient and family  comfort focused care    Personal Advance Directives Treatment Guidelines:   Treatment Guidelines:  · Decision Maker  Health Care Proxy    Location of Discussion:   Time Spent on Advance Care Planning:  I spent 18 (in minutes) on advance care planning services with the patient.  This time is separate and distinct from any other care management services provided on this date.    Location of Discussion:  · Location of discussion  Telephone      Electronic Signatures:  Mimi Jim)  (Signed 27-Apr-2022 16:56)  	Authored: Goals of Care Conversation, Personal Advance Directives Treatment Guidelines, Location of Discussion      Last Updated: 27-Apr-2022 16:56 by Mimi Jim)

## 2022-04-29 NOTE — PROGRESS NOTE ADULT - PROBLEM SELECTOR PROBLEM 1
Acute respiratory failure with hypoxia
Acute respiratory failure with hypoxia
Functional quadriplegia
Acute respiratory failure with hypoxia
SOB (shortness of breath)
SOB (shortness of breath)
Acute respiratory failure with hypoxia
SOB (shortness of breath)
SOB (shortness of breath)
Acute respiratory failure with hypoxia
Functional quadriplegia
Functional quadriplegia
Acute respiratory failure with hypoxia
Acute respiratory failure with hypoxia

## 2022-04-29 NOTE — PROGRESS NOTE ADULT - PROBLEM SELECTOR PROBLEM 4
Cancer-related pain
Therapeutic opioid induced constipation
Functional quadriplegia
Therapeutic opioid induced constipation
Therapeutic opioid induced constipation
Palliative care encounter
Cancer-related pain
Metastasis from gastric cancer
Palliative care encounter
Cancer-related pain

## 2022-04-29 NOTE — PROGRESS NOTE ADULT - PROBLEM SELECTOR PROBLEM 5
Normocytic anemia
Metastasis from gastric cancer
Normocytic anemia
Metastasis from gastric cancer
Normocytic anemia
Metastasis from gastric cancer
Palliative care encounter
Normocytic anemia
Metastasis from gastric cancer

## 2022-04-29 NOTE — PROGRESS NOTE ADULT - PROBLEM SELECTOR PLAN 6
Emotional support provided to the family at the bedside.  Disposition planning  will be guided by clinical course.  Will continue to monitor

## 2022-04-29 NOTE — PROGRESS NOTE ADULT - PROBLEM SELECTOR PLAN 3
Pt is highly anxious which is likely exacerbated by ongoing dyspnea. Patient with no reliable oral route  -Ativan 0.25mg IV q1hr PRN (0 required within a 24hr period 8am-8am)

## 2022-04-30 NOTE — PROVIDER CONTACT NOTE (OTHER) - BACKGROUND
Patient has DNR order.
admitted for shortness of breath
Dx: SOB  Hx: Gastric CA, Pleural Effusion
admitted for shortness of breath
Pt admitted for SOB, recurrent pleural effusion, metastatic gastric cancer.

## 2022-04-30 NOTE — DISCHARGE NOTE FOR THE EXPIRED PATIENT - HOSPITAL COURSE
51F with Gastric cancer not on conventional chemotherapy per patient preference, presented with SOB and recurrentt malignant loculated pleural effusion, s/p thoracentesis and paracentesis at [facility]. Hospital bound for 1 month for pain, dyspnea, severe debility. Palliative team assisted with pain control and complex medical decisions. Patient was tranferred to Palliative Care Unit after family elected comfort-focused measures. Patient  on 2022 at 1:00AM.

## 2022-04-30 NOTE — PROVIDER CONTACT NOTE (OTHER) - REASON
Tele event. PAT, , 3.3 sec
Patient 
Patient refused last dose of Potassium chloride powder 40 meqs
Patient had episode of tachycardia at 135 and episodes of anxiety.
Pt experiencing SOB

## 2022-04-30 NOTE — PROVIDER CONTACT NOTE (OTHER) - ASSESSMENT
Patient is A&Ox4. Very anxious. Patient is non-compliant. Potassium level of 2.9
Patient is A&Ox4. Heart rate of 135. Very anxious.
No response to external stimuli, no spontaneous respirations, no apical heart rate, Negative papillary response to stimuli.
Pt A&Ox4. VSS, HR normally 115. Pt on 3L NC. Pt denies SOB and chest pain. Pt able to make needs known.
Pt was offered her scheduled 10PM dose of MS Contin, but refused. Pt requested Dilaudid instead. Pt's BP was 93/55 at first, after 20 min it was 101/63.
No

## 2022-04-30 NOTE — PROVIDER CONTACT NOTE (OTHER) - RECOMMENDATIONS
LEELEE Mack made aware. 4L NC titrated for comfort.
Notify LEELEE Patrick
Patient pronounced dead at 01:00. Sister and daughters at bedside. Sister declined autopsy.
see the patient at bedside
Ativan IVPB

## 2022-04-30 NOTE — PROVIDER CONTACT NOTE (OTHER) - SITUATION
Hold Lopressor morning of stress test     [place] at the [address] [facility] and [address] [facility] located on the first floor of [place] [address] through [facility] main entrance and turn immediately to your left. Date/Time:      Dobutamine Stress Test    A chemical stress test uses chemical agents injected into the body through the vein. These chemicals make the heart function as if it were under stress. A chemical stress test is used when a traditional stress test (called a cardiac stress test) cannot be done. Testing will take approximately one hour. Dobutamine Stress Echocardiogram Instructions:     No caffeine 24 hours prior to the testing. This includes: coffee, pop/soda, chocolate, cold medications, etc.  Any product that might contain caffeine. Do not eat or drink anything, except water, eight (8) hours before the test.   No alcohol or nicotine twelve (12) hours prior to your test.   Wear comfortable clothing. If you are taking Metoprolol (Lopressor, Toprol), Carvedilol (Coreg), Atenolol (Tenformin), Nebivolol (Bystolic), Propranolol (Innderall), or Sotalol (Betapace) do not take the day before or morning of this procedure. If you need to change this appointment, please call outpatient scheduling at [phone]. [place] at the [address] [facility] and [address] [facility] located on the first floor of [place] [address] through [facility] main entrance and turn immediately to your left. Date/Time:     Patient's contact number:  [phone] (home)     Echocardiogram -  No prep. Takes approximately 30 min. An echocardiogram uses sound waves to produce images of your heart. This commonly used test allows your doctor to see how your heart is beating and pumping blood. Your doctor can use the images from an echocardiogram to identify various abnormalities in the heart muscle and valves.     This test has 2 parts:   Ø You will be asked to disrobe from the waist up
Patient had episode of tachycardia at 135 and episodes of anxiety.
and given a gown to wear. The technologist will then hook up an EKG monitor to you for the entire exam.   Ø You will then have an ultrasound of your heart (echocardiogram) to assess the heart muscle, heart valves and heart function. You may eat and take any medicines before the exam.     If you need to change your appointment, please call outpatient scheduling at [phone].
Pt complains of difficulty breathing and is requesting MS Contin.
Tele event. PAT, , 3.3 sec. First time.
Patient refused last dose of Potassium chloride powder 40 meqs
Patient without respirations or pulse.

## 2022-05-11 LAB
CULTURE RESULTS: SIGNIFICANT CHANGE UP
SPECIMEN SOURCE: SIGNIFICANT CHANGE UP

## 2022-08-25 NOTE — PATIENT PROFILE ADULT - CONTRAINDICATIONS & PRECAUTIONS (SELECT ALL THAT APPLY)
Rayshawn Hoskins (:  1962) is a 64 y.o. female,Established patient, here for evaluation of the following chief complaint(s): Toe Pain         ASSESSMENT/PLAN:  1. Ingrowing nail with infection  -     doxycycline monohydrate (ADOXA) 100 MG tablet; Take 1 tablet by mouth 2 times daily for 10 days, Disp-20 tablet, R-0Normal  -     Flynn Hancock, Podiatry, [facility]  At this time we will treat symptomatically with antibiotics and refer to podiatry for further evaluation and treatment of bilateral feet. Red flags discussed with patient. If these occur she is to directly to the nearest emergency department. Patient voiced understanding. No follow-ups on file. Subjective   SUBJECTIVE/OBJECTIVE:  HPI  Patient presents today for evaluation of worsening left-sided third digit toe pain. Denies any trauma or injury to the affected region prior to symptoms beginning. Patient states that the symptoms have been present for the last several weeks. Denies any other issues or concerns at this time. Review of Systems   Musculoskeletal:  Positive for arthralgias, gait problem and joint swelling. Skin:  Positive for color change. All other systems reviewed and are negative.        Current Outpatient Medications:     OLANZapine (ZYPREXA) 15 MG tablet, , Disp: , Rfl:     doxycycline monohydrate (ADOXA) 100 MG tablet, Take 1 tablet by mouth 2 times daily for 10 days, Disp: 20 tablet, Rfl: 0    insulin glargine (LANTUS SOLOSTAR) 100 UNIT/ML injection pen, Inject 20 Units into the skin nightly, Disp: 5 pen, Rfl: 0    Insulin Pen Needle (MEIJER PEN NEEDLES) 31G X 8 MM MISC, 1 each by Does not apply route daily, Disp: 100 each, Rfl: 3    SITagliptin (JANUVIA) 100 MG tablet, TAKE ONE TABLET BY MOUTH ONCE DAILY, Disp: 30 tablet, Rfl: 5    atorvastatin (LIPITOR) 40 MG tablet, Take 1 tablet by mouth in the morning., Disp: 30 tablet, Rfl: 5    Empagliflozin-metFORMIN HCl (SYNJARDY) 12.5-500 MG TABS, Take 1 tablet by mouth in the morning and at bedtime, Disp: 60 tablet, Rfl: 5    dicyclomine (BENTYL) 10 MG capsule, Take 1 capsule by mouth in the morning and 1 capsule at noon and 1 capsule in the evening and 1 capsule before bedtime. , Disp: 120 capsule, Rfl: 5    diclofenac sodium (VOLTAREN) 1 % GEL, Apply 4 g topically 4 times daily, Disp: 350 g, Rfl: 5    clonazePAM (KLONOPIN) 2 MG tablet, Take 2 mg by mouth daily. 2 tablets every evening, Disp: , Rfl:     blood glucose test strips (FREESTYLE LITE) strip, 1 each by In Vitro route 2 times daily As needed. , Disp: 100 each, Rfl: 3    FLUoxetine (PROZAC) 40 MG capsule, Take 40 mg by mouth daily, Disp: , Rfl:     glucose monitoring kit (FREESTYLE) monitoring kit, 1 kit by Does not apply route daily, Disp: 1 kit, Rfl: 0   Patient Active Problem List   Diagnosis    MDD (major depressive disorder)    Chronic meniscal tear of knee    Primary osteoarthritis of left knee    Depression, major, single episode, mild (HCC)    Sx of RLS (restless legs syndrome)    Obesity, Class I, BMI 30-34.9    Nicotine use disorder    Irritable bowel syndrome    Diabetes mellitus ([address]    Morbidly obese ([address]    Chronic pain of left knee    Urine abnormality     Past Medical History:   Diagnosis Date    Acute psychosis ([address] 05/2018    Anxiety disorder     Delusional disorder ([address]     Depression with suicidal ideation 6/20/2018    Drug overdose, intentional self-harm, initial encounter ([address] 6/18/2018    Ethylene glycol poisoning 6/18/2018    Hallucination     Herpes zoster without complication 7/95/6402    Hypokalemia 6/19/2018    Hyponatremia 6/18/2018    Hypophosphatemia 6/19/2018    IBS (irritable bowel syndrome)     Major depressive disorder     with 3-4 suicidal drug OD attempts prior to 6/18/2018    Major depressive disorder, recurrent, severe with psychotic features ([address] 6/20/2018    MDD (major depressive disorder), single episode, severe with psychotic features ([facility] 9/6/2018    RLS (restless legs syndrome)     Shingles     9/2016 - right side    Type 2 diabetes mellitus without complication ([facility]     Yeast infection 3/6/2020     Past Surgical History:   Procedure Laterality Date    ABDOMEN SURGERY  u    laproscopic surgery/ gynocologic    CHOLECYSTECTOMY  1987    COLONOSCOPY      COLONOSCOPY  08/02/2017    and upper endoscopy    HYSTERECTOMY (CERVIX STATUS UNKNOWN)  2014    KNEE ARTHROSCOPY Left 01/09/2017    URETHRA SURGERY      stretching.  WRIST SURGERY Left 11/4/15    reduction internal fixation     Social History     Socioeconomic History    Marital status:      Spouse name: Jennifer Pacheco Number of children: 5    Years of education: 12    Highest education level: Not on file   Occupational History    Occupation: CAREGIVER     Comment: LAST [employer]   Tobacco Use    Smoking status: Never    Smokeless tobacco: Never   Substance and Sexual Activity    Alcohol use: Yes    Drug use: Yes     Types: Marijuana Berneta Gwyn)     Comment: \"I smoked a little bit of marijuana last night\"    Sexual activity: Not Currently     Partners: Male   Other Topics Concern    Not on file   Social History Narrative    Not on file     Social Determinants of Health     Financial Resource Strain: Not on file   Food Insecurity: Not on file   Transportation Needs: Not on file   Physical Activity: Inactive    Days of Exercise per Week: 0 days    Minutes of Exercise per Session: 0 min   Stress: Not on file   Social Connections: Not on file   Intimate Partner Violence: Not on file   Housing Stability: Not on file     History reviewed. No pertinent family history. There are no preventive care reminders to display for this patient. There are no preventive care reminders to display for this patient.    Diabetes Management   Topic Date Due    Diabetic foot exam  Never done    Diabetic retinal exam  Never done      Health Maintenance Due   Topic  DTaP/Tdap/Td vaccine (1 - Tdap)    Shingles vaccine (1 of 2)      Health Maintenance   Topic Date Due    Pneumococcal 0-64 years Vaccine (1 - PCV) Never done    Diabetic foot exam  Never done    Diabetic retinal exam  Never done    Hepatitis B vaccine (1 of 3 - Risk 3-dose series) Never done    DTaP/Tdap/Td vaccine (1 - Tdap) Never done    Colorectal Cancer Screen  Never done    Shingles vaccine (1 of 2) Never done    COVID-19 Vaccine (3 - Booster for Moderna series) 09/16/2021    Flu vaccine (1) 09/01/2022    A1C test (Diabetic or Prediabetic)  10/26/2022    Breast cancer screen  06/15/2023    Diabetic microalbuminuria test  07/26/2023    Lipids  07/26/2023    Depression Monitoring  07/26/2023    Annual Wellness Visit (AWV)  07/27/2023    Hepatitis C screen  Completed    Hepatitis A vaccine  Aged Out    Hib vaccine  Aged Out    Meningococcal (ACWY) vaccine  Aged Out    HIV screen  Discontinued      There are no preventive care reminders to display for this patient. There are no preventive care reminders to display for this patient. /76   Pulse 95   Temp 97.4 °F (36.3 °C)   Ht 5' (1.524 m)   Wt 175 lb (79.4 kg)   LMP 09/12/2014 (Approximate)   SpO2 98%   BMI 34.18 kg/m²     Objective   Physical Exam  Vitals reviewed. Constitutional:       Appearance: Normal appearance. She is obese. HENT:      Head: Normocephalic and atraumatic. Eyes:      Pupils: Pupils are equal, round, and reactive to light. Cardiovascular:      Rate and Rhythm: Normal rate. Pulmonary:      Effort: Pulmonary effort is normal.   Musculoskeletal:         General: Swelling and tenderness present. Feet:    Skin:     General: Skin is warm. Findings: Erythema present. Neurological:      General: No focal deficit present. Mental Status: She is alert.    Psychiatric:         Mood and Affect: Mood normal.                An electronic signature was used to authenticate this note.    --Stanford Sanon, DO Patient/surrogate refused vaccine.../Egg allergy (administer egg-free influenza vaccine)

## 2022-09-20 NOTE — H&P ADULT - NSHPRISKHIVSCREEN_GEN_ALL_CORE
Detail Level: Detailed Quality 110: Preventive Care And Screening: Influenza Immunization: Influenza immunization was not ordered or administered, reason not given Quality 431: Preventive Care And Screening: Unhealthy Alcohol Use - Screening: Patient not identified as an unhealthy alcohol user when screened for unhealthy alcohol use using a systematic screening method Quality 130: Documentation Of Current Medications In The Medical Record: Current Medications Documented Quality 226: Preventive Care And Screening: Tobacco Use: Screening And Cessation Intervention: Patient screened for tobacco use and is an ex/non-smoker Unable to offer due to clinical condition

## 2022-10-24 NOTE — H&P ADULT - ATTENDING COMMENTS
Abdominal Pain, N/V/D 51 yr old female with a pmh of  metastatic gastric cancer (bone metastasis and peritoneal carcinomatosis), prior SBO, multiple bowel resections, and recent hospitalization at [facility] as she was unable to tolerate PO- it was there that she she had a thoracentesis and paracentesis with progression of her cancer. She presents as she has still not been able to tolerate much PO - only liquids now- associated with vomiting ?bilious emesis and generalized abdominal pain. She called Dr Llanos (GI) who told her to present to [facility] for possible emergent stenting.    Denies  headache, dizziness, chest pain, palpitations, SOB, joint pain, diarrhea/constipation, urinary symptoms.     Vitals in the ED: T 99, HR 97, /75, RR 18 satting 100% RA    REVIEW OF SYSTEMS:    CONSTITUTIONAL: No weakness, fevers or chills  EYES/ENT: No visual changes;  No dysphagia; No sore throat; No rhinorrhea; No sinus pain/pressure  NECK: No pain or stiffness  RESPIRATORY: No cough, wheezing, hemoptysis; No shortness of breath  CARDIOVASCULAR: No chest pain or palpitations; No lower extremity edema  GASTROINTESTINAL:+ abdominal no  epigastric pain. + nausea, vomiting, no hematemesis; No diarrhea or constipation. No melena or hematochezia.  GENITOURINARY: No dysuria, frequency or hematuria  NEUROLOGICAL: No numbness or weakness  MSK: ambulates independently , lower extremity swelling   SKIN: No itching, burning, rashes, or lesions   All other review of systems is negative unless indicated above.    PHYSICAL EXAM:  GENERAL: NAD, well-developed, well-nourished  HEAD:  Atraumatic, Normocephalic  EYES: EOMI, PERRL, conjunctiva and sclera clear  NECK: Supple, No JVD  CHEST/LUNG: decreased Air entry on the right ; No wheezes, rales or rhonchi  HEART: Regular rate and rhythm; No murmurs, rubs, or gallops, (+)S1, S2  ABDOMEN: Soft, Nontender, Nondistended; Normal Bowel sounds   EXTREMITIES:  2+ Peripheral Pulses, No clubbing, cyanosis, bilateral lower extremity edema - non pitting with calf tenderness  PSYCH: normal mood and affect  NEUROLOGY: AAOx3, non-focal  SKIN: No rashes or lesions    Dysphagia.   New  ongoing dysphagia   NPO for possible EGD in AM by GI    Abdominal pain.   Acute on chronic with no concern for acute abdomen   Palliative consult for pain management - in meantime resume prior [facility] pain management : Dilaudid 0.3mg IVP q 3hrs for Mod pain, and 0.5mg IVP q 3hrs for Severe pain.    Gastric cancer  Acute as new recent dx  decreased air entry on the right with hx of pleural effusion  CXR      Lower extremity edema  New  reports on and off edema of lower extremities  bilateral dopplers     Remainder as above

## 2022-11-29 NOTE — CONSULT NOTE ADULT - PROBLEM SELECTOR RECOMMENDATION 4
DUPLICATE ENCOUNTER.   Patient completed HCP on 12/30/21 at [facility]. She re-affirmed her sister, Eldon Brady ([phone]). HCP completed 12/30/21 at [facility].   2nd HCP - Darius Flores (spouse) [phone].     Thank you for allowing us to participate in your patient's care. We will continue to follow with you. Please page [phone] for any q's or c's.     Louise Shaikh D.O.   Palliative Medicine

## 2022-12-16 NOTE — DISCHARGE NOTE NURSING/CASE MANAGEMENT/SOCIAL WORK - PATIENT PORTAL LINK FT
Critical Care Team - Daily Progress Note      Date and time: 12/16/2022 6:52 AM  Patient's name:  Virgil Brown  Medical Record Number: [id number]  Patient's account/billing number: [de-identified]  Patient's YOB: 1991  Age: 36 y.o. Date of Admission: 12/15/2022 12:08 PM  Length of stay during current admission: 1      Primary Care Physician: Sasha Hendricks  ICU Attending Physician: Dr. Rene Thomas Status: Full Code    Reason for ICU admission:   Chief Complaint   Patient presents with    Drug Overdose     36year old who became unresponsive while with her boyfriend, EMS was called. Patient got Narcan with minimal response. Intubated due to GCS of 3 and airway protection. Initially placed on propofol. In ED LFTs were elevated, CT cervical spine unremarkable, CT head negative, CXR unremarkable. Alcohol level 266, UDS negative. Found to be covid positive. Subjective:     OVERNIGHT EVENTS:          Patient seen and examined at bedside. Nurse reports that patient \"goes form 0 to 100\" making it very difficult to wean her sedation. Currently on 30 mcg propofol, Versed 5 mg, Precedex 1.2 mcg. Discussed with nurse about possibly starting Librium while weaning sedation as tolerated, will discuss with attending physician.      IV NS 125cc/h  Ventilator settings: PRVC 12/460/5/30  ABG: pH 7.44, pCO2 26, HCO3 19.7, pO2 310  I: 3071 O: 675 (415 UO)  Tmax: 101.3, RR 12, HR 81, BP 95/64    K 3.0, replaced 40mEq for 2 doses  Mg 1.2, replaced with 4g  ->91  -> 462  Alk phos -> 123-> 102  Platelets 108-> 63, lovenox held     Has Young, ETT, Peripheral line, OG    Take Effexor and Abilify at home, currently held while NPO    AWAKE & FOLLOWING COMMANDS:  [x] No   [] Yes    CURRENT VENTILATION STATUS:     [x] Ventilator  [] BIPAP  [] Nasal Cannula [] Room Air      IF INTUBATED, ET TUBE MARKING AT LOWER LIP:     23  cms    SECRETIONS Amount:  [] Small [] Moderate  [] Large  [x] None  Color:     [] White [] Colored  [] Bloody    SEDATION:  RAAS Score:  [x] Propofol gtt  [x] Versed gtt  [] Ativan gtt   [] No Sedation    PARALYZED:  [x] No    [] Yes    DIARRHEA:                [x] No                [] Yes  (C. Difficile status: [] positive                                                                                                                       [] negative                                                                                                                     [] pending)    VASOPRESSORS:  [x] No    [] Yes    If yes -   [] Levophed       [] Dopamine     [] Vasopressin       [] Dobutamine  [] Phenylephrine         [] Epinephrine    CENTRAL LINES:     [x] No   [] Yes   (Date of Insertion:   )           If yes -     [] Right IJ     [] Left IJ [] Right Femoral [] Left Femoral                   [] Right Subclavian [] Left Subclavian       PLUMMER'S CATHETER:   [] No   [x] Yes  (Date of Insertion: 12/15/2022  )     URINE OUTPUT:            [x] Good   [] Low              [] Anuric    REVIEW OF SYSTEMS: Unable to be performed       OBJECTIVE:     VITAL SIGNS:  /71   Pulse 85   Temp 100.4 °F (38 °C) (Bladder)   Resp 12   Ht 5' 5\" (1.651 m)   Wt 109 lb 2 oz (49.5 kg)   SpO2 100%   BMI 18.16 kg/m²   Tmax over 24 hours:  Temp (24hrs), Av.7 °F (37.1 °C), Min:96.8 °F (36 °C), Max:101.3 °F (38.5 °C)      Patient Vitals for the past 8 hrs:   BP Temp Temp src Pulse Resp SpO2 Height Weight   22 0600 102/71 100.4 °F (38 °C) Bladder 85 12 100 % -- --   22 0502 -- -- -- 93 13 100 % -- 109 lb 2 oz (49.5 kg)   22 0500 91/68 -- -- 88 17 100 % -- --   22 0400 103/75 (!) 101.3 °F (38.5 °C) Bladder 89 16 100 % -- --   22 0300 104/76 -- -- 90 16 100 % -- --   22 0200 99/71 (!) 101.1 °F (38.4 °C) Bladder 91 16 100 % -- --   22 0100 99/65 -- -- 94 16 100 % -- --   22 0009 -- -- -- 90 24 100 % 5' 5\" (1.651 m) --   22 0000 96/ 99.9 °F (37.7 °C) Bladder 90 16 100 % -- --   12/15/22 2300 96/71 -- -- 88 16 100 % -- --         Intake/Output Summary (Last 24 hours) at 12/16/2022 0652  Last data filed at 12/16/2022 0630  Gross per 24 hour   Intake 3071.6 ml   Output 675 ml   Net 2396.6 ml     Date 12/16/22 0000 - 12/16/22 2359   Shift 5602-8760 5564-6745 2692-4850 24 Hour Total   INTAKE   I.V.(mL/kg) 1043. 7(21.1)   1043. 7(21.1)   Shift Total(mL/kg) 1043. 7(21.1)   1043. 7(21.1)   OUTPUT   Urine(mL/kg/hr) 215   215   Emesis/NG output(mL/kg) 250(5.1)   250(5.1)   Shift Total(mL/kg) 465(9.4)   465(9.4)   Weight (kg) 49.5 49.5 49.5 49.5     Wt Readings from Last 3 Encounters:   12/16/22 109 lb 2 oz (49.5 kg)   03/09/22 106 lb (48.1 kg)     Body mass index is 18.16 kg/m². PHYSICAL EXAM:  Constitutional: intubated, sedated  Respiratory: clear to auscultation, no wheezes or rales and unlabored breathing. Cardiovascular: regular rate and rhythm, normal S1, S2, no murmur noted and 2+ pulses throughout  Abdomen: soft, nontender, nondistended, no masses or organomegaly  NEUROLOGIC: Sedated, not currently awake or alert  Extremities:  peripheral pulses normal, no pedal edema,.       Any additional physical findings:      MEDICATIONS:  Scheduled Meds:   sodium chloride flush  5-40 mL IntraVENous 2 times per day    enoxaparin  30 mg SubCUTAneous Daily    thiamine and folic acid IVPB   IntraVENous Daily    thiamine and folic acid IVPB   IntraVENous Daily    famotidine (PEPCID) injection  20 mg IntraVENous BID     Continuous Infusions:   sodium chloride      propofol 40 mcg/kg/min (12/16/22 0630)    sodium chloride 125 mL/hr at 12/16/22 0630    midazolam 5 mg/hr (12/16/22 0652)    dexmedetomidine 1.2 mcg/kg/hr (12/16/22 0630)     PRN Meds:   midazolam, 2 mg, Q2H PRN  sodium chloride flush, 5-40 mL, PRN  sodium chloride, , PRN  ondansetron, 4 mg, Q8H PRN   Or  ondansetron, 4 mg, Q6H PRN  polyethylene glycol, 17 g, Daily PRN  acetaminophen, 650 mg, Q6H PRN   Or  acetaminophen, 650 mg, Q6H PRN        SUPPORT DEVICES: [x] Ventilator [] BIPAP  [] Nasal Cannula [] Room Air    VENT SETTINGS (Comprehensive) (if applicable):  Vent Information  Ventilator ID: [id number]  Equipment Changed: HME, Expiratory Filter  Additional Respiratory Assessments  Heart Rate: 85  Resp: 12  SpO2: 100 %  End Tidal CO2: 27 (%)  Position: Semi-Chang's  Humidification Source: HME    ABGs:     Lab Results   Component Value Date/Time    FIO2 40.0 12/16/2022 04:55 AM     Lactic Acid: No results found for: LACTA      DATA:  Complete Blood Count:   Recent Labs     12/15/22  1228   WBC 4.8   HGB 11.3*   .3*   PLT See Reflexed IPF Result   RBC 2.83*   HCT 32.9*   MCH 39.9*   MCHC 34.3   RDW 15.0*        PT/INR:    Lab Results   Component Value Date/Time    PROTIME 12.4 12/15/2022 12:28 PM    INR 1.2 12/15/2022 12:28 PM     PTT:  No results found for: APTT, PTT    Basal Metabolic Profile:   Recent Labs     12/15/22  1211 12/15/22  1228   NA  --  134*   K  --  4.0   BUN  --  2*   CREATININE 0.46* 0.33*   CL  --  100   CO2  --  18*      Magnesium:   Lab Results   Component Value Date/Time    MG 1.8 12/15/2022 12:28 PM    MG 1.6 03/10/2022 06:14 AM     Phosphorus: No results found for: PHOS  S. Calcium:  Recent Labs     12/15/22  1228   CALCIUM 8.8     S. Ionized Calcium:No results for input(s): IONCA in the last 72 hours.       Urinalysis:   Lab Results   Component Value Date/Time    NITRU NEGATIVE 12/15/2022 12:49 PM    COLORU Dark Yellow 12/15/2022 12:49 PM    PHUR 6.0 12/15/2022 12:49 PM    WBCUA 0 TO 2 12/15/2022 12:49 PM    RBCUA 2 TO 5 12/15/2022 12:49 PM    MUCUS 3+ 03/09/2022 12:21 PM    TRICHOMONAS NOT REPORTED 09/20/2014 06:19 AM    YEAST NOT REPORTED 09/20/2014 06:19 AM    BACTERIA FEW 09/20/2014 06:19 AM    CLARITYU slightly cloudy 07/22/2014 11:58 AM    SPECGRAV 1.020 12/15/2022 12:49 PM    LEUKOCYTESUR NEGATIVE 12/15/2022 12:49 PM    UROBILINOGEN Normal 12/15/2022 12:49 PM    BILIRUBINUR NEGATIVE  Verified by ictotest. 12/15/2022 12:49 PM    BILIRUBINUR negative 07/22/2014 11:58 AM    BLOODU negative 07/22/2014 11:58 AM    GLUCOSEU NEGATIVE 12/15/2022 12:49 PM    KETUA SMALL 12/15/2022 12:49 PM    AMORPHOUS NOT REPORTED 09/20/2014 06:19 AM       CARDIAC ENZYMES: No results for input(s): CKMB, CKMBINDEX, TROPONINI in the last 72 hours. Invalid input(s): CKTOTAL;3  BNP: No results for input(s): BNP in the last 72 hours. LFTS  Recent Labs     12/15/22  1228   ALKPHOS 123*   *   *   BILITOT 0.8   BILIDIR 0.3*   LABALBU 3.8       AMYLASE/LIPASE/AMMONIA  Recent Labs     12/15/22  1255   AMMONIA 61*       Last 3 Blood Glucose:   Recent Labs     12/15/22  1228   GLUCOSE 130*      HgBA1c:  No results found for: LABA1C      TSH:  No results found for: TSH  ANEMIA STUDIES  No results for input(s): LABIRON, TIBC, FERRITIN, [id number], FOLATE, OCCULTBLD in the last 72 hours. Cultures during this admission:     Blood cultures:                 [] None drawn      [] Negative             []  Positive (Details:  )  Urine Culture:                   [] None drawn      [] Negative             []  Positive (Details:  )  Sputum Culture:               [] None drawn       [] Negative             []  Positive (Details:  )   Endotracheal aspirate:     [] None drawn       [] Negative             []  Positive (Details:  )        ASSESSMENT:     Principal Problem:    Drug overdose of undetermined intent, initial encounter  Resolved Problems:    * No resolved hospital problems.  *        PLAN:     Neurologic  -Altered Mental Status likely 2/2 suspected drug overdose  - UDS negative  -Hx of EtOH abuse, +EtOH 266  -Monitor to EtOH withdraw  - Seizure precautions  - Daily thiamine and folate   -Neuro checks per protocol  -Wean sedation  -CT cervical spine negative  -Hx Bipolar disorder, oral home meds held, Abilify and Effexor      Cardiovascular  -Hemodynamically stable, no pressor support at this time  -MAP goal > 65     Pulmonary  -Acute Respiratory Failure, intubated for airway protection  -Covid-19 positive  -ABG: pH 7.44, pCO2 26, HCO3 19.7, pO2 310  -Maintain oxygen sats >92%  -Pulmonary toilet  -Intubated, on PRVC 12/460/5/30  -No concern for infectious process on CXR     GI/Nutrition  -Transaminitis, improving  -Hepatic steatosis, march 2022  -Glycolax PRN  -Ulcer Prophylaxis: Pepcid BID  -Diet:Diet NPO  -->462/->91/->102  -Ammonia 61->57     Renal/Fluid/Electrolyte  -IV Fluids: [email or web address] mL/Hr   -I: 3071, O: 675  -Hypokalemia, replaced  -Hypomagnesemia, replaced     ID  -No concern for acute infectious process  -Antimicrobials: not indicated at this time  -UA negaative     Hematology  -Plts 133-> 63, thrombocytopenia      Endocrine:   -glucose controlled - most recent BGL is   -No hx of diabetes. Cont to monitor. DVT Prophylaxis: Lovenox held, Pneumatic compression device  Discharge Needs:  PT/OT, CM    CODE STATUS: Full Code     DISPOSITION:  [x] To remain ICU  [] OK for out of ICU from [id number] [facility] MD Anne-Marie,             Department of Internal Medicine/ Critical care  [facility] ([place]             12/16/2022, 6:52 AM    Attending Physician Statement  I have discussed the care of Sal Ruiz, including pertinent history and exam findings with the resident. I have reviewed the key elements of all parts of the encounter with the resident. I have seen and examined the patient with the resident. I agree with the assessment and plan and status of the problem list as documented. I seen the patient during around today, chart reviewed, labs and medications reviewed ventilator setting arterial blood gases seen. Patient was not able to wean down on propofol additionally she needed Precedex drip and also on Versed drip overnight was more agitative apparently did not follow commands. Her COVID was also came out positive.   She had a T-max of 101.3 overnight. Ventilator setting PRVC/12/460/5/30 percent ABG 7.4 4/26/310. WBC count is 2.7 platelet count is 63 hemoglobin is stable at 10.0. AST is 462 and ALT is 91. Will get ID evaluation. Start tube feeding. Resume her Effexor and Abilify. Will get respiratory cultures if not done. Follow-up blood culture. Potassium replacement and follow potassium. Follow platelet counts continue Lovenox for DVT prophylaxis for now. Continue ventilatory support. Patient is very agitated on 3 sedative medication. Long history of alcohol use apparently no history of narcotic use. Will start patient on Librium 25 mg 3 times a day and try to wean Versed drip down will adjust Librium. Will continue thiamine and folate. Discussed with nursing staff, treatment and plan discussed. Discussed with respiratory therapist.    Total critical care time caring for this patient with life threatening, unstable organ failure, including direct patient contact, management of life support systems, review of data including imaging and labs, discussions with other team members and physicians at least 28  Min so far today, excluding procedures. Please note that this chart was generated using voice recognition Dragon dictation software. Although every effort was made to ensure the accuracy of this automated transcription, some errors in transcription may have occurred.      Virginia Noble MD  12/16/2022 11:49 AM You can access the FollowMyHealth Patient Portal offered by [facility] by registering at the following website: [email or web address]. By joining [facility]’s FollowMyHealth portal, you will also be able to view your health information using other applications (apps) compatible with our system.

## 2022-12-19 NOTE — PROGRESS NOTE ADULT - PROVIDER SPECIALTY LIST ADULT
Anesthesia
Palliative Care
Pulmonology
Pulmonology
Gastroenterology
Hospitalist
no
Hospitalist

## 2023-01-07 NOTE — PROGRESS NOTE ADULT - PROBLEM SELECTOR PLAN 3
None metastatic stage 4 gastric cancer, peritoneum, bones and malignant effusion/ascites.  refused disease modifying therapy in past  - wishes to fly to [place] for nonconventional treatment with Dr. Layne- does not want us to discuss her case with him. I recommended against this but stated that we would optimize her as best we can should she decide to go.  - palliative reconsulted for refocusing of GOC: pt still wants to be full code

## 2023-02-02 NOTE — ED PROVIDER NOTE - IV ALTEPLASE ADMIN OUTSIDE HIDDEN
Subjective:   Patient ID:  Harvey Eduardo is a 71 y.o. female who presents for evaluation of No chief complaint on file.      HPI  72 yo female, never smoker, no alcohol, no family hx of premature cad   Htn on bp meds, states only taking the olmesartan but not the chlorthalidone she didn't know it's a bp med  Denies any exertional chest pain   No unusual thornton   No syncope , no palptiations,   Active   She has a left rib to the side pain when she moves in certain direction or coughs , she had it for many years, had seen katina Navarrete . 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     Menopause     Retinal defect     breaud    Sciatica     Vitamin D deficiency        Past Surgical History:   Procedure Laterality Date    CARPAL TUNNEL RELEASE      LUMBAR DISCECTOMY      ROTATOR CUFF REPAIR Right     TUBAL LIGATION  1980       Social History     Tobacco Use    Smoking status: Never    Smokeless tobacco: Never   Substance Use Topics    Alcohol use: No    Drug use: No       Family History   Problem Relation Age of Onset    Asthma Mother     Colon cancer Mother 66        passed away at 96yo    Hypertension Mother     Heart failure Mother     Ovarian cancer Sister     Asthma Brother     Colon cancer Brother     Colon cancer Brother     Breast cancer Neg Hx        Review of Systems   Constitutional: Negative for fever and malaise/fatigue.   HENT:  Negative for sore throat.    Eyes:  Negative for blurred vision.   Cardiovascular:  Negative for chest pain, claudication, cyanosis, dyspnea on exertion, irregular heartbeat, leg swelling, near-syncope, orthopnea, palpitations, paroxysmal  nocturnal dyspnea and syncope.   Respiratory:  Negative for cough and hemoptysis.    Hematologic/Lymphatic: Negative for bleeding problem.   Skin:  Negative for rash.   Musculoskeletal:  Negative for falls.   Gastrointestinal:  Negative for abdominal pain.   Genitourinary: Negative.    Neurological: Negative.    Psychiatric/Behavioral:  Negative for altered mental status and substance abuse.      Current Outpatient Medications on File Prior to Visit   Medication Sig    chlorthalidone (HYGROTEN) 25 MG Tab Take 1 tablet (25 mg total) by mouth once daily.    cholecalciferol, vitamin D3, 125 mcg (5,000 unit) Tab Take 10,000 Units by mouth. Patient takes 68392 units 5 days a week    fluticasone propionate (FLONASE) 50 mcg/actuation nasal spray 2 sprays (100 mcg total) by Each Nostril route once daily.    HYDROcodone-acetaminophen (NORCO) 5-325 mg per tablet Take 1 tablet by mouth every 6 (six) hours as needed for Pain.    multivitamin (THERAGRAN) per tablet Take 1 tablet by mouth once daily.    olmesartan (BENICAR) 40 MG tablet Take 1 tablet (40 mg total) by mouth once daily.    albuterol (PROVENTIL/VENTOLIN HFA) 90 mcg/actuation inhaler Inhale 1-2 puffs into the lungs every 4 (four) hours as needed for Wheezing or Shortness of Breath (cough). (Patient not taking: Reported on 1/6/2023)    azelastine (ASTELIN) 137 mcg (0.1 %) nasal spray 2 sprays (274 mcg total) by Nasal route 2 (two) times daily.     No current facility-administered medications on file prior to visit.       Objective:   Objective:  Wt Readings from Last 3 Encounters:   02/02/23 103.9 kg (229 lb)   01/27/23 102 kg (224 lb 13.9 oz)   01/10/23 103.9 kg (229 lb)     Temp Readings from Last 3 Encounters:   01/27/23 98 °F (36.7 °C)   01/02/23 97.8 °F (36.6 °C) (Oral)   11/17/22 98.3 °F (36.8 °C) (Temporal)     BP Readings from Last 3 Encounters:   02/02/23 (!) 146/68   01/27/23 (!) 140/82   01/06/23 (!) 140/72     Pulse Readings from Last 3 Encounters:    02/02/23 70   01/27/23 75   01/06/23 64       Physical Exam  Vitals reviewed.   Constitutional:       Appearance: She is well-developed.   HENT:      Head: Normocephalic and atraumatic.   Eyes:      General: No scleral icterus.     Conjunctiva/sclera: Conjunctivae normal.   Cardiovascular:      Rate and Rhythm: Normal rate and regular rhythm.      Pulses: Intact distal pulses.      Heart sounds: Normal heart sounds. No murmur heard.  Pulmonary:      Effort: No respiratory distress.      Breath sounds: No wheezing or rales.   Chest:      Chest wall: No tenderness.   Abdominal:      General: Bowel sounds are normal. There is no distension.      Palpations: Abdomen is soft.      Tenderness: There is no guarding.   Musculoskeletal:         General: Normal range of motion.      Cervical back: Normal range of motion and neck supple.   Skin:     General: Skin is warm.   Neurological:      Mental Status: She is alert and oriented to person, place, and time.       Lab Results   Component Value Date    CHOL 188 05/24/2022    CHOL 201 (H) 09/21/2021    CHOL 199 09/29/2020     Lab Results   Component Value Date    HDL 54 05/24/2022    HDL 60 09/21/2021    HDL 60 09/29/2020     Lab Results   Component Value Date    LDLCALC 109.4 05/24/2022    LDLCALC 126 (H) 09/21/2021    LDLCALC 124 (H) 09/29/2020     Lab Results   Component Value Date    TRIG 123 05/24/2022    TRIG 83 09/21/2021    TRIG 82 09/29/2020     Lab Results   Component Value Date    CHOLHDL 28.7 05/24/2022    CHOLHDL 25.7 06/18/2010       Chemistry        Component Value Date/Time     01/26/2023 1022    K 4.4 01/26/2023 1022     01/26/2023 1022    CO2 30 (H) 01/26/2023 1022    BUN 8 01/26/2023 1022    CREATININE 1.0 01/26/2023 1022    GLU 95 01/26/2023 1022        Component Value Date/Time    CALCIUM 10.0 01/26/2023 1022    ALKPHOS 81 05/24/2022 1009    AST 17 05/24/2022 1009    ALT 9 (L) 05/24/2022 1009    BILITOT 0.4 05/24/2022 1009    ESTGFRAFRICA  >60.0 05/24/2022 1009    EGFRNONAA >60.0 05/24/2022 1009          Lab Results   Component Value Date    TSH 2.134 05/24/2022     Lab Results   Component Value Date    INR 1.0 06/18/2010     Lab Results   Component Value Date    WBC 4.95 05/24/2022    HGB 11.0 (L) 05/24/2022    HCT 36.4 (L) 05/24/2022    MCV 86 05/24/2022     05/24/2022     BNP  @LABRCNTIP(BNP,BNPTRIAGEBLO)@  Estimated Creatinine Clearance: 67 mL/min (based on SCr of 1 mg/dL).     Imaging:  ======  Results for orders placed during the hospital encounter of 01/04/21    Echo Color Flow Doppler? Yes    Interpretation Summary  · The left ventricle is normal in size with eccentric hypertrophy and normal systolic function. The estimated ejection fraction is 65%  · Normal left ventricular diastolic function.  · The estimated PA systolic pressure is 23 mmHg.  · Normal right ventricular size with normal right ventricular systolic function.  · Normal central venous pressure (3 mmHg).    No results found for this or any previous visit.    No results found for this or any previous visit.    Results for orders placed during the hospital encounter of 08/01/22    X-Ray Chest PA And Lateral    Narrative  EXAMINATION:  XR CHEST PA AND LATERAL    CLINICAL HISTORY:  Acute maxillary sinusitis, unspecified    TECHNIQUE:  PA and lateral views of the chest were performed.    COMPARISON:  05/05/2021    FINDINGS:  Cardiac silhouette and mediastinal contours are normal.  Lungs are clear.  Osseous structures demonstrate no acute abnormality    Impression  No acute cardiopulmonary process.      Electronically signed by: Dante White MD  Date:    08/02/2022  Time:    08:23    No results found for this or any previous visit.    No valid procedures specified.    Diagnostic Results:  ECG: Reviewed  Normal     The 10-year ASCVD risk score (Ash DK, et al., 2019) is: 13.9%    Values used to calculate the score:      Age: 71 years      Sex: Female      Is Non-  : Yes      Diabetic: No      Tobacco smoker: No      Systolic Blood Pressure: 146 mmHg      Is BP treated: Yes      HDL Cholesterol: 54 mg/dL      Total Cholesterol: 188 mg/dL    Assessment and Plan:   Mixed hyperlipidemia    Essential hypertension, benign  -     Ambulatory referral/consult to Cardiology    Upper back pain on left side  -     Ambulatory referral/consult to Cardiology    Flank pain    Primary osteoarthritis of left shoulder    Morbid obesity      Reviewed all tests and above medical conditions with patient in detail and formulated treatment plan.  Risk factor modification discussed.   Cardiac low salt diet discussed.  Maintaining healthy weight and weight loss goals were discussed in clinic.  Non cardiac flank pain   Cw norvasc, resume her chlorthalidone    Follow up in 6 Months      show

## 2023-03-07 NOTE — PROGRESS NOTE ADULT - PROBLEM/PLAN-1
Increase entresto to 97/103 mg bid when ready for new refill of entresto    Continue all your medications as prescribed     Call if having shortness of breath, cough, wheezing, chest pain, dizziness, lightheadedness, heart racing, palpitations, swelling, weight gain, fatigue or weakness    Call 911 with severe shortness of breath, chest pain or chest pressure not improved after 15 minutes of rest or if feeling faint,  passing out or having a fall     Weigh yourself daily in the morning before breakfast, call if gaining 3 lbs or more overnight or more than 5 lbs in one week. Follow 2000 mg sodium restricted , heart healthy diet, Keep daily fluids at 48-64 ounces per day    Follow up with Dr. Nicole Beasley as scheduled     Follow up with the specialty care clinic in 1-2 months     Check blood test for basic metabolic panel 2 weeks after increasing entresto to the new dose     Resume chair exercises as tolerated      Practice pursed lip breathing 2-3 times per day. Relax your neck and shoulder muscles. Inhale slowly through your nose for 2 counts or more. Pucker your lips as if you are going to blow out a candle. Exhale slowly and gently through your lips for a least twice as long as you inhaled. Repeat this 4-5 times per day       Limit sodium to  2000 mg daily. Common high sodium foods include frozen dinners, soups (not homemade), some cereal, vegetable juice, canned vegetables, lunch meats, processed meats like hotdogs, sausage, banda, pepperoni, soy sauce, pre-packaged rice or potatoes. Please remember to read nutrition labels for sodium content.     www.healthyeating. nhlbi.nih.gov      Exercise daily as tolerated, with goal of doing moderate aerobic exercise like walking for about 30 minutes 5 days per week. Begin chair exercises at moderate pace for 5-10 minutes 2-3 times a day. Pace your activity to prevent shortness of breath or fatigue.  Stop exercise if you develop chest pain, lightheadedness, significant shortness
of breath or increased back  pain.
DISPLAY PLAN FREE TEXT

## 2023-04-07 NOTE — PROGRESS NOTE ADULT - PROBLEM SELECTOR PROBLEM 5
Pleural effusion
Palliative care encounter
Palliative care encounter
No
Palliative care encounter
Pleural effusion
Lower extremity edema
Lower extremity edema

## 2023-06-16 NOTE — ASU PREOP CHECKLIST - ASSESSMENT, HISTORY & PHYSICAL COMPLETED AND ON MEDICAL RECORD
Delayed Note:    Received a call from Sancho Redmond at [facility] intake stating that Patient was accepted under Dr. Angela Metzger. RN to RN to take place. Spoke with RN to inform of Patient's acceptance to [address] [place] [address] Street,[address]. Provided accepting Doctor and that RN to RN will take place. Called Patient's Ivanasha Estevez, to inform him of Patient's acceptance to [address] [place] [address],[address]. Provided Gricelda Quintana with the address and phone number to [address] [place] [address],[address]. Spoke with Patient to inform her of acceptance to [address] [place] [address],[address]. Informed her that Gricelda Quintana was contacted as well. Received a call from Tyrone at [address] [place] [address],[address] who stated that transportation can be arranged after 7:00PM.     Transferring To:     [address], [address] done

## 2023-07-17 NOTE — PATIENT PROFILE ADULT - NSPROPTRIGHTCAREGIVER_GEN_A_NUR
Speech Language/Pathology  Speech/Language Pathology  Assessment    Patient Name: Anel Sinha  CPPYF'V Date: 7/17/2023     Problem List  Principal Problem:    Dizziness  Active Problems:    Gastroparesis    Class 1 obesity due to excess calories with serious comorbidity and body mass index (BMI) of 34.0 to 34.9 in adult    Depression with anxiety    GERD (gastroesophageal reflux disease)    Benign hypertension with CKD (chronic kidney disease) stage III (HCC)    Seizure disorder (HCC)    Past Medical History  Past Medical History:   Diagnosis Date    Asthma     Atypical chest pain     Depression     Diabetes mellitus ([address])     Gastroparesis     GERD (gastroesophageal reflux disease)     Hyperlipidemia     Hypertension     Psychiatric disorder     Renal disorder     Sciatica     Seizure disorder ([address])      Past Surgical History  Past Surgical History:   Procedure Laterality Date    APPENDECTOMY      BREAST BIOPSY Left  benign    CHOLECYSTECTOMY      COLONOSCOPY      HYSTERECTOMY      [place] LAPAROSCOPIC APPENDECTOMY N/A 03/24/2021    Procedure: APPENDECTOMY LAPAROSCOPIC;  Surgeon: Alberto Jean MD;  Location: [address] [place] [address] OR;  Service: General    [place] LAPS ABD PRTM&OMENTUM DX W/WO SPEC BR/WA SPX N/A 03/24/2021    Procedure: LAPAROSCOPY DIAGNOSTIC, EXTENSIVE DESI;  Surgeon: Alberto Jean MD;  Location: [address] [place] [address] OR;  Service: General    UPPER GASTROINTESTINAL ENDOSCOPY          07/17/23 1008   Patient Information   Current Medical dizziness, nausea, LINDY   Special Studies CT, MRI   Past Medical History DM   Swallow Information   Current Symptoms/Concerns   (Pt reports sensation of things becoming "stuck. Ken[place] even water. ")   Current Diet Regular; Thin liquid   Baseline Diet Regular; Thin liquids   Baseline Assessment   Behavior/Cognition Alert; Cooperative; Interactive   Speech/Language Status WFL   Patient Positioning Upright in bed   Swallow Mechanism Exam   Labial Symmetry WFL   Labial Strength WFL   Labial ROM WFL   Labial Sensation WFL   Facial Symmetry WFL   Facial Strength WFL   Facial ROM WFL   Facial Sensation WFL   Lingual Symmetry WFL   Lingual Strength WFL   Lingual ROM WFL   Lingual Sensation WFL   Dentition Edentulous   SpO2 92 %   Consistencies Assessed and Performance   Materials Admnistered Thin liquid   Oral Stage WFL; Mild impaired  (edentulous)   Phargngeal Stage WFL   Swallow Mechanics WFL;Swallow initation; Appears prompt;Good Larygneal rise   Esophageal Concerns Sour belching;C/O food dysphagia   Strategies and Efficacy small bites, small sips, slow rate   Summary   Swallow Summary Patient received sitting upright in bed watching television drinking thin via straw. Patient reports that for "3-4 years" she has been experiencing a sensation that things become "stuck" in her throat. Pt gesturing at base of neck, between collar bones. She endorses gastroparesis and delayed emptying, indicates that "even water gets stuck."  Pt reports using a thin liquid wash to clear items and feelings of residue. Patient demonstrates audible noise when swallowing that sounds like the opening/closing of her UES based on timing. Pt is edentulous and reports that bread gives her trouble "so I avoid it."  Patient observed consuming thin via straw in a very reclined position (< 45 degrees) with no overt s/s of penetration, aspiration or vocal change. She reports feeling dry mouth often. Overall swallow function appears normal, symptoms suggest contributing GI components at this time. Pt recommended dental soft diet as it more closely aligns with her baseline diet. Recommendations   Risk for Aspiration Mild   Recommendations Consider oral diet   Diet Solid Recommendation Level 3 Dysphagia/ advanced/ soft to chew   Diet Liquid Recommendation Thin liquid   Recommended Form of Meds Whole; With thin liquid   General Precautions Aspiration precautions; Feed only when alert;Minimize distractions;Upright as possible for all oral intake Compensatory Swallowing Strategies Alternate solids and liquids   Further Evaluations Gastroenterology  (OP)   Results Reviewed with PT/Family/Caregiver   Speech Therapy Prognosis   Prognosis Good   Prognosis Considerations Patient Participation Level no

## 2023-10-10 NOTE — DISCHARGE NOTE PROVIDER - NSCORESITESY/N_GEN_A_CORE_RD
[facility] - Medical Surgical Unit  Hospital Medicine  Progress Note    Patient Name: Joey King  MRN: [id number]  Patient Class: IP- Swing   Admission Date: 10/6/2023  Length of Stay: 4 days  Attending Physician: Jerod Urbina Jr., MD  Primary Care Provider: Prashanth Ryan DO        Subjective:     Principal Problem:Muscle weakness (generalized)        HPI:  Mr. Joey King is an 86 year old male with pmh of paroxysmal afib with chronic anticoagulation with eliquis, hx of GI bleed, (09/2021) PE, DVT, htn, Chronic diastolic CHF, BPH, HLD, hypothyroidism, depression, MARCELO, GERD and anxiety. PCP is Dr. Prashanth Ryan. Mr. King was admitted to [facility] on 09/22-. He had lap. Cholecystectomy with Dr. Sree Tolbert on 09/23/23. Discharged from [facility] to swing bed here at [facility] on 10/06/23 for continued therapy. Mr. King lives alone, has home health services. States he was getting around with cane before hospitalization. Plans to return home at discharge.      Overview/Hospital Course:  10/10/23 complains of nausea. States occurred after eating salad. Abdomen soft , non distended. Bowel sounds present. Will give zofran and monitor.      Interval History: weakness    Review of Systems   Constitutional:  Positive for appetite change. Negative for fatigue and fever.        Reports poor appetite   HENT:  Negative for facial swelling, sinus pressure, sinus pain and trouble swallowing.    Eyes:  Negative for pain and discharge.   Respiratory:  Positive for cough. Negative for shortness of breath and wheezing.    Cardiovascular:  Negative for chest pain, palpitations and leg swelling.   Gastrointestinal:  Negative for abdominal distention, abdominal pain, blood in stool, constipation, diarrhea, nausea and vomiting.   Genitourinary:  Negative for difficulty urinating and dysuria.   Musculoskeletal:  Negative for arthralgias and back pain.   Skin:  Positive  for wound.        Lap sites to abdomen- no signs of infection   Neurological:  Positive for weakness. Negative for light-headedness and headaches.     Objective:     Vital Signs (Most Recent):  Temp: 97.4 °F (36.3 °C) (10/10/23 0750)  Pulse: 62 (10/10/23 1353)  Resp: 20 (10/10/23 0750)  BP: (!) 104/56 (10/10/23 0750)  SpO2: 98 % (10/10/23 0750) Vital Signs (24h Range):  Temp:  [97.4 °F (36.3 °C)-97.8 °F (36.6 °C)] 97.4 °F (36.3 °C)  Pulse:  [44-91] 62  Resp:  [18-20] 20  SpO2:  [95 %-98 %] 98 %  BP: (104-129)/(56-64) 104/56     Weight: 84.4 kg (186 lb)  Body mass index is 25.94 kg/m².    Intake/Output Summary (Last 24 hours) at 10/10/2023 1520  Last data filed at 10/10/2023 1341  Gross per 24 hour   Intake 720 ml   Output 100 ml   Net 620 ml         Physical Exam  HENT:      Head: Normocephalic.      Mouth/Throat:      Mouth: Mucous membranes are moist.   Cardiovascular:      Rate and Rhythm: Normal rate and regular rhythm.      Pulses: Normal pulses.      Heart sounds: Normal heart sounds.   Pulmonary:      Effort: Pulmonary effort is normal. No respiratory distress.      Breath sounds: Normal breath sounds. No stridor. No wheezing or rhonchi.   Abdominal:      General: Bowel sounds are normal. There is no distension.      Palpations: Abdomen is soft. There is no mass.      Tenderness: There is no abdominal tenderness. There is no guarding or rebound.      Hernia: No hernia is present.      Comments: nausea   Musculoskeletal:         General: Normal range of motion.      Cervical back: Normal range of motion.   Skin:     General: Skin is warm and dry.      Coloration: Skin is not jaundiced or pale.      Findings: No bruising, erythema or rash.      Comments: Lap. Surgery  sites - no signs of infection   Neurological:      Mental Status: He is alert and oriented to person, place, and time.      Motor: Weakness present.   Psychiatric:         Mood and Affect: Mood normal.             Significant Labs:   All pertinent  labs within the past 24 hours have been reviewed.  Recent Lab Results       None          Significant Imaging: I have reviewed all pertinent imaging results/findings within the past 24 hours.      Assessment/Plan:      * Muscle weakness (generalized)  10/09/23 PT and OT to evaluate and treat        Paroxysmal atrial fibrillation  Patient with Paroxysmal (<7 days) atrial fibrillation which is controlled currently with Beta Blocker. Patient is currently in sinus rhythm.[id number] Score: 2.    10/09/23 eliquis 5 mg po BID   Continue flecainide    Chronic diastolic CHF (congestive heart failure)  Patient is identified as having Diastolic (HFpEF) heart failure that is Chronic. CHF is currently controlled. Latest ECHO performed and demonstrates- No results found for this or any previous visit.  . Continue Beta Blocker and monitor clinical status closely. Monitor on telemetry. Patient is on CHF pathway.  Monitor strict Is&Os and daily weights. 10/09/23 stable, daily weights, intake and output    Gastroesophageal reflux disease without esophagitis  10/09/23 continue protonix      Essential hypertension  10/09/23 continue losartan, metoprolol and clonidine      Hypothyroidism  10/09/23 continue levothyroxine 100 mcg po daily      Depression  10/09/23 continue abilify and celexa        Anxiety  10/09/23 continue celexa      Benign prostatic hyperplasia  10/09/23 continue oxybutnin       MARCELO (obstructive sleep apnea)  10/09/23 uses cpap at hs - family member to bring         VTE Risk Mitigation (From admission, onward)         Ordered     Place ULISES hose  Until discontinued         10/07/23 0658     apixaban tablet 5 mg  2 times daily         10/06/23 1740                Discharge Planning   DAVID:      Code Status: Full Code   Is the patient medically ready for discharge?:     Reason for patient still in hospital (select all that apply): Treatment  Discharge Plan A: Home, Home Health                  Dimple Quezada  FNP  Department of Hospital Medicine   [facility] - Medical Surgical Unit   No

## 2023-10-12 NOTE — H&P ADULT - PROBLEM SELECTOR PROBLEM 3
Hypokalemia Doxepin Counseling:  Patient advised that the medication is sedating and not to drive a car after taking this medication. Patient informed of potential adverse effects including but not limited to dry mouth, urinary retention, and blurry vision.  The patient verbalized understanding of the proper use and possible adverse effects of doxepin.  All of the patient's questions and concerns were addressed.

## 2023-11-27 NOTE — ED ADULT NURSE NOTE - OBJECTIVE STATEMENT
oral
51F metastatic gastric CA (bone metastasis and peritoneal carcinomatosis), h/o SBO, s/p multiple bowel resections, recent hospitalizations for progression of gastric cancer w/ large right pleural effusions s/p thoracentesis and large ascites s/p paracentesis, discharged home 1/9, and readmitted 1/15-1/19 for recurrent symptoms, s/p pleural catheter placement 1/17, and discharged home with PO pain regimen, now returned with sudden worsening of upper back pain and SOB after draining the pleural catheter 1/24.   Patient stated that symptoms were controlled after discharge until yesterday after draining 500 cc from the pleural catheter. Woke up around 9PM w/ transient severe upper back pain, radiating to the anterior chest, with SOB during the episode. Also started to have belching around the same time without vomiting. (+) abdominal discomfort, and was concerned that it might be due to recurrence of ascites.

## 2023-12-04 NOTE — CONSULT NOTE ADULT - PROBLEM SELECTOR PROBLEM 7
----- Message from Viviana Connolly CNM sent at 12/2/2023  5:23 PM CST -----  Metronidazole 500 mg bid for 7 days.   Hyponatremia Palliative care encounter

## 2023-12-18 NOTE — PROGRESS NOTE ADULT - SUBJECTIVE AND OBJECTIVE BOX
Date of Service   04-23-22 @ 12:29    Patient is a 51y old  Female who presents with a chief complaint of sob (23 Apr 2022 03:51)      INTERVAL HISTORY: pt feels ok, sister at bedside   TELEMETRY Personally reviewed: -120's     REVIEW OF SYSTEMS:   CONSTITUTIONAL: No weakness  EYES/ENT: No visual changes; No throat pain  Neck: No pain or stiffness  Respiratory: No cough, wheezing, No shortness of breath  CARDIOVASCULAR: no chest pain or palpitations  GASTROINTESTINAL: No abdominal pain, no nausea, vomiting or hematemesis  GENITOURINARY: No dysuria, frequency or hematuria  NEUROLOGICAL: No stroke like symptoms  SKIN: No rashes    	  MEDICATIONS:  buMETAnide 2 milliGRAM(s) Oral two times a day        PHYSICAL EXAM:  T(C): 36.8 (04-23-22 @ 11:38), Max: 36.8 (04-23-22 @ 11:38)  HR: 123 (04-23-22 @ 11:38) (123 - 127)  BP: 106/62 (04-23-22 @ 11:38) (106/62 - 122/70)  RR: 18 (04-23-22 @ 11:38) (18 - 20)  SpO2: 98% (04-23-22 @ 11:38) (94% - 98%)  Wt(kg): --  I&O's Summary    22 Apr 2022 07:01  -  23 Apr 2022 07:00  --------------------------------------------------------  IN: 0 mL / OUT: 1400 mL / NET: -1400 mL          Appearance: In no distress	  HEENT:    PERRL, EOMI	  Cardiovascular:  S1 S2, No JVD  Respiratory: Lungs clear to auscultation	  Gastrointestinal:  round, large, Non-tender, + BS	  Vascularature:  No edema of LE  Psychiatric: Appropriate affect   Neuro: no acute focal deficits                               7.7    5.64  )-----------( 279      ( 23 Apr 2022 10:12 )             26.6     04-23    137  |  87<L>  |  13  ----------------------------<  113<H>  3.2<L>   |  38<H>  |  0.45<L>    Ca    8.9      23 Apr 2022 10:12          Labs personally reviewed      ASSESSMENT/PLAN: 	  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 She was aggressively diuresed with some improvement in her symptoms. She presents now tachycardic and shortness of breath secondary to anxiety.    - No further cardiac workup or interventions at this time.  - Tachycardia likely i/s/o metabolic state and anxiety.  - -120's   - Please continue supportive care and diuresis as per primary team.  - Palliative care appreciated.   - Supplemental O2 to keep sats >92 and pt comfortable       Andreina Cao [facility]   Richard Howard DO [facility]  Cardiovascular Medicine  [address] [place] [address], [address]  Office: [phone]   N/A

## 2024-02-24 NOTE — ED ADULT NURSE NOTE - PAIN RATING/NUMBER SCALE (0-10): ACTIVITY
Chief Complaint   Patient presents with    Urgent Care     Left side of face, arm, neck and hand feel numb and tingling on fingers off and on since Monday.          ICD-10-CM    1. Numbness and tingling of left side of face  R20.0     R20.2       2. Heaviness of upper extremity  R29.898       Advised patient he should take an ambulance to the emergency room.  He declined ambulance and will instead have friend drive him to [facility] immediately.  Primary concern is TIA, CVA and he will need advanced imaging to rule this out.  Called [facility] emergency room and inform them of patient's impending arrival.    Subjective     Ivan Reyes Santiago is an 52 year old male who presents to clinic today for numbness in the left side of face down into the neck for 2 days.  He also was having intermittent numbness in the arm and hand for the last 24 hours.  He denies any trauma, there is been no new activities.    ROS: 10 point ROS neg other than the symptoms noted above in the HPI.       Objective    BP (!) 142/97 (BP Location: Left arm, Patient Position: Sitting, Cuff Size: Adult Regular)   Pulse 83   Temp 96.9  F (36.1  C) (Tympanic)   Resp 16   Wt 70.7 kg (155 lb 12.8 oz)   SpO2 99%   BMI 26.74 kg/m    Nurses notes and VS have been reviewed.    Physical Exam   GENERAL: Alert, vigorous, is in no acute distress.  SKIN: skin is clear, no rash or abnormal pigmentation  HEAD: The head is normocephalic.   EYES: The eyes are normal. The conjunctivae and cornea normal. Red reflexes are seen bilaterally.  Pupils equal reactive to light bilaterally  NOSE: Clear, no discharge or congestion: pharynx noninjected  NECK: The neck is supple and thyroid is normal, no masses; LYMPH NODES: No adenopathy  LUNGS: The lung fields are clear to auscultation, no rales, rhonchi, wheezing or retractions  CV: Rhythm is regular. S1 and S2 are normal. No murmurs.  ABDOMEN: Bowel sounds are normal. Abdomen soft, non tender,  non  distended, no masses or hepatosplenomegaly.  NEURO: Alert and oriented, normal strength in all extremities, normal gait, normal balance, normal speech and swallowing  EXTREMITIES: Symmetric extremities no deformities          JOB Tee, CNP  [facility] Provider    The use of Dragon/[employer] dictation services may have been used to construct the content in this note; any grammatical or spelling errors are non-intentional. Please contact the author of this note directly if you are in need of any clarification.    8

## 2024-03-28 LAB
APPEARANCE: ABNORMAL
BACTERIA UR CULT: NORMAL
BACTERIA: NEGATIVE
BILIRUBIN URINE: ABNORMAL
BLOOD URINE: NEGATIVE
CALCIUM OXALATE CRYSTALS: ABNORMAL
COLOR: YELLOW
GLUCOSE QUALITATIVE U: NEGATIVE
HYALINE CASTS: 11 /LPF
KETONES URINE: ABNORMAL
LEUKOCYTE ESTERASE URINE: ABNORMAL
MICROSCOPIC-UA: NORMAL
NITRITE URINE: NEGATIVE
PH URINE: 6
PROTEIN URINE: ABNORMAL
RED BLOOD CELLS URINE: 12 /HPF
SPECIFIC GRAVITY URINE: 1.03
SQUAMOUS EPITHELIAL CELLS: 5 /HPF
UROBILINOGEN URINE: ABNORMAL
WHITE BLOOD CELLS URINE: 9 /HPF

## 2024-04-03 NOTE — CONSULT NOTE ADULT - SUBJECTIVE AND OBJECTIVE BOX
HPI:  50 y/o f with pmhx Gastric Ca, not currently on treatment with recurring pleural effusion,  presents from [facility] for concern of worsening shortness of breath. Patient was recently admitted to [facility] ( 3/23-4/1)  with same complaint and had a thoracentesis and paracentesis. 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  Ct surg removed pleurex catheter on 3/28/22 and pt was to follow up with Dr Daniels in 10-14 days. Patient refused conventional chemo and is seeking alternative medicine for cancer as outpatient. During the recent hosp admission, pt was being followed by Palliative care team.    In the ED, she remains hemodynamically stable on 5 L NC. 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     Received :  Ativan 1 mg oral X1, Dilaudid 0.5mg IV X2, Dilaudid 1 mg IV once , Lasix 20mg IV X1 Pepcid 20mg IV X1  (03 Apr 2022 17:58)    PERTINENT PM/SXH:   Bariatric surg stat-unsp    Gastric cancer    Small bowel obstruction    Perforated bowel    Other abdominal hernia    Obesity (BMI 30-39.9)      History of gastrectomy    History of resection of small bowel      FAMILY HISTORY:  FH: diabetes mellitus (Mother)    FH: pulmonary embolism (Mother)    Family history of abdominal aortic aneurysm (AAA) (Father)    Family Hx substance abuse [ ]yes [ ]no  ITEMS NOT CHECKED ARE NOT PRESENT    SOCIAL HISTORY:   Significant other/partner[ ]  Children[ ]  Evangelical/Spirituality:  Substance hx:  [ ]   Tobacco hx:  [ ]   Alcohol hx: [ ]   Home Opioid hx:  [ ] I-Stop Reference No:  Living Situation: [ ]Home  [ ]Long term care  [ ]Rehab [ ]Other    ADVANCE DIRECTIVES:    DNR/MOLST  [ ]  Living Will  [ ]   DECISION MAKER(s):  [ ] Health Care Proxy(s)  [ ] Surrogate(s)  [ ] Guardian           Name(s): Phone Number(s):    BASELINE (I)ADL(s) (prior to admission):  [place]: [ ]Total  [ ] Moderate [ ]Dependent    Allergies    Cipro (Rash)  QUINOLONES (Unknown)    Intolerances    MEDICATIONS  (STANDING):  acetaminophen   IVPB .. 1000 milliGRAM(s) IV Intermittent once  lactulose Syrup 20 Gram(s) Oral every 6 hours  multivitamin 1 Tablet(s) Oral daily  polyethylene glycol 3350 17 Gram(s) Oral daily  sodium chloride 1 Gram(s) Oral three times a day    MEDICATIONS  (PRN):  acetaminophen     Tablet .. 650 milliGRAM(s) Oral every 6 hours PRN Temp greater or equal to 38C (100.4F)  aluminum hydroxide/magnesium hydroxide/simethicone Suspension 30 milliLiter(s) Oral every 4 hours PRN Dyspepsia  HYDROmorphone   Tablet 4 milliGRAM(s) Oral every 4 hours PRN Moderate Pain (4 - 6)  melatonin 3 milliGRAM(s) Oral at bedtime PRN Insomnia  methocarbamol 750 milliGRAM(s) Oral three times a day PRN Muscle Spasm  ondansetron Injectable 4 milliGRAM(s) IV Push every 8 hours PRN Nausea and/or Vomiting    PRESENT SYMPTOMS: [ ]Unable to self-report  [ ] CPOT [ ] PAINADs [ ] RDOS  Source if other than patient:  [ ]Family   [ ]Team     Pain: [ ]yes [ ]no  QOL impact -   Location -                    Aggravating factors -  Quality -  Radiation -  Timing-  Severity (0-10 scale):  Minimal acceptable level (0-10 scale):     CPOT:    https://www.[facility].org/getattachment/[email or web address]/Critical-Care-Pain-Observation-Tool-(CPOT)    PAIN AD Score:   http://geriatrictoolkit.[facility]/cog/painad.pdf (press ctrl +  left click to view)    Dyspnea:                           [ ]Mild [ ]Moderate [ ]Severe      RDOS:  0 to 2  minimal or no respiratory distress   3  mild distress  4 to 6 moderate distress  >7 severe distress  https://[email or web address].net/handouts/hen/Respiratory_Distress_Observation_Scale.pdf (Ctrl +  left click to view)     Anxiety:                             [ ]Mild [ ]Moderate [ ]Severe  Fatigue:                             [ ]Mild [ ]Moderate [ ]Severe  Nausea:                             [ ]Mild [ ]Moderate [ ]Severe  Loss of appetite:              [ ]Mild [ ]Moderate [ ]Severe  Constipation:                    [ ]Mild [ ]Moderate [ ]Severe    Other Symptoms:  [ ]All other review of systems negative     Palliative Performance Status Version 2:         %    http://npcrc.org/files/news/palliative_performance_scale_ppsv2.pdf  PHYSICAL EXAM:  Vital Signs Last 24 Hrs  T(C): 36.6 (04 Apr 2022 05:20), Max: 36.9 (04 Apr 2022 00:31)  T(F): 97.9 (04 Apr 2022 05:20), Max: 98.4 (04 Apr 2022 00:31)  HR: 121 (04 Apr 2022 05:20) (62 - 121)  BP: 104/51 (04 Apr 2022 05:20) (95/58 - 127/80)  BP(mean): --  RR: 20 (04 Apr 2022 05:20) (16 - 25)  SpO2: 100% (04 Apr 2022 05:20) (89% - 100%) I&O's Summary    03 Apr 2022 07:01  -  04 Apr 2022 07:00  --------------------------------------------------------  IN: 700 mL / OUT: 0 mL / NET: 700 mL      GENERAL: [ ]Cachexia    [ ]Alert  [ ]Oriented x   [ ]Lethargic  [ ]Unarousable  [ ]Verbal  [ ]Non-Verbal  Behavioral:   [ ] Anxiety  [ ] Delirium [ ] Agitation [ ] Other  HEENT:  [ ]Normal   [ ]Dry mouth   [ ]ET Tube/Trach  [ ]Oral lesions  PULMONARY:   [ ]Clear [ ]Tachypnea  [ ]Audible excessive secretions   [ ]Rhonchi        [ ]Right [ ]Left [ ]Bilateral  [ ]Crackles        [ ]Right [ ]Left [ ]Bilateral  [ ]Wheezing     [ ]Right [ ]Left [ ]Bilateral  [ ]Diminished breath sounds [ ]right [ ]left [ ]bilateral  CARDIOVASCULAR:    [ ]Regular [ ]Irregular [ ]Tachy  [ ]Linus [ ]Murmur [ ]Other  GASTROINTESTINAL:  [ ]Soft  [ ]Distended   [ ]+BS  [ ]Non tender [ ]Tender  [ ]Other [ ]PEG [ ]OGT/ NGT  Last BM:  GENITOURINARY:  [ ]Normal [ ] Incontinent   [ ]Oliguria/Anuria   [ ]Ansari  MUSCULOSKELETAL:   [ ]Normal   [ ]Weakness  [ ]Bed/Wheelchair bound [ ]Edema  NEUROLOGIC:   [ ]No focal deficits  [ ]Cognitive impairment  [ ]Dysphagia [ ]Dysarthria [ ]Paresis [ ]Other   SKIN:   [ ]Normal  [ ]Rash  [ ]Other  [ ]Pressure ulcer(s)       Present on admission [ ]y [ ]n    CRITICAL CARE:  [ ] Shock Present  [ ]Septic [ ]Cardiogenic [ ]Neurologic [ ]Hypovolemic  [ ]  Vasopressors [ ]  Inotropes   [ ]Respiratory failure present [ ]Mechanical ventilation [ ]Non-invasive ventilatory support [ ]High flow    [ ]Acute  [ ]Chronic [ ]Hypoxic  [ ]Hypercarbic [ ]Other  [ ]Other organ failure     LABS:                        8.5    7.24  )-----------( 387      ( 04 Apr 2022 07:39 )             29.1   04-04    132<L>  |  97  |  11  ----------------------------<  92  4.4   |  21<L>  |  0.57    Ca    8.9      04 Apr 2022 07:37  Mg     2.2     04-03    TPro  5.8<L>  /  Alb  2.8<L>  /  TBili  0.4  /  DBili  x   /  AST  27  /  ALT  19  /  AlkPhos  2506<H>  04-04  PT/INR - ( 03 Apr 2022 09:31 )   PT: 14.0 sec;   INR: 1.20 ratio         PTT - ( 03 Apr 2022 09:31 )  PTT:31.4 sec      RADIOLOGY & ADDITIONAL STUDIES:    PROTEIN CALORIE MALNUTRITION PRESENT: [ ]mild [ ]moderate [ ]severe [ ]underweight [ ]morbid obesity  https://www.andeal.org/vault/2440/web/files/ONC/Table_Clinical%20Characteristics%20to%20Document%20Malnutrition-White%20JV%20et%20al%202012.pdf    Height (cm): 167.6 (04-03-22 @ 08:47), 167.6 (03-28-22 @ 11:11), 167.6 (03-19-22 @ 17:35)  Weight (kg): 90.7 (04-03-22 @ 08:47), 109.6 (03-28-22 @ 11:11), 113.4 (03-19-22 @ 17:35)  BMI (kg/m2): 32.3 (04-03-22 @ 08:47), 39 (03-28-22 @ 11:11), 40.4 (03-19-22 @ 17:35)    [ ]PPSV2 < or = to 30% [ ]significant weight loss  [ ]poor nutritional intake  [ ]anasarca[ ]Artificial Nutrition      REFERRALS:   [ ]Chaplaincy  [ ]Hospice  [ ]Child Life  [ ]Social Work  [ ]Case management [ ]Holistic Therapy     Goals of Care Document:  Odynophagia HPI:  50 y/o f with pmhx Gastric Ca, not currently on treatment with recurring pleural effusion,  presents from [facility] for concern of worsening shortness of breath. Patient was recently admitted to [facility] ( 3/23-4/1)  with same complaint and had a thoracentesis and paracentesis. 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  Ct surg removed pleurex catheter on 3/28/22 and pt was to follow up with Dr Daniels in 10-14 days. Patient refused conventional chemo and is seeking alternative medicine for cancer as outpatient. During the recent hosp admission, pt was being followed by Palliative care team.  In the ED, she remains hemodynamically stable on 5 L NC. 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     Received :  Ativan 1 mg oral X1, Dilaudid 0.5mg IV X2, Dilaudid 1 mg IV once , Lasix 20mg IV X1 Pepcid 20mg IV X1  (03 Apr 2022 17:58)    PERTINENT PM/SXH:   Bariatric surg stat-unsp  Gastric cancer  Small bowel obstruction  Perforated bowel  Other abdominal hernia  Obesity (BMI 30-39.9)  History of gastrectomy  History of resection of small bowel    FAMILY HISTORY:  FH: diabetes mellitus (Mother)  FH: pulmonary embolism (Mother)  Family history of abdominal aortic aneurysm (AAA) (Father)  Family Hx substance abuse [ ]yes [ ]no  ITEMS NOT CHECKED ARE NOT PRESENT    SOCIAL HISTORY:   Significant other/partner[ ]  Children[ ]  Muslim/Spirituality:  Substance hx:  [ ]   Tobacco hx:  [ ]   Alcohol hx: [ ]   Home Opioid hx:  [ ] I-Stop Reference No:  Living Situation: [ ]Home  [ ]Long term care  [ ]Rehab [ ]Other    ADVANCE DIRECTIVES:    DNR/MOLST  [ ]  Living Will  [ ]   DECISION MAKER(s):  [ ] Health Care Proxy(s)  [ ] Surrogate(s)  [ ] Guardian           Name(s): Phone Number(s):    BASELINE (I)ADL(s) (prior to admission):  [place]: [ ]Total  [ ] Moderate [ ]Dependent    Allergies    Cipro (Rash)  QUINOLONES (Unknown)    Intolerances    MEDICATIONS  (STANDING):  acetaminophen   IVPB .. 1000 milliGRAM(s) IV Intermittent once  lactulose Syrup 20 Gram(s) Oral every 6 hours  multivitamin 1 Tablet(s) Oral daily  polyethylene glycol 3350 17 Gram(s) Oral daily  sodium chloride 1 Gram(s) Oral three times a day    MEDICATIONS  (PRN):  acetaminophen     Tablet .. 650 milliGRAM(s) Oral every 6 hours PRN Temp greater or equal to 38C (100.4F)  aluminum hydroxide/magnesium hydroxide/simethicone Suspension 30 milliLiter(s) Oral every 4 hours PRN Dyspepsia  HYDROmorphone   Tablet 4 milliGRAM(s) Oral every 4 hours PRN Moderate Pain (4 - 6)  melatonin 3 milliGRAM(s) Oral at bedtime PRN Insomnia  methocarbamol 750 milliGRAM(s) Oral three times a day PRN Muscle Spasm  ondansetron Injectable 4 milliGRAM(s) IV Push every 8 hours PRN Nausea and/or Vomiting    PRESENT SYMPTOMS: [ ]Unable to self-report  [ ] CPOT [ ] PAINADs [ ] RDOS  Source if other than patient:  [ ]Family   [ ]Team     Pain: [ ]yes [ ]no  QOL impact -   Location -                    Aggravating factors -  Quality -  Radiation -  Timing-  Severity (0-10 scale):  Minimal acceptable level (0-10 scale):     CPOT:    https://www.[facility].org/getattachment/[email or web address]/Critical-Care-Pain-Observation-Tool-(CPOT)    PAIN AD Score:   http://geriatrictoolkit.[facility]/cog/painad.pdf (press ctrl +  left click to view)    Dyspnea:                           [ ]Mild [ ]Moderate [ ]Severe      RDOS:  0 to 2  minimal or no respiratory distress   3  mild distress  4 to 6 moderate distress  >7 severe distress  https://homecareinformation.net/handouts/hen/Respiratory_Distress_Observation_Scale.pdf (Ctrl +  left click to view)     Anxiety:                             [ ]Mild [ ]Moderate [ ]Severe  Fatigue:                             [ ]Mild [ ]Moderate [ ]Severe  Nausea:                             [ ]Mild [ ]Moderate [ ]Severe  Loss of appetite:              [ ]Mild [ ]Moderate [ ]Severe  Constipation:                    [ ]Mild [ ]Moderate [ ]Severe    Other Symptoms:  [ ]All other review of systems negative     Palliative Performance Status Version 2:         %    http://npcrc.org/files/news/palliative_performance_scale_ppsv2.pdf  PHYSICAL EXAM:  Vital Signs Last 24 Hrs  T(C): 36.6 (04 Apr 2022 05:20), Max: 36.9 (04 Apr 2022 00:31)  T(F): 97.9 (04 Apr 2022 05:20), Max: 98.4 (04 Apr 2022 00:31)  HR: 121 (04 Apr 2022 05:20) (62 - 121)  BP: 104/51 (04 Apr 2022 05:20) (95/58 - 127/80)  BP(mean): --  RR: 20 (04 Apr 2022 05:20) (16 - 25)  SpO2: 100% (04 Apr 2022 05:20) (89% - 100%) I&O's Summary    03 Apr 2022 07:01  -  04 Apr 2022 07:00  --------------------------------------------------------  IN: 700 mL / OUT: 0 mL / NET: 700 mL      GENERAL: [ ]Cachexia    [ ]Alert  [ ]Oriented x   [ ]Lethargic  [ ]Unarousable  [ ]Verbal  [ ]Non-Verbal  Behavioral:   [ ] Anxiety  [ ] Delirium [ ] Agitation [ ] Other  HEENT:  [ ]Normal   [ ]Dry mouth   [ ]ET Tube/Trach  [ ]Oral lesions  PULMONARY:   [ ]Clear [ ]Tachypnea  [ ]Audible excessive secretions   [ ]Rhonchi        [ ]Right [ ]Left [ ]Bilateral  [ ]Crackles        [ ]Right [ ]Left [ ]Bilateral  [ ]Wheezing     [ ]Right [ ]Left [ ]Bilateral  [ ]Diminished breath sounds [ ]right [ ]left [ ]bilateral  CARDIOVASCULAR:    [ ]Regular [ ]Irregular [ ]Tachy  [ ]Linus [ ]Murmur [ ]Other  GASTROINTESTINAL:  [ ]Soft  [ ]Distended   [ ]+BS  [ ]Non tender [ ]Tender  [ ]Other [ ]PEG [ ]OGT/ NGT  Last BM:  GENITOURINARY:  [ ]Normal [ ] Incontinent   [ ]Oliguria/Anuria   [ ]Ansari  MUSCULOSKELETAL:   [ ]Normal   [ ]Weakness  [ ]Bed/Wheelchair bound [ ]Edema  NEUROLOGIC:   [ ]No focal deficits  [ ]Cognitive impairment  [ ]Dysphagia [ ]Dysarthria [ ]Paresis [ ]Other   SKIN:   [ ]Normal  [ ]Rash  [ ]Other  [ ]Pressure ulcer(s)       Present on admission [ ]y [ ]n    CRITICAL CARE:  [ ] Shock Present  [ ]Septic [ ]Cardiogenic [ ]Neurologic [ ]Hypovolemic  [ ]  Vasopressors [ ]  Inotropes   [ ]Respiratory failure present [ ]Mechanical ventilation [ ]Non-invasive ventilatory support [ ]High flow    [ ]Acute  [ ]Chronic [ ]Hypoxic  [ ]Hypercarbic [ ]Other  [ ]Other organ failure     LABS:                        8.5    7.24  )-----------( 387      ( 04 Apr 2022 07:39 )             29.1   04-04    132<L>  |  97  |  11  ----------------------------<  92  4.4   |  21<L>  |  0.57    Ca    8.9      04 Apr 2022 07:37  Mg     2.2     04-03    TPro  5.8<L>  /  Alb  2.8<L>  /  TBili  0.4  /  DBili  x   /  AST  27  /  ALT  19  /  AlkPhos  2506<H>  04-04  PT/INR - ( 03 Apr 2022 09:31 )   PT: 14.0 sec;   INR: 1.20 ratio         PTT - ( 03 Apr 2022 09:31 )  PTT:31.4 sec      RADIOLOGY & ADDITIONAL STUDIES:    PROTEIN CALORIE MALNUTRITION PRESENT: [ ]mild [ ]moderate [ ]severe [ ]underweight [ ]morbid obesity  https://www.andeal.org/vault/2440/web/files/ONC/Table_Clinical%20Characteristics%20to%20Document%20Malnutrition-White%20JV%20et%20al%202012.pdf    Height (cm): 167.6 (04-03-22 @ 08:47), 167.6 (03-28-22 @ 11:11), 167.6 (03-19-22 @ 17:35)  Weight (kg): 90.7 (04-03-22 @ 08:47), 109.6 (03-28-22 @ 11:11), 113.4 (03-19-22 @ 17:35)  BMI (kg/m2): 32.3 (04-03-22 @ 08:47), 39 (03-28-22 @ 11:11), 40.4 (03-19-22 @ 17:35)    [ ]PPSV2 < or = to 30% [ ]significant weight loss  [ ]poor nutritional intake  [ ]anasarca[ ]Artificial Nutrition      REFERRALS:   [ ]Chaplaincy  [ ]Hospice  [ ]Child Life  [ ]Social Work  [ ]Case management [ ]Holistic Therapy     Goals of Care Document:  HPI:  52 y/o f with pmhx Gastric Ca, not currently on treatment with recurring pleural effusion,  presents from [facility] for concern of worsening shortness of breath. Patient was recently admitted to [facility] ( 3/23-4/1)  with same complaint and had a thoracentesis and paracentesis. 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  Ct surg removed pleurex catheter on 3/28/22 and pt was to follow up with Dr Daniels in 10-14 days. Patient refused conventional chemo and is seeking alternative medicine for cancer as outpatient. During the recent hosp admission, pt was being followed by Palliative care team.  In the ED, she remains hemodynamically stable on 5 L NC. 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     Received :  Ativan 1 mg oral X1, Dilaudid 0.5mg IV X2, Dilaudid 1 mg IV once , Lasix 20mg IV X1 Pepcid 20mg IV X1  (03 Apr 2022 17:58)    PERTINENT PM/SXH:   Bariatric surg stat-unsp  Gastric cancer  Small bowel obstruction  Perforated bowel  Other abdominal hernia  Obesity (BMI 30-39.9)  History of gastrectomy  History of resection of small bowel    FAMILY HISTORY:  FH: diabetes mellitus (Mother)  FH: pulmonary embolism (Mother)  Family history of abdominal aortic aneurysm (AAA) (Father)  Family Hx substance abuse [ ]yes [ ]no  ITEMS NOT CHECKED ARE NOT PRESENT    SOCIAL HISTORY:   Significant other/partner[x]  Children (3 children at home ages 10, 16) [ ]  Pentecostalism/Spirituality:  Substance hx:  [ ]   Tobacco hx:  [ ]   Alcohol hx: [ ]   Home Opioid hx:  [x] I-Stop Reference No: [id number]  Living Situation: [x]Home  [ ]Long term care  [ ]Rehab [ ]Other    ADVANCE DIRECTIVES: deferred to speak about topic due to pt with severe symptoms  DNR/MOLST  [ ]  Living Will  [ ]   DECISION MAKER(s): patient  [ ] Health Care Proxy(s)  [ ] Surrogate(s)  [ ] Guardian           Name(s): Phone Number(s):     BASELINE (I)ADL(s) (prior to admission):  [place]: [ ]Total  [ ] Moderate [x]Dependent- pt with new decreased functional status, in rehab prior to admission    Allergies  Cipro (Rash)  QUINOLONES (Unknown)  Intolerances    MEDICATIONS  (STANDING):  acetaminophen   IVPB .. 1000 milliGRAM(s) IV Intermittent once  lactulose Syrup 20 Gram(s) Oral every 6 hours  multivitamin 1 Tablet(s) Oral daily  polyethylene glycol 3350 17 Gram(s) Oral daily  sodium chloride 1 Gram(s) Oral three times a day    MEDICATIONS  (PRN):  acetaminophen     Tablet .. 650 milliGRAM(s) Oral every 6 hours PRN Temp greater or equal to 38C (100.4F)  aluminum hydroxide/magnesium hydroxide/simethicone Suspension 30 milliLiter(s) Oral every 4 hours PRN Dyspepsia  HYDROmorphone   Tablet 4 milliGRAM(s) Oral every 4 hours PRN Moderate Pain (4 - 6)  melatonin 3 milliGRAM(s) Oral at bedtime PRN Insomnia  methocarbamol 750 milliGRAM(s) Oral three times a day PRN Muscle Spasm  ondansetron Injectable 4 milliGRAM(s) IV Push every 8 hours PRN Nausea and/or Vomiting    PRESENT SYMPTOMS: [ ]Unable to self-report  [x] CPOT [x] PAINADs [x] RDOS  Source if other than patient:  [ ]Family   [ ]Team     Pain: [x]yes [ ]no  QOL impact - affects ADLs and speaking in full sentences   Location -  spine, chest (specifically over clavicle fracture), abdomen (diffuse discomfort), legs (over areas of swelling)  Aggravating factors - movement especially turning/changing- pt afraid to urinate or stool per nursing staff due to fear of movement)  Quality - discomfort over abdomen  Radiation - unable to describe  Timing- constant when pt does not take pain medications  Severity (0-10 scale): 10/10 before Dilaudid 4mg, 4/10 afterwards  Minimal acceptable level (0-10 scale): 2-3    CPOT:  4  https://www.[facility].org/getattachment/[email or web address]-4e0u-4e3g-2923m3186c1y/Critical-Care-Pain-Observation-Tool-(CPOT)    PAIN AD Score: 4  http://geriatrictoolkit.[facility]/cog/painad.pdf (press ctrl +  left click to view)    Dyspnea:                           [ ]Mild [ ]Moderate [X]Severe    RDOS: 7  0 to 2  minimal or no respiratory distress   3  mild distress  4 to 6 moderate distress  >7 severe distress  https://[employer].net/handouts/hen/Respiratory_Distress_Observation_Scale.pdf (Ctrl +  left click to view)     Anxiety:                             [ ]Mild [ ]Moderate [x]Severe  Fatigue:                             [ ]Mild [x]Moderate [ ]Severe  Nausea:                             [ ]Mild [ ]Moderate [ ]Severe  Loss of appetite:              [ ]Mild [ ]Moderate [ ]Severe  Constipation:                    [ ]Mild [x]Moderate [ ]Severe    Other Symptoms:  [x]All other review of systems negative     Palliative Performance Status Version 2:       40  %    http://[facility].org/files/news/palliative_performance_scale_ppsv2.pdf  PHYSICAL EXAM:  Vital Signs Last 24 Hrs  T(C): 36.6 (04 Apr 2022 05:20), Max: 36.9 (04 Apr 2022 00:31)  T(F): 97.9 (04 Apr 2022 05:20), Max: 98.4 (04 Apr 2022 00:31)  HR: 121 (04 Apr 2022 05:20) (62 - 121)  BP: 104/51 (04 Apr 2022 05:20) (95/58 - 127/80)  RR: 20 (04 Apr 2022 05:20) (16 - 25)  SpO2: 100% (04 Apr 2022 05:20) (89% - 100%) I&O's Summary    03 Apr 2022 07:01  -  04 Apr 2022 07:00  --------------------------------------------------------  IN: 700 mL / OUT: 0 mL / NET: 700 mL    GENERAL: [ ]Cachexia    [x]Alert  [x]Oriented x 3 [x]Lethargic  [ ]Unarousable  [x]Verbal  [ ]Non-Verbal  Behavioral:   [x] Anxiety  [ ] Delirium [ ] Agitation [ ] Other  HEENT:  [x]Normal   [ ]Dry mouth   [ ]ET Tube/Trach  [ ]Oral lesions  PULMONARY:   [x]Clear [ ]Tachypnea  [ ]Audible excessive secretions   [ ]Rhonchi        [ ]Right [ ]Left [ ]Bilateral  [x]Crackles        [ ]Right [ ]Left [x]Bilateral  [ ]Wheezing     [ ]Right [ ]Left [ ]Bilateral  [x]Diminished breath sounds [x]right [ ]left [ ]bilateral  CARDIOVASCULAR:    [ ]Regular [ ]Irregular [x]Tachy  [ ]Linus [ ]Murmur [ ]Other  GASTROINTESTINAL:  [x]Soft  [x]Distended   [x]+BS  [ ]Non tender [ ]Tender  [ ]Other-mass over RUQ, firm, nontender [ ]PEG [ ]OGT/ NGT  Last BM: 48 hours ago  GENITOURINARY:  [ ]Normal [ ] Incontinent   [ ]Oliguria/Anuria   [ ]Ansari  MUSCULOSKELETAL:   [ ]Normal   [x]Weakness  [ ]Bed/Wheelchair bound [x]Edema +4 b/l pitting above knees  NEUROLOGIC:   [x]No focal deficits  [ ]Cognitive impairment  [ ]Dysphagia [ ]Dysarthria [ ]Paresis [ ]Other   SKIN:   [x]Normal  [ ]Rash  [ ]Other  [ ]Pressure ulcer(s)       Present on admission [ ]y [ ]n    CRITICAL CARE:  [ ] Shock Present  [ ]Septic [ ]Cardiogenic [ ]Neurologic [ ]Hypovolemic  [ ]  Vasopressors [ ]  Inotropes   [ ]Respiratory failure present [ ]Mechanical ventilation [ ]Non-invasive ventilatory support [ ]High flow    [ ]Acute  [ ]Chronic [ ]Hypoxic  [ ]Hypercarbic [ ]Other  [ ]Other organ failure     LABS:                        8.5    7.24  )-----------( 387      ( 04 Apr 2022 07:39 )             29.1   04-04    132<L>  |  97  |  11  ----------------------------<  92  4.4   |  21<L>  |  0.57    Ca    8.9      04 Apr 2022 07:37  Mg     2.2     04-03    TPro  5.8<L>  /  Alb  2.8<L>  /  TBili  0.4  /  DBili  x   /  AST  27  /  ALT  19  /  AlkPhos  2506<H>  04-04  PT/INR - ( 03 Apr 2022 09:31 )   PT: 14.0 sec;   INR: 1.20 ratio       PTT - ( 03 Apr 2022 09:31 )  PTT:31.4 sec    RADIOLOGY & ADDITIONAL STUDIES:     PROTEIN CALORIE MALNUTRITION PRESENT: [ ]mild [ ]moderate [ ]severe [ ]underweight [ ]morbid obesity  https://www.andeal.org/vault/2440/web/files/ONC/Table_Clinical%20Characteristics%20to%20Document%20Malnutrition-White%20JV%20et%20al%202012.pdf    Height (cm): 167.6 (04-03-22 @ 08:47), 167.6 (03-28-22 @ 11:11), 167.6 (03-19-22 @ 17:35)  Weight (kg): 90.7 (04-03-22 @ 08:47), 109.6 (03-28-22 @ 11:11), 113.4 (03-19-22 @ 17:35)  BMI (kg/m2): 32.3 (04-03-22 @ 08:47), 39 (03-28-22 @ 11:11), 40.4 (03-19-22 @ 17:35)    [ ]PPSV2 < or = to 30% [ ]significant weight loss  [ ]poor nutritional intake  [ ]anasarca[ ]Artificial Nutrition      REFERRALS:   [ ]Chaplaincy  [ ]Hospice  [x]Child Life  [ ]Social Work  [x]Case management [ ]Holistic Therapy     Goals of Care Document: deferred at this time due to severe symptoms

## 2024-05-15 NOTE — PATIENT PROFILE ADULT - FALL HARM RISK - TYPE OF ASSESSMENT
Patient transferred to higher level of care. No TCM call required per policy.   Julia Perez RN on 5/15/2024 at 8:55 AM    
Admission

## 2024-12-10 NOTE — DISCHARGE NOTE PROVIDER - NSDCHHNEEDSERVICE_GEN_ALL_CORE
Return back to clinic in 3 to 4 months    Record your weight daily, first thing in the morning and keep a record.    Call us with a weight gain greater than 3 pounds in one day or 5 pounds in one week.     Follow a 2 gram sodium (2000 mg) and a 2 liter (64 ounces) fluid restricted diet.     Avoid the use of NSAID (nonsteroidal anti-inflammatory drugs, I.e Ibuprofen, Advil and Aleve).     
Observation and assessment/Teaching and training

## 2024-12-27 NOTE — ED ADULT NURSE NOTE - SUICIDE SCREENING DEPRESSION
[facility] REFERRAL URGENCY: ROUTINE [phone] REFERRING PHYSICIAN: FATOU LOFTON ADDRESS OF REFERRING PHYS: [address] RE: DL FRAZIER PATIENT 1937 PATIENT PCP: FATOU LOFTON WE RECEIVED A REFERRAL FOR PALLIATIVE MEDICINE AT [facility] AND PALLIATIVE MEDICINE AT [facility]. WE WERE INFORMED THE PATIENT IS UNABLE TO SPEAK. CAN YOU PLEASE PROVIDE US WITH PATIENT'S ?       Urgency: ROUTINE  Referred To: [facility] and Palliative [facility] at [facility] ([facility]  Clinical History: Patient has ischemic stroke and pressure ulceration with frequent recent hospitalizations.  Indication: Ischemic stroke (ICD-10CM: I63.50)  Patient  Legal Name: Dl Frazier  Goes By: Dl  : 1937 (87)  Sex: Male  Pronouns: He/Him  PCP: Fatou Lofton    Patient Contact  Phone: [phone]  Address: [facility] [place] [address]    Consulting Provider  Name: [facility] ([facility]  NPI: Unknown  Specialty: Palliative Medicine  Address: [address] [place] [id number]  Phone: [phone]  Fax: [phone]    Insurance  Carrier: Medicare Part B  Type: Medicare - Original  Subscriber: Dl Frazier  ID: [id number]  Group:  Referral Authorization Code:  Verified: No authorization needed  Referral Authorization Expiration Date:  Not Specified    Referring Provider  Name: Fatou Lofton MD  Email: [email or web address]  Address:  [place] [address]  Phone: [phone]  Fax: [phone]     
4pm 1222/27 - spouse Yoko called back, already sched for 1/29, said will need some durable medical supplies before that, eg. Mattress, wants to see if can move up appt or get needs sooner. [facility], part of [facility], not hospice.    At [phone] and provided direct #.  Also advised may need to contact pcp for orders for other goods, offered to message ([employer]) pcp, pt took phone # for palliative care and declined further assistance  
show
Negative

## 2025-02-21 NOTE — ED ADULT NURSE NOTE - NS ED NURSE RECORD ANOTHER HT AND WT

## 2025-03-24 NOTE — ASU PREOP CHECKLIST - NS PREOP CHK MONITOR ANESTHESIA CONSENT
Normal exam.  Labs as ordered.  Skin check normal.  No significant abnormal nevi.  Breast exam completed- Per GYNE  No cervical or inguinal lymphadenopathy.  Hernial orifices intact.    Immunizations- Deferred  
done

## 2025-05-23 NOTE — ED ADULT NURSE NOTE - NS ED NOTE ABUSE RESPONSE YN
Medication passed protocol.     Medication: Tirzepatide (Mounjaro) 7.5 MG/0.5 ML Solution Auto-injector  passed protocol.   Last office visit date: 12/4/24  Next appointment scheduled?: Yes   Refilled per Dr. Severino's protocol.    Yes

## 2025-06-19 NOTE — PROGRESS NOTE ADULT - PROBLEM SELECTOR PLAN 2
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    Monitor SPO2   I spoke with IR, Pulmonary and  CT surgery - at this time they cannot offer another Pleurx due to loculation and positioning of the effusion  c/w Bumex 2mg IV BID    incentive spirometry as tolerated

## 2025-06-20 NOTE — OCCUPATIONAL THERAPY INITIAL EVALUATION ADULT - PATIENT PROFILE REVIEW, REHAB EVAL
yes normal/ROM intact/no joint swelling/no joint erythema/no joint warmth/no calf tenderness/normal gait/strength 5/5 bilateral upper extremities/strength 5/5 bilateral lower extremities details…

## 2025-07-21 NOTE — PRE-OP CHECKLIST - WEIGHT IN KG
----- Message from Obed Camarena sent at 7/21/2025 12:47 AM EDT -----  Hi team, Please schedule Maurice to follow up with me in 6 months. He will do CT and PFTs prior to appt. Orders are in.    90.7

## (undated) DEVICE — GLV 8.5 PROTEXIS (WHITE)

## (undated) DEVICE — POSITIONER FOAM EGG CRATE ULNAR 2PCS (PINK)

## (undated) DEVICE — DRSG STERISTRIPS 0.5 X 4"

## (undated) DEVICE — DRAPE LIGHT HANDLE COVER (BLUE)

## (undated) DEVICE — DRSG TEGADERM 6"X8"

## (undated) DEVICE — MEDICATION LABELS W MARKER

## (undated) DEVICE — DRAPE TOWEL BLUE 17" X 24"

## (undated) DEVICE — DRAPE IOBAN 23" X 23"

## (undated) DEVICE — TUBING MEDI-VAC W MAXIGRIP CONNECTORS 1/4"X6'

## (undated) DEVICE — WARMING BLANKET LOWER ADULT

## (undated) DEVICE — GLV 6.5 PROTEXIS (WHITE)

## (undated) DEVICE — GLV 8 PROTEXIS (WHITE)

## (undated) DEVICE — ELCTR ECG CONDUCTIVE ADHESIVE

## (undated) DEVICE — DISSECTOR ENDO PEANUT 5MM

## (undated) DEVICE — ELCTR BOVIE TIP BLADE INSULATED 2.75" EDGE

## (undated) DEVICE — CATH IV SAFE BC 22G X 1" (BLUE)

## (undated) DEVICE — BLADE SCALPEL SAFETYLOCK #10

## (undated) DEVICE — PREP CHLORAPREP HI-LITE ORANGE 26ML

## (undated) DEVICE — VENODYNE/SCD SLEEVE CALF LARGE

## (undated) DEVICE — SPECIMEN CONTAINER 100ML

## (undated) DEVICE — PACK BASIC GOWN

## (undated) DEVICE — GOWN LG

## (undated) DEVICE — CONTAINER FORMALIN 80ML YELLOW

## (undated) DEVICE — SOL IRR POUR H2O 250ML

## (undated) DEVICE — BASIN EMESIS 10IN GRADUATED MAUVE

## (undated) DEVICE — CLAMP BX HOT RAD JAW 3

## (undated) DEVICE — DRSG BANDAID 0.75X3"

## (undated) DEVICE — GLV 7.5 PROTEXIS (WHITE)

## (undated) DEVICE — DRAPE INSTRUMENT POUCH 6.75" X 11"

## (undated) DEVICE — BLADE SCALPEL SAFETYLOCK #11

## (undated) DEVICE — SOL IRR POUR NS 0.9% 500ML

## (undated) DEVICE — DRSG CURITY GAUZE SPONGE 4 X 4" 12-PLY NON-STERILE

## (undated) DEVICE — TUBING SUCTION 20FT

## (undated) DEVICE — PACK MINOR

## (undated) DEVICE — FOLEY TRAY 16FR 5CC LTX UMETER CLOSED

## (undated) DEVICE — BLADE SCALPEL SAFETYLOCK #15

## (undated) DEVICE — TAPE SILK 3"

## (undated) DEVICE — SUCTION YANKAUER NO CONTROL VENT

## (undated) DEVICE — PACK IV START WITH CHG

## (undated) DEVICE — GOWN TRIMAX LG

## (undated) DEVICE — TUBING IV SET GRAVITY 3Y 100" MACRO

## (undated) DEVICE — VENODYNE/SCD SLEEVE CALF MEDIUM

## (undated) DEVICE — DRSG 2X2

## (undated) DEVICE — BITE BLOCK ADULT 20 X 27MM (GREEN)

## (undated) DEVICE — DRAIN PLEURX KIT WITH 500ML VACUUM BOTTLE

## (undated) DEVICE — DENTURE CUP PINK

## (undated) DEVICE — DRSG OPSITE 13.75 X 4"

## (undated) DEVICE — DRSG BENZOIN 0.6CC

## (undated) DEVICE — LINE BREATHE SAMPLNG

## (undated) DEVICE — BIOPSY FORCEP COLD DISP

## (undated) DEVICE — LIJ/LIA-OLYMPUS UES 849-829: Type: DURABLE MEDICAL EQUIPMENT

## (undated) DEVICE — GLV 7 PROTEXIS (WHITE)

## (undated) DEVICE — SALIVA EJECTOR (BLUE)

## (undated) DEVICE — UNDERPAD LINEN SAVER 17 X 24"

## (undated) DEVICE — VISITEC 4X4

## (undated) DEVICE — LUBRICATING JELLY HR ONE SHOT 3G

## (undated) DEVICE — FACESHIELD FULL VISOR

## (undated) DEVICE — STAPLER SKIN VISI-STAT 35 WIDE

## (undated) DEVICE — BIOPSY FORCEP RADIAL JAW 4 STANDARD WITH NEEDLE

## (undated) DEVICE — PACK GENERAL MINOR

## (undated) DEVICE — D HELP - CLEARVIEW CLEARIFY SYSTEM

## (undated) DEVICE — LAP PAD 18 X 18"